# Patient Record
Sex: MALE | Race: WHITE
[De-identification: names, ages, dates, MRNs, and addresses within clinical notes are randomized per-mention and may not be internally consistent; named-entity substitution may affect disease eponyms.]

---

## 2019-02-12 ENCOUNTER — RECORD ABSTRACTING (OUTPATIENT)
Age: 64
End: 2019-02-12

## 2019-02-12 DIAGNOSIS — Z86.79 PERSONAL HISTORY OF OTHER DISEASES OF THE CIRCULATORY SYSTEM: ICD-10-CM

## 2019-02-12 DIAGNOSIS — Z86.39 PERSONAL HISTORY OF OTHER ENDOCRINE, NUTRITIONAL AND METABOLIC DISEASE: ICD-10-CM

## 2019-02-12 DIAGNOSIS — G56.03 CARPAL TUNNEL SYNDROM,BILATERAL UPPER LIMBS: ICD-10-CM

## 2019-02-12 DIAGNOSIS — I37.1 NONRHEUMATIC PULMONARY VALVE INSUFFICIENCY: ICD-10-CM

## 2019-02-12 DIAGNOSIS — Z87.39 PERSONAL HISTORY OF OTHER DISEASES OF THE MUSCULOSKELETAL SYSTEM AND CONNECTIVE TISSUE: ICD-10-CM

## 2019-02-12 DIAGNOSIS — Z87.448 PERSONAL HISTORY OF OTHER DISEASES OF URINARY SYSTEM: ICD-10-CM

## 2019-02-12 DIAGNOSIS — I82.0 BUDD-CHIARI SYNDROME: ICD-10-CM

## 2019-02-12 RX ORDER — MULTIVITAMIN
TABLET ORAL DAILY
Refills: 0 | Status: ACTIVE | COMMUNITY

## 2019-02-12 RX ORDER — FLUTICASONE PROPIONATE 50 UG/1
50 SPRAY, METERED NASAL DAILY
Refills: 0 | Status: ACTIVE | COMMUNITY

## 2019-02-14 ENCOUNTER — RX RENEWAL (OUTPATIENT)
Age: 64
End: 2019-02-14

## 2019-03-13 ENCOUNTER — RX RENEWAL (OUTPATIENT)
Age: 64
End: 2019-03-13

## 2019-03-13 DIAGNOSIS — E11.37X2 TYPE 2 DIABETES MELLITUS WITH DIABETIC MACULAR EDEMA, RESOLVED FOLLOWING TREATMENT, LEFT EYE: ICD-10-CM

## 2019-03-13 DIAGNOSIS — Z79.4 TYPE 2 DIABETES MELLITUS WITH DIABETIC MACULAR EDEMA, RESOLVED FOLLOWING TREATMENT, LEFT EYE: ICD-10-CM

## 2019-06-24 ENCOUNTER — RX RENEWAL (OUTPATIENT)
Age: 64
End: 2019-06-24

## 2019-08-18 ENCOUNTER — RX RENEWAL (OUTPATIENT)
Age: 64
End: 2019-08-18

## 2019-10-14 ENCOUNTER — APPOINTMENT (OUTPATIENT)
Dept: INTERNAL MEDICINE | Facility: CLINIC | Age: 64
End: 2019-10-14
Payer: COMMERCIAL

## 2019-10-14 VITALS
TEMPERATURE: 98.7 F | RESPIRATION RATE: 16 BRPM | OXYGEN SATURATION: 98 % | BODY MASS INDEX: 25.91 KG/M2 | HEART RATE: 72 BPM | SYSTOLIC BLOOD PRESSURE: 126 MMHG | DIASTOLIC BLOOD PRESSURE: 72 MMHG | WEIGHT: 171 LBS | HEIGHT: 68 IN

## 2019-10-14 PROCEDURE — 99214 OFFICE O/P EST MOD 30 MIN: CPT | Mod: 25

## 2019-10-14 PROCEDURE — 36415 COLL VENOUS BLD VENIPUNCTURE: CPT

## 2019-10-14 PROCEDURE — 90662 IIV NO PRSV INCREASED AG IM: CPT

## 2019-10-14 PROCEDURE — G0008: CPT

## 2019-10-14 NOTE — PHYSICAL EXAM
[No Acute Distress] : no acute distress [Well Developed] : well developed [Well Nourished] : well nourished [Normal Sclera/Conjunctiva] : normal sclera/conjunctiva [Well-Appearing] : well-appearing [PERRL] : pupils equal round and reactive to light [EOMI] : extraocular movements intact [Normal Oropharynx] : the oropharynx was normal [No JVD] : no jugular venous distention [Normal Outer Ear/Nose] : the outer ears and nose were normal in appearance [No Lymphadenopathy] : no lymphadenopathy [Thyroid Normal, No Nodules] : the thyroid was normal and there were no nodules present [Supple] : supple [No Respiratory Distress] : no respiratory distress  [No Accessory Muscle Use] : no accessory muscle use [Clear to Auscultation] : lungs were clear to auscultation bilaterally [Regular Rhythm] : with a regular rhythm [Normal Rate] : normal rate  [Normal S1, S2] : normal S1 and S2 [No Murmur] : no murmur heard [No Carotid Bruits] : no carotid bruits [No Abdominal Bruit] : a ~M bruit was not heard ~T in the abdomen [No Varicosities] : no varicosities [No Extremity Clubbing/Cyanosis] : no extremity clubbing/cyanosis [No Edema] : there was no peripheral edema [Pedal Pulses Present] : the pedal pulses are present [No Palpable Aorta] : no palpable aorta [Non Tender] : non-tender [Non-distended] : non-distended [Soft] : abdomen soft [No Masses] : no abdominal mass palpated [No HSM] : no HSM [Normal Anterior Cervical Nodes] : no anterior cervical lymphadenopathy [Normal Posterior Cervical Nodes] : no posterior cervical lymphadenopathy [Normal Bowel Sounds] : normal bowel sounds [No CVA Tenderness] : no CVA  tenderness [No Spinal Tenderness] : no spinal tenderness [No Joint Swelling] : no joint swelling [Grossly Normal Strength/Tone] : grossly normal strength/tone [No Focal Deficits] : no focal deficits [No Rash] : no rash [Coordination Grossly Intact] : coordination grossly intact [Deep Tendon Reflexes (DTR)] : deep tendon reflexes were 2+ and symmetric [Normal Affect] : the affect was normal [Normal Gait] : normal gait [Normal Insight/Judgement] : insight and judgment were intact [de-identified] : overweight

## 2019-10-14 NOTE — HISTORY OF PRESENT ILLNESS
[de-identified] : Patient is a 64 year old male with a past medical history as below who presents for fasting blood work and general follow-up. Patient states he is taking all medications as prescribed and denies any adverse reactions or side effects. He denies lightheadedness or dizziness on Irbesartan. He denies any new arthralgias or myalgias on Rosuvastatin. He denies any recent episodes of hypoglycemia. Patient notes recent MRI showed growth of the neuroendocrine tumor of the pancreas. Patient inquires about receiving the flu vaccine today. He states he has never received the Pneumonia vaccine.  [FreeTextEntry1] : fasting blood work and general follow-up\par

## 2019-10-14 NOTE — ASSESSMENT
[FreeTextEntry1] : Patient is a 64 year old male with a past medical history as above who presents for fasting blood work and general follow-up.\par

## 2019-10-14 NOTE — ADDENDUM
[FreeTextEntry1] : I, Bertin Webb, acted solely as scribe for Dr. Joss Freeman DO on this date 10/14/2019 10:50AM .\par \par All medical record entries made by the Scribe were at my, Dr. Joss Freeman DO direction and personally dictated by me on 10/14/2019 10:50AM. I have reviewed the chart and agree that the record accurately reflects my personal performance of the history, physical exam, assessment and plan. I have also personally directed, reviewed and agreed with the chart.\par

## 2019-10-14 NOTE — PLAN
[FreeTextEntry1] : Cardiology\par benign essential hypertension - continue Irbesartan 75mg p.o.q.d. - continue low sodium diet\par Endocrinology\par hyperlipidemia - continue Rosuvastatin Calcium 10mg p.o.q.d. - continue low cholesterol/low fat diet - check FLP and LFTs\par diabetes - continue Glipizide ER 10mg p.o.q.d. - check hemoglobin A1C\par vitamin D deficiency - continue vitamin D-3 2000 unit tablets p.o.q.d. with food - check vitamin D \par Oncology\par neuroendocrine tumor of pancreas - continue Afinitor 10mg p.o. as directed and Sandostatin 50 MCG/mL injections as directed - follow up with oncologist \par Urology\par BPH - continue Alfuzosin HCl ER 10mg p.o.q.d. - check PSA\par Infectious Disease\par flu vaccine - Fluzone High-Dose 0.5mL x 1 administered intramuscularly \par Pneumovax/Prevnar 13 - discussed, patient to receive the vaccine at next follow-up visit \par

## 2019-10-21 LAB
25(OH)D3 SERPL-MCNC: 66.5 NG/ML
ALBUMIN SERPL ELPH-MCNC: 4.3 G/DL
ALP BLD-CCNC: 101 U/L
ALT SERPL-CCNC: 26 U/L
ANION GAP SERPL CALC-SCNC: 16 MMOL/L
AST SERPL-CCNC: 43 U/L
BASOPHILS # BLD AUTO: 0.03 K/UL
BASOPHILS NFR BLD AUTO: 0.5 %
BILIRUB SERPL-MCNC: 0.5 MG/DL
BUN SERPL-MCNC: 27 MG/DL
CALCIUM SERPL-MCNC: 9.1 MG/DL
CHLORIDE SERPL-SCNC: 102 MMOL/L
CHOLEST SERPL-MCNC: 167 MG/DL
CHOLEST/HDLC SERPL: 2.7 RATIO
CO2 SERPL-SCNC: 21 MMOL/L
CREAT SERPL-MCNC: 1.5 MG/DL
EOSINOPHIL # BLD AUTO: 0.05 K/UL
EOSINOPHIL NFR BLD AUTO: 0.8 %
ESTIMATED AVERAGE GLUCOSE: 148 MG/DL
GLUCOSE SERPL-MCNC: 119 MG/DL
HBA1C MFR BLD HPLC: 6.8 %
HCT VFR BLD CALC: 39.6 %
HDLC SERPL-MCNC: 61 MG/DL
HGB BLD-MCNC: 12 G/DL
IMM GRANULOCYTES NFR BLD AUTO: 0.3 %
LDLC SERPL CALC-MCNC: 91 MG/DL
LYMPHOCYTES # BLD AUTO: 1.17 K/UL
LYMPHOCYTES NFR BLD AUTO: 18.7 %
MAN DIFF?: NORMAL
MCHC RBC-ENTMCNC: 25.2 PG
MCHC RBC-ENTMCNC: 30.3 GM/DL
MCV RBC AUTO: 83.2 FL
MONOCYTES # BLD AUTO: 0.51 K/UL
MONOCYTES NFR BLD AUTO: 8.1 %
NEUTROPHILS # BLD AUTO: 4.49 K/UL
NEUTROPHILS NFR BLD AUTO: 71.6 %
PLATELET # BLD AUTO: 163 K/UL
POTASSIUM SERPL-SCNC: 4.6 MMOL/L
PROT SERPL-MCNC: 7 G/DL
PSA SERPL-MCNC: 1.41 NG/ML
RBC # BLD: 4.76 M/UL
RBC # FLD: 13.5 %
SODIUM SERPL-SCNC: 139 MMOL/L
TRIGL SERPL-MCNC: 75 MG/DL
TSH SERPL-ACNC: 2.42 UIU/ML
WBC # FLD AUTO: 6.27 K/UL

## 2019-12-10 ENCOUNTER — RX RENEWAL (OUTPATIENT)
Age: 64
End: 2019-12-10

## 2020-02-17 ENCOUNTER — RX RENEWAL (OUTPATIENT)
Age: 65
End: 2020-02-17

## 2020-02-18 ENCOUNTER — RX RENEWAL (OUTPATIENT)
Age: 65
End: 2020-02-18

## 2020-03-09 ENCOUNTER — RX RENEWAL (OUTPATIENT)
Age: 65
End: 2020-03-09

## 2020-04-07 ENCOUNTER — RX RENEWAL (OUTPATIENT)
Age: 65
End: 2020-04-07

## 2020-04-16 ENCOUNTER — RX RENEWAL (OUTPATIENT)
Age: 65
End: 2020-04-16

## 2020-05-07 ENCOUNTER — RX RENEWAL (OUTPATIENT)
Age: 65
End: 2020-05-07

## 2020-05-13 ENCOUNTER — APPOINTMENT (OUTPATIENT)
Dept: INTERNAL MEDICINE | Facility: CLINIC | Age: 65
End: 2020-05-13
Payer: COMMERCIAL

## 2020-05-13 VITALS
OXYGEN SATURATION: 98 % | WEIGHT: 177 LBS | HEART RATE: 85 BPM | DIASTOLIC BLOOD PRESSURE: 95 MMHG | RESPIRATION RATE: 16 BRPM | TEMPERATURE: 97.9 F | BODY MASS INDEX: 26.83 KG/M2 | HEIGHT: 68 IN | SYSTOLIC BLOOD PRESSURE: 148 MMHG

## 2020-05-13 PROCEDURE — 99214 OFFICE O/P EST MOD 30 MIN: CPT | Mod: 25

## 2020-05-13 PROCEDURE — 36415 COLL VENOUS BLD VENIPUNCTURE: CPT

## 2020-05-15 LAB
25(OH)D3 SERPL-MCNC: 77.3 NG/ML
ALBUMIN SERPL ELPH-MCNC: 4.2 G/DL
ALP BLD-CCNC: 64 U/L
ALT SERPL-CCNC: 28 U/L
ANION GAP SERPL CALC-SCNC: 12 MMOL/L
AST SERPL-CCNC: 42 U/L
BASOPHILS # BLD AUTO: 0.05 K/UL
BASOPHILS NFR BLD AUTO: 1 %
BILIRUB SERPL-MCNC: 0.5 MG/DL
BUN SERPL-MCNC: 24 MG/DL
CALCIUM SERPL-MCNC: 9.4 MG/DL
CHLORIDE SERPL-SCNC: 103 MMOL/L
CHOLEST SERPL-MCNC: 121 MG/DL
CHOLEST/HDLC SERPL: 2.1 RATIO
CO2 SERPL-SCNC: 26 MMOL/L
CREAT SERPL-MCNC: 1.61 MG/DL
CREAT SPEC-SCNC: 112 MG/DL
EOSINOPHIL # BLD AUTO: 0.53 K/UL
EOSINOPHIL NFR BLD AUTO: 10.2 %
GLUCOSE SERPL-MCNC: 109 MG/DL
HCT VFR BLD CALC: 44 %
HDLC SERPL-MCNC: 56 MG/DL
HGB BLD-MCNC: 14.1 G/DL
IMM GRANULOCYTES NFR BLD AUTO: 0 %
LDLC SERPL CALC-MCNC: 48 MG/DL
LYMPHOCYTES # BLD AUTO: 1.57 K/UL
LYMPHOCYTES NFR BLD AUTO: 30.2 %
MAN DIFF?: NORMAL
MCHC RBC-ENTMCNC: 32 GM/DL
MCHC RBC-ENTMCNC: 34 PG
MCV RBC AUTO: 106 FL
MICROALBUMIN 24H UR DL<=1MG/L-MCNC: 1.8 MG/DL
MICROALBUMIN/CREAT 24H UR-RTO: 16 MG/G
MONOCYTES # BLD AUTO: 0.32 K/UL
MONOCYTES NFR BLD AUTO: 6.2 %
NEUTROPHILS # BLD AUTO: 2.73 K/UL
NEUTROPHILS NFR BLD AUTO: 52.4 %
PLATELET # BLD AUTO: 120 K/UL
POTASSIUM SERPL-SCNC: 4.3 MMOL/L
PROT SERPL-MCNC: 7 G/DL
PSA SERPL-MCNC: 1.2 NG/ML
RBC # BLD: 4.15 M/UL
RBC # FLD: 14.6 %
SARS-COV-2 IGG SERPL IA-ACNC: <0.1 INDEX
SARS-COV-2 IGG SERPL QL IA: NEGATIVE
SODIUM SERPL-SCNC: 141 MMOL/L
TRIGL SERPL-MCNC: 81 MG/DL
TSH SERPL-ACNC: 4.4 UIU/ML
WBC # FLD AUTO: 5.2 K/UL

## 2020-05-17 NOTE — REVIEW OF SYSTEMS
[Heartburn] : heartburn [Skin Rash] : skin rash [Negative] : Psychiatric [de-identified] : left lower extremity

## 2020-05-17 NOTE — HISTORY OF PRESENT ILLNESS
[FreeTextEntry1] : follow up, fasting bloodwork [de-identified] : 65 year old male PMH as below presents for general follow up, fasting blood work and RX refills.  He was started on a new medication to treat his neuroendocrine tumor (sutent).  This was prescribed by his oncologist, Dr. Llamas because previous meds became ineffective with advancement of his tumor with spread to his liver.  He has tolerated sutent, but it has caused him to have GERD which has been successfully treated with omeprazole.  He has gained 6 pounds since his last visit in October 2019.  He is trying to watch the carbohydrates and sugar in his diet because of DM2.  He continue to take glipizide ER10 mg daily and has not had episodes of hypoglycemia.  He denies myalgias/arthralgias on rosuvastatin.  He is taking irbesartan for HTN and denies dizziness/lightheadedness.  His GERD is well controlled on omeprazole.

## 2020-05-17 NOTE — DISCUSSION/SUMMARY
[FreeTextEntry1] : Called patient to review recent bloodwork.  They did not answer the phone and a voice message was left asking them to return my call.\par AK\par

## 2020-05-17 NOTE — PLAN
[FreeTextEntry1] : oncology\par neuroendocrine tumor-follow up with oncologist, continue sutent per Dr. Llamas\par \par Cardiology\par hyperlipidemia-continue rosuvastatin-check FLP and LFTs\par HTN-continue irbesartan, RX refilled, low sodium diet\par \par Endocrinology\par DM2-continue glipizide ER 10 qd, RX refilled, check a1c, urine for microalbumin to creatinine ratio, wt loss, low carb diet\par \par urology\par continue alfuzosin, check PSA\par \par Gastroenterology\par GERD-secondary to sutent-stable on omeprazole, antireflux measures discussed\par \par Dermatology\par left LE rash-?eczyma-trial of OTC cortisone cream, if not improved recommend dermatology consultation

## 2020-05-17 NOTE — ASSESSMENT
[FreeTextEntry1] : 65 year old male presents for fasting blood work, RX refills, BP check and general follow up.

## 2020-05-17 NOTE — PHYSICAL EXAM
[Normal] : normal gait, coordination grossly intact, no focal deficits and deep tendon reflexes were 2+ and symmetric [de-identified] : left lower extremity erythematous plaque (chronic)

## 2020-11-09 ENCOUNTER — RX RENEWAL (OUTPATIENT)
Age: 65
End: 2020-11-09

## 2020-11-10 ENCOUNTER — RX RENEWAL (OUTPATIENT)
Age: 65
End: 2020-11-10

## 2021-02-12 ENCOUNTER — APPOINTMENT (OUTPATIENT)
Dept: INTERNAL MEDICINE | Facility: CLINIC | Age: 66
End: 2021-02-12
Payer: MEDICARE

## 2021-02-12 ENCOUNTER — MED ADMIN CHARGE (OUTPATIENT)
Age: 66
End: 2021-02-12

## 2021-02-12 VITALS
SYSTOLIC BLOOD PRESSURE: 140 MMHG | OXYGEN SATURATION: 98 % | TEMPERATURE: 97.2 F | HEART RATE: 73 BPM | HEIGHT: 67 IN | DIASTOLIC BLOOD PRESSURE: 87 MMHG

## 2021-02-12 DIAGNOSIS — Z23 ENCOUNTER FOR IMMUNIZATION: ICD-10-CM

## 2021-02-12 DIAGNOSIS — E55.9 VITAMIN D DEFICIENCY, UNSPECIFIED: ICD-10-CM

## 2021-02-12 DIAGNOSIS — M81.0 AGE-RELATED OSTEOPOROSIS W/OUT CURRENT PATHOLOGICAL FRACTURE: ICD-10-CM

## 2021-02-12 PROCEDURE — 99214 OFFICE O/P EST MOD 30 MIN: CPT | Mod: 25

## 2021-02-12 PROCEDURE — G0442 ANNUAL ALCOHOL SCREEN 15 MIN: CPT

## 2021-02-12 PROCEDURE — G0009: CPT

## 2021-02-12 PROCEDURE — 90670 PCV13 VACCINE IM: CPT

## 2021-02-12 PROCEDURE — 36415 COLL VENOUS BLD VENIPUNCTURE: CPT

## 2021-02-12 PROCEDURE — 99072 ADDL SUPL MATRL&STAF TM PHE: CPT

## 2021-02-17 ENCOUNTER — NON-APPOINTMENT (OUTPATIENT)
Age: 66
End: 2021-02-17

## 2021-02-17 ENCOUNTER — TRANSCRIPTION ENCOUNTER (OUTPATIENT)
Age: 66
End: 2021-02-17

## 2021-02-17 NOTE — HISTORY OF PRESENT ILLNESS
[FreeTextEntry1] : Fasting labs medication renewal  [de-identified] : Mr. STOCK is a 66 year  male, who present to the office for fasting labs.\par Currently under care of Dr. Padron - has catheter  secondary to dysfunctional bladder \par Takes medication as directed \par Denies fever, chills, nausea or vomiting\par Denies getting PCV 13 shot

## 2021-02-17 NOTE — ASSESSMENT
[FreeTextEntry1] : Mr. STOCK is a 66 year  male, who present to the office for fasting labs and medication renewal

## 2021-02-17 NOTE — COUNSELING
[Potential consequences of obesity discussed] : Potential consequences of obesity discussed [Good understanding] : Patient has a good understanding of disease, goals and obesity follow-up plan [FreeTextEntry2] : ADA diet

## 2021-02-17 NOTE — HEALTH RISK ASSESSMENT
[No falls in past year] : Patient reported no falls in the past year [Patient reported bone density results were abnormal] : Patient reported bone density results were abnormal [Patient reported colonoscopy was abnormal] : Patient reported colonoscopy was abnormal [HIV test declined] : HIV test declined [Hepatitis C test declined] : Hepatitis C test declined [With Family] : lives with family [Fully functional (bathing, dressing, toileting, transferring, walking, feeding)] : Fully functional (bathing, dressing, toileting, transferring, walking, feeding) [Fully functional (using the telephone, shopping, preparing meals, housekeeping, doing laundry, using] : Fully functional and needs no help or supervision to perform IADLs (using the telephone, shopping, preparing meals, housekeeping, doing laundry, using transportation, managing medications and managing finances) [Yes] : Yes [2 - 4 times a month (2 pts)] : 2-4 times a month (2 points) [3 or 4 (1 pt)] : 3 or 4  (1 point) [Never (0 pts)] : Never (0 points) [No] : In the past 12 months have you used drugs other than those required for medical reasons? No [0] : 2) Feeling down, depressed, or hopeless: Not at all (0) [] : No [Audit-CScore] : 3 [[id number]] : 0 [Change in mental status noted] : No change in mental status noted [BoneDensityDate] : 05/18 [BoneDensityComments] : osteoporosis  [ColonoscopyDate] : 12/19 [ColonoscopyComments] : polyps repeat 5 years Dr. Alicea

## 2021-02-17 NOTE — PLAN
[FreeTextEntry1] : DIMPLE was provided education about general treatment options. DIMPLE was advised long term complication of diabetes and its comorbidities.  Patient was advised to routine follow up appointment with podiatry and opthalmology.\par should have repeat hgb a1c every 3 months and microalbumin every 6 months.  DIMPLE STOCK was advised to call me if any concerns about their diabetes. \par Refilled medication.\par Check labs \par Goal LDL less than 70\par \par Cardio - Hypertension -  Patient was educated about hypertension and the importance of controlling the pressure through lifestyle modification which include low sodium  diet and  aerobic  exercise.  Also discussed the use of prescription medication which included their benefits and their side effects. We discussed the use of ASA 81 mg daily.   Having a BMI less than or equal to 25. \par \par Immunization PCV 13 given - education provided \par \par Patient  education  - COVID-19   Counseling and education provided to the patient.  Advised sign and symptoms of the virus.  Advised contact precautions.  Educated patient on proper hand washing and to participate in social distancing. \par Patient is in full awareness of the plan and agrees to it.  All pt question was answered.  \par \par  Follow up with  a COVID vax

## 2021-02-19 ENCOUNTER — TRANSCRIPTION ENCOUNTER (OUTPATIENT)
Age: 66
End: 2021-02-19

## 2021-02-20 ENCOUNTER — EMERGENCY (EMERGENCY)
Facility: HOSPITAL | Age: 66
LOS: 1 days | Discharge: HOME | End: 2021-02-20
Attending: STUDENT IN AN ORGANIZED HEALTH CARE EDUCATION/TRAINING PROGRAM | Admitting: STUDENT IN AN ORGANIZED HEALTH CARE EDUCATION/TRAINING PROGRAM
Payer: MEDICARE

## 2021-02-20 VITALS
RESPIRATION RATE: 19 BRPM | HEART RATE: 79 BPM | TEMPERATURE: 98 F | SYSTOLIC BLOOD PRESSURE: 101 MMHG | DIASTOLIC BLOOD PRESSURE: 65 MMHG | OXYGEN SATURATION: 98 %

## 2021-02-20 VITALS
TEMPERATURE: 98 F | WEIGHT: 184.97 LBS | OXYGEN SATURATION: 98 % | SYSTOLIC BLOOD PRESSURE: 100 MMHG | HEART RATE: 76 BPM | HEIGHT: 68 IN | DIASTOLIC BLOOD PRESSURE: 66 MMHG | RESPIRATION RATE: 18 BRPM

## 2021-02-20 LAB
ALBUMIN SERPL ELPH-MCNC: 3.3 G/DL — SIGNIFICANT CHANGE UP (ref 3.3–5)
ALP SERPL-CCNC: 64 U/L — SIGNIFICANT CHANGE UP (ref 40–120)
ALT FLD-CCNC: 21 U/L — SIGNIFICANT CHANGE UP (ref 12–78)
ANION GAP SERPL CALC-SCNC: 10 MMOL/L — SIGNIFICANT CHANGE UP (ref 5–17)
APTT BLD: 31.4 SEC — SIGNIFICANT CHANGE UP (ref 27.5–35.5)
AST SERPL-CCNC: 23 U/L — SIGNIFICANT CHANGE UP (ref 15–37)
BASOPHILS # BLD AUTO: 0.03 K/UL — SIGNIFICANT CHANGE UP (ref 0–0.2)
BASOPHILS NFR BLD AUTO: 0.2 % — SIGNIFICANT CHANGE UP (ref 0–2)
BILIRUB SERPL-MCNC: 0.4 MG/DL — SIGNIFICANT CHANGE UP (ref 0.2–1.2)
BLD GP AB SCN SERPL QL: SIGNIFICANT CHANGE UP
BUN SERPL-MCNC: 34 MG/DL — HIGH (ref 7–23)
CALCIUM SERPL-MCNC: 9.3 MG/DL — SIGNIFICANT CHANGE UP (ref 8.5–10.1)
CHLORIDE SERPL-SCNC: 101 MMOL/L — SIGNIFICANT CHANGE UP (ref 96–108)
CO2 SERPL-SCNC: 24 MMOL/L — SIGNIFICANT CHANGE UP (ref 22–31)
CREAT SERPL-MCNC: 2 MG/DL — HIGH (ref 0.5–1.3)
EOSINOPHIL # BLD AUTO: 0.03 K/UL — SIGNIFICANT CHANGE UP (ref 0–0.5)
EOSINOPHIL NFR BLD AUTO: 0.2 % — SIGNIFICANT CHANGE UP (ref 0–6)
GLUCOSE SERPL-MCNC: 158 MG/DL — HIGH (ref 70–99)
HCT VFR BLD CALC: 36.2 % — LOW (ref 39–50)
HGB BLD-MCNC: 11.9 G/DL — LOW (ref 13–17)
IMM GRANULOCYTES NFR BLD AUTO: 0.4 % — SIGNIFICANT CHANGE UP (ref 0–1.5)
INR BLD: 1.09 RATIO — SIGNIFICANT CHANGE UP (ref 0.88–1.16)
LIDOCAIN IGE QN: 97 U/L — SIGNIFICANT CHANGE UP (ref 73–393)
LYMPHOCYTES # BLD AUTO: 0.88 K/UL — LOW (ref 1–3.3)
LYMPHOCYTES # BLD AUTO: 7 % — LOW (ref 13–44)
MCHC RBC-ENTMCNC: 31.2 PG — SIGNIFICANT CHANGE UP (ref 27–34)
MCHC RBC-ENTMCNC: 32.9 GM/DL — SIGNIFICANT CHANGE UP (ref 32–36)
MCV RBC AUTO: 95 FL — SIGNIFICANT CHANGE UP (ref 80–100)
MONOCYTES # BLD AUTO: 0.79 K/UL — SIGNIFICANT CHANGE UP (ref 0–0.9)
MONOCYTES NFR BLD AUTO: 6.3 % — SIGNIFICANT CHANGE UP (ref 2–14)
NEUTROPHILS # BLD AUTO: 10.82 K/UL — HIGH (ref 1.8–7.4)
NEUTROPHILS NFR BLD AUTO: 85.9 % — HIGH (ref 43–77)
NRBC # BLD: 0 /100 WBCS — SIGNIFICANT CHANGE UP (ref 0–0)
PLATELET # BLD AUTO: 260 K/UL — SIGNIFICANT CHANGE UP (ref 150–400)
POTASSIUM SERPL-MCNC: 3.9 MMOL/L — SIGNIFICANT CHANGE UP (ref 3.5–5.3)
POTASSIUM SERPL-SCNC: 3.9 MMOL/L — SIGNIFICANT CHANGE UP (ref 3.5–5.3)
PROT SERPL-MCNC: 8 G/DL — SIGNIFICANT CHANGE UP (ref 6–8.3)
PROTHROM AB SERPL-ACNC: 12.7 SEC — SIGNIFICANT CHANGE UP (ref 10.6–13.6)
RBC # BLD: 3.81 M/UL — LOW (ref 4.2–5.8)
RBC # FLD: 12.3 % — SIGNIFICANT CHANGE UP (ref 10.3–14.5)
SODIUM SERPL-SCNC: 135 MMOL/L — SIGNIFICANT CHANGE UP (ref 135–145)
WBC # BLD: 12.6 K/UL — HIGH (ref 3.8–10.5)
WBC # FLD AUTO: 12.6 K/UL — HIGH (ref 3.8–10.5)

## 2021-02-20 PROCEDURE — 96360 HYDRATION IV INFUSION INIT: CPT

## 2021-02-20 PROCEDURE — 74176 CT ABD & PELVIS W/O CONTRAST: CPT

## 2021-02-20 PROCEDURE — 99284 EMERGENCY DEPT VISIT MOD MDM: CPT

## 2021-02-20 PROCEDURE — 83690 ASSAY OF LIPASE: CPT

## 2021-02-20 PROCEDURE — 80053 COMPREHEN METABOLIC PANEL: CPT

## 2021-02-20 PROCEDURE — 74176 CT ABD & PELVIS W/O CONTRAST: CPT | Mod: 26,MA

## 2021-02-20 PROCEDURE — 85610 PROTHROMBIN TIME: CPT

## 2021-02-20 PROCEDURE — 36415 COLL VENOUS BLD VENIPUNCTURE: CPT

## 2021-02-20 PROCEDURE — 85025 COMPLETE CBC W/AUTO DIFF WBC: CPT

## 2021-02-20 PROCEDURE — 86850 RBC ANTIBODY SCREEN: CPT

## 2021-02-20 PROCEDURE — 86901 BLOOD TYPING SEROLOGIC RH(D): CPT

## 2021-02-20 PROCEDURE — 85730 THROMBOPLASTIN TIME PARTIAL: CPT

## 2021-02-20 PROCEDURE — 99284 EMERGENCY DEPT VISIT MOD MDM: CPT | Mod: 25

## 2021-02-20 PROCEDURE — 86900 BLOOD TYPING SEROLOGIC ABO: CPT

## 2021-02-20 RX ORDER — MULTIVIT WITH MIN/MFOLATE/K2 340-15/3 G
1 POWDER (GRAM) ORAL ONCE
Refills: 0 | Status: COMPLETED | OUTPATIENT
Start: 2021-02-20 | End: 2021-02-20

## 2021-02-20 RX ORDER — SODIUM CHLORIDE 9 MG/ML
1000 INJECTION INTRAMUSCULAR; INTRAVENOUS; SUBCUTANEOUS ONCE
Refills: 0 | Status: COMPLETED | OUTPATIENT
Start: 2021-02-20 | End: 2021-02-20

## 2021-02-20 RX ORDER — IOHEXOL 300 MG/ML
30 INJECTION, SOLUTION INTRAVENOUS ONCE
Refills: 0 | Status: COMPLETED | OUTPATIENT
Start: 2021-02-20 | End: 2021-02-20

## 2021-02-20 RX ADMIN — SODIUM CHLORIDE 1000 MILLILITER(S): 9 INJECTION INTRAMUSCULAR; INTRAVENOUS; SUBCUTANEOUS at 12:14

## 2021-02-20 RX ADMIN — SODIUM CHLORIDE 1000 MILLILITER(S): 9 INJECTION INTRAMUSCULAR; INTRAVENOUS; SUBCUTANEOUS at 13:00

## 2021-02-20 RX ADMIN — IOHEXOL 30 MILLILITER(S): 300 INJECTION, SOLUTION INTRAVENOUS at 12:14

## 2021-02-20 RX ADMIN — Medication 1 BOTTLE: at 15:19

## 2021-02-20 NOTE — ED ADULT NURSE NOTE - NSFALLRSKASSESSDT_ED_ALL_ED
Please see fax from [facility] requesting medication alternative script written for the below medication.     Medication: One touch Delica 30G Lancets   QTY: 100  SIG: Test blood sugars 2 times daily as directed.   Diagnosis E11.9    Note: Insurance will only allow once a day testing. Please send new rx for once a day testing.        20-Feb-2021 12:13

## 2021-02-20 NOTE — ED PROVIDER NOTE - OBJECTIVE STATEMENT
66 M history of hypertension, hyperlipidemia, DM, mari-pancreatic neuroendocrine tumor here complaining of constipation. patient reports he normally has 1 bowel movement a day but for the past 6 days has been unable to have a bowel movement. He reports worsening abdominal pain and back pain. he has been passing flatus. He has tried several otc remedies such as dulcolax, Miralax and a home enema. he report his tumor is enlarging and he is being set up for radiation. he was having difficulty urinating and had a Hale catheter placed.    PMD Sweetie  Onc: Rad  Uro: Nereida

## 2021-02-20 NOTE — ED PROVIDER NOTE - NSFOLLOWUPINSTRUCTIONS_ED_ALL_ED_FT
Please follow up with your primary medical doctor / outpatient team.    Constipation    WHAT YOU NEED TO KNOW:    Constipation is when you have hard, dry bowel movements, or you go longer than usual between bowel movements.     DISCHARGE INSTRUCTIONS:    Return to the emergency department if:     You have blood in your bowel movements.      You have a fever and abdominal pain with the constipation.    Contact your healthcare provider if:     Your constipation gets worse.       You start to vomit.      You have questions or concerns about your condition or care.    Medicines:     Medicine such as a laxative may help relax and loosen your intestines to help you have a bowel movement. Your provider may recommend you only use laxatives for a short time. Long-term use may make your bowels dependent on the medicine.      Take your medicine as directed. Contact your healthcare provider if you think your medicine is not helping or if you have side effects. Tell him of her if you are allergic to any medicine. Keep a list of the medicines, vitamins, and herbs you take. Include the amounts, and when and why you take them. Bring the list or the pill bottles to follow-up visits. Carry your medicine list with you in case of an emergency.    Relieve constipation:     A suppository may be used to help soften your bowel movements. This may make them easier to pass. A suppository is guided into your rectum through your anus.Suppository for Constipation           An enema is liquid medicine used to clear bowel movement from your rectum. The medicine is put into your rectum through your anus.Enemas         Prevent constipation:     Drink liquids as directed. You may need to drink extra liquids to help soften and move your bowels. Ask how much liquid to drink each day and which liquids are best for you.       Eat high-fiber foods. This may help decrease constipation by adding bulk to your bowel movements. High-fiber foods include fruit, vegetables, whole-grain breads and cereals, and beans. Your healthcare provider or dietitian can help you create a high-fiber meal plan. Your provider may also recommend a fiber supplement if you cannot get enough fiber from food.        Exercise regularly. Regular physical activity can help stimulate your intestines. Ask which exercises are best for you.    Schedule a time each day to have a bowel movement. This may help train your body to have regular bowel movements. Bend forward while you are on the toilet to help move the bowel movement out. Sit on the toilet for at least 10 minutes, even if you do not have a bowel movement.     Follow up with your healthcare provider as directed: Write down your questions so you remember to ask them during your visits.

## 2021-02-20 NOTE — ED ADULT TRIAGE NOTE - WEIGHT IN KG
Spoke to pharmacy, quantity changed to 32 so that pt has an even amount of antibiotics for future dental appts.   83.9

## 2021-02-20 NOTE — ED PROVIDER NOTE - PATIENT PORTAL LINK FT
You can access the FollowMyHealth Patient Portal offered by [facility] by registering at the following website: [email or web address]. By joining [facility]’s FollowMyHealth portal, you will also be able to view your health information using other applications (apps) compatible with our system.

## 2021-02-20 NOTE — ED PROVIDER NOTE - PHYSICAL EXAMINATION
General:  Comfortable, no acute distress.  Head:  Normocephalic.  Eyes:  Conjunctiva pink, no icterus.  Ear, Nose, Mouth, Throat:  No obvious congestion.  Cardiovascular:  Regular rate, no obvious murmur.  Respiratory:  Clear to auscultation with good air entry bilaterally.  Abdomen:  Soft, non-distended.  No rebound, no guarding. Monge's / McBurney's negative. +bl lower abdominal tenderness to palpation.  Urologic: No CVA tenderness.  Musculoskeletal:  No deformity, edema, or calf tenderness.  Neurologic: Alert and oriented, gross motor and sensory intact.  Skin:  Warm and dry.  Psychiatric: Normal affect.

## 2021-02-20 NOTE — ED PROVIDER NOTE - PROGRESS NOTE DETAILS
results discussed with patient, no bowel obstruction, just constipation. patient given Mag Citrate and had BM.

## 2021-02-20 NOTE — ED PROVIDER NOTE - NS ED ROS FT
Constitutional: No fever.  Neurological: No headache.  Eyes: No vision changes.   Ears, Nose, Mouth, Throat: No congestion.  Cardiovascular: No chest pain.  Respiratory: No difficulty breathing.  Gastrointestinal: No nausea or vomiting. + abdominal pain.  Genitourinary: No dysuria.  Musculoskeletal: No joint pain.  Integumentary (skin and/or breast): No rash.

## 2021-02-20 NOTE — ED PROVIDER NOTE - PMH
BPH (benign prostatic hyperplasia)    DM (diabetes mellitus)    HLD (hyperlipidemia)    Hypertension    Light chain nephropathy    Renal insufficiency

## 2021-02-20 NOTE — ED PROVIDER NOTE - CARE PROVIDER_API CALL
Joss Freeman (DO)  Medicine  [facility]  [address], [address]  [place] [address]  Phone: [phone]  Fax: [phone]  Established Patient  Follow Up Time: 1-3 Days

## 2021-02-20 NOTE — ED ADULT NURSE NOTE - ED CARDIAC CAPILLARY REFILL
02/13/2020  Adalgisa Wright is a 67 y.o., female.    Anesthesia Evaluation    I have reviewed the Patient Summary Reports.    I have reviewed the Nursing Notes.   I have reviewed the Medications.     Review of Systems  Anesthesia Hx:  No problems with previous Anesthesia    Social:  Non-Smoker    Hematology/Oncology:         -- Anemia:   Cardiovascular:   Hypertension Valvular problems/Murmurs hyperlipidemia Aortic Valve Replacement 2003   Pulmonary:  Pulmonary Normal    Renal/:  Renal/ Normal     Hepatic/GI:   GERD Rectal adenocarcinoma    Irritable bowel syndrome with constipation   Musculoskeletal:   Arthritis  DDD (degenerative disc disease), cervical   Neurological:  Neurology Normal    Endocrine:  Endocrine Normal        Physical Exam  General:  Well nourished    Airway/Jaw/Neck:  Airway Findings: Mouth Opening: Normal Tongue: Normal  General Airway Assessment: Adult  Mallampati: II  TM Distance: 4 - 6 cm  Jaw/Neck Findings:  Neck ROM: Normal ROM      Dental:  Dental Findings: In tact, Upper partial dentures   Chest/Lungs:  Chest/Lungs Findings: Clear to auscultation, Normal Respiratory Rate     Heart/Vascular:  Heart Findings: Rate: Normal  Rhythm: Regular Rhythm  Sounds: Normal        Mental Status:  Mental Status Findings:  Cooperative, Alert and Oriented         Anesthesia Plan  Type of Anesthesia, risks & benefits discussed:  Anesthesia Type:  general  Patient's Preference:   Intra-op Monitoring Plan: standard ASA monitors and arterial line  Intra-op Monitoring Plan Comments:   Post Op Pain Control Plan: multimodal analgesia and per primary service following discharge from PACU  Post Op Pain Control Plan Comments:   Induction:   IV  Beta Blocker:  Patient is on a Beta-Blocker and has received one dose within the past 24 hours (No further documentation required).       Informed Consent: Patient  understands risks and agrees with Anesthesia plan.  Questions answered. Anesthesia consent signed with patient.  ASA Score: 2     Day of Surgery Review of History & Physical:  There are no significant changes.          Ready For Surgery From Anesthesia Perspective.         Patient Active Problem List   Diagnosis    Anismus    Benign neoplasm of ascending colon    Postmenopausal osteoporosis    Irritable bowel syndrome with constipation    Hypertension    Aortic valve replaced    Chronic anticoagulation    DDD (degenerative disc disease), cervical    Gastroesophageal reflux disease without esophagitis    Iron deficiency anemia due to chronic blood loss    HLD (hyperlipidemia)    Rectal adenocarcinoma       Chemistry        Component Value Date/Time     01/27/2020 0931    K 4.0 01/27/2020 0931     01/27/2020 0931    CO2 28 01/27/2020 0931    BUN 11 01/27/2020 0931    CREATININE 0.8 01/27/2020 0931    GLU 97 01/27/2020 0931        Component Value Date/Time    CALCIUM 9.8 01/27/2020 0931    ALKPHOS 133 01/27/2020 0931    AST 23 01/27/2020 0931    ALT 22 01/27/2020 0931    BILITOT 0.4 01/27/2020 0931    ESTGFRAFRICA >60 01/27/2020 0931    EGFRNONAA >60 01/27/2020 0931        Lab Results   Component Value Date    WBC 2.57 (L) 01/08/2020    HGB 11.9 (L) 01/08/2020    HCT 38.0 01/08/2020     (H) 01/08/2020     (L) 01/08/2020          2 seconds or less

## 2021-02-20 NOTE — ED ADULT NURSE REASSESSMENT NOTE - NS ED NURSE REASSESS COMMENT FT1
Pt awake and alert from home c/o constipation, pt has recent dx of tumor near pancreas, pt drinking for ct-scan, pt has coulter that is draining and in place r/t kidney function will monitor

## 2021-02-20 NOTE — ED ADULT TRIAGE NOTE - CHIEF COMPLAINT QUOTE
C/O constipation since 7 days.  Has tried dulcolax and enema without success.  No c/o N/V.  Also states lower back pain

## 2021-02-20 NOTE — ED ADULT NURSE NOTE - OBJECTIVE STATEMENT
Pt awake and alert from home hasn't had a BM since Sunday pt doesn't have pain at this time, recently dx with tumor near pancreas, pt also has coulter catheter in place r/t elevated kidney levels and not being able to empty bladder, coulter draining at this time.

## 2021-02-21 ENCOUNTER — NON-APPOINTMENT (OUTPATIENT)
Age: 66
End: 2021-02-21

## 2021-02-22 PROBLEM — E78.5 HYPERLIPIDEMIA, UNSPECIFIED: Chronic | Status: ACTIVE | Noted: 2021-02-20

## 2021-02-22 PROBLEM — E11.9 TYPE 2 DIABETES MELLITUS WITHOUT COMPLICATIONS: Chronic | Status: ACTIVE | Noted: 2021-02-20

## 2021-02-23 ENCOUNTER — NON-APPOINTMENT (OUTPATIENT)
Age: 66
End: 2021-02-23

## 2021-02-23 LAB
25(OH)D3 SERPL-MCNC: 62.5 NG/ML
ALBUMIN SERPL ELPH-MCNC: 3.8 G/DL
ALP BLD-CCNC: 60 U/L
ALT SERPL-CCNC: 20 U/L
ANION GAP SERPL CALC-SCNC: 12 MMOL/L
AST SERPL-CCNC: 25 U/L
BASOPHILS # BLD AUTO: 0.05 K/UL
BASOPHILS NFR BLD AUTO: 0.7 %
BILIRUB SERPL-MCNC: 0.2 MG/DL
BUN SERPL-MCNC: 39 MG/DL
CALCIUM SERPL-MCNC: 9.1 MG/DL
CHLORIDE SERPL-SCNC: 104 MMOL/L
CHOLEST SERPL-MCNC: 135 MG/DL
CO2 SERPL-SCNC: 23 MMOL/L
CREAT SERPL-MCNC: 2.04 MG/DL
EOSINOPHIL # BLD AUTO: 0.18 K/UL
EOSINOPHIL NFR BLD AUTO: 2.4 %
ESTIMATED AVERAGE GLUCOSE: 114 MG/DL
GLUCOSE SERPL-MCNC: 114 MG/DL
HBA1C MFR BLD HPLC: 5.6 %
HCT VFR BLD CALC: 36.6 %
HDLC SERPL-MCNC: 65 MG/DL
HGB BLD-MCNC: 11.2 G/DL
IMM GRANULOCYTES NFR BLD AUTO: 0.3 %
LDLC SERPL CALC-MCNC: 62 MG/DL
LYMPHOCYTES # BLD AUTO: 1.29 K/UL
LYMPHOCYTES NFR BLD AUTO: 17.1 %
MAN DIFF?: NORMAL
MCHC RBC-ENTMCNC: 30.6 GM/DL
MCHC RBC-ENTMCNC: 31.7 PG
MCV RBC AUTO: 103.7 FL
MONOCYTES # BLD AUTO: 0.55 K/UL
MONOCYTES NFR BLD AUTO: 7.3 %
NEUTROPHILS # BLD AUTO: 5.47 K/UL
NEUTROPHILS NFR BLD AUTO: 72.2 %
NONHDLC SERPL-MCNC: 70 MG/DL
PLATELET # BLD AUTO: 226 K/UL
POTASSIUM SERPL-SCNC: 4.8 MMOL/L
PROT SERPL-MCNC: 6.9 G/DL
RBC # BLD: 3.53 M/UL
RBC # FLD: 12.6 %
SODIUM SERPL-SCNC: 140 MMOL/L
TRIGL SERPL-MCNC: 38 MG/DL
WBC # FLD AUTO: 7.56 K/UL

## 2021-03-18 ENCOUNTER — APPOINTMENT (OUTPATIENT)
Dept: INTERNAL MEDICINE | Facility: CLINIC | Age: 66
End: 2021-03-18
Payer: MEDICARE

## 2021-03-18 VITALS
WEIGHT: 178 LBS | BODY MASS INDEX: 27.94 KG/M2 | RESPIRATION RATE: 16 BRPM | OXYGEN SATURATION: 98 % | HEIGHT: 67 IN | HEART RATE: 97 BPM | TEMPERATURE: 97.5 F

## 2021-03-18 VITALS — DIASTOLIC BLOOD PRESSURE: 72 MMHG | SYSTOLIC BLOOD PRESSURE: 110 MMHG

## 2021-03-18 DIAGNOSIS — M54.9 DORSALGIA, UNSPECIFIED: ICD-10-CM

## 2021-03-18 PROCEDURE — 99214 OFFICE O/P EST MOD 30 MIN: CPT

## 2021-03-18 NOTE — HISTORY OF PRESENT ILLNESS
[___ Days ago] : [unfilled] days ago [Constant] : constant [Moderate] : moderate [Heat] : heat [In the Morning] : in the morning [Stable] : stable [FreeTextEntry5] : sitting and Tylenol  [FreeTextEntry4] : Standing  [FreeTextEntry8] : Unaware of any recent trauma to the back\par Denies lower ext weakness\par Denies pain with urination or blood in the urine\par Pain is exacerbated with being in certain positions or prolong standing.  \par Pain worse in the am \par Denies numbness or tingling sensation in the legs \par \par Did get one covid shot so far

## 2021-03-18 NOTE — REVIEW OF SYSTEMS
[Negative] : Heme/Lymph [Back Pain] : back pain [Dysuria] : no dysuria [Hematuria] : no hematuria [Swollen Glands] : no swollen glands [FreeTextEntry8] : catheter / bladder issues  [FreeTextEntry9] : back stiffness

## 2021-03-18 NOTE — PLAN
[FreeTextEntry1] : Ortho- LBP  - Advised to start Mobic and cyclobenzaprine 10 mg.  If not improving to call the office as well as get an x-ray of L-s spine.  \par Advised ROM exercise to do at home .\par \par DIMPLE was provided education about general treatment options. DIMPLE was advised long term complication of diabetes and its comorbidities.  Patient was advised to routine follow up appointment with podiatry and opthalmology.\par should have repeat hgb a1c every 3 months and microalbumin every 6 months.  DIMPLE STOCK was advised to call me if any concerns about their diabetes. \par Goal LDL less than 70\par \par Cardio - Hypertension -  Patient was educated about hypertension and the importance of controlling the pressure through lifestyle modification which include low sodium  diet and  aerobic  exercise.  Also discussed the use of prescription medication which included their benefits and their side effects. We discussed the use of ASA 81 mg daily.   Having a BMI less than or equal to 25. \par \par Patient  education  - COVID-19   Counseling and education provided to the patient.  Advised sign and symptoms of the virus.  Advised contact precautions.  Educated patient on proper hand washing and to participate in social distancing.   Had one dose of the covid vax.  Advised to call the office with the COVID vax date one the second shot is complete\par \par Patient is in full awareness of the plan and agrees to it.  All pt question was answered.  \par \par

## 2021-03-18 NOTE — HEALTH RISK ASSESSMENT
[Yes] : Yes [2 - 4 times a month (2 pts)] : 2-4 times a month (2 points) [3 or 4 (1 pt)] : 3 or 4  (1 point) [Never (0 pts)] : Never (0 points) [No] : In the past 12 months have you used drugs other than those required for medical reasons? No [No falls in past year] : Patient reported no falls in the past year [0] : 2) Feeling down, depressed, or hopeless: Not at all (0) [] : No [Audit-CScore] : 3 [[id number]] : 0

## 2021-04-22 DIAGNOSIS — M43.06 SPONDYLOLYSIS, LUMBAR REGION: ICD-10-CM

## 2021-04-23 ENCOUNTER — NON-APPOINTMENT (OUTPATIENT)
Age: 66
End: 2021-04-23

## 2021-05-10 ENCOUNTER — RX RENEWAL (OUTPATIENT)
Age: 66
End: 2021-05-10

## 2021-08-10 ENCOUNTER — RX RENEWAL (OUTPATIENT)
Age: 66
End: 2021-08-10

## 2021-08-11 ENCOUNTER — RX RENEWAL (OUTPATIENT)
Age: 66
End: 2021-08-11

## 2021-08-17 ENCOUNTER — APPOINTMENT (OUTPATIENT)
Dept: INTERNAL MEDICINE | Facility: CLINIC | Age: 66
End: 2021-08-17
Payer: MEDICARE

## 2021-08-17 VITALS
HEART RATE: 71 BPM | TEMPERATURE: 97.3 F | BODY MASS INDEX: 28.16 KG/M2 | WEIGHT: 179.4 LBS | RESPIRATION RATE: 16 BRPM | DIASTOLIC BLOOD PRESSURE: 88 MMHG | SYSTOLIC BLOOD PRESSURE: 118 MMHG | HEIGHT: 67 IN | OXYGEN SATURATION: 98 %

## 2021-08-17 DIAGNOSIS — Z12.5 ENCOUNTER FOR SCREENING FOR MALIGNANT NEOPLASM OF PROSTATE: ICD-10-CM

## 2021-08-17 PROCEDURE — 99214 OFFICE O/P EST MOD 30 MIN: CPT | Mod: 25

## 2021-08-17 PROCEDURE — 36415 COLL VENOUS BLD VENIPUNCTURE: CPT

## 2021-08-18 NOTE — ASSESSMENT
[FreeTextEntry1] : Mr. STOCK is a 66 year  male, who present to the office for fasting labs ad renewal of medications

## 2021-08-18 NOTE — PLAN
[FreeTextEntry1] : Susan - DIMPLE was provided education about general treatment options. DIMPLE was advised long term complication of diabetes and its comorbidities.  Patient was advised to routine follow up appointment with podiatry and opthalmology.\par should have repeat hgb a1c every 3 months and microalbumin every 6 months.  DIMPLE STOCK was advised to call me if any concerns about their diabetes. \par Goal LDL less than 70\par Check FLP/ LFT and a1c \par Monitor BG \par \par Cardio - Hypertension -  Patient was educated about hypertension and the importance of controlling the pressure through lifestyle modification which include low sodium  diet and  aerobic  exercise.  Also discussed the use of prescription medication which included their benefits and their side effects. We discussed the use of ASA 81 mg daily.   Having a BMI less than or equal to 25. \par Hyperlipidemia -   Advised low fat cholesterol diet.  Advised importance of having low cholesterol / LDL/ triglycerides. Advised life style modifications.  Continue with current medications.  Refilled \par \par Patient  education  - COVID-19   Counseling and education provided to the patient.  Advised sign and symptoms of the virus.  Advised contact precautions.  Educated patient on proper hand washing and to participate in social distancing.   Had one dose of the covid vax.  Advised to call the office with the COVID vax date one the second shot is complete\par \par Patient is in full awareness of the plan and agrees to it.  All pt question was answered.  \par \par

## 2021-08-18 NOTE — HEALTH RISK ASSESSMENT
[Yes] : Yes [2 - 4 times a month (2 pts)] : 2-4 times a month (2 points) [3 or 4 (1 pt)] : 3 or 4  (1 point) [Never (0 pts)] : Never (0 points) [No] : In the past 12 months have you used drugs other than those required for medical reasons? No [No falls in past year] : Patient reported no falls in the past year [0] : 2) Feeling down, depressed, or hopeless: Not at all (0) [] : No [[id number]] : 0 [Audit-CScore] : 3

## 2021-08-18 NOTE — HISTORY OF PRESENT ILLNESS
[FreeTextEntry1] : fasting labs, renewal of medication  [de-identified] : Mr. STOCK is a 66 year  male, who present to the office for fasting labs.\par States he is doing well on his diet.  Denies  having any hypo or hyperglycemic events \par Denies CP, SOB, LEBLANC \par Taking all medication as .\par still seeing  states he self cath 3 x a day

## 2021-08-19 ENCOUNTER — NON-APPOINTMENT (OUTPATIENT)
Age: 66
End: 2021-08-19

## 2021-08-20 ENCOUNTER — NON-APPOINTMENT (OUTPATIENT)
Age: 66
End: 2021-08-20

## 2021-08-24 LAB
25(OH)D3 SERPL-MCNC: 63.3 NG/ML
ALBUMIN SERPL ELPH-MCNC: 4.5 G/DL
ALP BLD-CCNC: 109 U/L
ALT SERPL-CCNC: 27 U/L
ANION GAP SERPL CALC-SCNC: 10 MMOL/L
AST SERPL-CCNC: 27 U/L
BASOPHILS # BLD AUTO: 0.05 K/UL
BASOPHILS NFR BLD AUTO: 0.9 %
BILIRUB SERPL-MCNC: 0.6 MG/DL
BUN SERPL-MCNC: 30 MG/DL
CALCIUM SERPL-MCNC: 9 MG/DL
CHLORIDE SERPL-SCNC: 103 MMOL/L
CHOLEST SERPL-MCNC: 151 MG/DL
CO2 SERPL-SCNC: 25 MMOL/L
CREAT SERPL-MCNC: 1.51 MG/DL
EOSINOPHIL # BLD AUTO: 0.17 K/UL
EOSINOPHIL NFR BLD AUTO: 3.2 %
ESTIMATED AVERAGE GLUCOSE: 123 MG/DL
GLUCOSE SERPL-MCNC: 118 MG/DL
HBA1C MFR BLD HPLC: 5.9 %
HCT VFR BLD CALC: 43.7 %
HDLC SERPL-MCNC: 63 MG/DL
HGB BLD-MCNC: 13.5 G/DL
IMM GRANULOCYTES NFR BLD AUTO: 0 %
LDLC SERPL CALC-MCNC: 72 MG/DL
LYMPHOCYTES # BLD AUTO: 0.89 K/UL
LYMPHOCYTES NFR BLD AUTO: 16.6 %
MAN DIFF?: NORMAL
MCHC RBC-ENTMCNC: 29.2 PG
MCHC RBC-ENTMCNC: 30.9 GM/DL
MCV RBC AUTO: 94.4 FL
MONOCYTES # BLD AUTO: 0.47 K/UL
MONOCYTES NFR BLD AUTO: 8.8 %
NEUTROPHILS # BLD AUTO: 3.78 K/UL
NEUTROPHILS NFR BLD AUTO: 70.5 %
NONHDLC SERPL-MCNC: 89 MG/DL
PLATELET # BLD AUTO: 180 K/UL
POTASSIUM SERPL-SCNC: 4.3 MMOL/L
PROT SERPL-MCNC: 7.2 G/DL
PSA SERPL-MCNC: 1.18 NG/ML
RBC # BLD: 4.63 M/UL
RBC # FLD: 15.3 %
SODIUM SERPL-SCNC: 139 MMOL/L
TRIGL SERPL-MCNC: 82 MG/DL
TSH SERPL-ACNC: 2.1 UIU/ML
WBC # FLD AUTO: 5.36 K/UL

## 2021-11-17 ENCOUNTER — RX RENEWAL (OUTPATIENT)
Age: 66
End: 2021-11-17

## 2021-12-13 ENCOUNTER — RX RENEWAL (OUTPATIENT)
Age: 66
End: 2021-12-13

## 2022-01-01 ENCOUNTER — INPATIENT (INPATIENT)
Facility: HOSPITAL | Age: 67
LOS: 3 days | Discharge: HOME | DRG: 392 | End: 2022-12-14
Attending: STUDENT IN AN ORGANIZED HEALTH CARE EDUCATION/TRAINING PROGRAM | Admitting: FAMILY MEDICINE
Payer: MEDICARE

## 2022-01-01 ENCOUNTER — RX RENEWAL (OUTPATIENT)
Age: 67
End: 2022-01-01

## 2022-01-01 ENCOUNTER — RESULT CHARGE (OUTPATIENT)
Age: 67
End: 2022-01-01

## 2022-01-01 ENCOUNTER — NON-APPOINTMENT (OUTPATIENT)
Age: 67
End: 2022-01-01

## 2022-01-01 ENCOUNTER — TRANSCRIPTION ENCOUNTER (OUTPATIENT)
Age: 67
End: 2022-01-01

## 2022-01-01 ENCOUNTER — APPOINTMENT (OUTPATIENT)
Dept: INTERNAL MEDICINE | Facility: CLINIC | Age: 67
End: 2022-01-01

## 2022-01-01 VITALS
BODY MASS INDEX: 28.25 KG/M2 | DIASTOLIC BLOOD PRESSURE: 80 MMHG | TEMPERATURE: 98.1 F | OXYGEN SATURATION: 98 % | RESPIRATION RATE: 14 BRPM | WEIGHT: 180 LBS | HEIGHT: 67 IN | HEART RATE: 69 BPM | SYSTOLIC BLOOD PRESSURE: 110 MMHG

## 2022-01-01 VITALS
HEART RATE: 125 BPM | OXYGEN SATURATION: 98 % | DIASTOLIC BLOOD PRESSURE: 86 MMHG | SYSTOLIC BLOOD PRESSURE: 120 MMHG | WEIGHT: 175.05 LBS | HEIGHT: 66 IN | TEMPERATURE: 98 F

## 2022-01-01 VITALS
SYSTOLIC BLOOD PRESSURE: 120 MMHG | OXYGEN SATURATION: 96 % | DIASTOLIC BLOOD PRESSURE: 80 MMHG | HEART RATE: 70 BPM | TEMPERATURE: 99 F | RESPIRATION RATE: 18 BRPM

## 2022-01-01 VITALS — DIASTOLIC BLOOD PRESSURE: 80 MMHG | SYSTOLIC BLOOD PRESSURE: 110 MMHG

## 2022-01-01 DIAGNOSIS — T78.3XXA ANGIONEUROTIC EDEMA, INITIAL ENCOUNTER: ICD-10-CM

## 2022-01-01 DIAGNOSIS — L72.3 SEBACEOUS CYST: ICD-10-CM

## 2022-01-01 DIAGNOSIS — R11.2 NAUSEA WITH VOMITING, UNSPECIFIED: ICD-10-CM

## 2022-01-01 DIAGNOSIS — E78.5 HYPERLIPIDEMIA, UNSPECIFIED: ICD-10-CM

## 2022-01-01 DIAGNOSIS — J84.10 PULMONARY FIBROSIS, UNSPECIFIED: ICD-10-CM

## 2022-01-01 DIAGNOSIS — Z29.9 ENCOUNTER FOR PROPHYLACTIC MEASURES, UNSPECIFIED: ICD-10-CM

## 2022-01-01 DIAGNOSIS — E11.9 TYPE 2 DIABETES MELLITUS WITHOUT COMPLICATIONS: ICD-10-CM

## 2022-01-01 DIAGNOSIS — Z00.00 ENCOUNTER FOR GENERAL ADULT MEDICAL EXAMINATION W/OUT ABNORMAL FINDINGS: ICD-10-CM

## 2022-01-01 DIAGNOSIS — C7A.8 OTHER MALIGNANT NEUROENDOCRINE TUMORS: ICD-10-CM

## 2022-01-01 DIAGNOSIS — D72.829 ELEVATED WHITE BLOOD CELL COUNT, UNSPECIFIED: ICD-10-CM

## 2022-01-01 DIAGNOSIS — H61.22 IMPACTED CERUMEN, LEFT EAR: ICD-10-CM

## 2022-01-01 DIAGNOSIS — B35.1 TINEA UNGUIUM: ICD-10-CM

## 2022-01-01 DIAGNOSIS — D47.2 MONOCLONAL GAMMOPATHY: ICD-10-CM

## 2022-01-01 DIAGNOSIS — R82.81 PYURIA: ICD-10-CM

## 2022-01-01 DIAGNOSIS — I10 ESSENTIAL (PRIMARY) HYPERTENSION: ICD-10-CM

## 2022-01-01 DIAGNOSIS — N17.9 ACUTE KIDNEY FAILURE, UNSPECIFIED: ICD-10-CM

## 2022-01-01 DIAGNOSIS — N40.0 BENIGN PROSTATIC HYPERPLASIA WITHOUT LOWER URINARY TRACT SYMPTOMS: ICD-10-CM

## 2022-01-01 DIAGNOSIS — N40.0 BENIGN PROSTATIC HYPERPLASIA WITHOUT LOWER URINARY TRACT SYMPMS: ICD-10-CM

## 2022-01-01 LAB
-  AMIKACIN: SIGNIFICANT CHANGE UP
-  AMOXICILLIN/CLAVULANIC ACID: SIGNIFICANT CHANGE UP
-  AMPICILLIN/SULBACTAM: SIGNIFICANT CHANGE UP
-  AMPICILLIN: SIGNIFICANT CHANGE UP
-  AZTREONAM: SIGNIFICANT CHANGE UP
-  CEFAZOLIN: SIGNIFICANT CHANGE UP
-  CEFEPIME: SIGNIFICANT CHANGE UP
-  CEFOXITIN: SIGNIFICANT CHANGE UP
-  CEFTRIAXONE: SIGNIFICANT CHANGE UP
-  CIPROFLOXACIN: SIGNIFICANT CHANGE UP
-  ERTAPENEM: SIGNIFICANT CHANGE UP
-  GENTAMICIN: SIGNIFICANT CHANGE UP
-  IMIPENEM: SIGNIFICANT CHANGE UP
-  LEVOFLOXACIN: SIGNIFICANT CHANGE UP
-  MEROPENEM: SIGNIFICANT CHANGE UP
-  NITROFURANTOIN: SIGNIFICANT CHANGE UP
-  PIPERACILLIN/TAZOBACTAM: SIGNIFICANT CHANGE UP
-  TOBRAMYCIN: SIGNIFICANT CHANGE UP
-  TRIMETHOPRIM/SULFAMETHOXAZOLE: SIGNIFICANT CHANGE UP
25(OH)D3 SERPL-MCNC: 75.9 NG/ML
A1C WITH ESTIMATED AVERAGE GLUCOSE RESULT: 6.4 % — HIGH (ref 4–5.6)
ALBUMIN SERPL ELPH-MCNC: 2.2 G/DL — LOW (ref 3.3–5)
ALBUMIN SERPL ELPH-MCNC: 2.3 G/DL — LOW (ref 3.3–5)
ALBUMIN SERPL ELPH-MCNC: 2.5 G/DL — LOW (ref 3.3–5)
ALBUMIN SERPL ELPH-MCNC: 2.6 G/DL — LOW (ref 3.3–5)
ALBUMIN SERPL ELPH-MCNC: 3.2 G/DL — LOW (ref 3.3–5)
ALBUMIN SERPL ELPH-MCNC: 4.5 G/DL
ALP BLD-CCNC: 72 U/L
ALP SERPL-CCNC: 48 U/L — SIGNIFICANT CHANGE UP (ref 40–120)
ALP SERPL-CCNC: 51 U/L — SIGNIFICANT CHANGE UP (ref 40–120)
ALP SERPL-CCNC: 51 U/L — SIGNIFICANT CHANGE UP (ref 40–120)
ALP SERPL-CCNC: 60 U/L — SIGNIFICANT CHANGE UP (ref 40–120)
ALP SERPL-CCNC: 66 U/L — SIGNIFICANT CHANGE UP (ref 40–120)
ALT FLD-CCNC: 28 U/L — SIGNIFICANT CHANGE UP (ref 12–78)
ALT FLD-CCNC: 29 U/L — SIGNIFICANT CHANGE UP (ref 12–78)
ALT FLD-CCNC: 30 U/L — SIGNIFICANT CHANGE UP (ref 12–78)
ALT FLD-CCNC: 34 U/L — SIGNIFICANT CHANGE UP (ref 12–78)
ALT FLD-CCNC: 44 U/L — SIGNIFICANT CHANGE UP (ref 12–78)
ALT SERPL-CCNC: 31 U/L
ANION GAP SERPL CALC-SCNC: 12 MMOL/L
ANION GAP SERPL CALC-SCNC: 4 MMOL/L — LOW (ref 5–17)
ANION GAP SERPL CALC-SCNC: 5 MMOL/L — SIGNIFICANT CHANGE UP (ref 5–17)
ANION GAP SERPL CALC-SCNC: 6 MMOL/L — SIGNIFICANT CHANGE UP (ref 5–17)
ANION GAP SERPL CALC-SCNC: 6 MMOL/L — SIGNIFICANT CHANGE UP (ref 5–17)
ANION GAP SERPL CALC-SCNC: 7 MMOL/L — SIGNIFICANT CHANGE UP (ref 5–17)
APPEARANCE UR: ABNORMAL
APTT BLD: 26.8 SEC — LOW (ref 27.5–35.5)
AST SERPL-CCNC: 29 U/L — SIGNIFICANT CHANGE UP (ref 15–37)
AST SERPL-CCNC: 32 U/L — SIGNIFICANT CHANGE UP (ref 15–37)
AST SERPL-CCNC: 35 U/L
AST SERPL-CCNC: 37 U/L — SIGNIFICANT CHANGE UP (ref 15–37)
AST SERPL-CCNC: 40 U/L — HIGH (ref 15–37)
AST SERPL-CCNC: 45 U/L — HIGH (ref 15–37)
BACTERIA UR CULT: ABNORMAL
BASOPHILS # BLD AUTO: 0 K/UL — SIGNIFICANT CHANGE UP (ref 0–0.2)
BASOPHILS # BLD AUTO: 0.05 K/UL
BASOPHILS NFR BLD AUTO: 0 % — SIGNIFICANT CHANGE UP (ref 0–2)
BASOPHILS NFR BLD AUTO: 1 %
BILIRUB SERPL-MCNC: 0.3 MG/DL — SIGNIFICANT CHANGE UP (ref 0.2–1.2)
BILIRUB SERPL-MCNC: 0.4 MG/DL — SIGNIFICANT CHANGE UP (ref 0.2–1.2)
BILIRUB SERPL-MCNC: 0.5 MG/DL
BILIRUB SERPL-MCNC: 0.5 MG/DL — SIGNIFICANT CHANGE UP (ref 0.2–1.2)
BILIRUB UR QL STRIP: NORMAL
BILIRUB UR-MCNC: NEGATIVE — SIGNIFICANT CHANGE UP
BUN SERPL-MCNC: 22 MG/DL — SIGNIFICANT CHANGE UP (ref 7–23)
BUN SERPL-MCNC: 25 MG/DL — HIGH (ref 7–23)
BUN SERPL-MCNC: 26 MG/DL — HIGH (ref 7–23)
BUN SERPL-MCNC: 31 MG/DL
BUN SERPL-MCNC: 31 MG/DL — HIGH (ref 7–23)
BUN SERPL-MCNC: 40 MG/DL — HIGH (ref 7–23)
CALCIUM SERPL-MCNC: 8.2 MG/DL — LOW (ref 8.5–10.1)
CALCIUM SERPL-MCNC: 8.4 MG/DL — LOW (ref 8.5–10.1)
CALCIUM SERPL-MCNC: 8.5 MG/DL — SIGNIFICANT CHANGE UP (ref 8.5–10.1)
CALCIUM SERPL-MCNC: 8.6 MG/DL — SIGNIFICANT CHANGE UP (ref 8.5–10.1)
CALCIUM SERPL-MCNC: 9.2 MG/DL — SIGNIFICANT CHANGE UP (ref 8.5–10.1)
CALCIUM SERPL-MCNC: 9.5 MG/DL
CHLORIDE SERPL-SCNC: 102 MMOL/L — SIGNIFICANT CHANGE UP (ref 96–108)
CHLORIDE SERPL-SCNC: 104 MMOL/L — SIGNIFICANT CHANGE UP (ref 96–108)
CHLORIDE SERPL-SCNC: 106 MMOL/L
CHLORIDE SERPL-SCNC: 107 MMOL/L — SIGNIFICANT CHANGE UP (ref 96–108)
CHLORIDE SERPL-SCNC: 107 MMOL/L — SIGNIFICANT CHANGE UP (ref 96–108)
CHLORIDE SERPL-SCNC: 110 MMOL/L — HIGH (ref 96–108)
CHOLEST SERPL-MCNC: 138 MG/DL
CLARITY UR: CLEAR
CO2 SERPL-SCNC: 24 MMOL/L
CO2 SERPL-SCNC: 26 MMOL/L — SIGNIFICANT CHANGE UP (ref 22–31)
CO2 SERPL-SCNC: 28 MMOL/L — SIGNIFICANT CHANGE UP (ref 22–31)
CO2 SERPL-SCNC: 32 MMOL/L — HIGH (ref 22–31)
CO2 SERPL-SCNC: 33 MMOL/L — HIGH (ref 22–31)
CO2 SERPL-SCNC: 35 MMOL/L — HIGH (ref 22–31)
COLLECTION METHOD: NORMAL
COLOR SPEC: YELLOW — SIGNIFICANT CHANGE UP
CREAT SERPL-MCNC: 1.4 MG/DL — HIGH (ref 0.5–1.3)
CREAT SERPL-MCNC: 1.5 MG/DL — HIGH (ref 0.5–1.3)
CREAT SERPL-MCNC: 1.5 MG/DL — HIGH (ref 0.5–1.3)
CREAT SERPL-MCNC: 1.58 MG/DL
CREAT SERPL-MCNC: 1.8 MG/DL — HIGH (ref 0.5–1.3)
CREAT SERPL-MCNC: 1.9 MG/DL — HIGH (ref 0.5–1.3)
CREAT SPEC-SCNC: 100 MG/DL
CULTURE RESULTS: SIGNIFICANT CHANGE UP
DIFF PNL FLD: ABNORMAL
EGFR: 38 ML/MIN/1.73M2 — LOW
EGFR: 40 ML/MIN/1.73M2 — LOW
EGFR: 47 ML/MIN/1.73M2
EGFR: 50 ML/MIN/1.73M2 — LOW
EGFR: 50 ML/MIN/1.73M2 — LOW
EGFR: 55 ML/MIN/1.73M2 — LOW
EOSINOPHIL # BLD AUTO: 0 K/UL — SIGNIFICANT CHANGE UP (ref 0–0.5)
EOSINOPHIL # BLD AUTO: 0.1 K/UL
EOSINOPHIL NFR BLD AUTO: 0 % — SIGNIFICANT CHANGE UP (ref 0–6)
EOSINOPHIL NFR BLD AUTO: 2 %
ESTIMATED AVERAGE GLUCOSE: 131 MG/DL
ESTIMATED AVERAGE GLUCOSE: 137 MG/DL — HIGH (ref 68–114)
FLUAV AG NPH QL: SIGNIFICANT CHANGE UP
FLUBV AG NPH QL: SIGNIFICANT CHANGE UP
GLUCOSE SERPL-MCNC: 131 MG/DL
GLUCOSE SERPL-MCNC: 149 MG/DL — HIGH (ref 70–99)
GLUCOSE SERPL-MCNC: 157 MG/DL — HIGH (ref 70–99)
GLUCOSE SERPL-MCNC: 162 MG/DL — HIGH (ref 70–99)
GLUCOSE SERPL-MCNC: 197 MG/DL — HIGH (ref 70–99)
GLUCOSE SERPL-MCNC: 94 MG/DL — SIGNIFICANT CHANGE UP (ref 70–99)
GLUCOSE UR QL: NEGATIVE — SIGNIFICANT CHANGE UP
GLUCOSE UR-MCNC: NORMAL
HBA1C MFR BLD HPLC: 6.2 %
HCG UR QL: 0.2 EU/DL
HCT VFR BLD CALC: 33.6 % — LOW (ref 39–50)
HCT VFR BLD CALC: 35.1 % — LOW (ref 39–50)
HCT VFR BLD CALC: 35.2 % — LOW (ref 39–50)
HCT VFR BLD CALC: 40 % — SIGNIFICANT CHANGE UP (ref 39–50)
HCT VFR BLD CALC: 42.2 % — SIGNIFICANT CHANGE UP (ref 39–50)
HCT VFR BLD CALC: 44.8 %
HCV AB S/CO SERPL IA: 0.09 S/CO — SIGNIFICANT CHANGE UP (ref 0–0.99)
HCV AB SERPL-IMP: SIGNIFICANT CHANGE UP
HDLC SERPL-MCNC: 64 MG/DL
HGB BLD-MCNC: 10.6 G/DL — LOW (ref 13–17)
HGB BLD-MCNC: 11.4 G/DL — LOW (ref 13–17)
HGB BLD-MCNC: 11.5 G/DL — LOW (ref 13–17)
HGB BLD-MCNC: 12.6 G/DL — LOW (ref 13–17)
HGB BLD-MCNC: 13.6 G/DL
HGB BLD-MCNC: 14.1 G/DL — SIGNIFICANT CHANGE UP (ref 13–17)
HGB UR QL STRIP.AUTO: NORMAL
IMM GRANULOCYTES NFR BLD AUTO: 0 %
INR BLD: 1.04 RATIO — SIGNIFICANT CHANGE UP (ref 0.88–1.16)
KETONES UR-MCNC: NEGATIVE — SIGNIFICANT CHANGE UP
KETONES UR-MCNC: NORMAL
LACTATE SERPL-SCNC: 1.9 MMOL/L — SIGNIFICANT CHANGE UP (ref 0.7–2)
LDLC SERPL CALC-MCNC: 65 MG/DL
LEUKOCYTE ESTERASE UR QL STRIP: ABNORMAL
LEUKOCYTE ESTERASE UR-ACNC: ABNORMAL
LIDOCAIN IGE QN: 135 U/L — SIGNIFICANT CHANGE UP (ref 73–393)
LYMPHOCYTES # BLD AUTO: 0.22 K/UL — LOW (ref 1–3.3)
LYMPHOCYTES # BLD AUTO: 0.87 K/UL
LYMPHOCYTES # BLD AUTO: 1 % — LOW (ref 13–44)
LYMPHOCYTES NFR BLD AUTO: 17.3 %
MAN DIFF?: NORMAL
MCHC RBC-ENTMCNC: 29.3 PG
MCHC RBC-ENTMCNC: 29.8 PG — SIGNIFICANT CHANGE UP (ref 27–34)
MCHC RBC-ENTMCNC: 29.8 PG — SIGNIFICANT CHANGE UP (ref 27–34)
MCHC RBC-ENTMCNC: 29.9 PG — SIGNIFICANT CHANGE UP (ref 27–34)
MCHC RBC-ENTMCNC: 30.1 PG — SIGNIFICANT CHANGE UP (ref 27–34)
MCHC RBC-ENTMCNC: 30.2 PG — SIGNIFICANT CHANGE UP (ref 27–34)
MCHC RBC-ENTMCNC: 30.4 GM/DL
MCHC RBC-ENTMCNC: 31.5 GM/DL — LOW (ref 32–36)
MCHC RBC-ENTMCNC: 31.5 GM/DL — LOW (ref 32–36)
MCHC RBC-ENTMCNC: 32.4 GM/DL — SIGNIFICANT CHANGE UP (ref 32–36)
MCHC RBC-ENTMCNC: 32.8 GM/DL — SIGNIFICANT CHANGE UP (ref 32–36)
MCHC RBC-ENTMCNC: 33.4 GM/DL — SIGNIFICANT CHANGE UP (ref 32–36)
MCV RBC AUTO: 90.4 FL — SIGNIFICANT CHANGE UP (ref 80–100)
MCV RBC AUTO: 91.9 FL — SIGNIFICANT CHANGE UP (ref 80–100)
MCV RBC AUTO: 91.9 FL — SIGNIFICANT CHANGE UP (ref 80–100)
MCV RBC AUTO: 94.4 FL — SIGNIFICANT CHANGE UP (ref 80–100)
MCV RBC AUTO: 94.8 FL — SIGNIFICANT CHANGE UP (ref 80–100)
MCV RBC AUTO: 96.6 FL
METHOD TYPE: SIGNIFICANT CHANGE UP
MICROALBUMIN 24H UR DL<=1MG/L-MCNC: <1.2 MG/DL
MICROALBUMIN/CREAT 24H UR-RTO: NORMAL MG/G
MONOCYTES # BLD AUTO: 0.39 K/UL
MONOCYTES # BLD AUTO: 1.34 K/UL — HIGH (ref 0–0.9)
MONOCYTES NFR BLD AUTO: 6 % — SIGNIFICANT CHANGE UP (ref 2–14)
MONOCYTES NFR BLD AUTO: 7.8 %
NEUTROPHILS # BLD AUTO: 20.48 K/UL — HIGH (ref 1.8–7.4)
NEUTROPHILS # BLD AUTO: 3.61 K/UL
NEUTROPHILS NFR BLD AUTO: 71.9 %
NEUTROPHILS NFR BLD AUTO: 92 % — HIGH (ref 43–77)
NITRITE UR QL STRIP: POSITIVE
NITRITE UR-MCNC: NEGATIVE — SIGNIFICANT CHANGE UP
NONHDLC SERPL-MCNC: 74 MG/DL
NRBC # BLD: 0 /100 WBCS — SIGNIFICANT CHANGE UP (ref 0–0)
NRBC # BLD: SIGNIFICANT CHANGE UP /100 WBCS (ref 0–0)
ORGANISM # SPEC MICROSCOPIC CNT: SIGNIFICANT CHANGE UP
ORGANISM # SPEC MICROSCOPIC CNT: SIGNIFICANT CHANGE UP
PH UR STRIP: 6
PH UR: 5 — SIGNIFICANT CHANGE UP (ref 5–8)
PLATELET # BLD AUTO: 147 K/UL — LOW (ref 150–400)
PLATELET # BLD AUTO: 149 K/UL — LOW (ref 150–400)
PLATELET # BLD AUTO: 154 K/UL — SIGNIFICANT CHANGE UP (ref 150–400)
PLATELET # BLD AUTO: 171 K/UL — SIGNIFICANT CHANGE UP (ref 150–400)
PLATELET # BLD AUTO: 189 K/UL
PLATELET # BLD AUTO: 233 K/UL — SIGNIFICANT CHANGE UP (ref 150–400)
POTASSIUM SERPL-MCNC: 2.8 MMOL/L — CRITICAL LOW (ref 3.5–5.3)
POTASSIUM SERPL-MCNC: 2.9 MMOL/L — CRITICAL LOW (ref 3.5–5.3)
POTASSIUM SERPL-MCNC: 3 MMOL/L — LOW (ref 3.5–5.3)
POTASSIUM SERPL-MCNC: 3.5 MMOL/L — SIGNIFICANT CHANGE UP (ref 3.5–5.3)
POTASSIUM SERPL-MCNC: 3.7 MMOL/L — SIGNIFICANT CHANGE UP (ref 3.5–5.3)
POTASSIUM SERPL-MCNC: 3.9 MMOL/L — SIGNIFICANT CHANGE UP (ref 3.5–5.3)
POTASSIUM SERPL-MCNC: 4.1 MMOL/L — SIGNIFICANT CHANGE UP (ref 3.5–5.3)
POTASSIUM SERPL-SCNC: 2.8 MMOL/L — CRITICAL LOW (ref 3.5–5.3)
POTASSIUM SERPL-SCNC: 2.9 MMOL/L — CRITICAL LOW (ref 3.5–5.3)
POTASSIUM SERPL-SCNC: 3 MMOL/L — LOW (ref 3.5–5.3)
POTASSIUM SERPL-SCNC: 3.5 MMOL/L — SIGNIFICANT CHANGE UP (ref 3.5–5.3)
POTASSIUM SERPL-SCNC: 3.7 MMOL/L — SIGNIFICANT CHANGE UP (ref 3.5–5.3)
POTASSIUM SERPL-SCNC: 3.9 MMOL/L — SIGNIFICANT CHANGE UP (ref 3.5–5.3)
POTASSIUM SERPL-SCNC: 4.1 MMOL/L — SIGNIFICANT CHANGE UP (ref 3.5–5.3)
POTASSIUM SERPL-SCNC: 4.9 MMOL/L
PROT SERPL-MCNC: 5.5 G/DL — LOW (ref 6–8.3)
PROT SERPL-MCNC: 5.6 G/DL — LOW (ref 6–8.3)
PROT SERPL-MCNC: 5.6 G/DL — LOW (ref 6–8.3)
PROT SERPL-MCNC: 6.4 G/DL — SIGNIFICANT CHANGE UP (ref 6–8.3)
PROT SERPL-MCNC: 7.2 G/DL
PROT SERPL-MCNC: 7.3 G/DL — SIGNIFICANT CHANGE UP (ref 6–8.3)
PROT UR-MCNC: 30 MG/DL
PROTHROM AB SERPL-ACNC: 12.2 SEC — SIGNIFICANT CHANGE UP (ref 10.5–13.4)
PSA SERPL-MCNC: 1.22 NG/ML
RBC # BLD: 3.56 M/UL — LOW (ref 4.2–5.8)
RBC # BLD: 3.82 M/UL — LOW (ref 4.2–5.8)
RBC # BLD: 3.83 M/UL — LOW (ref 4.2–5.8)
RBC # BLD: 4.22 M/UL — SIGNIFICANT CHANGE UP (ref 4.2–5.8)
RBC # BLD: 4.64 M/UL
RBC # BLD: 4.67 M/UL — SIGNIFICANT CHANGE UP (ref 4.2–5.8)
RBC # FLD: 13.9 % — SIGNIFICANT CHANGE UP (ref 10.3–14.5)
RBC # FLD: 14.3 % — SIGNIFICANT CHANGE UP (ref 10.3–14.5)
RBC # FLD: 14.4 % — SIGNIFICANT CHANGE UP (ref 10.3–14.5)
RBC # FLD: 14.5 %
RBC # FLD: 14.5 % — SIGNIFICANT CHANGE UP (ref 10.3–14.5)
RBC # FLD: 14.5 % — SIGNIFICANT CHANGE UP (ref 10.3–14.5)
RSV RNA NPH QL NAA+NON-PROBE: SIGNIFICANT CHANGE UP
SARS-COV-2 RNA SPEC QL NAA+PROBE: SIGNIFICANT CHANGE UP
SODIUM SERPL-SCNC: 139 MMOL/L — SIGNIFICANT CHANGE UP (ref 135–145)
SODIUM SERPL-SCNC: 139 MMOL/L — SIGNIFICANT CHANGE UP (ref 135–145)
SODIUM SERPL-SCNC: 142 MMOL/L
SODIUM SERPL-SCNC: 142 MMOL/L — SIGNIFICANT CHANGE UP (ref 135–145)
SODIUM SERPL-SCNC: 145 MMOL/L — SIGNIFICANT CHANGE UP (ref 135–145)
SODIUM SERPL-SCNC: 147 MMOL/L — HIGH (ref 135–145)
SP GR SPEC: 1.02 — SIGNIFICANT CHANGE UP (ref 1.01–1.02)
SP GR UR STRIP: 1.03
SPECIMEN SOURCE: SIGNIFICANT CHANGE UP
TRIGL SERPL-MCNC: 44 MG/DL
TSH SERPL-ACNC: 2.27 UIU/ML
UROBILINOGEN FLD QL: NEGATIVE — SIGNIFICANT CHANGE UP
WBC # BLD: 13.27 K/UL — HIGH (ref 3.8–10.5)
WBC # BLD: 22.26 K/UL — HIGH (ref 3.8–10.5)
WBC # BLD: 5.15 K/UL — SIGNIFICANT CHANGE UP (ref 3.8–10.5)
WBC # BLD: 6.56 K/UL — SIGNIFICANT CHANGE UP (ref 3.8–10.5)
WBC # BLD: 7.14 K/UL — SIGNIFICANT CHANGE UP (ref 3.8–10.5)
WBC # FLD AUTO: 13.27 K/UL — HIGH (ref 3.8–10.5)
WBC # FLD AUTO: 22.26 K/UL — HIGH (ref 3.8–10.5)
WBC # FLD AUTO: 5.02 K/UL
WBC # FLD AUTO: 5.15 K/UL — SIGNIFICANT CHANGE UP (ref 3.8–10.5)
WBC # FLD AUTO: 6.56 K/UL — SIGNIFICANT CHANGE UP (ref 3.8–10.5)
WBC # FLD AUTO: 7.14 K/UL — SIGNIFICANT CHANGE UP (ref 3.8–10.5)

## 2022-01-01 PROCEDURE — 99232 SBSQ HOSP IP/OBS MODERATE 35: CPT | Mod: GC

## 2022-01-01 PROCEDURE — 85025 COMPLETE CBC W/AUTO DIFF WBC: CPT

## 2022-01-01 PROCEDURE — 71045 X-RAY EXAM CHEST 1 VIEW: CPT

## 2022-01-01 PROCEDURE — 74250 X-RAY XM SM INT 1CNTRST STD: CPT

## 2022-01-01 PROCEDURE — 82962 GLUCOSE BLOOD TEST: CPT

## 2022-01-01 PROCEDURE — 99233 SBSQ HOSP IP/OBS HIGH 50: CPT | Mod: GC

## 2022-01-01 PROCEDURE — 81003 URINALYSIS AUTO W/O SCOPE: CPT | Mod: QW

## 2022-01-01 PROCEDURE — 74176 CT ABD & PELVIS W/O CONTRAST: CPT | Mod: 26

## 2022-01-01 PROCEDURE — 87040 BLOOD CULTURE FOR BACTERIA: CPT

## 2022-01-01 PROCEDURE — 85610 PROTHROMBIN TIME: CPT

## 2022-01-01 PROCEDURE — 80053 COMPREHEN METABOLIC PANEL: CPT

## 2022-01-01 PROCEDURE — 93005 ELECTROCARDIOGRAM TRACING: CPT

## 2022-01-01 PROCEDURE — 87186 SC STD MICRODIL/AGAR DIL: CPT

## 2022-01-01 PROCEDURE — 99222 1ST HOSP IP/OBS MODERATE 55: CPT

## 2022-01-01 PROCEDURE — 84132 ASSAY OF SERUM POTASSIUM: CPT

## 2022-01-01 PROCEDURE — 86803 HEPATITIS C AB TEST: CPT

## 2022-01-01 PROCEDURE — 99233 SBSQ HOSP IP/OBS HIGH 50: CPT

## 2022-01-01 PROCEDURE — 93000 ELECTROCARDIOGRAM COMPLETE: CPT | Mod: 59

## 2022-01-01 PROCEDURE — 71045 X-RAY EXAM CHEST 1 VIEW: CPT | Mod: 26

## 2022-01-01 PROCEDURE — 87077 CULTURE AEROBIC IDENTIFY: CPT

## 2022-01-01 PROCEDURE — 96374 THER/PROPH/DIAG INJ IV PUSH: CPT

## 2022-01-01 PROCEDURE — 85027 COMPLETE CBC AUTOMATED: CPT

## 2022-01-01 PROCEDURE — G1004: CPT

## 2022-01-01 PROCEDURE — 93010 ELECTROCARDIOGRAM REPORT: CPT

## 2022-01-01 PROCEDURE — 83605 ASSAY OF LACTIC ACID: CPT

## 2022-01-01 PROCEDURE — 96376 TX/PRO/DX INJ SAME DRUG ADON: CPT

## 2022-01-01 PROCEDURE — 87637 SARSCOV2&INF A&B&RSV AMP PRB: CPT

## 2022-01-01 PROCEDURE — 83690 ASSAY OF LIPASE: CPT

## 2022-01-01 PROCEDURE — 99285 EMERGENCY DEPT VISIT HI MDM: CPT

## 2022-01-01 PROCEDURE — 99239 HOSP IP/OBS DSCHRG MGMT >30: CPT

## 2022-01-01 PROCEDURE — 99232 SBSQ HOSP IP/OBS MODERATE 35: CPT

## 2022-01-01 PROCEDURE — 85730 THROMBOPLASTIN TIME PARTIAL: CPT

## 2022-01-01 PROCEDURE — 99223 1ST HOSP IP/OBS HIGH 75: CPT | Mod: GC

## 2022-01-01 PROCEDURE — 36415 COLL VENOUS BLD VENIPUNCTURE: CPT

## 2022-01-01 PROCEDURE — 43752 NASAL/OROGASTRIC W/TUBE PLMT: CPT

## 2022-01-01 PROCEDURE — 74176 CT ABD & PELVIS W/O CONTRAST: CPT | Mod: MG

## 2022-01-01 PROCEDURE — 81001 URINALYSIS AUTO W/SCOPE: CPT

## 2022-01-01 PROCEDURE — 74250 X-RAY XM SM INT 1CNTRST STD: CPT | Mod: 26

## 2022-01-01 PROCEDURE — 82270 OCCULT BLOOD FECES: CPT

## 2022-01-01 PROCEDURE — 87086 URINE CULTURE/COLONY COUNT: CPT

## 2022-01-01 PROCEDURE — 99285 EMERGENCY DEPT VISIT HI MDM: CPT | Mod: FS

## 2022-01-01 PROCEDURE — G0439: CPT

## 2022-01-01 PROCEDURE — 0225U NFCT DS DNA&RNA 21 SARSCOV2: CPT

## 2022-01-01 PROCEDURE — 83036 HEMOGLOBIN GLYCOSYLATED A1C: CPT

## 2022-01-01 PROCEDURE — 96375 TX/PRO/DX INJ NEW DRUG ADDON: CPT

## 2022-01-01 RX ORDER — METOCLOPRAMIDE HCL 10 MG
10 TABLET ORAL EVERY 6 HOURS
Refills: 0 | Status: DISCONTINUED | OUTPATIENT
Start: 2022-01-01 | End: 2022-01-01

## 2022-01-01 RX ORDER — IRBESARTAN 75 MG/1
1 TABLET ORAL
Qty: 0 | Refills: 0 | DISCHARGE

## 2022-01-01 RX ORDER — FAMOTIDINE 10 MG/ML
20 INJECTION INTRAVENOUS DAILY
Refills: 0 | Status: DISCONTINUED | OUTPATIENT
Start: 2022-01-01 | End: 2022-01-01

## 2022-01-01 RX ORDER — PANTOPRAZOLE SODIUM 20 MG/1
40 TABLET, DELAYED RELEASE ORAL
Refills: 0 | Status: DISCONTINUED | OUTPATIENT
Start: 2022-01-01 | End: 2022-01-01

## 2022-01-01 RX ORDER — ONDANSETRON 8 MG/1
4 TABLET, FILM COATED ORAL ONCE
Refills: 0 | Status: COMPLETED | OUTPATIENT
Start: 2022-01-01 | End: 2022-01-01

## 2022-01-01 RX ORDER — MULTIVIT-MIN/FOLIC/VIT K/LYCOP 400-300MCG
25 MCG TABLET ORAL DAILY
Refills: 0 | Status: ACTIVE | COMMUNITY
Start: 2022-01-01

## 2022-01-01 RX ORDER — SODIUM CHLORIDE 9 MG/ML
2500 INJECTION INTRAMUSCULAR; INTRAVENOUS; SUBCUTANEOUS ONCE
Refills: 0 | Status: COMPLETED | OUTPATIENT
Start: 2022-01-01 | End: 2022-01-01

## 2022-01-01 RX ORDER — POTASSIUM CHLORIDE 20 MEQ
20 PACKET (EA) ORAL
Refills: 0 | Status: DISCONTINUED | OUTPATIENT
Start: 2022-01-01 | End: 2022-01-01

## 2022-01-01 RX ORDER — HEPARIN SODIUM 5000 [USP'U]/ML
5000 INJECTION INTRAVENOUS; SUBCUTANEOUS EVERY 12 HOURS
Refills: 0 | Status: DISCONTINUED | OUTPATIENT
Start: 2022-01-01 | End: 2022-01-01

## 2022-01-01 RX ORDER — PROCHLORPERAZINE MALEATE 5 MG
0 TABLET ORAL
Qty: 0 | Refills: 0 | DISCHARGE
Start: 2022-01-01

## 2022-01-01 RX ORDER — POTASSIUM CHLORIDE 20 MEQ
40 PACKET (EA) ORAL ONCE
Refills: 0 | Status: COMPLETED | OUTPATIENT
Start: 2022-01-01 | End: 2022-01-01

## 2022-01-01 RX ORDER — POTASSIUM CHLORIDE 20 MEQ
10 PACKET (EA) ORAL
Refills: 0 | Status: COMPLETED | OUTPATIENT
Start: 2022-01-01 | End: 2022-01-01

## 2022-01-01 RX ORDER — MULTIVIT-MIN/FOLIC/VIT K/LYCOP 400-300MCG
50 MCG TABLET ORAL DAILY
Qty: 180 | Refills: 0 | Status: DISCONTINUED | COMMUNITY
Start: 1900-01-01 | End: 2022-01-01

## 2022-01-01 RX ORDER — FAMOTIDINE 10 MG/ML
20 INJECTION INTRAVENOUS ONCE
Refills: 0 | Status: DISCONTINUED | OUTPATIENT
Start: 2022-01-01 | End: 2022-01-01

## 2022-01-01 RX ORDER — ONDANSETRON 8 MG/1
4 TABLET, FILM COATED ORAL EVERY 8 HOURS
Refills: 0 | Status: DISCONTINUED | OUTPATIENT
Start: 2022-01-01 | End: 2022-01-01

## 2022-01-01 RX ORDER — DEXTROSE MONOHYDRATE, SODIUM CHLORIDE, AND POTASSIUM CHLORIDE 50; .745; 4.5 G/1000ML; G/1000ML; G/1000ML
1000 INJECTION, SOLUTION INTRAVENOUS
Refills: 0 | Status: DISCONTINUED | OUTPATIENT
Start: 2022-01-01 | End: 2022-01-01

## 2022-01-01 RX ORDER — INSULIN LISPRO 100/ML
VIAL (ML) SUBCUTANEOUS
Refills: 0 | Status: DISCONTINUED | OUTPATIENT
Start: 2022-01-01 | End: 2022-01-01

## 2022-01-01 RX ORDER — ONDANSETRON 8 MG/1
4 TABLET, FILM COATED ORAL EVERY 6 HOURS
Refills: 0 | Status: DISCONTINUED | OUTPATIENT
Start: 2022-01-01 | End: 2022-01-01

## 2022-01-01 RX ORDER — SODIUM CHLORIDE 9 MG/ML
1000 INJECTION, SOLUTION INTRAVENOUS
Refills: 0 | Status: DISCONTINUED | OUTPATIENT
Start: 2022-01-01 | End: 2022-01-01

## 2022-01-01 RX ORDER — ONDANSETRON 8 MG/1
0 TABLET, FILM COATED ORAL
Qty: 0 | Refills: 0 | DISCHARGE
Start: 2022-01-01

## 2022-01-01 RX ORDER — PROCHLORPERAZINE MALEATE 5 MG
5 TABLET ORAL ONCE
Refills: 0 | Status: DISCONTINUED | OUTPATIENT
Start: 2022-01-01 | End: 2022-01-01

## 2022-01-01 RX ORDER — POTASSIUM CHLORIDE 20 MEQ
10 PACKET (EA) ORAL
Qty: 0 | Refills: 0 | DISCHARGE
Start: 2022-01-01

## 2022-01-01 RX ORDER — CHOLECALCIFEROL (VITAMIN D3) 125 MCG
4000 CAPSULE ORAL DAILY
Refills: 0 | Status: DISCONTINUED | OUTPATIENT
Start: 2022-01-01 | End: 2022-01-01

## 2022-01-01 RX ORDER — LANOLIN ALCOHOL/MO/W.PET/CERES
3 CREAM (GRAM) TOPICAL AT BEDTIME
Refills: 0 | Status: DISCONTINUED | OUTPATIENT
Start: 2022-01-01 | End: 2022-01-01

## 2022-01-01 RX ORDER — INSULIN LISPRO 100/ML
VIAL (ML) SUBCUTANEOUS AT BEDTIME
Refills: 0 | Status: DISCONTINUED | OUTPATIENT
Start: 2022-01-01 | End: 2022-01-01

## 2022-01-01 RX ORDER — DEXTROSE 50 % IN WATER 50 %
25 SYRINGE (ML) INTRAVENOUS ONCE
Refills: 0 | Status: DISCONTINUED | OUTPATIENT
Start: 2022-01-01 | End: 2022-01-01

## 2022-01-01 RX ORDER — PANTOPRAZOLE SODIUM 20 MG/1
40 TABLET, DELAYED RELEASE ORAL
Qty: 0 | Refills: 0 | DISCHARGE
Start: 2022-01-01

## 2022-01-01 RX ORDER — FAMOTIDINE 10 MG/ML
20 INJECTION INTRAVENOUS ONCE
Refills: 0 | Status: COMPLETED | OUTPATIENT
Start: 2022-01-01 | End: 2022-01-01

## 2022-01-01 RX ORDER — PREGABALIN 225 MG/1
1000 CAPSULE ORAL DAILY
Refills: 0 | Status: DISCONTINUED | OUTPATIENT
Start: 2022-01-01 | End: 2022-01-01

## 2022-01-01 RX ORDER — PANTOPRAZOLE SODIUM 20 MG/1
40 TABLET, DELAYED RELEASE ORAL EVERY 12 HOURS
Refills: 0 | Status: DISCONTINUED | OUTPATIENT
Start: 2022-01-01 | End: 2022-01-01

## 2022-01-01 RX ORDER — SODIUM CHLORIDE 9 MG/ML
1000 INJECTION INTRAMUSCULAR; INTRAVENOUS; SUBCUTANEOUS
Refills: 0 | Status: DISCONTINUED | OUTPATIENT
Start: 2022-01-01 | End: 2022-01-01

## 2022-01-01 RX ORDER — FERROUS SULFATE 325(65) MG
325 TABLET ORAL DAILY
Refills: 0 | Status: DISCONTINUED | OUTPATIENT
Start: 2022-01-01 | End: 2022-01-01

## 2022-01-01 RX ORDER — DIPHENHYDRAMINE HCL 50 MG
25 CAPSULE ORAL ONCE
Refills: 0 | Status: COMPLETED | OUTPATIENT
Start: 2022-01-01 | End: 2022-01-01

## 2022-01-01 RX ORDER — IRBESARTAN 75 MG/1
75 TABLET ORAL
Qty: 90 | Refills: 0 | Status: ACTIVE | COMMUNITY
Start: 2019-02-14 | End: 1900-01-01

## 2022-01-01 RX ORDER — DEXTROSE 50 % IN WATER 50 %
15 SYRINGE (ML) INTRAVENOUS ONCE
Refills: 0 | Status: DISCONTINUED | OUTPATIENT
Start: 2022-01-01 | End: 2022-01-01

## 2022-01-01 RX ORDER — CICLOPIROX 80 MG/ML
8 SOLUTION TOPICAL
Qty: 1 | Refills: 3 | Status: ACTIVE | COMMUNITY
Start: 2022-01-01 | End: 1900-01-01

## 2022-01-01 RX ORDER — METOCLOPRAMIDE HCL 10 MG
0 TABLET ORAL
Qty: 0 | Refills: 0 | DISCHARGE
Start: 2022-01-01

## 2022-01-01 RX ORDER — TAMSULOSIN HYDROCHLORIDE 0.4 MG/1
0.8 CAPSULE ORAL AT BEDTIME
Refills: 0 | Status: DISCONTINUED | OUTPATIENT
Start: 2022-01-01 | End: 2022-01-01

## 2022-01-01 RX ORDER — LANOLIN ALCOHOL/MO/W.PET/CERES
1 CREAM (GRAM) TOPICAL
Qty: 0 | Refills: 0 | DISCHARGE
Start: 2022-01-01

## 2022-01-01 RX ORDER — PROCHLORPERAZINE MALEATE 5 MG
5 TABLET ORAL EVERY 6 HOURS
Refills: 0 | Status: DISCONTINUED | OUTPATIENT
Start: 2022-01-01 | End: 2022-01-01

## 2022-01-01 RX ORDER — PROCHLORPERAZINE MALEATE 5 MG
10 TABLET ORAL ONCE
Refills: 0 | Status: DISCONTINUED | OUTPATIENT
Start: 2022-01-01 | End: 2022-01-01

## 2022-01-01 RX ORDER — PROCHLORPERAZINE MALEATE 5 MG
5 TABLET ORAL ONCE
Refills: 0 | Status: COMPLETED | OUTPATIENT
Start: 2022-01-01 | End: 2022-01-01

## 2022-01-01 RX ORDER — ROSUVASTATIN CALCIUM 5 MG/1
1 TABLET ORAL
Qty: 0 | Refills: 0 | DISCHARGE

## 2022-01-01 RX ORDER — GLUCAGON INJECTION, SOLUTION 0.5 MG/.1ML
1 INJECTION, SOLUTION SUBCUTANEOUS ONCE
Refills: 0 | Status: DISCONTINUED | OUTPATIENT
Start: 2022-01-01 | End: 2022-01-01

## 2022-01-01 RX ORDER — DIATRIZOATE MEGLUMINE 180 MG/ML
100 INJECTION, SOLUTION INTRAVESICAL ONCE
Refills: 0 | Status: DISCONTINUED | OUTPATIENT
Start: 2022-01-01 | End: 2022-01-01

## 2022-01-01 RX ORDER — DEXTROSE 50 % IN WATER 50 %
12.5 SYRINGE (ML) INTRAVENOUS ONCE
Refills: 0 | Status: DISCONTINUED | OUTPATIENT
Start: 2022-01-01 | End: 2022-01-01

## 2022-01-01 RX ORDER — ACETAMINOPHEN 500 MG
650 TABLET ORAL EVERY 6 HOURS
Refills: 0 | Status: DISCONTINUED | OUTPATIENT
Start: 2022-01-01 | End: 2022-01-01

## 2022-01-01 RX ORDER — ROSUVASTATIN CALCIUM 10 MG/1
10 TABLET, FILM COATED ORAL
Qty: 90 | Refills: 0 | Status: ACTIVE | COMMUNITY
Start: 2019-06-24 | End: 1900-01-01

## 2022-01-01 RX ADMIN — Medication 125 MILLIGRAM(S): at 12:38

## 2022-01-01 RX ADMIN — PANTOPRAZOLE SODIUM 40 MILLIGRAM(S): 20 TABLET, DELAYED RELEASE ORAL at 05:34

## 2022-01-01 RX ADMIN — Medication 1: at 09:15

## 2022-01-01 RX ADMIN — HEPARIN SODIUM 5000 UNIT(S): 5000 INJECTION INTRAVENOUS; SUBCUTANEOUS at 17:56

## 2022-01-01 RX ADMIN — HEPARIN SODIUM 5000 UNIT(S): 5000 INJECTION INTRAVENOUS; SUBCUTANEOUS at 06:08

## 2022-01-01 RX ADMIN — Medication 100 MILLIEQUIVALENT(S): at 12:54

## 2022-01-01 RX ADMIN — ONDANSETRON 4 MILLIGRAM(S): 8 TABLET, FILM COATED ORAL at 11:57

## 2022-01-01 RX ADMIN — Medication 5 MILLIGRAM(S): at 17:58

## 2022-01-01 RX ADMIN — Medication 1: at 17:56

## 2022-01-01 RX ADMIN — HEPARIN SODIUM 5000 UNIT(S): 5000 INJECTION INTRAVENOUS; SUBCUTANEOUS at 05:34

## 2022-01-01 RX ADMIN — DEXTROSE MONOHYDRATE, SODIUM CHLORIDE, AND POTASSIUM CHLORIDE 80 MILLILITER(S): 50; .745; 4.5 INJECTION, SOLUTION INTRAVENOUS at 23:04

## 2022-01-01 RX ADMIN — SODIUM CHLORIDE 2500 MILLILITER(S): 9 INJECTION INTRAMUSCULAR; INTRAVENOUS; SUBCUTANEOUS at 12:48

## 2022-01-01 RX ADMIN — Medication 100 MILLIEQUIVALENT(S): at 17:42

## 2022-01-01 RX ADMIN — Medication 40 MILLIEQUIVALENT(S): at 12:54

## 2022-01-01 RX ADMIN — SODIUM CHLORIDE 80 MILLILITER(S): 9 INJECTION, SOLUTION INTRAVENOUS at 06:14

## 2022-01-01 RX ADMIN — ONDANSETRON 4 MILLIGRAM(S): 8 TABLET, FILM COATED ORAL at 14:00

## 2022-01-01 RX ADMIN — ONDANSETRON 4 MILLIGRAM(S): 8 TABLET, FILM COATED ORAL at 09:16

## 2022-01-01 RX ADMIN — PANTOPRAZOLE SODIUM 40 MILLIGRAM(S): 20 TABLET, DELAYED RELEASE ORAL at 17:41

## 2022-01-01 RX ADMIN — PANTOPRAZOLE SODIUM 40 MILLIGRAM(S): 20 TABLET, DELAYED RELEASE ORAL at 17:56

## 2022-01-01 RX ADMIN — Medication 5: at 08:34

## 2022-01-01 RX ADMIN — ONDANSETRON 4 MILLIGRAM(S): 8 TABLET, FILM COATED ORAL at 09:09

## 2022-01-01 RX ADMIN — Medication 3: at 11:57

## 2022-01-01 RX ADMIN — HEPARIN SODIUM 5000 UNIT(S): 5000 INJECTION INTRAVENOUS; SUBCUTANEOUS at 18:42

## 2022-01-01 RX ADMIN — Medication 100 MILLIEQUIVALENT(S): at 21:07

## 2022-01-01 RX ADMIN — ONDANSETRON 4 MILLIGRAM(S): 8 TABLET, FILM COATED ORAL at 01:47

## 2022-01-01 RX ADMIN — ONDANSETRON 4 MILLIGRAM(S): 8 TABLET, FILM COATED ORAL at 01:21

## 2022-01-01 RX ADMIN — Medication 100 MILLIEQUIVALENT(S): at 16:24

## 2022-01-01 RX ADMIN — Medication 650 MILLIGRAM(S): at 09:09

## 2022-01-01 RX ADMIN — SODIUM CHLORIDE 80 MILLILITER(S): 9 INJECTION, SOLUTION INTRAVENOUS at 15:10

## 2022-01-01 RX ADMIN — ONDANSETRON 4 MILLIGRAM(S): 8 TABLET, FILM COATED ORAL at 12:38

## 2022-01-01 RX ADMIN — ONDANSETRON 4 MILLIGRAM(S): 8 TABLET, FILM COATED ORAL at 17:42

## 2022-01-01 RX ADMIN — ONDANSETRON 4 MILLIGRAM(S): 8 TABLET, FILM COATED ORAL at 11:47

## 2022-01-01 RX ADMIN — PANTOPRAZOLE SODIUM 40 MILLIGRAM(S): 20 TABLET, DELAYED RELEASE ORAL at 18:12

## 2022-01-01 RX ADMIN — Medication 100 MILLIEQUIVALENT(S): at 14:56

## 2022-01-01 RX ADMIN — ONDANSETRON 4 MILLIGRAM(S): 8 TABLET, FILM COATED ORAL at 18:12

## 2022-01-01 RX ADMIN — ONDANSETRON 4 MILLIGRAM(S): 8 TABLET, FILM COATED ORAL at 06:08

## 2022-01-01 RX ADMIN — ONDANSETRON 4 MILLIGRAM(S): 8 TABLET, FILM COATED ORAL at 14:19

## 2022-01-01 RX ADMIN — Medication 2: at 17:08

## 2022-01-01 RX ADMIN — FAMOTIDINE 20 MILLIGRAM(S): 10 INJECTION INTRAVENOUS at 11:47

## 2022-01-01 RX ADMIN — PANTOPRAZOLE SODIUM 40 MILLIGRAM(S): 20 TABLET, DELAYED RELEASE ORAL at 06:08

## 2022-01-01 RX ADMIN — ONDANSETRON 4 MILLIGRAM(S): 8 TABLET, FILM COATED ORAL at 06:03

## 2022-01-01 RX ADMIN — HEPARIN SODIUM 5000 UNIT(S): 5000 INJECTION INTRAVENOUS; SUBCUTANEOUS at 05:35

## 2022-01-01 RX ADMIN — HEPARIN SODIUM 5000 UNIT(S): 5000 INJECTION INTRAVENOUS; SUBCUTANEOUS at 17:41

## 2022-01-01 RX ADMIN — Medication 100 MILLIEQUIVALENT(S): at 16:53

## 2022-01-01 RX ADMIN — SODIUM CHLORIDE 80 MILLILITER(S): 9 INJECTION, SOLUTION INTRAVENOUS at 19:28

## 2022-01-01 RX ADMIN — Medication 1: at 14:18

## 2022-01-01 RX ADMIN — HEPARIN SODIUM 5000 UNIT(S): 5000 INJECTION INTRAVENOUS; SUBCUTANEOUS at 18:12

## 2022-01-01 RX ADMIN — Medication 25 MILLIGRAM(S): at 12:38

## 2022-01-01 RX ADMIN — PANTOPRAZOLE SODIUM 40 MILLIGRAM(S): 20 TABLET, DELAYED RELEASE ORAL at 05:35

## 2022-01-01 RX ADMIN — TAMSULOSIN HYDROCHLORIDE 0.8 MILLIGRAM(S): 0.4 CAPSULE ORAL at 21:12

## 2022-01-01 RX ADMIN — HEPARIN SODIUM 5000 UNIT(S): 5000 INJECTION INTRAVENOUS; SUBCUTANEOUS at 06:02

## 2022-02-18 ENCOUNTER — APPOINTMENT (OUTPATIENT)
Dept: INTERNAL MEDICINE | Facility: CLINIC | Age: 67
End: 2022-02-18
Payer: MEDICARE

## 2022-02-18 VITALS
SYSTOLIC BLOOD PRESSURE: 140 MMHG | BODY MASS INDEX: 29.35 KG/M2 | RESPIRATION RATE: 16 BRPM | HEIGHT: 67 IN | WEIGHT: 187 LBS | TEMPERATURE: 98.1 F | HEART RATE: 74 BPM | OXYGEN SATURATION: 98 % | DIASTOLIC BLOOD PRESSURE: 90 MMHG

## 2022-02-18 VITALS — DIASTOLIC BLOOD PRESSURE: 92 MMHG | SYSTOLIC BLOOD PRESSURE: 146 MMHG

## 2022-02-18 LAB
BILIRUB UR QL STRIP: NEGATIVE
CLARITY UR: CLEAR
COLLECTION METHOD: NORMAL
GLUCOSE UR-MCNC: NEGATIVE
HCG UR QL: 0.2 EU/DL
HGB UR QL STRIP.AUTO: ABNORMAL
KETONES UR-MCNC: NEGATIVE
LEUKOCYTE ESTERASE UR QL STRIP: NEGATIVE
NITRITE UR QL STRIP: NEGATIVE
PH UR STRIP: 5.5
PROT UR STRIP-MCNC: NEGATIVE
SP GR UR STRIP: 1.02

## 2022-02-18 PROCEDURE — 81003 URINALYSIS AUTO W/O SCOPE: CPT | Mod: 59,QW

## 2022-02-18 PROCEDURE — 36415 COLL VENOUS BLD VENIPUNCTURE: CPT

## 2022-02-18 PROCEDURE — 99214 OFFICE O/P EST MOD 30 MIN: CPT | Mod: 25

## 2022-02-18 NOTE — HISTORY OF PRESENT ILLNESS
[FreeTextEntry1] : Fasting labs and medication renewal  [de-identified] : Mr. STOCK is a 67 year  male, who present to the office for medication renewal and fasting labs.  General feels well.  States blood pressure was elevated today.  Usually has normal BP readings.  Denies chest pain, SOB, dizziness or change in vision.  Recently gaine weight.  Does not have a high sodium diet.  States he took his bp medication today  \par Requesting renewal of Glipizide and crestor.\par

## 2022-02-18 NOTE — REVIEW OF SYSTEMS
[Back Pain] : back pain [Negative] : Musculoskeletal [Dysuria] : no dysuria [Hematuria] : no hematuria [Swollen Glands] : no swollen glands

## 2022-02-18 NOTE — HEALTH RISK ASSESSMENT
[Yes] : Yes [2 - 4 times a month (2 pts)] : 2-4 times a month (2 points) [3 or 4 (1 pt)] : 3 or 4  (1 point) [Never (0 pts)] : Never (0 points) [No] : In the past 12 months have you used drugs other than those required for medical reasons? No [No falls in past year] : Patient reported no falls in the past year [0] : 2) Feeling down, depressed, or hopeless: Not at all (0) [Audit-CScore] : 3 [IBH2Vmklp] : 0

## 2022-02-18 NOTE — PHYSICAL EXAM
[No Acute Distress] : no acute distress [Well Nourished] : well nourished [Well Developed] : well developed [Well-Appearing] : well-appearing [Normal Sclera/Conjunctiva] : normal sclera/conjunctiva [PERRL] : pupils equal round and reactive to light [EOMI] : extraocular movements intact [Normal Outer Ear/Nose] : the outer ears and nose were normal in appearance [Normal Oropharynx] : the oropharynx was normal [No JVD] : no jugular venous distention [No Lymphadenopathy] : no lymphadenopathy [Supple] : supple [Thyroid Normal, No Nodules] : the thyroid was normal and there were no nodules present [No Respiratory Distress] : no respiratory distress  [No Accessory Muscle Use] : no accessory muscle use [Clear to Auscultation] : lungs were clear to auscultation bilaterally [Normal Rate] : normal rate  [Regular Rhythm] : with a regular rhythm [Normal S1, S2] : normal S1 and S2 [No Carotid Bruits] : no carotid bruits [No Abdominal Bruit] : a ~M bruit was not heard ~T in the abdomen [Pedal Pulses Present] : the pedal pulses are present [No Edema] : there was no peripheral edema [No Palpable Aorta] : no palpable aorta [No Extremity Clubbing/Cyanosis] : no extremity clubbing/cyanosis [Soft] : abdomen soft [Non Tender] : non-tender [Non-distended] : non-distended [No Masses] : no abdominal mass palpated [No HSM] : no HSM [Normal Bowel Sounds] : normal bowel sounds [Normal Posterior Cervical Nodes] : no posterior cervical lymphadenopathy [Normal Anterior Cervical Nodes] : no anterior cervical lymphadenopathy [No CVA Tenderness] : no CVA  tenderness [No Spinal Tenderness] : no spinal tenderness [No Joint Swelling] : no joint swelling [Grossly Normal Strength/Tone] : grossly normal strength/tone [No Rash] : no rash [Coordination Grossly Intact] : coordination grossly intact [No Focal Deficits] : no focal deficits [Normal Gait] : normal gait [Deep Tendon Reflexes (DTR)] : deep tendon reflexes were 2+ and symmetric [Normal Affect] : the affect was normal [Normal Insight/Judgement] : insight and judgment were intact [Speech Grossly Normal] : speech grossly normal [Alert and Oriented x3] : oriented to person, place, and time [Normal Mood] : the mood was normal

## 2022-02-18 NOTE — PLAN
[FreeTextEntry1] : Susan - DMIPLE was provided education about general treatment options. DIMPLE was advised long term complication of diabetes and its comorbidities.  Patient was advised to routine follow up appointment with podiatry and opthalmology.\par should have repeat hgb a1c every 3 months and microalbumin every 6 months.  DIMPLE STOCK was advised to call me if any concerns about their diabetes. \par Goal LDL less than 70\par Check FLP/ LFT and a1c \par Monitor BG \par \par Cardio - Hypertension -  Patient was educated about hypertension and the importance of controlling the pressure through lifestyle modification which include low sodium  diet and  aerobic  exercise.  Also discussed the use of prescription medication which included their benefits and their side effects. We discussed the use of ASA 81 mg daily.   Having a BMI less than or equal to 25. \par Return to the office in a few weeks for a blood pressure check \par \par Hyperlipidemia -   Advised low fat cholesterol diet.  Advised importance of having low cholesterol / LDL/ triglycerides. Advised life style modifications.  Continue with current medications.  Refilled Crestor \par \par Patient  education  - COVID-19   Counseling and education provided to the patient.  Advised sign and symptoms of the virus.  Advised contact precautions.  Educated patient on proper hand washing and to participate in social distancing.   Had one dose of the covid vax.  Advised to call the office with the COVID vax date one the second shot is complete\par \par Patient is in full awareness of the plan and agrees to it.  All pt question was answered.  \par \par I spent 25 Minutes with the patient, half of which we discussed finding on physical exam  and coordinated care.  As well as reviewed my plans and follow ups.

## 2022-02-25 LAB
ALBUMIN SERPL ELPH-MCNC: 4.5 G/DL
ALP BLD-CCNC: 87 U/L
ALT SERPL-CCNC: 16 U/L
ANION GAP SERPL CALC-SCNC: 12 MMOL/L
AST SERPL-CCNC: 27 U/L
BASOPHILS # BLD AUTO: 0.04 K/UL
BASOPHILS NFR BLD AUTO: 0.8 %
BILIRUB SERPL-MCNC: 0.5 MG/DL
BUN SERPL-MCNC: 28 MG/DL
CALCIUM SERPL-MCNC: 9.6 MG/DL
CHLORIDE SERPL-SCNC: 101 MMOL/L
CHOLEST SERPL-MCNC: 167 MG/DL
CO2 SERPL-SCNC: 22 MMOL/L
CREAT SERPL-MCNC: 1.34 MG/DL
EOSINOPHIL # BLD AUTO: 0.12 K/UL
EOSINOPHIL NFR BLD AUTO: 2.5 %
ESTIMATED AVERAGE GLUCOSE: 117 MG/DL
GLUCOSE SERPL-MCNC: 69 MG/DL
HBA1C MFR BLD HPLC: 5.7 %
HCT VFR BLD CALC: 43.1 %
HDLC SERPL-MCNC: 68 MG/DL
HGB BLD-MCNC: 13.4 G/DL
IMM GRANULOCYTES NFR BLD AUTO: 0 %
LDLC SERPL CALC-MCNC: 86 MG/DL
LYMPHOCYTES # BLD AUTO: 0.95 K/UL
LYMPHOCYTES NFR BLD AUTO: 19.7 %
MAN DIFF?: NORMAL
MCHC RBC-ENTMCNC: 29.3 PG
MCHC RBC-ENTMCNC: 31.1 GM/DL
MCV RBC AUTO: 94.1 FL
MONOCYTES # BLD AUTO: 0.46 K/UL
MONOCYTES NFR BLD AUTO: 9.5 %
NEUTROPHILS # BLD AUTO: 3.25 K/UL
NEUTROPHILS NFR BLD AUTO: 67.5 %
NONHDLC SERPL-MCNC: 99 MG/DL
PLATELET # BLD AUTO: 182 K/UL
POTASSIUM SERPL-SCNC: 4.2 MMOL/L
PROT SERPL-MCNC: 7.3 G/DL
RBC # BLD: 4.58 M/UL
RBC # FLD: 14.4 %
SODIUM SERPL-SCNC: 135 MMOL/L
TRIGL SERPL-MCNC: 60 MG/DL
TSH SERPL-ACNC: 2.24 UIU/ML
WBC # FLD AUTO: 4.82 K/UL

## 2022-04-01 ENCOUNTER — APPOINTMENT (OUTPATIENT)
Dept: INTERNAL MEDICINE | Facility: CLINIC | Age: 67
End: 2022-04-01
Payer: MEDICARE

## 2022-04-01 VITALS
RESPIRATION RATE: 16 BRPM | WEIGHT: 187.2 LBS | HEART RATE: 61 BPM | DIASTOLIC BLOOD PRESSURE: 86 MMHG | OXYGEN SATURATION: 96 % | TEMPERATURE: 96.6 F | HEIGHT: 67 IN | BODY MASS INDEX: 29.38 KG/M2 | SYSTOLIC BLOOD PRESSURE: 132 MMHG

## 2022-04-01 VITALS — DIASTOLIC BLOOD PRESSURE: 80 MMHG | SYSTOLIC BLOOD PRESSURE: 120 MMHG

## 2022-04-01 PROCEDURE — 69210 REMOVE IMPACTED EAR WAX UNI: CPT

## 2022-04-01 PROCEDURE — 99214 OFFICE O/P EST MOD 30 MIN: CPT | Mod: 25

## 2022-04-01 NOTE — ASSESSMENT
[FreeTextEntry1] : Mr. TSOCK is a 66 year  male, who present to the office for blood pressure check and cerumen impaction

## 2022-04-01 NOTE — PHYSICAL EXAM
[No Acute Distress] : no acute distress [Well Nourished] : well nourished [Well Developed] : well developed [Well-Appearing] : well-appearing [Normal Sclera/Conjunctiva] : normal sclera/conjunctiva [PERRL] : pupils equal round and reactive to light [EOMI] : extraocular movements intact [Normal Outer Ear/Nose] : the outer ears and nose were normal in appearance [Normal Oropharynx] : the oropharynx was normal [No JVD] : no jugular venous distention [No Lymphadenopathy] : no lymphadenopathy [Supple] : supple [Thyroid Normal, No Nodules] : the thyroid was normal and there were no nodules present [No Respiratory Distress] : no respiratory distress  [No Accessory Muscle Use] : no accessory muscle use [Clear to Auscultation] : lungs were clear to auscultation bilaterally [Normal Rate] : normal rate  [Regular Rhythm] : with a regular rhythm [Normal S1, S2] : normal S1 and S2 [No Carotid Bruits] : no carotid bruits [No Abdominal Bruit] : a ~M bruit was not heard ~T in the abdomen [Pedal Pulses Present] : the pedal pulses are present [No Edema] : there was no peripheral edema [No Palpable Aorta] : no palpable aorta [No Extremity Clubbing/Cyanosis] : no extremity clubbing/cyanosis [Soft] : abdomen soft [Non Tender] : non-tender [Non-distended] : non-distended [No Masses] : no abdominal mass palpated [No HSM] : no HSM [Normal Bowel Sounds] : normal bowel sounds [Normal Posterior Cervical Nodes] : no posterior cervical lymphadenopathy [Normal Anterior Cervical Nodes] : no anterior cervical lymphadenopathy [No CVA Tenderness] : no CVA  tenderness [No Spinal Tenderness] : no spinal tenderness [No Joint Swelling] : no joint swelling [Grossly Normal Strength/Tone] : grossly normal strength/tone [No Rash] : no rash [Coordination Grossly Intact] : coordination grossly intact [No Focal Deficits] : no focal deficits [Normal Gait] : normal gait [Deep Tendon Reflexes (DTR)] : deep tendon reflexes were 2+ and symmetric [Speech Grossly Normal] : speech grossly normal [Normal Affect] : the affect was normal [Alert and Oriented x3] : oriented to person, place, and time [Normal Mood] : the mood was normal [Normal Insight/Judgement] : insight and judgment were intact [de-identified] : right TMI - Left ear impacted cerumen

## 2022-04-01 NOTE — REVIEW OF SYSTEMS
[Back Pain] : back pain [Negative] : Heme/Lymph [Dysuria] : no dysuria [Hematuria] : no hematuria [Swollen Glands] : no swollen glands [FreeTextEntry4] : ear clogged

## 2022-04-01 NOTE — PLAN
[FreeTextEntry1] : ENT -  Cerumen impaction - dried - soak ear with hydroperoxide for 5 minutes - than use warm water to irrigate the ear -  Successfully removed the wax.  s/p removal TMI.  Avoid using  q-tip \par Advised to use debrox q 2 weeks to prevent build up  \par \par   Endo - DIMPLE was provided education about general treatment options. DIMPLE was advised long term complication of diabetes and its comorbidities.  Patient was advised to routine follow up appointment with podiatry and opthalmology.\par should have repeat hgb a1c every 3 months and microalbumin every 6 months.  DIMPLE STOCK was advised to call me if any concerns about their diabetes. \par Goal LDL less than 70\par Check FLP/ LFT and a1c  2- months \par Monitor BG \par \par Cardio - Hypertension -  Patient was educated about hypertension and the importance of controlling the pressure through lifestyle modification which include low sodium  diet and  aerobic  exercise.  Also discussed the use of prescription medication which included their benefits and their side effects. We discussed the use of ASA 81 mg daily.   Having a BMI less than or equal to 25.  Continue to monitor BP \par \par Hyperlipidemia -   Advised low fat cholesterol diet.  Advised importance of having low cholesterol / LDL/ triglycerides. Advised life style modifications.  Continue with current medications.  Refilled Crestor \par \par Patient  education  - COVID-19   Counseling and education provided to the patient.  Advised sign and symptoms of the virus.  Advised contact precautions.  Educated patient on proper hand washing and to participate in social distancing.   Had one dose of the covid vax.  Advised to call the office with the COVID vax date one the second shot is complete\par \par Patient is in full awareness of the plan and agrees to it.  All pt question was answered.  \par \par I spent 30 Minutes with the patient, half of which we discussed finding on physical exam  and coordinated care.  As well as reviewed my plans and follow ups.

## 2022-04-01 NOTE — HEALTH RISK ASSESSMENT
[Yes] : Yes [2 - 4 times a month (2 pts)] : 2-4 times a month (2 points) [3 or 4 (1 pt)] : 3 or 4  (1 point) [Never (0 pts)] : Never (0 points) [No] : In the past 12 months have you used drugs other than those required for medical reasons? No [No falls in past year] : Patient reported no falls in the past year [0] : 2) Feeling down, depressed, or hopeless: Not at all (0) [Audit-CScore] : 3 [[id number]] : 0

## 2022-04-01 NOTE — HISTORY OF PRESENT ILLNESS
[FreeTextEntry1] : Blood pressure check and review of labs from 2/22  [de-identified] : Mr. STOCK is a 67 year  male, who present to the office for blood pressure check.\par General feels well -   No fever or chills.\par States was at the urologist office the other day and bp was 140/82 \par Denies headache or chest pain \par Also been having decrease hearing left ear-  feels clogged. denies pain  - unaware of what makes it better or worse.

## 2022-05-17 ENCOUNTER — RX RENEWAL (OUTPATIENT)
Age: 67
End: 2022-05-17

## 2022-08-18 PROBLEM — H61.22 IMPACTED CERUMEN OF LEFT EAR: Status: RESOLVED | Noted: 2022-04-01 | Resolved: 2022-01-01

## 2022-08-18 PROBLEM — E78.5 OTHER AND UNSPECIFIED HYPERLIPIDEMIA: Status: ACTIVE | Noted: 2019-02-12

## 2022-08-18 PROBLEM — N40.0 BPH (BENIGN PROSTATIC HYPERPLASIA): Status: ACTIVE | Noted: 2020-02-10

## 2022-08-18 PROBLEM — J84.10 LUNG FIBROSIS: Status: ACTIVE | Noted: 2022-01-01

## 2022-08-18 PROBLEM — L72.3 SEBACEOUS CYST: Status: ACTIVE | Noted: 2022-01-01

## 2022-08-18 PROBLEM — D47.2 MGUS (MONOCLONAL GAMMOPATHY OF UNKNOWN SIGNIFICANCE): Status: ACTIVE | Noted: 2022-01-01

## 2022-08-18 PROBLEM — B35.1 ONYCHOMYCOSIS: Status: ACTIVE | Noted: 2022-01-01

## 2022-08-18 NOTE — PLAN
[FreeTextEntry1] : Cardiology-screening for coronary artery disease, history of diabetes, reviewed EKG no acute ST-T wave changes noted.  Counseling given to the patient about aerobic exercise.  Check comprehensive metabolic and fasting lipid panel.\par \par Cardio - Hypertension -  Patient was educated about hypertension and the importance of controlling the pressure through lifestyle modification which include low sodium  diet and  aerobic  exercise.  Also discussed the use of prescription medication which included their benefits and their side effects.\par Having a BMI less than or equal to 25.  Advised renal protection with ARB continue with Irbesartan.\par \par Hyperlipidemia -   Advised low fat cholesterol diet.  Advised importance of having low cholesterol / LDL/ triglycerides. Advised life style modifications.  Continue with current medications.  Refilled  medication \par \par Endo - DIMPLE was provided education about general treatment options. DIMPLE was advised long term complication of diabetes and its comorbidities.  Patient was advised to routine follow up appointment with podiatry and opthalmology.\par Should have repeat hgb  A1C  every 3 months and microalbumin every 6 months.  DIMPLE MONTRELL was advised to call me if any concerns about their diabetes. \par Goal LDL less than 70\par Check FLP/ LFT and a1c  \par Will call [facility] for the consult ophthalmology.\par \par GI- repeat colonoscopy in 2024 reviewed colonoscopy report from 2019\par \par Pulmonary-patient worked related hazards i.e. cement dust-vies patient to get a CT scan of the chest\par Reviewed CT scan from 2017 showed questionable scarring and laboratory changes\par \par Urology- BPH- urology- check PSA levels.\par Abnormal urine urinalysis check urine culture\par  request recent urological consult\par Hyperlipidemia -   Advised low fat cholesterol diet.  Advised importance of having low cholesterol / LDL/ triglycerides. Advised life style modifications.  Continue with current medications.  Refilled Crestor \par \par Oncology- neuroendocrine tumor-follow-up with oncologist.  Going for PET scan in September request copy of the report\par Dermatology DIMPLE was encouraged to wear sun protective clothing, sun block, and proper use of SPF board brim hats, to seek shade and to avoid the sun in peak hours.  ABCD of skin moles was advised. \par Sebaceous cyst on back refer to dermatology for excision.  \par Onychomycosis  -start Pen[place] nail polish daily.  Remove nail polish after 7 days and then start recording.  Also can consider laser therapy. \par Patient  education  - COVID-19   Counseling and education provided to the patient.  Advised sign and symptoms of the virus.  Advised contact precautions.  Educated patient on proper hand washing and to participate in social distancing.   Had one dose of the covid vax.  Advised to call the office with the COVID vax date one the second shot is complete\par \par

## 2022-08-18 NOTE — ASSESSMENT
[FreeTextEntry1] : Mr. STOCK is a 66 year  male, who present to the office for physical exam and medication renewal

## 2022-08-18 NOTE — DATA REVIEWED
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

## 2022-08-18 NOTE — PHYSICAL EXAM
[No Acute Distress] : no acute distress [Well Nourished] : well nourished [Well Developed] : well developed [Well-Appearing] : well-appearing [Normal Sclera/Conjunctiva] : normal sclera/conjunctiva [PERRL] : pupils equal round and reactive to light [EOMI] : extraocular movements intact [Normal Outer Ear/Nose] : the outer ears and nose were normal in appearance [Normal Oropharynx] : the oropharynx was normal [No JVD] : no jugular venous distention [No Lymphadenopathy] : no lymphadenopathy [Supple] : supple [Thyroid Normal, No Nodules] : the thyroid was normal and there were no nodules present [No Respiratory Distress] : no respiratory distress  [No Accessory Muscle Use] : no accessory muscle use [Clear to Auscultation] : lungs were clear to auscultation bilaterally [Normal Rate] : normal rate  [Regular Rhythm] : with a regular rhythm [Normal S1, S2] : normal S1 and S2 [No Carotid Bruits] : no carotid bruits [No Abdominal Bruit] : a ~M bruit was not heard ~T in the abdomen [Pedal Pulses Present] : the pedal pulses are present [No Edema] : there was no peripheral edema [No Palpable Aorta] : no palpable aorta [No Extremity Clubbing/Cyanosis] : no extremity clubbing/cyanosis [Soft] : abdomen soft [Non Tender] : non-tender [Non-distended] : non-distended [No Masses] : no abdominal mass palpated [No HSM] : no HSM [Normal Bowel Sounds] : normal bowel sounds [Normal Posterior Cervical Nodes] : no posterior cervical lymphadenopathy [Normal Anterior Cervical Nodes] : no anterior cervical lymphadenopathy [No CVA Tenderness] : no CVA  tenderness [No Spinal Tenderness] : no spinal tenderness [No Joint Swelling] : no joint swelling [Grossly Normal Strength/Tone] : grossly normal strength/tone [No Rash] : no rash [Coordination Grossly Intact] : coordination grossly intact [No Focal Deficits] : no focal deficits [Normal Gait] : normal gait [Deep Tendon Reflexes (DTR)] : deep tendon reflexes were 2+ and symmetric [Speech Grossly Normal] : speech grossly normal [Normal Affect] : the affect was normal [Alert and Oriented x3] : oriented to person, place, and time [Normal Mood] : the mood was normal [Normal Insight/Judgement] : insight and judgment were intact [Normal TMs] : both tympanic membranes were normal [Normal Nasal Mucosa] : the nasal mucosa was normal [Normal Sphincter Tone] : normal sphincter tone [Right Foot Was Examined] : Right foot ~C was examined [Left Foot Was Examined] : left foot ~C was examined [] : both feet [Stool Occult Blood] : stool negative for occult blood [FreeTextEntry1] : Prostate slightly enlarged no nodules appreciated [de-identified] : As per urology [de-identified] : Onychomycosis noted hands and feet, Levasy cyst noted on back about 8-9 mm [TWNoteComboBox3] : +2 [TWNoteComboBox4] : +2

## 2022-08-18 NOTE — COUNSELING
[Potential consequences of obesity discussed] : Potential consequences of obesity discussed [Good understanding] : Patient has a good understanding of disease, goals and obesity follow-up plan [AUDIT-C Screening administered and reviewed] : AUDIT-C Screening administered and reviewed [Encouraged to increase physical activity] : Encouraged to increase physical activity [Decrease Portions] : decrease portions [FreeTextEntry2] : ADA diet

## 2022-08-18 NOTE — HEALTH RISK ASSESSMENT
[Yes] : Yes [2 - 4 times a month (2 pts)] : 2-4 times a month (2 points) [3 or 4 (1 pt)] : 3 or 4  (1 point) [Never (0 pts)] : Never (0 points) [No] : In the past 12 months have you used drugs other than those required for medical reasons? No [No falls in past year] : Patient reported no falls in the past year [0] : 2) Feeling down, depressed, or hopeless: Not at all (0) [Good] : ~his/her~  mood as  good [Never] : Never [No Retinopathy] : No retinopathy [None] : None [With Family] : lives with family [# of Members in Household ___] :  household currently consist of [unfilled] member(s) [Employed] : employed [College] : College [] :  [# Of Children ___] : has [unfilled] children [Feels Safe at Home] : Feels safe at home [Fully functional (bathing, dressing, toileting, transferring, walking, feeding)] : Fully functional (bathing, dressing, toileting, transferring, walking, feeding) [Fully functional (using the telephone, shopping, preparing meals, housekeeping, doing laundry, using] : Fully functional and needs no help or supervision to perform IADLs (using the telephone, shopping, preparing meals, housekeeping, doing laundry, using transportation, managing medications and managing finances) [Smoke Detector] : smoke detector [Carbon Monoxide Detector] : carbon monoxide detector [Seat Belt] :  uses seat belt [FreeTextEntry1] : spot on the back  [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

## 2022-08-18 NOTE — REVIEW OF SYSTEMS
[Negative] : ENT [Dysuria] : no dysuria [Hematuria] : no hematuria [Back Pain] : no back pain [Swollen Glands] : no swollen glands

## 2022-08-18 NOTE — HISTORY OF PRESENT ILLNESS
[FreeTextEntry1] : Yearly physical  [de-identified] : Mr. STOCK is a 67 year  male, who present to the office for physical exam.\par States he recently saw his urologist for evaluation of his bladder wall function.  Still has to self catheterize.  Brought in a urine sample today to have it evaluated..  Denies having any pain with urination, fever chills or night sweats.\par \par Requesting renewal of medications for diabetes and hypertension.  Denies having any side effects to current medication regimen.\par Denies having hypo or hyperglycemic events\par \par Has a growth on his back which seems to be getting slightly larger.  Denies any pain with it.  Dates its been on his back for long period of time\par \par \par

## 2022-08-22 PROBLEM — R82.81 PYURIA: Status: ACTIVE | Noted: 2022-01-01

## 2022-12-10 NOTE — ED PROVIDER NOTE - CLINICAL SUMMARY MEDICAL DECISION MAKING FREE TEXT BOX
DT: I have personally performed a face to face diagnostic evaluation on this patient.  I have reviewed the PA's note and agree with the history, exam, and plan of care, except as noted.  History and Exam by me shows 67 M hx neuroendocrine tumor, htn, hld, dm, on iron supplementation c/o nausea, vomiting after receiving lutathera treatment 3 days ago. Last time he had this done was 1.5 years ago. Was seen @ [facility] yesterday, had labs, given IVF/zofran/decadron with improvement. Symptoms started again last night and noticed swelling to his lips. Denies swelling elsewhere, rash, itching. Pt self-catheterizes x years. Denies f/c, cp, sob, abd pain, urinary changes. .  Patient is NAD.  A n O x 3. Head NC/AT. mouth-  bl upper and lower lip edematous, nt. Lungs cta bl. Heart s1,s2, rrr, no murmurs. Abd-soft, nt, no guarding, no rebound, no distension, no cva tenderness. Ext- FROM actively,  ambulating s any difficulty.  Labs and cxr were unremarkable except wbc 22, bun 40 cr 1.9

## 2022-12-10 NOTE — H&P ADULT - TIME BILLING
direct patient care including but not limited to reviewing chart, medications ,laboratory data, imaging reports, discussion of plan of care with consultants on the case, coordination of care with multidisciplinary team involved in the case and discussion of plan with patient.  Patient and family agreeable to plan of care and verbalized understanding the anticipated hospital course and treatment plan.

## 2022-12-10 NOTE — H&P ADULT - ASSESSMENT
67y Male with PMHx of neuroendocrine tumor, HTN, HLD, T2DM presenting with nausea and vomiting after receiving Lutathera treatment being admitted for angioedema of lips and nausea/vomiting.

## 2022-12-10 NOTE — ED ADULT TRIAGE NOTE - CHIEF COMPLAINT QUOTE
68y/o male c/o n/v, abdominal discomfort/indigestion since wednesday night. pmhx pancreatic tumor, had recent radiation treatment on wednesday, was given fluids and antiemetics. pt presents with angioedema at this time, able to completing full sentences, no respiratory distress, no drooling noted, airway patent.

## 2022-12-10 NOTE — ED PROVIDER NOTE - NS ED ATTENDING STATEMENT MOD
This was a shared visit with the RICHA. I reviewed and verified the documentation and independently performed the documented:

## 2022-12-10 NOTE — H&P ADULT - NSHPREVIEWOFSYSTEMS_GEN_ALL_CORE
CONSTITUTIONAL: Denies weakness, fevers, or chills. Denies headaches. Denies dizziness or lightheadedness.  EYES/ENT: Denies vertigo. Denies visual changes. Denies hearing changes. +Angioedema lips  NECK: Denies pain or stiffness.  RESPIRATORY: Denies cough, wheezing. Denies shortness of breath.  CARDIOVASCULAR: Denies chest pain or palpitations.  GASTROINTESTINAL: Denies abdominal pain. Denies diarrhea or constipation. +Nausea, vomiting  GENITOURINARY: Denies dysuria, frequency, or hematuria.  EXTREMITIES: Denies swelling or edema. Denies any pain.  NEUROLOGICAL: Denies numbness. Denies any focal weakness. Denies any episode of syncope.  SKIN: Denies rashes, bruising. CONSTITUTIONAL: Denies weakness, fevers, or chills. Denies headaches. Denies dizziness or lightheadedness.  EYES/ENT: +Angioedema lips; Denies vertigo. Denies visual changes. Denies hearing changes.   NECK: Denies pain or stiffness.  RESPIRATORY: Denies cough, wheezing. Denies shortness of breath.  CARDIOVASCULAR: Denies chest pain or palpitations.  GASTROINTESTINAL: +Nausea, vomiting Denies abdominal pain. Denies diarrhea or constipation.   GENITOURINARY: Denies dysuria, frequency, or hematuria.  EXTREMITIES: +Lip swelling   NEUROLOGICAL: Denies numbness. Denies any focal weakness. Denies any episode of syncope.  SKIN: Denies rashes, bruising.

## 2022-12-10 NOTE — H&P ADULT - PROBLEM SELECTOR PLAN 2
Pt presents with multiple episodes of nausea and vomiting  - Still currently having active episodes of vomiting in ED  - CTAP ordered  - F/u lactate  - NPO except meds  - Maintenance IVF NS 75cc/hr  - Zofran PRN for nausea  - GI (Dr. Rivers) consulted WBC 22.26 likely reactive - recently given decadron at [place] yesterday  - Afebrile, does not meet SIRS/sepsis criteria on admission  - CXR showed no acute cardiopulmonary pathologies on wet read, pending official  - Fu CT scan; r/o pancreatitis given hx of neuroendocrine cancer, leukocytosis and presenting symptoms   - Fu UA as patient self catherizes and is at increased risk   - Monitor for fevers and signs of infection  - Trend CBC

## 2022-12-10 NOTE — H&P ADULT - NSHPPHYSICALEXAM_GEN_ALL_CORE
T(C): 36.9 (12-10-22 @ 10:57), Max: 36.9 (12-10-22 @ 10:57)  HR: 125 (12-10-22 @ 10:57) (125 - 125)  BP: 120/86 (12-10-22 @ 10:57) (120/86 - 120/86)  RR: --  SpO2: 98% (12-10-22 @ 10:57) (98% - 98%)    PHYSICAL EXAM:  GENERAL: vomitting during exam   HEAD:  Atraumatic, Normocephalic  EYES: EOMI, PERRLA, conjunctiva and sclera clear  ENT: Moist mucous membranes  NECK: Supple, No JVD  CHEST/LUNG: Clear to auscultation bilaterally; No rales, rhonchi, wheezing, or rubs. Unlabored respirations  HEART: Regular rate and rhythm; No murmurs, rubs, or gallops  ABDOMEN: Bowel sounds present; Soft, Nontender, Nondistended. No hepatomegally  EXTREMITIES:  2+ Peripheral Pulses, brisk capillary refill. No clubbing, cyanosis, or edema  NERVOUS SYSTEM:  Alert & Oriented X3, speech clear. No deficits   MSK: FROM all 4 extremities, full and equal strength  SKIN: No rashes or lesions T(C): 36.9 (12-10-22 @ 10:57), Max: 36.9 (12-10-22 @ 10:57)  HR: 125 (12-10-22 @ 10:57) (125 - 125)  BP: 120/86 (12-10-22 @ 10:57) (120/86 - 120/86)  RR: --  SpO2: 98% (12-10-22 @ 10:57) (98% - 98%)    PHYSICAL EXAM:  GENERAL: vomittingx2 during exam   HEAD:  Atraumatic, Normocephalic  EYES: EOMI, PERRLA, conjunctiva and sclera clear  ENT: Moist mucous membranes  NECK: Supple, No JVD  CHEST/LUNG: Clear to auscultation bilaterally; No rales, rhonchi, wheezing, or rubs. Unlabored respirations  HEART: Regular rate and rhythm; No murmurs, rubs, or gallops  ABDOMEN: Bowel sounds present; Soft, Nontender, Nondistended. No hepatomegally  EXTREMITIES:  2+ Peripheral Pulses, brisk capillary refill. No clubbing, cyanosis, or edema  NERVOUS SYSTEM:  Alert & Oriented X3, speech clear. No deficits   MSK: FROM all 4 extremities, full and equal strength  SKIN: No rashes or lesions  mouth: swollen lip

## 2022-12-10 NOTE — ED ADULT NURSE NOTE - OBJECTIVE STATEMENT
the patient presents to the er with complaints of epi gastric pain with associated n/v/d since Wednesday night.  He states that on Wednesday Day, he received a directed radiation treatment for his pancreatic tumor.  He had this procedure performed once before 1.5 years ago and had no reaction at that time.  he went back to [facility] the following day where they administered fluids and antiemetics with relief, but once he got home he started to feel sick again.  he is alert / oriented x4.  spouse at bedside.  edwina barrow.

## 2022-12-10 NOTE — H&P ADULT - PROBLEM SELECTOR PLAN 5
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 Llamas and urologist Dr. Padron  - Receives monthly Sandostatin (Octreotide) injection  - Received Lutathera 3 days prior to admission (new experimental drug), last received 1.5 years ago  - Heme/Onc Dr. Llamas (pt follows outpt) consulted

## 2022-12-10 NOTE — H&P ADULT - PROBLEM SELECTOR PLAN 3
ZO likely 2/2 pre-renal azotemia in the setting of dehydration  - BUN 40, Cr 1.9 on admission  - Baseline Cr: 1.4-1.5 as per outpt chart review  - Given 2.5L NS bolus in ED  - Maintenance IVF as noted above  - Monitor BMP  - Monitor renal indices and urine output  - Avoid nephrotoxic medications ZO likely 2/2 pre-renal azotemia in the setting of dehydration  - BUN 40, Cr 1.9 on admission  - Baseline Cr: 1.4-1.5 as per outpt chart review  - Given 2.5L NS bolus in ED  - Maintenance IVF as noted above  - Monitor BMP  - Monitor renal indices and urine output  - Avoid nephrotoxic medications  - Consider coulter catheter to monitor ins and outs since patient self catherizes at home

## 2022-12-10 NOTE — H&P ADULT - HISTORY OF PRESENT ILLNESS
The patient is a 67y Male with PMHx of neuroendocrine tumor, HTN, HLD, T2DM presenting with nausea and vomiting after receiving Lutathera treatment 3 days ago. Previous time he received this treatment was 1.5 years ago. Of note, pt was recently at [facility] yesterday, where he was given IVF, zofran, decadron with symptomatic improvement and discharged home from ED. Symptoms began again last night with new swelling to his lips.    In the ED,  VS: Temp 98.4F, , /86, SpO2 98% on RA  Labs: WBC 22.26, BUN 40, Cr 1.9, Albumin 3.2  Imaging: CXR showed no acute cardiopulmonary pathologies on wet read, pending official  EKG: NSR 98bpm, nonspecific T wave abnormality, prolonged QT//460  Received: 1x Zofran, 1x Solumedrol 125mg IVP, 1x Pepcid IVP, 1x Benadryl 25mg IVP, 2.5L NS bolus The patient is a 67y Male with PMHx of neuroendocrine tumor diagnosed 6 years ago, HTN, HLD, T2DM presenting with nausea and vomiting the night after receiving Lutathera- an experimental cancer treatment- this Wednesday 12/7. He was nausea and vomiting once Wednesday night. From Thursday, he was vomiting every 15-20 minutes. He cannot tolerate food or water. Previous time he received this treatment was 1.5 years ago. He denies any symptoms similar to today or reaction at that time. Of note, pt was recently at [facility] yesterday, where he was given IVF, zofran, decadron with symptomatic improvement and discharged home from ED. Soon after discharge, the N/V started again and he also noticed swelling of his lips. Wife reports that his lips appeared even more swollen this morning. The patient follows with Dr. Llamas and gets Sandostatin monthly injections for the neuroendocrine tumor. Patient self catherizes around 4x a day. Wife at bedside.     In the ED,  VS: Temp 98.4F, , /86, SpO2 98% on RA  Labs: WBC 22.26, BUN 40, Cr 1.9, Albumin 3.2  Imaging: CXR showed no acute cardiopulmonary pathologies on wet read, pending official  EKG: NSR 98bpm, nonspecific T wave abnormality, prolonged QT//460  Received: 1x Zofran, 1x Solumedrol 125mg IVP, 1x Pepcid IVP, 1x Benadryl 25mg IVP, 2.5L NS bolus

## 2022-12-10 NOTE — H&P ADULT - PROBLEM SELECTOR PLAN 6
Chronic, known history of T2DM  - Hold home meds  - Low dose insulin corrective scale  - Hypoglycemia protocol, fingerstick glucose q6h  - Diet NPO  - F/u AM HbA1c Chronic, known history of T2DM  - Hold home meds: gllipizide   - Low dose insulin corrective scale  - Hypoglycemia protocol, fingerstick glucose q6h  - Diet NPO  - F/u AM HbA1c

## 2022-12-10 NOTE — ED PROVIDER NOTE - OBJECTIVE STATEMENT
67 M hx neuroendocrine tumor, htn, hld, dm, on iron supplementation c/o nausea, vomiting after receiving lutathera treatment 3 days ago. Last time he had this done was 1.5 years ago. Was seen @ [facility] yesterday, had labs, given IVF/zofran/decadron with improvement. Symptoms started again last night and noticed swelling to his lips. Denies swelling elsewhere, rash, itching. Pt self-catheterizes x years. Denies f/c, cp, sob, abd pain, urinary changes.    pmd luara  onc: [facility]

## 2022-12-10 NOTE — H&P ADULT - NSHPSOCIALHISTORY_GEN_ALL_CORE
Living Situation: lives at home with wife  ADLs/Mobility: ambulates and performs ADLs independently  Tobacco Use: denies; remote history of smoking >40 yrs ago  Alcohol Use: socially  Drug Use: denies  Vaccination: covid vaccinated x3 moderna

## 2022-12-10 NOTE — H&P ADULT - PROBLEM SELECTOR PLAN 4
WBC 22.26 likely reactive - recently given decadron at [place] yesterday  - Afebrile, does not meet SIRS/sepsis criteria on admission  - CXR showed no acute cardiopulmonary pathologies on wet read, pending official  - Monitor for fevers and signs of infection  - Trend CBC WBC 22.26 likely reactive - recently given decadron at [place] yesterday  - Afebrile, does not meet SIRS/sepsis criteria on admission  - CXR showed no acute cardiopulmonary pathologies on wet read, pending official  - Fu CT scan; r/o pancreatitis given hx of neuroendocrine cancer, leukocytosis and presenting symptoms   - Monitor for fevers and signs of infection  - Trend CBC Pt presents with swelling of lips s/p Lutathera treatment this Wednesday 12/7    - Pt last received Lutathera 1.5 years ago without issues    - Given IV fluids, Zofran and Decadron in [facility] yesterday   - Given 1x Solumedrol 125mg IVP, 1x Pepcid IVP, 1x Benadryl 25mg IVP, 2.5L NS bolus in ED  - EKG: NSR 98bpm, nonspecific T wave abnormality, prolonged QT//460  - Mild improvement after receiving Benadryl  - Monitor for symptoms and respiratory status

## 2022-12-10 NOTE — H&P ADULT - ATTENDING COMMENTS
Patient was seen and examined by myself. Case was discussed with house staff in details. I have reviewed and agree with the plan as outlined above with edits where appropriate.    HPI:  The patient is a 67y Male with PMHx of neuroendocrine tumor diagnosed 6 years ago, HTN, HLD, T2DM presenting with nausea and vomiting the night after receiving Lutathera- an experimental cancer treatment- this Wednesday 12/7. He was nausea and vomiting once Wednesday night. From Thursday, he was vomiting every 15-20 minutes. He cannot tolerate food or water. Previous time he received this treatment was 1.5 years ago. He denies any symptoms similar to today or reaction at that time. Of note, pt was recently at [facility] yesterday, where he was given IVF, zofran, decadron with symptomatic improvement and discharged home from ED. Soon after discharge, the N/V started again and he also noticed swelling of his lips. Wife reports that his lips appeared even more swollen this morning. The patient follows with Dr. Llamas and gets Sandostatin monthly injections for the neuroendocrine tumor. Patient self catherizes around 4x a day. Wife at bedside.     In the ED,  Received: 1x Zofran, 1x Solumedrol 125mg IVP, 1x Pepcid IVP, 1x Benadryl 25mg IVP, 2.5L NS bolus (10 Dec 2022 15:02)      ROS: as in the HPI; all other ROS negative    SH and family history as above    Vital Signs Last 24 Hrs  T(C): 36.9 (10 Dec 2022 10:57), Max: 36.9 (10 Dec 2022 10:57)  T(F): 98.4 (10 Dec 2022 10:57), Max: 98.4 (10 Dec 2022 10:57)  HR: 125 (10 Dec 2022 10:57) (125 - 125)  BP: 120/86 (10 Dec 2022 10:57) (120/86 - 120/86)  BP(mean): --  RR: --  SpO2: 98% (10 Dec 2022 10:57) (98% - 98%)    Parameters below as of 10 Dec 2022 10:57  Patient On (Oxygen Delivery Method): room air        GEN: NAD  HEENT- normocephalic; mouth moist  CVS- S1S2+,irregular,   LUNGS- clear to auscultation; no wheezing  ABD: Soft , nontender, nondistended, Bowel sounds are present  EXTREMITY: no calf tenderness, no cyanosis, no edema  NEURO: AAOx3; non focal neurologic exam; grossly non focal neuro exam  PSYCH: normal affect and behavior  BACK: no swelling or mass;   VASCULAR: distal peripheral pulses present  SKIN: warm and dry.       Labs and imaging reviewed      Patient presenting with Patient is a 67y old  Male who presents with a chief complaint of Angioedema, N/V (10 Dec 2022 15:02)   admitted for Intractable nausea and Vomiting likely side affect from recent cancer treatment, will put antiemetic ATC. CT pending to r/o intra abd etiology. IVF, NPO for now, may do clear liquid if no obstruction.         Plan of care discussed with patient and family ;  all questions and concerns were addressed.

## 2022-12-10 NOTE — H&P ADULT - NSICDXFAMILYHX_GEN_ALL_CORE_FT
FAMILY HISTORY:  Father  Still living? Unknown  FH: aneurysm, Age at diagnosis: Age Unknown    Mother  Still living? Unknown  FH: breast cancer, Age at diagnosis: Age Unknown

## 2022-12-10 NOTE — H&P ADULT - PROBLEM SELECTOR PLAN 7
Chronic, stable on admission  - Hold home sartan in setting of ZO  - Monitor routine hemodynamics Chronic, stable on admission  - Hold home losartan in setting of ZO  - Monitor routine hemodynamics

## 2022-12-10 NOTE — CONSULT NOTE ADULT - ASSESSMENT
Patient is a 67y Male with PMHx of neuroendocrine tumor diagnosed 6 years ago, HTN, HLD, T2DM p/w nausea and vomiting.  CT findings suggestive of increase in size of known pancreatic tumor now with mass effect on adjacent stomach, noted distention of proximal body, fundus, and distal esophagus with component of gastric outlet obstruction.  Vital signs noted with tachycardia.  Abdominal exam reassuring as distention or tenderness noted to palpation.  Labs revealing 22k leukocytosis, and elevated Cr (1.9).   Patient is a 67y Male with PMHx of neuroendocrine tumor diagnosed 6 years ago, HTN, HLD, T2DM p/w nausea and vomiting.  CT findings suggestive of increase in size of known pancreatic tumor now with mass effect on adjacent stomach, noted distention of proximal body, fundus, and distal esophagus with component of gastric outlet obstruction.  Vital signs noted with tachycardia.  Abdominal exam benign.  Labs revealing 22k leukocytosis, and elevated Cr (1.9).

## 2022-12-10 NOTE — CONSULT NOTE ADULT - PROBLEM SELECTOR RECOMMENDATION 9
- Given patient above findings and patient's clinical presentation, could consider NGT placement for stomach decompression  - NPO/IVF  - Continue care per primary team  - Consider Heme/Onc and GI team evaluation  - Surgery team will follow

## 2022-12-10 NOTE — H&P ADULT - PROBLEM SELECTOR PLAN 1
Pt presents with swelling of lips s/p Lutathera treatment 3 days prior to admission  - Pt last received Lutathera 1.5 years ago without issues  - Given 1x Solumedrol 125mg IVP, 1x Pepcid IVP, 1x Benadryl 25mg IVP, 2.5L NS bolus in ED  - EKG: NSR 98bpm, nonspecific T wave abnormality, prolonged QT//460  - Mild improvement after receiving Benadryl  - Monitor for symptoms and respiratory status Pt presents with swelling of lips s/p Lutathera treatment this Wednesday 12/7    - Pt last received Lutathera 1.5 years ago without issues    - Given IV fluids, Zofran and Decadron in [facility] yesterday   - Given 1x Solumedrol 125mg IVP, 1x Pepcid IVP, 1x Benadryl 25mg IVP, 2.5L NS bolus in ED  - EKG: NSR 98bpm, nonspecific T wave abnormality, prolonged QT//460  - Mild improvement after receiving Benadryl  - Monitor for symptoms and respiratory status Pt presents with multiple episodes of nausea and vomiting  - Still currently having active episodes of vomiting in ED  - CTAP ordered  - F/u lactate  - NPO except meds  - Maintenance IVF NS 75cc/hr  - Zofran PRN for nausea, consider Reglan after CT scan results  - One dose STAT compazine  - 20 mg IVP Pepcid daily  - Fu amylase/lipase levels  - GI (Dr. Rivers) consulted Pt presents with multiple episodes of nausea and vomiting  - Still currently having active episodes of vomiting in ED  - CTAP ordered  - NPO except meds  - Maintenance IVF NS 75cc/hr  - Zofran PRN for nausea, consider Reglan after CT scan results  - One dose STAT compazine  - 20 mg IVP Pepcid daily  - F/u lipase levels

## 2022-12-10 NOTE — CHART NOTE - NSCHARTNOTEFT_GEN_A_CORE
test result reviewed: 1. positive Rhinovirus: supportive care  2. < from: CT Abdomen and Pelvis w/ Oral Cont (12.10.22 @ 16:54) >    Limited noncontrast study.    Increased size of primary pancreatic tumor with increased mass effecton   the adjacent stomach and surrounding soft tissue structures, in   comparison with 2/20/2021 CT.    Distention of the proximal body, fundus, and distal esophagus suggests   component of gastric outlet obstruction. Component of esophagitis can be   considered as well given distal esophageal wall thickening.    New hepatic hypodensities since prior imaging from 2/20/2021, measuring   up to 4 cm hepatic dome, concerning for metastatic disease. Suggest   contrast enhanced MRI correlation versus Dotatate PET-CT for evaluation.    Trace to small bilateral pleural effusions with adjacent compressive   atelectasis.    Severe T12-L1 disc height loss and endplate sclerosis is new since   2/20/2021. Correlate clinically.    Additional findings as above.    < end of copied text >    GI and Sx consult called, will follow up recommendations

## 2022-12-10 NOTE — ED ADULT NURSE NOTE - NSICDXPASTMEDICALHX_GEN_ALL_CORE_FT
PAST MEDICAL HISTORY:  BPH (benign prostatic hyperplasia)     DM (diabetes mellitus)     HLD (hyperlipidemia)     Hypertension     Light chain nephropathy     Renal insufficiency

## 2022-12-11 NOTE — PROGRESS NOTE ADULT - PROBLEM SELECTOR PLAN 1
Pt presents with multiple episodes of nausea and vomiting  - Still currently having active episodes of vomiting in ED  - CTAP ordered  - NPO except meds  - Maintenance IVF NS 75cc/hr  - Zofran PRN for nausea, consider Reglan after CT scan results  - One dose STAT compazine  - 20 mg IVP Pepcid daily  - F/u lipase levels Pt presents with multiple episodes of nausea and vomiting  - Still currently having active episodes of vomiting, abdominal exam soft, nontender, nondistended  - CTAP w/ oral contrast showed increased size of pancreatic tumor with increased mass effect on adjacent stomach and surrounding soft tissues; distention of proximal body, fundus, and distal esophagus suggesting component of gastric outlet obstruction; esophageal wall thickening, possibly esophagitis; new hepatic hypodensities likely mets compared to 2/20/2021, trace to small b/l pleural effusions; severe T12-L1 disc height loss and endplate sclerosis.  - NPO except meds  - Maintenance LR NS 80cc/hr  - Zofran, Reglan, and Prochlorperazine PRN for nausea/vomiting  - Protonix 40mg IVP BID  - F/u lipase levels  - GI Dr. Rivers consulted; consider need for EGD  - Surgery Dr. Hernandez consulted; consider NGT for decompression Pt presents with multiple episodes of nausea and vomiting  - Still currently having active episodes of vomiting, abdominal exam soft, nontender, nondistended  - CTAP w/ oral contrast showed increased size of pancreatic tumor with increased mass effect on adjacent stomach and surrounding soft tissues; distention of proximal body, fundus, and distal esophagus suggesting component of gastric outlet obstruction; esophageal wall thickening, possibly esophagitis; new hepatic hypodensities likely mets compared to 2/20/2021, trace to small b/l pleural effusions; severe T12-L1 disc height loss and endplate sclerosis.  - NPO except meds; dced vitamins  - Maintenance LR NS 80cc/hr  - Zofran, Reglan, and Prochlorperazine PRN for nausea/vomiting  - Protonix 40mg IVP BID  - F/u lipase levels  - GI Dr. Rivers consulted; consider need for EGD  - Surgery Dr. Hernandez consulted; consider NGT for decompression Pt presents with multiple episodes of nausea and vomiting  - Still currently having active episodes of vomiting, abdominal exam soft, nontender, nondistended  - CTAP w/ oral contrast showed increased size of pancreatic tumor with increased mass effect on adjacent stomach and surrounding soft tissues; distention of proximal body, fundus, and distal esophagus suggesting component of gastric outlet obstruction; esophageal wall thickening, possibly esophagitis; new hepatic hypodensities likely mets compared to 2/20/2021, trace to small b/l pleural effusions; severe T12-L1 disc height loss and endplate sclerosis.  - NPO except meds; dced vitamins  - Maintenance LR NS 80cc/hr  - Zofran, Reglan, and Prochlorperazine PRN for nausea/vomiting  - Protonix 40mg IVP BID  - Lipase 135; no signs of acute pancreatitis  - GI Dr. Rivers consulted; consider need for EGD  - Surgery Dr. Hernandez consulted; consider NGT for decompression

## 2022-12-11 NOTE — PROGRESS NOTE ADULT - PROBLEM SELECTOR PLAN 6
Chronic, known history of T2DM  - Hold home meds: gllipizide   - Low dose insulin corrective scale  - Hypoglycemia protocol, fingerstick glucose q6h  - Diet NPO  - F/u AM HbA1c

## 2022-12-11 NOTE — PROGRESS NOTE ADULT - ASSESSMENT
67y Male with PMHx of neuroendocrine tumor, HTN, HLD, T2DM presenting with nausea and vomiting after receiving Lutathera treatment being admitted for angioedema of lips and nausea/vomiting    Plan:  NPO/IVF  Consider small bowel study tomorrow before attempting to feed  care per primary team

## 2022-12-11 NOTE — PROGRESS NOTE ADULT - PROBLEM SELECTOR PLAN 9
Chronic  - On home Alfuzosin  - self catherizes at home Chronic  - On home Alfuzosin  - Self catherizes at home

## 2022-12-11 NOTE — ED ADULT NURSE REASSESSMENT NOTE - NSIMPLEMENTINTERV_GEN_ALL_ED
Implemented All Universal Safety Interventions:  [place] to call system. Call bell, personal items and telephone within reach. Instruct patient to call for assistance. Room bathroom lighting operational. Non-slip footwear when patient is off stretcher. Physically safe environment: no spills, clutter or unnecessary equipment. Stretcher in lowest position, wheels locked, appropriate side rails in place.
Implemented All Universal Safety Interventions:  [place] to call system. Call bell, personal items and telephone within reach. Instruct patient to call for assistance. Room bathroom lighting operational. Non-slip footwear when patient is off stretcher. Physically safe environment: no spills, clutter or unnecessary equipment. Stretcher in lowest position, wheels locked, appropriate side rails in place.

## 2022-12-11 NOTE — ED ADULT NURSE REASSESSMENT NOTE - NS ED NURSE REASSESS COMMENT FT1
1634:  coulter inserted as per residents orders.  tolerated well by the patient.  50ml's return on insertion.  safety maintained.  patient being taken for ct scan at this time.  vitals recorded.  edwina barrow.
1657:  returned from CT scan.  tolerated test well.  informed him that I need to do a fingerstick and will then call the floor to give report.  edwina barrow.
1707:  fingerstick 216.   will cover accordingly. (2 units).  patient aware of bed assignment.  lips are definitely improving, swelling going down, and he states he does feel better, but still vomiting.  safety maintained.  edwina barrow.
1715:  attempted to call unit to give report and was told "ill call you back, I need to speak to admitting on this patient".  no name provided.  edwina barrow.
1750:  called supervisor in regards to loss of bed, and the frustration the patient feels.  edwina barrow.
Per patient and his spouse, Dr. Lee instructed him that since he had a Lutathera treatment (which is radioactive) on Wednesday 12/07/2022, he should not be around pregnant women or children for 7 days.  Note is on bed board, and supervisor Klever is aware.  I will reinforce to the ER Nurse that she should inform the floor nurse of this restriction.
VSS.  A/Ox4.  No change in gen status.  Await in pt bed

## 2022-12-11 NOTE — PROGRESS NOTE ADULT - PROBLEM SELECTOR PLAN 5
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 Llamas and urologist Dr. Padron  - Receives monthly Sandostatin (Octreotide) injection  - Received Lutathera 3 days prior to admission (new experimental drug), last received 1.5 years ago  - Heme/Onc Dr. Llamas (pt follows outpt) consulted Pt with history of neuroendocrine tumor in pancreas diagnosed 6 years ago  - Follows with oncologist Dr. Llamas and urologist Dr. Padron  - Receives monthly Sandostatin (Octreotide) injection  - Received Lutathera 3 days prior to admission (new experimental drug), last received 1.5 years ago  - CTAP scan noted above  - Pt states that he recently got a PET scan done, which showed growth in size of tumor and that was the reason why he received this Lutathera treatment - which helped shrink the tumor when he previously got it  - Heme/Onc Dr. Llamas (pt follows outpt) consulted

## 2022-12-11 NOTE — PROGRESS NOTE ADULT - SUBJECTIVE AND OBJECTIVE BOX
Patient is a 67y old  Male who presents with a chief complaint of Angioedema, N/V (11 Dec 2022 10:18)      INTERVAL HPI/OVERNIGHT EVENTS:  Pt seen and examined by bedside this morning in NAD. Pt still complaining of continued vomiting but it has improved slightly. Stated that frequency of it has decreased was originally vomiting every 15 minutes and then 30 minutes and now less. Just vomited before being seen and had some spit up during conversation.       MEDICATIONS  (STANDING):  cholecalciferol 4000 Unit(s) Oral daily  cyanocobalamin 1000 MICROGram(s) Oral daily  dextrose 5%. 1000 milliLiter(s) (100 mL/Hr) IV Continuous <Continuous>  dextrose 5%. 1000 milliLiter(s) (50 mL/Hr) IV Continuous <Continuous>  dextrose 50% Injectable 25 Gram(s) IV Push once  dextrose 50% Injectable 12.5 Gram(s) IV Push once  dextrose 50% Injectable 25 Gram(s) IV Push once  ferrous    sulfate 325 milliGRAM(s) Oral daily  glucagon  Injectable 1 milliGRAM(s) IntraMuscular once  heparin   Injectable 5000 Unit(s) SubCutaneous every 12 hours  insulin lispro (ADMELOG) corrective regimen sliding scale   SubCutaneous three times a day before meals  insulin lispro (ADMELOG) corrective regimen sliding scale   SubCutaneous at bedtime  lactated ringers. 1000 milliLiter(s) (80 mL/Hr) IV Continuous <Continuous>  multivitamin 1 Tablet(s) Oral daily  ondansetron Injectable 4 milliGRAM(s) IV Push every 6 hours  pantoprazole  Injectable 40 milliGRAM(s) IV Push every 12 hours  tamsulosin 0.8 milliGRAM(s) Oral at bedtime    MEDICATIONS  (PRN):  acetaminophen     Tablet .. 650 milliGRAM(s) Oral every 6 hours PRN Temp greater or equal to 38C (100.4F), Mild Pain (1 - 3)  aluminum hydroxide/magnesium hydroxide/simethicone Suspension 30 milliLiter(s) Oral every 4 hours PRN Dyspepsia  dextrose Oral Gel 15 Gram(s) Oral once PRN Blood Glucose LESS THAN 70 milliGRAM(s)/deciliter  melatonin 3 milliGRAM(s) Oral at bedtime PRN Insomnia  metoclopramide Injectable 10 milliGRAM(s) IV Push every 6 hours PRN nauseas/vomiting  ondansetron Injectable 4 milliGRAM(s) IV Push every 8 hours PRN Nausea and/or Vomiting  prochlorperazine   Injectable 5 milliGRAM(s) IntraMuscular every 6 hours PRN nausea/vomiting      Allergies    No Known Allergies    Intolerances        REVIEW OF SYSTEMS:  CONSTITUTIONAL: No fever or chills  HEENT: No headache, sore throat  RESPIRATORY: No cough, wheezing, or shortness of breath  CARDIOVASCULAR: No chest pain, palpitations  GASTROINTESTINAL: No abd pain, nausea, vomiting, or diarrhea  GENITOURINARY: No dysuria, frequency, or hematuria  NEUROLOGICAL: No focal weakness or dizziness  MUSCULOSKELETAL: No myalgias     Vital Signs Last 24 Hrs  T(C): 37.3 (11 Dec 2022 07:57), Max: 37.8 (10 Dec 2022 20:02)  T(F): 99.1 (11 Dec 2022 07:57), Max: 100 (10 Dec 2022 20:02)  HR: 70 (11 Dec 2022 07:57) (70 - 107)  BP: 123/74 (11 Dec 2022 07:57) (123/74 - 154/87)  BP(mean): --  RR: 18 (11 Dec 2022 07:57) (17 - 19)  SpO2: 95% (11 Dec 2022 07:57) (95% - 98%)    Parameters below as of 11 Dec 2022 07:57  Patient On (Oxygen Delivery Method): room air        PHYSICAL EXAM:  GENERAL: NAD, appears stated age  HEENT: anicteric, eomi  CHEST/LUNG: CTA b/l, no rales, wheezes, or rhonchi  HEART: RRR, S1, S2  ABDOMEN: BS+, soft, nontender, nondistended  EXTREMITIES: no edema, cyanosis, or calf tenderness  NERVOUS SYSTEM: answers questions and follows commands appropriately    LABS:                        11.5   13.27 )-----------( 154      ( 11 Dec 2022 07:19 )             35.1     CBC Full  -  ( 11 Dec 2022 07:19 )  WBC Count : 13.27 K/uL  Hemoglobin : 11.5 g/dL  Hematocrit : 35.1 %  Platelet Count - Automated : 154 K/uL  Mean Cell Volume : 91.9 fl  Mean Cell Hemoglobin : 30.1 pg  Mean Cell Hemoglobin Concentration : 32.8 gm/dL  Auto Neutrophil # : x  Auto Lymphocyte # : x  Auto Monocyte # : x  Auto Eosinophil # : x  Auto Basophil # : x  Auto Neutrophil % : x  Auto Lymphocyte % : x  Auto Monocyte % : x  Auto Eosinophil % : x  Auto Basophil % : x    11 Dec 2022 07:19    142    |  110    |  31     ----------------------------<  162    3.9     |  26     |  1.50     Ca    8.6        11 Dec 2022 07:19    TPro  5.6    /  Alb  2.6    /  TBili  0.3    /  DBili  x      /  AST  29     /  ALT  34     /  AlkPhos  51     11 Dec 2022 07:19    PT/INR - ( 10 Dec 2022 11:50 )   PT: 12.2 sec;   INR: 1.04 ratio         PTT - ( 10 Dec 2022 11:50 )  PTT:26.8 sec  Urinalysis Basic - ( 11 Dec 2022 06:48 )    Color: Yellow / Appearance: Slightly Turbid / S.025 / pH: x  Gluc: x / Ketone: Negative  / Bili: Negative / Urobili: Negative   Blood: x / Protein: 30 mg/dL / Nitrite: Negative   Leuk Esterase: Trace / RBC: 3-5 /HPF / WBC 3-5   Sq Epi: x / Non Sq Epi: Occasional / Bacteria: Moderate      CAPILLARY BLOOD GLUCOSE      POCT Blood Glucose.: 138 mg/dL (11 Dec 2022 07:35)  POCT Blood Glucose.: 136 mg/dL (10 Dec 2022 22:01)  POCT Blood Glucose.: 216 mg/dL (10 Dec 2022 17:02)          RADIOLOGY & ADDITIONAL TESTS:      Consultant(s) Notes Reviewed:  [x] YES  [ ] NO Patient is a 67y old  Male who presents with a chief complaint of Angioedema, N/V (11 Dec 2022 10:18)      INTERVAL HPI/OVERNIGHT EVENTS:  Pt seen and examined by bedside this morning in NAD. Pt still complaining of continued vomiting but it has improved slightly. Stated that frequency of it has decreased - was originally vomiting every 15 minutes and then 30 minutes and now less. Just vomited before being seen and had some spit up during conversation. No abdominal pain but he just feels the reflux and nausea when the contents comes up. States that the vomit is now bile because he hasn't eaten. Drank some ensure this morning and felt okay afterwards. Denies any chest pain, dizziness/lightheadedness, fevers, chills, shortness of breath, diarrhea.    Of note, pt states that he recently got a PET scan just prior to receiving his Lutathera treatment on , which showed increased size of his tumor hence why he underwent this treatment last week.      MEDICATIONS  (STANDING):  cholecalciferol 4000 Unit(s) Oral daily  cyanocobalamin 1000 MICROGram(s) Oral daily  dextrose 5%. 1000 milliLiter(s) (100 mL/Hr) IV Continuous <Continuous>  dextrose 5%. 1000 milliLiter(s) (50 mL/Hr) IV Continuous <Continuous>  dextrose 50% Injectable 25 Gram(s) IV Push once  dextrose 50% Injectable 12.5 Gram(s) IV Push once  dextrose 50% Injectable 25 Gram(s) IV Push once  ferrous    sulfate 325 milliGRAM(s) Oral daily  glucagon  Injectable 1 milliGRAM(s) IntraMuscular once  heparin   Injectable 5000 Unit(s) SubCutaneous every 12 hours  insulin lispro (ADMELOG) corrective regimen sliding scale   SubCutaneous three times a day before meals  insulin lispro (ADMELOG) corrective regimen sliding scale   SubCutaneous at bedtime  lactated ringers. 1000 milliLiter(s) (80 mL/Hr) IV Continuous <Continuous>  multivitamin 1 Tablet(s) Oral daily  ondansetron Injectable 4 milliGRAM(s) IV Push every 6 hours  pantoprazole  Injectable 40 milliGRAM(s) IV Push every 12 hours  tamsulosin 0.8 milliGRAM(s) Oral at bedtime    MEDICATIONS  (PRN):  acetaminophen     Tablet .. 650 milliGRAM(s) Oral every 6 hours PRN Temp greater or equal to 38C (100.4F), Mild Pain (1 - 3)  aluminum hydroxide/magnesium hydroxide/simethicone Suspension 30 milliLiter(s) Oral every 4 hours PRN Dyspepsia  dextrose Oral Gel 15 Gram(s) Oral once PRN Blood Glucose LESS THAN 70 milliGRAM(s)/deciliter  melatonin 3 milliGRAM(s) Oral at bedtime PRN Insomnia  metoclopramide Injectable 10 milliGRAM(s) IV Push every 6 hours PRN nauseas/vomiting  ondansetron Injectable 4 milliGRAM(s) IV Push every 8 hours PRN Nausea and/or Vomiting  prochlorperazine   Injectable 5 milliGRAM(s) IntraMuscular every 6 hours PRN nausea/vomiting      Allergies    No Known Allergies    Intolerances        REVIEW OF SYSTEMS:  CONSTITUTIONAL: No fever or chills  HEENT: No headache, sore throat  RESPIRATORY: No cough, wheezing, or shortness of breath  CARDIOVASCULAR: No chest pain, palpitations  GASTROINTESTINAL: No abd pain or diarrhea +nausea/vomiting  GENITOURINARY: No dysuria, frequency, or hematuria  NEUROLOGICAL: No focal weakness or dizziness  MUSCULOSKELETAL: No myalgias       Vital Signs Last 24 Hrs  T(C): 37.3 (11 Dec 2022 07:57), Max: 37.8 (10 Dec 2022 20:02)  T(F): 99.1 (11 Dec 2022 07:57), Max: 100 (10 Dec 2022 20:02)  HR: 70 (11 Dec 2022 07:57) (70 - 107)  BP: 123/74 (11 Dec 2022 07:57) (123/74 - 154/87)  BP(mean): --  RR: 18 (11 Dec 2022 07:57) (17 - 19)  SpO2: 95% (11 Dec 2022 07:57) (95% - 98%)    Parameters below as of 11 Dec 2022 07:57  Patient On (Oxygen Delivery Method): room air      PHYSICAL EXAM:  GENERAL: NAD, appears stated age  HEENT: anicteric, eomi  CHEST/LUNG: CTA b/l, no rales, wheezes, or rhonchi  HEART: RRR, S1, S2  ABDOMEN: BS+, soft, nontender, nondistended  EXTREMITIES: no edema, cyanosis, or calf tenderness  NERVOUS SYSTEM: answers questions and follows commands appropriately      LABS:                        11.5   13.27 )-----------( 154      ( 11 Dec 2022 07:19 )             35.1     CBC Full  -  ( 11 Dec 2022 07:19 )  WBC Count : 13.27 K/uL  Hemoglobin : 11.5 g/dL  Hematocrit : 35.1 %  Platelet Count - Automated : 154 K/uL  Mean Cell Volume : 91.9 fl  Mean Cell Hemoglobin : 30.1 pg  Mean Cell Hemoglobin Concentration : 32.8 gm/dL  Auto Neutrophil # : x  Auto Lymphocyte # : x  Auto Monocyte # : x  Auto Eosinophil # : x  Auto Basophil # : x  Auto Neutrophil % : x  Auto Lymphocyte % : x  Auto Monocyte % : x  Auto Eosinophil % : x  Auto Basophil % : x    11 Dec 2022 07:19    142    |  110    |  31     ----------------------------<  162    3.9     |  26     |  1.50     Ca    8.6        11 Dec 2022 07:19    TPro  5.6    /  Alb  2.6    /  TBili  0.3    /  DBili  x      /  AST  29     /  ALT  34     /  AlkPhos  51     11 Dec 2022 07:19    PT/INR - ( 10 Dec 2022 11:50 )   PT: 12.2 sec;   INR: 1.04 ratio         PTT - ( 10 Dec 2022 11:50 )  PTT:26.8 sec  Urinalysis Basic - ( 11 Dec 2022 06:48 )    Color: Yellow / Appearance: Slightly Turbid / S.025 / pH: x  Gluc: x / Ketone: Negative  / Bili: Negative / Urobili: Negative   Blood: x / Protein: 30 mg/dL / Nitrite: Negative   Leuk Esterase: Trace / RBC: 3-5 /HPF / WBC 3-5   Sq Epi: x / Non Sq Epi: Occasional / Bacteria: Moderate      CAPILLARY BLOOD GLUCOSE      POCT Blood Glucose.: 138 mg/dL (11 Dec 2022 07:35)  POCT Blood Glucose.: 136 mg/dL (10 Dec 2022 22:01)  POCT Blood Glucose.: 216 mg/dL (10 Dec 2022 17:02)      RADIOLOGY & ADDITIONAL TESTS:    ACC: [id number] EXAM:  CT ABDOMEN AND PELVIS OC                          PROCEDURE DATE:  12/10/2022      INTERPRETATION:  CLINICAL INFORMATION: History of neuroendocrine tumor.   Nausea and vomiting post recent chemotherapy.    COMPARISON: CT abdomen pelvis 2021    CONTRAST/COMPLICATIONS:  IV Contrast: NONE  0 cc administered   0 cc discarded  Oral Contrast: Smoothie Readi-Cat 2  Complications: None reported at time of study completion    PROCEDURE:  CT of the Abdomen and Pelvis was performed.  Sagittal and coronal reformats were performed.    FINDINGS:    LOWER CHEST: Prominent size of the partially imaged heart. Coronary   artery calcifications. Trace to small pleural effusions with adjacent   compressive atelectasis of the lung parenchyma. Esophagus is distended   and mildly thickened.    Solid organ evaluation is limited due to noncontrast technique.    LIVER: Several new hepatic hypodense lesions measuring up to 4 cm at the   hepatic dome, image 15 series 2, concerning for metastatic disease,   though incompletely characterized on this noncontrast study.  BILE DUCTS: No biliary distention  GALLBLADDER: Contracted  SPLEEN: Spleen size within normal limits. Splenic hilum is obscured   secondary to pancreatic mass described below.  PANCREAS: Large mass lesion again replacing the pancreatic body and tail   measuring 13.2 x 8.9 cm, image 38 series 2, previously 10.7 x 7.4 cm on   2021. There is mass effect on the adjacent stomach, proximal small   bowel of the left hemiabdomen, and left kidney. 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  ADRENALS: Unremarkable  KIDNEYS/URETERS: No hydronephrosis. Mass effect in the left kidney   secondary to left upper quadrant mass. 2 mm nonobstructing left renal   calculus.    BLADDER: Mildly distended with in situ Hale catheter. Droplets of air   within the bladder likely due to presence of the Hale catheter.  REPRODUCTIVE ORGANS: Prostate size within normal limits.    BOWEL: Limited evaluation due to lack of contrast. Distal esophagus is   distended and mildly thickened. Gastric fundus and proximal body is   distended followed by compression of the distal body and antrum adjacent   to pancreatic mass. Remainder of the small bowel is nondistended.   Appendix not visualized. Mild stool burden of the colon limits evaluation   of the colonic mucosa.  PERITONEUM: No ascites  VESSELS: No abdominal aortic aneurysm. Atheromatous changes. 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  ABDOMINAL WALL: Soft tissue edema. Numerous nodular densities along the   bilateral gluteal soft tissues are similar to prior imaging from ,   unclear significance.  BONES: Diffuse osseous demineralization and multilevel degenerative   changes of the bones. Bilateral pars interarticularis defects at L5-S1   with grade 1 anterolisthesis of L5 on S1. Severe T12-L1 disc height loss   and endplate sclerosis, new since 2021. Central canal and neural   foramina are not adequately assessed on this study.    IMPRESSION:    Limited noncontrast study.    Increased size of primary pancreatic tumor with increased mass effecton   the adjacent stomach and surrounding soft tissue structures, in   comparison with 2021 CT.    Distention of the proximal body, fundus, and distal esophagus suggests   component of gastric outlet obstruction. Component of esophagitis can be   considered as well given distal esophageal wall thickening.    New hepatic hypodensities since prior imaging from 2021, measuring   up to 4 cm hepatic dome, concerning for metastatic disease. Suggest   contrast enhanced MRI correlation versus Dotatate PET-CT for evaluation.    Trace to small bilateral pleural effusions with adjacent compressive   atelectasis.    Severe T12-L1 disc height loss and endplate sclerosis is new since   2021. Correlate clinically.    Additional findings as above.    Dr. Plasencia discussed these findings with Dr. Mejia Quintanilla on 12/10/2022 at   5:26 PM, with read back.    --- End of Report ---    CASEY PLASENCIA M.D., ATTENDING RADIOLOGIST  This document has been electronically signed. Dec 10 2022  5:33PM      Consultant(s) Notes Reviewed:  [x] YES  [ ] NO

## 2022-12-11 NOTE — PROGRESS NOTE ADULT - PROBLEM SELECTOR PLAN 4
Pt presents with swelling of lips s/p Lutathera treatment this Wednesday 12/7    - Pt last received Lutathera 1.5 years ago without issues    - Given IV fluids, Zofran and Decadron in [facility] yesterday   - Given 1x Solumedrol 125mg IVP, 1x Pepcid IVP, 1x Benadryl 25mg IVP, 2.5L NS bolus in ED  - EKG: NSR 98bpm, nonspecific T wave abnormality, prolonged QT//460  - Mild improvement after receiving Benadryl  - Monitor for symptoms and respiratory status Pt presents with swelling of lips s/p Lutathera treatment 12/7; Improving less swollen  - Pt last received Lutathera 1.5 years ago without issues    - Given IV fluids, Zofran and Decadron in [facility] yesterday   - Given 1x Solumedrol 125mg IVP, 1x Pepcid IVP, 1x Benadryl 25mg IVP, 2.5L NS bolus in ED  - EKG: NSR 98bpm, nonspecific T wave abnormality, prolonged QT//460  - Improvement noted after receiving Benadryl and Solumedrol  - Monitor for symptoms and respiratory status

## 2022-12-11 NOTE — PROGRESS NOTE ADULT - PROBLEM SELECTOR PLAN 2
WBC 22.26 likely reactive - recently given decadron at [facility] yesterday  - Afebrile, does not meet SIRS/sepsis criteria on admission  - CXR showed no acute cardiopulmonary pathologies on wet read, pending official  - Fu CT scan; r/o pancreatitis given hx of neuroendocrine cancer, leukocytosis and presenting symptoms   - Fu UA as patient self catherizes and is at increased risk   - Monitor for fevers and signs of infection  - Trend CBC WBC 22.26 on admisison likely reactive - recently given decadron at [facility] yesterday  - Afebrile, does not meet SIRS/sepsis criteria on admission  - WBC trending downwards 13.27 in AM (12/11)  - CXR showed no acute cardiopulmonary pathologies on wet read, pending official  - CTAP scan noted above  - UA unimpressive, f/u Urine cx as patient self catherizes and is at increased risk   - Monitor for fevers and signs of infection  - Trend CBC

## 2022-12-11 NOTE — CONSULT NOTE ADULT - ASSESSMENT
GOO/ Nausea /Vomiting  NPO   IVF  PPI bid  surgical eval noted  NGT if condition deteriorates  may need egd

## 2022-12-11 NOTE — CONSULT NOTE ADULT - ASSESSMENT
Progressive metastatic neuroendocrine cancer on 2nd course of lutathera (received dose 12/7/2022)  admitted now with nausea and vomiting.  Review of scans suggesting component of gastric outlet obstruction.     Recommendations:  1.  follow CBC  2.  GI and surgical evaluation  3.  continue hydration and Zofran  4.  needs radiation isolation through Wednesday  5.  further heme onc recommendations pending above  discussed with Dr. Hudson and Hernandez as well as patient and wife

## 2022-12-11 NOTE — PATIENT PROFILE ADULT - FALL HARM RISK - UNIVERSAL INTERVENTIONS
Bed in lowest position, wheels locked, appropriate side rails in place/Call bell, personal items and telephone in reach/Instruct patient to call for assistance before getting out of bed or chair/Non-slip footwear when patient is out of bed/[place] to call system/Physically safe environment - no spills, clutter or unnecessary equipment/Purposeful Proactive Rounding/Room/bathroom lighting operational, light cord in reach

## 2022-12-11 NOTE — PROGRESS NOTE ADULT - PROBLEM SELECTOR PLAN 3
ZO likely 2/2 pre-renal azotemia in the setting of dehydration  - BUN 40, Cr 1.9 on admission  - Baseline Cr: 1.4-1.5 as per outpt chart review  - Given 2.5L NS bolus in ED  - Maintenance IVF as noted above  - Monitor BMP  - Monitor renal indices and urine output  - Avoid nephrotoxic medications  - Consider coulter catheter to monitor ins and outs since patient self catherizes at home ZO likely 2/2 pre-renal azotemia in the setting of dehydration, improving  - BUN 40, Cr 1.9 on admission  - Cr trending downwards 1.5 in AM (12/11)  - Baseline Cr: 1.4-1.5 as per outpt chart review  - Given 2.5L NS bolus in ED  - Maintenance IVF as noted above  - Monitor BMP  - Monitor renal indices and urine output  - Avoid nephrotoxic medications  - Hale placed in as patient self catherizes frequently at home

## 2022-12-11 NOTE — PROGRESS NOTE ADULT - SUBJECTIVE AND OBJECTIVE BOX
NAEON    a/w intractable vomiting and nausea and gastric outlet obstruction 2/2 pancreatic mass    SUBJECTIVE:  Patient seen this morning, denies any new complaints, NGT not in place, still with some emesis     MEDICATIONS  (STANDING):  dextrose 5%. 1000 milliLiter(s) (100 mL/Hr) IV Continuous <Continuous>  dextrose 5%. 1000 milliLiter(s) (50 mL/Hr) IV Continuous <Continuous>  dextrose 50% Injectable 25 Gram(s) IV Push once  dextrose 50% Injectable 12.5 Gram(s) IV Push once  dextrose 50% Injectable 25 Gram(s) IV Push once  glucagon  Injectable 1 milliGRAM(s) IntraMuscular once  heparin   Injectable 5000 Unit(s) SubCutaneous every 12 hours  insulin lispro (ADMELOG) corrective regimen sliding scale   SubCutaneous three times a day before meals  insulin lispro (ADMELOG) corrective regimen sliding scale   SubCutaneous at bedtime  lactated ringers. 1000 milliLiter(s) (80 mL/Hr) IV Continuous <Continuous>  ondansetron Injectable 4 milliGRAM(s) IV Push every 6 hours  pantoprazole  Injectable 40 milliGRAM(s) IV Push every 12 hours  tamsulosin 0.8 milliGRAM(s) Oral at bedtime    MEDICATIONS  (PRN):  acetaminophen     Tablet .. 650 milliGRAM(s) Oral every 6 hours PRN Temp greater or equal to 38C (100.4F), Mild Pain (1 - 3)  aluminum hydroxide/magnesium hydroxide/simethicone Suspension 30 milliLiter(s) Oral every 4 hours PRN Dyspepsia  dextrose Oral Gel 15 Gram(s) Oral once PRN Blood Glucose LESS THAN 70 milliGRAM(s)/deciliter  melatonin 3 milliGRAM(s) Oral at bedtime PRN Insomnia  metoclopramide Injectable 10 milliGRAM(s) IV Push every 6 hours PRN nauseas/vomiting  ondansetron Injectable 4 milliGRAM(s) IV Push every 8 hours PRN Nausea and/or Vomiting  prochlorperazine   Injectable 5 milliGRAM(s) IntraMuscular every 6 hours PRN nausea/vomiting      Vital Signs Last 24 Hrs  T(C): 37.3 (11 Dec 2022 07:57), Max: 37.8 (10 Dec 2022 20:02)  T(F): 99.1 (11 Dec 2022 07:57), Max: 100 (10 Dec 2022 20:02)  HR: 70 (11 Dec 2022 07:57) (70 - 107)  BP: 123/74 (11 Dec 2022 07:57) (123/74 - 154/87)  BP(mean): --  RR: 18 (11 Dec 2022 07:57) (17 - 19)  SpO2: 95% (11 Dec 2022 07:57) (95% - 98%)    Parameters below as of 11 Dec 2022 07:57  Patient On (Oxygen Delivery Method): room air        PHYSICAL EXAM:  Seen from [place] [id number] radiation therapy     I&O's Detail    10 Dec 2022 07:01  -  11 Dec 2022 07:00  --------------------------------------------------------  IN:  Total IN: 0 mL    OUT:    Voided (mL): 1000 mL  Total OUT: 1000 mL    Total NET: -1000 mL          LABS:                        11.5   13.27 )-----------( 154      ( 11 Dec 2022 07:19 )             35.1     12-11    142  |  110<H>  |  31<H>  ----------------------------<  162<H>  3.9   |  26  |  1.50<H>    Ca    8.6      11 Dec 2022 07:19    TPro  5.6<L>  /  Alb  2.6<L>  /  TBili  0.3  /  DBili  x   /  AST  29  /  ALT  34  /  AlkPhos  51  12-11    PT/INR - ( 10 Dec 2022 11:50 )   PT: 12.2 sec;   INR: 1.04 ratio         PTT - ( 10 Dec 2022 11:50 )  PTT:26.8 sec  Urinalysis Basic - ( 11 Dec 2022 06:48 )    Color: Yellow / Appearance: Slightly Turbid / S.025 / pH: x  Gluc: x / Ketone: Negative  / Bili: Negative / Urobili: Negative   Blood: x / Protein: 30 mg/dL / Nitrite: Negative   Leuk Esterase: Trace / RBC: 3-5 /HPF / WBC 3-5   Sq Epi: x / Non Sq Epi: Occasional / Bacteria: Moderate

## 2022-12-12 NOTE — PROGRESS NOTE ADULT - SUBJECTIVE AND OBJECTIVE BOX
[facility]  Deion Brady PA-C  [address] [place] [address]  [phone]      INTERVAL HPI/OVERNIGHT EVENTS:  Overnight events noted  +NGT    MEDICATIONS  (STANDING):  dextrose 5%. 1000 milliLiter(s) (100 mL/Hr) IV Continuous <Continuous>  dextrose 5%. 1000 milliLiter(s) (50 mL/Hr) IV Continuous <Continuous>  dextrose 50% Injectable 25 Gram(s) IV Push once  dextrose 50% Injectable 12.5 Gram(s) IV Push once  dextrose 50% Injectable 25 Gram(s) IV Push once  diatrizoate meglumine/diatrizoate sodium. 100 milliLiter(s) Oral once  glucagon  Injectable 1 milliGRAM(s) IntraMuscular once  heparin   Injectable 5000 Unit(s) SubCutaneous every 12 hours  insulin lispro (ADMELOG) corrective regimen sliding scale   SubCutaneous three times a day before meals  insulin lispro (ADMELOG) corrective regimen sliding scale   SubCutaneous at bedtime  lactated ringers. 1000 milliLiter(s) (80 mL/Hr) IV Continuous <Continuous>  ondansetron Injectable 4 milliGRAM(s) IV Push every 6 hours  pantoprazole  Injectable 40 milliGRAM(s) IV Push two times a day  tamsulosin 0.8 milliGRAM(s) Oral at bedtime    MEDICATIONS  (PRN):  acetaminophen     Tablet .. 650 milliGRAM(s) Oral every 6 hours PRN Temp greater or equal to 38C (100.4F), Mild Pain (1 - 3)  aluminum hydroxide/magnesium hydroxide/simethicone Suspension 30 milliLiter(s) Oral every 4 hours PRN Dyspepsia  dextrose Oral Gel 15 Gram(s) Oral once PRN Blood Glucose LESS THAN 70 milliGRAM(s)/deciliter  melatonin 3 milliGRAM(s) Oral at bedtime PRN Insomnia  metoclopramide Injectable 10 milliGRAM(s) IV Push every 6 hours PRN nauseas/vomiting  ondansetron Injectable 4 milliGRAM(s) IV Push every 8 hours PRN Nausea and/or Vomiting  prochlorperazine   Injectable 5 milliGRAM(s) IntraMuscular every 6 hours PRN nausea/vomiting      Allergies    losartan (Angioedema)      PHYSICAL EXAM:   Vital Signs:  Vital Signs Last 24 Hrs  T(C): 37.6 (12 Dec 2022 04:54), Max: 37.6 (12 Dec 2022 04:54)  T(F): 99.6 (12 Dec 2022 04:54), Max: 99.6 (12 Dec 2022 04:54)  HR: 96 (12 Dec 2022 04:54) (69 - 96)  BP: 139/76 (12 Dec 2022 04:54) (132/78 - 139/76)  BP(mean): --  RR: 18 (12 Dec 2022 04:54) (16 - 18)  SpO2: 91% (12 Dec 2022 04:54) (91% - 96%)    Parameters below as of 12 Dec 2022 04:54  Patient On (Oxygen Delivery Method): room air    GENERAL:  Appears stated age  ABDOMEN:  Soft, non-tender, non-distended, +NGT  NEURO:  Alert      LABS:                        11.4   6.56  )-----------( 149      ( 12 Dec 2022 10:13 )             35.2     1212    145  |  107  |  26<H>  ----------------------------<  149<H>  2.8<LL>   |  32<H>  |  1.40<H>    Ca    8.2<L>      12 Dec 2022 10:13    TPro  5.6<L>  /  Alb  2.3<L>  /  TBili  0.4  /  DBili  x   /  AST  32  /  ALT  28  /  AlkPhos  48  12-12    PT/INR - ( 10 Dec 2022 11:50 )   PT: 12.2 sec;   INR: 1.04 ratio         PTT - ( 10 Dec 2022 11:50 )  PTT:26.8 sec  Urinalysis Basic - ( 11 Dec 2022 06:48 )    Color: Yellow / Appearance: Slightly Turbid / S.025 / pH: x  Gluc: x / Ketone: Negative  / Bili: Negative / Urobili: Negative   Blood: x / Protein: 30 mg/dL / Nitrite: Negative   Leuk Esterase: Trace / RBC: 3-5 /HPF / WBC 3-5   Sq Epi: x / Non Sq Epi: Occasional / Bacteria: Moderate

## 2022-12-12 NOTE — DISCHARGE NOTE PROVIDER - ATTENDING DISCHARGE PHYSICAL EXAMINATION:
PHYSICAL EXAM (On Date of Discharge):   Vital Signs Last 24 Hrs  T(C): 37.2 (14 Dec 2022 11:13), Max: 37.7 (13 Dec 2022 21:21)  T(F): 98.9 (14 Dec 2022 11:13), Max: 99.9 (13 Dec 2022 21:21)  HR: 70 (14 Dec 2022 11:13) (70 - 75)  BP: 106/67 (14 Dec 2022 11:13) (106/67 - 120/80)  BP(mean): --  RR: 18 (14 Dec 2022 11:13) (18 - 19)  SpO2: 96% (14 Dec 2022 11:13) (92% - 96%)    Parameters below as of 14 Dec 2022 11:13  Patient On (Oxygen Delivery Method): room air    PHYSICAL EXAM:  GENERAL: NAD, appears stated age  HEENT: anicteric, eomi  CHEST/LUNG: CTA b/l, no rales, wheezes, or rhonchi  HEART: RRR, S1, S2  ABDOMEN: BS+, soft, nontender, nondistended  EXTREMITIES: no edema, cyanosis, or calf tenderness  NERVOUS SYSTEM: answers questions and follows commands appropriately

## 2022-12-12 NOTE — PROGRESS NOTE ADULT - PROBLEM SELECTOR PLAN 4
Pt presents with swelling of lips s/p Lutathera treatment 12/7; Improving less swollen  - Pt last received Lutathera 1.5 years ago without issues    - Given IV fluids, Zofran and Decadron in [facility] yesterday   - Given 1x Solumedrol 125mg IVP, 1x Pepcid IVP, 1x Benadryl 25mg IVP, 2.5L NS bolus in ED  - EKG: NSR 98bpm, nonspecific T wave abnormality, prolonged QT//460  - Improvement noted after receiving Benadryl and Solumedrol  - Monitor for symptoms and respiratory status Pt presents with swelling of lips s/p Lutathera treatment 12/7; resolved  - Pt last received Lutathera 1.5 years ago without issues  - Angioedema possibly from home irbesartan, will HOLD  - Given IV fluids, Zofran and Decadron in [facility] yesterday   - Given 1x Solumedrol 125mg IVP, 1x Pepcid IVP, 1x Benadryl 25mg IVP, 2.5L NS bolus in ED  - EKG: NSR 98bpm, nonspecific T wave abnormality, prolonged QT//460  - Improvement noted after receiving Benadryl and Solumedrol  - Monitor for symptoms and respiratory status

## 2022-12-12 NOTE — DISCHARGE NOTE PROVIDER - NSDCCPCAREPLAN_GEN_ALL_CORE_FT
PRINCIPAL DISCHARGE DIAGNOSIS  Diagnosis: Nausea & vomiting  Assessment and Plan of Treatment: You were admitted with persistent nausea and vomiting. You were managed with supportive anti-nausea medications and needed a nasogastric tube placed to help decompress your stomach. You will need to be followed up with an abdominal series scan to see if your gastric outlet obstruction has resolved after you are transferred.  Please see your primary care physician and oncologist Dr. Llamas for regular follow up.      SECONDARY DISCHARGE DIAGNOSES  Diagnosis: Angioedema of lips  Assessment and Plan of Treatment: You presented with angioedema and swelling of your lips. You were given benadryl and solumedrol (steroids) and your symptoms improved throughout hospital stay. This was a possible allergic reaction from your irbesartan - so please do NOT continue to take your sartan medication until you see your primary care physician to follow up.    Diagnosis: Neuroendocrine cancer  Assessment and Plan of Treatment: You have a known history of a neuroendocrine cancer. A CT scan was done during this hospital stay and results were relayed back to you and to your oncologist, Dr. Llamas.  Please continue to see Dr. Llamas for regular follow up.    Diagnosis: ZO (acute kidney injury)  Assessment and Plan of Treatment: You were found to have an acute kidney injury on admission to the hospital. Your creatinine levels, which is a marker of your kidney functions was elevated. You were given IV fluids for proper hydration and your creatinine levels were monitored daily. Your kidney function continued to improve and normalized during your hospital stay.  Please see your primary care physician for regular follow up and labwork.    Diagnosis: BPH (benign prostatic hyperplasia)  Assessment and Plan of Treatment: You have a known history of enlarged prostate that you normally self-catherize at home. A coulter was placed during this hospital admission so that there is a decreased risk of any possible complications or infection from the need to repeated catherization.  Please continue to take your home medications as previously instructed and see your primary care physician for regular follow up.    Diagnosis: DM (diabetes mellitus)  Assessment and Plan of Treatment: You have a known history of diabetes. Your blood sugar levels were monitored and managed throughout your hospital stay.  Please continue to your take home medications as previously instructed and see your primary care physician for regular follow up.     PRINCIPAL DISCHARGE DIAGNOSIS  Diagnosis: Nausea & vomiting  Assessment and Plan of Treatment: You were admitted with persistent nausea and vomiting. You were managed with supportive anti-nausea medications and needed a nasogastric tube placed to help decompress your stomach. A small bowel x-ray to reassess your condition was done and showed improvement with no obstruction. The nasogastric tube was removed and you were able to tolerate eating regular diet with no more vomiting.  Please see your primary care physician and oncologist Dr. Llamas for regular follow up.      SECONDARY DISCHARGE DIAGNOSES  Diagnosis: Angioedema of lips  Assessment and Plan of Treatment: You presented with angioedema and swelling of your lips. You were given benadryl and solumedrol (steroids) and your symptoms improved throughout hospital stay. This was a possible allergic reaction from your irbesartan - so please do NOT continue to take your sartan medication until you see your primary care physician to follow up.    Diagnosis: Neuroendocrine cancer  Assessment and Plan of Treatment: You have a known history of a neuroendocrine cancer. A CT scan was done during this hospital stay and results were relayed back to you and to your oncologist, Dr. Llamas.  Please continue to see Dr. Llamas for regular follow up.    Diagnosis: ZO (acute kidney injury)  Assessment and Plan of Treatment: You were found to have an acute kidney injury on admission to the hospital. Your creatinine levels, which is a marker of your kidney functions was elevated. You were given IV fluids for proper hydration and your creatinine levels were monitored daily. Your kidney function continued to improve and normalized during your hospital stay.  Please see your primary care physician for regular follow up and labwork.    Diagnosis: BPH (benign prostatic hyperplasia)  Assessment and Plan of Treatment: You have a known history of enlarged prostate that you normally self-catherize at home. A coulter was placed during this hospital admission so that there is a decreased risk of any possible complications or infection from the need to repeated catherization. Coulter was removed prior to discharge.  Please continue to take your home medications as previously instructed and see your primary care physician for regular follow up.    Diagnosis: DM (diabetes mellitus)  Assessment and Plan of Treatment: You have a known history of diabetes. Your blood sugar levels were monitored and managed throughout your hospital stay.  Please continue to your take home medications as previously instructed and see your primary care physician for regular follow up.     PRINCIPAL DISCHARGE DIAGNOSIS  Diagnosis: Nausea & vomiting  Assessment and Plan of Treatment: You were admitted with persistent nausea and vomiting. You were managed with supportive anti-nausea medications and needed a nasogastric tube placed to help decompress your stomach. A small bowel x-ray to reassess your condition was done and showed improvement with no obstruction. The nasogastric tube was removed and you were able to tolerate eating regular diet with no more vomiting.  Please see your primary care physician and oncologist Dr. Llamas for regular follow up.      SECONDARY DISCHARGE DIAGNOSES  Diagnosis: Angioedema of lips  Assessment and Plan of Treatment: You presented with angioedema and swelling of your lips. You were given benadryl and solumedrol (steroids) and your symptoms improved throughout hospital stay. This was a possible reaction from your irbesartan - so please do NOT continue to take your irbesartan medication until you see your primary care physician to follow up.    Diagnosis: Neuroendocrine cancer  Assessment and Plan of Treatment: You have a known history of a neuroendocrine cancer. A CT scan was done during this hospital stay and results were relayed back to you and to your oncologist, Dr. Llamas.  Please continue to see Dr. Llamas for regular follow up.    Diagnosis: ZO (acute kidney injury)  Assessment and Plan of Treatment: You were found to have an acute kidney injury on admission to the hospital. Your creatinine levels, which is a marker of your kidney functions was elevated. You were given IV fluids for proper hydration and your creatinine levels were monitored daily. Your kidney function continued to improve and normalized during your hospital stay.  Please see your primary care physician for regular follow up and labwork.    Diagnosis: BPH (benign prostatic hyperplasia)  Assessment and Plan of Treatment: You have a known history of enlarged prostate that you normally self-catherize at home. A coulter was placed during this hospital admission. Coulter was removed prior to discharge.  Please continue to take your home medications as previously instructed, continue to self straight cath and see your primary care physician for regular follow up.    Diagnosis: DM (diabetes mellitus)  Assessment and Plan of Treatment: You have a known history of diabetes. Your blood sugar levels were monitored and managed throughout your hospital stay.  Please continue to your take home medications as previously instructed and see your primary care physician for regular follow up.

## 2022-12-12 NOTE — CONSULT NOTE ADULT - SUBJECTIVE AND OBJECTIVE BOX
[facility]  INFECTIOUS DISEASES   [address] [place] [address]  Tel: [phone]     Fax: [phone]  ========================================================  MD Cindy Fuentes Kaushal, MD Cho, Michelle, MD Sunjit, Jaspal, MD  ========================================================    MRN-[id number]  DIMPLE STOCK     CC:  Angioedema, N/V     HPI:  68yo man with PMH of neuroendocrine tumor diagnosed 6 years ago, HTN, DM2 was admitted on 12/10 with nausea and vomiting the night after receiving Lutathera, an experimental cancer treatment, on .  Now he has a NGT and feels better with that, less nausea and vomiting. Swelling in lips are resolved.  He also follows with  and since last year, self catheterize himself about 4times daily. No pain in abdomen, flanks, no hematuria or any other symptoms.  UA on admission negative but now UC is growing GNR so ID has been called for evaluation.     PAST MEDICAL & SURGICAL HISTORY:  Hypertension  BPH (benign prostatic hyperplasia)  Renal insufficiency  Light chain nephropathy  DM (diabetes mellitus)  HLD (hyperlipidemia)  No significant past surgical history    Social Hx: no current smoking, ETOH Or drugs     FAMILY HISTORY:  FH: breast cancer (Mother)    FH: aneurysm (Father)    Allergies  losartan (Angioedema)    Antibiotics:  MEDICATIONS  (STANDING):  dextrose 5%. 1000 milliLiter(s) (100 mL/Hr) IV Continuous <Continuous>  dextrose 5%. 1000 milliLiter(s) (50 mL/Hr) IV Continuous <Continuous>  dextrose 50% Injectable 25 Gram(s) IV Push once  dextrose 50% Injectable 12.5 Gram(s) IV Push once  dextrose 50% Injectable 25 Gram(s) IV Push once  diatrizoate meglumine/diatrizoate sodium. 100 milliLiter(s) Oral once  glucagon  Injectable 1 milliGRAM(s) IntraMuscular once  heparin   Injectable 5000 Unit(s) SubCutaneous every 12 hours  insulin lispro (ADMELOG) corrective regimen sliding scale   SubCutaneous three times a day before meals  insulin lispro (ADMELOG) corrective regimen sliding scale   SubCutaneous at bedtime  lactated ringers. 1000 milliLiter(s) (80 mL/Hr) IV Continuous <Continuous>  ondansetron Injectable 4 milliGRAM(s) IV Push every 6 hours  pantoprazole  Injectable 40 milliGRAM(s) IV Push two times a day  potassium chloride  10 mEq/100 mL IVPB 10 milliEquivalent(s) IV Intermittent every 1 hour  tamsulosin 0.8 milliGRAM(s) Oral at bedtime    MEDICATIONS  (PRN):  acetaminophen     Tablet .. 650 milliGRAM(s) Oral every 6 hours PRN Temp greater or equal to 38C (100.4F), Mild Pain (1 - 3)  aluminum hydroxide/magnesium hydroxide/simethicone Suspension 30 milliLiter(s) Oral every 4 hours PRN Dyspepsia  dextrose Oral Gel 15 Gram(s) Oral once PRN Blood Glucose LESS THAN 70 milliGRAM(s)/deciliter  melatonin 3 milliGRAM(s) Oral at bedtime PRN Insomnia  metoclopramide Injectable 10 milliGRAM(s) IV Push every 6 hours PRN nauseas/vomiting  ondansetron Injectable 4 milliGRAM(s) IV Push every 8 hours PRN Nausea and/or Vomiting  prochlorperazine   Injectable 5 milliGRAM(s) IntraMuscular every 6 hours PRN nausea/vomiting    REVIEW OF SYSTEMS:  CONSTITUTIONAL:  No Fever or chills  HEENT:  No diplopia or blurred vision.  No sore throat or runny nose.  CARDIOVASCULAR:  No chest pain or SOB.  RESPIRATORY:  No cough, shortness of breath, PND or orthopnea.  GASTROINTESTINAL:  No nausea, vomiting or diarrhea.  GENITOURINARY:  No dysuria, frequency or urgency. No Blood in urine  MUSCULOSKELETAL:  no joint aches, no muscle pain  SKIN:  No change in skin, hair or nails.  NEUROLOGIC:  No paresthesias or weakness.  PSYCHIATRIC:  No disorder of thought or mood.  ENDOCRINE:  No heat or cold intolerance, polyuria or polydipsia.  HEMATOLOGICAL:  No easy bruising or bleeding.     Physical Exam:  Vital Signs Last 24 Hrs  T(C): 37.6 (12 Dec 2022 04:54), Max: 37.6 (12 Dec 2022 04:54)  T(F): 99.6 (12 Dec 2022 04:54), Max: 99.6 (12 Dec 2022 04:54)  HR: 96 (12 Dec 2022 04:54) (69 - 96)  BP: 139/76 (12 Dec 2022 04:54) (132/78 - 139/76)  RR: 18 (12 Dec 2022 04:54) (16 - 18)  SpO2: 91% (12 Dec 2022 04:54) (91% - 96%)  Parameters below as of 12 Dec 2022 04:54  Patient On (Oxygen Delivery Method): room air  GEN: NAD, NGT in place with dark green fluid in suction cup  HEENT: normocephalic and atraumatic. EOMI. PERRL.    NECK: Supple.  No lymphadenopathy   LUNGS: Clear to auscultation.  HEART: Regular rate and rhythm   ABDOMEN: Soft, nontender, and nondistended.  Positive bowel sounds.    : No CVA tenderness  EXTREMITIES: Without edema.  NEUROLOGIC: grossly intact.  PSYCHIATRIC: Appropriate affect .  SKIN: No rash     Labs:      145  |  107  |  26<H>  ----------------------------<  149<H>  2.8<LL>   |  32<H>  |  1.40<H>    Ca    8.2<L>      12 Dec 2022 10:13    TPro  5.6<L>  /  Alb  2.3<L>  /  TBili  0.4  /  DBili  x   /  AST  32  /  ALT  28  /  AlkPhos  48                          11.4   6.56  )-----------( 149      ( 12 Dec 2022 10:13 )             35.2   Urinalysis Basic - ( 11 Dec 2022 06:48 )    Color: Yellow / Appearance: Slightly Turbid / S.025 / pH: x  Gluc: x / Ketone: Negative  / Bili: Negative / Urobili: Negative   Blood: x / Protein: 30 mg/dL / Nitrite: Negative   Leuk Esterase: Trace / RBC: 3-5 /HPF / WBC 3-5   Sq Epi: x / Non Sq Epi: Occasional / Bacteria: Moderate    LIVER FUNCTIONS - ( 12 Dec 2022 10:13 )  Alb: 2.3 g/dL / Pro: 5.6 g/dL / ALK PHOS: 48 U/L / ALT: 28 U/L / AST: 32 U/L / GGT: x           SARS-CoV-2 Result: NotDetec (12-10-22 @ 11:50)  SARS-CoV-2: NotDetec (12-10-22 @ 11:50)    RECENT CULTURES:   @ 06:56 Clean Catch Clean Catch (Midstream)     >100,000 CFU/ml Gram Negative Rods    12-10 @ 11:50      Detected    12-10 @ 11:45 .Blood Blood-Peripheral     No growth to date.    12-10 @ 11:40 .Blood Blood-Peripheral     No growth to date.    All imaging and other data have been reviewed.  < from: CT Abdomen and Pelvis w/ Oral Cont (12.10.22 @ 16:54) >  IMPRESSION:  Limited noncontrast study.  Increased size of primary pancreatic tumor with increased mass effecton   the adjacent stomach and surrounding soft tissue structures, in   comparison with 2021 CT.  Distention of the proximal body, fundus, and distal esophagus suggests   component of gastric outlet obstruction. Component of esophagitis can be   considered as well given distal esophageal wall thickening.  New hepatic hypodensities since prior imaging from 2021, measuring   up to 4 cm hepatic dome, concerning for metastatic disease. Suggest   contrast enhanced MRI correlation versus Dotatate PET-CT for evaluation.  Trace to small bilateral pleural effusions with adjacent compressive   atelectasis.  Severe T12-L1 disc height loss and endplate sclerosis is new since   2021. Correlate clinically.    Assessment and Plan:   68yo man with PMH of neuroendocrine tumor diagnosed 6 years ago, HTN, DM2 was admitted on 12/10 with nausea and vomiting the night after receiving Lutathera, an experimental cancer treatment, on .  Now he has a NGT and feels better with that, less nausea and vomiting. Swelling in lips are resolved.  He also follows with  and since last year, self catheterize himself about 4times daily. No pain in abdomen, flanks, no hematuria or any other symptoms.  UA on admission negative but now UC is growing GNR so ID has been called for evaluation.   Since asymptomatic no fever, with normal UA, I wouldn't treat him (even though immunocompromised). Will watch closely off antibiotics. Leukocytosis that was reactional has been normalized.   Asymptomatic bacteriuria should be treated in pregnancy,  procedure and renal transplant patients.     Thank you for courtesy of this consult.     Please reconsult as needed.   Discussed with the primary team.     Bertram Gleason MD  Division of Infectious Diseases   Please call ID service at [phone] with any question.      55 minutes spent on total encounter assessing patient, examination, chart review, counseling and coordinating care by the attending physician/nurse/care manager.  
Patient is a 67y old  Male who presents with a chief complaint of Angioedema, N/V (11 Dec 2022 13:40)      HPI:  The patient is a 67y Male with PMHx of neuroendocrine tumor diagnosed 6 years ago, HTN, HLD, T2DM presenting with nausea and vomiting the night after receiving Lutathera- an experimental cancer treatment- this Wednesday 12/7. He was nausea and vomiting once Wednesday night. From Thursday, he was vomiting every 15-20 minutes. He cannot tolerate food or water. Previous time he received this treatment was 1.5 years ago. He denies any symptoms similar to today or reaction at that time. Of note, pt was recently at [place] yesterday, where he was given IVF, zofran, decadron with symptomatic improvement and discharged home from ED. Soon after discharge, the N/V started again and he also noticed swelling of his lips. Wife reports that his lips appeared even more swollen this morning. The patient follows with Dr. Llamas and gets Sandostatin monthly injections for the neuroendocrine tumor. Patient self catherizes around 4x a day. Wife at bedside.     In the ED,  VS: Temp 98.4F, , /86, SpO2 98% on RA  Labs: WBC 22.26, BUN 40, Cr 1.9, Albumin 3.2  Imaging: CXR showed no acute cardiopulmonary pathologies on wet read, pending official  EKG: NSR 98bpm, nonspecific T wave abnormality, prolonged QT//460  Received: 1x Zofran, 1x Solumedrol 125mg IVP, 1x Pepcid IVP, 1x Benadryl 25mg IVP, 2.5L NS bolus (10 Dec 2022 15:02)       ROS:  Negative except for:    PAST MEDICAL & SURGICAL HISTORY:  Hypertension      BPH (benign prostatic hyperplasia)      Renal insufficiency      Light chain nephropathy      DM (diabetes mellitus)      HLD (hyperlipidemia)      No significant past surgical history          SOCIAL HISTORY:    FAMILY HISTORY:  FH: breast cancer (Mother)    FH: aneurysm (Father)        MEDICATIONS  (STANDING):  dextrose 5%. 1000 milliLiter(s) (100 mL/Hr) IV Continuous <Continuous>  dextrose 5%. 1000 milliLiter(s) (50 mL/Hr) IV Continuous <Continuous>  dextrose 50% Injectable 25 Gram(s) IV Push once  dextrose 50% Injectable 12.5 Gram(s) IV Push once  dextrose 50% Injectable 25 Gram(s) IV Push once  glucagon  Injectable 1 milliGRAM(s) IntraMuscular once  heparin   Injectable 5000 Unit(s) SubCutaneous every 12 hours  insulin lispro (ADMELOG) corrective regimen sliding scale   SubCutaneous three times a day before meals  insulin lispro (ADMELOG) corrective regimen sliding scale   SubCutaneous at bedtime  lactated ringers. 1000 milliLiter(s) (80 mL/Hr) IV Continuous <Continuous>  ondansetron Injectable 4 milliGRAM(s) IV Push every 6 hours  pantoprazole  Injectable 40 milliGRAM(s) IV Push every 12 hours  tamsulosin 0.8 milliGRAM(s) Oral at bedtime    MEDICATIONS  (PRN):  acetaminophen     Tablet .. 650 milliGRAM(s) Oral every 6 hours PRN Temp greater or equal to 38C (100.4F), Mild Pain (1 - 3)  aluminum hydroxide/magnesium hydroxide/simethicone Suspension 30 milliLiter(s) Oral every 4 hours PRN Dyspepsia  dextrose Oral Gel 15 Gram(s) Oral once PRN Blood Glucose LESS THAN 70 milliGRAM(s)/deciliter  melatonin 3 milliGRAM(s) Oral at bedtime PRN Insomnia  metoclopramide Injectable 10 milliGRAM(s) IV Push every 6 hours PRN nauseas/vomiting  ondansetron Injectable 4 milliGRAM(s) IV Push every 8 hours PRN Nausea and/or Vomiting  prochlorperazine   Injectable 5 milliGRAM(s) IntraMuscular every 6 hours PRN nausea/vomiting      Allergies    No Known Allergies    Intolerances        Vital Signs Last 24 Hrs  T(C): 37.3 (11 Dec 2022 07:57), Max: 37.8 (10 Dec 2022 20:02)  T(F): 99.1 (11 Dec 2022 07:57), Max: 100 (10 Dec 2022 20:02)  HR: 70 (11 Dec 2022 07:57) (70 - 94)  BP: 123/74 (11 Dec 2022 07:57) (123/74 - 154/87)  BP(mean): --  RR: 18 (11 Dec 2022 07:57) (17 - 18)  SpO2: 95% (11 Dec 2022 07:57) (95% - 98%)    Parameters below as of 11 Dec 2022 07:57  Patient On (Oxygen Delivery Method): room air        PHYSICAL EXAM  General: adult in NAD  HEENT: clear oropharynx, anicteric sclera, pink conjunctivae  Neck: supple  CV: normal S1S2 with no murmur rubs or gallops  Lungs: clear to auscultation, no wheezes, no rhales  Abdomen: soft non-tender non-distended, no hepato/splenomegaly  Ext: no clubbing cyanosis or edema  Skin: no rashes and no petichiae  Neuro: alert and oriented X3 no focal deficits      LABS:    CBC Full  -  ( 11 Dec 2022 07:19 )  WBC Count : 13.27 K/uL  RBC Count : 3.82 M/uL  Hemoglobin : 11.5 g/dL  Hematocrit : 35.1 %  Platelet Count - Automated : 154 K/uL  Mean Cell Volume : 91.9 fl  Mean Cell Hemoglobin : 30.1 pg  Mean Cell Hemoglobin Concentration : 32.8 gm/dL  Auto Neutrophil # : x  Auto Lymphocyte # : x  Auto Monocyte # : x  Auto Eosinophil # : x  Auto Basophil # : x  Auto Neutrophil % : x  Auto Lymphocyte % : x  Auto Monocyte % : x  Auto Eosinophil % : x  Auto Basophil % : x    12-11    142  |  110<H>  |  31<H>  ----------------------------<  162<H>  3.9   |  26  |  1.50<H>    Ca    8.6      11 Dec 2022 07:19    TPro  5.6<L>  /  Alb  2.6<L>  /  TBili  0.3  /  DBili  x   /  AST  29  /  ALT  34  /  AlkPhos  51  12-11    PT/INR - ( 10 Dec 2022 11:50 )   PT: 12.2 sec;   INR: 1.04 ratio         PTT - ( 10 Dec 2022 11:50 )  PTT:26.8 sec          BLOOD SMEAR INTERPRETATION:    RADIOLOGY & ADDITIONAL STUDIES:    < from: CT Abdomen and Pelvis w/ Oral Cont (12.10.22 @ 16:54) >  ACC: [id number] EXAM:  CT ABDOMEN AND PELVIS OC                          PROCEDURE DATE:  12/10/2022          INTERPRETATION:  CLINICAL INFORMATION: History of neuroendocrine tumor.   Nausea and vomiting post recent chemotherapy.    COMPARISON: CT abdomen pelvis 2/20/2021    CONTRAST/COMPLICATIONS:  IV Contrast: NONE  0 cc administered   0 cc discarded  Oral Contrast: Smoothie Readi-Cat 2  Complications: None reported at time of study completion    PROCEDURE:  CT of the Abdomen and Pelvis was performed.  Sagittal and coronal reformats were performed.    FINDINGS:    LOWER CHEST: Prominent size of the partially imaged heart. Coronary   artery calcifications. Trace to small pleural effusions with adjacent   compressive atelectasis of the lung parenchyma. Esophagus is distended   and mildly thickened.    Solid organ evaluation is limited due to noncontrast technique.    LIVER: Several new hepatic hypodense lesions measuring up to 4 cm at the   hepatic dome, image 15 series 2, concerning for metastatic disease,   though incompletely characterized on this noncontrast study.  BILE DUCTS: No biliary distention  GALLBLADDER: Contracted  SPLEEN: Spleen size within normal limits. Splenic hilum is obscured   secondary to pancreatic mass described below.  PANCREAS: Large mass lesion again replacing the pancreatic body and tail   measuring 13.2 x 8.9 cm, image 38 series 2, previously 10.7 x 7.4 cm on   2/20/2021. There is mass effect on the adjacent stomach, proximal small   bowel of the left hemiabdomen, and left kidney. 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  ADRENALS: Unremarkable  KIDNEYS/URETERS: No hydronephrosis. Mass effect in the left kidney   secondary to left upper quadrant mass. 2 mm nonobstructing left renal   calculus.    BLADDER: Mildly distended with in situ Hale catheter. Droplets of air   within the bladder likely due to presence of the Hale catheter.  REPRODUCTIVE ORGANS: Prostate size within normal limits.    BOWEL: Limited evaluation due to lack of contrast. Distal esophagus is   distended and mildly thickened. Gastric fundus and proximal body is   distended followed by compression of the distal body and antrum adjacent   to pancreatic mass. Remainder of the small bowel is nondistended.   Appendix not visualized. Mild stool burden of the colon limits evaluation   of the colonic mucosa.  PERITONEUM: No ascites  VESSELS: No abdominal aortic aneurysm. Atheromatous changes. 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  ABDOMINAL WALL: Soft tissue edema. Numerous nodular densities along the   bilateral gluteal soft tissues are similar to prior imaging from 2021,   unclear significance.  BONES: Diffuse osseous demineralization and multilevel degenerative   changes of the bones. Bilateral pars interarticularis defects at L5-S1   with grade 1 anterolisthesis of L5 on S1. Severe T12-L1 disc height loss   and endplate sclerosis, new since 2/20/2021. Central canal and neural   foramina are not adequately assessed on this study.    IMPRESSION:    Limited noncontrast study.    Increased size of primary pancreatic tumor with increased mass effecton   the adjacent stomach and surrounding soft tissue structures, in   comparison with 2/20/2021 CT.    Distention of the proximal body, fundus, and distal esophagus suggests   component of gastric outlet obstruction. Component of esophagitis can be   considered as well given distal esophageal wall thickening.    New hepatic hypodensities since prior imaging from 2/20/2021, measuring   up to 4 cm hepatic dome, concerning for metastatic disease. Suggest   contrast enhanced MRI correlation versus Dotatate PET-CT for evaluation.    Trace to small bilateral pleural effusions with adjacent compressive   atelectasis.    Severe T12-L1 disc height loss and endplate sclerosis is new since   2/20/2021. Correlate clinically.    Additional findings as above.    Dr. Plasencia discussed these findings with Dr. Mejia Quintanilla on 12/10/2022 at   5:26 PM, with read back.    --- End of Report ---            CASEY PLASENCIA M.D., ATTENDING RADIOLOGIST  This document has been electronically signed. Dec 10 2022  5:33PM    < end of copied text >  
  Chief Complaint:  Patient is a 67y old  Male who presents with a chief complaint of Angioedema, N/V (10 Dec 2022 19:22)    Hypertension    BPH (benign prostatic hyperplasia)    Renal insufficiency    Light chain nephropathy    DM (diabetes mellitus)    HLD (hyperlipidemia)    No significant past surgical history       HPI:  The patient is a 67y Male with PMHx of neuroendocrine tumor diagnosed 6 years ago, HTN, HLD, T2DM presenting with nausea and vomiting the night after receiving Lutathera- an experimental cancer treatment- this . He was nausea and vomiting once Wednesday night. From Thursday, he was vomiting every 15-20 minutes. He cannot tolerate food or water. Previous time he received this treatment was 1.5 years ago. He denies any symptoms similar to today or reaction at that time. Of note, pt was recently at [place] yesterday, where he was given IVF, zofran, decadron with symptomatic improvement and discharged home from ED. Soon after discharge, the N/V started again and he also noticed swelling of his lips. Wife reports that his lips appeared even more swollen this morning. The patient follows with Dr. Llamas and gets Sandostatin monthly injections for the neuroendocrine tumor. Patient self catherizes around 4x a day. Wife at bedside.     In the ED,  VS: Temp 98.4F, , /86, SpO2 98% on RA  Labs: WBC 22.26, BUN 40, Cr 1.9, Albumin 3.2  Imaging: CXR showed no acute cardiopulmonary pathologies on wet read, pending official  EKG: NSR 98bpm, nonspecific T wave abnormality, prolonged QT//460  Received: 1x Zofran, 1x Solumedrol 125mg IVP, 1x Pepcid IVP, 1x Benadryl 25mg IVP, 2.5L NS bolus (10 Dec 2022 15:02)      No Known Allergies      acetaminophen     Tablet .. 650 milliGRAM(s) Oral every 6 hours PRN  aluminum hydroxide/magnesium hydroxide/simethicone Suspension 30 milliLiter(s) Oral every 4 hours PRN  cholecalciferol 4000 Unit(s) Oral daily  cyanocobalamin 1000 MICROGram(s) Oral daily  dextrose 5%. 1000 milliLiter(s) IV Continuous <Continuous>  dextrose 5%. 1000 milliLiter(s) IV Continuous <Continuous>  dextrose 50% Injectable 25 Gram(s) IV Push once  dextrose 50% Injectable 12.5 Gram(s) IV Push once  dextrose 50% Injectable 25 Gram(s) IV Push once  dextrose Oral Gel 15 Gram(s) Oral once PRN  famotidine Injectable 20 milliGRAM(s) IV Push daily  ferrous    sulfate 325 milliGRAM(s) Oral daily  glucagon  Injectable 1 milliGRAM(s) IntraMuscular once  heparin   Injectable 5000 Unit(s) SubCutaneous every 12 hours  insulin lispro (ADMELOG) corrective regimen sliding scale   SubCutaneous three times a day before meals  insulin lispro (ADMELOG) corrective regimen sliding scale   SubCutaneous at bedtime  lactated ringers. 1000 milliLiter(s) IV Continuous <Continuous>  melatonin 3 milliGRAM(s) Oral at bedtime PRN  metoclopramide Injectable 10 milliGRAM(s) IV Push every 6 hours PRN  multivitamin 1 Tablet(s) Oral daily  ondansetron Injectable 4 milliGRAM(s) IV Push every 8 hours PRN  ondansetron Injectable 4 milliGRAM(s) IV Push every 6 hours  prochlorperazine   Injectable 5 milliGRAM(s) IV Push once PRN  prochlorperazine   Injectable 5 milliGRAM(s) IntraMuscular every 6 hours PRN  tamsulosin 0.8 milliGRAM(s) Oral at bedtime        FAMILY HISTORY:  FH: breast cancer (Mother)    FH: aneurysm (Father)          Review of Systems:    General:  No wt loss, fevers, chills, night sweats,fatigue,   Eyes:  Good vision, no reported pain  ENT:  No sore throat, pain, runny nose, dysphagia  CV:  No pain, palpitatioins, hypo/hypertension  Resp:  No dyspnea, cough, tachypnea, wheezing  :  No pain, bleeding, incontinence, nocturia  Muscle:  No pain, weakness  Neuro:  No weakness, tingling, memory problems  Psych:  No fatigue, insomnia, mood problems, depression  Endocrine:  No polyuria, polydypsia, cold/heat intolerance  Heme:  No petechiae, ecchymosis, easy bruisability  Skin:  No rash, tattoos, scars, edema    Relevant Family History:       Relevant Social History:       Physical Exam:    Vital Signs:  Vital Signs Last 24 Hrs  T(C): 37.3 (11 Dec 2022 07:57), Max: 37.8 (10 Dec 2022 20:02)  T(F): 99.1 (11 Dec 2022 07:57), Max: 100 (10 Dec 2022 20:02)  HR: 70 (11 Dec 2022 07:57) (70 - 125)  BP: 123/74 (11 Dec 2022 07:57) (120/86 - 154/87)  BP(mean): --  RR: 18 (11 Dec 2022 07:57) (17 - 19)  SpO2: 95% (11 Dec 2022 07:57) (95% - 98%)    Parameters below as of 11 Dec 2022 07:57  Patient On (Oxygen Delivery Method): room air      Daily Height in cm: 167.64 (10 Dec 2022 10:57)    Daily     General:  Appears stated age, well-groomed, well-nourished, no distress  HEENT:  NC/AT,  conjunctivae clear and pink, no thyromegaly, nodules, adenopathy, no JVD  Chest:  Full & symmetric excursion, no increased effort, breath sounds clear  Cardiovascular:  Regular rhythm, S1, S2, no murmur/rub/S3/S4, no abdominal bruit, no edema  Abdomen:  Soft, non-tender, non-distended, normoactive bowel sounds,  no masses ,no hepatosplenomeagaly, no signs of chronic liver disease  Extremities:  no cyanosis,clubbing or edema  Skin:  No rash/erythema/ecchymoses/petechiae/wounds/abscess/warm/dry  Neuro/Psych:  Alert, oriented, no asterixis, no tremor, no encephalopathy    Laboratory:                            11.5   13.27 )-----------( 154      ( 11 Dec 2022 07:19 )             35.1     12-11    142  |  110<H>  |  31<H>  ----------------------------<  162<H>  3.9   |  26  |  1.50<H>    Ca    8.6      11 Dec 2022 07:19    TPro  5.6<L>  /  Alb  2.6<L>  /  TBili  0.3  /  DBili  x   /  AST  29  /  ALT  34  /  AlkPhos  51  12-11    LIVER FUNCTIONS - ( 11 Dec 2022 07:19 )  Alb: 2.6 g/dL / Pro: 5.6 g/dL / ALK PHOS: 51 U/L / ALT: 34 U/L / AST: 29 U/L / GGT: x           PT/INR - ( 10 Dec 2022 11:50 )   PT: 12.2 sec;   INR: 1.04 ratio         PTT - ( 10 Dec 2022 11:50 )  PTT:26.8 sec  Urinalysis Basic - ( 11 Dec 2022 06:48 )    Color: Yellow / Appearance: Slightly Turbid / S.025 / pH: x  Gluc: x / Ketone: Negative  / Bili: Negative / Urobili: Negative   Blood: x / Protein: 30 mg/dL / Nitrite: Negative   Leuk Esterase: Trace / RBC: 3-5 /HPF / WBC 3-5   Sq Epi: x / Non Sq Epi: Occasional / Bacteria: Moderate        Imaging:          
SURGERY PA CONSULT NOTE:    CHIEF COMPLAINT:  Patient is a 67y old  Male who presents with a chief complaint of Angioedema, N/V (10 Dec 2022 15:02)      HPI FROM ED:  HPI:  The patient is a 67y Male with PMHx of neuroendocrine tumor diagnosed 6 years ago, HTN, HLD, T2DM presenting with nausea and vomiting the night after receiving Lutathera- an experimental cancer treatment- this Wednesday 12/7. He was nausea and vomiting once Wednesday night. From Thursday, he was vomiting every 15-20 minutes. He cannot tolerate food or water. Previous time he received this treatment was 1.5 years ago. He denies any symptoms similar to today or reaction at that time. Of note, pt was recently at [facility] yesterday, where he was given IVF, zofran, decadron with symptomatic improvement and discharged home from ED. Soon after discharge, the N/V started again and he also noticed swelling of his lips. Wife reports that his lips appeared even more swollen this morning. The patient follows with Dr. Llamas and gets Sandostatin monthly injections for the neuroendocrine tumor. Patient self catherizes around 4x a day. Wife at bedside.       Interval HPI:  Patient is a 67 year old male with PMHx as listed above, presenting with 2 days of nausea and multiple episodes of vomiting starting Wednesday night.  Patient states he had underwent serial imaging for his known neuroendocrine tumor (diagnosed 6 years ago at [facility], followed Dr. Llamas) and found to have an enlarged tumor from previous study.  Patient decided to see Dr. Bosworth (Radiation oncologist at [facility]) for a second trial of Lutathera (1st trial last year), on Wednesday 12/7.  Nausea and vomiting began Wednesday night, with minimal improvement in symptoms.  He returned to [facility] for treatment and was subsequently discharged.  Of note, patient was evaluated by Dr. Douglas Pike (surgical oncologist) 6 years ago who recommended surgery, however patient decided to continue medical management of neuroendocrine tumor.  At this time, patient denies passing flatus or BMs as he has not eaten in 2 days.  Denies fevers, chills, abdominal pain, chest pain, SOB, palpitations, calf pain.      In the ED,  VS: Temp 98.4F, , /86, SpO2 98% on RA  Labs: WBC 22.26, BUN 40, Cr 1.9, Albumin 3.2  Imaging: CXR showed no acute cardiopulmonary pathologies on wet read, pending official  EKG: NSR 98bpm, nonspecific T wave abnormality, prolonged QT//460  Received: 1x Zofran, 1x Solumedrol 125mg IVP, 1x Pepcid IVP, 1x Benadryl 25mg IVP, 2.5L NS bolus (10 Dec 2022 15:02)      PAST MEDICAL HISTORY:  PAST MEDICAL & SURGICAL HISTORY:  Hypertension      BPH (benign prostatic hyperplasia)      Renal insufficiency      Light chain nephropathy      DM (diabetes mellitus)      HLD (hyperlipidemia)      No significant past surgical history          PAST SURGICAL HISTORY:    REVIEW OF SYSTEMS:  General/Constitutional: No acute distress, no headache, weakness, fevers, or chills   HEENT: Denies auditory or visual changes/disturbances, no vertigo, no throat pain, no dysphagia    Neck: Denies neck pain/stiffness, denies swelling/lumps/hoarseness   Lymphatic: Denies lumps or swelling in the axillae, groin, or neck bilaterally   Respiratory: Denies cough/hemoptysis, denies wheezing/SOB/dyspnea  Cardiac: Denies chest pain, palpitations  Abdomen: + nausea/vomiting   Extremities: Denies sores, swelling, discoloration bilat UE/LE  Genitourinary: Denies urinary issues or complaints, denies dysuria/hematuria  Neuro: Denies weakness, paraesthesias, paralysis, syncope, loss of vision  Skin: Denies pruritus, pain, rashes  Psych: Denies hallucinations, visual disturbances, or depression    MEDICATIONS:  Home Medications:  acetaminophen 325 mg oral tablet: 2 tab(s) orally every 6 hours, As needed, Mild Pain (10 Dec 2022 15:35)  alfuzosin 10 mg oral tablet, extended release: 1 tab(s) orally once a day (10 Dec 2022 15:35)  cholecalciferol: 4000 mcg by mouth daily (10 Dec 2022 15:35)  cyanocobalamin 1000 mcg/mL injectable solution: inject into the muscle every 30 days  (10 Dec 2022 15:35)  ferrous sulfate 324 mg (65 mg elemental iron) oral tablet:  (10 Dec 2022 15:35)  glipiZIDE 10 mg oral tablet: 1 tab(s) orally once a day (10 Dec 2022 15:35)  irbesartan 75 mg oral tablet: 1 tab(s) orally once a day (10 Dec 2022 15:35)  multivitamin: 1 tab(s) orally once a day (10 Dec 2022 15:35)    MEDICATIONS  (STANDING):  cholecalciferol 4000 Unit(s) Oral daily  cyanocobalamin 1000 MICROGram(s) Oral daily  dextrose 5%. 1000 milliLiter(s) (100 mL/Hr) IV Continuous <Continuous>  dextrose 5%. 1000 milliLiter(s) (50 mL/Hr) IV Continuous <Continuous>  dextrose 50% Injectable 25 Gram(s) IV Push once  dextrose 50% Injectable 12.5 Gram(s) IV Push once  dextrose 50% Injectable 25 Gram(s) IV Push once  ferrous    sulfate 325 milliGRAM(s) Oral daily  glucagon  Injectable 1 milliGRAM(s) IntraMuscular once  heparin   Injectable 5000 Unit(s) SubCutaneous every 12 hours  insulin lispro (ADMELOG) corrective regimen sliding scale   SubCutaneous three times a day before meals  insulin lispro (ADMELOG) corrective regimen sliding scale   SubCutaneous at bedtime  lactated ringers. 1000 milliLiter(s) (80 mL/Hr) IV Continuous <Continuous>  multivitamin 1 Tablet(s) Oral daily  tamsulosin 0.8 milliGRAM(s) Oral at bedtime    MEDICATIONS  (PRN):  acetaminophen     Tablet .. 650 milliGRAM(s) Oral every 6 hours PRN Temp greater or equal to 38C (100.4F), Mild Pain (1 - 3)  aluminum hydroxide/magnesium hydroxide/simethicone Suspension 30 milliLiter(s) Oral every 4 hours PRN Dyspepsia  dextrose Oral Gel 15 Gram(s) Oral once PRN Blood Glucose LESS THAN 70 milliGRAM(s)/deciliter  melatonin 3 milliGRAM(s) Oral at bedtime PRN Insomnia  ondansetron Injectable 4 milliGRAM(s) IV Push every 8 hours PRN Nausea and/or Vomiting  prochlorperazine   Injectable 5 milliGRAM(s) IV Push once PRN vomitting      ALLERGIES:  Allergies    No Known Allergies    Intolerances        SOCIAL HISTORY:  Social History:  Living Situation: lives at home with wife  ADLs/Mobility: ambulates and performs ADLs independently  Tobacco Use: denies; remote history of smoking >40 yrs ago  Alcohol Use: socially  Drug Use: denies  Vaccination: covid vaccinated x3 moderna (10 Dec 2022 15:02)      FAMILY HISTORY:  FAMILY HISTORY:  FH: breast cancer (Mother)    FH: aneurysm (Father)        VITAL SIGNS:  Vital Signs Last 24 Hrs  T(C): 36.9 (10 Dec 2022 18:27), Max: 37 (10 Dec 2022 16:35)  T(F): 98.4 (10 Dec 2022 18:27), Max: 98.6 (10 Dec 2022 16:35)  HR: 94 (10 Dec 2022 18:27) (94 - 125)  BP: 128/84 (10 Dec 2022 18:27) (120/86 - 128/84)  BP(mean): --  RR: 17 (10 Dec 2022 18:27) (17 - 19)  SpO2: 98% (10 Dec 2022 18:27) (98% - 98%)    Parameters below as of 10 Dec 2022 18:27  Patient On (Oxygen Delivery Method): room air        PHYSICAL EXAM:  General: No acute distress, appears comfortable, well nourished, well-groomed, appears stated age  Head, Eyes, Ears, Nose, Throat: Normal cephalic/atraumatic, anicteric, conjunctiva-non injected and moist, vision grossly intact, hearing grossly intact, no nasal discharge, ears and nose symmetrical and atraumatic.  Nasal, oral, and oropharyngeal mucosa pink moist with no evidence of ulceration  Neck: Supple, carotids have good upstroke, trachea in the midline, without JVD or thyromegaly  Lymphatic: No evidence of masses or lymphadenopathy in the head, neck, trunk, axillary, inguinal, or supraclavicular regions  Chest: Lungs are clear to P&A, no wheezing, no rales, no ronchi, with good inspiratory effort  Heart: Heart rhythm regular, no murmurs  Abdomen: Soft, non tender, good bowel sounds present in all four quadrants.  No guarding, rebound, and no peritoneal signs.  No evidence of hepatosplenomegaly.  No evidence of abdominal wall hernias.  Inguinal regions are unremarkable with no evidence of hernias.   Extremity: No swelling, or open sores, no gross deformities,  good range of motion, 2+ peripheral pulses bilat UE/LE, no edema,  negative Kim's sign, no lymphadenopathy  Neuro: Alert and oriented x3, motor and sensory intact  Psychiatric: Awake , alert, oriented x3 with an appropriate affect.   Skin: Good color, turgor, texture with no gross lesions, no eruptions, no rashes, no subcutaneous nodules and normal temperature.     LABS:                        14.1   22.26 )-----------( 233      ( 10 Dec 2022 11:50 )             42.2     12-10    139  |  104  |  40<H>  ----------------------------<  197<H>  4.1   |  28  |  1.90<H>    Ca    9.2      10 Dec 2022 11:50    TPro  7.3  /  Alb  3.2<L>  /  TBili  0.4  /  DBili  x   /  AST  37  /  ALT  44  /  AlkPhos  66  12-10    PT/INR - ( 10 Dec 2022 11:50 )   PT: 12.2 sec;   INR: 1.04 ratio         PTT - ( 10 Dec 2022 11:50 )  PTT:26.8 sec    LIVER FUNCTIONS - ( 10 Dec 2022 11:50 )  Alb: 3.2 g/dL / Pro: 7.3 g/dL / ALK PHOS: 66 U/L / ALT: 44 U/L / AST: 37 U/L / GGT: x               RADIOLOGY & ADDITIONAL STUDIES:      ACC: [id number] EXAM:  CT ABDOMEN AND PELVIS OC                          PROCEDURE DATE:  12/10/2022    INTERPRETATION:  CLINICAL INFORMATION: History of neuroendocrine tumor.   Nausea and vomiting post recent chemotherapy.    COMPARISON: CT abdomen pelvis 2/20/2021    CONTRAST/COMPLICATIONS:  IV Contrast: NONE  0 cc administered   0 cc discarded  Oral Contrast: Smoothie Readi-Cat 2  Complications: None reported at time of study completion    PROCEDURE:  CT of the Abdomen and Pelvis was performed.  Sagittal and coronal reformats were performed.    FINDINGS:    LOWER CHEST: Prominent size of the partially imaged heart. Coronary   artery calcifications. Trace to small pleural effusions with adjacent   compressive atelectasis of the lung parenchyma. Esophagus is distended   and mildly thickened.    Solid organ evaluation is limited due to noncontrast technique.    LIVER: Several new hepatic hypodense lesions measuring up to 4 cm at the   hepatic dome, image 15 series 2, concerning for metastatic disease,   though incompletely characterized on this noncontrast study.  BILE DUCTS: No biliary distention  GALLBLADDER: Contracted  SPLEEN: Spleen size within normal limits. Splenic hilum is obscured   secondary to pancreatic mass described below.  PANCREAS: Large mass lesion again replacing the pancreatic body and tail   measuring 13.2 x 8.9 cm, image 38 series 2, previously 10.7 x 7.4 cm on   2/20/2021. There is mass effect on the adjacent stomach, proximal small   bowel of the left hemiabdomen, and left kidney. 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  ADRENALS: Unremarkable  KIDNEYS/URETERS: No hydronephrosis. Mass effect in the left kidney   secondary to left upper quadrant mass. 2 mm nonobstructing left renal   calculus.    BLADDER: Mildly distended with in situ Hale catheter. Droplets of air   within the bladder likely due to presence of the Hale catheter.  REPRODUCTIVE ORGANS: Prostate size within normal limits.    BOWEL: Limited evaluation due to lack of contrast. Distal esophagus is   distended and mildly thickened. Gastric fundus and proximal body is   distended followed by compression of the distal body and antrum adjacent   to pancreatic mass. Remainder of the small bowel is nondistended.   Appendix not visualized. Mild stool burden of the colon limits evaluation   of the colonic mucosa.  PERITONEUM: No ascites  VESSELS: No abdominal aortic aneurysm. Atheromatous changes. 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  ABDOMINAL WALL: Soft tissue edema. Numerous nodular densities along the   bilateral gluteal soft tissues are similar to prior imaging from 2021,   unclear significance.  BONES: Diffuse osseous demineralization and multilevel degenerative   changes of the bones. Bilateral pars interarticularis defects at L5-S1   with grade 1 anterolisthesis of L5 on S1. Severe T12-L1 disc height loss   and endplate sclerosis, new since 2/20/2021. Central canal and neural   foramina are not adequately assessed on this study.    IMPRESSION:    Limited noncontrast study.    Increased size of primary pancreatic tumor with increased mass effecton   the adjacent stomach and surrounding soft tissue structures, in   comparison with 2/20/2021 CT.    Distention of the proximal body, fundus, and distal esophagus suggests   component of gastric outlet obstruction. Component of esophagitis can be   considered as well given distal esophageal wall thickening.    New hepatic hypodensities since prior imaging from 2/20/2021, measuring   up to 4 cm hepatic dome, concerning for metastatic disease. Suggest   contrast enhanced MRI correlation versus Dotatate PET-CT for evaluation.    Trace to small bilateral pleural effusions with adjacent compressive   atelectasis.    Severe T12-L1 disc height loss and endplate sclerosis is new since   2/20/2021. Correlate clinically.    Additional findings as above.    Dr. Plasencia discussed these findings with Dr. Mejia Quintanilla on 12/10/2022 at   5:26 PM, with read back.    --- End of Report ---            CASEY PLASENCIA M.D., ATTENDING RADIOLOGIST  This document has been electronically signed. Dec 10 2022  5:33PM

## 2022-12-12 NOTE — DISCHARGE NOTE PROVIDER - PROVIDER TOKENS
PROVIDER:[TOKEN:[[id number]],FOLLOWUP:[1 week]],PROVIDER:[TOKEN:[[id number]],FOLLOWUP:[2 weeks]]

## 2022-12-12 NOTE — PROGRESS NOTE ADULT - SUBJECTIVE AND OBJECTIVE BOX
[INTERVAL HX: ]  Patient seen and examined;  Chart reviewed and events noted;     [MEDICATIONS]  MEDICATIONS  (STANDING):  dextrose 5% + sodium chloride 0.45% with potassium chloride 40 mEq/L 1000 milliLiter(s) (80 mL/Hr) IV Continuous <Continuous>  dextrose 5%. 1000 milliLiter(s) (100 mL/Hr) IV Continuous <Continuous>  dextrose 5%. 1000 milliLiter(s) (50 mL/Hr) IV Continuous <Continuous>  dextrose 50% Injectable 25 Gram(s) IV Push once  dextrose 50% Injectable 12.5 Gram(s) IV Push once  dextrose 50% Injectable 25 Gram(s) IV Push once  diatrizoate meglumine/diatrizoate sodium. 100 milliLiter(s) Oral once  glucagon  Injectable 1 milliGRAM(s) IntraMuscular once  heparin   Injectable 5000 Unit(s) SubCutaneous every 12 hours  insulin lispro (ADMELOG) corrective regimen sliding scale   SubCutaneous three times a day before meals  insulin lispro (ADMELOG) corrective regimen sliding scale   SubCutaneous at bedtime  ondansetron Injectable 4 milliGRAM(s) IV Push every 6 hours  pantoprazole  Injectable 40 milliGRAM(s) IV Push two times a day  tamsulosin 0.8 milliGRAM(s) Oral at bedtime    MEDICATIONS  (PRN):  acetaminophen     Tablet .. 650 milliGRAM(s) Oral every 6 hours PRN Temp greater or equal to 38C (100.4F), Mild Pain (1 - 3)  aluminum hydroxide/magnesium hydroxide/simethicone Suspension 30 milliLiter(s) Oral every 4 hours PRN Dyspepsia  dextrose Oral Gel 15 Gram(s) Oral once PRN Blood Glucose LESS THAN 70 milliGRAM(s)/deciliter  melatonin 3 milliGRAM(s) Oral at bedtime PRN Insomnia  metoclopramide Injectable 10 milliGRAM(s) IV Push every 6 hours PRN nauseas/vomiting  ondansetron Injectable 4 milliGRAM(s) IV Push every 8 hours PRN Nausea and/or Vomiting  prochlorperazine   Injectable 5 milliGRAM(s) IntraMuscular every 6 hours PRN nausea/vomiting      [VITALS]  Vital Signs Last 24 Hrs  T(C): 37.6 (12 Dec 2022 04:54), Max: 37.6 (12 Dec 2022 04:54)  T(F): 99.6 (12 Dec 2022 04:54), Max: 99.6 (12 Dec 2022 04:54)  HR: 96 (12 Dec 2022 04:54) (96 - 96)  BP: 139/76 (12 Dec 2022 04:54) (139/76 - 139/76)  BP(mean): --  RR: 18 (12 Dec 2022 04:54) (18 - 18)  SpO2: 91% (12 Dec 2022 04:54) (91% - 91%)    Parameters below as of 12 Dec 2022 04:54  Patient On (Oxygen Delivery Method): room air      [WT/HT]  Daily     Daily   [VENT]      [PHYSICAL EXAM]  xxxxxx    [LABS:]                        11.4   6.56  )-----------( 149      ( 12 Dec 2022 10:13 )             35.2         x   |  x   |  x   ----------------------------<  x   3.0<L>   |  x   |  x     Ca    8.2<L>      12 Dec 2022 10:13    TPro  5.6<L>  /  Alb  2.3<L>  /  TBili  0.4  /  DBili  x   /  AST  32  /  ALT  28  /  AlkPhos  48  12-12            Urinalysis Basic - ( 11 Dec 2022 06:48 )    Color: Yellow / Appearance: Slightly Turbid / S.025 / pH: x  Gluc: x / Ketone: Negative  / Bili: Negative / Urobili: Negative   Blood: x / Protein: 30 mg/dL / Nitrite: Negative   Leuk Esterase: Trace / RBC: 3-5 /HPF / WBC 3-5   Sq Epi: x / Non Sq Epi: Occasional / Bacteria: Moderate        Culture - Urine (collected 11 Dec 2022 06:56)  Source: Clean Catch Clean Catch (Midstream)  Preliminary Report (12 Dec 2022 15:17):    >100,000 CFU/ml Gram Negative Rods    Culture - Blood (collected 10 Dec 2022 11:45)  Source: .Blood Blood-Peripheral  Preliminary Report (11 Dec 2022 15:02):    No growth to date.    Culture - Blood (collected 10 Dec 2022 11:40)  Source: .Blood Blood-Peripheral  Preliminary Report (11 Dec 2022 15:02):    No growth to date.      SARS-CoV-2: NotDetec (10 Dec 2022 11:50)        Culture - Urine (collected 11 Dec 2022 06:56)  Source: Clean Catch Clean Catch (Midstream)  Preliminary Report (12 Dec 2022 15:17):    >100,000 CFU/ml Gram Negative Rods    Culture - Blood (collected 10 Dec 2022 11:45)  Source: .Blood Blood-Peripheral  Preliminary Report (11 Dec 2022 15:02):    No growth to date.    Culture - Blood (collected 10 Dec 2022 11:40)  Source: .Blood Blood-Peripheral  Preliminary Report (11 Dec 2022 15:02):    No growth to date.        [RADIOLOGY STUDIES:]     [INTERVAL HX: ]  Patient seen and examined;  Chart reviewed and events noted;   s/p abd xrays with SB series    +flatus, +BM  NGT in place.     [MEDICATIONS]  MEDICATIONS  (STANDING):  dextrose 5% + sodium chloride 0.45% with potassium chloride 40 mEq/L 1000 milliLiter(s) (80 mL/Hr) IV Continuous <Continuous>  dextrose 5%. 1000 milliLiter(s) (100 mL/Hr) IV Continuous <Continuous>  dextrose 5%. 1000 milliLiter(s) (50 mL/Hr) IV Continuous <Continuous>  dextrose 50% Injectable 25 Gram(s) IV Push once  dextrose 50% Injectable 12.5 Gram(s) IV Push once  dextrose 50% Injectable 25 Gram(s) IV Push once  diatrizoate meglumine/diatrizoate sodium. 100 milliLiter(s) Oral once  glucagon  Injectable 1 milliGRAM(s) IntraMuscular once  heparin   Injectable 5000 Unit(s) SubCutaneous every 12 hours  insulin lispro (ADMELOG) corrective regimen sliding scale   SubCutaneous three times a day before meals  insulin lispro (ADMELOG) corrective regimen sliding scale   SubCutaneous at bedtime  ondansetron Injectable 4 milliGRAM(s) IV Push every 6 hours  pantoprazole  Injectable 40 milliGRAM(s) IV Push two times a day  tamsulosin 0.8 milliGRAM(s) Oral at bedtime    MEDICATIONS  (PRN):  acetaminophen     Tablet .. 650 milliGRAM(s) Oral every 6 hours PRN Temp greater or equal to 38C (100.4F), Mild Pain (1 - 3)  aluminum hydroxide/magnesium hydroxide/simethicone Suspension 30 milliLiter(s) Oral every 4 hours PRN Dyspepsia  dextrose Oral Gel 15 Gram(s) Oral once PRN Blood Glucose LESS THAN 70 milliGRAM(s)/deciliter  melatonin 3 milliGRAM(s) Oral at bedtime PRN Insomnia  metoclopramide Injectable 10 milliGRAM(s) IV Push every 6 hours PRN nauseas/vomiting  ondansetron Injectable 4 milliGRAM(s) IV Push every 8 hours PRN Nausea and/or Vomiting  prochlorperazine   Injectable 5 milliGRAM(s) IntraMuscular every 6 hours PRN nausea/vomiting      [VITALS]  Vital Signs Last 24 Hrs  T(C): 37.6 (12 Dec 2022 04:54), Max: 37.6 (12 Dec 2022 04:54)  T(F): 99.6 (12 Dec 2022 04:54), Max: 99.6 (12 Dec 2022 04:54)  HR: 96 (12 Dec 2022 04:54) (96 - 96)  BP: 139/76 (12 Dec 2022 04:54) (139/76 - 139/76)  BP(mean): --  RR: 18 (12 Dec 2022 04:54) (18 - 18)  SpO2: 91% (12 Dec 2022 04:54) (91% - 91%)    Parameters below as of 12 Dec 2022 04:54  Patient On (Oxygen Delivery Method): room air      [WT/HT]  Daily     Daily   [VENT]      [PHYSICAL EXAM]  WNWD NAD  anicteric  S12S2 RRR  CTAB  soft NT ND no HSM, NABS  no LE edema  AOx4    [LABS:]                        11.4   6.56  )-----------( 149      ( 12 Dec 2022 10:13 )             35.2     1212    x   |  x   |  x   ----------------------------<  x   3.0<L>   |  x   |  x     Ca    8.2<L>      12 Dec 2022 10:13    TPro  5.6<L>  /  Alb  2.3<L>  /  TBili  0.4  /  DBili  x   /  AST  32  /  ALT  28  /  AlkPhos  48  12-12            Urinalysis Basic - ( 11 Dec 2022 06:48 )    Color: Yellow / Appearance: Slightly Turbid / S.025 / pH: x  Gluc: x / Ketone: Negative  / Bili: Negative / Urobili: Negative   Blood: x / Protein: 30 mg/dL / Nitrite: Negative   Leuk Esterase: Trace / RBC: 3-5 /HPF / WBC 3-5   Sq Epi: x / Non Sq Epi: Occasional / Bacteria: Moderate        Culture - Urine (collected 11 Dec 2022 06:56)  Source: Clean Catch Clean Catch (Midstream)  Preliminary Report (12 Dec 2022 15:17):    >100,000 CFU/ml Gram Negative Rods    Culture - Blood (collected 10 Dec 2022 11:45)  Source: .Blood Blood-Peripheral  Preliminary Report (11 Dec 2022 15:02):    No growth to date.    Culture - Blood (collected 10 Dec 2022 11:40)  Source: .Blood Blood-Peripheral  Preliminary Report (11 Dec 2022 15:02):    No growth to date.      SARS-CoV-2: NotDetec (10 Dec 2022 11:50)        Culture - Urine (collected 11 Dec 2022 06:56)  Source: Clean Catch Clean Catch (Midstream)  Preliminary Report (12 Dec 2022 15:17):    >100,000 CFU/ml Gram Negative Rods    Culture - Blood (collected 10 Dec 2022 11:45)  Source: .Blood Blood-Peripheral  Preliminary Report (11 Dec 2022 15:02):    No growth to date.    Culture - Blood (collected 10 Dec 2022 11:40)  Source: .Blood Blood-Peripheral  Preliminary Report (11 Dec 2022 15:02):    No growth to date.        [RADIOLOGY STUDIES:]

## 2022-12-12 NOTE — PROGRESS NOTE ADULT - PROBLEM SELECTOR PLAN 1
VSS, exam reassuring  F/u AM labs  NPO/IVF  NGT in place with 500cc/12hr  Continue care per primary team     Will discuss with Dr. Hernandez VSS, exam reassuring  F/u AM labs  Replete lytes as needed  NPO/IVF  NGT in place with 500cc/12hr  SBS with GG today  Continue care per primary team     Will discuss with Dr. Hernandez

## 2022-12-12 NOTE — PROGRESS NOTE ADULT - ASSESSMENT
Progressive metastatic neuroendocrine cancer on 2nd course of lutathera (received dose 12/7/2022)  admitted now with nausea and vomiting.      Review of scans suggesting component of gastric outlet obstruction.   repeat imaging done today 12/12/22  Small bowel series w/o evidence for gastric outlet obx    RECOMMENDATIONS   follow CBC   GI and surgical followup.    continue hydration and Zofran   Supportive measures.     s/p Lutathera last week Wed. on radiation isolation through Wednesday/Tue    Further heme onc recommendations pending above    D/w Pt and Dr Hudson

## 2022-12-12 NOTE — PROGRESS NOTE ADULT - PROBLEM SELECTOR PLAN 2
WBC 22.26 on admission likely reactive - recently given decadron at [place] yesterday  - Afebrile, does not meet SIRS/sepsis criteria on admission  - WBC trending downwards 13.27 in AM (12/11)  - CXR showed no acute cardiopulmonary pathologies on wet read, pending official  - CTAP scan noted above  - UA unimpressive, f/u Urine cx as patient self catherizes and is at increased risk   - Monitor for fevers and signs of infection  - Trend CBC WBC 22.26 on admission likely reactive - recently given decadron at [place] yesterday  - Afebrile, does not meet SIRS/sepsis criteria on admission  - WBC downtrended and normalized  - CXR showed no acute cardiopulmonary pathologies on wet read, pending official  - CTAP scan noted above  - UA unimpressive, f/u Urine cx as patient self catherizes and is at increased risk   - Monitor for fevers and signs of infection  - Trend CBC WBC 22.26 on admission likely reactive - recently given decadron at [place] yesterday  - Afebrile, does not meet SIRS/sepsis criteria on admission  - WBC downtrended and normalized  - CXR showed no acute cardiopulmonary pathologies  - CTAP scan noted above  - UA unimpressive, f/u Urine cx as patient self catherizes and is at increased risk   - Monitor for fevers and signs of infection  - Trend CBC WBC 22.26 on admission likely reactive - recently given decadron at [place] yesterday  - Afebrile, does not meet SIRS/sepsis criteria on admission  - WBC downtrended and normalized  - CXR showed no acute cardiopulmonary pathologies  - CTAP scan noted above  - UA unimpressive  - UCx grew GNR; patient self catherizes and is at increased risk   - Monitor for fevers and signs of infection  - Trend CBC  - ID Dr. Gleason consulted, recs appreciated

## 2022-12-12 NOTE — PROVIDER CONTACT NOTE (CRITICAL VALUE NOTIFICATION) - ASSESSMENT
[facility] WANDER: [phone]                 INTERAGENCY TRANSFER FORM - NOTES (H&P, Discharge Summary, Consults, Procedures, Therapies)   10/23/2020                   Hospital Admission Date: 10/23/2020  RANJIT COSME   : 1963  Sex: Male        Patient PCP Information     Provider PCP Type    Robel Morris PA-C General      History & Physicals    No notes of this type exist for this encounter.     Discharge Summaries    No notes of this type exist for this encounter.     Consult Notes    No notes of this type exist for this encounter.     Progress Notes - Physician (Notes from 10/20/20 through 10/23/20)    No notes of this type exist for this encounter.     Procedure Notes    No notes of this type exist for this encounter.     Progress Notes - Therapies (Notes from 10/20/20 through 10/23/20)    No notes of this type exist for this encounter.      Pt asymptomatic at this time

## 2022-12-12 NOTE — PROGRESS NOTE ADULT - SUBJECTIVE AND OBJECTIVE BOX
SURGERY PA NOTE ON BEHALF OF DR. NORTON:    S: Patient seen and examined at bedside. No acute overnight events. With NGT in place, succus in cannister with dark reddish/brown hue. Admits passing flatus. Denies new complaints, denies headache, dizziness, sob, chest pain, n/v. Pt currently on last day of isolation for radiation tx.    MEDICATIONS:  acetaminophen     Tablet .. 650 milliGRAM(s) Oral every 6 hours PRN  aluminum hydroxide/magnesium hydroxide/simethicone Suspension 30 milliLiter(s) Oral every 4 hours PRN  dextrose 5%. 1000 milliLiter(s) IV Continuous <Continuous>  dextrose 5%. 1000 milliLiter(s) IV Continuous <Continuous>  dextrose 50% Injectable 25 Gram(s) IV Push once  dextrose 50% Injectable 12.5 Gram(s) IV Push once  dextrose 50% Injectable 25 Gram(s) IV Push once  dextrose Oral Gel 15 Gram(s) Oral once PRN  glucagon  Injectable 1 milliGRAM(s) IntraMuscular once  heparin   Injectable 5000 Unit(s) SubCutaneous every 12 hours  insulin lispro (ADMELOG) corrective regimen sliding scale   SubCutaneous three times a day before meals  insulin lispro (ADMELOG) corrective regimen sliding scale   SubCutaneous at bedtime  lactated ringers. 1000 milliLiter(s) IV Continuous <Continuous>  melatonin 3 milliGRAM(s) Oral at bedtime PRN  metoclopramide Injectable 10 milliGRAM(s) IV Push every 6 hours PRN  ondansetron Injectable 4 milliGRAM(s) IV Push every 8 hours PRN  ondansetron Injectable 4 milliGRAM(s) IV Push every 6 hours  pantoprazole  Injectable 40 milliGRAM(s) IV Push two times a day  prochlorperazine   Injectable 5 milliGRAM(s) IntraMuscular every 6 hours PRN  tamsulosin 0.8 milliGRAM(s) Oral at bedtime    O:  Vital Signs Last 24 Hrs  T(C): 37.6 (12 Dec 2022 04:54), Max: 37.6 (12 Dec 2022 04:54)  T(F): 99.6 (12 Dec 2022 04:54), Max: 99.6 (12 Dec 2022 04:54)  HR: 96 (12 Dec 2022 04:54) (69 - 96)  BP: 139/76 (12 Dec 2022 04:54) (132/78 - 139/76)  RR: 18 (12 Dec 2022 04:54) (16 - 18)  SpO2: 91% (12 Dec 2022 04:54) (91% - 96%)    Parameters below as of 12 Dec 2022 04:54  Patient On (Oxygen Delivery Method): room air    I&O SUMMARY:  12-11-22 @ 07:01  -  12-12-22 @ 07:00  --------------------------------------------------------  IN: 0 mL / OUT: 2400 mL / NET: -2400 mL    PHYSICAL EXAM:  GENERAL: No acute distress, well-developed, NGT in place to LCWS  HEAD:  Atraumatic, Normocephalic  CHEST/LUNG: non labored respirations, no accessory muscle use  HEART: Regular rate and rhythm  ABDOMEN: Soft, non-tender, non-distended  NEUROLOGY: A&O x 3, no focal deficits    LABS:                        11.5   13.27 )-----------( 154      ( 11 Dec 2022 07:19 )             35.1     12-11    142  |  110<H>  |  31<H>  ----------------------------<  162<H>  3.9   |  26  |  1.50<H>    Ca    8.6      11 Dec 2022 07:19    TPro  5.6<L>  /  Alb  2.6<L>  /  TBili  0.3  /  DBili  x   /  AST  29  /  ALT  34  /  AlkPhos  51  12-11    PT/INR - ( 10 Dec 2022 11:50 )   PT: 12.2 sec;   INR: 1.04 ratio         PTT - ( 10 Dec 2022 11:50 )  PTT:26.8 sec

## 2022-12-12 NOTE — PROGRESS NOTE ADULT - ASSESSMENT
GOO/ Nausea /Vomiting    NGT  IVF  PPI bid  surgical eval noted  Follow small bowel series    I reviewed the overnight course of events on the unit, re-confirming the patient history. I discussed the care with the patient and their family  Differential diagnosis and plan of care discussed with patient after the evaluation  35 minutes spent on total encounter of which more than fifty percent of the encounter was spent counseling and/or coordinating care by the attending physician.  Advanced care planning was discussed with patient and family.  Advanced care planning forms were reviewed and discussed.  Risks, benefits and alternatives of gastroenterologic procedures were discussed in detail and all questions were answered.

## 2022-12-12 NOTE — DISCHARGE NOTE PROVIDER - HOSPITAL COURSE
HPI:  The patient is a 67y Male with PMHx of neuroendocrine tumor diagnosed 6 years ago, HTN, HLD, T2DM presenting with nausea and vomiting the night after receiving Lutathera- an experimental cancer treatment- this Wednesday 12/7. He was nausea and vomiting once Wednesday night. From Thursday, he was vomiting every 15-20 minutes. He cannot tolerate food or water. Previous time he received this treatment was 1.5 years ago. He denies any symptoms similar to today or reaction at that time. Of note, pt was recently at [place] yesterday, where he was given IVF, zofran, decadron with symptomatic improvement and discharged home from ED. Soon after discharge, the N/V started again and he also noticed swelling of his lips. Wife reports that his lips appeared even more swollen this morning. The patient follows with Dr. Llamas and gets Sandostatin monthly injections for the neuroendocrine tumor. Patient self catherizes around 4x a day. Wife at bedside.     In the ED,  VS: Temp 98.4F, , /86, SpO2 98% on RA  Labs: WBC 22.26, BUN 40, Cr 1.9, Albumin 3.2  Imaging: CXR showed no acute cardiopulmonary pathologies on wet read, pending official  EKG: NSR 98bpm, nonspecific T wave abnormality, prolonged QT//460  Received: 1x Zofran, 1x Solumedrol 125mg IVP, 1x Pepcid IVP, 1x Benadryl 25mg IVP, 2.5L NS bolus (10 Dec 2022 15:02)    ---  HOSPITAL COURSE:   Patient was admitted for intractable nausea, vomiting, and angioedema of the lips. CT abd/pelvis with oral contrast showed increased size of pancreatic tumor with increased mass effect on adjacent stomach and surrounding soft tissues; distension of proximal body, fundus, and distal esophagus suggesting component of gastric outlet obstruction; esophageal wall thickening possibility esophagitis; new hepatic hypodensities likely mets compared to 2/20/2021. Surgery and GI were consulted. Pt was treated with supportive anti-nausea medications and PPI. Pt's symptoms initially did not improve with just supportive treatments. NGT was placed. Pt felt symptomatically better afterwards. Small bowel series was ordered - however with pt's recent Lutathera treatment and radioactive status, pt will require transfer for proper management and protocols for radiographic images and treatments. In addition, pt had ZO on admission. His kidney functions improved after receiving IVF management. Pt was also found to have leukocytosis initially, which was likely reactive due to recent steroids. Hale was placed in because pt has a history of frequent self-catherization at home. Angioedema also resolved after allergic management with solumedrol and benadryl. His home irbesartan will be held going forward as there is a possibility of new allergic reaction to medication with angioedema of lips presentation. 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 Llamas during hospital stay.     Patient's medical condition improved throughout hospital course and is medically stable for discharge home with close outpatient follow up. Patient seen and examined on day of discharge.    ---  PATIENT CONDITION:  - stable    ---  PHYSICAL EXAM (On Date of Discharge):   VITALS:   T(C): 37.6 (12-12-22 @ 04:54), Max: 37.6 (12-12-22 @ 04:54)  HR: 96 (12-12-22 @ 04:54) (69 - 96)  BP: 139/76 (12-12-22 @ 04:54) (132/78 - 139/76)  RR: 18 (12-12-22 @ 04:54) (16 - 18)  SpO2: 91% (12-12-22 @ 04:54) (91% - 96%)    PHYSICAL EXAM:  GENERAL: NAD, appears stated age  HEENT: anicteric, eomi  CHEST/LUNG: CTA b/l, no rales, wheezes, or rhonchi  HEART: RRR, S1, S2  ABDOMEN: BS+, soft, nontender, nondistended +NGT in place draining gastric contents  EXTREMITIES: no edema, cyanosis, or calf tenderness  NERVOUS SYSTEM: answers questions and follows commands appropriately    ---  CONSULTANTS:   GI - Dr. Rivers  Surgery - Dr. Hernandez  Heme/onc - Dr. Llamas    ---  TIME SPENT:  I, the attending physician, was physically present for the key portions of the evaluation and management (E/M) service provided. The total amount of time spent reviewing the hospital notes, laboratory values, imaging findings, assessing/counseling the patient, discussing with consultant physicians, social work, nursing staff was -- minutes    ---  Primary care provider was made aware of plan for discharge:  [  ] NO     [  ] YES   HPI:  The patient is a 67y Male with PMHx of neuroendocrine tumor diagnosed 6 years ago, HTN, HLD, T2DM presenting with nausea and vomiting the night after receiving Lutathera- an experimental cancer treatment- this Wednesday 12/7. He was nausea and vomiting once Wednesday night. From Thursday, he was vomiting every 15-20 minutes. He cannot tolerate food or water. Previous time he received this treatment was 1.5 years ago. He denies any symptoms similar to today or reaction at that time. Of note, pt was recently at [place] yesterday, where he was given IVF, zofran, decadron with symptomatic improvement and discharged home from ED. Soon after discharge, the N/V started again and he also noticed swelling of his lips. Wife reports that his lips appeared even more swollen this morning. The patient follows with Dr. Llamas and gets Sandostatin monthly injections for the neuroendocrine tumor. Patient self catherizes around 4x a day. Wife at bedside.     In the ED,  VS: Temp 98.4F, , /86, SpO2 98% on RA  Labs: WBC 22.26, BUN 40, Cr 1.9, Albumin 3.2  Imaging: CXR showed no acute cardiopulmonary pathologies on wet read, pending official  EKG: NSR 98bpm, nonspecific T wave abnormality, prolonged QT//460  Received: 1x Zofran, 1x Solumedrol 125mg IVP, 1x Pepcid IVP, 1x Benadryl 25mg IVP, 2.5L NS bolus (10 Dec 2022 15:02)    ---  HOSPITAL COURSE:   Patient was admitted for intractable nausea, vomiting, and angioedema of the lips. CT abd/pelvis with oral contrast showed increased size of pancreatic tumor with increased mass effect on adjacent stomach and surrounding soft tissues; distension of proximal body, fundus, and distal esophagus suggesting component of gastric outlet obstruction; esophageal wall thickening possibility esophagitis; new hepatic hypodensities likely mets compared to 2/20/2021. Surgery and GI were consulted. Pt was treated with supportive anti-nausea medications and PPI. Pt's symptoms initially did not improve with just supportive treatments. NGT was placed. Pt felt symptomatically better afterwards. Small bowel series was ordered to reassess and showed no gastric outlet obstruction. NGT was removed and pt was able to tolerate advancements in diet. In addition, pt had ZO on admission. His kidney functions improved after receiving IVF management. Pt was also found to have leukocytosis initially, which was likely reactive due to recent steroids. Hale was placed in because pt has a history of frequent self-catherization at home. Angioedema also resolved after allergic management with solumedrol and benadryl. His home irbesartan will be held going forward as there is a possibility of new allergic reaction to medication with angioedema of lips presentation. 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 Llamas during hospital stay.     Patient's medical condition improved throughout hospital course and is medically stable for discharge home with close outpatient follow up. Patient seen and examined on day of discharge.    ---  PATIENT CONDITION:  - stable    ---  PHYSICAL EXAM (On Date of Discharge):   VITALS:   T(C): 37.6 (12-12-22 @ 04:54), Max: 37.6 (12-12-22 @ 04:54)  HR: 96 (12-12-22 @ 04:54) (69 - 96)  BP: 139/76 (12-12-22 @ 04:54) (132/78 - 139/76)  RR: 18 (12-12-22 @ 04:54) (16 - 18)  SpO2: 91% (12-12-22 @ 04:54) (91% - 96%)    PHYSICAL EXAM:  GENERAL: NAD, appears stated age  HEENT: anicteric, eomi  CHEST/LUNG: CTA b/l, no rales, wheezes, or rhonchi  HEART: RRR, S1, S2  ABDOMEN: BS+, soft, nontender, nondistended +NGT in place draining gastric contents  EXTREMITIES: no edema, cyanosis, or calf tenderness  NERVOUS SYSTEM: answers questions and follows commands appropriately    ---  CONSULTANTS:   GI - Dr. Rivers  Surgery - Dr. Hernandez  Heme/onc - Dr. Llamas    ---  TIME SPENT:  I, the attending physician, was physically present for the key portions of the evaluation and management (E/M) service provided. The total amount of time spent reviewing the hospital notes, laboratory values, imaging findings, assessing/counseling the patient, discussing with consultant physicians, social work, nursing staff was -- minutes    ---  Primary care provider was made aware of plan for discharge:  [  ] NO     [  ] YES   HPI:  The patient is a 67y Male with PMHx of neuroendocrine tumor diagnosed 6 years ago, HTN, HLD, T2DM presenting with nausea and vomiting the night after receiving Lutathera- an experimental cancer treatment- this Wednesday 12/7. He was nausea and vomiting once Wednesday night. From Thursday, he was vomiting every 15-20 minutes. He cannot tolerate food or water. Previous time he received this treatment was 1.5 years ago. He denies any symptoms similar to today or reaction at that time. Of note, pt was recently at [facility] yesterday, where he was given IVF, zofran, decadron with symptomatic improvement and discharged home from ED. Soon after discharge, the N/V started again and he also noticed swelling of his lips. Wife reports that his lips appeared even more swollen this morning. The patient follows with Dr. Llamas and gets Sandostatin monthly injections for the neuroendocrine tumor. Patient self catherizes around 4x a day. Wife at bedside.     In the ED,  VS: Temp 98.4F, , /86, SpO2 98% on RA  Labs: WBC 22.26, BUN 40, Cr 1.9, Albumin 3.2  Imaging: CXR showed no acute cardiopulmonary pathologies on wet read, pending official  EKG: NSR 98bpm, nonspecific T wave abnormality, prolonged QT//460  Received: 1x Zofran, 1x Solumedrol 125mg IVP, 1x Pepcid IVP, 1x Benadryl 25mg IVP, 2.5L NS bolus (10 Dec 2022 15:02)    ---  HOSPITAL COURSE:   Patient was admitted for intractable nausea, vomiting, and angioedema of the lips. CT abd/pelvis with oral contrast showed increased size of pancreatic tumor with increased mass effect on adjacent stomach and surrounding soft tissues; distension of proximal body, fundus, and distal esophagus suggesting component of gastric outlet obstruction; esophageal wall thickening possibility esophagitis; new hepatic hypodensities likely mets compared to 2/20/2021. Surgery and GI were consulted. Pt was treated with supportive anti-nausea medications and PPI. Pt's symptoms initially did not improve with just supportive treatments. NGT was placed. Pt felt symptomatically better afterwards. Small bowel series was ordered to reassess and showed no gastric outlet obstruction. NGT was removed and pt was able to tolerate advancements in diet. In addition, pt had ZO on admission. His kidney functions improved after receiving IVF management. Pt was also found to have leukocytosis initially, which was likely reactive due to recent steroids. Coulter was placed in because pt has a history of frequent self-catherization at home and will be discharged with the coulter taken out. May continue to straight cath himself at home. Angioedema also resolved after allergic management with solumedrol and benadryl. His home irbesartan will be held going forward as there is a possibility of new allergic reaction to medication with angioedema of lips presentation. 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 Llamas during hospital stay.     Patient's medical condition improved throughout hospital course and is medically stable for discharge home with close outpatient follow up. Patient seen and examined on day of discharge.    ---  PATIENT CONDITION:  - stable    ---  PHYSICAL EXAM (On Date of Discharge):   Vital Signs Last 24 Hrs  T(C): 37.2 (14 Dec 2022 11:13), Max: 37.7 (13 Dec 2022 21:21)  T(F): 98.9 (14 Dec 2022 11:13), Max: 99.9 (13 Dec 2022 21:21)  HR: 70 (14 Dec 2022 11:13) (70 - 75)  BP: 106/67 (14 Dec 2022 11:13) (106/67 - 120/80)  BP(mean): --  RR: 18 (14 Dec 2022 11:13) (18 - 19)  SpO2: 96% (14 Dec 2022 11:13) (92% - 96%)    Parameters below as of 14 Dec 2022 11:13  Patient On (Oxygen Delivery Method): room air    PHYSICAL EXAM:  GENERAL: NAD, appears stated age  HEENT: anicteric, eomi  CHEST/LUNG: CTA b/l, no rales, wheezes, or rhonchi  HEART: RRR, S1, S2  ABDOMEN: BS+, soft, nontender, nondistended  EXTREMITIES: no edema, cyanosis, or calf tenderness  NERVOUS SYSTEM: answers questions and follows commands appropriately    ---  CONSULTANTS:   GI - Dr. Rivers  Surgery - Dr. Hernandez  Heme/onc - Dr. Llamas    ---  TIME SPENT:  I, the attending physician, was physically present for the key portions of the evaluation and management (E/M) service provided. The total amount of time spent reviewing the hospital notes, laboratory values, imaging findings, assessing/counseling the patient, discussing with consultant physicians, social work, nursing staff was -- minutes    ---  Primary care provider was made aware of plan for discharge:  [  ] NO     [  ] YES   HPI:  The patient is a 67y Male with PMHx of neuroendocrine tumor diagnosed 6 years ago, HTN, HLD, T2DM presenting with nausea and vomiting the night after receiving Lutathera- an experimental cancer treatment- this Wednesday 12/7. He was nausea and vomiting once Wednesday night. From Thursday, he was vomiting every 15-20 minutes. He cannot tolerate food or water. Previous time he received this treatment was 1.5 years ago. He denies any symptoms similar to today or reaction at that time. Of note, pt was recently at [place] yesterday, where he was given IVF, zofran, decadron with symptomatic improvement and discharged home from ED. Soon after discharge, the N/V started again and he also noticed swelling of his lips. Wife reports that his lips appeared even more swollen this morning. The patient follows with Dr. Llamas and gets Sandostatin monthly injections for the neuroendocrine tumor. Patient self catherizes around 4x a day. Wife at bedside.     In the ED,  VS: Temp 98.4F, , /86, SpO2 98% on RA  Labs: WBC 22.26, BUN 40, Cr 1.9, Albumin 3.2  Imaging: CXR showed no acute cardiopulmonary pathologies on wet read, pending official  EKG: NSR 98bpm, nonspecific T wave abnormality, prolonged QT//460  Received: 1x Zofran, 1x Solumedrol 125mg IVP, 1x Pepcid IVP, 1x Benadryl 25mg IVP, 2.5L NS bolus (10 Dec 2022 15:02)    ---  HOSPITAL COURSE:   Patient was admitted for intractable nausea, vomiting, and angioedema of the lips. CT abd/pelvis with oral contrast showed increased size of pancreatic tumor with increased mass effect on adjacent stomach and surrounding soft tissues; distension of proximal body, fundus, and distal esophagus suggesting component of gastric outlet obstruction; esophageal wall thickening possibility esophagitis; new hepatic hypodensities likely mets compared to 2/20/2021. Surgery and GI were consulted. Pt was treated with supportive anti-nausea medications and PPI. Pt's symptoms initially did not improve with just supportive treatments. NGT was placed. Pt felt symptomatically better afterwards. Small bowel series was ordered to reassess and showed no gastric outlet obstruction. NGT was removed and pt was able to tolerate advancements in diet. In addition, pt had ZO on admission. His kidney functions improved after receiving IVF management. Pt was also found to have leukocytosis initially, which was likely reactive due to recent steroids. Coulter was placed in because pt has a history of frequent self-catherization at home and will be discharged with the coulter taken out. May continue to straight cath himself at home. Angioedema also resolved after allergic management with solumedrol and benadryl. His home irbesartan will be held going forward as there is a possibility of angioedema. 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 Llamas during hospital stay.     Patient's medical condition improved throughout hospital course and is medically stable for discharge home with close outpatient follow up. Patient seen and examined on day of discharge.    ---  PATIENT CONDITION:  - stable    ---  PHYSICAL EXAM (On Date of Discharge):   Vital Signs Last 24 Hrs  T(C): 37.2 (14 Dec 2022 11:13), Max: 37.7 (13 Dec 2022 21:21)  T(F): 98.9 (14 Dec 2022 11:13), Max: 99.9 (13 Dec 2022 21:21)  HR: 70 (14 Dec 2022 11:13) (70 - 75)  BP: 106/67 (14 Dec 2022 11:13) (106/67 - 120/80)  BP(mean): --  RR: 18 (14 Dec 2022 11:13) (18 - 19)  SpO2: 96% (14 Dec 2022 11:13) (92% - 96%)    Parameters below as of 14 Dec 2022 11:13  Patient On (Oxygen Delivery Method): room air    PHYSICAL EXAM:  GENERAL: NAD, appears stated age  HEENT: anicteric, eomi  CHEST/LUNG: CTA b/l, no rales, wheezes, or rhonchi  HEART: RRR, S1, S2  ABDOMEN: BS+, soft, nontender, nondistended  EXTREMITIES: no edema, cyanosis, or calf tenderness  NERVOUS SYSTEM: answers questions and follows commands appropriately    ---  CONSULTANTS:   GI - Dr. Rivers  Surgery - Dr. Hernandez  Heme/onc - Dr. Llamas

## 2022-12-12 NOTE — PROGRESS NOTE ADULT - PROBLEM SELECTOR PLAN 1
Pt presents with multiple episodes of nausea and vomiting  - Still currently having active episodes of vomiting, abdominal exam soft, nontender, nondistended  - CTAP w/ oral contrast showed increased size of pancreatic tumor with increased mass effect on adjacent stomach and surrounding soft tissues; distention of proximal body, fundus, and distal esophagus suggesting component of gastric outlet obstruction; esophageal wall thickening, possibly esophagitis; new hepatic hypodensities likely mets compared to 2/20/2021, trace to small b/l pleural effusions; severe T12-L1 disc height loss and endplate sclerosis.  - NPO except meds; dced vitamins  - Maintenance LR NS 80cc/hr  - Zofran, Reglan, and Prochlorperazine PRN for nausea/vomiting  - Protonix 40mg IVP BID  - Lipase 135; no signs of acute pancreatitis  - GI Dr. Rivers consulted; consider need for EGD  - Surgery Dr. Hernandez consulted; consider NGT for decompression Pt presents with multiple episodes of nausea and vomiting  - Still currently having active episodes of vomiting, abdominal exam soft, nontender, nondistended  - CTAP w/ oral contrast showed increased size of pancreatic tumor with increased mass effect on adjacent stomach and surrounding soft tissues; distention of proximal body, fundus, and distal esophagus suggesting component of gastric outlet obstruction; esophageal wall thickening, possibly esophagitis; new hepatic hypodensities likely mets compared to 2/20/2021, trace to small b/l pleural effusions; severe T12-L1 disc height loss and endplate sclerosis.  - NPO except meds; dced vitamins  - NGT placed, draining gastric contents; pt feels symptomatically better  - SBS ordered today for evaluation of gastric outlet obstruction  - Maintenance LR 80cc/hr  - Zofran, Reglan, and Prochlorperazine PRN for nausea/vomiting  - Protonix 40mg IVP BID  - Lipase 135; no signs of acute pancreatitis  - GI Dr. Rivers consulted; consider EGD  - Surgery Dr. Hernandez following

## 2022-12-12 NOTE — PROGRESS NOTE ADULT - PROBLEM SELECTOR PLAN 7
Chronic, stable on admission  - Hold home losartan in setting of ZO  - Monitor routine hemodynamics Chronic, stable on admission  - Hold home losartan in setting of ZO and angioedema; would recommend not starting until pt reviews medication with pcp  - Monitor routine hemodynamics

## 2022-12-12 NOTE — PROGRESS NOTE ADULT - PROBLEM SELECTOR PLAN 3
ZO likely 2/2 pre-renal azotemia in the setting of dehydration, improving  - BUN 40, Cr 1.9 on admission  - Cr trending downwards 1.5 in AM (12/11)  - Baseline Cr: 1.4-1.5 as per outpt chart review  - Given 2.5L NS bolus in ED  - Maintenance IVF as noted above  - Monitor BMP  - Monitor renal indices and urine output  - Avoid nephrotoxic medications  - Hale placed in as patient self catherizes frequently at home ZO likely 2/2 pre-renal azotemia in the setting of dehydration, improving  - BUN 40, Cr 1.9 on admission  - Cr trending downwards 1.4 (12/12)  - Baseline Cr: 1.4-1.5 as per outpt chart review  - Given 2.5L NS bolus in ED  - Maintenance IVF as noted above  - Monitor BMP  - Monitor renal indices and urine output  - Avoid nephrotoxic medications  - Hale placed in as patient self catherizes frequently at home    #Hypokalemia  - Repleted with Kriders today  - Trend BMP

## 2022-12-12 NOTE — PROGRESS NOTE ADULT - PROBLEM SELECTOR PLAN 5
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 Llamas and urologist Dr. Padron  - Receives monthly Sandostatin (Octreotide) injection  - Received Lutathera 3 days prior to admission (new experimental drug), last received 1.5 years ago  - CTAP scan noted above  - Pt states that he recently got a PET scan done, which showed growth in size of tumor and that was the reason why he received this Lutathera treatment - which helped shrink the tumor when he previously got it  - Heme/Onc Dr. Llamas (pt follows outpt) consulted Pt with history of neuroendocrine tumor in pancreas diagnosed 6 years ago  - Follows with oncologist Dr. Llamas and urologist Dr. Padron  - Receives monthly Sandostatin (Octreotide) injection  - Received Lutathera 3 days prior to admission (new experimental drug), last received 1.5 years ago  - CTAP scan noted above  - Pt states that he recently got a PET scan done, which showed growth in size of tumor and that was the reason why he received this Lutathera treatment - which helped shrink the tumor when he previously got it  - Radiation isolation precautions  - Heme/Onc Dr. Llamas (pt follows outpt) consulted

## 2022-12-12 NOTE — DISCHARGE NOTE PROVIDER - CARE PROVIDER_API CALL
Ac Llamas)  Hematology; Internal Medicine; Medical Oncology  [address] [facility], [address]  [place] [address]  Phone: [phone]  Fax: [phone]  Follow Up Time: 1 week    Joss Freeman (DO)  Medicine  [facility]  [address] [facility] [address], [address]  [place] [address]  Phone: [phone]  Fax: [phone]  Follow Up Time: 2 weeks

## 2022-12-12 NOTE — PROGRESS NOTE ADULT - ASSESSMENT
67y Male with PMHx of neuroendocrine tumor, HTN, HLD, T2DM presenting with nausea and vomiting after receiving Lutathera treatment being admitted for angioedema of lips and nausea/vomiting   67y Male with PMHx of neuroendocrine tumor, HTN, HLD, T2DM presenting with nausea and vomiting after receiving Lutathera treatment being admitted for angioedema of lips and nausea/vomiting      As in above full PA notation.    Ms. Griggs personally seen and examined during p.m. rounds.  "spitting up" improved.  Denies abdominal pain.  Vitals non-suggestive.  Abdomen soft/ non tender.  Labs reassuring from General Surgery perspective.  UGI negative for GOO.  Clinically, improving overall.  Surgically, stable for present.  To continue current supportive care with removal of NG and consideration of clear liquids in a.m.

## 2022-12-12 NOTE — PROGRESS NOTE ADULT - PROBLEM SELECTOR PLAN 6
Chronic, known history of T2DM  - Hold home meds: gllipizide   - Low dose insulin corrective scale  - Hypoglycemia protocol, fingerstick glucose q6h  - Diet NPO  - F/u AM HbA1c Chronic, known history of T2DM  - Hold home meds: glipizide   - Low dose insulin corrective scale  - Hypoglycemia protocol, fingerstick glucose q6h  - Diet NPO

## 2022-12-13 NOTE — PROGRESS NOTE ADULT - PROBLEM SELECTOR PLAN 5
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 Llamas and urologist Dr. Padron  - Receives monthly Sandostatin (Octreotide) injection  - Received Lutathera 3 days prior to admission (new experimental drug), last received 1.5 years ago  - CTAP scan noted above  - Pt states that he recently got a PET scan done, which showed growth in size of tumor and that was the reason why he received this Lutathera treatment - which helped shrink the tumor when he previously got it  - Radiation isolation precautions  - Heme/Onc Dr. Llamas (pt follows outpt) consulted

## 2022-12-13 NOTE — PROGRESS NOTE ADULT - PROBLEM SELECTOR PLAN 1
CLD this AM  VSS, exam reassuring  F/u AM labs  Replete lytes as needed  NPO/IVF  Continue care per primary team     Will discuss with Dr. Hernandez.

## 2022-12-13 NOTE — PROGRESS NOTE ADULT - SUBJECTIVE AND OBJECTIVE BOX
[facility]  INFECTIOUS DISEASES   [address] [place] [address]  Tel: [phone]     Fax: [phone]  ========================================================  MD Cindy Fuentes Kaushal, MD Cho, Michelle, MD Sunjit, Jaspal, MD  ========================================================    [id number]  DIMPLE STOCK     CC:  Angioedema, N/V     HPI:  66yo man with PMH of neuroendocrine tumor diagnosed 6 years ago, HTN, DM2 was admitted on 12/10 with nausea and vomiting the night after receiving Lutathera, an experimental cancer treatment, on Wednesday 12/7.  Now he has a NGT and feels better with that, less nausea and vomiting. Swelling in lips are resolved.  He also follows with  and since last year, self catheterize himself about 4times daily. No pain in abdomen, flanks, no hematuria or any other symptoms.  UA on admission negative but now UC is growing GNR so ID has been called for evaluation.     PAST MEDICAL & SURGICAL HISTORY:  Hypertension  BPH (benign prostatic hyperplasia)  Renal insufficiency  Light chain nephropathy  DM (diabetes mellitus)  HLD (hyperlipidemia)  No significant past surgical history    Social Hx: no current smoking, ETOH Or drugs     FAMILY HISTORY:  FH: breast cancer (Mother)    FH: aneurysm (Father)    Allergies  losartan (Angioedema)    Antibiotics:  MEDICATIONS  (STANDING):  dextrose 5%. 1000 milliLiter(s) (100 mL/Hr) IV Continuous <Continuous>  dextrose 5%. 1000 milliLiter(s) (50 mL/Hr) IV Continuous <Continuous>  dextrose 50% Injectable 25 Gram(s) IV Push once  dextrose 50% Injectable 12.5 Gram(s) IV Push once  dextrose 50% Injectable 25 Gram(s) IV Push once  diatrizoate meglumine/diatrizoate sodium. 100 milliLiter(s) Oral once  glucagon  Injectable 1 milliGRAM(s) IntraMuscular once  heparin   Injectable 5000 Unit(s) SubCutaneous every 12 hours  insulin lispro (ADMELOG) corrective regimen sliding scale   SubCutaneous three times a day before meals  insulin lispro (ADMELOG) corrective regimen sliding scale   SubCutaneous at bedtime  lactated ringers. 1000 milliLiter(s) (80 mL/Hr) IV Continuous <Continuous>  ondansetron Injectable 4 milliGRAM(s) IV Push every 6 hours  pantoprazole  Injectable 40 milliGRAM(s) IV Push two times a day  potassium chloride  10 mEq/100 mL IVPB 10 milliEquivalent(s) IV Intermittent every 1 hour  tamsulosin 0.8 milliGRAM(s) Oral at bedtime    MEDICATIONS  (PRN):  acetaminophen     Tablet .. 650 milliGRAM(s) Oral every 6 hours PRN Temp greater or equal to 38C (100.4F), Mild Pain (1 - 3)  aluminum hydroxide/magnesium hydroxide/simethicone Suspension 30 milliLiter(s) Oral every 4 hours PRN Dyspepsia  dextrose Oral Gel 15 Gram(s) Oral once PRN Blood Glucose LESS THAN 70 milliGRAM(s)/deciliter  melatonin 3 milliGRAM(s) Oral at bedtime PRN Insomnia  metoclopramide Injectable 10 milliGRAM(s) IV Push every 6 hours PRN nauseas/vomiting  ondansetron Injectable 4 milliGRAM(s) IV Push every 8 hours PRN Nausea and/or Vomiting  prochlorperazine   Injectable 5 milliGRAM(s) IntraMuscular every 6 hours PRN nausea/vomiting    REVIEW OF SYSTEMS:  CONSTITUTIONAL:  No Fever or chills  HEENT:  No diplopia or blurred vision.  No sore throat or runny nose.  CARDIOVASCULAR:  No chest pain or SOB.  RESPIRATORY:  No cough, shortness of breath, PND or orthopnea.  GASTROINTESTINAL:  No nausea, vomiting or diarrhea.  GENITOURINARY:  No dysuria, frequency or urgency. No Blood in urine  MUSCULOSKELETAL:  no joint aches, no muscle pain  SKIN:  No change in skin, hair or nails.  NEUROLOGIC:  No paresthesias or weakness.  PSYCHIATRIC:  No disorder of thought or mood.  ENDOCRINE:  No heat or cold intolerance, polyuria or polydipsia.  HEMATOLOGICAL:  No easy bruising or bleeding.     Physical Exam:  Vital Signs Last 24 Hrs  T(C): 37.6 (12 Dec 2022 04:54), Max: 37.6 (12 Dec 2022 04:54)  T(F): 99.6 (12 Dec 2022 04:54), Max: 99.6 (12 Dec 2022 04:54)  HR: 96 (12 Dec 2022 04:54) (69 - 96)  BP: 139/76 (12 Dec 2022 04:54) (132/78 - 139/76)  RR: 18 (12 Dec 2022 04:54) (16 - 18)  SpO2: 91% (12 Dec 2022 04:54) (91% - 96%)  Parameters below as of 12 Dec 2022 04:54  Patient On (Oxygen Delivery Method): room air  GEN: NAD, NGT in place with dark green fluid in suction cup  HEENT: normocephalic and atraumatic. EOMI. PERRL.    NECK: Supple.  No lymphadenopathy   LUNGS: Clear to auscultation.  HEART: Regular rate and rhythm   ABDOMEN: Soft, nontender, and nondistended.  Positive bowel sounds.    : No CVA tenderness  EXTREMITIES: Without edema.  NEUROLOGIC: grossly intact.  PSYCHIATRIC: Appropriate affect .  SKIN: No rash       Labs:                        12.6   7.14  )-----------( 171      ( 13 Dec 2022 09:37 )             40.0     12-13    147<H>  |  107  |  25<H>  ----------------------------<  94  2.9<LL>   |  35<H>  |  1.80<H>    Ca    8.5      13 Dec 2022 09:37    TPro  6.4  /  Alb  2.5<L>  /  TBili  0.4  /  DBili  x   /  AST  45<H>  /  ALT  30  /  AlkPhos  60  12-13    Culture - Urine (collected 12-11-22 @ 06:56)  Source: Clean Catch Clean Catch (Midstream)    Culture - Blood (collected 12-10-22 @ 11:45)  Source: .Blood Blood-Peripheral    Culture - Blood (collected 12-10-22 @ 11:40)  Source: .Blood Blood-Peripheral    WBC Count: 7.14 K/uL (12-13-22 @ 09:37)  WBC Count: 6.56 K/uL (12-12-22 @ 10:13)  WBC Count: 13.27 K/uL (12-11-22 @ 07:19)  WBC Count: 22.26 K/uL (12-10-22 @ 11:50)    Creatinine, Serum: 1.80 mg/dL (12-13-22 @ 09:37)  Creatinine, Serum: 1.40 mg/dL (12-12-22 @ 10:13)  Creatinine, Serum: 1.50 mg/dL (12-11-22 @ 07:19)  Creatinine, Serum: 1.90 mg/dL (12-10-22 @ 11:50)    SARS-CoV-2 Result: NotDetec (12-10-22 @ 11:50)  SARS-CoV-2: NotDetec (12-10-22 @ 11:50)    All imaging and other data have been reviewed.  < from: CT Abdomen and Pelvis w/ Oral Cont (12.10.22 @ 16:54) >  IMPRESSION:  Limited noncontrast study.  Increased size of primary pancreatic tumor with increased mass effecton   the adjacent stomach and surrounding soft tissue structures, in   comparison with 2/20/2021 CT.  Distention of the proximal body, fundus, and distal esophagus suggests   component of gastric outlet obstruction. Component of esophagitis can be   considered as well given distal esophageal wall thickening.  New hepatic hypodensities since prior imaging from 2/20/2021, measuring   up to 4 cm hepatic dome, concerning for metastatic disease. Suggest   contrast enhanced MRI correlation versus Dotatate PET-CT for evaluation.  Trace to small bilateral pleural effusions with adjacent compressive   atelectasis.  Severe T12-L1 disc height loss and endplate sclerosis is new since   2/20/2021. Correlate clinically.    Assessment and Plan:   66yo man with PMH of neuroendocrine tumor diagnosed 6 years ago, HTN, DM2 was admitted on 12/10 with nausea and vomiting the night after receiving Lutathera, an experimental cancer treatment, on Wednesday 12/7.  Now he has a NGT and feels better with that, less nausea and vomiting. Swelling in lips are resolved.  He also follows with  and since last year, self catheterize himself about 4times daily, since admission had a coulter catheter. No pain in abdomen, flanks, no hematuria or any other symptoms.  No fever or leukocytosis   NG tube has been removed, started on clear liquid diet, tolerating.   UA on admission negative but now UC is growing GNR.  Blood cultures negative   Since asymptomatic no fever, with normal UA, I wouldn't treat him (even though immunocompromised).   Will watch closely off antibiotics. Leukocytosis that was reactional has been normalized.   Asymptomatic bacteriuria should be treated in pregnancy,  procedure and renal transplant patients.     Will sign off please call with any question.     Bertram Gleason MD  Division of Infectious Diseases   Please call ID service at [phone] with any question.      35 minutes spent on total encounter assessing patient, examination, chart review, counseling and coordinating care by the attending physician/nurse/care manager.

## 2022-12-13 NOTE — PROGRESS NOTE ADULT - PROBLEM SELECTOR PLAN 6
Chronic, known history of T2DM  - Hold home meds: glipizide   - Low dose insulin corrective scale  - Hypoglycemia protocol, fingerstick glucose QAC&HS  - On diet, being advanced

## 2022-12-13 NOTE — PROGRESS NOTE ADULT - PROBLEM SELECTOR PLAN 3
ZO likely 2/2 pre-renal azotemia in the setting of dehydration, improving  - BUN 40, Cr 1.9 on admission  - Cr trending 1.4 (12/12) -> 1.8 (12/13)  - Baseline Cr: 1.4-1.5 as per outpt chart review  - Given 2.5L NS bolus in ED  - Maintenance IVF as noted above  - Monitor BMP  - Monitor renal indices and urine output  - Avoid nephrotoxic medications  - Hale placed in as patient self catherizes frequently at home ~4times a day    #Hypokalemia  - Repleted with Kriders yesterday  - K 2.9 in AM, still low  - Will replete with 40mEq powder and 30mEq Kriders  - Repeat K at 17:00 today  - Trend BMP

## 2022-12-13 NOTE — PROGRESS NOTE ADULT - PROBLEM SELECTOR PLAN 1
Pt presents with multiple episodes of nausea and vomiting  - Still currently having active episodes of vomiting, abdominal exam soft, nontender, nondistended  - CTAP w/ oral contrast showed increased size of pancreatic tumor with increased mass effect on adjacent stomach and surrounding soft tissues; distention of proximal body, fundus, and distal esophagus suggesting component of gastric outlet obstruction; esophageal wall thickening, possibly esophagitis; new hepatic hypodensities likely mets compared to 2/20/2021, trace to small b/l pleural effusions; severe T12-L1 disc height loss and endplate sclerosis.  - SBS showed no gastric outlet obstruction, NGT removed 12/12 PM  - Diet advanced to clears in morning and then fulls in afternoon  - Off maintenance fluids  - Zofran, Reglan, and Prochlorperazine PRN for nausea/vomiting  - Protonix 40mg IVP BID  - Lipase 135; no signs of acute pancreatitis  - Speech and swallow consulted  - GI Dr. Barker following  - Surgery Dr. Hernandez following

## 2022-12-13 NOTE — PROGRESS NOTE ADULT - SUBJECTIVE AND OBJECTIVE BOX
[facility]  Deion Brady PA-C  [address] [place] [address]  [phone]      INTERVAL HPI/OVERNIGHT EVENTS:  Pt s/e  NGT was removed overnight  Reports feeling well and tolerating liquids so far  +2 BM yesterday  Otherwise no new GI complaints    MEDICATIONS  (STANDING):  dextrose 5%. 1000 milliLiter(s) (100 mL/Hr) IV Continuous <Continuous>  dextrose 5%. 1000 milliLiter(s) (50 mL/Hr) IV Continuous <Continuous>  dextrose 50% Injectable 25 Gram(s) IV Push once  dextrose 50% Injectable 12.5 Gram(s) IV Push once  dextrose 50% Injectable 25 Gram(s) IV Push once  diatrizoate meglumine/diatrizoate sodium. 100 milliLiter(s) Oral once  glucagon  Injectable 1 milliGRAM(s) IntraMuscular once  heparin   Injectable 5000 Unit(s) SubCutaneous every 12 hours  insulin lispro (ADMELOG) corrective regimen sliding scale   SubCutaneous three times a day before meals  insulin lispro (ADMELOG) corrective regimen sliding scale   SubCutaneous at bedtime  ondansetron Injectable 4 milliGRAM(s) IV Push every 6 hours  pantoprazole  Injectable 40 milliGRAM(s) IV Push two times a day  potassium chloride   Powder 40 milliEquivalent(s) Oral once  potassium chloride  10 mEq/100 mL IVPB 10 milliEquivalent(s) IV Intermittent every 1 hour  tamsulosin 0.8 milliGRAM(s) Oral at bedtime    MEDICATIONS  (PRN):  acetaminophen     Tablet .. 650 milliGRAM(s) Oral every 6 hours PRN Temp greater or equal to 38C (100.4F), Mild Pain (1 - 3)  aluminum hydroxide/magnesium hydroxide/simethicone Suspension 30 milliLiter(s) Oral every 4 hours PRN Dyspepsia  dextrose Oral Gel 15 Gram(s) Oral once PRN Blood Glucose LESS THAN 70 milliGRAM(s)/deciliter  melatonin 3 milliGRAM(s) Oral at bedtime PRN Insomnia  metoclopramide Injectable 10 milliGRAM(s) IV Push every 6 hours PRN nauseas/vomiting  ondansetron Injectable 4 milliGRAM(s) IV Push every 8 hours PRN Nausea and/or Vomiting  prochlorperazine   Injectable 5 milliGRAM(s) IntraMuscular every 6 hours PRN nausea/vomiting      Allergies    losartan (Angioedema)      PHYSICAL EXAM:   Vital Signs:  Vital Signs Last 24 Hrs  T(C): 37.1 (13 Dec 2022 11:23), Max: 37.1 (13 Dec 2022 04:55)  T(F): 98.8 (13 Dec 2022 11:23), Max: 98.8 (13 Dec 2022 04:55)  HR: 69 (13 Dec 2022 11:23) (69 - 69)  BP: 139/70 (13 Dec 2022 11:23) (136/74 - 139/70)  BP(mean): --  RR: 18 (13 Dec 2022 11:23) (18 - 18)  SpO2: 92% (13 Dec 2022 11:23) (92% - 92%)    Parameters below as of 13 Dec 2022 11:23  Patient On (Oxygen Delivery Method): room air    GENERAL:  Appears stated age  ABDOMEN:  Soft, non-tender, non-distended  NEURO:  Alert      LABS:                        12.6   7.14  )-----------( 171      ( 13 Dec 2022 09:37 )             40.0     12-13    147<H>  |  107  |  25<H>  ----------------------------<  94  2.9<LL>   |  35<H>  |  1.80<H>    Ca    8.5      13 Dec 2022 09:37    TPro  6.4  /  Alb  2.5<L>  /  TBili  0.4  /  DBili  x   /  AST  45<H>  /  ALT  30  /  AlkPhos  60  12-13

## 2022-12-13 NOTE — PROGRESS NOTE ADULT - SUBJECTIVE AND OBJECTIVE BOX
Patient is a 67y old Male who presents with a chief complaint of Angioedema, N/V (13 Dec 2022 14:00)      INTERVAL HPI/OVERNIGHT EVENTS:  NGT removed overnight as small bowel series showed no gastric outlet obstruction. Diet was advanced to clear. Pt seen and examined this morning walking around in NAD. Pt stated that he felt much better. Has been having multiple small watery bowel movements because he has not eaten. Pt stated that he is still spitting up some phlegm. No actual episodes of vomiting though. Was able to tolerate clears, however had to force it down as per pt since he hasn't eaten in a couple of days now. Denies any fevers, chills, shortness of breath, chest pain, abd pain.      MEDICATIONS  (STANDING):  dextrose 5%. 1000 milliLiter(s) (100 mL/Hr) IV Continuous <Continuous>  dextrose 5%. 1000 milliLiter(s) (50 mL/Hr) IV Continuous <Continuous>  dextrose 50% Injectable 25 Gram(s) IV Push once  dextrose 50% Injectable 12.5 Gram(s) IV Push once  dextrose 50% Injectable 25 Gram(s) IV Push once  diatrizoate meglumine/diatrizoate sodium. 100 milliLiter(s) Oral once  glucagon  Injectable 1 milliGRAM(s) IntraMuscular once  heparin   Injectable 5000 Unit(s) SubCutaneous every 12 hours  insulin lispro (ADMELOG) corrective regimen sliding scale   SubCutaneous three times a day before meals  insulin lispro (ADMELOG) corrective regimen sliding scale   SubCutaneous at bedtime  ondansetron Injectable 4 milliGRAM(s) IV Push every 6 hours  pantoprazole  Injectable 40 milliGRAM(s) IV Push two times a day  potassium chloride  10 mEq/100 mL IVPB 10 milliEquivalent(s) IV Intermittent every 1 hour  tamsulosin 0.8 milliGRAM(s) Oral at bedtime    MEDICATIONS  (PRN):  acetaminophen     Tablet .. 650 milliGRAM(s) Oral every 6 hours PRN Temp greater or equal to 38C (100.4F), Mild Pain (1 - 3)  aluminum hydroxide/magnesium hydroxide/simethicone Suspension 30 milliLiter(s) Oral every 4 hours PRN Dyspepsia  dextrose Oral Gel 15 Gram(s) Oral once PRN Blood Glucose LESS THAN 70 milliGRAM(s)/deciliter  melatonin 3 milliGRAM(s) Oral at bedtime PRN Insomnia  metoclopramide Injectable 10 milliGRAM(s) IV Push every 6 hours PRN nauseas/vomiting  ondansetron Injectable 4 milliGRAM(s) IV Push every 8 hours PRN Nausea and/or Vomiting  prochlorperazine   Injectable 5 milliGRAM(s) IntraMuscular every 6 hours PRN nausea/vomiting      Allergies    losartan (Angioedema)    Intolerances      REVIEW OF SYSTEMS:  CONSTITUTIONAL: No fever or chills  HEENT: No headache, sore throat  RESPIRATORY: No cough, wheezing, or shortness of breath  CARDIOVASCULAR: No chest pain, palpitations  GASTROINTESTINAL: No abd pain, nausea, vomiting, or diarrhea +spitting up phlegm  GENITOURINARY: No dysuria, hematuria  NEUROLOGICAL: No focal weakness or dizziness  MUSCULOSKELETAL: No myalgias      Vital Signs Last 24 Hrs  T(C): 37.1 (13 Dec 2022 11:23), Max: 37.1 (13 Dec 2022 04:55)  T(F): 98.8 (13 Dec 2022 11:23), Max: 98.8 (13 Dec 2022 04:55)  HR: 69 (13 Dec 2022 11:23) (69 - 69)  BP: 139/70 (13 Dec 2022 11:23) (136/74 - 139/70)  BP(mean): --  RR: 18 (13 Dec 2022 11:23) (18 - 18)  SpO2: 92% (13 Dec 2022 11:23) (92% - 92%)    Parameters below as of 13 Dec 2022 11:23  Patient On (Oxygen Delivery Method): room air      PHYSICAL EXAM:  GENERAL: NAD, appears stated age  HEENT: anicteric, eomi  CHEST/LUNG: CTA b/l, no rales, wheezes, or rhonchi  HEART: RRR, S1, S2  ABDOMEN: BS+, soft, nontender, nondistended  EXTREMITIES: no edema, cyanosis, or calf tenderness  NERVOUS SYSTEM: answers questions and follows commands appropriately      LABS:                        12.6   7.14  )-----------( 171      ( 13 Dec 2022 09:37 )             40.0     CBC Full  -  ( 13 Dec 2022 09:37 )  WBC Count : 7.14 K/uL  Hemoglobin : 12.6 g/dL  Hematocrit : 40.0 %  Platelet Count - Automated : 171 K/uL  Mean Cell Volume : 94.8 fl  Mean Cell Hemoglobin : 29.9 pg  Mean Cell Hemoglobin Concentration : 31.5 gm/dL  Auto Neutrophil # : x  Auto Lymphocyte # : x  Auto Monocyte # : x  Auto Eosinophil # : x  Auto Basophil # : x  Auto Neutrophil % : x  Auto Lymphocyte % : x  Auto Monocyte % : x  Auto Eosinophil % : x  Auto Basophil % : x    13 Dec 2022 09:37    147    |  107    |  25     ----------------------------<  94     2.9     |  35     |  1.80     Ca    8.5        13 Dec 2022 09:37    TPro  6.4    /  Alb  2.5    /  TBili  0.4    /  DBili  x      /  AST  45     /  ALT  30     /  AlkPhos  60     13 Dec 2022 09:37        CAPILLARY BLOOD GLUCOSE      POCT Blood Glucose.: 96 mg/dL (13 Dec 2022 12:04)  POCT Blood Glucose.: 388 mg/dL (13 Dec 2022 08:05)  POCT Blood Glucose.: 159 mg/dL (12 Dec 2022 21:17)  POCT Blood Glucose.: 143 mg/dL (12 Dec 2022 17:38)        Culture - Urine (collected 12-11-22 @ 06:56)  Source: Clean Catch Clean Catch (Midstream)  Preliminary Report (12-13-22 @ 15:05):    >100,000 CFU/ml Klebsiella variicola    Culture - Blood (collected 12-10-22 @ 11:45)  Source: .Blood Blood-Peripheral  Preliminary Report (12-11-22 @ 15:02):    No growth to date.    Culture - Blood (collected 12-10-22 @ 11:40)  Source: .Blood Blood-Peripheral  Preliminary Report (12-11-22 @ 15:02):    No growth to date.      RADIOLOGY & ADDITIONAL TESTS:      Consultant(s) Notes Reviewed:  [x] YES  [ ] NO

## 2022-12-13 NOTE — PROGRESS NOTE ADULT - PROBLEM SELECTOR PLAN 8
Chronic  - Was previously on home Crestor - however has not been taking it anymore

## 2022-12-13 NOTE — PROGRESS NOTE ADULT - PROBLEM SELECTOR PLAN 2
WBC 22.26 on admission likely reactive - recently given decadron at [place] yesterday  - Afebrile, does not meet SIRS/sepsis criteria on admission  - WBC downtrended and normalized  - CXR showed no acute cardiopulmonary pathologies  - CTAP scan noted above  - UA unimpressive  - UCx grew Klebsiella Variicola; patient self catherizes and is at increased risk  - No urinary symptoms, likely contaminant  - Monitor for fevers and signs of infection  - Trend CBC  - ID Dr. Gleason consulted, rec holding off antibiotics for now

## 2022-12-13 NOTE — PROGRESS NOTE ADULT - ASSESSMENT
Progressive metastatic neuroendocrine cancer on 2nd course of lutathera (received dose 12/7/2022)  admitted now with nausea and vomiting.      Review of scans suggesting component of gastric outlet obstruction.   repeat imaging done today 12/12/22  Small bowel series w/o evidence for gastric outlet obx    RECOMMENDATIONS    -clinically appear improving, NG tube out and tolerating fluids po  -continue monitor serial labs  -supportive and symptomatic management from Heme/Onc [facility]

## 2022-12-13 NOTE — SWALLOW BEDSIDE ASSESSMENT ADULT - COMMENTS
Consult received and chart reviewed. As per charting, NGT now removed, clear liquid diet initiated today 12/13. Per discussion with Dr. Hudson's resident, medical team to reconsult this service for clinical swallow assessment pending clearance of diet advancement.

## 2022-12-13 NOTE — PROGRESS NOTE ADULT - ASSESSMENT
GOO/ Nausea /Vomiting    Small bowel series noted; no evidence of GOO  NGT was removed  Advance diet as tolerated  Monitor for GI function  PPI BID  Will follow    I reviewed the overnight course of events on the unit, re-confirming the patient history. I discussed the care with the patient and their family  Differential diagnosis and plan of care discussed with patient after the evaluation  35 minutes spent on total encounter of which more than fifty percent of the encounter was spent counseling and/or coordinating care by the attending physician.  Advanced care planning was discussed with patient and family.  Advanced care planning forms were reviewed and discussed.  Risks, benefits and alternatives of gastroenterologic procedures were discussed in detail and all questions were answered.

## 2022-12-13 NOTE — PROGRESS NOTE ADULT - PROBLEM SELECTOR PLAN 4
Pt presents with swelling of lips s/p Lutathera treatment 12/7; resolved  - Pt last received Lutathera 1.5 years ago without issues  - Angioedema possibly from home irbesartan, will HOLD  - Given IV fluids, Zofran and Decadron in [facility] day prior to admission  - Given 1x Solumedrol 125mg IVP, 1x Pepcid IVP, 1x Benadryl 25mg IVP, 2.5L NS bolus in ED  - EKG: NSR 98bpm, nonspecific T wave abnormality, prolonged QT//460  - Improvement noted after receiving Benadryl and Solumedrol  - Monitor for symptoms and respiratory status

## 2022-12-13 NOTE — PROGRESS NOTE ADULT - PROBLEM SELECTOR PLAN 7
Chronic, stable on admission  - Hold home losartan in setting of ZO and angioedema; would recommend not starting until pt reviews medication with pcp  - Monitor routine hemodynamics

## 2022-12-13 NOTE — PROGRESS NOTE ADULT - TIME BILLING
Reviewed with patient in detail, Hospitalist team, Surgical PA's and today's RN team.
Reviewed with patient in detail, Hospitalist, Surgical PA team and today's RN team.

## 2022-12-13 NOTE — PROGRESS NOTE ADULT - SUBJECTIVE AND OBJECTIVE BOX
All interim records and events noted.    NG tube out  keeping down liquids  no abdomen pain, no nausea or vomit  3 episodes of small smounts watery diarrhea      MEDICATIONS  (STANDING):  dextrose 5%. 1000 milliLiter(s) (100 mL/Hr) IV Continuous <Continuous>  dextrose 5%. 1000 milliLiter(s) (50 mL/Hr) IV Continuous <Continuous>  dextrose 50% Injectable 25 Gram(s) IV Push once  dextrose 50% Injectable 12.5 Gram(s) IV Push once  dextrose 50% Injectable 25 Gram(s) IV Push once  diatrizoate meglumine/diatrizoate sodium. 100 milliLiter(s) Oral once  glucagon  Injectable 1 milliGRAM(s) IntraMuscular once  heparin   Injectable 5000 Unit(s) SubCutaneous every 12 hours  insulin lispro (ADMELOG) corrective regimen sliding scale   SubCutaneous three times a day before meals  insulin lispro (ADMELOG) corrective regimen sliding scale   SubCutaneous at bedtime  ondansetron Injectable 4 milliGRAM(s) IV Push every 6 hours  pantoprazole  Injectable 40 milliGRAM(s) IV Push two times a day  potassium chloride  10 mEq/100 mL IVPB 10 milliEquivalent(s) IV Intermittent every 1 hour  tamsulosin 0.8 milliGRAM(s) Oral at bedtime    MEDICATIONS  (PRN):  acetaminophen     Tablet .. 650 milliGRAM(s) Oral every 6 hours PRN Temp greater or equal to 38C (100.4F), Mild Pain (1 - 3)  aluminum hydroxide/magnesium hydroxide/simethicone Suspension 30 milliLiter(s) Oral every 4 hours PRN Dyspepsia  dextrose Oral Gel 15 Gram(s) Oral once PRN Blood Glucose LESS THAN 70 milliGRAM(s)/deciliter  melatonin 3 milliGRAM(s) Oral at bedtime PRN Insomnia  metoclopramide Injectable 10 milliGRAM(s) IV Push every 6 hours PRN nauseas/vomiting  ondansetron Injectable 4 milliGRAM(s) IV Push every 8 hours PRN Nausea and/or Vomiting  prochlorperazine   Injectable 5 milliGRAM(s) IntraMuscular every 6 hours PRN nausea/vomiting      Vital Signs Last 24 Hrs  T(C): 37.1 (13 Dec 2022 11:23), Max: 37.1 (13 Dec 2022 04:55)  T(F): 98.8 (13 Dec 2022 11:23), Max: 98.8 (13 Dec 2022 04:55)  HR: 69 (13 Dec 2022 11:23) (69 - 69)  BP: 139/70 (13 Dec 2022 11:23) (136/74 - 139/70)  BP(mean): --  RR: 18 (13 Dec 2022 11:23) (18 - 18)  SpO2: 92% (13 Dec 2022 11:23) (92% - 92%)    Parameters below as of 13 Dec 2022 11:23  Patient On (Oxygen Delivery Method): room air        PHYSICAL EXAM  General: well developed  well nourished, in no acute distress  Head: atraumatic, normocephalic  ENT: sclera anicteric, buccal mucosa moist  Neck: supple, trachea midline  CV: S1 S2, regular rate and rhythm  Lungs: clear to auscultation, no wheezes/rhonchi  Abdomen: soft, nontender, bowel sounds present, no palpable masses  Extrem: no clubbing/cyanosis/edema  Skin: no significant increased ecchymosis/petechiae  Neuro: alert and oriented X3,  no focal deficits      LABS:             12.6   7.14  )-----------( 171      ( 12-13 @ 09:37 )             40.0                11.4   6.56  )-----------( 149      ( 12-12 @ 10:13 )             35.2                11.5   13.27 )-----------( 154      ( 12-11 @ 07:19 )             35.1       12-13    147<H>  |  107  |  25<H>  ----------------------------<  94  2.9<LL>   |  35<H>  |  1.80<H>    Ca    8.5      13 Dec 2022 09:37    TPro  6.4  /  Alb  2.5<L>  /  TBili  0.4  /  DBili  x   /  AST  45<H>  /  ALT  30  /  AlkPhos  60  12-13    12-10 @ 11:50  PT12.2 INR1.04  PTT26.8      RADIOLOGY & ADDITIONAL STUDIES:    IMPRESSION/RECOMMENDATIONS:

## 2022-12-13 NOTE — PROGRESS NOTE ADULT - ASSESSMENT
67y Male with PMHx of neuroendocrine tumor, HTN, HLD, T2DM presenting with nausea and vomiting after receiving Lutathera treatment being admitted for angioedema of lips and nausea/vomiting 67y Male with PMHx of neuroendocrine tumor, HTN, HLD, T2DM presenting with nausea and vomiting after receiving Lutathera treatment being admitted for angioedema of lips and nausea/vomiting    As in above full PA notation.  Mr. Griggs personally seen and examined during p.m. rounds.  Reports tolerating full liquids.  Denies abdominal pain.  Vitals non-suggestive.  Abdomen soft and non tender.  Labs reviewed.  No new imaging.  Clinically, improving overall.  Surgically, stable for present.  To continue current supportive care with advancement of diet.  At this time, no clinical/ radiographic evidence of ongoing GOO.  If tolerating low residue diet, no objection to discharge once medically appropriate.    If further concerns, consider EGD.  However, nausea/ vomiting prompting presentation likely multifactorial.  Surgery remains available as needed.  Please recall PRN for questions/ concerns/ changes.  Thank you.

## 2022-12-13 NOTE — PROGRESS NOTE ADULT - SUBJECTIVE AND OBJECTIVE BOX
SUBJECTIVE:  Patient seen and examined at bedside.  No overnight events.  Patient with no new complaints at this time, NPO, admits to flatus and bowel movement.  Patient denies any fevers, chills, chest pain, shortness of breath, nausea, vomiting or diarrhea.    Vital Signs Last 24 Hrs  T(C): 37.1 (13 Dec 2022 04:55), Max: 37.1 (13 Dec 2022 04:55)  T(F): 98.8 (13 Dec 2022 04:55), Max: 98.8 (13 Dec 2022 04:55)  HR: 69 (13 Dec 2022 04:55) (69 - 69)  BP: 136/74 (13 Dec 2022 04:55) (136/74 - 136/74)  BP(mean): --  RR: 18 (13 Dec 2022 04:55) (18 - 18)  SpO2: 92% (13 Dec 2022 04:55) (92% - 92%)    Parameters below as of 13 Dec 2022 04:55  Patient On (Oxygen Delivery Method): room air        PHYSICAL EXAM:  GENERAL: No acute distress, well-developed  HEAD:  Atraumatic, Normocephalic  ABDOMEN: Soft, non-tender, non-distended  NEUROLOGY: responding appropriately, no focal deficits    I&O's Summary    12 Dec 2022 07:01  -  13 Dec 2022 07:00  --------------------------------------------------------  IN: 0 mL / OUT: 1000 mL / NET: -1000 mL      I&O's Detail    12 Dec 2022 07:01  -  13 Dec 2022 07:00  --------------------------------------------------------  IN:  Total IN: 0 mL    OUT:    Voided (mL): 1000 mL  Total OUT: 1000 mL    Total NET: -1000 mL        MEDICATIONS  (STANDING):  dextrose 5% + sodium chloride 0.45% with potassium chloride 40 mEq/L 1000 milliLiter(s) (80 mL/Hr) IV Continuous <Continuous>  dextrose 5%. 1000 milliLiter(s) (50 mL/Hr) IV Continuous <Continuous>  dextrose 5%. 1000 milliLiter(s) (100 mL/Hr) IV Continuous <Continuous>  dextrose 50% Injectable 25 Gram(s) IV Push once  dextrose 50% Injectable 12.5 Gram(s) IV Push once  dextrose 50% Injectable 25 Gram(s) IV Push once  diatrizoate meglumine/diatrizoate sodium. 100 milliLiter(s) Oral once  glucagon  Injectable 1 milliGRAM(s) IntraMuscular once  heparin   Injectable 5000 Unit(s) SubCutaneous every 12 hours  insulin lispro (ADMELOG) corrective regimen sliding scale   SubCutaneous three times a day before meals  insulin lispro (ADMELOG) corrective regimen sliding scale   SubCutaneous at bedtime  ondansetron Injectable 4 milliGRAM(s) IV Push every 6 hours  pantoprazole  Injectable 40 milliGRAM(s) IV Push two times a day  tamsulosin 0.8 milliGRAM(s) Oral at bedtime    MEDICATIONS  (PRN):  acetaminophen     Tablet .. 650 milliGRAM(s) Oral every 6 hours PRN Temp greater or equal to 38C (100.4F), Mild Pain (1 - 3)  aluminum hydroxide/magnesium hydroxide/simethicone Suspension 30 milliLiter(s) Oral every 4 hours PRN Dyspepsia  dextrose Oral Gel 15 Gram(s) Oral once PRN Blood Glucose LESS THAN 70 milliGRAM(s)/deciliter  melatonin 3 milliGRAM(s) Oral at bedtime PRN Insomnia  metoclopramide Injectable 10 milliGRAM(s) IV Push every 6 hours PRN nauseas/vomiting  ondansetron Injectable 4 milliGRAM(s) IV Push every 8 hours PRN Nausea and/or Vomiting  prochlorperazine   Injectable 5 milliGRAM(s) IntraMuscular every 6 hours PRN nausea/vomiting    LABS:                        11.4   6.56  )-----------( 149      ( 12 Dec 2022 10:13 )             35.2     12-12    x   |  x   |  x   ----------------------------<  x   3.0<L>   |  x   |  x     Ca    8.2<L>      12 Dec 2022 10:13    TPro  5.6<L>  /  Alb  2.3<L>  /  TBili  0.4  /  DBili  x   /  AST  32  /  ALT  28  /  AlkPhos  48  12-12        RADIOLOGY & ADDITIONAL STUDIES:

## 2022-12-14 NOTE — PROGRESS NOTE ADULT - SUBJECTIVE AND OBJECTIVE BOX
[facility]  Deion Brady PA-C  [address] [place] [address]  [phone]      INTERVAL HPI/OVERNIGHT EVENTS:  Pt s/e  Reports tolerating reg diet so far  +BM    MEDICATIONS  (STANDING):  dextrose 5%. 1000 milliLiter(s) (100 mL/Hr) IV Continuous <Continuous>  dextrose 5%. 1000 milliLiter(s) (50 mL/Hr) IV Continuous <Continuous>  dextrose 50% Injectable 25 Gram(s) IV Push once  dextrose 50% Injectable 12.5 Gram(s) IV Push once  dextrose 50% Injectable 25 Gram(s) IV Push once  diatrizoate meglumine/diatrizoate sodium. 100 milliLiter(s) Oral once  glucagon  Injectable 1 milliGRAM(s) IntraMuscular once  heparin   Injectable 5000 Unit(s) SubCutaneous every 12 hours  insulin lispro (ADMELOG) corrective regimen sliding scale   SubCutaneous three times a day before meals  insulin lispro (ADMELOG) corrective regimen sliding scale   SubCutaneous at bedtime  ondansetron Injectable 4 milliGRAM(s) IV Push every 6 hours  pantoprazole  Injectable 40 milliGRAM(s) IV Push two times a day  tamsulosin 0.8 milliGRAM(s) Oral at bedtime    MEDICATIONS  (PRN):  acetaminophen     Tablet .. 650 milliGRAM(s) Oral every 6 hours PRN Temp greater or equal to 38C (100.4F), Mild Pain (1 - 3)  aluminum hydroxide/magnesium hydroxide/simethicone Suspension 30 milliLiter(s) Oral every 4 hours PRN Dyspepsia  dextrose Oral Gel 15 Gram(s) Oral once PRN Blood Glucose LESS THAN 70 milliGRAM(s)/deciliter  melatonin 3 milliGRAM(s) Oral at bedtime PRN Insomnia  metoclopramide Injectable 10 milliGRAM(s) IV Push every 6 hours PRN nauseas/vomiting  ondansetron Injectable 4 milliGRAM(s) IV Push every 8 hours PRN Nausea and/or Vomiting  prochlorperazine   Injectable 5 milliGRAM(s) IntraMuscular every 6 hours PRN nausea/vomiting      Allergies    losartan (Angioedema)      PHYSICAL EXAM:   Vital Signs:  Vital Signs Last 24 Hrs  T(C): 37.2 (14 Dec 2022 11:13), Max: 37.7 (13 Dec 2022 21:21)  T(F): 98.9 (14 Dec 2022 11:13), Max: 99.9 (13 Dec 2022 21:21)  HR: 70 (14 Dec 2022 11:13) (70 - 75)  BP: 120/80 (14 Dec 2022 11:13) (120/80 - 120/80)  BP(mean): --  RR: 18 (14 Dec 2022 11:13) (18 - 19)  SpO2: 96% (14 Dec 2022 11:13) (92% - 96%)    Parameters below as of 14 Dec 2022 11:13  Patient On (Oxygen Delivery Method): room air      GENERAL:  Appears stated age  ABDOMEN:  Soft, non-tender, non-distended  NEURO:  Alert      LABS:                        10.6   5.15  )-----------( 147      ( 14 Dec 2022 08:00 )             33.6     12-14    139  |  102  |  22  ----------------------------<  157<H>  3.7   |  33<H>  |  1.50<H>    Ca    8.4<L>      14 Dec 2022 08:00    TPro  5.5<L>  /  Alb  2.2<L>  /  TBili  0.5  /  DBili  x   /  AST  40<H>  /  ALT  29  /  AlkPhos  51  12-14

## 2022-12-14 NOTE — PROGRESS NOTE ADULT - SUBJECTIVE AND OBJECTIVE BOX
[INTERVAL HX: ]  Patient seen and examined;  Chart reviewed and events noted;   Patient seen late morning/early afternoon.    Had bowel movement yesterday.  No bowel movement this AM.  Positive flatus.  NG tube removed.  Feels better.  Tolerated lunch which consisted of Pasta.  Had difficulty with [place] toast this morning. Hoping to be discharged today.    [MEDICATIONS]  MEDICATIONS  (STANDING):  dextrose 5%. 1000 milliLiter(s) (100 mL/Hr) IV Continuous <Continuous>  dextrose 5%. 1000 milliLiter(s) (50 mL/Hr) IV Continuous <Continuous>  dextrose 50% Injectable 25 Gram(s) IV Push once  dextrose 50% Injectable 12.5 Gram(s) IV Push once  dextrose 50% Injectable 25 Gram(s) IV Push once  diatrizoate meglumine/diatrizoate sodium. 100 milliLiter(s) Oral once  glucagon  Injectable 1 milliGRAM(s) IntraMuscular once  heparin   Injectable 5000 Unit(s) SubCutaneous every 12 hours  insulin lispro (ADMELOG) corrective regimen sliding scale   SubCutaneous three times a day before meals  insulin lispro (ADMELOG) corrective regimen sliding scale   SubCutaneous at bedtime  ondansetron Injectable 4 milliGRAM(s) IV Push every 6 hours  pantoprazole  Injectable 40 milliGRAM(s) IV Push two times a day  tamsulosin 0.8 milliGRAM(s) Oral at bedtime    MEDICATIONS  (PRN):  acetaminophen     Tablet .. 650 milliGRAM(s) Oral every 6 hours PRN Temp greater or equal to 38C (100.4F), Mild Pain (1 - 3)  aluminum hydroxide/magnesium hydroxide/simethicone Suspension 30 milliLiter(s) Oral every 4 hours PRN Dyspepsia  dextrose Oral Gel 15 Gram(s) Oral once PRN Blood Glucose LESS THAN 70 milliGRAM(s)/deciliter  melatonin 3 milliGRAM(s) Oral at bedtime PRN Insomnia  metoclopramide Injectable 10 milliGRAM(s) IV Push every 6 hours PRN nauseas/vomiting  ondansetron Injectable 4 milliGRAM(s) IV Push every 8 hours PRN Nausea and/or Vomiting  prochlorperazine   Injectable 5 milliGRAM(s) IntraMuscular every 6 hours PRN nausea/vomiting      [VITALS]  Vital Signs Last 24 Hrs  T(C): 37.2 (14 Dec 2022 11:13), Max: 37.7 (13 Dec 2022 21:21)  T(F): 98.9 (14 Dec 2022 11:13), Max: 99.9 (13 Dec 2022 21:21)  HR: 70 (14 Dec 2022 11:13) (70 - 75)  BP: 106/67 (14 Dec 2022 11:13) (106/67 - 120/80)  BP(mean): --  RR: 18 (14 Dec 2022 11:13) (18 - 19)  SpO2: 96% (14 Dec 2022 11:13) (92% - 96%)    Parameters below as of 14 Dec 2022 11:13  Patient On (Oxygen Delivery Method): room air      [WT/HT]  Daily     Daily   [VENT]      [PHYSICAL EXAM]  WNWD AND  anicteric  S1S2 RRR  CTAB  soft mild distended abd, no guarding, no rebound, NABS  no LE edema    [LABS:]                        10.6   5.15  )-----------( 147      ( 14 Dec 2022 08:00 )             33.6     12-14    139  |  102  |  22  ----------------------------<  157<H>  3.7   |  33<H>  |  1.50<H>    Ca    8.4<L>      14 Dec 2022 08:00    TPro  5.5<L>  /  Alb  2.2<L>  /  TBili  0.5  /  DBili  x   /  AST  40<H>  /  ALT  29  /  AlkPhos  51  12-14                SARS-CoV-2: NotDetec (10 Dec 2022 11:50)          [RADIOLOGY STUDIES:]

## 2022-12-14 NOTE — PROGRESS NOTE ADULT - REASON FOR ADMISSION
Angioedema, N/V

## 2022-12-14 NOTE — DISCHARGE NOTE NURSING/CASE MANAGEMENT/SOCIAL WORK - PATIENT PORTAL LINK FT
You can access the FollowMyHealth Patient Portal offered by [facility] by registering at the following website: [email or web address]. By joining [facility]’s FollowMyHealth portal, you will also be able to view your health information using other applications (apps) compatible with our system.

## 2022-12-14 NOTE — PROGRESS NOTE ADULT - PROBLEM SELECTOR PROBLEM 1
Neuroendocrine cancer
Nausea and vomiting
Neuroendocrine cancer
Nausea and vomiting
Neuroendocrine cancer
Nausea and vomiting

## 2022-12-14 NOTE — DISCHARGE NOTE NURSING/CASE MANAGEMENT/SOCIAL WORK - NSDCPEFALRISK_GEN_ALL_CORE
For information on Fall & Injury Prevention, visit: [email or web address] OR  [email or web address] OR  https://www.cdc.gov/steadi/patient.html

## 2022-12-14 NOTE — PROGRESS NOTE ADULT - ASSESSMENT
GOO/ Nausea /Vomiting    Small bowel series noted; no evidence of GOO  NGT was removed  Advance diet as tolerated  Monitor for GI function  PPI BID  No objection to begin d/c plan   Will follow    I reviewed the overnight course of events on the unit, re-confirming the patient history. I discussed the care with the patient and their family  Differential diagnosis and plan of care discussed with patient after the evaluation  35 minutes spent on total encounter of which more than fifty percent of the encounter was spent counseling and/or coordinating care by the attending physician.  Advanced care planning was discussed with patient and family.  Advanced care planning forms were reviewed and discussed.  Risks, benefits and alternatives of gastroenterologic procedures were discussed in detail and all questions were answered.

## 2022-12-14 NOTE — PROGRESS NOTE ADULT - PROVIDER SPECIALTY LIST ADULT
Infectious Disease
Surgery
Heme/Onc
Surgery
Gastroenterology
Surgery
Heme/Onc
Heme/Onc
Hospitalist

## 2023-01-01 ENCOUNTER — APPOINTMENT (OUTPATIENT)
Dept: INTERNAL MEDICINE | Facility: CLINIC | Age: 68
End: 2023-01-01
Payer: MEDICARE

## 2023-01-01 ENCOUNTER — OUTPATIENT (OUTPATIENT)
Dept: OUTPATIENT SERVICES | Facility: HOSPITAL | Age: 68
LOS: 1 days | Discharge: HOME | End: 2023-01-01

## 2023-01-01 ENCOUNTER — NON-APPOINTMENT (OUTPATIENT)
Age: 68
End: 2023-01-01

## 2023-01-01 ENCOUNTER — TRANSCRIPTION ENCOUNTER (OUTPATIENT)
Age: 68
End: 2023-01-01

## 2023-01-01 ENCOUNTER — APPOINTMENT (OUTPATIENT)
Dept: THORACIC SURGERY | Facility: CLINIC | Age: 68
End: 2023-01-01
Payer: MEDICARE

## 2023-01-01 ENCOUNTER — RESULT REVIEW (OUTPATIENT)
Age: 68
End: 2023-01-01

## 2023-01-01 ENCOUNTER — APPOINTMENT (OUTPATIENT)
Dept: SURGICAL ONCOLOGY | Facility: CLINIC | Age: 68
End: 2023-01-01

## 2023-01-01 ENCOUNTER — INPATIENT (INPATIENT)
Facility: HOSPITAL | Age: 68
LOS: 5 days | Discharge: HOME | DRG: 393 | End: 2023-04-27
Attending: HOSPITALIST | Admitting: HOSPITALIST
Payer: MEDICARE

## 2023-01-01 ENCOUNTER — APPOINTMENT (OUTPATIENT)
Dept: CT IMAGING | Facility: CLINIC | Age: 68
End: 2023-01-01
Payer: MEDICARE

## 2023-01-01 ENCOUNTER — APPOINTMENT (OUTPATIENT)
Dept: SURGICAL ONCOLOGY | Facility: HOSPITAL | Age: 68
End: 2023-01-01

## 2023-01-01 ENCOUNTER — OUTPATIENT (OUTPATIENT)
Dept: OUTPATIENT SERVICES | Facility: HOSPITAL | Age: 68
LOS: 1 days | End: 2023-01-01
Payer: MEDICARE

## 2023-01-01 ENCOUNTER — INPATIENT (INPATIENT)
Facility: HOSPITAL | Age: 68
LOS: 33 days | DRG: 826 | End: 2023-06-02
Attending: INTERNAL MEDICINE | Admitting: INTERNAL MEDICINE
Payer: MEDICARE

## 2023-01-01 ENCOUNTER — APPOINTMENT (OUTPATIENT)
Dept: GASTROENTEROLOGY | Facility: CLINIC | Age: 68
End: 2023-01-01
Payer: MEDICARE

## 2023-01-01 ENCOUNTER — APPOINTMENT (OUTPATIENT)
Dept: RADIOLOGY | Facility: HOSPITAL | Age: 68
End: 2023-01-01
Payer: MEDICARE

## 2023-01-01 ENCOUNTER — LABORATORY RESULT (OUTPATIENT)
Age: 68
End: 2023-01-01

## 2023-01-01 ENCOUNTER — OUTPATIENT (OUTPATIENT)
Dept: OUTPATIENT SERVICES | Facility: HOSPITAL | Age: 68
LOS: 1 days | End: 2023-01-01

## 2023-01-01 ENCOUNTER — RX RENEWAL (OUTPATIENT)
Age: 68
End: 2023-01-01

## 2023-01-01 ENCOUNTER — INPATIENT (INPATIENT)
Facility: HOSPITAL | Age: 68
LOS: 7 days | Discharge: HOME | DRG: 380 | End: 2023-01-14
Attending: INTERNAL MEDICINE | Admitting: EMERGENCY MEDICINE
Payer: MEDICARE

## 2023-01-01 ENCOUNTER — APPOINTMENT (OUTPATIENT)
Dept: GASTROENTEROLOGY | Facility: CLINIC | Age: 68
End: 2023-01-01

## 2023-01-01 ENCOUNTER — APPOINTMENT (OUTPATIENT)
Dept: GASTROENTEROLOGY | Facility: HOSPITAL | Age: 68
End: 2023-01-01

## 2023-01-01 ENCOUNTER — APPOINTMENT (OUTPATIENT)
Dept: SURGICAL ONCOLOGY | Facility: CLINIC | Age: 68
End: 2023-01-01
Payer: MEDICARE

## 2023-01-01 ENCOUNTER — APPOINTMENT (OUTPATIENT)
Dept: SPEECH THERAPY | Facility: HOSPITAL | Age: 68
End: 2023-01-01

## 2023-01-01 ENCOUNTER — APPOINTMENT (OUTPATIENT)
Dept: RADIOLOGY | Facility: CLINIC | Age: 68
End: 2023-01-01
Payer: MEDICARE

## 2023-01-01 VITALS
HEART RATE: 81 BPM | SYSTOLIC BLOOD PRESSURE: 110 MMHG | RESPIRATION RATE: 16 BRPM | TEMPERATURE: 98 F | DIASTOLIC BLOOD PRESSURE: 69 MMHG | OXYGEN SATURATION: 98 %

## 2023-01-01 VITALS
OXYGEN SATURATION: 96 % | HEART RATE: 108 BPM | BODY MASS INDEX: 21.35 KG/M2 | HEIGHT: 67 IN | WEIGHT: 136 LBS | DIASTOLIC BLOOD PRESSURE: 60 MMHG | WEIGHT: 139.99 LBS | SYSTOLIC BLOOD PRESSURE: 88 MMHG | HEIGHT: 67 IN | RESPIRATION RATE: 18 BRPM

## 2023-01-01 VITALS
BODY MASS INDEX: 23.54 KG/M2 | HEIGHT: 67 IN | OXYGEN SATURATION: 99 % | SYSTOLIC BLOOD PRESSURE: 91 MMHG | DIASTOLIC BLOOD PRESSURE: 61 MMHG | WEIGHT: 150 LBS | HEART RATE: 74 BPM

## 2023-01-01 VITALS — WEIGHT: 139.99 LBS | HEIGHT: 67 IN | HEART RATE: 131 BPM | OXYGEN SATURATION: 100 %

## 2023-01-01 VITALS
WEIGHT: 141 LBS | HEIGHT: 67 IN | SYSTOLIC BLOOD PRESSURE: 91 MMHG | BODY MASS INDEX: 22.13 KG/M2 | DIASTOLIC BLOOD PRESSURE: 62 MMHG

## 2023-01-01 VITALS
SYSTOLIC BLOOD PRESSURE: 96 MMHG | HEART RATE: 60 BPM | BODY MASS INDEX: 22.44 KG/M2 | HEIGHT: 67 IN | WEIGHT: 143 LBS | DIASTOLIC BLOOD PRESSURE: 70 MMHG

## 2023-01-01 VITALS
TEMPERATURE: 97.6 F | SYSTOLIC BLOOD PRESSURE: 89 MMHG | DIASTOLIC BLOOD PRESSURE: 56 MMHG | BODY MASS INDEX: 21.9 KG/M2 | RESPIRATION RATE: 16 BRPM | HEART RATE: 110 BPM | OXYGEN SATURATION: 99 % | WEIGHT: 139.5 LBS | HEIGHT: 67 IN

## 2023-01-01 VITALS — HEART RATE: 46 BPM | SYSTOLIC BLOOD PRESSURE: 53 MMHG | RESPIRATION RATE: 17 BRPM | DIASTOLIC BLOOD PRESSURE: 21 MMHG

## 2023-01-01 VITALS — DIASTOLIC BLOOD PRESSURE: 64 MMHG | SYSTOLIC BLOOD PRESSURE: 96 MMHG

## 2023-01-01 VITALS
RESPIRATION RATE: 18 BRPM | OXYGEN SATURATION: 99 % | DIASTOLIC BLOOD PRESSURE: 78 MMHG | HEART RATE: 92 BPM | SYSTOLIC BLOOD PRESSURE: 113 MMHG | TEMPERATURE: 98 F

## 2023-01-01 VITALS
SYSTOLIC BLOOD PRESSURE: 95 MMHG | DIASTOLIC BLOOD PRESSURE: 66 MMHG | OXYGEN SATURATION: 97 % | HEART RATE: 109 BPM | HEIGHT: 67 IN | RESPIRATION RATE: 18 BRPM | BODY MASS INDEX: 21.03 KG/M2 | WEIGHT: 134 LBS

## 2023-01-01 VITALS
HEIGHT: 67 IN | DIASTOLIC BLOOD PRESSURE: 63 MMHG | HEART RATE: 103 BPM | WEIGHT: 141 LBS | BODY MASS INDEX: 22.13 KG/M2 | SYSTOLIC BLOOD PRESSURE: 91 MMHG

## 2023-01-01 VITALS
SYSTOLIC BLOOD PRESSURE: 92 MMHG | HEIGHT: 67 IN | BODY MASS INDEX: 22.6 KG/M2 | DIASTOLIC BLOOD PRESSURE: 60 MMHG | HEART RATE: 89 BPM | TEMPERATURE: 98.6 F | RESPIRATION RATE: 15 BRPM | OXYGEN SATURATION: 98 % | WEIGHT: 144 LBS

## 2023-01-01 VITALS — HEIGHT: 66 IN

## 2023-01-01 DIAGNOSIS — E11.9 TYPE 2 DIABETES MELLITUS W/OUT COMPLICATIONS: ICD-10-CM

## 2023-01-01 DIAGNOSIS — D3A.8 OTHER BENIGN NEUROENDOCRINE TUMORS: ICD-10-CM

## 2023-01-01 DIAGNOSIS — K92.2 GASTROINTESTINAL HEMORRHAGE, UNSPECIFIED: ICD-10-CM

## 2023-01-01 DIAGNOSIS — K44.9 DIAPHRAGMATIC HERNIA WITHOUT OBSTRUCTION OR GANGRENE: ICD-10-CM

## 2023-01-01 DIAGNOSIS — E11.22 TYPE 2 DIABETES MELLITUS WITH DIABETIC CHRONIC KIDNEY DISEASE: ICD-10-CM

## 2023-01-01 DIAGNOSIS — N40.0 BENIGN PROSTATIC HYPERPLASIA WITHOUT LOWER URINARY TRACT SYMPTOMS: ICD-10-CM

## 2023-01-01 DIAGNOSIS — R13.19 OTHER DYSPHAGIA: ICD-10-CM

## 2023-01-01 DIAGNOSIS — Z98.890 OTHER SPECIFIED POSTPROCEDURAL STATES: ICD-10-CM

## 2023-01-01 DIAGNOSIS — N39.0 URINARY TRACT INFECTION, SITE NOT SPECIFIED: ICD-10-CM

## 2023-01-01 DIAGNOSIS — I10 ESSENTIAL (PRIMARY) HYPERTENSION: ICD-10-CM

## 2023-01-01 DIAGNOSIS — K20.90 ESOPHAGITIS, UNSPECIFIED WITHOUT BLEEDING: ICD-10-CM

## 2023-01-01 DIAGNOSIS — R13.10 DYSPHAGIA, UNSPECIFIED: ICD-10-CM

## 2023-01-01 DIAGNOSIS — Z88.8 ALLERGY STATUS TO OTHER DRUGS, MEDICAMENTS AND BIOLOGICAL SUBSTANCES STATUS: ICD-10-CM

## 2023-01-01 DIAGNOSIS — N17.9 ACUTE KIDNEY FAILURE, UNSPECIFIED: ICD-10-CM

## 2023-01-01 DIAGNOSIS — E43 UNSPECIFIED SEVERE PROTEIN-CALORIE MALNUTRITION: ICD-10-CM

## 2023-01-01 DIAGNOSIS — E78.5 HYPERLIPIDEMIA, UNSPECIFIED: ICD-10-CM

## 2023-01-01 DIAGNOSIS — E86.0 DEHYDRATION: ICD-10-CM

## 2023-01-01 DIAGNOSIS — C78.7 SECONDARY MALIGNANT NEOPLASM OF LIVER AND INTRAHEPATIC BILE DUCT: ICD-10-CM

## 2023-01-01 DIAGNOSIS — K31.1 ADULT HYPERTROPHIC PYLORIC STENOSIS: ICD-10-CM

## 2023-01-01 DIAGNOSIS — E87.6 HYPOKALEMIA: ICD-10-CM

## 2023-01-01 DIAGNOSIS — R11.2 NAUSEA WITH VOMITING, UNSPECIFIED: ICD-10-CM

## 2023-01-01 DIAGNOSIS — A41.9 SEPSIS, UNSPECIFIED ORGANISM: ICD-10-CM

## 2023-01-01 DIAGNOSIS — E87.1 HYPO-OSMOLALITY AND HYPONATREMIA: ICD-10-CM

## 2023-01-01 DIAGNOSIS — R07.89 OTHER CHEST PAIN: ICD-10-CM

## 2023-01-01 DIAGNOSIS — C7A.8 OTHER MALIGNANT NEUROENDOCRINE TUMORS: ICD-10-CM

## 2023-01-01 DIAGNOSIS — E85.81 LIGHT CHAIN (AL) AMYLOIDOSIS: ICD-10-CM

## 2023-01-01 DIAGNOSIS — E11.9 TYPE 2 DIABETES MELLITUS WITHOUT COMPLICATIONS: ICD-10-CM

## 2023-01-01 DIAGNOSIS — N18.9 CHRONIC KIDNEY DISEASE, UNSPECIFIED: ICD-10-CM

## 2023-01-01 DIAGNOSIS — E87.0 HYPEROSMOLALITY AND HYPERNATREMIA: ICD-10-CM

## 2023-01-01 DIAGNOSIS — R33.9 RETENTION OF URINE, UNSPECIFIED: ICD-10-CM
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
## 2023-01-01 DIAGNOSIS — K94.13 ENTEROSTOMY MALFUNCTION: ICD-10-CM

## 2023-01-01 DIAGNOSIS — C7A.00 MALIGNANT CARCINOID TUMOR OF UNSPECIFIED SITE: ICD-10-CM

## 2023-01-01 DIAGNOSIS — D64.9 ANEMIA, UNSPECIFIED: ICD-10-CM

## 2023-01-01 DIAGNOSIS — N28.9 DISORDER OF KIDNEY AND URETER, UNSPECIFIED: ICD-10-CM

## 2023-01-01 DIAGNOSIS — Z79.84 LONG TERM (CURRENT) USE OF ORAL HYPOGLYCEMIC DRUGS: ICD-10-CM

## 2023-01-01 DIAGNOSIS — I12.9 HYPERTENSIVE CHRONIC KIDNEY DISEASE WITH STAGE 1 THROUGH STAGE 4 CHRONIC KIDNEY DISEASE, OR UNSPECIFIED CHRONIC KIDNEY DISEASE: ICD-10-CM

## 2023-01-01 DIAGNOSIS — R19.00 INTRA-ABDOMINAL AND PELVIC SWELLING, MASS AND LUMP, UNSPECIFIED SITE: ICD-10-CM

## 2023-01-01 LAB
-  AMIKACIN: SIGNIFICANT CHANGE UP
-  AMOXICILLIN/CLAVULANIC ACID: SIGNIFICANT CHANGE UP
-  AMOXICILLIN/CLAVULANIC ACID: SIGNIFICANT CHANGE UP
-  AMPICILLIN/SULBACTAM: SIGNIFICANT CHANGE UP
-  AMPICILLIN: SIGNIFICANT CHANGE UP
-  AZTREONAM: SIGNIFICANT CHANGE UP
-  CEFAZOLIN: SIGNIFICANT CHANGE UP
-  CEFEPIME: SIGNIFICANT CHANGE UP
-  CEFOXITIN: SIGNIFICANT CHANGE UP
-  CEFTRIAXONE: SIGNIFICANT CHANGE UP
-  CIPROFLOXACIN: SIGNIFICANT CHANGE UP
-  ERTAPENEM: SIGNIFICANT CHANGE UP
-  GENTAMICIN: SIGNIFICANT CHANGE UP
-  IMIPENEM: SIGNIFICANT CHANGE UP
-  LEVOFLOXACIN: SIGNIFICANT CHANGE UP
-  MEROPENEM: SIGNIFICANT CHANGE UP
-  NITROFURANTOIN: SIGNIFICANT CHANGE UP
-  NITROFURANTOIN: SIGNIFICANT CHANGE UP
-  PIPERACILLIN/TAZOBACTAM: SIGNIFICANT CHANGE UP
-  TOBRAMYCIN: SIGNIFICANT CHANGE UP
-  TRIMETHOPRIM/SULFAMETHOXAZOLE: SIGNIFICANT CHANGE UP
A1C WITH ESTIMATED AVERAGE GLUCOSE RESULT: 7.3 % — HIGH (ref 4–5.6)
ACANTHOCYTES BLD QL SMEAR: SLIGHT — SIGNIFICANT CHANGE UP
ACANTHOCYTES BLD QL SMEAR: SLIGHT — SIGNIFICANT CHANGE UP
ADD ON TEST-SPECIMEN IN LAB: SIGNIFICANT CHANGE UP
ALBUMIN SERPL ELPH-MCNC: 1.6 G/DL — LOW (ref 3.3–5)
ALBUMIN SERPL ELPH-MCNC: 1.6 G/DL — LOW (ref 3.3–5)
ALBUMIN SERPL ELPH-MCNC: 1.7 G/DL — LOW (ref 3.3–5)
ALBUMIN SERPL ELPH-MCNC: 1.7 G/DL — LOW (ref 3.3–5)
ALBUMIN SERPL ELPH-MCNC: 1.8 G/DL — LOW (ref 3.3–5)
ALBUMIN SERPL ELPH-MCNC: 1.8 G/DL — LOW (ref 3.3–5)
ALBUMIN SERPL ELPH-MCNC: 2 G/DL — LOW (ref 3.3–5)
ALBUMIN SERPL ELPH-MCNC: 2.1 G/DL — LOW (ref 3.3–5)
ALBUMIN SERPL ELPH-MCNC: 2.2 G/DL — LOW (ref 3.3–5)
ALBUMIN SERPL ELPH-MCNC: 2.2 G/DL — LOW (ref 3.3–5)
ALBUMIN SERPL ELPH-MCNC: 2.3 G/DL — LOW (ref 3.3–5)
ALBUMIN SERPL ELPH-MCNC: 2.4 G/DL — LOW (ref 3.3–5)
ALBUMIN SERPL ELPH-MCNC: 2.6 G/DL — LOW (ref 3.3–5)
ALBUMIN SERPL ELPH-MCNC: 2.7 G/DL — LOW (ref 3.3–5)
ALBUMIN SERPL ELPH-MCNC: 2.7 G/DL — LOW (ref 3.3–5)
ALBUMIN SERPL ELPH-MCNC: 2.8 G/DL — LOW (ref 3.3–5)
ALBUMIN SERPL ELPH-MCNC: 2.9 G/DL — LOW (ref 3.3–5)
ALBUMIN SERPL ELPH-MCNC: 3.1 G/DL — LOW (ref 3.3–5)
ALBUMIN SERPL ELPH-MCNC: 3.3 G/DL — SIGNIFICANT CHANGE UP (ref 3.3–5)
ALBUMIN SERPL ELPH-MCNC: 3.4 G/DL — SIGNIFICANT CHANGE UP (ref 3.3–5)
ALBUMIN SERPL ELPH-MCNC: 3.8 G/DL
ALBUMIN SERPL ELPH-MCNC: 4.1 G/DL
ALP BLD-CCNC: 180 U/L
ALP BLD-CCNC: 95 U/L
ALP SERPL-CCNC: 107 U/L — SIGNIFICANT CHANGE UP (ref 40–120)
ALP SERPL-CCNC: 121 U/L — HIGH (ref 40–120)
ALP SERPL-CCNC: 127 U/L — HIGH (ref 40–120)
ALP SERPL-CCNC: 128 U/L — HIGH (ref 40–120)
ALP SERPL-CCNC: 128 U/L — HIGH (ref 40–120)
ALP SERPL-CCNC: 129 U/L — HIGH (ref 40–120)
ALP SERPL-CCNC: 132 U/L — HIGH (ref 40–120)
ALP SERPL-CCNC: 135 U/L — HIGH (ref 40–120)
ALP SERPL-CCNC: 141 U/L — HIGH (ref 40–120)
ALP SERPL-CCNC: 145 U/L — HIGH (ref 40–120)
ALP SERPL-CCNC: 147 U/L — HIGH (ref 40–120)
ALP SERPL-CCNC: 175 U/L — HIGH (ref 40–120)
ALP SERPL-CCNC: 182 U/L — HIGH (ref 40–120)
ALP SERPL-CCNC: 182 U/L — HIGH (ref 40–120)
ALP SERPL-CCNC: 195 U/L — HIGH (ref 40–120)
ALP SERPL-CCNC: 209 U/L — HIGH (ref 40–120)
ALP SERPL-CCNC: 215 U/L — HIGH (ref 40–120)
ALP SERPL-CCNC: 228 U/L — HIGH (ref 40–120)
ALP SERPL-CCNC: 240 U/L — HIGH (ref 40–120)
ALP SERPL-CCNC: 250 U/L — HIGH (ref 40–120)
ALP SERPL-CCNC: 256 U/L — HIGH (ref 40–120)
ALP SERPL-CCNC: 270 U/L — HIGH (ref 40–120)
ALP SERPL-CCNC: 270 U/L — HIGH (ref 40–120)
ALP SERPL-CCNC: 283 U/L — HIGH (ref 40–120)
ALP SERPL-CCNC: 297 U/L — HIGH (ref 40–120)
ALP SERPL-CCNC: 357 U/L — HIGH (ref 40–120)
ALP SERPL-CCNC: 49 U/L — SIGNIFICANT CHANGE UP (ref 40–120)
ALP SERPL-CCNC: 66 U/L — SIGNIFICANT CHANGE UP (ref 40–120)
ALP SERPL-CCNC: 77 U/L — SIGNIFICANT CHANGE UP (ref 40–120)
ALT FLD-CCNC: 106 U/L — HIGH (ref 12–78)
ALT FLD-CCNC: 11 U/L — LOW (ref 12–78)
ALT FLD-CCNC: 113 U/L — HIGH (ref 12–78)
ALT FLD-CCNC: 113 U/L — HIGH (ref 12–78)
ALT FLD-CCNC: 133 U/L — HIGH (ref 12–78)
ALT FLD-CCNC: 20 U/L — SIGNIFICANT CHANGE UP (ref 12–78)
ALT FLD-CCNC: 22 U/L — SIGNIFICANT CHANGE UP (ref 12–78)
ALT FLD-CCNC: 258 U/L — HIGH (ref 12–78)
ALT FLD-CCNC: 316 U/L — HIGH (ref 12–78)
ALT FLD-CCNC: 328 U/L — HIGH (ref 12–78)
ALT FLD-CCNC: 332 U/L — HIGH (ref 12–78)
ALT FLD-CCNC: 401 U/L — HIGH (ref 12–78)
ALT FLD-CCNC: 534 U/L — HIGH (ref 12–78)
ALT FLD-CCNC: 54 U/L — SIGNIFICANT CHANGE UP (ref 12–78)
ALT FLD-CCNC: 57 U/L — SIGNIFICANT CHANGE UP (ref 12–78)
ALT FLD-CCNC: 59 U/L — SIGNIFICANT CHANGE UP (ref 12–78)
ALT FLD-CCNC: 66 U/L — SIGNIFICANT CHANGE UP (ref 12–78)
ALT FLD-CCNC: 67 U/L — SIGNIFICANT CHANGE UP (ref 12–78)
ALT FLD-CCNC: 694 U/L — HIGH (ref 12–78)
ALT FLD-CCNC: 70 U/L — SIGNIFICANT CHANGE UP (ref 12–78)
ALT FLD-CCNC: 74 U/L — SIGNIFICANT CHANGE UP (ref 12–78)
ALT FLD-CCNC: 75 U/L — SIGNIFICANT CHANGE UP (ref 12–78)
ALT FLD-CCNC: 84 U/L — HIGH (ref 12–78)
ALT FLD-CCNC: 854 U/L — HIGH (ref 12–78)
ALT FLD-CCNC: 87 U/L — HIGH (ref 12–78)
ALT FLD-CCNC: 881 U/L — HIGH (ref 12–78)
ALT FLD-CCNC: 90 U/L — HIGH (ref 12–78)
ALT FLD-CCNC: 91 U/L — HIGH (ref 12–78)
ALT FLD-CCNC: 92 U/L — HIGH (ref 12–78)
ALT SERPL-CCNC: 58 U/L
ALT SERPL-CCNC: 74 U/L
AMMONIA BLD-MCNC: 105 UMOL/L — HIGH (ref 11–32)
AMMONIA BLD-MCNC: 113 UMOL/L — HIGH (ref 11–32)
AMMONIA BLD-MCNC: 31 UMOL/L — SIGNIFICANT CHANGE UP (ref 11–32)
AMMONIA BLD-MCNC: 33 UMOL/L — HIGH (ref 11–32)
AMMONIA BLD-MCNC: 42 UMOL/L — HIGH (ref 11–32)
AMMONIA BLD-MCNC: 47 UMOL/L — HIGH (ref 11–32)
AMMONIA BLD-MCNC: 51 UMOL/L — HIGH (ref 11–32)
AMMONIA BLD-MCNC: 55 UMOL/L — HIGH (ref 11–32)
AMMONIA BLD-MCNC: 56 UMOL/L — HIGH (ref 11–32)
AMMONIA BLD-MCNC: 63 UMOL/L — HIGH (ref 11–32)
AMMONIA BLD-MCNC: 67 UMOL/L — HIGH (ref 11–32)
AMMONIA BLD-MCNC: 83 UMOL/L — HIGH (ref 11–32)
AMYLASE FLD-CCNC: 30 U/L — SIGNIFICANT CHANGE UP
ANION GAP SERPL CALC-SCNC: 10 MMOL/L — SIGNIFICANT CHANGE UP (ref 5–17)
ANION GAP SERPL CALC-SCNC: 11 MMOL/L — SIGNIFICANT CHANGE UP (ref 5–17)
ANION GAP SERPL CALC-SCNC: 12 MMOL/L — SIGNIFICANT CHANGE UP (ref 5–17)
ANION GAP SERPL CALC-SCNC: 13 MMOL/L — SIGNIFICANT CHANGE UP (ref 5–17)
ANION GAP SERPL CALC-SCNC: 14 MMOL/L — SIGNIFICANT CHANGE UP (ref 5–17)
ANION GAP SERPL CALC-SCNC: 15 MMOL/L — SIGNIFICANT CHANGE UP (ref 5–17)
ANION GAP SERPL CALC-SCNC: 16 MMOL/L — SIGNIFICANT CHANGE UP (ref 5–17)
ANION GAP SERPL CALC-SCNC: 17 MMOL/L
ANION GAP SERPL CALC-SCNC: 18 MMOL/L — HIGH (ref 5–17)
ANION GAP SERPL CALC-SCNC: 2 MMOL/L — LOW (ref 5–17)
ANION GAP SERPL CALC-SCNC: 3 MMOL/L — LOW (ref 5–17)
ANION GAP SERPL CALC-SCNC: 4 MMOL/L — LOW (ref 5–17)
ANION GAP SERPL CALC-SCNC: 5 MMOL/L — SIGNIFICANT CHANGE UP (ref 5–17)
ANION GAP SERPL CALC-SCNC: 6 MMOL/L — SIGNIFICANT CHANGE UP (ref 5–17)
ANION GAP SERPL CALC-SCNC: 7 MMOL/L — SIGNIFICANT CHANGE UP (ref 5–17)
ANION GAP SERPL CALC-SCNC: 7 MMOL/L — SIGNIFICANT CHANGE UP (ref 5–17)
ANION GAP SERPL CALC-SCNC: 8 MMOL/L — SIGNIFICANT CHANGE UP (ref 5–17)
ANION GAP SERPL CALC-SCNC: 9 MMOL/L
ANION GAP SERPL CALC-SCNC: 9 MMOL/L — SIGNIFICANT CHANGE UP (ref 5–17)
ANISOCYTOSIS BLD QL: SIGNIFICANT CHANGE UP
ANISOCYTOSIS BLD QL: SIGNIFICANT CHANGE UP
APPEARANCE UR: ABNORMAL
APPEARANCE UR: ABNORMAL
APPEARANCE UR: CLEAR — SIGNIFICANT CHANGE UP
APTT BLD: 27.5 SEC — SIGNIFICANT CHANGE UP (ref 27.5–35.5)
APTT BLD: 29.9 SEC — SIGNIFICANT CHANGE UP (ref 27.5–35.5)
APTT BLD: 30.3 SEC — SIGNIFICANT CHANGE UP (ref 27.5–35.5)
APTT BLD: 33 SEC — SIGNIFICANT CHANGE UP (ref 27.5–35.5)
APTT BLD: 38.5 SEC — HIGH (ref 27.5–35.5)
AST SERPL-CCNC: 103 U/L — HIGH (ref 15–37)
AST SERPL-CCNC: 112 U/L — HIGH (ref 15–37)
AST SERPL-CCNC: 137 U/L — HIGH (ref 15–37)
AST SERPL-CCNC: 140 U/L — HIGH (ref 15–37)
AST SERPL-CCNC: 16 U/L — SIGNIFICANT CHANGE UP (ref 15–37)
AST SERPL-CCNC: 1641 U/L — HIGH (ref 15–37)
AST SERPL-CCNC: 170 U/L — HIGH (ref 15–37)
AST SERPL-CCNC: 175 U/L — HIGH (ref 15–37)
AST SERPL-CCNC: 182 U/L — HIGH (ref 15–37)
AST SERPL-CCNC: 190 U/L — HIGH (ref 15–37)
AST SERPL-CCNC: 192 U/L — HIGH (ref 15–37)
AST SERPL-CCNC: 222 U/L — HIGH (ref 15–37)
AST SERPL-CCNC: 228 U/L — HIGH (ref 15–37)
AST SERPL-CCNC: 240 U/L — HIGH (ref 15–37)
AST SERPL-CCNC: 246 U/L — HIGH (ref 15–37)
AST SERPL-CCNC: 25 U/L — SIGNIFICANT CHANGE UP (ref 15–37)
AST SERPL-CCNC: 2828 U/L — HIGH (ref 15–37)
AST SERPL-CCNC: 3063 U/L — HIGH (ref 15–37)
AST SERPL-CCNC: 319 U/L — HIGH (ref 15–37)
AST SERPL-CCNC: 33 U/L — SIGNIFICANT CHANGE UP (ref 15–37)
AST SERPL-CCNC: 337 U/L — HIGH (ref 15–37)
AST SERPL-CCNC: 421 U/L — HIGH (ref 15–37)
AST SERPL-CCNC: 421 U/L — HIGH (ref 15–37)
AST SERPL-CCNC: 446 U/L — HIGH (ref 15–37)
AST SERPL-CCNC: 454 U/L — HIGH (ref 15–37)
AST SERPL-CCNC: 48 U/L
AST SERPL-CCNC: 510 U/L — HIGH (ref 15–37)
AST SERPL-CCNC: 75 U/L
AST SERPL-CCNC: 763 U/L — HIGH (ref 15–37)
AST SERPL-CCNC: 767 U/L — HIGH (ref 15–37)
AST SERPL-CCNC: 871 U/L — HIGH (ref 15–37)
BACTERIA # UR AUTO: ABNORMAL
BACTERIA # UR AUTO: ABNORMAL
BASE EXCESS BLDA CALC-SCNC: -1.8 MMOL/L — SIGNIFICANT CHANGE UP (ref -2–3)
BASE EXCESS BLDA CALC-SCNC: -3.6 MMOL/L — LOW (ref -2–3)
BASE EXCESS BLDA CALC-SCNC: -3.7 MMOL/L — LOW (ref -2–3)
BASE EXCESS BLDA CALC-SCNC: -6 MMOL/L — LOW (ref -2–3)
BASE EXCESS BLDA CALC-SCNC: -6.1 MMOL/L — LOW (ref -2–3)
BASE EXCESS BLDA CALC-SCNC: -6.4 MMOL/L — LOW (ref -2–3)
BASE EXCESS BLDA CALC-SCNC: -6.9 MMOL/L — LOW (ref -2–3)
BASE EXCESS BLDA CALC-SCNC: -7.8 MMOL/L — LOW (ref -2–3)
BASE EXCESS BLDA CALC-SCNC: -8 MMOL/L — LOW (ref -2–3)
BASE EXCESS BLDA CALC-SCNC: -8.1 MMOL/L — LOW (ref -2–3)
BASE EXCESS BLDA CALC-SCNC: -8.2 MMOL/L — LOW (ref -2–3)
BASE EXCESS BLDA CALC-SCNC: -8.9 MMOL/L — LOW (ref -2–3)
BASE EXCESS BLDA CALC-SCNC: -9.9 MMOL/L — LOW (ref -2–3)
BASE EXCESS BLDV CALC-SCNC: -8.8 MMOL/L — SIGNIFICANT CHANGE UP (ref -2–3)
BASO STIPL BLD QL SMEAR: PRESENT — SIGNIFICANT CHANGE UP
BASOPHILS # BLD AUTO: 0 K/UL — SIGNIFICANT CHANGE UP (ref 0–0.2)
BASOPHILS # BLD AUTO: 0.01 K/UL — SIGNIFICANT CHANGE UP (ref 0–0.2)
BASOPHILS # BLD AUTO: 0.02 K/UL
BASOPHILS # BLD AUTO: 0.02 K/UL — SIGNIFICANT CHANGE UP (ref 0–0.2)
BASOPHILS # BLD AUTO: 0.06 K/UL
BASOPHILS NFR BLD AUTO: 0 % — SIGNIFICANT CHANGE UP (ref 0–2)
BASOPHILS NFR BLD AUTO: 0.1 % — SIGNIFICANT CHANGE UP (ref 0–2)
BASOPHILS NFR BLD AUTO: 0.2 % — SIGNIFICANT CHANGE UP (ref 0–2)
BASOPHILS NFR BLD AUTO: 0.3 %
BASOPHILS NFR BLD AUTO: 0.3 % — SIGNIFICANT CHANGE UP (ref 0–2)
BASOPHILS NFR BLD AUTO: 1 %
BILIRUB DIRECT SERPL-MCNC: 0.2 MG/DL — SIGNIFICANT CHANGE UP (ref 0–0.3)
BILIRUB DIRECT SERPL-MCNC: 0.6 MG/DL — HIGH (ref 0–0.3)
BILIRUB DIRECT SERPL-MCNC: 0.6 MG/DL — HIGH (ref 0–0.3)
BILIRUB DIRECT SERPL-MCNC: 0.7 MG/DL — HIGH (ref 0–0.3)
BILIRUB INDIRECT FLD-MCNC: 0.4 MG/DL — SIGNIFICANT CHANGE UP (ref 0.2–1)
BILIRUB INDIRECT FLD-MCNC: 0.4 MG/DL — SIGNIFICANT CHANGE UP (ref 0.2–1)
BILIRUB INDIRECT FLD-MCNC: 0.5 MG/DL — SIGNIFICANT CHANGE UP (ref 0.2–1)
BILIRUB SERPL-MCNC: 0.3 MG/DL
BILIRUB SERPL-MCNC: 0.3 MG/DL
BILIRUB SERPL-MCNC: 0.3 MG/DL — SIGNIFICANT CHANGE UP (ref 0.2–1.2)
BILIRUB SERPL-MCNC: 0.3 MG/DL — SIGNIFICANT CHANGE UP (ref 0.2–1.2)
BILIRUB SERPL-MCNC: 0.4 MG/DL — SIGNIFICANT CHANGE UP (ref 0.2–1.2)
BILIRUB SERPL-MCNC: 0.5 MG/DL — SIGNIFICANT CHANGE UP (ref 0.2–1.2)
BILIRUB SERPL-MCNC: 0.5 MG/DL — SIGNIFICANT CHANGE UP (ref 0.2–1.2)
BILIRUB SERPL-MCNC: 0.6 MG/DL — SIGNIFICANT CHANGE UP (ref 0.2–1.2)
BILIRUB SERPL-MCNC: 0.9 MG/DL — SIGNIFICANT CHANGE UP (ref 0.2–1.2)
BILIRUB SERPL-MCNC: 1 MG/DL — SIGNIFICANT CHANGE UP (ref 0.2–1.2)
BILIRUB SERPL-MCNC: 1 MG/DL — SIGNIFICANT CHANGE UP (ref 0.2–1.2)
BILIRUB SERPL-MCNC: 1.1 MG/DL — SIGNIFICANT CHANGE UP (ref 0.2–1.2)
BILIRUB SERPL-MCNC: 1.1 MG/DL — SIGNIFICANT CHANGE UP (ref 0.2–1.2)
BILIRUB SERPL-MCNC: 1.2 MG/DL — SIGNIFICANT CHANGE UP (ref 0.2–1.2)
BILIRUB SERPL-MCNC: 1.3 MG/DL — HIGH (ref 0.2–1.2)
BILIRUB SERPL-MCNC: 1.5 MG/DL — HIGH (ref 0.2–1.2)
BILIRUB SERPL-MCNC: 1.5 MG/DL — HIGH (ref 0.2–1.2)
BILIRUB SERPL-MCNC: 1.9 MG/DL — HIGH (ref 0.2–1.2)
BILIRUB SERPL-MCNC: 5.2 MG/DL — HIGH (ref 0.2–1.2)
BILIRUB SERPL-MCNC: 5.3 MG/DL — HIGH (ref 0.2–1.2)
BILIRUB SERPL-MCNC: 5.7 MG/DL — HIGH (ref 0.2–1.2)
BILIRUB SERPL-MCNC: 5.8 MG/DL — HIGH (ref 0.2–1.2)
BILIRUB SERPL-MCNC: 5.8 MG/DL — HIGH (ref 0.2–1.2)
BILIRUB SERPL-MCNC: 6.2 MG/DL — HIGH (ref 0.2–1.2)
BILIRUB SERPL-MCNC: 6.2 MG/DL — HIGH (ref 0.2–1.2)
BILIRUB SERPL-MCNC: 6.4 MG/DL — HIGH (ref 0.2–1.2)
BILIRUB SERPL-MCNC: 6.6 MG/DL — HIGH (ref 0.2–1.2)
BILIRUB SERPL-MCNC: 6.8 MG/DL — HIGH (ref 0.2–1.2)
BILIRUB SERPL-MCNC: 7 MG/DL — HIGH (ref 0.2–1.2)
BILIRUB UR-MCNC: ABNORMAL
BILIRUB UR-MCNC: NEGATIVE — SIGNIFICANT CHANGE UP
BILIRUB UR-MCNC: NEGATIVE — SIGNIFICANT CHANGE UP
BLD GP AB SCN SERPL QL: SIGNIFICANT CHANGE UP
BLOOD GAS COMMENTS ARTERIAL: SIGNIFICANT CHANGE UP
BLOOD GAS COMMENTS, VENOUS: SIGNIFICANT CHANGE UP
BUN SERPL-MCNC: 17 MG/DL — SIGNIFICANT CHANGE UP (ref 7–23)
BUN SERPL-MCNC: 19 MG/DL — SIGNIFICANT CHANGE UP (ref 7–23)
BUN SERPL-MCNC: 23 MG/DL — SIGNIFICANT CHANGE UP (ref 7–23)
BUN SERPL-MCNC: 26 MG/DL — HIGH (ref 7–23)
BUN SERPL-MCNC: 28 MG/DL — HIGH (ref 7–23)
BUN SERPL-MCNC: 28 MG/DL — HIGH (ref 7–23)
BUN SERPL-MCNC: 29 MG/DL — HIGH (ref 7–23)
BUN SERPL-MCNC: 29 MG/DL — HIGH (ref 7–23)
BUN SERPL-MCNC: 30 MG/DL — HIGH (ref 7–23)
BUN SERPL-MCNC: 31 MG/DL — HIGH (ref 7–23)
BUN SERPL-MCNC: 31 MG/DL — HIGH (ref 7–23)
BUN SERPL-MCNC: 32 MG/DL — HIGH (ref 7–23)
BUN SERPL-MCNC: 32 MG/DL — HIGH (ref 7–23)
BUN SERPL-MCNC: 33 MG/DL — HIGH (ref 7–23)
BUN SERPL-MCNC: 36 MG/DL — HIGH (ref 7–23)
BUN SERPL-MCNC: 37 MG/DL — HIGH (ref 7–23)
BUN SERPL-MCNC: 38 MG/DL — HIGH (ref 7–23)
BUN SERPL-MCNC: 39 MG/DL
BUN SERPL-MCNC: 39 MG/DL — HIGH (ref 7–23)
BUN SERPL-MCNC: 41 MG/DL — HIGH (ref 7–23)
BUN SERPL-MCNC: 44 MG/DL — HIGH (ref 7–23)
BUN SERPL-MCNC: 44 MG/DL — HIGH (ref 7–23)
BUN SERPL-MCNC: 46 MG/DL — HIGH (ref 7–23)
BUN SERPL-MCNC: 47 MG/DL — HIGH (ref 7–23)
BUN SERPL-MCNC: 47 MG/DL — HIGH (ref 7–23)
BUN SERPL-MCNC: 48 MG/DL — HIGH (ref 7–23)
BUN SERPL-MCNC: 48 MG/DL — HIGH (ref 7–23)
BUN SERPL-MCNC: 49 MG/DL — HIGH (ref 7–23)
BUN SERPL-MCNC: 50 MG/DL — HIGH (ref 7–23)
BUN SERPL-MCNC: 50 MG/DL — HIGH (ref 7–23)
BUN SERPL-MCNC: 51 MG/DL — HIGH (ref 7–23)
BUN SERPL-MCNC: 51 MG/DL — HIGH (ref 7–23)
BUN SERPL-MCNC: 52 MG/DL — HIGH (ref 7–23)
BUN SERPL-MCNC: 56 MG/DL — HIGH (ref 7–23)
BUN SERPL-MCNC: 56 MG/DL — HIGH (ref 7–23)
BUN SERPL-MCNC: 57 MG/DL — HIGH (ref 7–23)
BUN SERPL-MCNC: 57 MG/DL — HIGH (ref 7–23)
BUN SERPL-MCNC: 58 MG/DL — HIGH (ref 7–23)
BUN SERPL-MCNC: 62 MG/DL — HIGH (ref 7–23)
BUN SERPL-MCNC: 64 MG/DL — HIGH (ref 7–23)
BUN SERPL-MCNC: 66 MG/DL — HIGH (ref 7–23)
BUN SERPL-MCNC: 66 MG/DL — HIGH (ref 7–23)
BUN SERPL-MCNC: 73 MG/DL
BUN SERPL-MCNC: 73 MG/DL — HIGH (ref 7–23)
BUN SERPL-MCNC: 74 MG/DL — HIGH (ref 7–23)
BUN SERPL-MCNC: 76 MG/DL — HIGH (ref 7–23)
BUN SERPL-MCNC: 87 MG/DL — HIGH (ref 7–23)
CALCIUM SERPL-MCNC: 10 MG/DL
CALCIUM SERPL-MCNC: 10 MG/DL — SIGNIFICANT CHANGE UP (ref 8.5–10.1)
CALCIUM SERPL-MCNC: 7.4 MG/DL — LOW (ref 8.5–10.1)
CALCIUM SERPL-MCNC: 7.5 MG/DL — LOW (ref 8.5–10.1)
CALCIUM SERPL-MCNC: 7.5 MG/DL — LOW (ref 8.5–10.1)
CALCIUM SERPL-MCNC: 7.6 MG/DL — LOW (ref 8.5–10.1)
CALCIUM SERPL-MCNC: 7.6 MG/DL — LOW (ref 8.5–10.1)
CALCIUM SERPL-MCNC: 7.7 MG/DL — LOW (ref 8.5–10.1)
CALCIUM SERPL-MCNC: 7.8 MG/DL — LOW (ref 8.5–10.1)
CALCIUM SERPL-MCNC: 7.9 MG/DL — LOW (ref 8.5–10.1)
CALCIUM SERPL-MCNC: 8 MG/DL — LOW (ref 8.5–10.1)
CALCIUM SERPL-MCNC: 8 MG/DL — LOW (ref 8.5–10.1)
CALCIUM SERPL-MCNC: 8.2 MG/DL — LOW (ref 8.5–10.1)
CALCIUM SERPL-MCNC: 8.3 MG/DL — LOW (ref 8.5–10.1)
CALCIUM SERPL-MCNC: 8.4 MG/DL — LOW (ref 8.5–10.1)
CALCIUM SERPL-MCNC: 8.5 MG/DL — SIGNIFICANT CHANGE UP (ref 8.5–10.1)
CALCIUM SERPL-MCNC: 8.6 MG/DL — SIGNIFICANT CHANGE UP (ref 8.5–10.1)
CALCIUM SERPL-MCNC: 8.7 MG/DL — SIGNIFICANT CHANGE UP (ref 8.5–10.1)
CALCIUM SERPL-MCNC: 8.8 MG/DL — SIGNIFICANT CHANGE UP (ref 8.5–10.1)
CALCIUM SERPL-MCNC: 8.9 MG/DL — SIGNIFICANT CHANGE UP (ref 8.5–10.1)
CALCIUM SERPL-MCNC: 9 MG/DL — SIGNIFICANT CHANGE UP (ref 8.5–10.1)
CALCIUM SERPL-MCNC: 9.1 MG/DL
CALCIUM SERPL-MCNC: 9.1 MG/DL — SIGNIFICANT CHANGE UP (ref 8.5–10.1)
CALCIUM SERPL-MCNC: 9.2 MG/DL — SIGNIFICANT CHANGE UP (ref 8.5–10.1)
CALCIUM SERPL-MCNC: 9.4 MG/DL — SIGNIFICANT CHANGE UP (ref 8.5–10.1)
CALCIUM SERPL-MCNC: 9.7 MG/DL — SIGNIFICANT CHANGE UP (ref 8.5–10.1)
CALCIUM SERPL-MCNC: 9.7 MG/DL — SIGNIFICANT CHANGE UP (ref 8.5–10.1)
CGA SERPL-MCNC: 2639 NG/ML
CHLORIDE SERPL-SCNC: 100 MMOL/L — SIGNIFICANT CHANGE UP (ref 96–108)
CHLORIDE SERPL-SCNC: 101 MMOL/L — SIGNIFICANT CHANGE UP (ref 96–108)
CHLORIDE SERPL-SCNC: 102 MMOL/L — SIGNIFICANT CHANGE UP (ref 96–108)
CHLORIDE SERPL-SCNC: 103 MMOL/L — SIGNIFICANT CHANGE UP (ref 96–108)
CHLORIDE SERPL-SCNC: 104 MMOL/L — SIGNIFICANT CHANGE UP (ref 96–108)
CHLORIDE SERPL-SCNC: 105 MMOL/L — SIGNIFICANT CHANGE UP (ref 96–108)
CHLORIDE SERPL-SCNC: 106 MMOL/L — SIGNIFICANT CHANGE UP (ref 96–108)
CHLORIDE SERPL-SCNC: 106 MMOL/L — SIGNIFICANT CHANGE UP (ref 96–108)
CHLORIDE SERPL-SCNC: 107 MMOL/L — SIGNIFICANT CHANGE UP (ref 96–108)
CHLORIDE SERPL-SCNC: 108 MMOL/L — SIGNIFICANT CHANGE UP (ref 96–108)
CHLORIDE SERPL-SCNC: 109 MMOL/L — HIGH (ref 96–108)
CHLORIDE SERPL-SCNC: 110 MMOL/L — HIGH (ref 96–108)
CHLORIDE SERPL-SCNC: 111 MMOL/L — HIGH (ref 96–108)
CHLORIDE SERPL-SCNC: 112 MMOL/L — HIGH (ref 96–108)
CHLORIDE SERPL-SCNC: 113 MMOL/L — HIGH (ref 96–108)
CHLORIDE SERPL-SCNC: 114 MMOL/L — HIGH (ref 96–108)
CHLORIDE SERPL-SCNC: 115 MMOL/L — HIGH (ref 96–108)
CHLORIDE SERPL-SCNC: 115 MMOL/L — HIGH (ref 96–108)
CHLORIDE SERPL-SCNC: 116 MMOL/L — HIGH (ref 96–108)
CHLORIDE SERPL-SCNC: 72 MMOL/L
CHLORIDE SERPL-SCNC: 79 MMOL/L — LOW (ref 96–108)
CHLORIDE SERPL-SCNC: 95 MMOL/L
CHLORIDE SERPL-SCNC: 95 MMOL/L — LOW (ref 96–108)
CHLORIDE SERPL-SCNC: 96 MMOL/L — SIGNIFICANT CHANGE UP (ref 96–108)
CO2 SERPL-SCNC: 16 MMOL/L — LOW (ref 22–31)
CO2 SERPL-SCNC: 17 MMOL/L — LOW (ref 22–31)
CO2 SERPL-SCNC: 18 MMOL/L — LOW (ref 22–31)
CO2 SERPL-SCNC: 19 MMOL/L — LOW (ref 22–31)
CO2 SERPL-SCNC: 20 MMOL/L — LOW (ref 22–31)
CO2 SERPL-SCNC: 20 MMOL/L — LOW (ref 22–31)
CO2 SERPL-SCNC: 21 MMOL/L — LOW (ref 22–31)
CO2 SERPL-SCNC: 22 MMOL/L — SIGNIFICANT CHANGE UP (ref 22–31)
CO2 SERPL-SCNC: 23 MMOL/L — SIGNIFICANT CHANGE UP (ref 22–31)
CO2 SERPL-SCNC: 24 MMOL/L — SIGNIFICANT CHANGE UP (ref 22–31)
CO2 SERPL-SCNC: 25 MMOL/L — SIGNIFICANT CHANGE UP (ref 22–31)
CO2 SERPL-SCNC: 26 MMOL/L — SIGNIFICANT CHANGE UP (ref 22–31)
CO2 SERPL-SCNC: 27 MMOL/L — SIGNIFICANT CHANGE UP (ref 22–31)
CO2 SERPL-SCNC: 28 MMOL/L — SIGNIFICANT CHANGE UP (ref 22–31)
CO2 SERPL-SCNC: 29 MMOL/L — SIGNIFICANT CHANGE UP (ref 22–31)
CO2 SERPL-SCNC: 30 MMOL/L
CO2 SERPL-SCNC: 30 MMOL/L — SIGNIFICANT CHANGE UP (ref 22–31)
CO2 SERPL-SCNC: 30 MMOL/L — SIGNIFICANT CHANGE UP (ref 22–31)
CO2 SERPL-SCNC: 32 MMOL/L — HIGH (ref 22–31)
CO2 SERPL-SCNC: 32 MMOL/L — HIGH (ref 22–31)
CO2 SERPL-SCNC: 33 MMOL/L — HIGH (ref 22–31)
CO2 SERPL-SCNC: 34 MMOL/L — HIGH (ref 22–31)
CO2 SERPL-SCNC: 34 MMOL/L — HIGH (ref 22–31)
CO2 SERPL-SCNC: 36 MMOL/L — HIGH (ref 22–31)
CO2 SERPL-SCNC: 41 MMOL/L — HIGH (ref 22–31)
CO2 SERPL-SCNC: 42 MMOL/L
COLOR SPEC: YELLOW — SIGNIFICANT CHANGE UP
CORTIS AM PEAK SERPL-MCNC: 20.4 UG/DL — HIGH (ref 6–18.4)
CREAT SERPL-MCNC: 1.14 MG/DL — SIGNIFICANT CHANGE UP (ref 0.5–1.3)
CREAT SERPL-MCNC: 1.3 MG/DL — SIGNIFICANT CHANGE UP (ref 0.5–1.3)
CREAT SERPL-MCNC: 1.34 MG/DL — HIGH (ref 0.5–1.3)
CREAT SERPL-MCNC: 1.4 MG/DL — HIGH (ref 0.5–1.3)
CREAT SERPL-MCNC: 1.45 MG/DL — HIGH (ref 0.5–1.3)
CREAT SERPL-MCNC: 1.48 MG/DL
CREAT SERPL-MCNC: 1.56 MG/DL — HIGH (ref 0.5–1.3)
CREAT SERPL-MCNC: 1.72 MG/DL — HIGH (ref 0.5–1.3)
CREAT SERPL-MCNC: 1.73 MG/DL — HIGH (ref 0.5–1.3)
CREAT SERPL-MCNC: 1.77 MG/DL
CREAT SERPL-MCNC: 1.79 MG/DL — HIGH (ref 0.5–1.3)
CREAT SERPL-MCNC: 1.81 MG/DL — HIGH (ref 0.5–1.3)
CREAT SERPL-MCNC: 1.82 MG/DL — HIGH (ref 0.5–1.3)
CREAT SERPL-MCNC: 1.82 MG/DL — HIGH (ref 0.5–1.3)
CREAT SERPL-MCNC: 1.86 MG/DL — HIGH (ref 0.5–1.3)
CREAT SERPL-MCNC: 1.88 MG/DL — HIGH (ref 0.5–1.3)
CREAT SERPL-MCNC: 1.93 MG/DL — HIGH (ref 0.5–1.3)
CREAT SERPL-MCNC: 1.94 MG/DL — HIGH (ref 0.5–1.3)
CREAT SERPL-MCNC: 1.97 MG/DL — HIGH (ref 0.5–1.3)
CREAT SERPL-MCNC: 1.99 MG/DL — HIGH (ref 0.5–1.3)
CREAT SERPL-MCNC: 2 MG/DL — HIGH (ref 0.5–1.3)
CREAT SERPL-MCNC: 2.01 MG/DL — HIGH (ref 0.5–1.3)
CREAT SERPL-MCNC: 2.05 MG/DL — HIGH (ref 0.5–1.3)
CREAT SERPL-MCNC: 2.05 MG/DL — HIGH (ref 0.5–1.3)
CREAT SERPL-MCNC: 2.13 MG/DL — HIGH (ref 0.5–1.3)
CREAT SERPL-MCNC: 2.17 MG/DL — HIGH (ref 0.5–1.3)
CREAT SERPL-MCNC: 2.2 MG/DL — HIGH (ref 0.5–1.3)
CREAT SERPL-MCNC: 2.32 MG/DL — HIGH (ref 0.5–1.3)
CREAT SERPL-MCNC: 2.41 MG/DL — HIGH (ref 0.5–1.3)
CREAT SERPL-MCNC: 2.42 MG/DL — HIGH (ref 0.5–1.3)
CREAT SERPL-MCNC: 2.46 MG/DL — HIGH (ref 0.5–1.3)
CREAT SERPL-MCNC: 2.47 MG/DL — HIGH (ref 0.5–1.3)
CREAT SERPL-MCNC: 2.66 MG/DL — HIGH (ref 0.5–1.3)
CREAT SERPL-MCNC: 2.68 MG/DL — HIGH (ref 0.5–1.3)
CREAT SERPL-MCNC: 2.71 MG/DL — HIGH (ref 0.5–1.3)
CREAT SERPL-MCNC: 2.78 MG/DL — HIGH (ref 0.5–1.3)
CREAT SERPL-MCNC: 2.87 MG/DL — HIGH (ref 0.5–1.3)
CREAT SERPL-MCNC: 2.87 MG/DL — HIGH (ref 0.5–1.3)
CREAT SERPL-MCNC: 2.88 MG/DL — HIGH (ref 0.5–1.3)
CREAT SERPL-MCNC: 2.89 MG/DL — HIGH (ref 0.5–1.3)
CREAT SERPL-MCNC: 2.92 MG/DL — HIGH (ref 0.5–1.3)
CREAT SERPL-MCNC: 3.18 MG/DL — HIGH (ref 0.5–1.3)
CREAT SERPL-MCNC: 3.21 MG/DL — HIGH (ref 0.5–1.3)
CREAT SERPL-MCNC: 3.29 MG/DL — HIGH (ref 0.5–1.3)
CREAT SERPL-MCNC: 3.32 MG/DL — HIGH (ref 0.5–1.3)
CREAT SERPL-MCNC: 3.33 MG/DL — HIGH (ref 0.5–1.3)
CREAT SERPL-MCNC: 3.33 MG/DL — HIGH (ref 0.5–1.3)
CREAT SERPL-MCNC: 3.41 MG/DL — HIGH (ref 0.5–1.3)
CREAT SERPL-MCNC: 3.42 MG/DL — HIGH (ref 0.5–1.3)
CREAT SERPL-MCNC: 3.46 MG/DL — HIGH (ref 0.5–1.3)
CREAT SERPL-MCNC: 3.65 MG/DL — HIGH (ref 0.5–1.3)
CREAT SERPL-MCNC: 3.69 MG/DL — HIGH (ref 0.5–1.3)
CREAT SERPL-MCNC: 3.73 MG/DL — HIGH (ref 0.5–1.3)
CREAT SERPL-MCNC: 3.88 MG/DL — HIGH (ref 0.5–1.3)
CREAT SERPL-MCNC: 3.99 MG/DL — HIGH (ref 0.5–1.3)
CREAT SERPL-MCNC: 4.05 MG/DL — HIGH (ref 0.5–1.3)
CREAT SERPL-MCNC: 4.45 MG/DL — HIGH (ref 0.5–1.3)
CREAT SERPL-MCNC: 4.67 MG/DL — HIGH (ref 0.5–1.3)
CREAT SERPL-MCNC: 5.41 MG/DL — HIGH (ref 0.5–1.3)
CULTURE RESULTS: SIGNIFICANT CHANGE UP
DACRYOCYTES BLD QL SMEAR: SLIGHT — SIGNIFICANT CHANGE UP
DACRYOCYTES BLD QL SMEAR: SLIGHT — SIGNIFICANT CHANGE UP
DIFF PNL FLD: ABNORMAL
E CLOAC COMP DNA BLD POS QL NAA+PROBE: SIGNIFICANT CHANGE UP
EGFR: 11 ML/MIN/1.73M2 — LOW
EGFR: 13 ML/MIN/1.73M2 — LOW
EGFR: 14 ML/MIN/1.73M2 — LOW
EGFR: 15 ML/MIN/1.73M2 — LOW
EGFR: 15 ML/MIN/1.73M2 — LOW
EGFR: 16 ML/MIN/1.73M2 — LOW
EGFR: 17 ML/MIN/1.73M2 — LOW
EGFR: 18 ML/MIN/1.73M2 — LOW
EGFR: 19 ML/MIN/1.73M2 — LOW
EGFR: 20 ML/MIN/1.73M2 — LOW
EGFR: 23 ML/MIN/1.73M2 — LOW
EGFR: 24 ML/MIN/1.73M2 — LOW
EGFR: 25 ML/MIN/1.73M2 — LOW
EGFR: 28 ML/MIN/1.73M2 — LOW
EGFR: 29 ML/MIN/1.73M2 — LOW
EGFR: 30 ML/MIN/1.73M2 — LOW
EGFR: 32 ML/MIN/1.73M2 — LOW
EGFR: 32 ML/MIN/1.73M2 — LOW
EGFR: 33 ML/MIN/1.73M2 — LOW
EGFR: 34 ML/MIN/1.73M2 — LOW
EGFR: 34 ML/MIN/1.73M2 — LOW
EGFR: 35 ML/MIN/1.73M2 — LOW
EGFR: 35 ML/MIN/1.73M2 — LOW
EGFR: 36 ML/MIN/1.73M2 — LOW
EGFR: 36 ML/MIN/1.73M2 — LOW
EGFR: 37 ML/MIN/1.73M2 — LOW
EGFR: 37 ML/MIN/1.73M2 — LOW
EGFR: 38 ML/MIN/1.73M2 — LOW
EGFR: 39 ML/MIN/1.73M2 — LOW
EGFR: 40 ML/MIN/1.73M2 — LOW
EGFR: 41 ML/MIN/1.73M2
EGFR: 41 ML/MIN/1.73M2 — LOW
EGFR: 42 ML/MIN/1.73M2 — LOW
EGFR: 43 ML/MIN/1.73M2 — LOW
EGFR: 48 ML/MIN/1.73M2 — LOW
EGFR: 51 ML/MIN/1.73M2
EGFR: 52 ML/MIN/1.73M2 — LOW
EGFR: 55 ML/MIN/1.73M2 — LOW
EGFR: 58 ML/MIN/1.73M2 — LOW
EGFR: 59 ML/MIN/1.73M2 — LOW
EGFR: 70 ML/MIN/1.73M2 — SIGNIFICANT CHANGE UP
ELLIPTOCYTES BLD QL SMEAR: SLIGHT — SIGNIFICANT CHANGE UP
EOSINOPHIL # BLD AUTO: 0 K/UL
EOSINOPHIL # BLD AUTO: 0 K/UL — SIGNIFICANT CHANGE UP (ref 0–0.5)
EOSINOPHIL # BLD AUTO: 0.01 K/UL — SIGNIFICANT CHANGE UP (ref 0–0.5)
EOSINOPHIL # BLD AUTO: 0.02 K/UL
EOSINOPHIL # BLD AUTO: 0.02 K/UL — SIGNIFICANT CHANGE UP (ref 0–0.5)
EOSINOPHIL # BLD AUTO: 0.05 K/UL — SIGNIFICANT CHANGE UP (ref 0–0.5)
EOSINOPHIL # BLD AUTO: 0.12 K/UL — SIGNIFICANT CHANGE UP (ref 0–0.5)
EOSINOPHIL NFR BLD AUTO: 0 %
EOSINOPHIL NFR BLD AUTO: 0 % — SIGNIFICANT CHANGE UP (ref 0–6)
EOSINOPHIL NFR BLD AUTO: 0.1 % — SIGNIFICANT CHANGE UP (ref 0–6)
EOSINOPHIL NFR BLD AUTO: 0.2 % — SIGNIFICANT CHANGE UP (ref 0–6)
EOSINOPHIL NFR BLD AUTO: 0.3 %
EOSINOPHIL NFR BLD AUTO: 1 % — SIGNIFICANT CHANGE UP (ref 0–6)
EOSINOPHIL NFR BLD AUTO: 2 % — SIGNIFICANT CHANGE UP (ref 0–6)
EPI CELLS # UR: SIGNIFICANT CHANGE UP
EPI CELLS # UR: SIGNIFICANT CHANGE UP
ESTIMATED AVERAGE GLUCOSE: 151 MG/DL
ESTIMATED AVERAGE GLUCOSE: 163 MG/DL — HIGH (ref 68–114)
FERRITIN SERPL-MCNC: 143 NG/ML — SIGNIFICANT CHANGE UP (ref 30–400)
FERRITIN SERPL-MCNC: 38 NG/ML — SIGNIFICANT CHANGE UP (ref 30–400)
FIBRINOGEN PPP-MCNC: 435 MG/DL — SIGNIFICANT CHANGE UP (ref 330–520)
FOLATE SERPL-MCNC: 11.4 NG/ML — SIGNIFICANT CHANGE UP
FOLATE SERPL-MCNC: 14.2 NG/ML — SIGNIFICANT CHANGE UP
FUNGITELL: >500 PG/ML — HIGH
GAS PNL BLDA: SIGNIFICANT CHANGE UP
GAS PNL BLDV: SIGNIFICANT CHANGE UP
GLUCOSE BLDC GLUCOMTR-MCNC: 108 MG/DL — HIGH (ref 70–99)
GLUCOSE BLDC GLUCOMTR-MCNC: 112 MG/DL — HIGH (ref 70–99)
GLUCOSE BLDC GLUCOMTR-MCNC: 112 MG/DL — HIGH (ref 70–99)
GLUCOSE BLDC GLUCOMTR-MCNC: 116 MG/DL — HIGH (ref 70–99)
GLUCOSE BLDC GLUCOMTR-MCNC: 117 MG/DL — HIGH (ref 70–99)
GLUCOSE BLDC GLUCOMTR-MCNC: 118 MG/DL — HIGH (ref 70–99)
GLUCOSE BLDC GLUCOMTR-MCNC: 119 MG/DL — HIGH (ref 70–99)
GLUCOSE BLDC GLUCOMTR-MCNC: 121 MG/DL — HIGH (ref 70–99)
GLUCOSE BLDC GLUCOMTR-MCNC: 121 MG/DL — HIGH (ref 70–99)
GLUCOSE BLDC GLUCOMTR-MCNC: 122 MG/DL — HIGH (ref 70–99)
GLUCOSE BLDC GLUCOMTR-MCNC: 124 MG/DL — HIGH (ref 70–99)
GLUCOSE BLDC GLUCOMTR-MCNC: 126 MG/DL — HIGH (ref 70–99)
GLUCOSE BLDC GLUCOMTR-MCNC: 128 MG/DL — HIGH (ref 70–99)
GLUCOSE BLDC GLUCOMTR-MCNC: 131 MG/DL — HIGH (ref 70–99)
GLUCOSE BLDC GLUCOMTR-MCNC: 131 MG/DL — HIGH (ref 70–99)
GLUCOSE BLDC GLUCOMTR-MCNC: 132 MG/DL — HIGH (ref 70–99)
GLUCOSE BLDC GLUCOMTR-MCNC: 132 MG/DL — HIGH (ref 70–99)
GLUCOSE BLDC GLUCOMTR-MCNC: 133 MG/DL — HIGH (ref 70–99)
GLUCOSE BLDC GLUCOMTR-MCNC: 133 MG/DL — HIGH (ref 70–99)
GLUCOSE BLDC GLUCOMTR-MCNC: 135 MG/DL — HIGH (ref 70–99)
GLUCOSE BLDC GLUCOMTR-MCNC: 136 MG/DL — HIGH (ref 70–99)
GLUCOSE BLDC GLUCOMTR-MCNC: 136 MG/DL — HIGH (ref 70–99)
GLUCOSE BLDC GLUCOMTR-MCNC: 137 MG/DL — HIGH (ref 70–99)
GLUCOSE BLDC GLUCOMTR-MCNC: 139 MG/DL — HIGH (ref 70–99)
GLUCOSE BLDC GLUCOMTR-MCNC: 139 MG/DL — HIGH (ref 70–99)
GLUCOSE BLDC GLUCOMTR-MCNC: 140 MG/DL — HIGH (ref 70–99)
GLUCOSE BLDC GLUCOMTR-MCNC: 140 MG/DL — HIGH (ref 70–99)
GLUCOSE BLDC GLUCOMTR-MCNC: 141 MG/DL — HIGH (ref 70–99)
GLUCOSE BLDC GLUCOMTR-MCNC: 141 MG/DL — HIGH (ref 70–99)
GLUCOSE BLDC GLUCOMTR-MCNC: 142 MG/DL — HIGH (ref 70–99)
GLUCOSE BLDC GLUCOMTR-MCNC: 143 MG/DL — HIGH (ref 70–99)
GLUCOSE BLDC GLUCOMTR-MCNC: 144 MG/DL — HIGH (ref 70–99)
GLUCOSE BLDC GLUCOMTR-MCNC: 147 MG/DL — HIGH (ref 70–99)
GLUCOSE BLDC GLUCOMTR-MCNC: 147 MG/DL — HIGH (ref 70–99)
GLUCOSE BLDC GLUCOMTR-MCNC: 150 MG/DL — HIGH (ref 70–99)
GLUCOSE BLDC GLUCOMTR-MCNC: 150 MG/DL — HIGH (ref 70–99)
GLUCOSE BLDC GLUCOMTR-MCNC: 152 MG/DL — HIGH (ref 70–99)
GLUCOSE BLDC GLUCOMTR-MCNC: 152 MG/DL — HIGH (ref 70–99)
GLUCOSE BLDC GLUCOMTR-MCNC: 153 MG/DL — HIGH (ref 70–99)
GLUCOSE BLDC GLUCOMTR-MCNC: 158 MG/DL — HIGH (ref 70–99)
GLUCOSE BLDC GLUCOMTR-MCNC: 158 MG/DL — HIGH (ref 70–99)
GLUCOSE BLDC GLUCOMTR-MCNC: 159 MG/DL — HIGH (ref 70–99)
GLUCOSE BLDC GLUCOMTR-MCNC: 159 MG/DL — HIGH (ref 70–99)
GLUCOSE BLDC GLUCOMTR-MCNC: 160 MG/DL — HIGH (ref 70–99)
GLUCOSE BLDC GLUCOMTR-MCNC: 161 MG/DL — HIGH (ref 70–99)
GLUCOSE BLDC GLUCOMTR-MCNC: 161 MG/DL — HIGH (ref 70–99)
GLUCOSE BLDC GLUCOMTR-MCNC: 162 MG/DL — HIGH (ref 70–99)
GLUCOSE BLDC GLUCOMTR-MCNC: 163 MG/DL — HIGH (ref 70–99)
GLUCOSE BLDC GLUCOMTR-MCNC: 164 MG/DL — HIGH (ref 70–99)
GLUCOSE BLDC GLUCOMTR-MCNC: 164 MG/DL — HIGH (ref 70–99)
GLUCOSE BLDC GLUCOMTR-MCNC: 165 MG/DL — HIGH (ref 70–99)
GLUCOSE BLDC GLUCOMTR-MCNC: 171 MG/DL — HIGH (ref 70–99)
GLUCOSE BLDC GLUCOMTR-MCNC: 173 MG/DL — HIGH (ref 70–99)
GLUCOSE BLDC GLUCOMTR-MCNC: 176 MG/DL — HIGH (ref 70–99)
GLUCOSE BLDC GLUCOMTR-MCNC: 177 MG/DL — HIGH (ref 70–99)
GLUCOSE BLDC GLUCOMTR-MCNC: 178 MG/DL — HIGH (ref 70–99)
GLUCOSE BLDC GLUCOMTR-MCNC: 178 MG/DL — HIGH (ref 70–99)
GLUCOSE BLDC GLUCOMTR-MCNC: 179 MG/DL — HIGH (ref 70–99)
GLUCOSE BLDC GLUCOMTR-MCNC: 180 MG/DL — HIGH (ref 70–99)
GLUCOSE BLDC GLUCOMTR-MCNC: 181 MG/DL — HIGH (ref 70–99)
GLUCOSE BLDC GLUCOMTR-MCNC: 186 MG/DL — HIGH (ref 70–99)
GLUCOSE BLDC GLUCOMTR-MCNC: 188 MG/DL — HIGH (ref 70–99)
GLUCOSE BLDC GLUCOMTR-MCNC: 189 MG/DL — HIGH (ref 70–99)
GLUCOSE BLDC GLUCOMTR-MCNC: 190 MG/DL — HIGH (ref 70–99)
GLUCOSE BLDC GLUCOMTR-MCNC: 192 MG/DL — HIGH (ref 70–99)
GLUCOSE BLDC GLUCOMTR-MCNC: 194 MG/DL — HIGH (ref 70–99)
GLUCOSE BLDC GLUCOMTR-MCNC: 196 MG/DL — HIGH (ref 70–99)
GLUCOSE BLDC GLUCOMTR-MCNC: 198 MG/DL — HIGH (ref 70–99)
GLUCOSE BLDC GLUCOMTR-MCNC: 202 MG/DL — HIGH (ref 70–99)
GLUCOSE BLDC GLUCOMTR-MCNC: 204 MG/DL — HIGH (ref 70–99)
GLUCOSE BLDC GLUCOMTR-MCNC: 206 MG/DL — HIGH (ref 70–99)
GLUCOSE BLDC GLUCOMTR-MCNC: 207 MG/DL — HIGH (ref 70–99)
GLUCOSE BLDC GLUCOMTR-MCNC: 209 MG/DL — HIGH (ref 70–99)
GLUCOSE BLDC GLUCOMTR-MCNC: 210 MG/DL — HIGH (ref 70–99)
GLUCOSE BLDC GLUCOMTR-MCNC: 214 MG/DL — HIGH (ref 70–99)
GLUCOSE BLDC GLUCOMTR-MCNC: 215 MG/DL — HIGH (ref 70–99)
GLUCOSE BLDC GLUCOMTR-MCNC: 215 MG/DL — HIGH (ref 70–99)
GLUCOSE BLDC GLUCOMTR-MCNC: 217 MG/DL — HIGH (ref 70–99)
GLUCOSE BLDC GLUCOMTR-MCNC: 218 MG/DL — HIGH (ref 70–99)
GLUCOSE BLDC GLUCOMTR-MCNC: 220 MG/DL — HIGH (ref 70–99)
GLUCOSE BLDC GLUCOMTR-MCNC: 221 MG/DL — HIGH (ref 70–99)
GLUCOSE BLDC GLUCOMTR-MCNC: 221 MG/DL — HIGH (ref 70–99)
GLUCOSE BLDC GLUCOMTR-MCNC: 223 MG/DL — HIGH (ref 70–99)
GLUCOSE BLDC GLUCOMTR-MCNC: 225 MG/DL — HIGH (ref 70–99)
GLUCOSE BLDC GLUCOMTR-MCNC: 228 MG/DL — HIGH (ref 70–99)
GLUCOSE BLDC GLUCOMTR-MCNC: 229 MG/DL — HIGH (ref 70–99)
GLUCOSE BLDC GLUCOMTR-MCNC: 231 MG/DL — HIGH (ref 70–99)
GLUCOSE BLDC GLUCOMTR-MCNC: 231 MG/DL — HIGH (ref 70–99)
GLUCOSE BLDC GLUCOMTR-MCNC: 232 MG/DL — HIGH (ref 70–99)
GLUCOSE BLDC GLUCOMTR-MCNC: 232 MG/DL — HIGH (ref 70–99)
GLUCOSE BLDC GLUCOMTR-MCNC: 238 MG/DL — HIGH (ref 70–99)
GLUCOSE BLDC GLUCOMTR-MCNC: 239 MG/DL — HIGH (ref 70–99)
GLUCOSE BLDC GLUCOMTR-MCNC: 240 MG/DL — HIGH (ref 70–99)
GLUCOSE BLDC GLUCOMTR-MCNC: 242 MG/DL — HIGH (ref 70–99)
GLUCOSE BLDC GLUCOMTR-MCNC: 243 MG/DL — HIGH (ref 70–99)
GLUCOSE BLDC GLUCOMTR-MCNC: 244 MG/DL — HIGH (ref 70–99)
GLUCOSE BLDC GLUCOMTR-MCNC: 245 MG/DL — HIGH (ref 70–99)
GLUCOSE BLDC GLUCOMTR-MCNC: 247 MG/DL — HIGH (ref 70–99)
GLUCOSE BLDC GLUCOMTR-MCNC: 250 MG/DL — HIGH (ref 70–99)
GLUCOSE BLDC GLUCOMTR-MCNC: 250 MG/DL — HIGH (ref 70–99)
GLUCOSE BLDC GLUCOMTR-MCNC: 252 MG/DL — HIGH (ref 70–99)
GLUCOSE BLDC GLUCOMTR-MCNC: 252 MG/DL — HIGH (ref 70–99)
GLUCOSE BLDC GLUCOMTR-MCNC: 253 MG/DL — HIGH (ref 70–99)
GLUCOSE BLDC GLUCOMTR-MCNC: 254 MG/DL — HIGH (ref 70–99)
GLUCOSE BLDC GLUCOMTR-MCNC: 257 MG/DL — HIGH (ref 70–99)
GLUCOSE BLDC GLUCOMTR-MCNC: 259 MG/DL — HIGH (ref 70–99)
GLUCOSE BLDC GLUCOMTR-MCNC: 261 MG/DL — HIGH (ref 70–99)
GLUCOSE BLDC GLUCOMTR-MCNC: 262 MG/DL — HIGH (ref 70–99)
GLUCOSE BLDC GLUCOMTR-MCNC: 263 MG/DL — HIGH (ref 70–99)
GLUCOSE BLDC GLUCOMTR-MCNC: 266 MG/DL — HIGH (ref 70–99)
GLUCOSE BLDC GLUCOMTR-MCNC: 267 MG/DL — HIGH (ref 70–99)
GLUCOSE BLDC GLUCOMTR-MCNC: 271 MG/DL — HIGH (ref 70–99)
GLUCOSE BLDC GLUCOMTR-MCNC: 274 MG/DL — HIGH (ref 70–99)
GLUCOSE BLDC GLUCOMTR-MCNC: 277 MG/DL — HIGH (ref 70–99)
GLUCOSE BLDC GLUCOMTR-MCNC: 285 MG/DL — HIGH (ref 70–99)
GLUCOSE BLDC GLUCOMTR-MCNC: 285 MG/DL — HIGH (ref 70–99)
GLUCOSE BLDC GLUCOMTR-MCNC: 286 MG/DL — HIGH (ref 70–99)
GLUCOSE BLDC GLUCOMTR-MCNC: 289 MG/DL — HIGH (ref 70–99)
GLUCOSE BLDC GLUCOMTR-MCNC: 290 MG/DL — HIGH (ref 70–99)
GLUCOSE BLDC GLUCOMTR-MCNC: 291 MG/DL — HIGH (ref 70–99)
GLUCOSE BLDC GLUCOMTR-MCNC: 293 MG/DL — HIGH (ref 70–99)
GLUCOSE BLDC GLUCOMTR-MCNC: 296 MG/DL — HIGH (ref 70–99)
GLUCOSE BLDC GLUCOMTR-MCNC: 296 MG/DL — HIGH (ref 70–99)
GLUCOSE BLDC GLUCOMTR-MCNC: 307 MG/DL — HIGH (ref 70–99)
GLUCOSE BLDC GLUCOMTR-MCNC: 308 MG/DL — HIGH (ref 70–99)
GLUCOSE BLDC GLUCOMTR-MCNC: 323 MG/DL — HIGH (ref 70–99)
GLUCOSE BLDC GLUCOMTR-MCNC: 327 MG/DL — HIGH (ref 70–99)
GLUCOSE BLDC GLUCOMTR-MCNC: 39 MG/DL — CRITICAL LOW (ref 70–99)
GLUCOSE BLDC GLUCOMTR-MCNC: 50 MG/DL — CRITICAL LOW (ref 70–99)
GLUCOSE BLDC GLUCOMTR-MCNC: 52 MG/DL — CRITICAL LOW (ref 70–99)
GLUCOSE BLDC GLUCOMTR-MCNC: 88 MG/DL — SIGNIFICANT CHANGE UP (ref 70–99)
GLUCOSE BLDC GLUCOMTR-MCNC: 98 MG/DL — SIGNIFICANT CHANGE UP (ref 70–99)
GLUCOSE BLDC GLUCOMTR-MCNC: 99 MG/DL — SIGNIFICANT CHANGE UP (ref 70–99)
GLUCOSE SERPL-MCNC: 119 MG/DL — HIGH (ref 70–99)
GLUCOSE SERPL-MCNC: 127 MG/DL — HIGH (ref 70–99)
GLUCOSE SERPL-MCNC: 128 MG/DL — HIGH (ref 70–99)
GLUCOSE SERPL-MCNC: 132 MG/DL — HIGH (ref 70–99)
GLUCOSE SERPL-MCNC: 132 MG/DL — HIGH (ref 70–99)
GLUCOSE SERPL-MCNC: 134 MG/DL — HIGH (ref 70–99)
GLUCOSE SERPL-MCNC: 136 MG/DL — HIGH (ref 70–99)
GLUCOSE SERPL-MCNC: 139 MG/DL — HIGH (ref 70–99)
GLUCOSE SERPL-MCNC: 141 MG/DL — HIGH (ref 70–99)
GLUCOSE SERPL-MCNC: 147 MG/DL — HIGH (ref 70–99)
GLUCOSE SERPL-MCNC: 151 MG/DL — HIGH (ref 70–99)
GLUCOSE SERPL-MCNC: 156 MG/DL — HIGH (ref 70–99)
GLUCOSE SERPL-MCNC: 158 MG/DL — HIGH (ref 70–99)
GLUCOSE SERPL-MCNC: 158 MG/DL — HIGH (ref 70–99)
GLUCOSE SERPL-MCNC: 161 MG/DL
GLUCOSE SERPL-MCNC: 167 MG/DL — HIGH (ref 70–99)
GLUCOSE SERPL-MCNC: 171 MG/DL — HIGH (ref 70–99)
GLUCOSE SERPL-MCNC: 176 MG/DL — HIGH (ref 70–99)
GLUCOSE SERPL-MCNC: 177 MG/DL — HIGH (ref 70–99)
GLUCOSE SERPL-MCNC: 178 MG/DL — HIGH (ref 70–99)
GLUCOSE SERPL-MCNC: 179 MG/DL — HIGH (ref 70–99)
GLUCOSE SERPL-MCNC: 182 MG/DL — HIGH (ref 70–99)
GLUCOSE SERPL-MCNC: 182 MG/DL — HIGH (ref 70–99)
GLUCOSE SERPL-MCNC: 183 MG/DL — HIGH (ref 70–99)
GLUCOSE SERPL-MCNC: 183 MG/DL — HIGH (ref 70–99)
GLUCOSE SERPL-MCNC: 184 MG/DL — HIGH (ref 70–99)
GLUCOSE SERPL-MCNC: 185 MG/DL — HIGH (ref 70–99)
GLUCOSE SERPL-MCNC: 187 MG/DL — HIGH (ref 70–99)
GLUCOSE SERPL-MCNC: 187 MG/DL — HIGH (ref 70–99)
GLUCOSE SERPL-MCNC: 189 MG/DL — HIGH (ref 70–99)
GLUCOSE SERPL-MCNC: 191 MG/DL
GLUCOSE SERPL-MCNC: 193 MG/DL — HIGH (ref 70–99)
GLUCOSE SERPL-MCNC: 201 MG/DL — HIGH (ref 70–99)
GLUCOSE SERPL-MCNC: 202 MG/DL — HIGH (ref 70–99)
GLUCOSE SERPL-MCNC: 203 MG/DL — HIGH (ref 70–99)
GLUCOSE SERPL-MCNC: 204 MG/DL — HIGH (ref 70–99)
GLUCOSE SERPL-MCNC: 207 MG/DL — HIGH (ref 70–99)
GLUCOSE SERPL-MCNC: 207 MG/DL — HIGH (ref 70–99)
GLUCOSE SERPL-MCNC: 210 MG/DL — HIGH (ref 70–99)
GLUCOSE SERPL-MCNC: 210 MG/DL — HIGH (ref 70–99)
GLUCOSE SERPL-MCNC: 218 MG/DL — HIGH (ref 70–99)
GLUCOSE SERPL-MCNC: 222 MG/DL — HIGH (ref 70–99)
GLUCOSE SERPL-MCNC: 223 MG/DL — HIGH (ref 70–99)
GLUCOSE SERPL-MCNC: 234 MG/DL — HIGH (ref 70–99)
GLUCOSE SERPL-MCNC: 236 MG/DL — HIGH (ref 70–99)
GLUCOSE SERPL-MCNC: 238 MG/DL — HIGH (ref 70–99)
GLUCOSE SERPL-MCNC: 241 MG/DL — HIGH (ref 70–99)
GLUCOSE SERPL-MCNC: 250 MG/DL — HIGH (ref 70–99)
GLUCOSE SERPL-MCNC: 255 MG/DL — HIGH (ref 70–99)
GLUCOSE SERPL-MCNC: 257 MG/DL — HIGH (ref 70–99)
GLUCOSE SERPL-MCNC: 264 MG/DL — HIGH (ref 70–99)
GLUCOSE SERPL-MCNC: 275 MG/DL — HIGH (ref 70–99)
GLUCOSE SERPL-MCNC: 282 MG/DL — HIGH (ref 70–99)
GLUCOSE SERPL-MCNC: 310 MG/DL — HIGH (ref 70–99)
GLUCOSE UR QL: NEGATIVE — SIGNIFICANT CHANGE UP
GRAM STN FLD: SIGNIFICANT CHANGE UP
HAPTOGLOB SERPL-MCNC: 241 MG/DL — HIGH (ref 34–200)
HAV IGM SER-ACNC: SIGNIFICANT CHANGE UP
HBA1C MFR BLD HPLC: 6.9 %
HBV CORE IGM SER-ACNC: SIGNIFICANT CHANGE UP
HBV SURFACE AG SER-ACNC: SIGNIFICANT CHANGE UP
HCO3 BLDA-SCNC: 14 MMOL/L — LOW (ref 21–28)
HCO3 BLDA-SCNC: 16 MMOL/L — LOW (ref 21–28)
HCO3 BLDA-SCNC: 16 MMOL/L — LOW (ref 21–28)
HCO3 BLDA-SCNC: 18 MMOL/L — LOW (ref 21–28)
HCO3 BLDA-SCNC: 18 MMOL/L — LOW (ref 21–28)
HCO3 BLDA-SCNC: 19 MMOL/L — LOW (ref 21–28)
HCO3 BLDA-SCNC: 20 MMOL/L — LOW (ref 21–28)
HCO3 BLDA-SCNC: 20 MMOL/L — LOW (ref 21–28)
HCO3 BLDA-SCNC: 21 MMOL/L — SIGNIFICANT CHANGE UP (ref 21–28)
HCO3 BLDA-SCNC: 22 MMOL/L — SIGNIFICANT CHANGE UP (ref 21–28)
HCO3 BLDA-SCNC: 23 MMOL/L — SIGNIFICANT CHANGE UP (ref 21–28)
HCO3 BLDA-SCNC: 23 MMOL/L — SIGNIFICANT CHANGE UP (ref 21–28)
HCO3 BLDA-SCNC: 25 MMOL/L — SIGNIFICANT CHANGE UP (ref 21–28)
HCO3 BLDV-SCNC: 17 MMOL/L — LOW (ref 22–29)
HCT VFR BLD CALC: 19.9 % — CRITICAL LOW (ref 39–50)
HCT VFR BLD CALC: 20.4 % — CRITICAL LOW (ref 39–50)
HCT VFR BLD CALC: 20.7 % — CRITICAL LOW (ref 39–50)
HCT VFR BLD CALC: 21.5 % — LOW (ref 39–50)
HCT VFR BLD CALC: 21.5 % — LOW (ref 39–50)
HCT VFR BLD CALC: 22.1 % — LOW (ref 39–50)
HCT VFR BLD CALC: 22.1 % — LOW (ref 39–50)
HCT VFR BLD CALC: 22.3 % — LOW (ref 39–50)
HCT VFR BLD CALC: 22.4 % — LOW (ref 39–50)
HCT VFR BLD CALC: 22.5 % — LOW (ref 39–50)
HCT VFR BLD CALC: 23 % — LOW (ref 39–50)
HCT VFR BLD CALC: 23.1 % — LOW (ref 39–50)
HCT VFR BLD CALC: 23.2 % — LOW (ref 39–50)
HCT VFR BLD CALC: 23.3 % — LOW (ref 39–50)
HCT VFR BLD CALC: 23.4 % — LOW (ref 39–50)
HCT VFR BLD CALC: 23.6 % — LOW (ref 39–50)
HCT VFR BLD CALC: 23.6 % — LOW (ref 39–50)
HCT VFR BLD CALC: 23.8 % — LOW (ref 39–50)
HCT VFR BLD CALC: 24 % — LOW (ref 39–50)
HCT VFR BLD CALC: 24.2 % — LOW (ref 39–50)
HCT VFR BLD CALC: 24.4 % — LOW (ref 39–50)
HCT VFR BLD CALC: 24.6 % — LOW (ref 39–50)
HCT VFR BLD CALC: 24.7 % — LOW (ref 39–50)
HCT VFR BLD CALC: 25.1 % — LOW (ref 39–50)
HCT VFR BLD CALC: 25.3 % — LOW (ref 39–50)
HCT VFR BLD CALC: 25.4 % — LOW (ref 39–50)
HCT VFR BLD CALC: 25.6 % — LOW (ref 39–50)
HCT VFR BLD CALC: 25.7 % — LOW (ref 39–50)
HCT VFR BLD CALC: 25.7 % — LOW (ref 39–50)
HCT VFR BLD CALC: 25.9 % — LOW (ref 39–50)
HCT VFR BLD CALC: 26 % — LOW (ref 39–50)
HCT VFR BLD CALC: 26.1 % — LOW (ref 39–50)
HCT VFR BLD CALC: 26.1 % — LOW (ref 39–50)
HCT VFR BLD CALC: 26.3 % — LOW (ref 39–50)
HCT VFR BLD CALC: 26.8 % — LOW (ref 39–50)
HCT VFR BLD CALC: 26.9 % — LOW (ref 39–50)
HCT VFR BLD CALC: 27 % — LOW (ref 39–50)
HCT VFR BLD CALC: 27.2 % — LOW (ref 39–50)
HCT VFR BLD CALC: 27.3 % — LOW (ref 39–50)
HCT VFR BLD CALC: 27.5 % — LOW (ref 39–50)
HCT VFR BLD CALC: 27.5 % — LOW (ref 39–50)
HCT VFR BLD CALC: 27.7 % — LOW (ref 39–50)
HCT VFR BLD CALC: 27.8 % — LOW (ref 39–50)
HCT VFR BLD CALC: 28.1 % — LOW (ref 39–50)
HCT VFR BLD CALC: 28.1 % — LOW (ref 39–50)
HCT VFR BLD CALC: 28.4 % — LOW (ref 39–50)
HCT VFR BLD CALC: 28.5 % — LOW (ref 39–50)
HCT VFR BLD CALC: 28.9 % — LOW (ref 39–50)
HCT VFR BLD CALC: 29.3 % — LOW (ref 39–50)
HCT VFR BLD CALC: 29.8 % — LOW (ref 39–50)
HCT VFR BLD CALC: 30.6 % — LOW (ref 39–50)
HCT VFR BLD CALC: 31.1 %
HCT VFR BLD CALC: 31.1 % — LOW (ref 39–50)
HCT VFR BLD CALC: 31.2 % — LOW (ref 39–50)
HCT VFR BLD CALC: 31.2 % — LOW (ref 39–50)
HCT VFR BLD CALC: 31.5 % — LOW (ref 39–50)
HCT VFR BLD CALC: 31.7 % — LOW (ref 39–50)
HCT VFR BLD CALC: 32.8 %
HCT VFR BLD CALC: 33.1 % — LOW (ref 39–50)
HCT VFR BLD CALC: 33.3 % — LOW (ref 39–50)
HCT VFR BLD CALC: 36.5 % — LOW (ref 39–50)
HCT VFR BLD CALC: 42.7 % — SIGNIFICANT CHANGE UP (ref 39–50)
HCV AB S/CO SERPL IA: 0.09 S/CO — SIGNIFICANT CHANGE UP (ref 0–0.99)
HCV AB SERPL-IMP: SIGNIFICANT CHANGE UP
HEPARIN-PF4 AB RESULT: <0.6 U/ML — SIGNIFICANT CHANGE UP (ref 0–0.9)
HGB BLD-MCNC: 10.2 G/DL — LOW (ref 13–17)
HGB BLD-MCNC: 10.3 G/DL
HGB BLD-MCNC: 10.3 G/DL — LOW (ref 13–17)
HGB BLD-MCNC: 10.4 G/DL — LOW (ref 13–17)
HGB BLD-MCNC: 10.8 G/DL — LOW (ref 13–17)
HGB BLD-MCNC: 11.1 G/DL — LOW (ref 13–17)
HGB BLD-MCNC: 11.9 G/DL — LOW (ref 13–17)
HGB BLD-MCNC: 13.8 G/DL — SIGNIFICANT CHANGE UP (ref 13–17)
HGB BLD-MCNC: 6.9 G/DL — CRITICAL LOW (ref 13–17)
HGB BLD-MCNC: 7 G/DL — CRITICAL LOW (ref 13–17)
HGB BLD-MCNC: 7 G/DL — CRITICAL LOW (ref 13–17)
HGB BLD-MCNC: 7.1 G/DL — LOW (ref 13–17)
HGB BLD-MCNC: 7.2 G/DL — LOW (ref 13–17)
HGB BLD-MCNC: 7.3 G/DL — LOW (ref 13–17)
HGB BLD-MCNC: 7.3 G/DL — LOW (ref 13–17)
HGB BLD-MCNC: 7.4 G/DL — LOW (ref 13–17)
HGB BLD-MCNC: 7.4 G/DL — LOW (ref 13–17)
HGB BLD-MCNC: 7.5 G/DL — LOW (ref 13–17)
HGB BLD-MCNC: 7.6 G/DL — LOW (ref 13–17)
HGB BLD-MCNC: 7.7 G/DL — LOW (ref 13–17)
HGB BLD-MCNC: 7.8 G/DL — LOW (ref 13–17)
HGB BLD-MCNC: 7.8 G/DL — LOW (ref 13–17)
HGB BLD-MCNC: 7.9 G/DL — LOW (ref 13–17)
HGB BLD-MCNC: 7.9 G/DL — LOW (ref 13–17)
HGB BLD-MCNC: 8 G/DL — LOW (ref 13–17)
HGB BLD-MCNC: 8.1 G/DL — LOW (ref 13–17)
HGB BLD-MCNC: 8.2 G/DL — LOW (ref 13–17)
HGB BLD-MCNC: 8.3 G/DL — LOW (ref 13–17)
HGB BLD-MCNC: 8.4 G/DL — LOW (ref 13–17)
HGB BLD-MCNC: 8.5 G/DL — LOW (ref 13–17)
HGB BLD-MCNC: 8.6 G/DL — LOW (ref 13–17)
HGB BLD-MCNC: 8.6 G/DL — LOW (ref 13–17)
HGB BLD-MCNC: 8.7 G/DL — LOW (ref 13–17)
HGB BLD-MCNC: 8.8 G/DL — LOW (ref 13–17)
HGB BLD-MCNC: 8.9 G/DL — LOW (ref 13–17)
HGB BLD-MCNC: 9 G/DL — LOW (ref 13–17)
HGB BLD-MCNC: 9.2 G/DL — LOW (ref 13–17)
HGB BLD-MCNC: 9.3 G/DL — LOW (ref 13–17)
HGB BLD-MCNC: 9.4 G/DL — LOW (ref 13–17)
HGB BLD-MCNC: 9.6 G/DL
HGB BLD-MCNC: 9.6 G/DL — LOW (ref 13–17)
HGB BLD-MCNC: 9.6 G/DL — LOW (ref 13–17)
HGB BLD-MCNC: 9.7 G/DL — LOW (ref 13–17)
HGB BLD-MCNC: 9.7 G/DL — LOW (ref 13–17)
HGB BLD-MCNC: 9.8 G/DL — LOW (ref 13–17)
HIV 1 & 2 AB SERPL IA.RAPID: SIGNIFICANT CHANGE UP
HOROWITZ INDEX BLDA+IHG-RTO: 30 — SIGNIFICANT CHANGE UP
HYPOCHROMIA BLD QL: SLIGHT — SIGNIFICANT CHANGE UP
HYPOCHROMIA BLD QL: SLIGHT — SIGNIFICANT CHANGE UP
IMM GRANULOCYTES NFR BLD AUTO: 0.2 %
IMM GRANULOCYTES NFR BLD AUTO: 0.2 % — SIGNIFICANT CHANGE UP (ref 0–0.9)
IMM GRANULOCYTES NFR BLD AUTO: 0.3 %
IMM GRANULOCYTES NFR BLD AUTO: 0.3 % — SIGNIFICANT CHANGE UP (ref 0–0.9)
IMM GRANULOCYTES NFR BLD AUTO: 0.6 % — SIGNIFICANT CHANGE UP (ref 0–0.9)
IMM GRANULOCYTES NFR BLD AUTO: 0.6 % — SIGNIFICANT CHANGE UP (ref 0–0.9)
IMM GRANULOCYTES NFR BLD AUTO: 0.7 % — SIGNIFICANT CHANGE UP (ref 0–0.9)
INR BLD: 1.08 RATIO — SIGNIFICANT CHANGE UP (ref 0.88–1.16)
INR BLD: 1.09 RATIO — SIGNIFICANT CHANGE UP (ref 0.88–1.16)
INR BLD: 1.09 RATIO — SIGNIFICANT CHANGE UP (ref 0.88–1.16)
INR BLD: 1.1 RATIO — SIGNIFICANT CHANGE UP (ref 0.88–1.16)
INR BLD: 1.14 RATIO — SIGNIFICANT CHANGE UP (ref 0.88–1.16)
INR BLD: 1.21 RATIO — HIGH (ref 0.88–1.16)
INR BLD: 1.23 RATIO — HIGH (ref 0.88–1.16)
INR BLD: 1.25 RATIO — HIGH (ref 0.88–1.16)
INR BLD: 1.27 RATIO — HIGH (ref 0.88–1.16)
INR BLD: 1.33 RATIO — HIGH (ref 0.88–1.16)
INR BLD: 1.48 RATIO — HIGH (ref 0.88–1.16)
INR BLD: 1.5 RATIO — HIGH (ref 0.88–1.16)
INR BLD: 2.29 RATIO — HIGH (ref 0.88–1.16)
IRON SATN MFR SERPL: 13 % — LOW (ref 16–55)
IRON SATN MFR SERPL: 24 UG/DL — LOW (ref 45–165)
IRON SATN MFR SERPL: 24 UG/DL — LOW (ref 45–165)
IRON SATN MFR SERPL: 31 UG/DL — LOW (ref 45–165)
IRON SATN MFR SERPL: 9 % — LOW (ref 16–55)
KETONES UR-MCNC: ABNORMAL
KETONES UR-MCNC: NEGATIVE — SIGNIFICANT CHANGE UP
KETONES UR-MCNC: NEGATIVE — SIGNIFICANT CHANGE UP
LACTATE SERPL-SCNC: 1.2 MMOL/L — SIGNIFICANT CHANGE UP (ref 0.7–2)
LACTATE SERPL-SCNC: 1.4 MMOL/L — SIGNIFICANT CHANGE UP (ref 0.7–2)
LACTATE SERPL-SCNC: 1.7 MMOL/L — SIGNIFICANT CHANGE UP (ref 0.7–2)
LACTATE SERPL-SCNC: 1.9 MMOL/L — SIGNIFICANT CHANGE UP (ref 0.7–2)
LACTATE SERPL-SCNC: 2.6 MMOL/L — HIGH (ref 0.7–2)
LACTATE SERPL-SCNC: 3.6 MMOL/L — HIGH (ref 0.7–2)
LACTATE SERPL-SCNC: 4.1 MMOL/L — CRITICAL HIGH (ref 0.7–2)
LACTATE SERPL-SCNC: 4.6 MMOL/L — CRITICAL HIGH (ref 0.7–2)
LACTATE SERPL-SCNC: 5.1 MMOL/L — CRITICAL HIGH (ref 0.7–2)
LACTATE SERPL-SCNC: 7.7 MMOL/L — CRITICAL HIGH (ref 0.7–2)
LDH SERPL L TO P-CCNC: 1168 U/L — HIGH (ref 84–241)
LDH SERPL L TO P-CCNC: 1311 U/L — HIGH (ref 84–241)
LDH SERPL L TO P-CCNC: 351 U/L — HIGH (ref 84–241)
LEUKOCYTE ESTERASE UR-ACNC: ABNORMAL
LEUKOCYTE ESTERASE UR-ACNC: ABNORMAL
LEUKOCYTE ESTERASE UR-ACNC: NEGATIVE — SIGNIFICANT CHANGE UP
LIDOCAIN IGE QN: 157 U/L — SIGNIFICANT CHANGE UP (ref 73–393)
LYMPHOCYTES # BLD AUTO: 0.16 K/UL — LOW (ref 1–3.3)
LYMPHOCYTES # BLD AUTO: 0.19 K/UL — LOW (ref 1–3.3)
LYMPHOCYTES # BLD AUTO: 0.19 K/UL — LOW (ref 1–3.3)
LYMPHOCYTES # BLD AUTO: 0.26 K/UL — LOW (ref 1–3.3)
LYMPHOCYTES # BLD AUTO: 0.36 K/UL — LOW (ref 1–3.3)
LYMPHOCYTES # BLD AUTO: 0.47 K/UL — LOW (ref 1–3.3)
LYMPHOCYTES # BLD AUTO: 0.48 K/UL
LYMPHOCYTES # BLD AUTO: 0.49 K/UL — LOW (ref 1–3.3)
LYMPHOCYTES # BLD AUTO: 0.5 K/UL
LYMPHOCYTES # BLD AUTO: 0.51 K/UL — LOW (ref 1–3.3)
LYMPHOCYTES # BLD AUTO: 0.6 K/UL — LOW (ref 1–3.3)
LYMPHOCYTES # BLD AUTO: 0.61 K/UL — LOW (ref 1–3.3)
LYMPHOCYTES # BLD AUTO: 2 % — LOW (ref 13–44)
LYMPHOCYTES # BLD AUTO: 2 % — LOW (ref 13–44)
LYMPHOCYTES # BLD AUTO: 2.8 % — LOW (ref 13–44)
LYMPHOCYTES # BLD AUTO: 3 % — LOW (ref 13–44)
LYMPHOCYTES # BLD AUTO: 3.7 % — LOW (ref 13–44)
LYMPHOCYTES # BLD AUTO: 6.2 % — LOW (ref 13–44)
LYMPHOCYTES # BLD AUTO: 6.7 % — LOW (ref 13–44)
LYMPHOCYTES # BLD AUTO: 7.1 % — LOW (ref 13–44)
LYMPHOCYTES # BLD AUTO: 8 % — LOW (ref 13–44)
LYMPHOCYTES # BLD AUTO: 8.1 % — LOW (ref 13–44)
LYMPHOCYTES NFR BLD AUTO: 7.9 %
LYMPHOCYTES NFR BLD AUTO: 8.1 %
MACROCYTES BLD QL: SLIGHT — SIGNIFICANT CHANGE UP
MAGNESIUM SERPL-MCNC: 1.5 MG/DL — LOW (ref 1.6–2.6)
MAGNESIUM SERPL-MCNC: 1.5 MG/DL — LOW (ref 1.6–2.6)
MAGNESIUM SERPL-MCNC: 1.7 MG/DL — SIGNIFICANT CHANGE UP (ref 1.6–2.6)
MAGNESIUM SERPL-MCNC: 1.8 MG/DL — SIGNIFICANT CHANGE UP (ref 1.6–2.6)
MAGNESIUM SERPL-MCNC: 1.8 MG/DL — SIGNIFICANT CHANGE UP (ref 1.6–2.6)
MAGNESIUM SERPL-MCNC: 1.9 MG/DL — SIGNIFICANT CHANGE UP (ref 1.6–2.6)
MAGNESIUM SERPL-MCNC: 2 MG/DL — SIGNIFICANT CHANGE UP (ref 1.6–2.6)
MAGNESIUM SERPL-MCNC: 2.1 MG/DL — SIGNIFICANT CHANGE UP (ref 1.6–2.6)
MAGNESIUM SERPL-MCNC: 2.2 MG/DL — SIGNIFICANT CHANGE UP (ref 1.6–2.6)
MAGNESIUM SERPL-MCNC: 2.3 MG/DL — SIGNIFICANT CHANGE UP (ref 1.6–2.6)
MAGNESIUM SERPL-MCNC: 2.4 MG/DL — SIGNIFICANT CHANGE UP (ref 1.6–2.6)
MAGNESIUM SERPL-MCNC: 2.5 MG/DL — SIGNIFICANT CHANGE UP (ref 1.6–2.6)
MAGNESIUM SERPL-MCNC: 2.5 MG/DL — SIGNIFICANT CHANGE UP (ref 1.6–2.6)
MAN DIFF?: NORMAL
MAN DIFF?: NORMAL
MANUAL SMEAR VERIFICATION: SIGNIFICANT CHANGE UP
MCHC RBC-ENTMCNC: 27.8 PG — SIGNIFICANT CHANGE UP (ref 27–34)
MCHC RBC-ENTMCNC: 27.9 PG — SIGNIFICANT CHANGE UP (ref 27–34)
MCHC RBC-ENTMCNC: 28 PG — SIGNIFICANT CHANGE UP (ref 27–34)
MCHC RBC-ENTMCNC: 28.1 PG — SIGNIFICANT CHANGE UP (ref 27–34)
MCHC RBC-ENTMCNC: 28.2 PG — SIGNIFICANT CHANGE UP (ref 27–34)
MCHC RBC-ENTMCNC: 28.3 PG — SIGNIFICANT CHANGE UP (ref 27–34)
MCHC RBC-ENTMCNC: 28.4 PG — SIGNIFICANT CHANGE UP (ref 27–34)
MCHC RBC-ENTMCNC: 28.4 PG — SIGNIFICANT CHANGE UP (ref 27–34)
MCHC RBC-ENTMCNC: 28.5 PG — SIGNIFICANT CHANGE UP (ref 27–34)
MCHC RBC-ENTMCNC: 28.6 PG — SIGNIFICANT CHANGE UP (ref 27–34)
MCHC RBC-ENTMCNC: 28.7 PG — SIGNIFICANT CHANGE UP (ref 27–34)
MCHC RBC-ENTMCNC: 28.8 PG — SIGNIFICANT CHANGE UP (ref 27–34)
MCHC RBC-ENTMCNC: 28.9 PG — SIGNIFICANT CHANGE UP (ref 27–34)
MCHC RBC-ENTMCNC: 28.9 PG — SIGNIFICANT CHANGE UP (ref 27–34)
MCHC RBC-ENTMCNC: 29 PG — SIGNIFICANT CHANGE UP (ref 27–34)
MCHC RBC-ENTMCNC: 29 PG — SIGNIFICANT CHANGE UP (ref 27–34)
MCHC RBC-ENTMCNC: 29.1 PG — SIGNIFICANT CHANGE UP (ref 27–34)
MCHC RBC-ENTMCNC: 29.2 PG — SIGNIFICANT CHANGE UP (ref 27–34)
MCHC RBC-ENTMCNC: 29.2 PG — SIGNIFICANT CHANGE UP (ref 27–34)
MCHC RBC-ENTMCNC: 29.3 PG — SIGNIFICANT CHANGE UP (ref 27–34)
MCHC RBC-ENTMCNC: 29.4 PG — SIGNIFICANT CHANGE UP (ref 27–34)
MCHC RBC-ENTMCNC: 29.5 PG — SIGNIFICANT CHANGE UP (ref 27–34)
MCHC RBC-ENTMCNC: 29.5 PG — SIGNIFICANT CHANGE UP (ref 27–34)
MCHC RBC-ENTMCNC: 29.6 PG — SIGNIFICANT CHANGE UP (ref 27–34)
MCHC RBC-ENTMCNC: 29.7 PG — SIGNIFICANT CHANGE UP (ref 27–34)
MCHC RBC-ENTMCNC: 29.7 PG — SIGNIFICANT CHANGE UP (ref 27–34)
MCHC RBC-ENTMCNC: 29.8 PG — SIGNIFICANT CHANGE UP (ref 27–34)
MCHC RBC-ENTMCNC: 29.8 PG — SIGNIFICANT CHANGE UP (ref 27–34)
MCHC RBC-ENTMCNC: 29.9 PG — SIGNIFICANT CHANGE UP (ref 27–34)
MCHC RBC-ENTMCNC: 30 PG — SIGNIFICANT CHANGE UP (ref 27–34)
MCHC RBC-ENTMCNC: 30.1 PG — SIGNIFICANT CHANGE UP (ref 27–34)
MCHC RBC-ENTMCNC: 30.3 PG
MCHC RBC-ENTMCNC: 30.4 GM/DL — LOW (ref 32–36)
MCHC RBC-ENTMCNC: 30.4 PG
MCHC RBC-ENTMCNC: 30.6 GM/DL — LOW (ref 32–36)
MCHC RBC-ENTMCNC: 30.8 GM/DL — LOW (ref 32–36)
MCHC RBC-ENTMCNC: 30.8 GM/DL — LOW (ref 32–36)
MCHC RBC-ENTMCNC: 30.9 GM/DL
MCHC RBC-ENTMCNC: 30.9 GM/DL — LOW (ref 32–36)
MCHC RBC-ENTMCNC: 31 GM/DL — LOW (ref 32–36)
MCHC RBC-ENTMCNC: 31.1 GM/DL — LOW (ref 32–36)
MCHC RBC-ENTMCNC: 31.4 GM/DL
MCHC RBC-ENTMCNC: 31.4 GM/DL — LOW (ref 32–36)
MCHC RBC-ENTMCNC: 31.5 GM/DL — LOW (ref 32–36)
MCHC RBC-ENTMCNC: 31.5 GM/DL — LOW (ref 32–36)
MCHC RBC-ENTMCNC: 31.6 GM/DL — LOW (ref 32–36)
MCHC RBC-ENTMCNC: 31.7 GM/DL — LOW (ref 32–36)
MCHC RBC-ENTMCNC: 31.9 GM/DL — LOW (ref 32–36)
MCHC RBC-ENTMCNC: 32 GM/DL — SIGNIFICANT CHANGE UP (ref 32–36)
MCHC RBC-ENTMCNC: 32.1 GM/DL — SIGNIFICANT CHANGE UP (ref 32–36)
MCHC RBC-ENTMCNC: 32.1 GM/DL — SIGNIFICANT CHANGE UP (ref 32–36)
MCHC RBC-ENTMCNC: 32.2 GM/DL — SIGNIFICANT CHANGE UP (ref 32–36)
MCHC RBC-ENTMCNC: 32.2 GM/DL — SIGNIFICANT CHANGE UP (ref 32–36)
MCHC RBC-ENTMCNC: 32.3 GM/DL — SIGNIFICANT CHANGE UP (ref 32–36)
MCHC RBC-ENTMCNC: 32.4 GM/DL — SIGNIFICANT CHANGE UP (ref 32–36)
MCHC RBC-ENTMCNC: 32.6 GM/DL — SIGNIFICANT CHANGE UP (ref 32–36)
MCHC RBC-ENTMCNC: 32.7 GM/DL — SIGNIFICANT CHANGE UP (ref 32–36)
MCHC RBC-ENTMCNC: 32.7 GM/DL — SIGNIFICANT CHANGE UP (ref 32–36)
MCHC RBC-ENTMCNC: 32.8 GM/DL — SIGNIFICANT CHANGE UP (ref 32–36)
MCHC RBC-ENTMCNC: 32.9 GM/DL — SIGNIFICANT CHANGE UP (ref 32–36)
MCHC RBC-ENTMCNC: 33 GM/DL — SIGNIFICANT CHANGE UP (ref 32–36)
MCHC RBC-ENTMCNC: 33.1 GM/DL — SIGNIFICANT CHANGE UP (ref 32–36)
MCHC RBC-ENTMCNC: 33.2 GM/DL — SIGNIFICANT CHANGE UP (ref 32–36)
MCHC RBC-ENTMCNC: 33.3 GM/DL — SIGNIFICANT CHANGE UP (ref 32–36)
MCHC RBC-ENTMCNC: 33.3 GM/DL — SIGNIFICANT CHANGE UP (ref 32–36)
MCHC RBC-ENTMCNC: 33.5 GM/DL — SIGNIFICANT CHANGE UP (ref 32–36)
MCHC RBC-ENTMCNC: 33.5 GM/DL — SIGNIFICANT CHANGE UP (ref 32–36)
MCHC RBC-ENTMCNC: 33.6 GM/DL — SIGNIFICANT CHANGE UP (ref 32–36)
MCHC RBC-ENTMCNC: 33.6 GM/DL — SIGNIFICANT CHANGE UP (ref 32–36)
MCHC RBC-ENTMCNC: 33.7 GM/DL — SIGNIFICANT CHANGE UP (ref 32–36)
MCHC RBC-ENTMCNC: 33.7 GM/DL — SIGNIFICANT CHANGE UP (ref 32–36)
MCHC RBC-ENTMCNC: 33.8 GM/DL — SIGNIFICANT CHANGE UP (ref 32–36)
MCHC RBC-ENTMCNC: 34 GM/DL — SIGNIFICANT CHANGE UP (ref 32–36)
MCHC RBC-ENTMCNC: 34.1 GM/DL — SIGNIFICANT CHANGE UP (ref 32–36)
MCHC RBC-ENTMCNC: 34.2 GM/DL — SIGNIFICANT CHANGE UP (ref 32–36)
MCHC RBC-ENTMCNC: 34.3 GM/DL — SIGNIFICANT CHANGE UP (ref 32–36)
MCHC RBC-ENTMCNC: 34.4 GM/DL — SIGNIFICANT CHANGE UP (ref 32–36)
MCHC RBC-ENTMCNC: 34.7 GM/DL — SIGNIFICANT CHANGE UP (ref 32–36)
MCV RBC AUTO: 82.6 FL — SIGNIFICANT CHANGE UP (ref 80–100)
MCV RBC AUTO: 83.4 FL — SIGNIFICANT CHANGE UP (ref 80–100)
MCV RBC AUTO: 83.4 FL — SIGNIFICANT CHANGE UP (ref 80–100)
MCV RBC AUTO: 84 FL — SIGNIFICANT CHANGE UP (ref 80–100)
MCV RBC AUTO: 84.1 FL — SIGNIFICANT CHANGE UP (ref 80–100)
MCV RBC AUTO: 84.2 FL — SIGNIFICANT CHANGE UP (ref 80–100)
MCV RBC AUTO: 84.4 FL — SIGNIFICANT CHANGE UP (ref 80–100)
MCV RBC AUTO: 84.7 FL — SIGNIFICANT CHANGE UP (ref 80–100)
MCV RBC AUTO: 84.8 FL — SIGNIFICANT CHANGE UP (ref 80–100)
MCV RBC AUTO: 85.2 FL — SIGNIFICANT CHANGE UP (ref 80–100)
MCV RBC AUTO: 85.3 FL — SIGNIFICANT CHANGE UP (ref 80–100)
MCV RBC AUTO: 85.4 FL — SIGNIFICANT CHANGE UP (ref 80–100)
MCV RBC AUTO: 85.5 FL — SIGNIFICANT CHANGE UP (ref 80–100)
MCV RBC AUTO: 85.5 FL — SIGNIFICANT CHANGE UP (ref 80–100)
MCV RBC AUTO: 85.6 FL — SIGNIFICANT CHANGE UP (ref 80–100)
MCV RBC AUTO: 85.7 FL — SIGNIFICANT CHANGE UP (ref 80–100)
MCV RBC AUTO: 85.8 FL — SIGNIFICANT CHANGE UP (ref 80–100)
MCV RBC AUTO: 85.9 FL — SIGNIFICANT CHANGE UP (ref 80–100)
MCV RBC AUTO: 86 FL — SIGNIFICANT CHANGE UP (ref 80–100)
MCV RBC AUTO: 86 FL — SIGNIFICANT CHANGE UP (ref 80–100)
MCV RBC AUTO: 86.5 FL — SIGNIFICANT CHANGE UP (ref 80–100)
MCV RBC AUTO: 86.7 FL — SIGNIFICANT CHANGE UP (ref 80–100)
MCV RBC AUTO: 86.7 FL — SIGNIFICANT CHANGE UP (ref 80–100)
MCV RBC AUTO: 86.8 FL — SIGNIFICANT CHANGE UP (ref 80–100)
MCV RBC AUTO: 87.3 FL — SIGNIFICANT CHANGE UP (ref 80–100)
MCV RBC AUTO: 87.4 FL — SIGNIFICANT CHANGE UP (ref 80–100)
MCV RBC AUTO: 87.5 FL — SIGNIFICANT CHANGE UP (ref 80–100)
MCV RBC AUTO: 88.3 FL — SIGNIFICANT CHANGE UP (ref 80–100)
MCV RBC AUTO: 88.5 FL — SIGNIFICANT CHANGE UP (ref 80–100)
MCV RBC AUTO: 88.5 FL — SIGNIFICANT CHANGE UP (ref 80–100)
MCV RBC AUTO: 88.6 FL — SIGNIFICANT CHANGE UP (ref 80–100)
MCV RBC AUTO: 88.8 FL — SIGNIFICANT CHANGE UP (ref 80–100)
MCV RBC AUTO: 88.9 FL — SIGNIFICANT CHANGE UP (ref 80–100)
MCV RBC AUTO: 89 FL — SIGNIFICANT CHANGE UP (ref 80–100)
MCV RBC AUTO: 89.3 FL — SIGNIFICANT CHANGE UP (ref 80–100)
MCV RBC AUTO: 89.5 FL — SIGNIFICANT CHANGE UP (ref 80–100)
MCV RBC AUTO: 89.6 FL — SIGNIFICANT CHANGE UP (ref 80–100)
MCV RBC AUTO: 89.9 FL — SIGNIFICANT CHANGE UP (ref 80–100)
MCV RBC AUTO: 89.9 FL — SIGNIFICANT CHANGE UP (ref 80–100)
MCV RBC AUTO: 90.3 FL — SIGNIFICANT CHANGE UP (ref 80–100)
MCV RBC AUTO: 90.3 FL — SIGNIFICANT CHANGE UP (ref 80–100)
MCV RBC AUTO: 90.5 FL — SIGNIFICANT CHANGE UP (ref 80–100)
MCV RBC AUTO: 90.7 FL — SIGNIFICANT CHANGE UP (ref 80–100)
MCV RBC AUTO: 90.7 FL — SIGNIFICANT CHANGE UP (ref 80–100)
MCV RBC AUTO: 90.8 FL — SIGNIFICANT CHANGE UP (ref 80–100)
MCV RBC AUTO: 91 FL — SIGNIFICANT CHANGE UP (ref 80–100)
MCV RBC AUTO: 91.1 FL — SIGNIFICANT CHANGE UP (ref 80–100)
MCV RBC AUTO: 91.2 FL — SIGNIFICANT CHANGE UP (ref 80–100)
MCV RBC AUTO: 91.3 FL — SIGNIFICANT CHANGE UP (ref 80–100)
MCV RBC AUTO: 91.8 FL — SIGNIFICANT CHANGE UP (ref 80–100)
MCV RBC AUTO: 91.8 FL — SIGNIFICANT CHANGE UP (ref 80–100)
MCV RBC AUTO: 91.9 FL — SIGNIFICANT CHANGE UP (ref 80–100)
MCV RBC AUTO: 92.1 FL — SIGNIFICANT CHANGE UP (ref 80–100)
MCV RBC AUTO: 92.4 FL — SIGNIFICANT CHANGE UP (ref 80–100)
MCV RBC AUTO: 92.5 FL — SIGNIFICANT CHANGE UP (ref 80–100)
MCV RBC AUTO: 92.6 FL — SIGNIFICANT CHANGE UP (ref 80–100)
MCV RBC AUTO: 92.7 FL — SIGNIFICANT CHANGE UP (ref 80–100)
MCV RBC AUTO: 92.8 FL — SIGNIFICANT CHANGE UP (ref 80–100)
MCV RBC AUTO: 92.8 FL — SIGNIFICANT CHANGE UP (ref 80–100)
MCV RBC AUTO: 93.2 FL — SIGNIFICANT CHANGE UP (ref 80–100)
MCV RBC AUTO: 93.4 FL — SIGNIFICANT CHANGE UP (ref 80–100)
MCV RBC AUTO: 93.6 FL — SIGNIFICANT CHANGE UP (ref 80–100)
MCV RBC AUTO: 94.1 FL — SIGNIFICANT CHANGE UP (ref 80–100)
MCV RBC AUTO: 96.5 FL
MCV RBC AUTO: 98.4 FL
METHOD TYPE: SIGNIFICANT CHANGE UP
MONOCYTES # BLD AUTO: 0.05 K/UL — SIGNIFICANT CHANGE UP (ref 0–0.9)
MONOCYTES # BLD AUTO: 0.12 K/UL — SIGNIFICANT CHANGE UP (ref 0–0.9)
MONOCYTES # BLD AUTO: 0.38 K/UL — SIGNIFICANT CHANGE UP (ref 0–0.9)
MONOCYTES # BLD AUTO: 0.38 K/UL — SIGNIFICANT CHANGE UP (ref 0–0.9)
MONOCYTES # BLD AUTO: 0.41 K/UL — SIGNIFICANT CHANGE UP (ref 0–0.9)
MONOCYTES # BLD AUTO: 0.49 K/UL
MONOCYTES # BLD AUTO: 0.51 K/UL
MONOCYTES # BLD AUTO: 0.52 K/UL — SIGNIFICANT CHANGE UP (ref 0–0.9)
MONOCYTES # BLD AUTO: 0.54 K/UL — SIGNIFICANT CHANGE UP (ref 0–0.9)
MONOCYTES # BLD AUTO: 0.57 K/UL — SIGNIFICANT CHANGE UP (ref 0–0.9)
MONOCYTES # BLD AUTO: 0.57 K/UL — SIGNIFICANT CHANGE UP (ref 0–0.9)
MONOCYTES # BLD AUTO: 0.63 K/UL — SIGNIFICANT CHANGE UP (ref 0–0.9)
MONOCYTES NFR BLD AUTO: 1 % — LOW (ref 2–14)
MONOCYTES NFR BLD AUTO: 2 % — SIGNIFICANT CHANGE UP (ref 2–14)
MONOCYTES NFR BLD AUTO: 3.9 % — SIGNIFICANT CHANGE UP (ref 2–14)
MONOCYTES NFR BLD AUTO: 4 % — SIGNIFICANT CHANGE UP (ref 2–14)
MONOCYTES NFR BLD AUTO: 4.5 % — SIGNIFICANT CHANGE UP (ref 2–14)
MONOCYTES NFR BLD AUTO: 5.5 % — SIGNIFICANT CHANGE UP (ref 2–14)
MONOCYTES NFR BLD AUTO: 6 % — SIGNIFICANT CHANGE UP (ref 2–14)
MONOCYTES NFR BLD AUTO: 6.6 % — SIGNIFICANT CHANGE UP (ref 2–14)
MONOCYTES NFR BLD AUTO: 7.5 % — SIGNIFICANT CHANGE UP (ref 2–14)
MONOCYTES NFR BLD AUTO: 8 %
MONOCYTES NFR BLD AUTO: 8.4 %
MONOCYTES NFR BLD AUTO: 9.4 % — SIGNIFICANT CHANGE UP (ref 2–14)
NEUTROPHILS # BLD AUTO: 15.02 K/UL — HIGH (ref 1.8–7.4)
NEUTROPHILS # BLD AUTO: 4.95 K/UL — SIGNIFICANT CHANGE UP (ref 1.8–7.4)
NEUTROPHILS # BLD AUTO: 4.97 K/UL
NEUTROPHILS # BLD AUTO: 5 K/UL — SIGNIFICANT CHANGE UP (ref 1.8–7.4)
NEUTROPHILS # BLD AUTO: 5.11 K/UL
NEUTROPHILS # BLD AUTO: 5.17 K/UL — SIGNIFICANT CHANGE UP (ref 1.8–7.4)
NEUTROPHILS # BLD AUTO: 5.22 K/UL — SIGNIFICANT CHANGE UP (ref 1.8–7.4)
NEUTROPHILS # BLD AUTO: 6.11 K/UL — SIGNIFICANT CHANGE UP (ref 1.8–7.4)
NEUTROPHILS # BLD AUTO: 8.1 K/UL — HIGH (ref 1.8–7.4)
NEUTROPHILS # BLD AUTO: 8.58 K/UL — HIGH (ref 1.8–7.4)
NEUTROPHILS # BLD AUTO: 8.71 K/UL — HIGH (ref 1.8–7.4)
NEUTROPHILS # BLD AUTO: 8.77 K/UL — HIGH (ref 1.8–7.4)
NEUTROPHILS NFR BLD AUTO: 79 % — HIGH (ref 43–77)
NEUTROPHILS NFR BLD AUTO: 80 % — HIGH (ref 43–77)
NEUTROPHILS NFR BLD AUTO: 82 % — HIGH (ref 43–77)
NEUTROPHILS NFR BLD AUTO: 82.2 %
NEUTROPHILS NFR BLD AUTO: 83.3 %
NEUTROPHILS NFR BLD AUTO: 85 % — HIGH (ref 43–77)
NEUTROPHILS NFR BLD AUTO: 86.6 % — HIGH (ref 43–77)
NEUTROPHILS NFR BLD AUTO: 88.3 % — HIGH (ref 43–77)
NEUTROPHILS NFR BLD AUTO: 90.9 % — HIGH (ref 43–77)
NEUTROPHILS NFR BLD AUTO: 91 % — HIGH (ref 43–77)
NEUTROPHILS NFR BLD AUTO: 91.7 % — HIGH (ref 43–77)
NEUTROPHILS NFR BLD AUTO: 93 % — HIGH (ref 43–77)
NEUTS BAND # BLD: 4 % — SIGNIFICANT CHANGE UP (ref 0–8)
NEUTS BAND # BLD: 8 % — SIGNIFICANT CHANGE UP (ref 0–8)
NITRITE UR-MCNC: NEGATIVE — SIGNIFICANT CHANGE UP
NRBC # BLD: 1 /100 WBCS — HIGH (ref 0–0)
NRBC # BLD: 2 /100 WBCS — HIGH (ref 0–0)
NRBC # BLD: 3 /100 WBCS — HIGH (ref 0–0)
NRBC # BLD: 3 /100 — HIGH (ref 0–0)
NRBC # BLD: 4 /100 WBCS — HIGH (ref 0–0)
NRBC # BLD: 5 /100 WBCS — HIGH (ref 0–0)
NRBC # BLD: 6 /100 WBCS — HIGH (ref 0–0)
NRBC # BLD: 8 /100 — HIGH (ref 0–0)
NRBC # BLD: SIGNIFICANT CHANGE UP /100 WBCS (ref 0–0)
OB PNL STL: NEGATIVE — SIGNIFICANT CHANGE UP
ORGANISM # SPEC MICROSCOPIC CNT: SIGNIFICANT CHANGE UP
OVALOCYTES BLD QL SMEAR: SLIGHT — SIGNIFICANT CHANGE UP
PCO2 BLDA: 24 MMHG — LOW (ref 35–48)
PCO2 BLDA: 29 MMHG — LOW (ref 35–48)
PCO2 BLDA: 31 MMHG — LOW (ref 35–48)
PCO2 BLDA: 35 MMHG — SIGNIFICANT CHANGE UP (ref 35–48)
PCO2 BLDA: 37 MMHG — SIGNIFICANT CHANGE UP (ref 35–48)
PCO2 BLDA: 38 MMHG — SIGNIFICANT CHANGE UP (ref 35–48)
PCO2 BLDA: 40 MMHG — SIGNIFICANT CHANGE UP (ref 35–48)
PCO2 BLDA: 43 MMHG — SIGNIFICANT CHANGE UP (ref 35–48)
PCO2 BLDA: 45 MMHG — SIGNIFICANT CHANGE UP (ref 35–48)
PCO2 BLDA: 47 MMHG — SIGNIFICANT CHANGE UP (ref 35–48)
PCO2 BLDA: 48 MMHG — SIGNIFICANT CHANGE UP (ref 35–48)
PCO2 BLDA: 50 MMHG — HIGH (ref 35–48)
PCO2 BLDA: 63 MMHG — HIGH (ref 35–48)
PCO2 BLDV: 34 MMHG — LOW (ref 42–55)
PF4 HEPARIN CMPLX AB SER-ACNC: NEGATIVE — SIGNIFICANT CHANGE UP
PH BLDA: 7.15 — CRITICAL LOW (ref 7.35–7.45)
PH BLDA: 7.23 — LOW (ref 7.35–7.45)
PH BLDA: 7.27 — LOW (ref 7.35–7.45)
PH BLDA: 7.28 — LOW (ref 7.35–7.45)
PH BLDA: 7.29 — LOW (ref 7.35–7.45)
PH BLDA: 7.3 — LOW (ref 7.35–7.45)
PH BLDA: 7.31 — LOW (ref 7.35–7.45)
PH BLDA: 7.32 — LOW (ref 7.35–7.45)
PH BLDA: 7.34 — LOW (ref 7.35–7.45)
PH BLDA: 7.35 — SIGNIFICANT CHANGE UP (ref 7.35–7.45)
PH BLDA: 7.36 — SIGNIFICANT CHANGE UP (ref 7.35–7.45)
PH BLDV: 7.3 — LOW (ref 7.32–7.43)
PH UR: 5 — SIGNIFICANT CHANGE UP (ref 5–8)
PH UR: 7 — SIGNIFICANT CHANGE UP (ref 5–8)
PH UR: 7 — SIGNIFICANT CHANGE UP (ref 5–8)
PHOSPHATE SERPL-MCNC: 1.6 MG/DL — LOW (ref 2.5–4.5)
PHOSPHATE SERPL-MCNC: 1.7 MG/DL — LOW (ref 2.5–4.5)
PHOSPHATE SERPL-MCNC: 1.8 MG/DL — LOW (ref 2.5–4.5)
PHOSPHATE SERPL-MCNC: 1.9 MG/DL — LOW (ref 2.5–4.5)
PHOSPHATE SERPL-MCNC: 1.9 MG/DL — LOW (ref 2.5–4.5)
PHOSPHATE SERPL-MCNC: 2 MG/DL — LOW (ref 2.5–4.5)
PHOSPHATE SERPL-MCNC: 2.2 MG/DL — LOW (ref 2.5–4.5)
PHOSPHATE SERPL-MCNC: 2.4 MG/DL — LOW (ref 2.5–4.5)
PHOSPHATE SERPL-MCNC: 2.5 MG/DL — SIGNIFICANT CHANGE UP (ref 2.5–4.5)
PHOSPHATE SERPL-MCNC: 2.7 MG/DL — SIGNIFICANT CHANGE UP (ref 2.5–4.5)
PHOSPHATE SERPL-MCNC: 2.7 MG/DL — SIGNIFICANT CHANGE UP (ref 2.5–4.5)
PHOSPHATE SERPL-MCNC: 2.9 MG/DL — SIGNIFICANT CHANGE UP (ref 2.5–4.5)
PHOSPHATE SERPL-MCNC: 2.9 MG/DL — SIGNIFICANT CHANGE UP (ref 2.5–4.5)
PHOSPHATE SERPL-MCNC: 3.3 MG/DL — SIGNIFICANT CHANGE UP (ref 2.5–4.5)
PHOSPHATE SERPL-MCNC: 3.3 MG/DL — SIGNIFICANT CHANGE UP (ref 2.5–4.5)
PHOSPHATE SERPL-MCNC: 3.4 MG/DL — SIGNIFICANT CHANGE UP (ref 2.5–4.5)
PHOSPHATE SERPL-MCNC: 3.4 MG/DL — SIGNIFICANT CHANGE UP (ref 2.5–4.5)
PHOSPHATE SERPL-MCNC: 3.5 MG/DL — SIGNIFICANT CHANGE UP (ref 2.5–4.5)
PHOSPHATE SERPL-MCNC: 3.5 MG/DL — SIGNIFICANT CHANGE UP (ref 2.5–4.5)
PHOSPHATE SERPL-MCNC: 3.6 MG/DL — SIGNIFICANT CHANGE UP (ref 2.5–4.5)
PHOSPHATE SERPL-MCNC: 3.6 MG/DL — SIGNIFICANT CHANGE UP (ref 2.5–4.5)
PHOSPHATE SERPL-MCNC: 3.7 MG/DL — SIGNIFICANT CHANGE UP (ref 2.5–4.5)
PHOSPHATE SERPL-MCNC: 3.9 MG/DL — SIGNIFICANT CHANGE UP (ref 2.5–4.5)
PHOSPHATE SERPL-MCNC: 4 MG/DL — SIGNIFICANT CHANGE UP (ref 2.5–4.5)
PHOSPHATE SERPL-MCNC: 4.1 MG/DL — SIGNIFICANT CHANGE UP (ref 2.5–4.5)
PHOSPHATE SERPL-MCNC: 4.4 MG/DL — SIGNIFICANT CHANGE UP (ref 2.5–4.5)
PHOSPHATE SERPL-MCNC: 4.6 MG/DL — HIGH (ref 2.5–4.5)
PHOSPHATE SERPL-MCNC: 4.6 MG/DL — HIGH (ref 2.5–4.5)
PHOSPHATE SERPL-MCNC: 4.8 MG/DL — HIGH (ref 2.5–4.5)
PHOSPHATE SERPL-MCNC: 5 MG/DL — HIGH (ref 2.5–4.5)
PHOSPHATE SERPL-MCNC: 5.1 MG/DL — HIGH (ref 2.5–4.5)
PHOSPHATE SERPL-MCNC: 5.2 MG/DL — HIGH (ref 2.5–4.5)
PHOSPHATE SERPL-MCNC: 5.5 MG/DL — HIGH (ref 2.5–4.5)
PHOSPHATE SERPL-MCNC: 6.1 MG/DL — HIGH (ref 2.5–4.5)
PHOSPHATE SERPL-MCNC: 6.4 MG/DL — HIGH (ref 2.5–4.5)
PHOSPHATE SERPL-MCNC: 6.5 MG/DL — HIGH (ref 2.5–4.5)
PHOSPHATE SERPL-MCNC: 7.6 MG/DL — HIGH (ref 2.5–4.5)
PHOSPHATE SERPL-MCNC: 8.2 MG/DL — HIGH (ref 2.5–4.5)
PLAT MORPH BLD: NORMAL — SIGNIFICANT CHANGE UP
PLATELET # BLD AUTO: 100 K/UL — LOW (ref 150–400)
PLATELET # BLD AUTO: 101 K/UL — LOW (ref 150–400)
PLATELET # BLD AUTO: 103 K/UL — LOW (ref 150–400)
PLATELET # BLD AUTO: 112 K/UL — LOW (ref 150–400)
PLATELET # BLD AUTO: 118 K/UL — LOW (ref 150–400)
PLATELET # BLD AUTO: 123 K/UL — LOW (ref 150–400)
PLATELET # BLD AUTO: 124 K/UL — LOW (ref 150–400)
PLATELET # BLD AUTO: 128 K/UL — LOW (ref 150–400)
PLATELET # BLD AUTO: 131 K/UL — LOW (ref 150–400)
PLATELET # BLD AUTO: 137 K/UL — LOW (ref 150–400)
PLATELET # BLD AUTO: 139 K/UL — LOW (ref 150–400)
PLATELET # BLD AUTO: 140 K/UL — LOW (ref 150–400)
PLATELET # BLD AUTO: 148 K/UL — LOW (ref 150–400)
PLATELET # BLD AUTO: 151 K/UL — SIGNIFICANT CHANGE UP (ref 150–400)
PLATELET # BLD AUTO: 152 K/UL — SIGNIFICANT CHANGE UP (ref 150–400)
PLATELET # BLD AUTO: 159 K/UL — SIGNIFICANT CHANGE UP (ref 150–400)
PLATELET # BLD AUTO: 162 K/UL — SIGNIFICANT CHANGE UP (ref 150–400)
PLATELET # BLD AUTO: 168 K/UL — SIGNIFICANT CHANGE UP (ref 150–400)
PLATELET # BLD AUTO: 177 K/UL — SIGNIFICANT CHANGE UP (ref 150–400)
PLATELET # BLD AUTO: 179 K/UL — SIGNIFICANT CHANGE UP (ref 150–400)
PLATELET # BLD AUTO: 185 K/UL — SIGNIFICANT CHANGE UP (ref 150–400)
PLATELET # BLD AUTO: 186 K/UL — SIGNIFICANT CHANGE UP (ref 150–400)
PLATELET # BLD AUTO: 20 K/UL — CRITICAL LOW (ref 150–400)
PLATELET # BLD AUTO: 20 K/UL — CRITICAL LOW (ref 150–400)
PLATELET # BLD AUTO: 205 K/UL — SIGNIFICANT CHANGE UP (ref 150–400)
PLATELET # BLD AUTO: 22 K/UL — LOW (ref 150–400)
PLATELET # BLD AUTO: 233 K/UL — SIGNIFICANT CHANGE UP (ref 150–400)
PLATELET # BLD AUTO: 247 K/UL
PLATELET # BLD AUTO: 255 K/UL — SIGNIFICANT CHANGE UP (ref 150–400)
PLATELET # BLD AUTO: 270 K/UL — SIGNIFICANT CHANGE UP (ref 150–400)
PLATELET # BLD AUTO: 30 K/UL — LOW (ref 150–400)
PLATELET # BLD AUTO: 30 K/UL — LOW (ref 150–400)
PLATELET # BLD AUTO: 31 K/UL — LOW (ref 150–400)
PLATELET # BLD AUTO: 34 K/UL — LOW (ref 150–400)
PLATELET # BLD AUTO: 34 K/UL — LOW (ref 150–400)
PLATELET # BLD AUTO: 346 K/UL
PLATELET # BLD AUTO: 39 K/UL — LOW (ref 150–400)
PLATELET # BLD AUTO: 41 K/UL — LOW (ref 150–400)
PLATELET # BLD AUTO: 42 K/UL — LOW (ref 150–400)
PLATELET # BLD AUTO: 42 K/UL — LOW (ref 150–400)
PLATELET # BLD AUTO: 43 K/UL — LOW (ref 150–400)
PLATELET # BLD AUTO: 44 K/UL — LOW (ref 150–400)
PLATELET # BLD AUTO: 45 K/UL — LOW (ref 150–400)
PLATELET # BLD AUTO: 47 K/UL — LOW (ref 150–400)
PLATELET # BLD AUTO: 50 K/UL — LOW (ref 150–400)
PLATELET # BLD AUTO: 51 K/UL — LOW (ref 150–400)
PLATELET # BLD AUTO: 51 K/UL — LOW (ref 150–400)
PLATELET # BLD AUTO: 52 K/UL — LOW (ref 150–400)
PLATELET # BLD AUTO: 52 K/UL — LOW (ref 150–400)
PLATELET # BLD AUTO: 53 K/UL — LOW (ref 150–400)
PLATELET # BLD AUTO: 54 K/UL — LOW (ref 150–400)
PLATELET # BLD AUTO: 56 K/UL — LOW (ref 150–400)
PLATELET # BLD AUTO: 59 K/UL — LOW (ref 150–400)
PLATELET # BLD AUTO: 68 K/UL — LOW (ref 150–400)
PLATELET # BLD AUTO: 69 K/UL — LOW (ref 150–400)
PLATELET # BLD AUTO: 69 K/UL — LOW (ref 150–400)
PLATELET # BLD AUTO: 73 K/UL — LOW (ref 150–400)
PLATELET # BLD AUTO: 81 K/UL — LOW (ref 150–400)
PLATELET # BLD AUTO: 83 K/UL — LOW (ref 150–400)
PLATELET # BLD AUTO: 84 K/UL — LOW (ref 150–400)
PLATELET # BLD AUTO: 88 K/UL — LOW (ref 150–400)
PLATELET # BLD AUTO: 9 K/UL — CRITICAL LOW (ref 150–400)
PLATELET # BLD AUTO: 90 K/UL — LOW (ref 150–400)
PLATELET # BLD AUTO: 93 K/UL — LOW (ref 150–400)
PLATELET # BLD AUTO: 96 K/UL — LOW (ref 150–400)
PO2 BLDA: 116 MMHG — HIGH (ref 83–108)
PO2 BLDA: 137 MMHG — HIGH (ref 83–108)
PO2 BLDA: 144 MMHG — HIGH (ref 83–108)
PO2 BLDA: 146 MMHG — HIGH (ref 83–108)
PO2 BLDA: 147 MMHG — HIGH (ref 83–108)
PO2 BLDA: 148 MMHG — HIGH (ref 83–108)
PO2 BLDA: 149 MMHG — HIGH (ref 83–108)
PO2 BLDA: 155 MMHG — HIGH (ref 83–108)
PO2 BLDA: 161 MMHG — HIGH (ref 83–108)
PO2 BLDA: 209 MMHG — HIGH (ref 83–108)
PO2 BLDA: 217 MMHG — HIGH (ref 83–108)
PO2 BLDA: 351 MMHG — HIGH (ref 83–108)
PO2 BLDA: 71 MMHG — LOW (ref 83–108)
PO2 BLDV: 265 MMHG — SIGNIFICANT CHANGE UP (ref 25–45)
POIKILOCYTOSIS BLD QL AUTO: SIGNIFICANT CHANGE UP
POIKILOCYTOSIS BLD QL AUTO: SIGNIFICANT CHANGE UP
POLYCHROMASIA BLD QL SMEAR: SLIGHT — SIGNIFICANT CHANGE UP
POTASSIUM SERPL-MCNC: 2.9 MMOL/L — CRITICAL LOW (ref 3.5–5.3)
POTASSIUM SERPL-MCNC: 3.1 MMOL/L — LOW (ref 3.5–5.3)
POTASSIUM SERPL-MCNC: 3.2 MMOL/L — LOW (ref 3.5–5.3)
POTASSIUM SERPL-MCNC: 3.3 MMOL/L — LOW (ref 3.5–5.3)
POTASSIUM SERPL-MCNC: 3.4 MMOL/L — LOW (ref 3.5–5.3)
POTASSIUM SERPL-MCNC: 3.5 MMOL/L — SIGNIFICANT CHANGE UP (ref 3.5–5.3)
POTASSIUM SERPL-MCNC: 3.6 MMOL/L — SIGNIFICANT CHANGE UP (ref 3.5–5.3)
POTASSIUM SERPL-MCNC: 3.7 MMOL/L — SIGNIFICANT CHANGE UP (ref 3.5–5.3)
POTASSIUM SERPL-MCNC: 3.8 MMOL/L — SIGNIFICANT CHANGE UP (ref 3.5–5.3)
POTASSIUM SERPL-MCNC: 3.8 MMOL/L — SIGNIFICANT CHANGE UP (ref 3.5–5.3)
POTASSIUM SERPL-MCNC: 3.9 MMOL/L — SIGNIFICANT CHANGE UP (ref 3.5–5.3)
POTASSIUM SERPL-MCNC: 4 MMOL/L — SIGNIFICANT CHANGE UP (ref 3.5–5.3)
POTASSIUM SERPL-MCNC: 4 MMOL/L — SIGNIFICANT CHANGE UP (ref 3.5–5.3)
POTASSIUM SERPL-MCNC: 4.1 MMOL/L — SIGNIFICANT CHANGE UP (ref 3.5–5.3)
POTASSIUM SERPL-MCNC: 4.1 MMOL/L — SIGNIFICANT CHANGE UP (ref 3.5–5.3)
POTASSIUM SERPL-MCNC: 4.2 MMOL/L — SIGNIFICANT CHANGE UP (ref 3.5–5.3)
POTASSIUM SERPL-MCNC: 4.3 MMOL/L — SIGNIFICANT CHANGE UP (ref 3.5–5.3)
POTASSIUM SERPL-MCNC: 4.4 MMOL/L — SIGNIFICANT CHANGE UP (ref 3.5–5.3)
POTASSIUM SERPL-MCNC: 4.5 MMOL/L — SIGNIFICANT CHANGE UP (ref 3.5–5.3)
POTASSIUM SERPL-MCNC: 4.5 MMOL/L — SIGNIFICANT CHANGE UP (ref 3.5–5.3)
POTASSIUM SERPL-MCNC: 4.6 MMOL/L — SIGNIFICANT CHANGE UP (ref 3.5–5.3)
POTASSIUM SERPL-MCNC: 4.7 MMOL/L — SIGNIFICANT CHANGE UP (ref 3.5–5.3)
POTASSIUM SERPL-MCNC: 4.7 MMOL/L — SIGNIFICANT CHANGE UP (ref 3.5–5.3)
POTASSIUM SERPL-MCNC: 4.9 MMOL/L — SIGNIFICANT CHANGE UP (ref 3.5–5.3)
POTASSIUM SERPL-MCNC: 4.9 MMOL/L — SIGNIFICANT CHANGE UP (ref 3.5–5.3)
POTASSIUM SERPL-MCNC: 5.1 MMOL/L — SIGNIFICANT CHANGE UP (ref 3.5–5.3)
POTASSIUM SERPL-MCNC: 5.2 MMOL/L — SIGNIFICANT CHANGE UP (ref 3.5–5.3)
POTASSIUM SERPL-MCNC: 5.3 MMOL/L — SIGNIFICANT CHANGE UP (ref 3.5–5.3)
POTASSIUM SERPL-SCNC: 2.9 MMOL/L — CRITICAL LOW (ref 3.5–5.3)
POTASSIUM SERPL-SCNC: 3.1 MMOL/L — LOW (ref 3.5–5.3)
POTASSIUM SERPL-SCNC: 3.2 MMOL/L
POTASSIUM SERPL-SCNC: 3.2 MMOL/L — LOW (ref 3.5–5.3)
POTASSIUM SERPL-SCNC: 3.3 MMOL/L — LOW (ref 3.5–5.3)
POTASSIUM SERPL-SCNC: 3.4 MMOL/L — LOW (ref 3.5–5.3)
POTASSIUM SERPL-SCNC: 3.5 MMOL/L — SIGNIFICANT CHANGE UP (ref 3.5–5.3)
POTASSIUM SERPL-SCNC: 3.6 MMOL/L — SIGNIFICANT CHANGE UP (ref 3.5–5.3)
POTASSIUM SERPL-SCNC: 3.7 MMOL/L — SIGNIFICANT CHANGE UP (ref 3.5–5.3)
POTASSIUM SERPL-SCNC: 3.8 MMOL/L — SIGNIFICANT CHANGE UP (ref 3.5–5.3)
POTASSIUM SERPL-SCNC: 3.8 MMOL/L — SIGNIFICANT CHANGE UP (ref 3.5–5.3)
POTASSIUM SERPL-SCNC: 3.9 MMOL/L — SIGNIFICANT CHANGE UP (ref 3.5–5.3)
POTASSIUM SERPL-SCNC: 4 MMOL/L — SIGNIFICANT CHANGE UP (ref 3.5–5.3)
POTASSIUM SERPL-SCNC: 4 MMOL/L — SIGNIFICANT CHANGE UP (ref 3.5–5.3)
POTASSIUM SERPL-SCNC: 4.1 MMOL/L — SIGNIFICANT CHANGE UP (ref 3.5–5.3)
POTASSIUM SERPL-SCNC: 4.1 MMOL/L — SIGNIFICANT CHANGE UP (ref 3.5–5.3)
POTASSIUM SERPL-SCNC: 4.2 MMOL/L — SIGNIFICANT CHANGE UP (ref 3.5–5.3)
POTASSIUM SERPL-SCNC: 4.3 MMOL/L — SIGNIFICANT CHANGE UP (ref 3.5–5.3)
POTASSIUM SERPL-SCNC: 4.4 MMOL/L — SIGNIFICANT CHANGE UP (ref 3.5–5.3)
POTASSIUM SERPL-SCNC: 4.5 MMOL/L — SIGNIFICANT CHANGE UP (ref 3.5–5.3)
POTASSIUM SERPL-SCNC: 4.5 MMOL/L — SIGNIFICANT CHANGE UP (ref 3.5–5.3)
POTASSIUM SERPL-SCNC: 4.6 MMOL/L
POTASSIUM SERPL-SCNC: 4.6 MMOL/L — SIGNIFICANT CHANGE UP (ref 3.5–5.3)
POTASSIUM SERPL-SCNC: 4.7 MMOL/L — SIGNIFICANT CHANGE UP (ref 3.5–5.3)
POTASSIUM SERPL-SCNC: 4.7 MMOL/L — SIGNIFICANT CHANGE UP (ref 3.5–5.3)
POTASSIUM SERPL-SCNC: 4.9 MMOL/L — SIGNIFICANT CHANGE UP (ref 3.5–5.3)
POTASSIUM SERPL-SCNC: 4.9 MMOL/L — SIGNIFICANT CHANGE UP (ref 3.5–5.3)
POTASSIUM SERPL-SCNC: 5.1 MMOL/L — SIGNIFICANT CHANGE UP (ref 3.5–5.3)
POTASSIUM SERPL-SCNC: 5.2 MMOL/L — SIGNIFICANT CHANGE UP (ref 3.5–5.3)
POTASSIUM SERPL-SCNC: 5.3 MMOL/L — SIGNIFICANT CHANGE UP (ref 3.5–5.3)
PROT SERPL-MCNC: 4.2 GM/DL — LOW (ref 6–8.3)
PROT SERPL-MCNC: 4.3 GM/DL — LOW (ref 6–8.3)
PROT SERPL-MCNC: 4.5 GM/DL — LOW (ref 6–8.3)
PROT SERPL-MCNC: 4.6 GM/DL — LOW (ref 6–8.3)
PROT SERPL-MCNC: 4.7 GM/DL — LOW (ref 6–8.3)
PROT SERPL-MCNC: 4.8 GM/DL — LOW (ref 6–8.3)
PROT SERPL-MCNC: 4.9 GM/DL — LOW (ref 6–8.3)
PROT SERPL-MCNC: 5 GM/DL — LOW (ref 6–8.3)
PROT SERPL-MCNC: 5.2 GM/DL — LOW (ref 6–8.3)
PROT SERPL-MCNC: 5.3 GM/DL — LOW (ref 6–8.3)
PROT SERPL-MCNC: 5.4 GM/DL — LOW (ref 6–8.3)
PROT SERPL-MCNC: 5.4 GM/DL — LOW (ref 6–8.3)
PROT SERPL-MCNC: 5.5 GM/DL — LOW (ref 6–8.3)
PROT SERPL-MCNC: 5.7 GM/DL — LOW (ref 6–8.3)
PROT SERPL-MCNC: 5.9 GM/DL — LOW (ref 6–8.3)
PROT SERPL-MCNC: 6.1 GM/DL — SIGNIFICANT CHANGE UP (ref 6–8.3)
PROT SERPL-MCNC: 6.2 GM/DL — SIGNIFICANT CHANGE UP (ref 6–8.3)
PROT SERPL-MCNC: 6.3 GM/DL — SIGNIFICANT CHANGE UP (ref 6–8.3)
PROT SERPL-MCNC: 6.3 GM/DL — SIGNIFICANT CHANGE UP (ref 6–8.3)
PROT SERPL-MCNC: 6.4 GM/DL — SIGNIFICANT CHANGE UP (ref 6–8.3)
PROT SERPL-MCNC: 6.5 GM/DL — SIGNIFICANT CHANGE UP (ref 6–8.3)
PROT SERPL-MCNC: 6.7 GM/DL — SIGNIFICANT CHANGE UP (ref 6–8.3)
PROT SERPL-MCNC: 6.9 G/DL
PROT SERPL-MCNC: 7.1 G/DL
PROT SERPL-MCNC: 7.6 GM/DL — SIGNIFICANT CHANGE UP (ref 6–8.3)
PROT SERPL-MCNC: 8 GM/DL — SIGNIFICANT CHANGE UP (ref 6–8.3)
PROT SERPL-MCNC: 8.1 GM/DL — SIGNIFICANT CHANGE UP (ref 6–8.3)
PROT UR-MCNC: 100
PROTHROM AB SERPL-ACNC: 12.5 SEC — SIGNIFICANT CHANGE UP (ref 10.5–13.4)
PROTHROM AB SERPL-ACNC: 12.6 SEC — SIGNIFICANT CHANGE UP (ref 10.5–13.4)
PROTHROM AB SERPL-ACNC: 12.6 SEC — SIGNIFICANT CHANGE UP (ref 10.5–13.4)
PROTHROM AB SERPL-ACNC: 12.8 SEC — SIGNIFICANT CHANGE UP (ref 10.5–13.4)
PROTHROM AB SERPL-ACNC: 13.3 SEC — SIGNIFICANT CHANGE UP (ref 10.5–13.4)
PROTHROM AB SERPL-ACNC: 14.1 SEC — HIGH (ref 10.5–13.4)
PROTHROM AB SERPL-ACNC: 14.3 SEC — HIGH (ref 10.5–13.4)
PROTHROM AB SERPL-ACNC: 14.5 SEC — HIGH (ref 10.5–13.4)
PROTHROM AB SERPL-ACNC: 14.8 SEC — HIGH (ref 10.5–13.4)
PROTHROM AB SERPL-ACNC: 15.5 SEC — HIGH (ref 10.5–13.4)
PROTHROM AB SERPL-ACNC: 17.2 SEC — HIGH (ref 10.5–13.4)
PROTHROM AB SERPL-ACNC: 17.5 SEC — HIGH (ref 10.5–13.4)
PROTHROM AB SERPL-ACNC: 26.8 SEC — HIGH (ref 10.5–13.4)
PS EXPRESSION INTERPRETATION: SIGNIFICANT CHANGE UP
PS EXPRESSION PF4 30 MCG/100U HEPARIN: 0 % — SIGNIFICANT CHANGE UP
PS EXPRESSION PF4 30 MCG: 0 % — SIGNIFICANT CHANGE UP
PS EXPRESSION RESULT: NEGATIVE — SIGNIFICANT CHANGE UP
RAPID RVP RESULT: SIGNIFICANT CHANGE UP
RBC # BLD: 2.38 M/UL — LOW (ref 4.2–5.8)
RBC # BLD: 2.41 M/UL — LOW (ref 4.2–5.8)
RBC # BLD: 2.43 M/UL — LOW (ref 4.2–5.8)
RBC # BLD: 2.46 M/UL — LOW (ref 4.2–5.8)
RBC # BLD: 2.46 M/UL — LOW (ref 4.2–5.8)
RBC # BLD: 2.47 M/UL — LOW (ref 4.2–5.8)
RBC # BLD: 2.48 M/UL — LOW (ref 4.2–5.8)
RBC # BLD: 2.52 M/UL — LOW (ref 4.2–5.8)
RBC # BLD: 2.55 M/UL — LOW (ref 4.2–5.8)
RBC # BLD: 2.6 M/UL — LOW (ref 4.2–5.8)
RBC # BLD: 2.61 M/UL — LOW (ref 4.2–5.8)
RBC # BLD: 2.61 M/UL — LOW (ref 4.2–5.8)
RBC # BLD: 2.62 M/UL — LOW (ref 4.2–5.8)
RBC # BLD: 2.62 M/UL — LOW (ref 4.2–5.8)
RBC # BLD: 2.66 M/UL — LOW (ref 4.2–5.8)
RBC # BLD: 2.67 M/UL — LOW (ref 4.2–5.8)
RBC # BLD: 2.69 M/UL — LOW (ref 4.2–5.8)
RBC # BLD: 2.72 M/UL — LOW (ref 4.2–5.8)
RBC # BLD: 2.73 M/UL — LOW (ref 4.2–5.8)
RBC # BLD: 2.74 M/UL — LOW (ref 4.2–5.8)
RBC # BLD: 2.76 M/UL — LOW (ref 4.2–5.8)
RBC # BLD: 2.78 M/UL — LOW (ref 4.2–5.8)
RBC # BLD: 2.79 M/UL — LOW (ref 4.2–5.8)
RBC # BLD: 2.79 M/UL — LOW (ref 4.2–5.8)
RBC # BLD: 2.8 M/UL — LOW (ref 4.2–5.8)
RBC # BLD: 2.8 M/UL — LOW (ref 4.2–5.8)
RBC # BLD: 2.81 M/UL — LOW (ref 4.2–5.8)
RBC # BLD: 2.87 M/UL — LOW (ref 4.2–5.8)
RBC # BLD: 2.88 M/UL — LOW (ref 4.2–5.8)
RBC # BLD: 2.89 M/UL — LOW (ref 4.2–5.8)
RBC # BLD: 2.91 M/UL — LOW (ref 4.2–5.8)
RBC # BLD: 2.91 M/UL — LOW (ref 4.2–5.8)
RBC # BLD: 2.92 M/UL — LOW (ref 4.2–5.8)
RBC # BLD: 2.93 M/UL — LOW (ref 4.2–5.8)
RBC # BLD: 2.94 M/UL — LOW (ref 4.2–5.8)
RBC # BLD: 2.95 M/UL — LOW (ref 4.2–5.8)
RBC # BLD: 2.97 M/UL — LOW (ref 4.2–5.8)
RBC # BLD: 2.98 M/UL — LOW (ref 4.2–5.8)
RBC # BLD: 3.01 M/UL — LOW (ref 4.2–5.8)
RBC # BLD: 3.03 M/UL — LOW (ref 4.2–5.8)
RBC # BLD: 3.04 M/UL — LOW (ref 4.2–5.8)
RBC # BLD: 3.05 M/UL — LOW (ref 4.2–5.8)
RBC # BLD: 3.05 M/UL — LOW (ref 4.2–5.8)
RBC # BLD: 3.06 M/UL — LOW (ref 4.2–5.8)
RBC # BLD: 3.1 M/UL — LOW (ref 4.2–5.8)
RBC # BLD: 3.13 M/UL — LOW (ref 4.2–5.8)
RBC # BLD: 3.13 M/UL — LOW (ref 4.2–5.8)
RBC # BLD: 3.16 M/UL
RBC # BLD: 3.27 M/UL — LOW (ref 4.2–5.8)
RBC # BLD: 3.33 M/UL — LOW (ref 4.2–5.8)
RBC # BLD: 3.37 M/UL — LOW (ref 4.2–5.8)
RBC # BLD: 3.38 M/UL — LOW (ref 4.2–5.8)
RBC # BLD: 3.39 M/UL — LOW (ref 4.2–5.8)
RBC # BLD: 3.4 M/UL
RBC # BLD: 3.41 M/UL — LOW (ref 4.2–5.8)
RBC # BLD: 3.44 M/UL — LOW (ref 4.2–5.8)
RBC # BLD: 3.63 M/UL — LOW (ref 4.2–5.8)
RBC # BLD: 3.72 M/UL — LOW (ref 4.2–5.8)
RBC # BLD: 3.77 M/UL — LOW (ref 4.2–5.8)
RBC # BLD: 3.88 M/UL — LOW (ref 4.2–5.8)
RBC # BLD: 4.04 M/UL — LOW (ref 4.2–5.8)
RBC # BLD: 4.62 M/UL — SIGNIFICANT CHANGE UP (ref 4.2–5.8)
RBC # FLD: 13.8 % — SIGNIFICANT CHANGE UP (ref 10.3–14.5)
RBC # FLD: 13.9 % — SIGNIFICANT CHANGE UP (ref 10.3–14.5)
RBC # FLD: 14.3 % — SIGNIFICANT CHANGE UP (ref 10.3–14.5)
RBC # FLD: 14.5 % — SIGNIFICANT CHANGE UP (ref 10.3–14.5)
RBC # FLD: 14.5 % — SIGNIFICANT CHANGE UP (ref 10.3–14.5)
RBC # FLD: 14.6 % — HIGH (ref 10.3–14.5)
RBC # FLD: 14.9 %
RBC # FLD: 14.9 % — HIGH (ref 10.3–14.5)
RBC # FLD: 15 % — HIGH (ref 10.3–14.5)
RBC # FLD: 15.1 % — HIGH (ref 10.3–14.5)
RBC # FLD: 15.3 % — HIGH (ref 10.3–14.5)
RBC # FLD: 15.4 % — HIGH (ref 10.3–14.5)
RBC # FLD: 15.5 %
RBC # FLD: 16.5 % — HIGH (ref 10.3–14.5)
RBC # FLD: 16.6 % — HIGH (ref 10.3–14.5)
RBC # FLD: 16.7 % — HIGH (ref 10.3–14.5)
RBC # FLD: 16.8 % — HIGH (ref 10.3–14.5)
RBC # FLD: 16.9 % — HIGH (ref 10.3–14.5)
RBC # FLD: 16.9 % — HIGH (ref 10.3–14.5)
RBC # FLD: 17 % — HIGH (ref 10.3–14.5)
RBC # FLD: 17.1 % — HIGH (ref 10.3–14.5)
RBC # FLD: 17.2 % — HIGH (ref 10.3–14.5)
RBC # FLD: 17.2 % — HIGH (ref 10.3–14.5)
RBC # FLD: 17.3 % — HIGH (ref 10.3–14.5)
RBC # FLD: 17.4 % — HIGH (ref 10.3–14.5)
RBC # FLD: 17.7 % — HIGH (ref 10.3–14.5)
RBC # FLD: 18.3 % — HIGH (ref 10.3–14.5)
RBC # FLD: 18.7 % — HIGH (ref 10.3–14.5)
RBC # FLD: 18.9 % — HIGH (ref 10.3–14.5)
RBC # FLD: 19.1 % — HIGH (ref 10.3–14.5)
RBC # FLD: 19.2 % — HIGH (ref 10.3–14.5)
RBC # FLD: 19.3 % — HIGH (ref 10.3–14.5)
RBC # FLD: 19.3 % — HIGH (ref 10.3–14.5)
RBC # FLD: 19.4 % — HIGH (ref 10.3–14.5)
RBC # FLD: 19.5 % — HIGH (ref 10.3–14.5)
RBC # FLD: 19.6 % — HIGH (ref 10.3–14.5)
RBC # FLD: 19.7 % — HIGH (ref 10.3–14.5)
RBC # FLD: 19.8 % — HIGH (ref 10.3–14.5)
RBC # FLD: 19.8 % — HIGH (ref 10.3–14.5)
RBC # FLD: 19.9 % — HIGH (ref 10.3–14.5)
RBC # FLD: 20 % — HIGH (ref 10.3–14.5)
RBC # FLD: 20.6 % — HIGH (ref 10.3–14.5)
RBC # FLD: 21 % — HIGH (ref 10.3–14.5)
RBC # FLD: 21.9 % — HIGH (ref 10.3–14.5)
RBC # FLD: 22.1 % — HIGH (ref 10.3–14.5)
RBC # FLD: 22.5 % — HIGH (ref 10.3–14.5)
RBC # FLD: 24 % — HIGH (ref 10.3–14.5)
RBC # FLD: 24.7 % — HIGH (ref 10.3–14.5)
RBC # FLD: 24.8 % — HIGH (ref 10.3–14.5)
RBC # FLD: 26.4 % — HIGH (ref 10.3–14.5)
RBC # FLD: 26.8 % — HIGH (ref 10.3–14.5)
RBC # FLD: 27.7 % — HIGH (ref 10.3–14.5)
RBC # FLD: 28.3 % — HIGH (ref 10.3–14.5)
RBC BLD AUTO: ABNORMAL
RBC CASTS # UR COMP ASSIST: ABNORMAL /HPF (ref 0–4)
RBC CASTS # UR COMP ASSIST: ABNORMAL /HPF (ref 0–4)
RETICS #: 57 K/UL — SIGNIFICANT CHANGE UP (ref 25–125)
RETICS/RBC NFR: 2 % — SIGNIFICANT CHANGE UP (ref 0.5–2.5)
SAO2 % BLDA: 100 % — HIGH (ref 94–98)
SAO2 % BLDA: 96 % — SIGNIFICANT CHANGE UP (ref 94–98)
SAO2 % BLDA: 99 % — HIGH (ref 94–98)
SAO2 % BLDV: 99 % — SIGNIFICANT CHANGE UP (ref 67–88)
SARS-COV-2 RNA SPEC QL NAA+PROBE: SIGNIFICANT CHANGE UP
SCHISTOCYTES BLD QL AUTO: SLIGHT — SIGNIFICANT CHANGE UP
SCHISTOCYTES BLD QL AUTO: SLIGHT — SIGNIFICANT CHANGE UP
SODIUM SERPL-SCNC: 127 MMOL/L — LOW (ref 135–145)
SODIUM SERPL-SCNC: 131 MMOL/L
SODIUM SERPL-SCNC: 133 MMOL/L — LOW (ref 135–145)
SODIUM SERPL-SCNC: 133 MMOL/L — LOW (ref 135–145)
SODIUM SERPL-SCNC: 134 MMOL/L
SODIUM SERPL-SCNC: 136 MMOL/L — SIGNIFICANT CHANGE UP (ref 135–145)
SODIUM SERPL-SCNC: 137 MMOL/L — SIGNIFICANT CHANGE UP (ref 135–145)
SODIUM SERPL-SCNC: 137 MMOL/L — SIGNIFICANT CHANGE UP (ref 135–145)
SODIUM SERPL-SCNC: 140 MMOL/L — SIGNIFICANT CHANGE UP (ref 135–145)
SODIUM SERPL-SCNC: 141 MMOL/L — SIGNIFICANT CHANGE UP (ref 135–145)
SODIUM SERPL-SCNC: 142 MMOL/L — SIGNIFICANT CHANGE UP (ref 135–145)
SODIUM SERPL-SCNC: 143 MMOL/L — SIGNIFICANT CHANGE UP (ref 135–145)
SODIUM SERPL-SCNC: 144 MMOL/L — SIGNIFICANT CHANGE UP (ref 135–145)
SODIUM SERPL-SCNC: 145 MMOL/L — SIGNIFICANT CHANGE UP (ref 135–145)
SODIUM SERPL-SCNC: 146 MMOL/L — HIGH (ref 135–145)
SODIUM SERPL-SCNC: 147 MMOL/L — HIGH (ref 135–145)
SODIUM SERPL-SCNC: 147 MMOL/L — HIGH (ref 135–145)
SODIUM SERPL-SCNC: 148 MMOL/L — HIGH (ref 135–145)
SODIUM SERPL-SCNC: 150 MMOL/L — HIGH (ref 135–145)
SP GR SPEC: 1.01 — SIGNIFICANT CHANGE UP (ref 1.01–1.02)
SP GR SPEC: 1.01 — SIGNIFICANT CHANGE UP (ref 1.01–1.02)
SP GR SPEC: 1.02 — SIGNIFICANT CHANGE UP (ref 1.01–1.02)
SPECIMEN SOURCE: SIGNIFICANT CHANGE UP
SURGICAL PATHOLOGY STUDY: SIGNIFICANT CHANGE UP
TARGETS BLD QL SMEAR: SIGNIFICANT CHANGE UP
TARGETS BLD QL SMEAR: SLIGHT — SIGNIFICANT CHANGE UP
TIBC SERPL-MCNC: 180 UG/DL — LOW (ref 220–430)
TIBC SERPL-MCNC: 335 UG/DL — SIGNIFICANT CHANGE UP (ref 220–430)
TRIGL SERPL-MCNC: 129 MG/DL — SIGNIFICANT CHANGE UP
TRIGL SERPL-MCNC: 136 MG/DL — SIGNIFICANT CHANGE UP
TRIGL SERPL-MCNC: 378 MG/DL — HIGH
TROPONIN I, HIGH SENSITIVITY RESULT: 10.99 NG/L — SIGNIFICANT CHANGE UP
TROPONIN I, HIGH SENSITIVITY RESULT: 8.17 NG/L — SIGNIFICANT CHANGE UP
TROPONIN I, HIGH SENSITIVITY RESULT: 8.75 NG/L — SIGNIFICANT CHANGE UP
TROPONIN I, HIGH SENSITIVITY RESULT: 9.71 NG/L — SIGNIFICANT CHANGE UP
TSH SERPL-MCNC: 5.69 UU/ML — HIGH (ref 0.34–4.82)
UIBC SERPL-MCNC: 156 UG/DL — SIGNIFICANT CHANGE UP (ref 110–370)
UIBC SERPL-MCNC: 303 UG/DL — SIGNIFICANT CHANGE UP (ref 110–370)
UNFRACTIONATED HEPARIN INTERPRETATION: SIGNIFICANT CHANGE UP
UNFRACTIONATED HEPARIN RESULT: NEGATIVE — SIGNIFICANT CHANGE UP
UNFRACTIONATED HEPARIN-HIGH DOSE: 0 % — SIGNIFICANT CHANGE UP
UNFRACTIONATED HEPARIN-LOW DOSE: 0 % — SIGNIFICANT CHANGE UP
UROBILINOGEN FLD QL: NEGATIVE — SIGNIFICANT CHANGE UP
VIT B12 SERPL-MCNC: 1803 PG/ML — HIGH (ref 232–1245)
VIT D25+D1,25 OH+D1,25 PNL SERPL-MCNC: 27 PG/ML — SIGNIFICANT CHANGE UP (ref 19.9–79.3)
WBC # BLD: 10 K/UL — SIGNIFICANT CHANGE UP (ref 3.8–10.5)
WBC # BLD: 10.1 K/UL — SIGNIFICANT CHANGE UP (ref 3.8–10.5)
WBC # BLD: 10.21 K/UL — SIGNIFICANT CHANGE UP (ref 3.8–10.5)
WBC # BLD: 10.31 K/UL — SIGNIFICANT CHANGE UP (ref 3.8–10.5)
WBC # BLD: 10.61 K/UL — HIGH (ref 3.8–10.5)
WBC # BLD: 10.71 K/UL — HIGH (ref 3.8–10.5)
WBC # BLD: 11.02 K/UL — HIGH (ref 3.8–10.5)
WBC # BLD: 11.04 K/UL — HIGH (ref 3.8–10.5)
WBC # BLD: 11.06 K/UL — HIGH (ref 3.8–10.5)
WBC # BLD: 11.3 K/UL — HIGH (ref 3.8–10.5)
WBC # BLD: 11.31 K/UL — HIGH (ref 3.8–10.5)
WBC # BLD: 11.83 K/UL — HIGH (ref 3.8–10.5)
WBC # BLD: 12.26 K/UL — HIGH (ref 3.8–10.5)
WBC # BLD: 14.57 K/UL — HIGH (ref 3.8–10.5)
WBC # BLD: 16.35 K/UL — HIGH (ref 3.8–10.5)
WBC # BLD: 16.65 K/UL — HIGH (ref 3.8–10.5)
WBC # BLD: 18.49 K/UL — HIGH (ref 3.8–10.5)
WBC # BLD: 18.53 K/UL — HIGH (ref 3.8–10.5)
WBC # BLD: 4 K/UL — SIGNIFICANT CHANGE UP (ref 3.8–10.5)
WBC # BLD: 4.53 K/UL — SIGNIFICANT CHANGE UP (ref 3.8–10.5)
WBC # BLD: 4.67 K/UL — SIGNIFICANT CHANGE UP (ref 3.8–10.5)
WBC # BLD: 5.09 K/UL — SIGNIFICANT CHANGE UP (ref 3.8–10.5)
WBC # BLD: 5.26 K/UL — SIGNIFICANT CHANGE UP (ref 3.8–10.5)
WBC # BLD: 5.36 K/UL — SIGNIFICANT CHANGE UP (ref 3.8–10.5)
WBC # BLD: 5.44 K/UL — SIGNIFICANT CHANGE UP (ref 3.8–10.5)
WBC # BLD: 5.78 K/UL — SIGNIFICANT CHANGE UP (ref 3.8–10.5)
WBC # BLD: 5.8 K/UL — SIGNIFICANT CHANGE UP (ref 3.8–10.5)
WBC # BLD: 5.93 K/UL — SIGNIFICANT CHANGE UP (ref 3.8–10.5)
WBC # BLD: 6.04 K/UL — SIGNIFICANT CHANGE UP (ref 3.8–10.5)
WBC # BLD: 6.14 K/UL — SIGNIFICANT CHANGE UP (ref 3.8–10.5)
WBC # BLD: 6.15 K/UL — SIGNIFICANT CHANGE UP (ref 3.8–10.5)
WBC # BLD: 6.27 K/UL — SIGNIFICANT CHANGE UP (ref 3.8–10.5)
WBC # BLD: 6.43 K/UL — SIGNIFICANT CHANGE UP (ref 3.8–10.5)
WBC # BLD: 6.67 K/UL — SIGNIFICANT CHANGE UP (ref 3.8–10.5)
WBC # BLD: 6.74 K/UL — SIGNIFICANT CHANGE UP (ref 3.8–10.5)
WBC # BLD: 7.02 K/UL — SIGNIFICANT CHANGE UP (ref 3.8–10.5)
WBC # BLD: 7.02 K/UL — SIGNIFICANT CHANGE UP (ref 3.8–10.5)
WBC # BLD: 7.1 K/UL — SIGNIFICANT CHANGE UP (ref 3.8–10.5)
WBC # BLD: 7.14 K/UL — SIGNIFICANT CHANGE UP (ref 3.8–10.5)
WBC # BLD: 7.18 K/UL — SIGNIFICANT CHANGE UP (ref 3.8–10.5)
WBC # BLD: 7.19 K/UL — SIGNIFICANT CHANGE UP (ref 3.8–10.5)
WBC # BLD: 7.42 K/UL — SIGNIFICANT CHANGE UP (ref 3.8–10.5)
WBC # BLD: 7.52 K/UL — SIGNIFICANT CHANGE UP (ref 3.8–10.5)
WBC # BLD: 7.55 K/UL — SIGNIFICANT CHANGE UP (ref 3.8–10.5)
WBC # BLD: 7.56 K/UL — SIGNIFICANT CHANGE UP (ref 3.8–10.5)
WBC # BLD: 7.65 K/UL — SIGNIFICANT CHANGE UP (ref 3.8–10.5)
WBC # BLD: 7.8 K/UL — SIGNIFICANT CHANGE UP (ref 3.8–10.5)
WBC # BLD: 7.8 K/UL — SIGNIFICANT CHANGE UP (ref 3.8–10.5)
WBC # BLD: 7.88 K/UL — SIGNIFICANT CHANGE UP (ref 3.8–10.5)
WBC # BLD: 7.94 K/UL — SIGNIFICANT CHANGE UP (ref 3.8–10.5)
WBC # BLD: 8.16 K/UL — SIGNIFICANT CHANGE UP (ref 3.8–10.5)
WBC # BLD: 8.64 K/UL — SIGNIFICANT CHANGE UP (ref 3.8–10.5)
WBC # BLD: 8.77 K/UL — SIGNIFICANT CHANGE UP (ref 3.8–10.5)
WBC # BLD: 8.8 K/UL — SIGNIFICANT CHANGE UP (ref 3.8–10.5)
WBC # BLD: 8.82 K/UL — SIGNIFICANT CHANGE UP (ref 3.8–10.5)
WBC # BLD: 8.91 K/UL — SIGNIFICANT CHANGE UP (ref 3.8–10.5)
WBC # BLD: 9.07 K/UL — SIGNIFICANT CHANGE UP (ref 3.8–10.5)
WBC # BLD: 9.17 K/UL — SIGNIFICANT CHANGE UP (ref 3.8–10.5)
WBC # BLD: 9.2 K/UL — SIGNIFICANT CHANGE UP (ref 3.8–10.5)
WBC # BLD: 9.28 K/UL — SIGNIFICANT CHANGE UP (ref 3.8–10.5)
WBC # BLD: 9.44 K/UL — SIGNIFICANT CHANGE UP (ref 3.8–10.5)
WBC # BLD: 9.44 K/UL — SIGNIFICANT CHANGE UP (ref 3.8–10.5)
WBC # BLD: 9.47 K/UL — SIGNIFICANT CHANGE UP (ref 3.8–10.5)
WBC # BLD: 9.61 K/UL — SIGNIFICANT CHANGE UP (ref 3.8–10.5)
WBC # BLD: 9.69 K/UL — SIGNIFICANT CHANGE UP (ref 3.8–10.5)
WBC # FLD AUTO: 10 K/UL — SIGNIFICANT CHANGE UP (ref 3.8–10.5)
WBC # FLD AUTO: 10.1 K/UL — SIGNIFICANT CHANGE UP (ref 3.8–10.5)
WBC # FLD AUTO: 10.21 K/UL — SIGNIFICANT CHANGE UP (ref 3.8–10.5)
WBC # FLD AUTO: 10.31 K/UL — SIGNIFICANT CHANGE UP (ref 3.8–10.5)
WBC # FLD AUTO: 10.61 K/UL — HIGH (ref 3.8–10.5)
WBC # FLD AUTO: 10.71 K/UL — HIGH (ref 3.8–10.5)
WBC # FLD AUTO: 11.02 K/UL — HIGH (ref 3.8–10.5)
WBC # FLD AUTO: 11.04 K/UL — HIGH (ref 3.8–10.5)
WBC # FLD AUTO: 11.06 K/UL — HIGH (ref 3.8–10.5)
WBC # FLD AUTO: 11.3 K/UL — HIGH (ref 3.8–10.5)
WBC # FLD AUTO: 11.31 K/UL — HIGH (ref 3.8–10.5)
WBC # FLD AUTO: 11.83 K/UL — HIGH (ref 3.8–10.5)
WBC # FLD AUTO: 12.26 K/UL — HIGH (ref 3.8–10.5)
WBC # FLD AUTO: 14.57 K/UL — HIGH (ref 3.8–10.5)
WBC # FLD AUTO: 16.35 K/UL — HIGH (ref 3.8–10.5)
WBC # FLD AUTO: 16.65 K/UL — HIGH (ref 3.8–10.5)
WBC # FLD AUTO: 18.49 K/UL — HIGH (ref 3.8–10.5)
WBC # FLD AUTO: 18.53 K/UL — HIGH (ref 3.8–10.5)
WBC # FLD AUTO: 4 K/UL — SIGNIFICANT CHANGE UP (ref 3.8–10.5)
WBC # FLD AUTO: 4.53 K/UL — SIGNIFICANT CHANGE UP (ref 3.8–10.5)
WBC # FLD AUTO: 4.67 K/UL — SIGNIFICANT CHANGE UP (ref 3.8–10.5)
WBC # FLD AUTO: 5.09 K/UL — SIGNIFICANT CHANGE UP (ref 3.8–10.5)
WBC # FLD AUTO: 5.26 K/UL — SIGNIFICANT CHANGE UP (ref 3.8–10.5)
WBC # FLD AUTO: 5.36 K/UL — SIGNIFICANT CHANGE UP (ref 3.8–10.5)
WBC # FLD AUTO: 5.44 K/UL — SIGNIFICANT CHANGE UP (ref 3.8–10.5)
WBC # FLD AUTO: 5.78 K/UL — SIGNIFICANT CHANGE UP (ref 3.8–10.5)
WBC # FLD AUTO: 5.8 K/UL — SIGNIFICANT CHANGE UP (ref 3.8–10.5)
WBC # FLD AUTO: 5.93 K/UL — SIGNIFICANT CHANGE UP (ref 3.8–10.5)
WBC # FLD AUTO: 6.04 K/UL — SIGNIFICANT CHANGE UP (ref 3.8–10.5)
WBC # FLD AUTO: 6.05 K/UL
WBC # FLD AUTO: 6.14 K/UL
WBC # FLD AUTO: 6.14 K/UL — SIGNIFICANT CHANGE UP (ref 3.8–10.5)
WBC # FLD AUTO: 6.15 K/UL — SIGNIFICANT CHANGE UP (ref 3.8–10.5)
WBC # FLD AUTO: 6.27 K/UL — SIGNIFICANT CHANGE UP (ref 3.8–10.5)
WBC # FLD AUTO: 6.43 K/UL — SIGNIFICANT CHANGE UP (ref 3.8–10.5)
WBC # FLD AUTO: 6.67 K/UL — SIGNIFICANT CHANGE UP (ref 3.8–10.5)
WBC # FLD AUTO: 6.74 K/UL — SIGNIFICANT CHANGE UP (ref 3.8–10.5)
WBC # FLD AUTO: 7.02 K/UL — SIGNIFICANT CHANGE UP (ref 3.8–10.5)
WBC # FLD AUTO: 7.02 K/UL — SIGNIFICANT CHANGE UP (ref 3.8–10.5)
WBC # FLD AUTO: 7.1 K/UL — SIGNIFICANT CHANGE UP (ref 3.8–10.5)
WBC # FLD AUTO: 7.14 K/UL — SIGNIFICANT CHANGE UP (ref 3.8–10.5)
WBC # FLD AUTO: 7.18 K/UL — SIGNIFICANT CHANGE UP (ref 3.8–10.5)
WBC # FLD AUTO: 7.19 K/UL — SIGNIFICANT CHANGE UP (ref 3.8–10.5)
WBC # FLD AUTO: 7.42 K/UL — SIGNIFICANT CHANGE UP (ref 3.8–10.5)
WBC # FLD AUTO: 7.52 K/UL — SIGNIFICANT CHANGE UP (ref 3.8–10.5)
WBC # FLD AUTO: 7.55 K/UL — SIGNIFICANT CHANGE UP (ref 3.8–10.5)
WBC # FLD AUTO: 7.56 K/UL — SIGNIFICANT CHANGE UP (ref 3.8–10.5)
WBC # FLD AUTO: 7.65 K/UL — SIGNIFICANT CHANGE UP (ref 3.8–10.5)
WBC # FLD AUTO: 7.8 K/UL — SIGNIFICANT CHANGE UP (ref 3.8–10.5)
WBC # FLD AUTO: 7.8 K/UL — SIGNIFICANT CHANGE UP (ref 3.8–10.5)
WBC # FLD AUTO: 7.88 K/UL — SIGNIFICANT CHANGE UP (ref 3.8–10.5)
WBC # FLD AUTO: 7.94 K/UL — SIGNIFICANT CHANGE UP (ref 3.8–10.5)
WBC # FLD AUTO: 8.16 K/UL — SIGNIFICANT CHANGE UP (ref 3.8–10.5)
WBC # FLD AUTO: 8.64 K/UL — SIGNIFICANT CHANGE UP (ref 3.8–10.5)
WBC # FLD AUTO: 8.77 K/UL — SIGNIFICANT CHANGE UP (ref 3.8–10.5)
WBC # FLD AUTO: 8.8 K/UL — SIGNIFICANT CHANGE UP (ref 3.8–10.5)
WBC # FLD AUTO: 8.82 K/UL — SIGNIFICANT CHANGE UP (ref 3.8–10.5)
WBC # FLD AUTO: 8.91 K/UL — SIGNIFICANT CHANGE UP (ref 3.8–10.5)
WBC # FLD AUTO: 9.07 K/UL — SIGNIFICANT CHANGE UP (ref 3.8–10.5)
WBC # FLD AUTO: 9.17 K/UL — SIGNIFICANT CHANGE UP (ref 3.8–10.5)
WBC # FLD AUTO: 9.2 K/UL — SIGNIFICANT CHANGE UP (ref 3.8–10.5)
WBC # FLD AUTO: 9.28 K/UL — SIGNIFICANT CHANGE UP (ref 3.8–10.5)
WBC # FLD AUTO: 9.44 K/UL — SIGNIFICANT CHANGE UP (ref 3.8–10.5)
WBC # FLD AUTO: 9.44 K/UL — SIGNIFICANT CHANGE UP (ref 3.8–10.5)
WBC # FLD AUTO: 9.47 K/UL — SIGNIFICANT CHANGE UP (ref 3.8–10.5)
WBC # FLD AUTO: 9.61 K/UL — SIGNIFICANT CHANGE UP (ref 3.8–10.5)
WBC # FLD AUTO: 9.69 K/UL — SIGNIFICANT CHANGE UP (ref 3.8–10.5)
WBC UR QL: >50 /HPF (ref 0–5)
WBC UR QL: >50 /HPF (ref 0–5)
ZINC SERPL-MCNC: 99 UG/DL — SIGNIFICANT CHANGE UP (ref 44–115)

## 2023-01-01 PROCEDURE — 99233 SBSQ HOSP IP/OBS HIGH 50: CPT

## 2023-01-01 PROCEDURE — 99223 1ST HOSP IP/OBS HIGH 75: CPT

## 2023-01-01 PROCEDURE — 99495 TRANSJ CARE MGMT MOD F2F 14D: CPT | Mod: 25

## 2023-01-01 PROCEDURE — 99024 POSTOP FOLLOW-UP VISIT: CPT

## 2023-01-01 PROCEDURE — 78452 HT MUSCLE IMAGE SPECT MULT: CPT | Mod: 26

## 2023-01-01 PROCEDURE — 99232 SBSQ HOSP IP/OBS MODERATE 35: CPT

## 2023-01-01 PROCEDURE — 85025 COMPLETE CBC W/AUTO DIFF WBC: CPT

## 2023-01-01 PROCEDURE — 80076 HEPATIC FUNCTION PANEL: CPT

## 2023-01-01 PROCEDURE — 74176 CT ABD & PELVIS W/O CONTRAST: CPT | Mod: 26,MA

## 2023-01-01 PROCEDURE — 82728 ASSAY OF FERRITIN: CPT

## 2023-01-01 PROCEDURE — C1769: CPT

## 2023-01-01 PROCEDURE — 87635 SARS-COV-2 COVID-19 AMP PRB: CPT

## 2023-01-01 PROCEDURE — 76770 US EXAM ABDO BACK WALL COMP: CPT | Mod: 26

## 2023-01-01 PROCEDURE — 93005 ELECTROCARDIOGRAM TRACING: CPT

## 2023-01-01 PROCEDURE — 86900 BLOOD TYPING SEROLOGIC ABO: CPT

## 2023-01-01 PROCEDURE — 87150 DNA/RNA AMPLIFIED PROBE: CPT

## 2023-01-01 PROCEDURE — 99291 CRITICAL CARE FIRST HOUR: CPT

## 2023-01-01 PROCEDURE — 82607 VITAMIN B-12: CPT

## 2023-01-01 PROCEDURE — 43241 EGD TUBE/CATH INSERTION: CPT | Mod: 59,GC

## 2023-01-01 PROCEDURE — 93010 ELECTROCARDIOGRAM REPORT: CPT

## 2023-01-01 PROCEDURE — 82150 ASSAY OF AMYLASE: CPT

## 2023-01-01 PROCEDURE — 82962 GLUCOSE BLOOD TEST: CPT

## 2023-01-01 PROCEDURE — 87077 CULTURE AEROBIC IDENTIFY: CPT

## 2023-01-01 PROCEDURE — 74177 CT ABD & PELVIS W/CONTRAST: CPT

## 2023-01-01 PROCEDURE — 36415 COLL VENOUS BLD VENIPUNCTURE: CPT

## 2023-01-01 PROCEDURE — 99221 1ST HOSP IP/OBS SF/LOW 40: CPT

## 2023-01-01 PROCEDURE — 82947 ASSAY GLUCOSE BLOOD QUANT: CPT

## 2023-01-01 PROCEDURE — 99291 CRITICAL CARE FIRST HOUR: CPT | Mod: 25

## 2023-01-01 PROCEDURE — 94002 VENT MGMT INPAT INIT DAY: CPT

## 2023-01-01 PROCEDURE — 83550 IRON BINDING TEST: CPT

## 2023-01-01 PROCEDURE — 84478 ASSAY OF TRIGLYCERIDES: CPT

## 2023-01-01 PROCEDURE — 74176 CT ABD & PELVIS W/O CONTRAST: CPT | Mod: 26

## 2023-01-01 PROCEDURE — L8699: CPT

## 2023-01-01 PROCEDURE — 74177 CT ABD & PELVIS W/CONTRAST: CPT | Mod: 26,MH

## 2023-01-01 PROCEDURE — 82803 BLOOD GASES ANY COMBINATION: CPT

## 2023-01-01 PROCEDURE — 88342 IMHCHEM/IMCYTCHM 1ST ANTB: CPT

## 2023-01-01 PROCEDURE — 84100 ASSAY OF PHOSPHORUS: CPT

## 2023-01-01 PROCEDURE — 99214 OFFICE O/P EST MOD 30 MIN: CPT

## 2023-01-01 PROCEDURE — 94660 CPAP INITIATION&MGMT: CPT

## 2023-01-01 PROCEDURE — 43246 EGD PLACE GASTROSTOMY TUBE: CPT

## 2023-01-01 PROCEDURE — 85045 AUTOMATED RETICULOCYTE COUNT: CPT

## 2023-01-01 PROCEDURE — 36556 INSERT NON-TUNNEL CV CATH: CPT

## 2023-01-01 PROCEDURE — P9100: CPT

## 2023-01-01 PROCEDURE — 71045 X-RAY EXAM CHEST 1 VIEW: CPT | Mod: 26

## 2023-01-01 PROCEDURE — 90935 HEMODIALYSIS ONE EVALUATION: CPT

## 2023-01-01 PROCEDURE — 85730 THROMBOPLASTIN TIME PARTIAL: CPT

## 2023-01-01 PROCEDURE — 70450 CT HEAD/BRAIN W/O DYE: CPT | Mod: 26

## 2023-01-01 PROCEDURE — 99232 SBSQ HOSP IP/OBS MODERATE 35: CPT | Mod: GC

## 2023-01-01 PROCEDURE — 99204 OFFICE O/P NEW MOD 45 MIN: CPT

## 2023-01-01 PROCEDURE — 88309 TISSUE EXAM BY PATHOLOGIST: CPT | Mod: 26

## 2023-01-01 PROCEDURE — 94003 VENT MGMT INPAT SUBQ DAY: CPT

## 2023-01-01 PROCEDURE — 99222 1ST HOSP IP/OBS MODERATE 55: CPT

## 2023-01-01 PROCEDURE — 80048 BASIC METABOLIC PNL TOTAL CA: CPT

## 2023-01-01 PROCEDURE — C1889: CPT

## 2023-01-01 PROCEDURE — 95720 EEG PHY/QHP EA INCR W/VEEG: CPT

## 2023-01-01 PROCEDURE — 82248 BILIRUBIN DIRECT: CPT

## 2023-01-01 PROCEDURE — 85027 COMPLETE CBC AUTOMATED: CPT

## 2023-01-01 PROCEDURE — 70450 CT HEAD/BRAIN W/O DYE: CPT

## 2023-01-01 PROCEDURE — 82565 ASSAY OF CREATININE: CPT

## 2023-01-01 PROCEDURE — C9113: CPT

## 2023-01-01 PROCEDURE — 82272 OCCULT BLD FECES 1-3 TESTS: CPT

## 2023-01-01 PROCEDURE — 99498 ADVNCD CARE PLAN ADDL 30 MIN: CPT

## 2023-01-01 PROCEDURE — 95700 EEG CONT REC W/VID EEG TECH: CPT

## 2023-01-01 PROCEDURE — 99205 OFFICE O/P NEW HI 60 MIN: CPT

## 2023-01-01 PROCEDURE — 88309 TISSUE EXAM BY PATHOLOGIST: CPT

## 2023-01-01 PROCEDURE — 80069 RENAL FUNCTION PANEL: CPT

## 2023-01-01 PROCEDURE — 86901 BLOOD TYPING SEROLOGIC RH(D): CPT

## 2023-01-01 PROCEDURE — 80053 COMPREHEN METABOLIC PANEL: CPT

## 2023-01-01 PROCEDURE — 83036 HEMOGLOBIN GLYCOSYLATED A1C: CPT

## 2023-01-01 PROCEDURE — 80074 ACUTE HEPATITIS PANEL: CPT

## 2023-01-01 PROCEDURE — 99239 HOSP IP/OBS DSCHRG MGMT >30: CPT

## 2023-01-01 PROCEDURE — 71046 X-RAY EXAM CHEST 2 VIEWS: CPT

## 2023-01-01 PROCEDURE — 71045 X-RAY EXAM CHEST 1 VIEW: CPT

## 2023-01-01 PROCEDURE — 83735 ASSAY OF MAGNESIUM: CPT

## 2023-01-01 PROCEDURE — 36600 WITHDRAWAL OF ARTERIAL BLOOD: CPT

## 2023-01-01 PROCEDURE — 87040 BLOOD CULTURE FOR BACTERIA: CPT

## 2023-01-01 PROCEDURE — 81001 URINALYSIS AUTO W/SCOPE: CPT

## 2023-01-01 PROCEDURE — 36430 TRANSFUSION BLD/BLD COMPNT: CPT

## 2023-01-01 PROCEDURE — 83615 LACTATE (LD) (LDH) ENZYME: CPT

## 2023-01-01 PROCEDURE — 99292 CRITICAL CARE ADDL 30 MIN: CPT | Mod: 25

## 2023-01-01 PROCEDURE — 97530 THERAPEUTIC ACTIVITIES: CPT | Mod: GP

## 2023-01-01 PROCEDURE — 82746 ASSAY OF FOLIC ACID SERUM: CPT

## 2023-01-01 PROCEDURE — 83540 ASSAY OF IRON: CPT

## 2023-01-01 PROCEDURE — 99285 EMERGENCY DEPT VISIT HI MDM: CPT

## 2023-01-01 PROCEDURE — 99497 ADVNCD CARE PLAN 30 MIN: CPT

## 2023-01-01 PROCEDURE — 88307 TISSUE EXAM BY PATHOLOGIST: CPT | Mod: 26

## 2023-01-01 PROCEDURE — 85014 HEMATOCRIT: CPT

## 2023-01-01 PROCEDURE — 88342 IMHCHEM/IMCYTCHM 1ST ANTB: CPT | Mod: 26

## 2023-01-01 PROCEDURE — 83010 ASSAY OF HAPTOGLOBIN QUANT: CPT

## 2023-01-01 PROCEDURE — 93306 TTE W/DOPPLER COMPLETE: CPT | Mod: 26

## 2023-01-01 PROCEDURE — 99238 HOSP IP/OBS DSCHRG MGMT 30/<: CPT

## 2023-01-01 PROCEDURE — 83605 ASSAY OF LACTIC ACID: CPT

## 2023-01-01 PROCEDURE — 74177 CT ABD & PELVIS W/CONTRAST: CPT | Mod: 26,MA

## 2023-01-01 PROCEDURE — 76770 US EXAM ABDO BACK WALL COMP: CPT

## 2023-01-01 PROCEDURE — 85384 FIBRINOGEN ACTIVITY: CPT

## 2023-01-01 PROCEDURE — 82140 ASSAY OF AMMONIA: CPT

## 2023-01-01 PROCEDURE — P9047: CPT

## 2023-01-01 PROCEDURE — 94760 N-INVAS EAR/PLS OXIMETRY 1: CPT

## 2023-01-01 PROCEDURE — 99292 CRITICAL CARE ADDL 30 MIN: CPT

## 2023-01-01 PROCEDURE — 82533 TOTAL CORTISOL: CPT

## 2023-01-01 PROCEDURE — 84443 ASSAY THYROID STIM HORMONE: CPT

## 2023-01-01 PROCEDURE — 87086 URINE CULTURE/COLONY COUNT: CPT

## 2023-01-01 PROCEDURE — 85018 HEMOGLOBIN: CPT

## 2023-01-01 PROCEDURE — 48140 PARTIAL REMOVAL OF PANCREAS: CPT | Mod: 82

## 2023-01-01 PROCEDURE — 71045 X-RAY EXAM CHEST 1 VIEW: CPT | Mod: 26,77

## 2023-01-01 PROCEDURE — 99212 OFFICE O/P EST SF 10 MIN: CPT

## 2023-01-01 PROCEDURE — 88341 IMHCHEM/IMCYTCHM EA ADD ANTB: CPT | Mod: 26

## 2023-01-01 PROCEDURE — 74230 X-RAY XM SWLNG FUNCJ C+: CPT | Mod: 26

## 2023-01-01 PROCEDURE — 0275U HEM HEPRN NDUC TRMBCTPNA SRM: CPT

## 2023-01-01 PROCEDURE — A9500: CPT

## 2023-01-01 PROCEDURE — P9016: CPT

## 2023-01-01 PROCEDURE — 84630 ASSAY OF ZINC: CPT

## 2023-01-01 PROCEDURE — 87186 SC STD MICRODIL/AGAR DIL: CPT

## 2023-01-01 PROCEDURE — 86850 RBC ANTIBODY SCREEN: CPT

## 2023-01-01 PROCEDURE — 86923 COMPATIBILITY TEST ELECTRIC: CPT

## 2023-01-01 PROCEDURE — 44141 PARTIAL REMOVAL OF COLON: CPT | Mod: 58

## 2023-01-01 PROCEDURE — 86022 PLATELET ANTIBODIES: CPT

## 2023-01-01 PROCEDURE — 88341 IMHCHEM/IMCYTCHM EA ADD ANTB: CPT

## 2023-01-01 PROCEDURE — 97110 THERAPEUTIC EXERCISES: CPT | Mod: GP

## 2023-01-01 PROCEDURE — P9059: CPT

## 2023-01-01 PROCEDURE — 95714 VEEG EA 12-26 HR UNMNTR: CPT

## 2023-01-01 PROCEDURE — 87449 NOS EACH ORGANISM AG IA: CPT

## 2023-01-01 PROCEDURE — P9037: CPT

## 2023-01-01 PROCEDURE — 85610 PROTHROMBIN TIME: CPT

## 2023-01-01 PROCEDURE — 93306 TTE W/DOPPLER COMPLETE: CPT

## 2023-01-01 PROCEDURE — 71046 X-RAY EXAM CHEST 2 VIEWS: CPT | Mod: 26

## 2023-01-01 PROCEDURE — 88307 TISSUE EXAM BY PATHOLOGIST: CPT

## 2023-01-01 PROCEDURE — 74176 CT ABD & PELVIS W/O CONTRAST: CPT

## 2023-01-01 PROCEDURE — 84484 ASSAY OF TROPONIN QUANT: CPT

## 2023-01-01 PROCEDURE — 97163 PT EVAL HIGH COMPLEX 45 MIN: CPT | Mod: GP

## 2023-01-01 PROCEDURE — 78452 HT MUSCLE IMAGE SPECT MULT: CPT

## 2023-01-01 PROCEDURE — 93017 CV STRESS TEST TRACING ONLY: CPT

## 2023-01-01 PROCEDURE — 99497 ADVNCD CARE PLAN 30 MIN: CPT | Mod: 25

## 2023-01-01 PROCEDURE — 36620 INSERTION CATHETER ARTERY: CPT

## 2023-01-01 PROCEDURE — 82652 VIT D 1 25-DIHYDROXY: CPT

## 2023-01-01 PROCEDURE — 51702 INSERT TEMP BLADDER CATH: CPT

## 2023-01-01 PROCEDURE — 43246 EGD PLACE GASTROSTOMY TUBE: CPT | Mod: GC

## 2023-01-01 PROCEDURE — 86703 HIV-1/HIV-2 1 RESULT ANTBDY: CPT

## 2023-01-01 PROCEDURE — 99285 EMERGENCY DEPT VISIT HI MDM: CPT | Mod: FS,CS

## 2023-01-01 PROCEDURE — 48140 PARTIAL REMOVAL OF PANCREAS: CPT

## 2023-01-01 RX ORDER — DEXTROSE 50 % IN WATER 50 %
25 SYRINGE (ML) INTRAVENOUS ONCE
Refills: 0 | Status: DISCONTINUED | OUTPATIENT
Start: 2023-01-01 | End: 2023-01-01

## 2023-01-01 RX ORDER — SODIUM CHLORIDE 9 MG/ML
1000 INJECTION, SOLUTION INTRAVENOUS
Refills: 0 | Status: DISCONTINUED | OUTPATIENT
Start: 2023-01-01 | End: 2023-01-01

## 2023-01-01 RX ORDER — HEPARIN SODIUM 5000 [USP'U]/ML
5000 INJECTION INTRAVENOUS; SUBCUTANEOUS EVERY 12 HOURS
Refills: 0 | Status: DISCONTINUED | OUTPATIENT
Start: 2023-01-01 | End: 2023-01-01

## 2023-01-01 RX ORDER — DEXTROSE 50 % IN WATER 50 %
15 SYRINGE (ML) INTRAVENOUS ONCE
Refills: 0 | Status: DISCONTINUED | OUTPATIENT
Start: 2023-01-01 | End: 2023-01-01

## 2023-01-01 RX ORDER — CEFEPIME 1 G/1
INJECTION, POWDER, FOR SOLUTION INTRAMUSCULAR; INTRAVENOUS
Refills: 0 | Status: DISCONTINUED | OUTPATIENT
Start: 2023-01-01 | End: 2023-01-01

## 2023-01-01 RX ORDER — MAGNESIUM SULFATE 500 MG/ML
1 VIAL (ML) INJECTION ONCE
Refills: 0 | Status: COMPLETED | OUTPATIENT
Start: 2023-01-01 | End: 2023-01-01

## 2023-01-01 RX ORDER — HEPARIN SODIUM 5000 [USP'U]/ML
5000 INJECTION INTRAVENOUS; SUBCUTANEOUS EVERY 8 HOURS
Refills: 0 | Status: DISCONTINUED | OUTPATIENT
Start: 2023-01-01 | End: 2023-01-01

## 2023-01-01 RX ORDER — CASPOFUNGIN ACETATE 7 MG/ML
70 INJECTION, POWDER, LYOPHILIZED, FOR SOLUTION INTRAVENOUS ONCE
Refills: 0 | Status: COMPLETED | OUTPATIENT
Start: 2023-01-01 | End: 2023-01-01

## 2023-01-01 RX ORDER — PHENYLEPHRINE HYDROCHLORIDE 10 MG/ML
0.5 INJECTION INTRAVENOUS
Qty: 40 | Refills: 0 | Status: DISCONTINUED | OUTPATIENT
Start: 2023-01-01 | End: 2023-01-01

## 2023-01-01 RX ORDER — MEROPENEM 1 G/30ML
500 INJECTION INTRAVENOUS ONCE
Refills: 0 | Status: COMPLETED | OUTPATIENT
Start: 2023-01-01 | End: 2023-01-01

## 2023-01-01 RX ORDER — GLIPIZIDE 5 MG/1
5 TABLET, FILM COATED, EXTENDED RELEASE ORAL
Qty: 60 | Refills: 0 | Status: ACTIVE | COMMUNITY
Start: 2019-03-13 | End: 1900-01-01

## 2023-01-01 RX ORDER — CHLORHEXIDINE GLUCONATE 213 G/1000ML
1 SOLUTION TOPICAL
Refills: 0 | Status: DISCONTINUED | OUTPATIENT
Start: 2023-01-01 | End: 2023-01-01

## 2023-01-01 RX ORDER — ALBUMIN HUMAN 25 %
50 VIAL (ML) INTRAVENOUS
Refills: 0 | Status: COMPLETED | OUTPATIENT
Start: 2023-01-01 | End: 2023-01-01

## 2023-01-01 RX ORDER — SODIUM CHLORIDE 9 MG/ML
500 INJECTION INTRAMUSCULAR; INTRAVENOUS; SUBCUTANEOUS ONCE
Refills: 0 | Status: COMPLETED | OUTPATIENT
Start: 2023-01-01 | End: 2023-01-01

## 2023-01-01 RX ORDER — DEXTROSE 50 % IN WATER 50 %
12.5 SYRINGE (ML) INTRAVENOUS ONCE
Refills: 0 | Status: DISCONTINUED | OUTPATIENT
Start: 2023-01-01 | End: 2023-01-01

## 2023-01-01 RX ORDER — HYDROMORPHONE HYDROCHLORIDE 2 MG/ML
1 INJECTION INTRAMUSCULAR; INTRAVENOUS; SUBCUTANEOUS ONCE
Refills: 0 | Status: DISCONTINUED | OUTPATIENT
Start: 2023-01-01 | End: 2023-01-01

## 2023-01-01 RX ORDER — MORPHINE SULFATE 50 MG/1
2 CAPSULE, EXTENDED RELEASE ORAL
Refills: 0 | Status: DISCONTINUED | OUTPATIENT
Start: 2023-01-01 | End: 2023-01-01

## 2023-01-01 RX ORDER — POTASSIUM PHOSPHATE, MONOBASIC POTASSIUM PHOSPHATE, DIBASIC 236; 224 MG/ML; MG/ML
30 INJECTION, SOLUTION INTRAVENOUS ONCE
Refills: 0 | Status: COMPLETED | OUTPATIENT
Start: 2023-01-01 | End: 2023-01-01

## 2023-01-01 RX ORDER — ONDANSETRON 8 MG/1
4 TABLET, FILM COATED ORAL EVERY 6 HOURS
Refills: 0 | Status: DISCONTINUED | OUTPATIENT
Start: 2023-01-01 | End: 2023-01-01

## 2023-01-01 RX ORDER — MIDODRINE HYDROCHLORIDE 2.5 MG/1
10 TABLET ORAL EVERY 8 HOURS
Refills: 0 | Status: DISCONTINUED | OUTPATIENT
Start: 2023-01-01 | End: 2023-01-01

## 2023-01-01 RX ORDER — CEFEPIME 1 G/1
INJECTION, POWDER, FOR SOLUTION INTRAMUSCULAR; INTRAVENOUS
Refills: 0 | Status: COMPLETED | OUTPATIENT
Start: 2023-01-01 | End: 2023-01-01

## 2023-01-01 RX ORDER — GLUCAGON INJECTION, SOLUTION 0.5 MG/.1ML
1 INJECTION, SOLUTION SUBCUTANEOUS ONCE
Refills: 0 | Status: DISCONTINUED | OUTPATIENT
Start: 2023-01-01 | End: 2023-01-01

## 2023-01-01 RX ORDER — I.V. FAT EMULSION 20 G/100ML
0.69 EMULSION INTRAVENOUS
Qty: 50 | Refills: 0 | Status: DISCONTINUED | OUTPATIENT
Start: 2023-01-01 | End: 2023-01-01

## 2023-01-01 RX ORDER — MEROPENEM 1 G/30ML
500 INJECTION INTRAVENOUS EVERY 12 HOURS
Refills: 0 | Status: COMPLETED | OUTPATIENT
Start: 2023-01-01 | End: 2023-01-01

## 2023-01-01 RX ORDER — SODIUM CHLORIDE 9 MG/ML
1000 INJECTION, SOLUTION INTRAVENOUS ONCE
Refills: 0 | Status: COMPLETED | OUTPATIENT
Start: 2023-01-01 | End: 2023-01-01

## 2023-01-01 RX ORDER — VANCOMYCIN HCL 1 G
1000 VIAL (EA) INTRAVENOUS ONCE
Refills: 0 | Status: COMPLETED | OUTPATIENT
Start: 2023-01-01 | End: 2023-01-01

## 2023-01-01 RX ORDER — POTASSIUM CHLORIDE 20 MEQ
20 PACKET (EA) ORAL ONCE
Refills: 0 | Status: COMPLETED | OUTPATIENT
Start: 2023-01-01 | End: 2023-01-01

## 2023-01-01 RX ORDER — FENTANYL CITRATE 50 UG/ML
50 INJECTION INTRAVENOUS
Refills: 0 | Status: DISCONTINUED | OUTPATIENT
Start: 2023-01-01 | End: 2023-01-01

## 2023-01-01 RX ORDER — SODIUM CHLORIDE 9 MG/ML
1000 INJECTION INTRAMUSCULAR; INTRAVENOUS; SUBCUTANEOUS ONCE
Refills: 0 | Status: COMPLETED | OUTPATIENT
Start: 2023-01-01 | End: 2023-01-01

## 2023-01-01 RX ORDER — FAMOTIDINE 10 MG/ML
20 INJECTION INTRAVENOUS ONCE
Refills: 0 | Status: COMPLETED | OUTPATIENT
Start: 2023-01-01 | End: 2023-01-01

## 2023-01-01 RX ORDER — NYSTATIN CREAM 100000 [USP'U]/G
1 CREAM TOPICAL
Refills: 0 | Status: DISCONTINUED | OUTPATIENT
Start: 2023-01-01 | End: 2023-01-01

## 2023-01-01 RX ORDER — INSULIN GLARGINE 100 [IU]/ML
25 INJECTION, SOLUTION SUBCUTANEOUS AT BEDTIME
Refills: 0 | Status: DISCONTINUED | OUTPATIENT
Start: 2023-01-01 | End: 2023-01-01

## 2023-01-01 RX ORDER — INSULIN LISPRO 100/ML
VIAL (ML) SUBCUTANEOUS EVERY 6 HOURS
Refills: 0 | Status: DISCONTINUED | OUTPATIENT
Start: 2023-01-01 | End: 2023-01-01

## 2023-01-01 RX ORDER — POTASSIUM PHOSPHATE, MONOBASIC POTASSIUM PHOSPHATE, DIBASIC 236; 224 MG/ML; MG/ML
15 INJECTION, SOLUTION INTRAVENOUS ONCE
Refills: 0 | Status: COMPLETED | OUTPATIENT
Start: 2023-01-01 | End: 2023-01-01

## 2023-01-01 RX ORDER — INSULIN GLARGINE 100 [IU]/ML
15 INJECTION, SOLUTION SUBCUTANEOUS ONCE
Refills: 0 | Status: COMPLETED | OUTPATIENT
Start: 2023-01-01 | End: 2023-01-01

## 2023-01-01 RX ORDER — SODIUM BICARBONATE 1 MEQ/ML
50 SYRINGE (ML) INTRAVENOUS ONCE
Refills: 0 | Status: COMPLETED | OUTPATIENT
Start: 2023-01-01 | End: 2023-01-01

## 2023-01-01 RX ORDER — ONDANSETRON 8 MG/1
4 TABLET, FILM COATED ORAL ONCE
Refills: 0 | Status: COMPLETED | OUTPATIENT
Start: 2023-01-01 | End: 2023-01-01

## 2023-01-01 RX ORDER — INSULIN GLARGINE 100 [IU]/ML
35 INJECTION, SOLUTION SUBCUTANEOUS AT BEDTIME
Refills: 0 | Status: DISCONTINUED | OUTPATIENT
Start: 2023-01-01 | End: 2023-01-01

## 2023-01-01 RX ORDER — ELECTROLYTE SOLUTION,INJ
1 VIAL (ML) INTRAVENOUS
Refills: 0 | Status: DISCONTINUED | OUTPATIENT
Start: 2023-01-01 | End: 2023-01-01

## 2023-01-01 RX ORDER — INSULIN LISPRO 100/ML
VIAL (ML) SUBCUTANEOUS AT BEDTIME
Refills: 0 | Status: DISCONTINUED | OUTPATIENT
Start: 2023-01-01 | End: 2023-01-01

## 2023-01-01 RX ORDER — ACETAMINOPHEN 500 MG
1000 TABLET ORAL ONCE
Refills: 0 | Status: COMPLETED | OUTPATIENT
Start: 2023-01-01 | End: 2023-01-01

## 2023-01-01 RX ORDER — ALCOHOL ANTISEPTIC PADS
70 PADS, MEDICATED (EA) TOPICAL
Qty: 1 | Refills: 0 | Status: ACTIVE | COMMUNITY
Start: 2023-01-01 | End: 1900-01-01

## 2023-01-01 RX ORDER — CEFEPIME 1 G/1
1000 INJECTION, POWDER, FOR SOLUTION INTRAMUSCULAR; INTRAVENOUS EVERY 12 HOURS
Refills: 0 | Status: DISCONTINUED | OUTPATIENT
Start: 2023-01-01 | End: 2023-01-01

## 2023-01-01 RX ORDER — IRBESARTAN 75 MG/1
75 TABLET ORAL DAILY
Refills: 0 | Status: DISCONTINUED | OUTPATIENT
Start: 2023-01-01 | End: 2023-01-01

## 2023-01-01 RX ORDER — ONDANSETRON 8 MG/1
4 TABLET, FILM COATED ORAL EVERY 8 HOURS
Refills: 0 | Status: DISCONTINUED | OUTPATIENT
Start: 2023-01-01 | End: 2023-01-01

## 2023-01-01 RX ORDER — FERROUS SULFATE 325(65) MG
1 TABLET ORAL
Qty: 0 | Refills: 0 | DISCHARGE

## 2023-01-01 RX ORDER — ALBUMIN HUMAN 25 %
50 VIAL (ML) INTRAVENOUS
Refills: 0 | Status: DISCONTINUED | OUTPATIENT
Start: 2023-01-01 | End: 2023-01-01

## 2023-01-01 RX ORDER — CEFEPIME 1 G/1
1000 INJECTION, POWDER, FOR SOLUTION INTRAMUSCULAR; INTRAVENOUS EVERY 24 HOURS
Refills: 0 | Status: DISCONTINUED | OUTPATIENT
Start: 2023-01-01 | End: 2023-01-01

## 2023-01-01 RX ORDER — MIDODRINE HYDROCHLORIDE 2.5 MG/1
5 TABLET ORAL EVERY 8 HOURS
Refills: 0 | Status: DISCONTINUED | OUTPATIENT
Start: 2023-01-01 | End: 2023-01-01

## 2023-01-01 RX ORDER — CEFEPIME 1 G/1
1000 INJECTION, POWDER, FOR SOLUTION INTRAMUSCULAR; INTRAVENOUS ONCE
Refills: 0 | Status: DISCONTINUED | OUTPATIENT
Start: 2023-01-01 | End: 2023-01-01

## 2023-01-01 RX ORDER — ALBUMIN HUMAN 25 %
50 VIAL (ML) INTRAVENOUS EVERY 6 HOURS
Refills: 0 | Status: COMPLETED | OUTPATIENT
Start: 2023-01-01 | End: 2023-01-01

## 2023-01-01 RX ORDER — PROCHLORPERAZINE MALEATE 5 MG
2.5 TABLET ORAL EVERY 4 HOURS
Refills: 0 | Status: DISCONTINUED | OUTPATIENT
Start: 2023-01-01 | End: 2023-01-01

## 2023-01-01 RX ORDER — ALPRAZOLAM 0.25 MG
0.25 TABLET ORAL ONCE
Refills: 0 | Status: DISCONTINUED | OUTPATIENT
Start: 2023-01-01 | End: 2023-01-01

## 2023-01-01 RX ORDER — REGADENOSON 0.08 MG/ML
0.4 INJECTION, SOLUTION INTRAVENOUS ONCE
Refills: 0 | Status: COMPLETED | OUTPATIENT
Start: 2023-01-01 | End: 2023-01-01

## 2023-01-01 RX ORDER — CEFEPIME 1 G/1
500 INJECTION, POWDER, FOR SOLUTION INTRAMUSCULAR; INTRAVENOUS ONCE
Refills: 0 | Status: COMPLETED | OUTPATIENT
Start: 2023-01-01 | End: 2023-01-01

## 2023-01-01 RX ORDER — I.V. FAT EMULSION 20 G/100ML
0.7 EMULSION INTRAVENOUS
Qty: 50 | Refills: 0 | Status: DISCONTINUED | OUTPATIENT
Start: 2023-01-01 | End: 2023-01-01

## 2023-01-01 RX ORDER — PIPERACILLIN AND TAZOBACTAM 4; .5 G/20ML; G/20ML
3.38 INJECTION, POWDER, LYOPHILIZED, FOR SOLUTION INTRAVENOUS EVERY 12 HOURS
Refills: 0 | Status: DISCONTINUED | OUTPATIENT
Start: 2023-01-01 | End: 2023-01-01

## 2023-01-01 RX ORDER — BLOOD SUGAR DIAGNOSTIC
STRIP MISCELLANEOUS TWICE DAILY
Qty: 200 | Refills: 0 | Status: COMPLETED | COMMUNITY
Start: 2023-01-01 | End: 2023-01-01

## 2023-01-01 RX ORDER — CHLORHEXIDINE GLUCONATE 213 G/1000ML
15 SOLUTION TOPICAL EVERY 12 HOURS
Refills: 0 | Status: DISCONTINUED | OUTPATIENT
Start: 2023-01-01 | End: 2023-01-01

## 2023-01-01 RX ORDER — PHENYLEPHRINE HYDROCHLORIDE 10 MG/ML
1 INJECTION INTRAVENOUS
Qty: 40 | Refills: 0 | Status: DISCONTINUED | OUTPATIENT
Start: 2023-01-01 | End: 2023-01-01

## 2023-01-01 RX ORDER — LANOLIN ALCOHOL/MO/W.PET/CERES
3 CREAM (GRAM) TOPICAL AT BEDTIME
Refills: 0 | Status: DISCONTINUED | OUTPATIENT
Start: 2023-01-01 | End: 2023-01-01

## 2023-01-01 RX ORDER — PIPERACILLIN AND TAZOBACTAM 4; .5 G/20ML; G/20ML
3.38 INJECTION, POWDER, LYOPHILIZED, FOR SOLUTION INTRAVENOUS ONCE
Refills: 0 | Status: COMPLETED | OUTPATIENT
Start: 2023-01-01 | End: 2023-01-01

## 2023-01-01 RX ORDER — HYDROMORPHONE HYDROCHLORIDE 2 MG/ML
1 INJECTION INTRAMUSCULAR; INTRAVENOUS; SUBCUTANEOUS EVERY 6 HOURS
Refills: 0 | Status: DISCONTINUED | OUTPATIENT
Start: 2023-01-01 | End: 2023-01-01

## 2023-01-01 RX ORDER — NOREPINEPHRINE BITARTRATE/D5W 8 MG/250ML
0.05 PLASTIC BAG, INJECTION (ML) INTRAVENOUS
Qty: 8 | Refills: 0 | Status: DISCONTINUED | OUTPATIENT
Start: 2023-01-01 | End: 2023-01-01

## 2023-01-01 RX ORDER — INSULIN GLARGINE 100 [IU]/ML
20 INJECTION, SOLUTION SUBCUTANEOUS ONCE
Refills: 0 | Status: COMPLETED | OUTPATIENT
Start: 2023-01-01 | End: 2023-01-01

## 2023-01-01 RX ORDER — MEROPENEM 1 G/30ML
500 INJECTION INTRAVENOUS EVERY 24 HOURS
Refills: 0 | Status: COMPLETED | OUTPATIENT
Start: 2023-01-01 | End: 2023-01-01

## 2023-01-01 RX ORDER — CEFTRIAXONE 500 MG/1
1000 INJECTION, POWDER, FOR SOLUTION INTRAMUSCULAR; INTRAVENOUS ONCE
Refills: 0 | Status: DISCONTINUED | OUTPATIENT
Start: 2023-01-01 | End: 2023-01-01

## 2023-01-01 RX ORDER — SODIUM CHLORIDE 9 MG/ML
1000 INJECTION INTRAMUSCULAR; INTRAVENOUS; SUBCUTANEOUS
Refills: 0 | Status: DISCONTINUED | OUTPATIENT
Start: 2023-01-01 | End: 2023-01-01

## 2023-01-01 RX ORDER — IRON/IRON ASP GLY/FA/MV-MIN 38 125-25-1MG
TABLET ORAL
Refills: 0 | Status: ACTIVE | COMMUNITY

## 2023-01-01 RX ORDER — DEXTROSE 50 % IN WATER 50 %
25 SYRINGE (ML) INTRAVENOUS ONCE
Refills: 0 | Status: COMPLETED | OUTPATIENT
Start: 2023-01-01 | End: 2023-01-01

## 2023-01-01 RX ORDER — NITROFURANTOIN (MONOHYDRATE/MACROCRYSTALS) 25; 75 MG/1; MG/1
100 CAPSULE ORAL
Qty: 14 | Refills: 0 | Status: COMPLETED | COMMUNITY
Start: 2022-01-01 | End: 2023-01-01

## 2023-01-01 RX ORDER — FENTANYL CITRATE 50 UG/ML
2 INJECTION INTRAVENOUS
Qty: 2500 | Refills: 0 | Status: DISCONTINUED | OUTPATIENT
Start: 2023-01-01 | End: 2023-01-01

## 2023-01-01 RX ORDER — PROPOFOL 10 MG/ML
10 INJECTION, EMULSION INTRAVENOUS
Qty: 1000 | Refills: 0 | Status: DISCONTINUED | OUTPATIENT
Start: 2023-01-01 | End: 2023-01-01

## 2023-01-01 RX ORDER — VASOPRESSIN 20 [USP'U]/ML
0.05 INJECTION INTRAVENOUS
Qty: 40 | Refills: 0 | Status: DISCONTINUED | OUTPATIENT
Start: 2023-01-01 | End: 2023-01-01

## 2023-01-01 RX ORDER — ALBUMIN HUMAN 25 %
100 VIAL (ML) INTRAVENOUS EVERY 4 HOURS
Refills: 0 | Status: COMPLETED | OUTPATIENT
Start: 2023-01-01 | End: 2023-01-01

## 2023-01-01 RX ORDER — DEXTROSE MONOHYDRATE, SODIUM CHLORIDE, AND POTASSIUM CHLORIDE 50; .745; 4.5 G/1000ML; G/1000ML; G/1000ML
1000 INJECTION, SOLUTION INTRAVENOUS
Refills: 0 | Status: DISCONTINUED | OUTPATIENT
Start: 2023-01-01 | End: 2023-01-01

## 2023-01-01 RX ORDER — SODIUM CHLORIDE 9 MG/ML
1950 INJECTION INTRAMUSCULAR; INTRAVENOUS; SUBCUTANEOUS ONCE
Refills: 0 | Status: COMPLETED | OUTPATIENT
Start: 2023-01-01 | End: 2023-01-01

## 2023-01-01 RX ORDER — METOCLOPRAMIDE HCL 10 MG
10 TABLET ORAL EVERY 6 HOURS
Refills: 0 | Status: DISCONTINUED | OUTPATIENT
Start: 2023-01-01 | End: 2023-01-01

## 2023-01-01 RX ORDER — LANCETS
EACH MISCELLANEOUS
Qty: 100 | Refills: 4 | Status: ACTIVE | COMMUNITY
Start: 2023-01-01 | End: 1900-01-01

## 2023-01-01 RX ORDER — CISATRACURIUM BESYLATE 2 MG/ML
3 INJECTION INTRAVENOUS
Qty: 200 | Refills: 0 | Status: DISCONTINUED | OUTPATIENT
Start: 2023-01-01 | End: 2023-01-01

## 2023-01-01 RX ORDER — FENTANYL CITRATE 50 UG/ML
25 INJECTION INTRAVENOUS
Refills: 0 | Status: DISCONTINUED | OUTPATIENT
Start: 2023-01-01 | End: 2023-01-01

## 2023-01-01 RX ORDER — ONDANSETRON 4 MG/1
4 TABLET, ORALLY DISINTEGRATING ORAL
Qty: 12 | Refills: 0 | Status: ACTIVE | COMMUNITY
Start: 2023-01-01 | End: 1900-01-01

## 2023-01-01 RX ORDER — POTASSIUM CHLORIDE 1.5 G/1
20 POWDER, FOR SOLUTION ORAL
Qty: 15 | Refills: 0 | Status: ACTIVE | COMMUNITY
Start: 2023-01-01 | End: 1900-01-01

## 2023-01-01 RX ORDER — LANCETS 18 GAUGE
EACH MISCELLANEOUS
Qty: 100 | Refills: 0 | Status: ACTIVE | COMMUNITY
Start: 2023-01-01 | End: 1900-01-01

## 2023-01-01 RX ORDER — PANTOPRAZOLE SODIUM 20 MG/1
80 TABLET, DELAYED RELEASE ORAL ONCE
Refills: 0 | Status: COMPLETED | OUTPATIENT
Start: 2023-01-01 | End: 2023-01-01

## 2023-01-01 RX ORDER — VASOPRESSIN 20 [USP'U]/ML
0.01 INJECTION INTRAVENOUS
Qty: 40 | Refills: 0 | Status: DISCONTINUED | OUTPATIENT
Start: 2023-01-01 | End: 2023-01-01

## 2023-01-01 RX ORDER — HUMAN INSULIN 100 [IU]/ML
10 INJECTION, SUSPENSION SUBCUTANEOUS ONCE
Refills: 0 | Status: COMPLETED | OUTPATIENT
Start: 2023-01-01 | End: 2023-01-01

## 2023-01-01 RX ORDER — NALOXONE HYDROCHLORIDE 4 MG/.1ML
0.4 SPRAY NASAL ONCE
Refills: 0 | Status: DISCONTINUED | OUTPATIENT
Start: 2023-01-01 | End: 2023-01-01

## 2023-01-01 RX ORDER — SODIUM CHLORIDE 9 MG/ML
1000 INJECTION, SOLUTION INTRAVENOUS
Refills: 0 | Status: COMPLETED | OUTPATIENT
Start: 2023-01-01 | End: 2023-01-01

## 2023-01-01 RX ORDER — CEFEPIME 1 G/1
500 INJECTION, POWDER, FOR SOLUTION INTRAMUSCULAR; INTRAVENOUS EVERY 12 HOURS
Refills: 0 | Status: COMPLETED | OUTPATIENT
Start: 2023-01-01 | End: 2023-01-01

## 2023-01-01 RX ORDER — SODIUM CHLORIDE 9 MG/ML
2000 INJECTION, SOLUTION INTRAVENOUS ONCE
Refills: 0 | Status: COMPLETED | OUTPATIENT
Start: 2023-01-01 | End: 2023-01-01

## 2023-01-01 RX ORDER — FERROUS SULFATE 325(65) MG
0 TABLET ORAL
Qty: 0 | Refills: 0 | DISCHARGE

## 2023-01-01 RX ORDER — SODIUM CHLORIDE 9 MG/ML
10 INJECTION INTRAMUSCULAR; INTRAVENOUS; SUBCUTANEOUS
Refills: 0 | Status: DISCONTINUED | OUTPATIENT
Start: 2023-01-01 | End: 2023-01-01

## 2023-01-01 RX ORDER — HYDROMORPHONE HYDROCHLORIDE 2 MG/ML
1 INJECTION INTRAMUSCULAR; INTRAVENOUS; SUBCUTANEOUS EVERY 4 HOURS
Refills: 0 | Status: DISCONTINUED | OUTPATIENT
Start: 2023-01-01 | End: 2023-01-01

## 2023-01-01 RX ORDER — ALFUZOSIN HYDROCHLORIDE 10 MG/1
1 TABLET, EXTENDED RELEASE ORAL
Qty: 0 | Refills: 0 | DISCHARGE

## 2023-01-01 RX ORDER — POTASSIUM CHLORIDE 20 MEQ
10 PACKET (EA) ORAL
Refills: 0 | Status: COMPLETED | OUTPATIENT
Start: 2023-01-01 | End: 2023-01-01

## 2023-01-01 RX ORDER — MIDODRINE HYDROCHLORIDE 2.5 MG/1
15 TABLET ORAL EVERY 8 HOURS
Refills: 0 | Status: DISCONTINUED | OUTPATIENT
Start: 2023-01-01 | End: 2023-01-01

## 2023-01-01 RX ORDER — OCTREOTIDE ACETATE 500 UG/ML
INJECTION, SOLUTION INTRAVENOUS; SUBCUTANEOUS
Refills: 0 | Status: ACTIVE | COMMUNITY

## 2023-01-01 RX ORDER — FERROUS SULFATE 325(65) MG
325 TABLET ORAL DAILY
Refills: 0 | Status: DISCONTINUED | OUTPATIENT
Start: 2023-01-01 | End: 2023-01-01

## 2023-01-01 RX ORDER — ONDANSETRON 8 MG/1
4 TABLET, FILM COATED ORAL ONCE
Refills: 0 | Status: DISCONTINUED | OUTPATIENT
Start: 2023-01-01 | End: 2023-01-01

## 2023-01-01 RX ORDER — IRBESARTAN 75 MG/1
1 TABLET ORAL
Qty: 0 | Refills: 0 | DISCHARGE

## 2023-01-01 RX ORDER — BLOOD-GLUCOSE METER
W/DEVICE EACH MISCELLANEOUS
Qty: 1 | Refills: 0 | Status: ACTIVE | COMMUNITY
Start: 2023-01-01 | End: 1900-01-01

## 2023-01-01 RX ORDER — INSULIN GLARGINE 100 [IU]/ML
10 INJECTION, SOLUTION SUBCUTANEOUS ONCE
Refills: 0 | Status: COMPLETED | OUTPATIENT
Start: 2023-01-01 | End: 2023-01-01

## 2023-01-01 RX ORDER — INSULIN LISPRO 100/ML
VIAL (ML) SUBCUTANEOUS
Refills: 0 | Status: DISCONTINUED | OUTPATIENT
Start: 2023-01-01 | End: 2023-01-01

## 2023-01-01 RX ORDER — MEROPENEM 1 G/30ML
INJECTION INTRAVENOUS
Refills: 0 | Status: COMPLETED | OUTPATIENT
Start: 2023-01-01 | End: 2023-01-01

## 2023-01-01 RX ORDER — VASOPRESSIN 20 [USP'U]/ML
0.04 INJECTION INTRAVENOUS
Qty: 40 | Refills: 0 | Status: DISCONTINUED | OUTPATIENT
Start: 2023-01-01 | End: 2023-01-01

## 2023-01-01 RX ORDER — SODIUM BICARBONATE 1 MEQ/ML
0.08 SYRINGE (ML) INTRAVENOUS
Qty: 50 | Refills: 0 | Status: DISCONTINUED | OUTPATIENT
Start: 2023-01-01 | End: 2023-01-01

## 2023-01-01 RX ORDER — MORPHINE SULFATE 50 MG/1
2 CAPSULE, EXTENDED RELEASE ORAL
Qty: 100 | Refills: 0 | Status: DISCONTINUED | OUTPATIENT
Start: 2023-01-01 | End: 2023-01-01

## 2023-01-01 RX ORDER — SODIUM CHLORIDE 9 MG/ML
1000 INJECTION INTRAMUSCULAR; INTRAVENOUS; SUBCUTANEOUS ONCE
Refills: 0 | Status: DISCONTINUED | OUTPATIENT
Start: 2023-01-01 | End: 2023-01-01

## 2023-01-01 RX ORDER — MELOXICAM 7.5 MG/1
7.5 TABLET ORAL
Qty: 10 | Refills: 0 | Status: COMPLETED | COMMUNITY
Start: 2021-03-18 | End: 2023-01-01

## 2023-01-01 RX ORDER — NALOXONE HYDROCHLORIDE 4 MG/.1ML
0.4 SPRAY NASAL ONCE
Refills: 0 | Status: COMPLETED | OUTPATIENT
Start: 2023-01-01 | End: 2023-01-01

## 2023-01-01 RX ORDER — DEXMEDETOMIDINE HYDROCHLORIDE IN 0.9% SODIUM CHLORIDE 4 UG/ML
0.04 INJECTION INTRAVENOUS
Qty: 400 | Refills: 0 | Status: DISCONTINUED | OUTPATIENT
Start: 2023-01-01 | End: 2023-01-01

## 2023-01-01 RX ORDER — POTASSIUM CHLORIDE 20 MEQ
40 PACKET (EA) ORAL ONCE
Refills: 0 | Status: COMPLETED | OUTPATIENT
Start: 2023-01-01 | End: 2023-01-01

## 2023-01-01 RX ORDER — METOCLOPRAMIDE HCL 10 MG
5 TABLET ORAL EVERY 6 HOURS
Refills: 0 | Status: DISCONTINUED | OUTPATIENT
Start: 2023-01-01 | End: 2023-01-01

## 2023-01-01 RX ORDER — ACETAMINOPHEN 500 MG
650 TABLET ORAL EVERY 6 HOURS
Refills: 0 | Status: DISCONTINUED | OUTPATIENT
Start: 2023-01-01 | End: 2023-01-01

## 2023-01-01 RX ORDER — VALSARTAN 80 MG/1
40 TABLET ORAL DAILY
Refills: 0 | Status: DISCONTINUED | OUTPATIENT
Start: 2023-01-01 | End: 2023-01-01

## 2023-01-01 RX ORDER — PHYTONADIONE (VIT K1) 5 MG
5 TABLET ORAL ONCE
Refills: 0 | Status: COMPLETED | OUTPATIENT
Start: 2023-01-01 | End: 2023-01-01

## 2023-01-01 RX ORDER — ALFUZOSIN HYDROCHLORIDE 10 MG/1
10 TABLET, EXTENDED RELEASE ORAL DAILY
Qty: 90 | Refills: 0 | Status: COMPLETED | COMMUNITY
End: 2023-01-01

## 2023-01-01 RX ORDER — DEXTROSE 50 % IN WATER 50 %
12.5 SYRINGE (ML) INTRAVENOUS ONCE
Refills: 0 | Status: COMPLETED | OUTPATIENT
Start: 2023-01-01 | End: 2023-01-01

## 2023-01-01 RX ORDER — PIPERACILLIN AND TAZOBACTAM 4; .5 G/20ML; G/20ML
3.38 INJECTION, POWDER, LYOPHILIZED, FOR SOLUTION INTRAVENOUS ONCE
Refills: 0 | Status: DISCONTINUED | OUTPATIENT
Start: 2023-01-01 | End: 2023-01-01

## 2023-01-01 RX ORDER — SODIUM,POTASSIUM PHOSPHATES 278-250MG
1 POWDER IN PACKET (EA) ORAL ONCE
Refills: 0 | Status: COMPLETED | OUTPATIENT
Start: 2023-01-01 | End: 2023-01-01

## 2023-01-01 RX ORDER — PANTOPRAZOLE SODIUM 20 MG/1
40 TABLET, DELAYED RELEASE ORAL ONCE
Refills: 0 | Status: COMPLETED | OUTPATIENT
Start: 2023-01-01 | End: 2023-01-01

## 2023-01-01 RX ORDER — PANTOPRAZOLE SODIUM 20 MG/1
40 TABLET, DELAYED RELEASE ORAL EVERY 12 HOURS
Refills: 0 | Status: DISCONTINUED | OUTPATIENT
Start: 2023-01-01 | End: 2023-01-01

## 2023-01-01 RX ORDER — ROSUVASTATIN CALCIUM 5 MG/1
1 TABLET ORAL
Qty: 0 | Refills: 0 | DISCHARGE

## 2023-01-01 RX ORDER — PHENYLEPHRINE HYDROCHLORIDE 10 MG/ML
0.05 INJECTION INTRAVENOUS
Qty: 160 | Refills: 0 | Status: DISCONTINUED | OUTPATIENT
Start: 2023-01-01 | End: 2023-01-01

## 2023-01-01 RX ORDER — ALBUMIN HUMAN 25 %
50 VIAL (ML) INTRAVENOUS ONCE
Refills: 0 | Status: COMPLETED | OUTPATIENT
Start: 2023-01-01 | End: 2023-01-01

## 2023-01-01 RX ORDER — ROBINUL 0.2 MG/ML
0.2 INJECTION INTRAMUSCULAR; INTRAVENOUS EVERY 6 HOURS
Refills: 0 | Status: DISCONTINUED | OUTPATIENT
Start: 2023-01-01 | End: 2023-01-01

## 2023-01-01 RX ORDER — OCTREOTIDE ACETATE 50 UG/ML
50 INJECTION, SOLUTION INTRAVENOUS; SUBCUTANEOUS
Refills: 0 | Status: COMPLETED | COMMUNITY
End: 2023-01-01

## 2023-01-01 RX ORDER — INSULIN GLARGINE 100 [IU]/ML
15 INJECTION, SOLUTION SUBCUTANEOUS EVERY MORNING
Refills: 0 | Status: DISCONTINUED | OUTPATIENT
Start: 2023-01-01 | End: 2023-01-01

## 2023-01-01 RX ORDER — PIPERACILLIN AND TAZOBACTAM 4; .5 G/20ML; G/20ML
3.38 INJECTION, POWDER, LYOPHILIZED, FOR SOLUTION INTRAVENOUS EVERY 8 HOURS
Refills: 0 | Status: DISCONTINUED | OUTPATIENT
Start: 2023-01-01 | End: 2023-01-01

## 2023-01-01 RX ORDER — PHENYLEPHRINE HYDROCHLORIDE 10 MG/ML
0.2 INJECTION INTRAVENOUS
Qty: 40 | Refills: 0 | Status: DISCONTINUED | OUTPATIENT
Start: 2023-01-01 | End: 2023-01-01

## 2023-01-01 RX ORDER — OMEPRAZOLE 40 MG/1
40 CAPSULE, DELAYED RELEASE ORAL
Qty: 90 | Refills: 0 | Status: ACTIVE | COMMUNITY
Start: 2023-01-01 | End: 1900-01-01

## 2023-01-01 RX ORDER — ALPRAZOLAM 0.25 MG
1 TABLET ORAL
Qty: 30 | Refills: 0
Start: 2023-01-01

## 2023-01-01 RX ORDER — MIDAZOLAM HYDROCHLORIDE 1 MG/ML
0.02 INJECTION, SOLUTION INTRAMUSCULAR; INTRAVENOUS
Qty: 100 | Refills: 0 | Status: DISCONTINUED | OUTPATIENT
Start: 2023-01-01 | End: 2023-01-01

## 2023-01-01 RX ORDER — PANTOPRAZOLE SODIUM 20 MG/1
8 TABLET, DELAYED RELEASE ORAL
Qty: 80 | Refills: 0 | Status: DISCONTINUED | OUTPATIENT
Start: 2023-01-01 | End: 2023-01-01

## 2023-01-01 RX ORDER — CEFTRIAXONE 500 MG/1
2000 INJECTION, POWDER, FOR SOLUTION INTRAMUSCULAR; INTRAVENOUS ONCE
Refills: 0 | Status: COMPLETED | OUTPATIENT
Start: 2023-01-01 | End: 2023-01-01

## 2023-01-01 RX ORDER — BLOOD SUGAR DIAGNOSTIC
STRIP MISCELLANEOUS
Qty: 100 | Refills: 0 | Status: ACTIVE | COMMUNITY
Start: 2023-01-01 | End: 1900-01-01

## 2023-01-01 RX ORDER — INSULIN GLARGINE 100 [IU]/ML
5 INJECTION, SOLUTION SUBCUTANEOUS EVERY MORNING
Refills: 0 | Status: DISCONTINUED | OUTPATIENT
Start: 2023-01-01 | End: 2023-01-01

## 2023-01-01 RX ORDER — SODIUM CHLORIDE 9 MG/ML
500 INJECTION, SOLUTION INTRAVENOUS ONCE
Refills: 0 | Status: COMPLETED | OUTPATIENT
Start: 2023-01-01 | End: 2023-01-01

## 2023-01-01 RX ORDER — INSULIN GLARGINE 100 [IU]/ML
8 INJECTION, SOLUTION SUBCUTANEOUS ONCE
Refills: 0 | Status: COMPLETED | OUTPATIENT
Start: 2023-01-01 | End: 2023-01-01

## 2023-01-01 RX ORDER — CYCLOBENZAPRINE HYDROCHLORIDE 10 MG/1
10 TABLET, FILM COATED ORAL
Qty: 15 | Refills: 0 | Status: COMPLETED | COMMUNITY
Start: 2021-03-18 | End: 2023-01-01

## 2023-01-01 RX ORDER — ACETAMINOPHEN 500 MG
635 TABLET ORAL EVERY 6 HOURS
Refills: 0 | Status: DISCONTINUED | OUTPATIENT
Start: 2023-01-01 | End: 2023-01-01

## 2023-01-01 RX ORDER — ALBUMIN HUMAN 25 %
50 VIAL (ML) INTRAVENOUS
Refills: 0 | Status: ACTIVE | OUTPATIENT
Start: 2023-01-01 | End: 2024-04-05

## 2023-01-01 RX ORDER — INSULIN GLARGINE 100 [IU]/ML
15 INJECTION, SOLUTION SUBCUTANEOUS AT BEDTIME
Refills: 0 | Status: DISCONTINUED | OUTPATIENT
Start: 2023-01-01 | End: 2023-01-01

## 2023-01-01 RX ORDER — PROPOFOL 10 MG/ML
30 INJECTION, EMULSION INTRAVENOUS
Qty: 1000 | Refills: 0 | Status: DISCONTINUED | OUTPATIENT
Start: 2023-01-01 | End: 2023-01-01

## 2023-01-01 RX ORDER — CHOLECALCIFEROL (VITAMIN D3) 125 MCG
0 CAPSULE ORAL
Qty: 0 | Refills: 0 | DISCHARGE

## 2023-01-01 RX ORDER — CEFTRIAXONE 500 MG/1
2000 INJECTION, POWDER, FOR SOLUTION INTRAMUSCULAR; INTRAVENOUS ONCE
Refills: 0 | Status: DISCONTINUED | OUTPATIENT
Start: 2023-01-01 | End: 2023-01-01

## 2023-01-01 RX ORDER — ALBUMIN HUMAN 25 %
50 VIAL (ML) INTRAVENOUS ONCE
Refills: 0 | Status: DISCONTINUED | OUTPATIENT
Start: 2023-01-01 | End: 2023-01-01

## 2023-01-01 RX ORDER — SODIUM CHLORIDE 9 MG/ML
2000 INJECTION INTRAMUSCULAR; INTRAVENOUS; SUBCUTANEOUS ONCE
Refills: 0 | Status: DISCONTINUED | OUTPATIENT
Start: 2023-01-01 | End: 2023-01-01

## 2023-01-01 RX ORDER — POTASSIUM CHLORIDE 20 MEQ
10 PACKET (EA) ORAL ONCE
Refills: 0 | Status: COMPLETED | OUTPATIENT
Start: 2023-01-01 | End: 2023-01-01

## 2023-01-01 RX ORDER — DEXMEDETOMIDINE HYDROCHLORIDE IN 0.9% SODIUM CHLORIDE 4 UG/ML
0.5 INJECTION INTRAVENOUS
Qty: 400 | Refills: 0 | Status: DISCONTINUED | OUTPATIENT
Start: 2023-01-01 | End: 2023-01-01

## 2023-01-01 RX ORDER — LACTULOSE 10 G/15ML
20 SOLUTION ORAL EVERY 8 HOURS
Refills: 0 | Status: DISCONTINUED | OUTPATIENT
Start: 2023-01-01 | End: 2023-01-01

## 2023-01-01 RX ORDER — PREGABALIN 225 MG/1
0 CAPSULE ORAL
Qty: 0 | Refills: 0 | DISCHARGE

## 2023-01-01 RX ORDER — CASPOFUNGIN ACETATE 7 MG/ML
50 INJECTION, POWDER, LYOPHILIZED, FOR SOLUTION INTRAVENOUS EVERY 24 HOURS
Refills: 0 | Status: DISCONTINUED | OUTPATIENT
Start: 2023-01-01 | End: 2023-01-01

## 2023-01-01 RX ORDER — CEFDINIR 250 MG/5ML
6 POWDER, FOR SUSPENSION ORAL
Qty: 1 | Refills: 0
Start: 2023-01-01 | End: 2023-01-01

## 2023-01-01 RX ORDER — HYDROMORPHONE HYDROCHLORIDE 2 MG/ML
0.1 INJECTION INTRAMUSCULAR; INTRAVENOUS; SUBCUTANEOUS
Refills: 0 | Status: DISCONTINUED | OUTPATIENT
Start: 2023-01-01 | End: 2023-01-01

## 2023-01-01 RX ORDER — MAGNESIUM SULFATE 500 MG/ML
2 VIAL (ML) INJECTION ONCE
Refills: 0 | Status: COMPLETED | OUTPATIENT
Start: 2023-01-01 | End: 2023-01-01

## 2023-01-01 RX ORDER — ROSUVASTATIN CALCIUM 5 MG/1
10 TABLET ORAL AT BEDTIME
Refills: 0 | Status: DISCONTINUED | OUTPATIENT
Start: 2023-01-01 | End: 2023-01-01

## 2023-01-01 RX ORDER — OXYCODONE HYDROCHLORIDE 5 MG/1
10 TABLET ORAL ONCE
Refills: 0 | Status: DISCONTINUED | OUTPATIENT
Start: 2023-01-01 | End: 2023-01-01

## 2023-01-01 RX ADMIN — Medication 5 MILLIGRAM(S): at 12:32

## 2023-01-01 RX ADMIN — PHENYLEPHRINE HYDROCHLORIDE 5.39 MICROGRAM(S)/KG/MIN: 10 INJECTION INTRAVENOUS at 22:50

## 2023-01-01 RX ADMIN — MEROPENEM 100 MILLIGRAM(S): 1 INJECTION INTRAVENOUS at 21:23

## 2023-01-01 RX ADMIN — PANTOPRAZOLE SODIUM 40 MILLIGRAM(S): 20 TABLET, DELAYED RELEASE ORAL at 21:51

## 2023-01-01 RX ADMIN — PANTOPRAZOLE SODIUM 40 MILLIGRAM(S): 20 TABLET, DELAYED RELEASE ORAL at 15:53

## 2023-01-01 RX ADMIN — Medication 6: at 05:28

## 2023-01-01 RX ADMIN — HEPARIN SODIUM 5000 UNIT(S): 5000 INJECTION INTRAVENOUS; SUBCUTANEOUS at 21:15

## 2023-01-01 RX ADMIN — Medication 50 MILLILITER(S): at 12:17

## 2023-01-01 RX ADMIN — NYSTATIN CREAM 1 APPLICATION(S): 100000 CREAM TOPICAL at 22:00

## 2023-01-01 RX ADMIN — MIDODRINE HYDROCHLORIDE 10 MILLIGRAM(S): 2.5 TABLET ORAL at 05:52

## 2023-01-01 RX ADMIN — HEPARIN SODIUM 5000 UNIT(S): 5000 INJECTION INTRAVENOUS; SUBCUTANEOUS at 13:52

## 2023-01-01 RX ADMIN — SODIUM CHLORIDE 75 MILLILITER(S): 9 INJECTION, SOLUTION INTRAVENOUS at 08:30

## 2023-01-01 RX ADMIN — SODIUM CHLORIDE 75 MILLILITER(S): 9 INJECTION INTRAMUSCULAR; INTRAVENOUS; SUBCUTANEOUS at 14:45

## 2023-01-01 RX ADMIN — CASPOFUNGIN ACETATE 260 MILLIGRAM(S): 7 INJECTION, POWDER, LYOPHILIZED, FOR SOLUTION INTRAVENOUS at 15:54

## 2023-01-01 RX ADMIN — Medication 600 MILLILITER(S): at 12:43

## 2023-01-01 RX ADMIN — CISATRACURIUM BESYLATE 11.4 MICROGRAM(S)/KG/MIN: 2 INJECTION INTRAVENOUS at 06:33

## 2023-01-01 RX ADMIN — Medication 50 MILLILITER(S): at 14:23

## 2023-01-01 RX ADMIN — DEXTROSE MONOHYDRATE, SODIUM CHLORIDE, AND POTASSIUM CHLORIDE 75 MILLILITER(S): 50; .745; 4.5 INJECTION, SOLUTION INTRAVENOUS at 15:24

## 2023-01-01 RX ADMIN — Medication 50 MILLILITER(S): at 13:50

## 2023-01-01 RX ADMIN — PANTOPRAZOLE SODIUM 40 MILLIGRAM(S): 20 TABLET, DELAYED RELEASE ORAL at 22:15

## 2023-01-01 RX ADMIN — Medication 5 MILLIGRAM(S): at 23:15

## 2023-01-01 RX ADMIN — Medication 600 MILLILITER(S): at 16:11

## 2023-01-01 RX ADMIN — MIDAZOLAM HYDROCHLORIDE 1.27 MG/KG/HR: 1 INJECTION, SOLUTION INTRAMUSCULAR; INTRAVENOUS at 18:17

## 2023-01-01 RX ADMIN — Medication 4: at 23:44

## 2023-01-01 RX ADMIN — CASPOFUNGIN ACETATE 260 MILLIGRAM(S): 7 INJECTION, POWDER, LYOPHILIZED, FOR SOLUTION INTRAVENOUS at 10:21

## 2023-01-01 RX ADMIN — MEROPENEM 100 MILLIGRAM(S): 1 INJECTION INTRAVENOUS at 06:07

## 2023-01-01 RX ADMIN — Medication 50 MILLILITER(S): at 12:02

## 2023-01-01 RX ADMIN — Medication 3: at 05:33

## 2023-01-01 RX ADMIN — SODIUM CHLORIDE 75 MILLILITER(S): 9 INJECTION INTRAMUSCULAR; INTRAVENOUS; SUBCUTANEOUS at 18:34

## 2023-01-01 RX ADMIN — MORPHINE SULFATE 2 MG/HR: 50 CAPSULE, EXTENDED RELEASE ORAL at 13:37

## 2023-01-01 RX ADMIN — CEFEPIME 100 MILLIGRAM(S): 1 INJECTION, POWDER, FOR SOLUTION INTRAMUSCULAR; INTRAVENOUS at 21:00

## 2023-01-01 RX ADMIN — Medication 50 MILLILITER(S): at 14:29

## 2023-01-01 RX ADMIN — Medication 5 MILLIGRAM(S): at 13:04

## 2023-01-01 RX ADMIN — NYSTATIN CREAM 1 APPLICATION(S): 100000 CREAM TOPICAL at 21:36

## 2023-01-01 RX ADMIN — Medication 4: at 23:39

## 2023-01-01 RX ADMIN — Medication 1: at 17:29

## 2023-01-01 RX ADMIN — LACTULOSE 20 GRAM(S): 10 SOLUTION ORAL at 21:26

## 2023-01-01 RX ADMIN — PHENYLEPHRINE HYDROCHLORIDE 0.67 MICROGRAM(S)/KG/MIN: 10 INJECTION INTRAVENOUS at 01:38

## 2023-01-01 RX ADMIN — FENTANYL CITRATE 12.7 MICROGRAM(S)/KG/HR: 50 INJECTION INTRAVENOUS at 16:28

## 2023-01-01 RX ADMIN — SODIUM CHLORIDE 75 MILLILITER(S): 9 INJECTION INTRAMUSCULAR; INTRAVENOUS; SUBCUTANEOUS at 07:55

## 2023-01-01 RX ADMIN — ONDANSETRON 4 MILLIGRAM(S): 8 TABLET, FILM COATED ORAL at 20:26

## 2023-01-01 RX ADMIN — Medication 5 MILLIGRAM(S): at 06:15

## 2023-01-01 RX ADMIN — PIPERACILLIN AND TAZOBACTAM 25 GRAM(S): 4; .5 INJECTION, POWDER, LYOPHILIZED, FOR SOLUTION INTRAVENOUS at 12:04

## 2023-01-01 RX ADMIN — Medication 5 MILLIGRAM(S): at 14:40

## 2023-01-01 RX ADMIN — LACTULOSE 20 GRAM(S): 10 SOLUTION ORAL at 15:54

## 2023-01-01 RX ADMIN — CEFTRIAXONE 2000 MILLIGRAM(S): 500 INJECTION, POWDER, FOR SOLUTION INTRAMUSCULAR; INTRAVENOUS at 10:40

## 2023-01-01 RX ADMIN — PHENYLEPHRINE HYDROCHLORIDE 5.39 MICROGRAM(S)/KG/MIN: 10 INJECTION INTRAVENOUS at 22:08

## 2023-01-01 RX ADMIN — CHLORHEXIDINE GLUCONATE 15 MILLILITER(S): 213 SOLUTION TOPICAL at 05:38

## 2023-01-01 RX ADMIN — Medication 4: at 05:25

## 2023-01-01 RX ADMIN — MIDODRINE HYDROCHLORIDE 15 MILLIGRAM(S): 2.5 TABLET ORAL at 14:41

## 2023-01-01 RX ADMIN — SODIUM CHLORIDE 100 MILLILITER(S): 9 INJECTION, SOLUTION INTRAVENOUS at 18:36

## 2023-01-01 RX ADMIN — MEROPENEM 100 MILLIGRAM(S): 1 INJECTION INTRAVENOUS at 22:03

## 2023-01-01 RX ADMIN — Medication 5 MILLIGRAM(S): at 15:57

## 2023-01-01 RX ADMIN — CHLORHEXIDINE GLUCONATE 15 MILLILITER(S): 213 SOLUTION TOPICAL at 05:09

## 2023-01-01 RX ADMIN — Medication 4: at 13:08

## 2023-01-01 RX ADMIN — PHENYLEPHRINE HYDROCHLORIDE 0.67 MICROGRAM(S)/KG/MIN: 10 INJECTION INTRAVENOUS at 20:38

## 2023-01-01 RX ADMIN — SODIUM CHLORIDE 75 MILLILITER(S): 9 INJECTION, SOLUTION INTRAVENOUS at 22:19

## 2023-01-01 RX ADMIN — PHENYLEPHRINE HYDROCHLORIDE 5.39 MICROGRAM(S)/KG/MIN: 10 INJECTION INTRAVENOUS at 10:11

## 2023-01-01 RX ADMIN — Medication 5 MILLIGRAM(S): at 17:56

## 2023-01-01 RX ADMIN — CEFEPIME 100 MILLIGRAM(S): 1 INJECTION, POWDER, FOR SOLUTION INTRAMUSCULAR; INTRAVENOUS at 18:19

## 2023-01-01 RX ADMIN — Medication 4: at 17:28

## 2023-01-01 RX ADMIN — Medication 50 MILLILITER(S): at 16:36

## 2023-01-01 RX ADMIN — Medication 6: at 18:13

## 2023-01-01 RX ADMIN — HEPARIN SODIUM 5000 UNIT(S): 5000 INJECTION INTRAVENOUS; SUBCUTANEOUS at 06:07

## 2023-01-01 RX ADMIN — PHENYLEPHRINE HYDROCHLORIDE 0.67 MICROGRAM(S)/KG/MIN: 10 INJECTION INTRAVENOUS at 23:16

## 2023-01-01 RX ADMIN — CHLORHEXIDINE GLUCONATE 1 APPLICATION(S): 213 SOLUTION TOPICAL at 05:15

## 2023-01-01 RX ADMIN — SODIUM CHLORIDE 75 MILLILITER(S): 9 INJECTION INTRAMUSCULAR; INTRAVENOUS; SUBCUTANEOUS at 23:21

## 2023-01-01 RX ADMIN — PANTOPRAZOLE SODIUM 40 MILLIGRAM(S): 20 TABLET, DELAYED RELEASE ORAL at 21:26

## 2023-01-01 RX ADMIN — PHENYLEPHRINE HYDROCHLORIDE 5.39 MICROGRAM(S)/KG/MIN: 10 INJECTION INTRAVENOUS at 11:59

## 2023-01-01 RX ADMIN — Medication 2: at 06:12

## 2023-01-01 RX ADMIN — PANTOPRAZOLE SODIUM 40 MILLIGRAM(S): 20 TABLET, DELAYED RELEASE ORAL at 11:05

## 2023-01-01 RX ADMIN — INSULIN GLARGINE 15 UNIT(S): 100 INJECTION, SOLUTION SUBCUTANEOUS at 18:44

## 2023-01-01 RX ADMIN — CHLORHEXIDINE GLUCONATE 15 MILLILITER(S): 213 SOLUTION TOPICAL at 18:49

## 2023-01-01 RX ADMIN — Medication 1 DROP(S): at 21:37

## 2023-01-01 RX ADMIN — Medication 50 MILLILITER(S): at 14:11

## 2023-01-01 RX ADMIN — VASOPRESSIN 6 UNIT(S)/MIN: 20 INJECTION INTRAVENOUS at 20:19

## 2023-01-01 RX ADMIN — Medication 6.74 MICROGRAM(S)/KG/MIN: at 04:43

## 2023-01-01 RX ADMIN — Medication 400 MILLIGRAM(S): at 20:22

## 2023-01-01 RX ADMIN — PANTOPRAZOLE SODIUM 40 MILLIGRAM(S): 20 TABLET, DELAYED RELEASE ORAL at 21:15

## 2023-01-01 RX ADMIN — Medication 1: at 05:57

## 2023-01-01 RX ADMIN — Medication 20 MILLIEQUIVALENT(S): at 10:07

## 2023-01-01 RX ADMIN — Medication 5.95 MICROGRAM(S)/KG/MIN: at 05:39

## 2023-01-01 RX ADMIN — DEXMEDETOMIDINE HYDROCHLORIDE IN 0.9% SODIUM CHLORIDE 8.99 MICROGRAM(S)/KG/HR: 4 INJECTION INTRAVENOUS at 22:25

## 2023-01-01 RX ADMIN — CHLORHEXIDINE GLUCONATE 1 APPLICATION(S): 213 SOLUTION TOPICAL at 07:25

## 2023-01-01 RX ADMIN — CEFEPIME 100 MILLIGRAM(S): 1 INJECTION, POWDER, FOR SOLUTION INTRAMUSCULAR; INTRAVENOUS at 17:37

## 2023-01-01 RX ADMIN — CHLORHEXIDINE GLUCONATE 15 MILLILITER(S): 213 SOLUTION TOPICAL at 22:48

## 2023-01-01 RX ADMIN — Medication 5 MILLIGRAM(S): at 06:07

## 2023-01-01 RX ADMIN — Medication 5 MILLIGRAM(S): at 17:55

## 2023-01-01 RX ADMIN — LACTULOSE 20 GRAM(S): 10 SOLUTION ORAL at 22:47

## 2023-01-01 RX ADMIN — Medication 2: at 23:52

## 2023-01-01 RX ADMIN — PANTOPRAZOLE SODIUM 40 MILLIGRAM(S): 20 TABLET, DELAYED RELEASE ORAL at 09:56

## 2023-01-01 RX ADMIN — Medication 8: at 11:31

## 2023-01-01 RX ADMIN — Medication 5 MILLIGRAM(S): at 23:16

## 2023-01-01 RX ADMIN — MEROPENEM 100 MILLIGRAM(S): 1 INJECTION INTRAVENOUS at 05:46

## 2023-01-01 RX ADMIN — NYSTATIN CREAM 1 APPLICATION(S): 100000 CREAM TOPICAL at 10:34

## 2023-01-01 RX ADMIN — PHENYLEPHRINE HYDROCHLORIDE 0.67 MICROGRAM(S)/KG/MIN: 10 INJECTION INTRAVENOUS at 10:47

## 2023-01-01 RX ADMIN — CHLORHEXIDINE GLUCONATE 1 APPLICATION(S): 213 SOLUTION TOPICAL at 05:28

## 2023-01-01 RX ADMIN — Medication 2: at 23:20

## 2023-01-01 RX ADMIN — HEPARIN SODIUM 5000 UNIT(S): 5000 INJECTION INTRAVENOUS; SUBCUTANEOUS at 11:11

## 2023-01-01 RX ADMIN — Medication 50 MILLILITER(S): at 11:10

## 2023-01-01 RX ADMIN — Medication 3: at 17:35

## 2023-01-01 RX ADMIN — Medication 5 MILLIGRAM(S): at 17:21

## 2023-01-01 RX ADMIN — NYSTATIN CREAM 1 APPLICATION(S): 100000 CREAM TOPICAL at 15:55

## 2023-01-01 RX ADMIN — INSULIN GLARGINE 10 UNIT(S): 100 INJECTION, SOLUTION SUBCUTANEOUS at 10:29

## 2023-01-01 RX ADMIN — ONDANSETRON 4 MILLIGRAM(S): 8 TABLET, FILM COATED ORAL at 18:16

## 2023-01-01 RX ADMIN — Medication 6: at 11:25

## 2023-01-01 RX ADMIN — VASOPRESSIN 1.5 UNIT(S)/MIN: 20 INJECTION INTRAVENOUS at 14:20

## 2023-01-01 RX ADMIN — Medication 50 MILLILITER(S): at 00:08

## 2023-01-01 RX ADMIN — Medication 6.74 MICROGRAM(S)/KG/MIN: at 01:38

## 2023-01-01 RX ADMIN — PHENYLEPHRINE HYDROCHLORIDE 0.67 MICROGRAM(S)/KG/MIN: 10 INJECTION INTRAVENOUS at 19:41

## 2023-01-01 RX ADMIN — NYSTATIN CREAM 1 APPLICATION(S): 100000 CREAM TOPICAL at 12:58

## 2023-01-01 RX ADMIN — Medication 5 MILLIGRAM(S): at 23:53

## 2023-01-01 RX ADMIN — MIDODRINE HYDROCHLORIDE 10 MILLIGRAM(S): 2.5 TABLET ORAL at 22:19

## 2023-01-01 RX ADMIN — Medication 5.95 MICROGRAM(S)/KG/MIN: at 18:59

## 2023-01-01 RX ADMIN — MEROPENEM 100 MILLIGRAM(S): 1 INJECTION INTRAVENOUS at 17:21

## 2023-01-01 RX ADMIN — Medication 5 MILLIGRAM(S): at 11:11

## 2023-01-01 RX ADMIN — PHENYLEPHRINE HYDROCHLORIDE 5.39 MICROGRAM(S)/KG/MIN: 10 INJECTION INTRAVENOUS at 19:20

## 2023-01-01 RX ADMIN — Medication 2: at 19:30

## 2023-01-01 RX ADMIN — PHENYLEPHRINE HYDROCHLORIDE 0.67 MICROGRAM(S)/KG/MIN: 10 INJECTION INTRAVENOUS at 18:16

## 2023-01-01 RX ADMIN — SODIUM CHLORIDE 75 MILLILITER(S): 9 INJECTION, SOLUTION INTRAVENOUS at 21:13

## 2023-01-01 RX ADMIN — CEFEPIME 1000 MILLIGRAM(S): 1 INJECTION, POWDER, FOR SOLUTION INTRAMUSCULAR; INTRAVENOUS at 14:13

## 2023-01-01 RX ADMIN — Medication 600 MILLILITER(S): at 13:22

## 2023-01-01 RX ADMIN — Medication 5 MILLIGRAM(S): at 05:12

## 2023-01-01 RX ADMIN — Medication 2: at 06:35

## 2023-01-01 RX ADMIN — PHENYLEPHRINE HYDROCHLORIDE 5.39 MICROGRAM(S)/KG/MIN: 10 INJECTION INTRAVENOUS at 22:19

## 2023-01-01 RX ADMIN — DEXMEDETOMIDINE HYDROCHLORIDE IN 0.9% SODIUM CHLORIDE 0.72 MICROGRAM(S)/KG/HR: 4 INJECTION INTRAVENOUS at 04:30

## 2023-01-01 RX ADMIN — CEFEPIME 100 MILLIGRAM(S): 1 INJECTION, POWDER, FOR SOLUTION INTRAMUSCULAR; INTRAVENOUS at 22:39

## 2023-01-01 RX ADMIN — Medication 50 MILLILITER(S): at 05:22

## 2023-01-01 RX ADMIN — PANTOPRAZOLE SODIUM 40 MILLIGRAM(S): 20 TABLET, DELAYED RELEASE ORAL at 21:27

## 2023-01-01 RX ADMIN — CHLORHEXIDINE GLUCONATE 15 MILLILITER(S): 213 SOLUTION TOPICAL at 18:47

## 2023-01-01 RX ADMIN — Medication 1: at 17:37

## 2023-01-01 RX ADMIN — Medication 5 MILLIGRAM(S): at 12:21

## 2023-01-01 RX ADMIN — VASOPRESSIN 6 UNIT(S)/MIN: 20 INJECTION INTRAVENOUS at 09:32

## 2023-01-01 RX ADMIN — CHLORHEXIDINE GLUCONATE 1 APPLICATION(S): 213 SOLUTION TOPICAL at 05:54

## 2023-01-01 RX ADMIN — Medication 5 MILLIGRAM(S): at 05:08

## 2023-01-01 RX ADMIN — CHLORHEXIDINE GLUCONATE 15 MILLILITER(S): 213 SOLUTION TOPICAL at 18:16

## 2023-01-01 RX ADMIN — Medication 5 MILLIGRAM(S): at 05:20

## 2023-01-01 RX ADMIN — Medication 50 MILLILITER(S): at 13:22

## 2023-01-01 RX ADMIN — PHENYLEPHRINE HYDROCHLORIDE 0.67 MICROGRAM(S)/KG/MIN: 10 INJECTION INTRAVENOUS at 03:47

## 2023-01-01 RX ADMIN — CEFEPIME 100 MILLIGRAM(S): 1 INJECTION, POWDER, FOR SOLUTION INTRAMUSCULAR; INTRAVENOUS at 17:49

## 2023-01-01 RX ADMIN — MEROPENEM 100 MILLIGRAM(S): 1 INJECTION INTRAVENOUS at 05:27

## 2023-01-01 RX ADMIN — PHENYLEPHRINE HYDROCHLORIDE 0.67 MICROGRAM(S)/KG/MIN: 10 INJECTION INTRAVENOUS at 21:23

## 2023-01-01 RX ADMIN — HEPARIN SODIUM 5000 UNIT(S): 5000 INJECTION INTRAVENOUS; SUBCUTANEOUS at 21:23

## 2023-01-01 RX ADMIN — Medication 50 MILLILITER(S): at 17:21

## 2023-01-01 RX ADMIN — PHENYLEPHRINE HYDROCHLORIDE 0.67 MICROGRAM(S)/KG/MIN: 10 INJECTION INTRAVENOUS at 22:26

## 2023-01-01 RX ADMIN — Medication 2: at 23:33

## 2023-01-01 RX ADMIN — Medication 5.95 MICROGRAM(S)/KG/MIN: at 10:36

## 2023-01-01 RX ADMIN — MIDODRINE HYDROCHLORIDE 15 MILLIGRAM(S): 2.5 TABLET ORAL at 13:35

## 2023-01-01 RX ADMIN — MIDODRINE HYDROCHLORIDE 15 MILLIGRAM(S): 2.5 TABLET ORAL at 06:21

## 2023-01-01 RX ADMIN — CHLORHEXIDINE GLUCONATE 1 APPLICATION(S): 213 SOLUTION TOPICAL at 00:00

## 2023-01-01 RX ADMIN — HUMAN INSULIN 10 UNIT(S): 100 INJECTION, SUSPENSION SUBCUTANEOUS at 13:09

## 2023-01-01 RX ADMIN — PANTOPRAZOLE SODIUM 40 MILLIGRAM(S): 20 TABLET, DELAYED RELEASE ORAL at 10:25

## 2023-01-01 RX ADMIN — PANTOPRAZOLE SODIUM 40 MILLIGRAM(S): 20 TABLET, DELAYED RELEASE ORAL at 21:24

## 2023-01-01 RX ADMIN — Medication 1: at 12:56

## 2023-01-01 RX ADMIN — Medication 5 MILLIGRAM(S): at 12:22

## 2023-01-01 RX ADMIN — CHLORHEXIDINE GLUCONATE 15 MILLILITER(S): 213 SOLUTION TOPICAL at 06:06

## 2023-01-01 RX ADMIN — PANTOPRAZOLE SODIUM 40 MILLIGRAM(S): 20 TABLET, DELAYED RELEASE ORAL at 22:09

## 2023-01-01 RX ADMIN — HEPARIN SODIUM 5000 UNIT(S): 5000 INJECTION INTRAVENOUS; SUBCUTANEOUS at 21:46

## 2023-01-01 RX ADMIN — Medication 2: at 00:02

## 2023-01-01 RX ADMIN — CEFEPIME 1000 MILLIGRAM(S): 1 INJECTION, POWDER, FOR SOLUTION INTRAMUSCULAR; INTRAVENOUS at 10:29

## 2023-01-01 RX ADMIN — Medication 2: at 00:25

## 2023-01-01 RX ADMIN — VASOPRESSIN 1.5 UNIT(S)/MIN: 20 INJECTION INTRAVENOUS at 04:31

## 2023-01-01 RX ADMIN — MIDODRINE HYDROCHLORIDE 10 MILLIGRAM(S): 2.5 TABLET ORAL at 21:24

## 2023-01-01 RX ADMIN — HEPARIN SODIUM 5000 UNIT(S): 5000 INJECTION INTRAVENOUS; SUBCUTANEOUS at 10:21

## 2023-01-01 RX ADMIN — ONDANSETRON 4 MILLIGRAM(S): 8 TABLET, FILM COATED ORAL at 05:38

## 2023-01-01 RX ADMIN — CHLORHEXIDINE GLUCONATE 15 MILLILITER(S): 213 SOLUTION TOPICAL at 19:07

## 2023-01-01 RX ADMIN — ONDANSETRON 4 MILLIGRAM(S): 8 TABLET, FILM COATED ORAL at 21:06

## 2023-01-01 RX ADMIN — SODIUM CHLORIDE 75 MILLILITER(S): 9 INJECTION INTRAMUSCULAR; INTRAVENOUS; SUBCUTANEOUS at 06:14

## 2023-01-01 RX ADMIN — PHENYLEPHRINE HYDROCHLORIDE 0.67 MICROGRAM(S)/KG/MIN: 10 INJECTION INTRAVENOUS at 23:03

## 2023-01-01 RX ADMIN — Medication 1 APPLICATION(S): at 18:06

## 2023-01-01 RX ADMIN — Medication 1 DROP(S): at 15:54

## 2023-01-01 RX ADMIN — Medication 0.25 MILLIGRAM(S): at 14:14

## 2023-01-01 RX ADMIN — HEPARIN SODIUM 5000 UNIT(S): 5000 INJECTION INTRAVENOUS; SUBCUTANEOUS at 22:18

## 2023-01-01 RX ADMIN — HEPARIN SODIUM 5000 UNIT(S): 5000 INJECTION INTRAVENOUS; SUBCUTANEOUS at 22:08

## 2023-01-01 RX ADMIN — Medication 1: at 06:02

## 2023-01-01 RX ADMIN — Medication 600 MILLILITER(S): at 15:24

## 2023-01-01 RX ADMIN — Medication 600 MILLILITER(S): at 12:41

## 2023-01-01 RX ADMIN — Medication 5 MILLIGRAM(S): at 23:39

## 2023-01-01 RX ADMIN — HYDROMORPHONE HYDROCHLORIDE 1 MILLIGRAM(S): 2 INJECTION INTRAMUSCULAR; INTRAVENOUS; SUBCUTANEOUS at 21:30

## 2023-01-01 RX ADMIN — Medication 50 MILLILITER(S): at 13:19

## 2023-01-01 RX ADMIN — Medication 6: at 12:28

## 2023-01-01 RX ADMIN — SODIUM CHLORIDE 75 MILLILITER(S): 9 INJECTION, SOLUTION INTRAVENOUS at 10:49

## 2023-01-01 RX ADMIN — POTASSIUM PHOSPHATE, MONOBASIC POTASSIUM PHOSPHATE, DIBASIC 62.5 MILLIMOLE(S): 236; 224 INJECTION, SOLUTION INTRAVENOUS at 10:39

## 2023-01-01 RX ADMIN — CEFEPIME 1000 MILLIGRAM(S): 1 INJECTION, POWDER, FOR SOLUTION INTRAMUSCULAR; INTRAVENOUS at 00:21

## 2023-01-01 RX ADMIN — SODIUM CHLORIDE 75 MILLILITER(S): 9 INJECTION, SOLUTION INTRAVENOUS at 11:57

## 2023-01-01 RX ADMIN — NYSTATIN CREAM 1 APPLICATION(S): 100000 CREAM TOPICAL at 09:52

## 2023-01-01 RX ADMIN — Medication 2.5 MILLIGRAM(S): at 05:48

## 2023-01-01 RX ADMIN — Medication 5 MILLIGRAM(S): at 18:35

## 2023-01-01 RX ADMIN — Medication 4: at 19:01

## 2023-01-01 RX ADMIN — Medication 1 PACKET(S): at 09:26

## 2023-01-01 RX ADMIN — Medication 5 MILLIGRAM(S): at 11:29

## 2023-01-01 RX ADMIN — PANTOPRAZOLE SODIUM 40 MILLIGRAM(S): 20 TABLET, DELAYED RELEASE ORAL at 21:44

## 2023-01-01 RX ADMIN — Medication 5 MILLIGRAM(S): at 05:52

## 2023-01-01 RX ADMIN — HEPARIN SODIUM 5000 UNIT(S): 5000 INJECTION INTRAVENOUS; SUBCUTANEOUS at 21:47

## 2023-01-01 RX ADMIN — SODIUM CHLORIDE 1000 MILLILITER(S): 9 INJECTION, SOLUTION INTRAVENOUS at 21:49

## 2023-01-01 RX ADMIN — PHENYLEPHRINE HYDROCHLORIDE 5.39 MICROGRAM(S)/KG/MIN: 10 INJECTION INTRAVENOUS at 04:04

## 2023-01-01 RX ADMIN — PHENYLEPHRINE HYDROCHLORIDE 5.39 MICROGRAM(S)/KG/MIN: 10 INJECTION INTRAVENOUS at 17:34

## 2023-01-01 RX ADMIN — Medication 2: at 06:04

## 2023-01-01 RX ADMIN — VASOPRESSIN 6 UNIT(S)/MIN: 20 INJECTION INTRAVENOUS at 13:15

## 2023-01-01 RX ADMIN — HEPARIN SODIUM 5000 UNIT(S): 5000 INJECTION INTRAVENOUS; SUBCUTANEOUS at 14:12

## 2023-01-01 RX ADMIN — CHLORHEXIDINE GLUCONATE 15 MILLILITER(S): 213 SOLUTION TOPICAL at 18:59

## 2023-01-01 RX ADMIN — CASPOFUNGIN ACETATE 260 MILLIGRAM(S): 7 INJECTION, POWDER, LYOPHILIZED, FOR SOLUTION INTRAVENOUS at 09:50

## 2023-01-01 RX ADMIN — Medication 100 MEQ/KG/HR: at 18:17

## 2023-01-01 RX ADMIN — MIDODRINE HYDROCHLORIDE 5 MILLIGRAM(S): 2.5 TABLET ORAL at 22:11

## 2023-01-01 RX ADMIN — NYSTATIN CREAM 1 APPLICATION(S): 100000 CREAM TOPICAL at 09:22

## 2023-01-01 RX ADMIN — PIPERACILLIN AND TAZOBACTAM 25 GRAM(S): 4; .5 INJECTION, POWDER, LYOPHILIZED, FOR SOLUTION INTRAVENOUS at 22:15

## 2023-01-01 RX ADMIN — Medication 100 MILLIEQUIVALENT(S): at 16:44

## 2023-01-01 RX ADMIN — HEPARIN SODIUM 5000 UNIT(S): 5000 INJECTION INTRAVENOUS; SUBCUTANEOUS at 05:28

## 2023-01-01 RX ADMIN — HEPARIN SODIUM 5000 UNIT(S): 5000 INJECTION INTRAVENOUS; SUBCUTANEOUS at 06:06

## 2023-01-01 RX ADMIN — Medication 5 MILLIGRAM(S): at 06:08

## 2023-01-01 RX ADMIN — Medication 5 MILLIGRAM(S): at 18:18

## 2023-01-01 RX ADMIN — CHLORHEXIDINE GLUCONATE 1 APPLICATION(S): 213 SOLUTION TOPICAL at 05:31

## 2023-01-01 RX ADMIN — Medication 5 MILLIGRAM(S): at 00:24

## 2023-01-01 RX ADMIN — PANTOPRAZOLE SODIUM 40 MILLIGRAM(S): 20 TABLET, DELAYED RELEASE ORAL at 22:40

## 2023-01-01 RX ADMIN — SODIUM CHLORIDE 100 MILLILITER(S): 9 INJECTION, SOLUTION INTRAVENOUS at 12:48

## 2023-01-01 RX ADMIN — PIPERACILLIN AND TAZOBACTAM 25 GRAM(S): 4; .5 INJECTION, POWDER, LYOPHILIZED, FOR SOLUTION INTRAVENOUS at 15:54

## 2023-01-01 RX ADMIN — PANTOPRAZOLE SODIUM 40 MILLIGRAM(S): 20 TABLET, DELAYED RELEASE ORAL at 10:53

## 2023-01-01 RX ADMIN — Medication 4: at 06:27

## 2023-01-01 RX ADMIN — PHENYLEPHRINE HYDROCHLORIDE 5.39 MICROGRAM(S)/KG/MIN: 10 INJECTION INTRAVENOUS at 09:21

## 2023-01-01 RX ADMIN — Medication 5 MILLIGRAM(S): at 05:28

## 2023-01-01 RX ADMIN — FENTANYL CITRATE 12.7 MICROGRAM(S)/KG/HR: 50 INJECTION INTRAVENOUS at 18:58

## 2023-01-01 RX ADMIN — Medication 5 MILLIGRAM(S): at 18:16

## 2023-01-01 RX ADMIN — FAMOTIDINE 20 MILLIGRAM(S): 10 INJECTION INTRAVENOUS at 22:32

## 2023-01-01 RX ADMIN — Medication 6: at 18:42

## 2023-01-01 RX ADMIN — PANTOPRAZOLE SODIUM 40 MILLIGRAM(S): 20 TABLET, DELAYED RELEASE ORAL at 21:38

## 2023-01-01 RX ADMIN — NYSTATIN CREAM 1 APPLICATION(S): 100000 CREAM TOPICAL at 23:40

## 2023-01-01 RX ADMIN — Medication 5 MILLIGRAM(S): at 06:04

## 2023-01-01 RX ADMIN — Medication 1000 MILLIGRAM(S): at 10:00

## 2023-01-01 RX ADMIN — PIPERACILLIN AND TAZOBACTAM 25 GRAM(S): 4; .5 INJECTION, POWDER, LYOPHILIZED, FOR SOLUTION INTRAVENOUS at 05:04

## 2023-01-01 RX ADMIN — PANTOPRAZOLE SODIUM 40 MILLIGRAM(S): 20 TABLET, DELAYED RELEASE ORAL at 09:38

## 2023-01-01 RX ADMIN — Medication 2: at 11:20

## 2023-01-01 RX ADMIN — Medication 2.5 MILLIGRAM(S): at 01:17

## 2023-01-01 RX ADMIN — PHENYLEPHRINE HYDROCHLORIDE 5.39 MICROGRAM(S)/KG/MIN: 10 INJECTION INTRAVENOUS at 17:03

## 2023-01-01 RX ADMIN — Medication 2: at 05:25

## 2023-01-01 RX ADMIN — MORPHINE SULFATE 2 MILLIGRAM(S): 50 CAPSULE, EXTENDED RELEASE ORAL at 13:23

## 2023-01-01 RX ADMIN — PIPERACILLIN AND TAZOBACTAM 25 GRAM(S): 4; .5 INJECTION, POWDER, LYOPHILIZED, FOR SOLUTION INTRAVENOUS at 06:15

## 2023-01-01 RX ADMIN — Medication 5 MILLIGRAM(S): at 18:49

## 2023-01-01 RX ADMIN — Medication 5 MILLIGRAM(S): at 18:37

## 2023-01-01 RX ADMIN — Medication 5 MILLIGRAM(S): at 05:21

## 2023-01-01 RX ADMIN — Medication 2: at 23:47

## 2023-01-01 RX ADMIN — PANTOPRAZOLE SODIUM 40 MILLIGRAM(S): 20 TABLET, DELAYED RELEASE ORAL at 09:20

## 2023-01-01 RX ADMIN — CASPOFUNGIN ACETATE 260 MILLIGRAM(S): 7 INJECTION, POWDER, LYOPHILIZED, FOR SOLUTION INTRAVENOUS at 10:34

## 2023-01-01 RX ADMIN — Medication 600 MILLILITER(S): at 14:19

## 2023-01-01 RX ADMIN — HEPARIN SODIUM 5000 UNIT(S): 5000 INJECTION INTRAVENOUS; SUBCUTANEOUS at 11:12

## 2023-01-01 RX ADMIN — CASPOFUNGIN ACETATE 260 MILLIGRAM(S): 7 INJECTION, POWDER, LYOPHILIZED, FOR SOLUTION INTRAVENOUS at 12:56

## 2023-01-01 RX ADMIN — MIDODRINE HYDROCHLORIDE 10 MILLIGRAM(S): 2.5 TABLET ORAL at 13:04

## 2023-01-01 RX ADMIN — Medication 1000 MILLIGRAM(S): at 21:00

## 2023-01-01 RX ADMIN — VASOPRESSIN 6 UNIT(S)/MIN: 20 INJECTION INTRAVENOUS at 13:56

## 2023-01-01 RX ADMIN — Medication 4: at 23:30

## 2023-01-01 RX ADMIN — MEROPENEM 100 MILLIGRAM(S): 1 INJECTION INTRAVENOUS at 19:07

## 2023-01-01 RX ADMIN — CHLORHEXIDINE GLUCONATE 15 MILLILITER(S): 213 SOLUTION TOPICAL at 21:39

## 2023-01-01 RX ADMIN — Medication 600 MILLILITER(S): at 14:10

## 2023-01-01 RX ADMIN — LACTULOSE 20 GRAM(S): 10 SOLUTION ORAL at 05:24

## 2023-01-01 RX ADMIN — ONDANSETRON 4 MILLIGRAM(S): 8 TABLET, FILM COATED ORAL at 12:26

## 2023-01-01 RX ADMIN — PHENYLEPHRINE HYDROCHLORIDE 0.67 MICROGRAM(S)/KG/MIN: 10 INJECTION INTRAVENOUS at 16:30

## 2023-01-01 RX ADMIN — PHENYLEPHRINE HYDROCHLORIDE 0.67 MICROGRAM(S)/KG/MIN: 10 INJECTION INTRAVENOUS at 05:30

## 2023-01-01 RX ADMIN — SODIUM CHLORIDE 75 MILLILITER(S): 9 INJECTION, SOLUTION INTRAVENOUS at 12:48

## 2023-01-01 RX ADMIN — PANTOPRAZOLE SODIUM 40 MILLIGRAM(S): 20 TABLET, DELAYED RELEASE ORAL at 21:48

## 2023-01-01 RX ADMIN — Medication 600 MILLILITER(S): at 12:02

## 2023-01-01 RX ADMIN — NYSTATIN CREAM 1 APPLICATION(S): 100000 CREAM TOPICAL at 21:25

## 2023-01-01 RX ADMIN — PANTOPRAZOLE SODIUM 40 MILLIGRAM(S): 20 TABLET, DELAYED RELEASE ORAL at 22:28

## 2023-01-01 RX ADMIN — PANTOPRAZOLE SODIUM 40 MILLIGRAM(S): 20 TABLET, DELAYED RELEASE ORAL at 21:10

## 2023-01-01 RX ADMIN — Medication 50 MILLILITER(S): at 13:29

## 2023-01-01 RX ADMIN — Medication 1 APPLICATION(S): at 05:39

## 2023-01-01 RX ADMIN — Medication 50 MILLILITER(S): at 12:21

## 2023-01-01 RX ADMIN — Medication 5 MILLIGRAM(S): at 11:26

## 2023-01-01 RX ADMIN — Medication 1 APPLICATION(S): at 12:46

## 2023-01-01 RX ADMIN — PANTOPRAZOLE SODIUM 40 MILLIGRAM(S): 20 TABLET, DELAYED RELEASE ORAL at 10:01

## 2023-01-01 RX ADMIN — PANTOPRAZOLE SODIUM 40 MILLIGRAM(S): 20 TABLET, DELAYED RELEASE ORAL at 12:05

## 2023-01-01 RX ADMIN — PANTOPRAZOLE SODIUM 40 MILLIGRAM(S): 20 TABLET, DELAYED RELEASE ORAL at 10:10

## 2023-01-01 RX ADMIN — Medication 5 MILLIGRAM(S): at 18:50

## 2023-01-01 RX ADMIN — CHLORHEXIDINE GLUCONATE 1 APPLICATION(S): 213 SOLUTION TOPICAL at 06:08

## 2023-01-01 RX ADMIN — Medication 4: at 23:14

## 2023-01-01 RX ADMIN — Medication 1: at 12:21

## 2023-01-01 RX ADMIN — Medication 5 MILLIGRAM(S): at 18:05

## 2023-01-01 RX ADMIN — ONDANSETRON 4 MILLIGRAM(S): 8 TABLET, FILM COATED ORAL at 00:58

## 2023-01-01 RX ADMIN — MIDODRINE HYDROCHLORIDE 10 MILLIGRAM(S): 2.5 TABLET ORAL at 05:28

## 2023-01-01 RX ADMIN — Medication 5 MILLIGRAM(S): at 05:55

## 2023-01-01 RX ADMIN — SODIUM CHLORIDE 1950 MILLILITER(S): 9 INJECTION INTRAMUSCULAR; INTRAVENOUS; SUBCUTANEOUS at 13:47

## 2023-01-01 RX ADMIN — Medication 50 MILLILITER(S): at 23:45

## 2023-01-01 RX ADMIN — Medication 600 MILLILITER(S): at 13:51

## 2023-01-01 RX ADMIN — CHLORHEXIDINE GLUCONATE 1 APPLICATION(S): 213 SOLUTION TOPICAL at 05:33

## 2023-01-01 RX ADMIN — Medication 5 MILLIGRAM(S): at 18:55

## 2023-01-01 RX ADMIN — Medication 1: at 06:27

## 2023-01-01 RX ADMIN — MEROPENEM 100 MILLIGRAM(S): 1 INJECTION INTRAVENOUS at 18:41

## 2023-01-01 RX ADMIN — CHLORHEXIDINE GLUCONATE 1 APPLICATION(S): 213 SOLUTION TOPICAL at 06:35

## 2023-01-01 RX ADMIN — CHLORHEXIDINE GLUCONATE 15 MILLILITER(S): 213 SOLUTION TOPICAL at 06:08

## 2023-01-01 RX ADMIN — ROSUVASTATIN CALCIUM 10 MILLIGRAM(S): 5 TABLET ORAL at 22:18

## 2023-01-01 RX ADMIN — Medication 50 MILLILITER(S): at 10:48

## 2023-01-01 RX ADMIN — MIDODRINE HYDROCHLORIDE 10 MILLIGRAM(S): 2.5 TABLET ORAL at 05:29

## 2023-01-01 RX ADMIN — Medication 4: at 23:53

## 2023-01-01 RX ADMIN — Medication 50 MILLILITER(S): at 13:18

## 2023-01-01 RX ADMIN — PHENYLEPHRINE HYDROCHLORIDE 5.39 MICROGRAM(S)/KG/MIN: 10 INJECTION INTRAVENOUS at 02:16

## 2023-01-01 RX ADMIN — NYSTATIN CREAM 1 APPLICATION(S): 100000 CREAM TOPICAL at 21:16

## 2023-01-01 RX ADMIN — PHENYLEPHRINE HYDROCHLORIDE 5.39 MICROGRAM(S)/KG/MIN: 10 INJECTION INTRAVENOUS at 09:06

## 2023-01-01 RX ADMIN — HEPARIN SODIUM 5000 UNIT(S): 5000 INJECTION INTRAVENOUS; SUBCUTANEOUS at 21:17

## 2023-01-01 RX ADMIN — HEPARIN SODIUM 5000 UNIT(S): 5000 INJECTION INTRAVENOUS; SUBCUTANEOUS at 21:28

## 2023-01-01 RX ADMIN — SODIUM CHLORIDE 75 MILLILITER(S): 9 INJECTION INTRAMUSCULAR; INTRAVENOUS; SUBCUTANEOUS at 19:38

## 2023-01-01 RX ADMIN — HEPARIN SODIUM 5000 UNIT(S): 5000 INJECTION INTRAVENOUS; SUBCUTANEOUS at 21:24

## 2023-01-01 RX ADMIN — CHLORHEXIDINE GLUCONATE 15 MILLILITER(S): 213 SOLUTION TOPICAL at 10:44

## 2023-01-01 RX ADMIN — PANTOPRAZOLE SODIUM 40 MILLIGRAM(S): 20 TABLET, DELAYED RELEASE ORAL at 22:08

## 2023-01-01 RX ADMIN — CHLORHEXIDINE GLUCONATE 1 APPLICATION(S): 213 SOLUTION TOPICAL at 06:25

## 2023-01-01 RX ADMIN — PANTOPRAZOLE SODIUM 40 MILLIGRAM(S): 20 TABLET, DELAYED RELEASE ORAL at 21:37

## 2023-01-01 RX ADMIN — DEXTROSE MONOHYDRATE, SODIUM CHLORIDE, AND POTASSIUM CHLORIDE 75 MILLILITER(S): 50; .745; 4.5 INJECTION, SOLUTION INTRAVENOUS at 01:12

## 2023-01-01 RX ADMIN — MEROPENEM 100 MILLIGRAM(S): 1 INJECTION INTRAVENOUS at 18:16

## 2023-01-01 RX ADMIN — CHLORHEXIDINE GLUCONATE 1 APPLICATION(S): 213 SOLUTION TOPICAL at 05:27

## 2023-01-01 RX ADMIN — Medication 50 MILLILITER(S): at 11:45

## 2023-01-01 RX ADMIN — CEFEPIME 1000 MILLIGRAM(S): 1 INJECTION, POWDER, FOR SOLUTION INTRAMUSCULAR; INTRAVENOUS at 21:46

## 2023-01-01 RX ADMIN — Medication 2: at 11:49

## 2023-01-01 RX ADMIN — HEPARIN SODIUM 5000 UNIT(S): 5000 INJECTION INTRAVENOUS; SUBCUTANEOUS at 13:34

## 2023-01-01 RX ADMIN — PHENYLEPHRINE HYDROCHLORIDE 5.39 MICROGRAM(S)/KG/MIN: 10 INJECTION INTRAVENOUS at 06:23

## 2023-01-01 RX ADMIN — HEPARIN SODIUM 5000 UNIT(S): 5000 INJECTION INTRAVENOUS; SUBCUTANEOUS at 06:23

## 2023-01-01 RX ADMIN — PANTOPRAZOLE SODIUM 10 MG/HR: 20 TABLET, DELAYED RELEASE ORAL at 09:46

## 2023-01-01 RX ADMIN — Medication 3: at 05:52

## 2023-01-01 RX ADMIN — VASOPRESSIN 6 UNIT(S)/MIN: 20 INJECTION INTRAVENOUS at 18:39

## 2023-01-01 RX ADMIN — SODIUM CHLORIDE 75 MILLILITER(S): 9 INJECTION, SOLUTION INTRAVENOUS at 05:23

## 2023-01-01 RX ADMIN — Medication 2: at 18:25

## 2023-01-01 RX ADMIN — PHENYLEPHRINE HYDROCHLORIDE 0.67 MICROGRAM(S)/KG/MIN: 10 INJECTION INTRAVENOUS at 04:43

## 2023-01-01 RX ADMIN — CHLORHEXIDINE GLUCONATE 15 MILLILITER(S): 213 SOLUTION TOPICAL at 17:56

## 2023-01-01 RX ADMIN — CHLORHEXIDINE GLUCONATE 15 MILLILITER(S): 213 SOLUTION TOPICAL at 05:52

## 2023-01-01 RX ADMIN — HEPARIN SODIUM 5000 UNIT(S): 5000 INJECTION INTRAVENOUS; SUBCUTANEOUS at 22:29

## 2023-01-01 RX ADMIN — Medication 5 MILLIGRAM(S): at 05:45

## 2023-01-01 RX ADMIN — HEPARIN SODIUM 5000 UNIT(S): 5000 INJECTION INTRAVENOUS; SUBCUTANEOUS at 21:26

## 2023-01-01 RX ADMIN — Medication 4: at 18:24

## 2023-01-01 RX ADMIN — VASOPRESSIN 6 UNIT(S)/MIN: 20 INJECTION INTRAVENOUS at 21:52

## 2023-01-01 RX ADMIN — FAMOTIDINE 20 MILLIGRAM(S): 10 INJECTION INTRAVENOUS at 20:50

## 2023-01-01 RX ADMIN — CEFEPIME 1000 MILLIGRAM(S): 1 INJECTION, POWDER, FOR SOLUTION INTRAMUSCULAR; INTRAVENOUS at 10:50

## 2023-01-01 RX ADMIN — SODIUM CHLORIDE 500 MILLILITER(S): 9 INJECTION INTRAMUSCULAR; INTRAVENOUS; SUBCUTANEOUS at 09:20

## 2023-01-01 RX ADMIN — PIPERACILLIN AND TAZOBACTAM 200 GRAM(S): 4; .5 INJECTION, POWDER, LYOPHILIZED, FOR SOLUTION INTRAVENOUS at 13:48

## 2023-01-01 RX ADMIN — CHLORHEXIDINE GLUCONATE 1 APPLICATION(S): 213 SOLUTION TOPICAL at 06:13

## 2023-01-01 RX ADMIN — Medication 5.95 MICROGRAM(S)/KG/MIN: at 16:53

## 2023-01-01 RX ADMIN — Medication 5 MILLIGRAM(S): at 05:27

## 2023-01-01 RX ADMIN — Medication 2: at 17:55

## 2023-01-01 RX ADMIN — NYSTATIN CREAM 1 APPLICATION(S): 100000 CREAM TOPICAL at 10:40

## 2023-01-01 RX ADMIN — SODIUM CHLORIDE 100 MILLILITER(S): 9 INJECTION, SOLUTION INTRAVENOUS at 18:11

## 2023-01-01 RX ADMIN — DEXMEDETOMIDINE HYDROCHLORIDE IN 0.9% SODIUM CHLORIDE 8.99 MICROGRAM(S)/KG/HR: 4 INJECTION INTRAVENOUS at 14:06

## 2023-01-01 RX ADMIN — PANTOPRAZOLE SODIUM 40 MILLIGRAM(S): 20 TABLET, DELAYED RELEASE ORAL at 21:02

## 2023-01-01 RX ADMIN — Medication 6: at 12:17

## 2023-01-01 RX ADMIN — CHLORHEXIDINE GLUCONATE 1 APPLICATION(S): 213 SOLUTION TOPICAL at 05:40

## 2023-01-01 RX ADMIN — Medication 5.95 MICROGRAM(S)/KG/MIN: at 18:51

## 2023-01-01 RX ADMIN — SODIUM CHLORIDE 1000 MILLILITER(S): 9 INJECTION INTRAMUSCULAR; INTRAVENOUS; SUBCUTANEOUS at 08:45

## 2023-01-01 RX ADMIN — MEROPENEM 100 MILLIGRAM(S): 1 INJECTION INTRAVENOUS at 05:26

## 2023-01-01 RX ADMIN — Medication 2: at 17:26

## 2023-01-01 RX ADMIN — CHLORHEXIDINE GLUCONATE 15 MILLILITER(S): 213 SOLUTION TOPICAL at 18:38

## 2023-01-01 RX ADMIN — Medication 1 EACH: at 22:45

## 2023-01-01 RX ADMIN — Medication 4: at 23:16

## 2023-01-01 RX ADMIN — Medication 400 MILLIGRAM(S): at 08:33

## 2023-01-01 RX ADMIN — CHLORHEXIDINE GLUCONATE 15 MILLILITER(S): 213 SOLUTION TOPICAL at 05:28

## 2023-01-01 RX ADMIN — SODIUM CHLORIDE 500 MILLILITER(S): 9 INJECTION INTRAMUSCULAR; INTRAVENOUS; SUBCUTANEOUS at 12:18

## 2023-01-01 RX ADMIN — PANTOPRAZOLE SODIUM 10 MG/HR: 20 TABLET, DELAYED RELEASE ORAL at 18:05

## 2023-01-01 RX ADMIN — PANTOPRAZOLE SODIUM 40 MILLIGRAM(S): 20 TABLET, DELAYED RELEASE ORAL at 11:01

## 2023-01-01 RX ADMIN — MEROPENEM 100 MILLIGRAM(S): 1 INJECTION INTRAVENOUS at 21:22

## 2023-01-01 RX ADMIN — HEPARIN SODIUM 5000 UNIT(S): 5000 INJECTION INTRAVENOUS; SUBCUTANEOUS at 06:03

## 2023-01-01 RX ADMIN — DEXMEDETOMIDINE HYDROCHLORIDE IN 0.9% SODIUM CHLORIDE 8.99 MICROGRAM(S)/KG/HR: 4 INJECTION INTRAVENOUS at 06:43

## 2023-01-01 RX ADMIN — Medication 5.95 MICROGRAM(S)/KG/MIN: at 11:01

## 2023-01-01 RX ADMIN — PANTOPRAZOLE SODIUM 40 MILLIGRAM(S): 20 TABLET, DELAYED RELEASE ORAL at 10:21

## 2023-01-01 RX ADMIN — HYDROMORPHONE HYDROCHLORIDE 1 MILLIGRAM(S): 2 INJECTION INTRAMUSCULAR; INTRAVENOUS; SUBCUTANEOUS at 21:25

## 2023-01-01 RX ADMIN — Medication 2: at 05:36

## 2023-01-01 RX ADMIN — PROPOFOL 3.81 MICROGRAM(S)/KG/MIN: 10 INJECTION, EMULSION INTRAVENOUS at 14:48

## 2023-01-01 RX ADMIN — Medication 100 MILLIEQUIVALENT(S): at 15:16

## 2023-01-01 RX ADMIN — PIPERACILLIN AND TAZOBACTAM 25 GRAM(S): 4; .5 INJECTION, POWDER, LYOPHILIZED, FOR SOLUTION INTRAVENOUS at 10:10

## 2023-01-01 RX ADMIN — INSULIN GLARGINE 8 UNIT(S): 100 INJECTION, SOLUTION SUBCUTANEOUS at 13:05

## 2023-01-01 RX ADMIN — INSULIN GLARGINE 15 UNIT(S): 100 INJECTION, SOLUTION SUBCUTANEOUS at 23:52

## 2023-01-01 RX ADMIN — CISATRACURIUM BESYLATE 11.4 MICROGRAM(S)/KG/MIN: 2 INJECTION INTRAVENOUS at 17:39

## 2023-01-01 RX ADMIN — PANTOPRAZOLE SODIUM 40 MILLIGRAM(S): 20 TABLET, DELAYED RELEASE ORAL at 21:25

## 2023-01-01 RX ADMIN — Medication 2: at 22:31

## 2023-01-01 RX ADMIN — MEROPENEM 100 MILLIGRAM(S): 1 INJECTION INTRAVENOUS at 20:34

## 2023-01-01 RX ADMIN — Medication 5 MILLIGRAM(S): at 00:10

## 2023-01-01 RX ADMIN — Medication 600 MILLILITER(S): at 12:10

## 2023-01-01 RX ADMIN — MIDODRINE HYDROCHLORIDE 10 MILLIGRAM(S): 2.5 TABLET ORAL at 15:06

## 2023-01-01 RX ADMIN — ROSUVASTATIN CALCIUM 10 MILLIGRAM(S): 5 TABLET ORAL at 21:26

## 2023-01-01 RX ADMIN — HYDROMORPHONE HYDROCHLORIDE 1 MILLIGRAM(S): 2 INJECTION INTRAMUSCULAR; INTRAVENOUS; SUBCUTANEOUS at 14:00

## 2023-01-01 RX ADMIN — Medication 1: at 05:27

## 2023-01-01 RX ADMIN — CEFEPIME 1000 MILLIGRAM(S): 1 INJECTION, POWDER, FOR SOLUTION INTRAMUSCULAR; INTRAVENOUS at 11:43

## 2023-01-01 RX ADMIN — PHENYLEPHRINE HYDROCHLORIDE 5.39 MICROGRAM(S)/KG/MIN: 10 INJECTION INTRAVENOUS at 13:15

## 2023-01-01 RX ADMIN — VASOPRESSIN 6 UNIT(S)/MIN: 20 INJECTION INTRAVENOUS at 01:40

## 2023-01-01 RX ADMIN — Medication 1000 MILLIGRAM(S): at 14:45

## 2023-01-01 RX ADMIN — Medication 600 MILLILITER(S): at 11:10

## 2023-01-01 RX ADMIN — HEPARIN SODIUM 5000 UNIT(S): 5000 INJECTION INTRAVENOUS; SUBCUTANEOUS at 15:53

## 2023-01-01 RX ADMIN — Medication 600 MILLILITER(S): at 11:17

## 2023-01-01 RX ADMIN — Medication 50 MILLILITER(S): at 18:17

## 2023-01-01 RX ADMIN — DEXMEDETOMIDINE HYDROCHLORIDE IN 0.9% SODIUM CHLORIDE 8.99 MICROGRAM(S)/KG/HR: 4 INJECTION INTRAVENOUS at 22:49

## 2023-01-01 RX ADMIN — Medication 2: at 11:47

## 2023-01-01 RX ADMIN — PHENYLEPHRINE HYDROCHLORIDE 0.67 MICROGRAM(S)/KG/MIN: 10 INJECTION INTRAVENOUS at 12:12

## 2023-01-01 RX ADMIN — PHENYLEPHRINE HYDROCHLORIDE 5.39 MICROGRAM(S)/KG/MIN: 10 INJECTION INTRAVENOUS at 20:20

## 2023-01-01 RX ADMIN — Medication 5 MILLIGRAM(S): at 12:03

## 2023-01-01 RX ADMIN — Medication 5 MILLIGRAM(S): at 12:56

## 2023-01-01 RX ADMIN — MEROPENEM 100 MILLIGRAM(S): 1 INJECTION INTRAVENOUS at 06:05

## 2023-01-01 RX ADMIN — Medication 1 EACH: at 22:49

## 2023-01-01 RX ADMIN — PIPERACILLIN AND TAZOBACTAM 25 GRAM(S): 4; .5 INJECTION, POWDER, LYOPHILIZED, FOR SOLUTION INTRAVENOUS at 22:47

## 2023-01-01 RX ADMIN — CHLORHEXIDINE GLUCONATE 1 APPLICATION(S): 213 SOLUTION TOPICAL at 05:00

## 2023-01-01 RX ADMIN — Medication 2: at 22:19

## 2023-01-01 RX ADMIN — Medication 50 MILLILITER(S): at 12:11

## 2023-01-01 RX ADMIN — CHLORHEXIDINE GLUCONATE 1 APPLICATION(S): 213 SOLUTION TOPICAL at 05:29

## 2023-01-01 RX ADMIN — Medication 2: at 06:08

## 2023-01-01 RX ADMIN — ROSUVASTATIN CALCIUM 10 MILLIGRAM(S): 5 TABLET ORAL at 21:16

## 2023-01-01 RX ADMIN — Medication 1 DROP(S): at 12:58

## 2023-01-01 RX ADMIN — PHENYLEPHRINE HYDROCHLORIDE 5.39 MICROGRAM(S)/KG/MIN: 10 INJECTION INTRAVENOUS at 05:24

## 2023-01-01 RX ADMIN — DEXTROSE MONOHYDRATE, SODIUM CHLORIDE, AND POTASSIUM CHLORIDE 75 MILLILITER(S): 50; .745; 4.5 INJECTION, SOLUTION INTRAVENOUS at 04:12

## 2023-01-01 RX ADMIN — Medication 6: at 12:53

## 2023-01-01 RX ADMIN — HEPARIN SODIUM 5000 UNIT(S): 5000 INJECTION INTRAVENOUS; SUBCUTANEOUS at 21:45

## 2023-01-01 RX ADMIN — Medication 30 MILLILITER(S): at 17:16

## 2023-01-01 RX ADMIN — PHENYLEPHRINE HYDROCHLORIDE 0.67 MICROGRAM(S)/KG/MIN: 10 INJECTION INTRAVENOUS at 10:41

## 2023-01-01 RX ADMIN — SODIUM CHLORIDE 100 MILLILITER(S): 9 INJECTION, SOLUTION INTRAVENOUS at 06:08

## 2023-01-01 RX ADMIN — DEXTROSE MONOHYDRATE, SODIUM CHLORIDE, AND POTASSIUM CHLORIDE 75 MILLILITER(S): 50; .745; 4.5 INJECTION, SOLUTION INTRAVENOUS at 10:08

## 2023-01-01 RX ADMIN — CHLORHEXIDINE GLUCONATE 1 APPLICATION(S): 213 SOLUTION TOPICAL at 06:10

## 2023-01-01 RX ADMIN — PIPERACILLIN AND TAZOBACTAM 200 GRAM(S): 4; .5 INJECTION, POWDER, LYOPHILIZED, FOR SOLUTION INTRAVENOUS at 08:43

## 2023-01-01 RX ADMIN — SODIUM CHLORIDE 75 MILLILITER(S): 9 INJECTION, SOLUTION INTRAVENOUS at 01:17

## 2023-01-01 RX ADMIN — PANTOPRAZOLE SODIUM 40 MILLIGRAM(S): 20 TABLET, DELAYED RELEASE ORAL at 12:55

## 2023-01-01 RX ADMIN — Medication 25 GRAM(S): at 23:39

## 2023-01-01 RX ADMIN — CASPOFUNGIN ACETATE 260 MILLIGRAM(S): 7 INJECTION, POWDER, LYOPHILIZED, FOR SOLUTION INTRAVENOUS at 09:39

## 2023-01-01 RX ADMIN — SODIUM CHLORIDE 1000 MILLILITER(S): 9 INJECTION INTRAMUSCULAR; INTRAVENOUS; SUBCUTANEOUS at 14:59

## 2023-01-01 RX ADMIN — Medication 3: at 18:04

## 2023-01-01 RX ADMIN — MIDODRINE HYDROCHLORIDE 10 MILLIGRAM(S): 2.5 TABLET ORAL at 13:08

## 2023-01-01 RX ADMIN — CEFEPIME 1000 MILLIGRAM(S): 1 INJECTION, POWDER, FOR SOLUTION INTRAMUSCULAR; INTRAVENOUS at 15:32

## 2023-01-01 RX ADMIN — PHENYLEPHRINE HYDROCHLORIDE 5.39 MICROGRAM(S)/KG/MIN: 10 INJECTION INTRAVENOUS at 08:34

## 2023-01-01 RX ADMIN — Medication 1: at 11:41

## 2023-01-01 RX ADMIN — HEPARIN SODIUM 5000 UNIT(S): 5000 INJECTION INTRAVENOUS; SUBCUTANEOUS at 09:21

## 2023-01-01 RX ADMIN — SODIUM CHLORIDE 250 MILLILITER(S): 9 INJECTION INTRAMUSCULAR; INTRAVENOUS; SUBCUTANEOUS at 18:29

## 2023-01-01 RX ADMIN — PANTOPRAZOLE SODIUM 40 MILLIGRAM(S): 20 TABLET, DELAYED RELEASE ORAL at 10:49

## 2023-01-01 RX ADMIN — INSULIN GLARGINE 15 UNIT(S): 100 INJECTION, SOLUTION SUBCUTANEOUS at 08:25

## 2023-01-01 RX ADMIN — Medication 5 MILLIGRAM(S): at 23:33

## 2023-01-01 RX ADMIN — PHENYLEPHRINE HYDROCHLORIDE 5.39 MICROGRAM(S)/KG/MIN: 10 INJECTION INTRAVENOUS at 16:31

## 2023-01-01 RX ADMIN — Medication 5 MILLIGRAM(S): at 06:05

## 2023-01-01 RX ADMIN — Medication 6.74 MICROGRAM(S)/KG/MIN: at 16:31

## 2023-01-01 RX ADMIN — Medication 4: at 00:23

## 2023-01-01 RX ADMIN — Medication 20 MILLIEQUIVALENT(S): at 07:25

## 2023-01-01 RX ADMIN — NYSTATIN CREAM 1 APPLICATION(S): 100000 CREAM TOPICAL at 21:37

## 2023-01-01 RX ADMIN — PHENYLEPHRINE HYDROCHLORIDE 0.67 MICROGRAM(S)/KG/MIN: 10 INJECTION INTRAVENOUS at 14:30

## 2023-01-01 RX ADMIN — Medication 600 MILLILITER(S): at 12:38

## 2023-01-01 RX ADMIN — SODIUM CHLORIDE 2000 MILLILITER(S): 9 INJECTION, SOLUTION INTRAVENOUS at 12:08

## 2023-01-01 RX ADMIN — PANTOPRAZOLE SODIUM 40 MILLIGRAM(S): 20 TABLET, DELAYED RELEASE ORAL at 22:47

## 2023-01-01 RX ADMIN — PHENYLEPHRINE HYDROCHLORIDE 5.39 MICROGRAM(S)/KG/MIN: 10 INJECTION INTRAVENOUS at 03:13

## 2023-01-01 RX ADMIN — HEPARIN SODIUM 5000 UNIT(S): 5000 INJECTION INTRAVENOUS; SUBCUTANEOUS at 21:35

## 2023-01-01 RX ADMIN — LACTULOSE 20 GRAM(S): 10 SOLUTION ORAL at 05:35

## 2023-01-01 RX ADMIN — PANTOPRAZOLE SODIUM 40 MILLIGRAM(S): 20 TABLET, DELAYED RELEASE ORAL at 11:12

## 2023-01-01 RX ADMIN — Medication 6: at 13:05

## 2023-01-01 RX ADMIN — CHLORHEXIDINE GLUCONATE 1 APPLICATION(S): 213 SOLUTION TOPICAL at 06:21

## 2023-01-01 RX ADMIN — Medication 1: at 12:02

## 2023-01-01 RX ADMIN — Medication 50 MILLILITER(S): at 15:27

## 2023-01-01 RX ADMIN — CEFEPIME 100 MILLIGRAM(S): 1 INJECTION, POWDER, FOR SOLUTION INTRAMUSCULAR; INTRAVENOUS at 06:01

## 2023-01-01 RX ADMIN — INSULIN GLARGINE 15 UNIT(S): 100 INJECTION, SOLUTION SUBCUTANEOUS at 08:56

## 2023-01-01 RX ADMIN — PIPERACILLIN AND TAZOBACTAM 25 GRAM(S): 4; .5 INJECTION, POWDER, LYOPHILIZED, FOR SOLUTION INTRAVENOUS at 05:39

## 2023-01-01 RX ADMIN — MIDODRINE HYDROCHLORIDE 10 MILLIGRAM(S): 2.5 TABLET ORAL at 05:30

## 2023-01-01 RX ADMIN — CHLORHEXIDINE GLUCONATE 1 APPLICATION(S): 213 SOLUTION TOPICAL at 05:58

## 2023-01-01 RX ADMIN — Medication 2: at 17:50

## 2023-01-01 RX ADMIN — HEPARIN SODIUM 5000 UNIT(S): 5000 INJECTION INTRAVENOUS; SUBCUTANEOUS at 14:47

## 2023-01-01 RX ADMIN — HEPARIN SODIUM 5000 UNIT(S): 5000 INJECTION INTRAVENOUS; SUBCUTANEOUS at 14:45

## 2023-01-01 RX ADMIN — PANTOPRAZOLE SODIUM 40 MILLIGRAM(S): 20 TABLET, DELAYED RELEASE ORAL at 21:34

## 2023-01-01 RX ADMIN — Medication 5 MILLIGRAM(S): at 11:40

## 2023-01-01 RX ADMIN — PANTOPRAZOLE SODIUM 40 MILLIGRAM(S): 20 TABLET, DELAYED RELEASE ORAL at 11:34

## 2023-01-01 RX ADMIN — CHLORHEXIDINE GLUCONATE 1 APPLICATION(S): 213 SOLUTION TOPICAL at 07:16

## 2023-01-01 RX ADMIN — SODIUM CHLORIDE 500 MILLILITER(S): 9 INJECTION INTRAMUSCULAR; INTRAVENOUS; SUBCUTANEOUS at 16:56

## 2023-01-01 RX ADMIN — HEPARIN SODIUM 5000 UNIT(S): 5000 INJECTION INTRAVENOUS; SUBCUTANEOUS at 10:53

## 2023-01-01 RX ADMIN — Medication 50 MILLILITER(S): at 17:01

## 2023-01-01 RX ADMIN — MEROPENEM 100 MILLIGRAM(S): 1 INJECTION INTRAVENOUS at 17:29

## 2023-01-01 RX ADMIN — Medication 4: at 05:51

## 2023-01-01 RX ADMIN — HEPARIN SODIUM 5000 UNIT(S): 5000 INJECTION INTRAVENOUS; SUBCUTANEOUS at 05:22

## 2023-01-01 RX ADMIN — MIDODRINE HYDROCHLORIDE 10 MILLIGRAM(S): 2.5 TABLET ORAL at 21:10

## 2023-01-01 RX ADMIN — POTASSIUM PHOSPHATE, MONOBASIC POTASSIUM PHOSPHATE, DIBASIC 83.33 MILLIMOLE(S): 236; 224 INJECTION, SOLUTION INTRAVENOUS at 10:07

## 2023-01-01 RX ADMIN — Medication 2: at 12:34

## 2023-01-01 RX ADMIN — DEXMEDETOMIDINE HYDROCHLORIDE IN 0.9% SODIUM CHLORIDE 8.99 MICROGRAM(S)/KG/HR: 4 INJECTION INTRAVENOUS at 01:38

## 2023-01-01 RX ADMIN — SODIUM CHLORIDE 75 MILLILITER(S): 9 INJECTION, SOLUTION INTRAVENOUS at 22:08

## 2023-01-01 RX ADMIN — Medication 100 MILLIEQUIVALENT(S): at 17:49

## 2023-01-01 RX ADMIN — Medication 1: at 23:50

## 2023-01-01 RX ADMIN — Medication 8: at 05:41

## 2023-01-01 RX ADMIN — CHLORHEXIDINE GLUCONATE 15 MILLILITER(S): 213 SOLUTION TOPICAL at 21:02

## 2023-01-01 RX ADMIN — CHLORHEXIDINE GLUCONATE 15 MILLILITER(S): 213 SOLUTION TOPICAL at 18:06

## 2023-01-01 RX ADMIN — Medication 1 APPLICATION(S): at 12:11

## 2023-01-01 RX ADMIN — PANTOPRAZOLE SODIUM 40 MILLIGRAM(S): 20 TABLET, DELAYED RELEASE ORAL at 22:49

## 2023-01-01 RX ADMIN — Medication 1: at 00:05

## 2023-01-01 RX ADMIN — PHENYLEPHRINE HYDROCHLORIDE 0.67 MICROGRAM(S)/KG/MIN: 10 INJECTION INTRAVENOUS at 22:00

## 2023-01-01 RX ADMIN — Medication 25 GRAM(S): at 19:32

## 2023-01-01 RX ADMIN — Medication 5.95 MICROGRAM(S)/KG/MIN: at 09:47

## 2023-01-01 RX ADMIN — PANTOPRAZOLE SODIUM 40 MILLIGRAM(S): 20 TABLET, DELAYED RELEASE ORAL at 08:55

## 2023-01-01 RX ADMIN — NYSTATIN CREAM 1 APPLICATION(S): 100000 CREAM TOPICAL at 15:05

## 2023-01-01 RX ADMIN — PHENYLEPHRINE HYDROCHLORIDE 5.39 MICROGRAM(S)/KG/MIN: 10 INJECTION INTRAVENOUS at 02:01

## 2023-01-01 RX ADMIN — Medication 600 MILLILITER(S): at 10:47

## 2023-01-01 RX ADMIN — INSULIN GLARGINE 35 UNIT(S): 100 INJECTION, SOLUTION SUBCUTANEOUS at 22:15

## 2023-01-01 RX ADMIN — NYSTATIN CREAM 1 APPLICATION(S): 100000 CREAM TOPICAL at 21:24

## 2023-01-01 RX ADMIN — HEPARIN SODIUM 5000 UNIT(S): 5000 INJECTION INTRAVENOUS; SUBCUTANEOUS at 14:13

## 2023-01-01 RX ADMIN — Medication 600 MILLILITER(S): at 13:05

## 2023-01-01 RX ADMIN — MIDODRINE HYDROCHLORIDE 15 MILLIGRAM(S): 2.5 TABLET ORAL at 22:40

## 2023-01-01 RX ADMIN — SODIUM CHLORIDE 75 MILLILITER(S): 9 INJECTION, SOLUTION INTRAVENOUS at 20:20

## 2023-01-01 RX ADMIN — CEFEPIME 100 MILLIGRAM(S): 1 INJECTION, POWDER, FOR SOLUTION INTRAMUSCULAR; INTRAVENOUS at 05:41

## 2023-01-01 RX ADMIN — Medication 1: at 06:36

## 2023-01-01 RX ADMIN — Medication 5 MILLIGRAM(S): at 12:19

## 2023-01-01 RX ADMIN — Medication 1: at 12:06

## 2023-01-01 RX ADMIN — NYSTATIN CREAM 1 APPLICATION(S): 100000 CREAM TOPICAL at 09:40

## 2023-01-01 RX ADMIN — POTASSIUM PHOSPHATE, MONOBASIC POTASSIUM PHOSPHATE, DIBASIC 83.33 MILLIMOLE(S): 236; 224 INJECTION, SOLUTION INTRAVENOUS at 18:32

## 2023-01-01 RX ADMIN — Medication 50 MILLILITER(S): at 17:27

## 2023-01-01 RX ADMIN — LACTULOSE 20 GRAM(S): 10 SOLUTION ORAL at 15:15

## 2023-01-01 RX ADMIN — CHLORHEXIDINE GLUCONATE 15 MILLILITER(S): 213 SOLUTION TOPICAL at 05:12

## 2023-01-01 RX ADMIN — NYSTATIN CREAM 1 APPLICATION(S): 100000 CREAM TOPICAL at 11:13

## 2023-01-01 RX ADMIN — NALOXONE HYDROCHLORIDE 0.4 MILLIGRAM(S): 4 SPRAY NASAL at 18:50

## 2023-01-01 RX ADMIN — PHENYLEPHRINE HYDROCHLORIDE 0.67 MICROGRAM(S)/KG/MIN: 10 INJECTION INTRAVENOUS at 18:42

## 2023-01-01 RX ADMIN — CHLORHEXIDINE GLUCONATE 15 MILLILITER(S): 213 SOLUTION TOPICAL at 18:41

## 2023-01-01 RX ADMIN — Medication 600 MILLILITER(S): at 11:03

## 2023-01-01 RX ADMIN — INSULIN GLARGINE 5 UNIT(S): 100 INJECTION, SOLUTION SUBCUTANEOUS at 07:49

## 2023-01-01 RX ADMIN — Medication 100 GRAM(S): at 09:26

## 2023-01-01 RX ADMIN — Medication 600 MILLILITER(S): at 14:13

## 2023-01-01 RX ADMIN — HEPARIN SODIUM 5000 UNIT(S): 5000 INJECTION INTRAVENOUS; SUBCUTANEOUS at 14:39

## 2023-01-01 RX ADMIN — CASPOFUNGIN ACETATE 260 MILLIGRAM(S): 7 INJECTION, POWDER, LYOPHILIZED, FOR SOLUTION INTRAVENOUS at 09:51

## 2023-01-01 RX ADMIN — Medication 0.5 MILLIGRAM(S): at 10:21

## 2023-01-01 RX ADMIN — Medication 4: at 18:43

## 2023-01-01 RX ADMIN — MIDODRINE HYDROCHLORIDE 15 MILLIGRAM(S): 2.5 TABLET ORAL at 06:25

## 2023-01-01 RX ADMIN — PANTOPRAZOLE SODIUM 40 MILLIGRAM(S): 20 TABLET, DELAYED RELEASE ORAL at 10:08

## 2023-01-01 RX ADMIN — Medication 600 MILLILITER(S): at 14:47

## 2023-01-01 RX ADMIN — Medication 5 MILLIGRAM(S): at 22:40

## 2023-01-01 RX ADMIN — ONDANSETRON 4 MILLIGRAM(S): 8 TABLET, FILM COATED ORAL at 08:44

## 2023-01-01 RX ADMIN — ONDANSETRON 4 MILLIGRAM(S): 8 TABLET, FILM COATED ORAL at 14:59

## 2023-01-01 RX ADMIN — PHENYLEPHRINE HYDROCHLORIDE 5.39 MICROGRAM(S)/KG/MIN: 10 INJECTION INTRAVENOUS at 20:00

## 2023-01-01 RX ADMIN — CHLORHEXIDINE GLUCONATE 1 APPLICATION(S): 213 SOLUTION TOPICAL at 06:33

## 2023-01-01 RX ADMIN — DEXMEDETOMIDINE HYDROCHLORIDE IN 0.9% SODIUM CHLORIDE 0.64 MICROGRAM(S)/KG/HR: 4 INJECTION INTRAVENOUS at 10:01

## 2023-01-01 RX ADMIN — Medication 5.95 MICROGRAM(S)/KG/MIN: at 03:10

## 2023-01-01 RX ADMIN — CHLORHEXIDINE GLUCONATE 15 MILLILITER(S): 213 SOLUTION TOPICAL at 19:08

## 2023-01-01 RX ADMIN — CHLORHEXIDINE GLUCONATE 1 APPLICATION(S): 213 SOLUTION TOPICAL at 05:22

## 2023-01-01 RX ADMIN — Medication 5 MILLIGRAM(S): at 01:02

## 2023-01-01 RX ADMIN — Medication 5.95 MICROGRAM(S)/KG/MIN: at 14:20

## 2023-01-01 RX ADMIN — Medication 12.5 GRAM(S): at 18:35

## 2023-01-01 RX ADMIN — Medication 5 MILLIGRAM(S): at 23:14

## 2023-01-01 RX ADMIN — CHLORHEXIDINE GLUCONATE 15 MILLILITER(S): 213 SOLUTION TOPICAL at 21:52

## 2023-01-01 RX ADMIN — Medication 5 MILLIGRAM(S): at 23:45

## 2023-01-01 RX ADMIN — PHENYLEPHRINE HYDROCHLORIDE 5.39 MICROGRAM(S)/KG/MIN: 10 INJECTION INTRAVENOUS at 12:18

## 2023-01-01 RX ADMIN — SODIUM CHLORIDE 75 MILLILITER(S): 9 INJECTION, SOLUTION INTRAVENOUS at 21:25

## 2023-01-01 RX ADMIN — PANTOPRAZOLE SODIUM 40 MILLIGRAM(S): 20 TABLET, DELAYED RELEASE ORAL at 22:03

## 2023-01-01 RX ADMIN — ROSUVASTATIN CALCIUM 10 MILLIGRAM(S): 5 TABLET ORAL at 22:14

## 2023-01-01 RX ADMIN — PHENYLEPHRINE HYDROCHLORIDE 0.67 MICROGRAM(S)/KG/MIN: 10 INJECTION INTRAVENOUS at 11:53

## 2023-01-01 RX ADMIN — Medication 100 GRAM(S): at 01:29

## 2023-01-01 RX ADMIN — Medication 100 GRAM(S): at 21:26

## 2023-01-01 RX ADMIN — I.V. FAT EMULSION 20.83 GM/KG/DAY: 20 EMULSION INTRAVENOUS at 22:33

## 2023-01-01 RX ADMIN — Medication 50 MILLILITER(S): at 12:45

## 2023-01-01 RX ADMIN — HEPARIN SODIUM 5000 UNIT(S): 5000 INJECTION INTRAVENOUS; SUBCUTANEOUS at 10:03

## 2023-01-01 RX ADMIN — Medication 4: at 12:57

## 2023-01-01 RX ADMIN — PIPERACILLIN AND TAZOBACTAM 25 GRAM(S): 4; .5 INJECTION, POWDER, LYOPHILIZED, FOR SOLUTION INTRAVENOUS at 15:22

## 2023-01-01 RX ADMIN — MORPHINE SULFATE 2 MILLIGRAM(S): 50 CAPSULE, EXTENDED RELEASE ORAL at 13:48

## 2023-01-01 RX ADMIN — Medication 1 APPLICATION(S): at 23:21

## 2023-01-01 RX ADMIN — Medication 5 MILLIGRAM(S): at 05:26

## 2023-01-01 RX ADMIN — PANTOPRAZOLE SODIUM 40 MILLIGRAM(S): 20 TABLET, DELAYED RELEASE ORAL at 21:23

## 2023-01-01 RX ADMIN — Medication 5 MILLIGRAM(S): at 06:24

## 2023-01-01 RX ADMIN — PANTOPRAZOLE SODIUM 40 MILLIGRAM(S): 20 TABLET, DELAYED RELEASE ORAL at 11:11

## 2023-01-01 RX ADMIN — SODIUM CHLORIDE 500 MILLILITER(S): 9 INJECTION INTRAMUSCULAR; INTRAVENOUS; SUBCUTANEOUS at 17:28

## 2023-01-01 RX ADMIN — PHENYLEPHRINE HYDROCHLORIDE 0.67 MICROGRAM(S)/KG/MIN: 10 INJECTION INTRAVENOUS at 20:18

## 2023-01-01 RX ADMIN — Medication 6: at 11:57

## 2023-01-01 RX ADMIN — PHENYLEPHRINE HYDROCHLORIDE 5.39 MICROGRAM(S)/KG/MIN: 10 INJECTION INTRAVENOUS at 02:08

## 2023-01-01 RX ADMIN — CHLORHEXIDINE GLUCONATE 1 APPLICATION(S): 213 SOLUTION TOPICAL at 05:09

## 2023-01-01 RX ADMIN — PHENYLEPHRINE HYDROCHLORIDE 5.39 MICROGRAM(S)/KG/MIN: 10 INJECTION INTRAVENOUS at 13:07

## 2023-01-01 RX ADMIN — Medication 250 MILLIGRAM(S): at 09:41

## 2023-01-01 RX ADMIN — CHLORHEXIDINE GLUCONATE 1 APPLICATION(S): 213 SOLUTION TOPICAL at 05:26

## 2023-01-01 RX ADMIN — CHLORHEXIDINE GLUCONATE 15 MILLILITER(S): 213 SOLUTION TOPICAL at 17:24

## 2023-01-01 RX ADMIN — Medication 1 EACH: at 22:29

## 2023-01-01 RX ADMIN — Medication 1: at 17:20

## 2023-01-01 RX ADMIN — LACTULOSE 20 GRAM(S): 10 SOLUTION ORAL at 05:04

## 2023-01-01 RX ADMIN — CHLORHEXIDINE GLUCONATE 15 MILLILITER(S): 213 SOLUTION TOPICAL at 18:50

## 2023-01-01 RX ADMIN — HEPARIN SODIUM 5000 UNIT(S): 5000 INJECTION INTRAVENOUS; SUBCUTANEOUS at 13:00

## 2023-01-01 RX ADMIN — SODIUM CHLORIDE 30 MILLILITER(S): 9 INJECTION, SOLUTION INTRAVENOUS at 18:42

## 2023-01-01 RX ADMIN — Medication 5 MILLIGRAM(S): at 13:09

## 2023-01-01 RX ADMIN — Medication 50 MILLILITER(S): at 15:58

## 2023-01-01 RX ADMIN — VASOPRESSIN 6 UNIT(S)/MIN: 20 INJECTION INTRAVENOUS at 22:09

## 2023-01-01 RX ADMIN — PANTOPRAZOLE SODIUM 40 MILLIGRAM(S): 20 TABLET, DELAYED RELEASE ORAL at 12:03

## 2023-01-01 RX ADMIN — Medication 100 MEQ/KG/HR: at 04:30

## 2023-01-01 RX ADMIN — Medication 600 MILLILITER(S): at 12:03

## 2023-01-01 RX ADMIN — CEFEPIME 1000 MILLIGRAM(S): 1 INJECTION, POWDER, FOR SOLUTION INTRAMUSCULAR; INTRAVENOUS at 23:11

## 2023-01-01 RX ADMIN — Medication 5 MILLIGRAM(S): at 17:35

## 2023-01-01 RX ADMIN — MEROPENEM 100 MILLIGRAM(S): 1 INJECTION INTRAVENOUS at 20:31

## 2023-01-01 RX ADMIN — Medication 2: at 17:40

## 2023-01-01 RX ADMIN — ONDANSETRON 4 MILLIGRAM(S): 8 TABLET, FILM COATED ORAL at 12:22

## 2023-01-01 RX ADMIN — HEPARIN SODIUM 5000 UNIT(S): 5000 INJECTION INTRAVENOUS; SUBCUTANEOUS at 23:11

## 2023-01-01 RX ADMIN — PHENYLEPHRINE HYDROCHLORIDE 0.67 MICROGRAM(S)/KG/MIN: 10 INJECTION INTRAVENOUS at 06:32

## 2023-01-01 RX ADMIN — HEPARIN SODIUM 5000 UNIT(S): 5000 INJECTION INTRAVENOUS; SUBCUTANEOUS at 21:50

## 2023-01-01 RX ADMIN — Medication 50 MILLILITER(S): at 04:03

## 2023-01-01 RX ADMIN — Medication 5 MILLIGRAM(S): at 22:29

## 2023-01-01 RX ADMIN — Medication 6.74 MICROGRAM(S)/KG/MIN: at 05:21

## 2023-01-01 RX ADMIN — Medication 600 MILLILITER(S): at 12:04

## 2023-01-01 RX ADMIN — SODIUM CHLORIDE 1000 MILLILITER(S): 9 INJECTION, SOLUTION INTRAVENOUS at 20:21

## 2023-01-01 RX ADMIN — PHENYLEPHRINE HYDROCHLORIDE 5.39 MICROGRAM(S)/KG/MIN: 10 INJECTION INTRAVENOUS at 08:04

## 2023-01-01 RX ADMIN — CHLORHEXIDINE GLUCONATE 1 APPLICATION(S): 213 SOLUTION TOPICAL at 07:36

## 2023-01-01 RX ADMIN — HYDROMORPHONE HYDROCHLORIDE 1 MILLIGRAM(S): 2 INJECTION INTRAMUSCULAR; INTRAVENOUS; SUBCUTANEOUS at 15:04

## 2023-01-01 RX ADMIN — Medication 5 MILLIGRAM(S): at 17:28

## 2023-01-01 RX ADMIN — Medication 3: at 06:12

## 2023-01-01 RX ADMIN — Medication 2: at 23:50

## 2023-01-01 RX ADMIN — MEROPENEM 100 MILLIGRAM(S): 1 INJECTION INTRAVENOUS at 05:20

## 2023-01-01 RX ADMIN — Medication 2: at 06:20

## 2023-01-01 RX ADMIN — Medication 50 GRAM(S): at 21:25

## 2023-01-01 RX ADMIN — CHLORHEXIDINE GLUCONATE 1 APPLICATION(S): 213 SOLUTION TOPICAL at 05:24

## 2023-01-01 RX ADMIN — Medication 1: at 13:19

## 2023-01-01 RX ADMIN — Medication 3: at 12:32

## 2023-01-01 RX ADMIN — Medication 4: at 11:21

## 2023-01-01 RX ADMIN — I.V. FAT EMULSION 20.83 GM/KG/DAY: 20 EMULSION INTRAVENOUS at 22:31

## 2023-01-01 RX ADMIN — Medication 5.95 MICROGRAM(S)/KG/MIN: at 15:15

## 2023-01-01 RX ADMIN — PHENYLEPHRINE HYDROCHLORIDE 0.67 MICROGRAM(S)/KG/MIN: 10 INJECTION INTRAVENOUS at 17:26

## 2023-01-01 RX ADMIN — HEPARIN SODIUM 5000 UNIT(S): 5000 INJECTION INTRAVENOUS; SUBCUTANEOUS at 22:15

## 2023-01-01 RX ADMIN — CHLORHEXIDINE GLUCONATE 15 MILLILITER(S): 213 SOLUTION TOPICAL at 05:26

## 2023-01-01 RX ADMIN — NYSTATIN CREAM 1 APPLICATION(S): 100000 CREAM TOPICAL at 22:30

## 2023-01-01 RX ADMIN — Medication 600 MILLILITER(S): at 13:02

## 2023-01-01 RX ADMIN — CASPOFUNGIN ACETATE 260 MILLIGRAM(S): 7 INJECTION, POWDER, LYOPHILIZED, FOR SOLUTION INTRAVENOUS at 16:36

## 2023-01-01 RX ADMIN — Medication 101 MILLIGRAM(S): at 08:20

## 2023-01-01 RX ADMIN — Medication 1: at 19:39

## 2023-01-01 RX ADMIN — Medication 4: at 18:49

## 2023-01-01 RX ADMIN — Medication 5 MILLIGRAM(S): at 05:38

## 2023-01-01 RX ADMIN — Medication 6: at 23:37

## 2023-01-01 RX ADMIN — Medication 1: at 12:45

## 2023-01-01 RX ADMIN — NYSTATIN CREAM 1 APPLICATION(S): 100000 CREAM TOPICAL at 21:20

## 2023-01-01 RX ADMIN — Medication 4: at 19:13

## 2023-01-01 RX ADMIN — Medication 6.74 MICROGRAM(S)/KG/MIN: at 00:42

## 2023-01-01 RX ADMIN — PHENYLEPHRINE HYDROCHLORIDE 0.67 MICROGRAM(S)/KG/MIN: 10 INJECTION INTRAVENOUS at 18:40

## 2023-01-01 RX ADMIN — PHENYLEPHRINE HYDROCHLORIDE 5.39 MICROGRAM(S)/KG/MIN: 10 INJECTION INTRAVENOUS at 09:54

## 2023-01-01 RX ADMIN — ROSUVASTATIN CALCIUM 10 MILLIGRAM(S): 5 TABLET ORAL at 21:45

## 2023-01-01 RX ADMIN — Medication 5 MILLIGRAM(S): at 23:36

## 2023-01-01 RX ADMIN — ONDANSETRON 4 MILLIGRAM(S): 8 TABLET, FILM COATED ORAL at 20:16

## 2023-01-01 RX ADMIN — DEXMEDETOMIDINE HYDROCHLORIDE IN 0.9% SODIUM CHLORIDE 0.72 MICROGRAM(S)/KG/HR: 4 INJECTION INTRAVENOUS at 23:02

## 2023-01-01 RX ADMIN — MEROPENEM 100 MILLIGRAM(S): 1 INJECTION INTRAVENOUS at 13:05

## 2023-01-01 RX ADMIN — HEPARIN SODIUM 5000 UNIT(S): 5000 INJECTION INTRAVENOUS; SUBCUTANEOUS at 06:28

## 2023-01-01 RX ADMIN — HEPARIN SODIUM 5000 UNIT(S): 5000 INJECTION INTRAVENOUS; SUBCUTANEOUS at 21:36

## 2023-01-01 RX ADMIN — CHLORHEXIDINE GLUCONATE 15 MILLILITER(S): 213 SOLUTION TOPICAL at 10:00

## 2023-01-01 RX ADMIN — Medication 5 MILLIGRAM(S): at 11:53

## 2023-01-01 RX ADMIN — Medication 100 MILLIEQUIVALENT(S): at 20:22

## 2023-01-01 RX ADMIN — INSULIN GLARGINE 25 UNIT(S): 100 INJECTION, SOLUTION SUBCUTANEOUS at 23:15

## 2023-01-01 RX ADMIN — Medication 6.74 MICROGRAM(S)/KG/MIN: at 21:38

## 2023-01-01 RX ADMIN — Medication 5 MILLIGRAM(S): at 00:23

## 2023-01-01 RX ADMIN — Medication 50 MILLIEQUIVALENT(S): at 11:43

## 2023-01-01 RX ADMIN — MEROPENEM 100 MILLIGRAM(S): 1 INJECTION INTRAVENOUS at 18:48

## 2023-01-01 RX ADMIN — CASPOFUNGIN ACETATE 260 MILLIGRAM(S): 7 INJECTION, POWDER, LYOPHILIZED, FOR SOLUTION INTRAVENOUS at 11:10

## 2023-01-01 RX ADMIN — CEFEPIME 1000 MILLIGRAM(S): 1 INJECTION, POWDER, FOR SOLUTION INTRAMUSCULAR; INTRAVENOUS at 10:21

## 2023-01-01 RX ADMIN — PHENYLEPHRINE HYDROCHLORIDE 5.39 MICROGRAM(S)/KG/MIN: 10 INJECTION INTRAVENOUS at 21:13

## 2023-01-01 RX ADMIN — CHLORHEXIDINE GLUCONATE 15 MILLILITER(S): 213 SOLUTION TOPICAL at 05:21

## 2023-01-01 RX ADMIN — SODIUM CHLORIDE 1000 MILLILITER(S): 9 INJECTION, SOLUTION INTRAVENOUS at 01:30

## 2023-01-01 RX ADMIN — Medication 6: at 05:27

## 2023-01-01 RX ADMIN — SODIUM CHLORIDE 75 MILLILITER(S): 9 INJECTION, SOLUTION INTRAVENOUS at 17:34

## 2023-01-01 RX ADMIN — PANTOPRAZOLE SODIUM 40 MILLIGRAM(S): 20 TABLET, DELAYED RELEASE ORAL at 10:03

## 2023-01-01 RX ADMIN — Medication 1 EACH: at 22:02

## 2023-01-01 RX ADMIN — SODIUM CHLORIDE 75 MILLILITER(S): 9 INJECTION, SOLUTION INTRAVENOUS at 20:46

## 2023-01-01 RX ADMIN — Medication 50 MILLILITER(S): at 23:16

## 2023-01-01 RX ADMIN — Medication 5 MILLIGRAM(S): at 11:14

## 2023-01-01 RX ADMIN — PANTOPRAZOLE SODIUM 40 MILLIGRAM(S): 20 TABLET, DELAYED RELEASE ORAL at 21:20

## 2023-01-01 RX ADMIN — Medication 5 MILLIGRAM(S): at 12:55

## 2023-01-01 RX ADMIN — HEPARIN SODIUM 5000 UNIT(S): 5000 INJECTION INTRAVENOUS; SUBCUTANEOUS at 06:14

## 2023-01-01 RX ADMIN — PANTOPRAZOLE SODIUM 80 MILLIGRAM(S): 20 TABLET, DELAYED RELEASE ORAL at 09:07

## 2023-01-01 RX ADMIN — Medication 5 MILLIGRAM(S): at 19:41

## 2023-01-01 RX ADMIN — SODIUM CHLORIDE 500 MILLILITER(S): 9 INJECTION, SOLUTION INTRAVENOUS at 05:38

## 2023-01-01 RX ADMIN — CHLORHEXIDINE GLUCONATE 15 MILLILITER(S): 213 SOLUTION TOPICAL at 06:07

## 2023-01-01 RX ADMIN — PIPERACILLIN AND TAZOBACTAM 25 GRAM(S): 4; .5 INJECTION, POWDER, LYOPHILIZED, FOR SOLUTION INTRAVENOUS at 21:36

## 2023-01-01 RX ADMIN — Medication 1: at 00:23

## 2023-01-01 RX ADMIN — Medication 5 MILLIGRAM(S): at 17:44

## 2023-01-01 RX ADMIN — PHENYLEPHRINE HYDROCHLORIDE 0.67 MICROGRAM(S)/KG/MIN: 10 INJECTION INTRAVENOUS at 22:49

## 2023-01-01 RX ADMIN — Medication 3: at 23:15

## 2023-01-01 RX ADMIN — POTASSIUM PHOSPHATE, MONOBASIC POTASSIUM PHOSPHATE, DIBASIC 62.5 MILLIMOLE(S): 236; 224 INJECTION, SOLUTION INTRAVENOUS at 11:14

## 2023-01-01 RX ADMIN — CHLORHEXIDINE GLUCONATE 1 APPLICATION(S): 213 SOLUTION TOPICAL at 06:15

## 2023-01-01 RX ADMIN — FENTANYL CITRATE 12.7 MICROGRAM(S)/KG/HR: 50 INJECTION INTRAVENOUS at 16:53

## 2023-01-01 RX ADMIN — Medication 5 MILLIGRAM(S): at 19:07

## 2023-01-01 RX ADMIN — Medication 0.5 MILLIGRAM(S): at 21:52

## 2023-01-01 RX ADMIN — Medication 5 MILLIGRAM(S): at 11:36

## 2023-01-01 RX ADMIN — INSULIN GLARGINE 20 UNIT(S): 100 INJECTION, SOLUTION SUBCUTANEOUS at 09:42

## 2023-01-01 RX ADMIN — SODIUM CHLORIDE 30 MILLILITER(S): 9 INJECTION, SOLUTION INTRAVENOUS at 02:54

## 2023-01-01 RX ADMIN — CHLORHEXIDINE GLUCONATE 1 APPLICATION(S): 213 SOLUTION TOPICAL at 06:06

## 2023-01-01 RX ADMIN — PANTOPRAZOLE SODIUM 40 MILLIGRAM(S): 20 TABLET, DELAYED RELEASE ORAL at 12:18

## 2023-01-01 RX ADMIN — PANTOPRAZOLE SODIUM 40 MILLIGRAM(S): 20 TABLET, DELAYED RELEASE ORAL at 10:34

## 2023-01-01 RX ADMIN — Medication 1: at 07:26

## 2023-01-01 RX ADMIN — Medication 5.95 MICROGRAM(S)/KG/MIN: at 21:02

## 2023-01-01 RX ADMIN — Medication 2: at 12:58

## 2023-01-01 RX ADMIN — MEROPENEM 100 MILLIGRAM(S): 1 INJECTION INTRAVENOUS at 21:25

## 2023-01-01 RX ADMIN — REGADENOSON 0.4 MILLIGRAM(S): 0.08 INJECTION, SOLUTION INTRAVENOUS at 10:20

## 2023-01-01 RX ADMIN — CHLORHEXIDINE GLUCONATE 1 APPLICATION(S): 213 SOLUTION TOPICAL at 05:44

## 2023-01-01 RX ADMIN — Medication 1 EACH: at 22:30

## 2023-01-01 RX ADMIN — CASPOFUNGIN ACETATE 260 MILLIGRAM(S): 7 INJECTION, POWDER, LYOPHILIZED, FOR SOLUTION INTRAVENOUS at 10:24

## 2023-01-01 RX ADMIN — PANTOPRAZOLE SODIUM 40 MILLIGRAM(S): 20 TABLET, DELAYED RELEASE ORAL at 21:19

## 2023-01-01 RX ADMIN — HEPARIN SODIUM 5000 UNIT(S): 5000 INJECTION INTRAVENOUS; SUBCUTANEOUS at 09:38

## 2023-01-01 RX ADMIN — MEROPENEM 100 MILLIGRAM(S): 1 INJECTION INTRAVENOUS at 05:12

## 2023-01-01 RX ADMIN — PIPERACILLIN AND TAZOBACTAM 200 GRAM(S): 4; .5 INJECTION, POWDER, LYOPHILIZED, FOR SOLUTION INTRAVENOUS at 09:35

## 2023-01-01 RX ADMIN — CEFEPIME 100 MILLIGRAM(S): 1 INJECTION, POWDER, FOR SOLUTION INTRAMUSCULAR; INTRAVENOUS at 09:03

## 2023-01-01 RX ADMIN — PANTOPRAZOLE SODIUM 40 MILLIGRAM(S): 20 TABLET, DELAYED RELEASE ORAL at 21:00

## 2023-01-01 RX ADMIN — PIPERACILLIN AND TAZOBACTAM 25 GRAM(S): 4; .5 INJECTION, POWDER, LYOPHILIZED, FOR SOLUTION INTRAVENOUS at 19:15

## 2023-01-01 RX ADMIN — PANTOPRAZOLE SODIUM 40 MILLIGRAM(S): 20 TABLET, DELAYED RELEASE ORAL at 21:59

## 2023-01-01 RX ADMIN — CEFEPIME 1000 MILLIGRAM(S): 1 INJECTION, POWDER, FOR SOLUTION INTRAMUSCULAR; INTRAVENOUS at 21:27

## 2023-01-01 RX ADMIN — Medication 2: at 18:57

## 2023-01-01 RX ADMIN — CHLORHEXIDINE GLUCONATE 15 MILLILITER(S): 213 SOLUTION TOPICAL at 21:36

## 2023-01-01 RX ADMIN — VASOPRESSIN 6 UNIT(S)/MIN: 20 INJECTION INTRAVENOUS at 10:19

## 2023-01-01 RX ADMIN — MEROPENEM 100 MILLIGRAM(S): 1 INJECTION INTRAVENOUS at 18:38

## 2023-01-01 RX ADMIN — PANTOPRAZOLE SODIUM 40 MILLIGRAM(S): 20 TABLET, DELAYED RELEASE ORAL at 09:18

## 2023-01-01 RX ADMIN — DEXMEDETOMIDINE HYDROCHLORIDE IN 0.9% SODIUM CHLORIDE 0.72 MICROGRAM(S)/KG/HR: 4 INJECTION INTRAVENOUS at 19:28

## 2023-01-01 RX ADMIN — LACTULOSE 20 GRAM(S): 10 SOLUTION ORAL at 14:44

## 2023-01-01 RX ADMIN — SODIUM CHLORIDE 75 MILLILITER(S): 9 INJECTION, SOLUTION INTRAVENOUS at 21:32

## 2023-01-01 RX ADMIN — Medication 2: at 18:53

## 2023-01-01 RX ADMIN — SODIUM CHLORIDE 500 MILLILITER(S): 9 INJECTION INTRAMUSCULAR; INTRAVENOUS; SUBCUTANEOUS at 14:10

## 2023-01-01 RX ADMIN — Medication 400 MILLIGRAM(S): at 11:34

## 2023-01-01 RX ADMIN — PIPERACILLIN AND TAZOBACTAM 25 GRAM(S): 4; .5 INJECTION, POWDER, LYOPHILIZED, FOR SOLUTION INTRAVENOUS at 14:46

## 2023-01-01 RX ADMIN — HYDROMORPHONE HYDROCHLORIDE 1 MILLIGRAM(S): 2 INJECTION INTRAMUSCULAR; INTRAVENOUS; SUBCUTANEOUS at 15:12

## 2023-01-01 RX ADMIN — SODIUM CHLORIDE 65 MILLILITER(S): 9 INJECTION INTRAMUSCULAR; INTRAVENOUS; SUBCUTANEOUS at 20:34

## 2023-01-01 RX ADMIN — INSULIN GLARGINE 20 UNIT(S): 100 INJECTION, SOLUTION SUBCUTANEOUS at 09:50

## 2023-01-01 RX ADMIN — PANTOPRAZOLE SODIUM 10 MG/HR: 20 TABLET, DELAYED RELEASE ORAL at 03:09

## 2023-01-01 RX ADMIN — Medication 6: at 05:25

## 2023-01-01 RX ADMIN — LACTULOSE 20 GRAM(S): 10 SOLUTION ORAL at 21:50

## 2023-01-01 RX ADMIN — DEXTROSE MONOHYDRATE, SODIUM CHLORIDE, AND POTASSIUM CHLORIDE 75 MILLILITER(S): 50; .745; 4.5 INJECTION, SOLUTION INTRAVENOUS at 17:56

## 2023-01-01 RX ADMIN — HEPARIN SODIUM 5000 UNIT(S): 5000 INJECTION INTRAVENOUS; SUBCUTANEOUS at 14:41

## 2023-01-01 RX ADMIN — Medication 50 MILLILITER(S): at 05:44

## 2023-01-01 RX ADMIN — PANTOPRAZOLE SODIUM 40 MILLIGRAM(S): 20 TABLET, DELAYED RELEASE ORAL at 17:16

## 2023-01-01 RX ADMIN — Medication 600 MILLILITER(S): at 13:04

## 2023-01-01 RX ADMIN — PHENYLEPHRINE HYDROCHLORIDE 0.67 MICROGRAM(S)/KG/MIN: 10 INJECTION INTRAVENOUS at 23:37

## 2023-01-01 RX ADMIN — HEPARIN SODIUM 5000 UNIT(S): 5000 INJECTION INTRAVENOUS; SUBCUTANEOUS at 22:14

## 2023-01-01 RX ADMIN — MEROPENEM 100 MILLIGRAM(S): 1 INJECTION INTRAVENOUS at 21:44

## 2023-01-01 RX ADMIN — CHLORHEXIDINE GLUCONATE 15 MILLILITER(S): 213 SOLUTION TOPICAL at 05:27

## 2023-01-01 RX ADMIN — PIPERACILLIN AND TAZOBACTAM 25 GRAM(S): 4; .5 INJECTION, POWDER, LYOPHILIZED, FOR SOLUTION INTRAVENOUS at 23:20

## 2023-01-01 RX ADMIN — DEXMEDETOMIDINE HYDROCHLORIDE IN 0.9% SODIUM CHLORIDE 8.99 MICROGRAM(S)/KG/HR: 4 INJECTION INTRAVENOUS at 11:34

## 2023-01-01 RX ADMIN — Medication 600 MILLILITER(S): at 11:40

## 2023-01-01 RX ADMIN — HEPARIN SODIUM 5000 UNIT(S): 5000 INJECTION INTRAVENOUS; SUBCUTANEOUS at 05:51

## 2023-01-01 RX ADMIN — HEPARIN SODIUM 5000 UNIT(S): 5000 INJECTION INTRAVENOUS; SUBCUTANEOUS at 13:49

## 2023-01-01 RX ADMIN — CASPOFUNGIN ACETATE 260 MILLIGRAM(S): 7 INJECTION, POWDER, LYOPHILIZED, FOR SOLUTION INTRAVENOUS at 12:12

## 2023-01-01 RX ADMIN — I.V. FAT EMULSION 20.8 GM/KG/DAY: 20 EMULSION INTRAVENOUS at 22:48

## 2023-01-01 RX ADMIN — CHLORHEXIDINE GLUCONATE 15 MILLILITER(S): 213 SOLUTION TOPICAL at 05:35

## 2023-01-01 RX ADMIN — Medication 600 MILLILITER(S): at 11:07

## 2023-01-01 RX ADMIN — PANTOPRAZOLE SODIUM 40 MILLIGRAM(S): 20 TABLET, DELAYED RELEASE ORAL at 23:20

## 2023-01-01 RX ADMIN — Medication 1: at 06:45

## 2023-01-01 RX ADMIN — HEPARIN SODIUM 5000 UNIT(S): 5000 INJECTION INTRAVENOUS; SUBCUTANEOUS at 14:10

## 2023-01-01 RX ADMIN — Medication 50 MILLILITER(S): at 00:03

## 2023-01-01 RX ADMIN — HEPARIN SODIUM 5000 UNIT(S): 5000 INJECTION INTRAVENOUS; SUBCUTANEOUS at 14:11

## 2023-01-01 RX ADMIN — CHLORHEXIDINE GLUCONATE 15 MILLILITER(S): 213 SOLUTION TOPICAL at 09:09

## 2023-01-01 RX ADMIN — DEXMEDETOMIDINE HYDROCHLORIDE IN 0.9% SODIUM CHLORIDE 8.99 MICROGRAM(S)/KG/HR: 4 INJECTION INTRAVENOUS at 00:42

## 2023-01-01 RX ADMIN — Medication 3: at 05:20

## 2023-01-01 RX ADMIN — HEPARIN SODIUM 5000 UNIT(S): 5000 INJECTION INTRAVENOUS; SUBCUTANEOUS at 22:09

## 2023-01-01 RX ADMIN — Medication 40 MILLIEQUIVALENT(S): at 09:24

## 2023-01-01 RX ADMIN — PANTOPRAZOLE SODIUM 40 MILLIGRAM(S): 20 TABLET, DELAYED RELEASE ORAL at 11:13

## 2023-01-01 RX ADMIN — HEPARIN SODIUM 5000 UNIT(S): 5000 INJECTION INTRAVENOUS; SUBCUTANEOUS at 05:43

## 2023-01-01 RX ADMIN — CEFEPIME 100 MILLIGRAM(S): 1 INJECTION, POWDER, FOR SOLUTION INTRAMUSCULAR; INTRAVENOUS at 05:25

## 2023-01-01 RX ADMIN — Medication 2: at 05:27

## 2023-01-01 RX ADMIN — VASOPRESSIN 6 UNIT(S)/MIN: 20 INJECTION INTRAVENOUS at 00:21

## 2023-01-01 RX ADMIN — ROSUVASTATIN CALCIUM 10 MILLIGRAM(S): 5 TABLET ORAL at 21:47

## 2023-01-01 RX ADMIN — HYDROMORPHONE HYDROCHLORIDE 1 MILLIGRAM(S): 2 INJECTION INTRAMUSCULAR; INTRAVENOUS; SUBCUTANEOUS at 13:57

## 2023-01-06 NOTE — ED PROVIDER NOTE - OBJECTIVE STATEMENT
66 y/o male with a PMHx of BPH, DM, HLD, HTN, light chain nephropathy, neuroendocrine tumor diagnosed 6 years ago, renal insufficiency presents to the ED c/o n/v. Pt recently admitted to [facility] for n/v. During admission, pt had CT abd/pelvis with oral contrast showing increased size of pancreatic tumor with increased mass effect on adjacent stomach and surrounding soft tissues; distension of proximal body, fundus, and distal esophagus suggesting component of gastric outlet obstruction; esophageal wall thickening possibility esophagitis; new hepatic hypodensities likely mets compared to 2/20/2021. Surgery and GI were consulted. Pt was treated with supportive anti-nausea medications and PPI. Pt's symptoms initially did not improve with just supportive treatments. NGT was placed. Pt presents today for n/v x days. No other complaints at this time. 66 y/o male with a PMHx of BPH, DM, HLD, HTN, light chain nephropathy, neuroendocrine tumor diagnosed 6 years ago, renal insufficiency presents to the ED c/o n/v. Pt recently admitted to [facility] for n/v. During admission, pt had CT abd/pelvis with oral contrast showing increased size of pancreatic tumor with increased mass effect on adjacent stomach and surrounding soft tissues; distension of proximal body, fundus, and distal esophagus suggesting component of gastric outlet obstruction; esophageal wall thickening possibility esophagitis; new hepatic hypodensities likely mets compared to 2/20/2021. Surgery and GI were consulted. Pt was treated with supportive anti-nausea medications and PPI. Pt's symptoms initially did not improve with just supportive treatments. NGT was placed. Pt presents today for n/v x days. no fevers. No other complaints at this time.

## 2023-01-06 NOTE — ED PROVIDER NOTE - DISPOSITION TYPE
ADMIT Complex Repair And Dermal Autograft Text: The defect edges were debeveled with a #15 scalpel blade.  The primary defect was closed partially with a complex linear closure.  Given the location of the defect, shape of the defect and the proximity to free margins an dermal autograft was deemed most appropriate to repair the remaining defect.  The graft was trimmed to fit the size of the remaining defect.  The graft was then placed in the primary defect, oriented appropriately, and sutured into place.

## 2023-01-06 NOTE — ED STATDOCS - WR ORDER DATE AND TIME 1
"Chief Complaint   Patient presents with     RECHECK     S/P Liver TX post op day 109       Vital signs:  Temp: 97.8  F (36.6  C) Temp src: Oral BP: 121/86 Pulse: 84   Resp: 16 SpO2: 98 %     Height: 160 cm (5' 3\") Weight: 55.3 kg (122 lb)  Estimated body mass index is 21.61 kg/m  as calculated from the following:    Height as of this encounter: 1.6 m (5' 3\").    Weight as of this encounter: 55.3 kg (122 lb).          Florence Burch [facility]  12/17/2018 10:03 AM      "
06-Jan-2023 11:40

## 2023-01-06 NOTE — ED PROVIDER NOTE - CLINICAL SUMMARY MEDICAL DECISION MAKING FREE TEXT BOX
with n/v and inability to tolerate PO in setting of recent gastric outlet obstruction from neuroendocrine tumor. Plan: labs, imaging, GI and admit.

## 2023-01-06 NOTE — H&P ADULT - HISTORY OF PRESENT ILLNESS
68 y/o M PMHx significant for Neuroendocrine tumor diagnosed 6 years ago, Hypertension, Hyperlipidemia, Diabetes Mellitus Type 2, BPH and light chain nephropathy, was reportedly referred to the [facility] by Gastroenterology for further evaluation and management intractable nausea and vomiting (non-bloody), and concerns for ongoing dehydration.  Vital signs in triage => BP 75/52, HR 83/min, RR 17/min, TMax 98.3'F, SpO2 100% on room air. Labs => BUN/Cr 41/2.41, Alb 3.1. CT ABD/Pelvis => Large pancreatic tail mass with minimal mass effect on the stomach. No bowel obstruction. The stomach and bowel are fluid-filled. Small hiatal hernia with questionable mild thickening of the distal esophagus. Multiple hepatic hypodense lesions concerning for metastatic disease. In the ED the patient was given Ondansetron 4mg IVP x 1, LR x 1L, and NS x 1L.

## 2023-01-06 NOTE — ED ADULT NURSE NOTE - OBJECTIVE STATEMENT
pt presents to ED from home for nausea, vomiting. reports he has not tolerated PO intake in a few days. hx pancreatic cancer. pt initially hypotensive. IV access established, labs drawn and sent to lab. 2000 cc bolus LR initiated. pt is A&O x4. GCS 15.

## 2023-01-06 NOTE — ED PROVIDER NOTE - NS ED ROS FT
Constitutional: No fever or chills  Eyes: No visual changes  HEENT: No throat pain  CV: No chest pain  Resp: No SOB no cough  GI: No abd pain, +n/v  : No dysuria  MSK: No musculoskeletal pain  Skin: No rash  Neuro: No headache

## 2023-01-06 NOTE — ED PROVIDER NOTE - NS_ ATTENDINGSCRIBEDETAILS _ED_A_ED_FT
I, Marcial Mead MD,  performed the initial face to face bedside interview with this patient regarding history of present illness, review of symptoms and relevant past medical, social and family history.  I completed an independent physical examination.  I was the initial provider who evaluated this patient.   I personally saw the patient and performed a substantive portion of the visit including all aspects of the medical decision making.  The history, relevant review of systems, past medical and surgical history, medical decision making, and physical examination was documented by the scribe in my presence and I attest to the accuracy of the documentation.

## 2023-01-06 NOTE — H&P ADULT - NSHPSOCIALHISTORY_GEN_ALL_CORE
Lives at home Lives at home with wife  ADLs/Mobility: ambulates and performs ADLs independently  Tobacco Use: denies; remote history of smoking >40 yrs ago  Alcohol Use: socially  Drug Use: denies  Vaccination: COVID-19 vaccinated x 3 w/ Moderna

## 2023-01-06 NOTE — ED STATDOCS - PROGRESS NOTE DETAILS
Naseem Kim for attending Dr. Austin:  68 y/o male w/ a PMHx of DM, light chain neuropathy, HTN, HLD, renal insuffiencey, BPH, and presents to the ED sent in by Dr. Doyle for n/v and dehydration. Pt found to be hypotensive to 75/52. Will send pt to main ED for further evaluation. Naseem Kim for attending Dr. Austin:  68 y/o male w/ a PMHx of DM, light chain neuropathy, HTN, HLD, renal insuffiencey, BPH, and presents to the ED sent in by Dr. Doyle for n/v and dehydration. Pt found to be hypotensive to 75/52. Will send pt to main ED for further evaluation. Further care of the patient deferred to the main ED attending, Informed patient of plan, said goodbye and patient was taken by NA to main ED.

## 2023-01-06 NOTE — H&P ADULT - ASSESSMENT
66 y/o M PMHx significant for Neuroendocrine tumor diagnosed 6 years ago, Hypertension, Hyperlipidemia, Diabetes Mellitus Type 2, BPH and light chain nephropathy, was reportedly referred to the [facility] by Gastroenterology for further evaluation and management intractable nausea and vomiting (non-bloody), and concerns for ongoing dehydration.  Vital signs in triage => BP 75/52, HR 83/min, RR 17/min, TMax 98.3'F, SpO2 100% on room air. Labs => BUN/Cr 41/2.41, Alb 3.1. CT ABD/Pelvis => Large pancreatic tail mass with minimal mass effect on the stomach. No bowel obstruction. The stomach and bowel are fluid-filled. Small hiatal hernia with questionable mild thickening of the distal esophagus. Multiple hepatic hypodense lesions concerning for metastatic disease. In the ED the patient was given Ondansetron 4mg IVP x 1, LR x 1L, and NS x 1L. 66 y/o M PMHx significant for Neuroendocrine tumor diagnosed 6 years ago, Hypertension, Hyperlipidemia, Diabetes Mellitus Type 2, BPH and light chain nephropathy, was reportedly referred to the [facility] by Gastroenterology for further evaluation and management intractable nausea and vomiting (non-bloody), and concerns for ongoing dehydration.  Vital signs in triage => BP 75/52, HR 83/min, RR 17/min, TMax 98.3'F, SpO2 100% on room air. Labs => BUN/Cr 41/2.41, Alb 3.1. CT ABD/Pelvis => Large pancreatic tail mass with minimal mass effect on the stomach. No bowel obstruction. The stomach and bowel are fluid-filled. Small hiatal hernia with questionable mild thickening of the distal esophagus. Multiple hepatic hypodense lesions concerning for metastatic disease. In the ED the patient was given Ondansetron 4mg IVP x 1, LR x 1L, and NS x 1L.     66 y/o M PMHx significant for Neuroendocrine tumor diagnosed 6 years ago, Hypertension, Hyperlipidemia, Diabetes Mellitus Type 2, BPH and light chain nephropathy, was reportedly referred to the [facility] by Gastroenterology for further evaluation and management intractable nausea and vomiting (non-bloody), and concerns for ongoing dehydration.  Vital signs in triage => BP 75/52, HR 83/min, RR 17/min, TMax 98.3'F, SpO2 100% on room air. Labs => BUN/Cr 41/2.41, Alb 3.1. CT ABD/Pelvis => Large pancreatic tail mass with minimal mass effect on the stomach. No bowel obstruction. The stomach and bowel are fluid-filled. Small hiatal hernia with questionable mild thickening of the distal esophagus. Multiple hepatic hypodense lesions concerning for metastatic disease. In the ED the patient was given Ondansetron 4mg IVP x 1, LR x 1L, and NS x 1L.    #Intractable Nausea and Vomiting  ·  Plan: Pt presents with multiple episodes of nausea and vomiting  - Still currently having active episodes of vomiting in ED  - CTAP ordered  - NPO except meds  - Maintenance IVF NS 75cc/hr  - Zofran PRN for nausea, consider Reglan after CT scan results  - One dose STAT compazine  - 20 mg IVP Pepcid daily  - F/u lipase levels  ·  Problem: ZO (acute kidney injury).   ·  Plan: ZO likely 2/2 pre-renal azotemia in the setting of dehydration  - BUN 40, Cr 1.9 on admission  - Baseline Cr: 1.4-1.5 as per outpt chart review  - Given 2.5L NS bolus in ED  - Maintenance IVF as noted above  - Monitor BMP  - Monitor renal indices and urine output  - Avoid nephrotoxic medications  - Consider coulter catheter to monitor ins and outs since patient self catherizes at home.    ·  Problem: Neuroendocrine cancer.   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 Llamas and urologist Dr. Padron  - Receives monthly Sandostatin (Octreotide) injection  - Received Lutathera 3 days prior to admission (new experimental drug), last received 1.5 years ago  - Heme/Onc Dr. Llamas (pt follows outpt) consulted.    ·  Problem: DM (diabetes mellitus).   ·  Plan: Chronic, known history of T2DM  - Hold home meds: gllipizide   - Low dose insulin corrective scale  - Hypoglycemia protocol, fingerstick glucose q6h  - Diet NPO  - F/u AM HbA1c.  ·  Problem: Hypertension.   ·  Plan: Chronic, stable on admission  - Hold home losartan in setting of ZO  - Monitor routine hemodynamics.  ·  Problem: HLD (hyperlipidemia).   ·  Plan: Chronic  - Was previously on home Crestor - however has not been taking it anymore.    ·  Problem: BPH (benign prostatic hyperplasia).   ·  Plan: Chronic  - On home Alfuzosin  - self catherizes at home.    ·  Problem: Need for prophylactic measure.   ·  Plan; DVT ppx w/ subq heparin.   68 y/o M PMHx significant for Neuroendocrine tumor diagnosed 6 years ago, Hypertension, Hyperlipidemia, Diabetes Mellitus Type 2, BPH and light chain nephropathy, was reportedly referred to the [facility] by Gastroenterology for further evaluation and management intractable nausea and vomiting (non-bloody), and concerns for ongoing dehydration.  Vital signs in triage => BP 75/52, HR 83/min, RR 17/min, TMax 98.3'F, SpO2 100% on room air. Labs => BUN/Cr 41/2.41, Alb 3.1. CT ABD/Pelvis => Large pancreatic tail mass with minimal mass effect on the stomach. No bowel obstruction. The stomach and bowel are fluid-filled. Small hiatal hernia with questionable mild thickening of the distal esophagus. Multiple hepatic hypodense lesions concerning for metastatic disease. In the ED the patient was given Ondansetron 4mg IVP x 1, LR x 1L, and NS x 1L.    #Intractable Nausea and Vomiting  ~admit to Medicine  ~CT revealed  Large pancreatic tail mass with minimal mass effect on the stomach  ~f/u w/ GI consultation  ~f/u w/ Surgery consultation  ~cont. anti-emetics prn  ~NPO except meds  ~cont. maintenance IVF NS 75cc/hr    #Acute on Chronic Renal Failure  ~DDx includes pre-renal azotemia  ~cont. IV hydration  ~strict I/Os  ~f/u am labs     #Neuroendocrine Cancer  ~patient has a history of a pancreatic neuroendocrine tumor diagnosed 6 years ago  ~follows with (Oncologist) Dr. Llamas and (Urologist) Dr. Padron  - Receives monthly Sandostatin (Octreotide) injection    #Diabetes Mellitus Type2  ~FS qAC/HS  ~cont. ISS per protocol    #Hypertension  ~will hold Irbesartan in the setting of OZ  ~Monitor routine hemodynamics    #Hyperlipidemia  ~cont. Rosuvastatin 10mg po qhs     #BPH  ~self catherizes at home    #Vte ppx  ~cont. Heparin sq

## 2023-01-06 NOTE — ED PROVIDER NOTE - PROGRESS NOTE DETAILS
Naseem Sales for attending Dr. Mead: Spoke with Maria Isabel José NP for Dr. Doyle. Agrees with admission. Will see pt. CT with likely gastric outlet obstruction again.  BP much improved 110/70.  Not tachycardic no fever no elevation white count or lactate no concern for sepsis at this time.  Patient was likely severely dehydrated.  Patient endorsed to Dr. Demarco accepts. Marcial Mead M.D., Attending Physician

## 2023-01-06 NOTE — ED ADULT NURSE REASSESSMENT NOTE - NS ED NURSE REASSESS COMMENT FT1
pt ambulated to bathroom with safe and steady gait. offered menu to order food. declines at this time. pt and family up to date on plan of care. awaiting inpatient bed.

## 2023-01-06 NOTE — ED STATDOCS - NS_ ATTENDINGSCRIBEDETAILS _ED_A_ED_FT
I Jose Austin MD saw and examined the patient. Scribe documented for me and under my supervision. I have modified the scribe's documentation where necessary to reflect my history, physical exam and other relevant documentations pertinent to the care of the patient.

## 2023-01-06 NOTE — PHARMACOTHERAPY INTERVENTION NOTE - COMMENTS
Medication history complete, reviewed medications with patient and confirmed with doctor first med .

## 2023-01-06 NOTE — H&P ADULT - NSHPPHYSICALEXAM_GEN_ALL_CORE
Vital Signs Last 24 Hrs  T(C): 36.6 (06 Jan 2023 14:58), Max: 36.8 (06 Jan 2023 11:29)  T(F): 97.8 (06 Jan 2023 14:58), Max: 98.3 (06 Jan 2023 11:29)  HR: 72 (06 Jan 2023 14:58) (72 - 83)  BP: 103/70 (06 Jan 2023 14:58) (75/52 - 103/70)  BP(mean): 77 (06 Jan 2023 14:58) (58 - 77)  RR: 15 (06 Jan 2023 14:58) (15 - 17)  SpO2: 99% (06 Jan 2023 14:58) (99% - 100%)    Parameters below as of 06 Jan 2023 14:58  Patient On (Oxygen Delivery Method): room air

## 2023-01-07 NOTE — DIETITIAN INITIAL EVALUATION ADULT - PERTINENT LABORATORY DATA
01-07    141  |  105  |  57<H>  ----------------------------<  236<H>  3.8   |  22  |  2.32<H>    Ca    8.6      07 Jan 2023 06:16    TPro  6.4  /  Alb  2.7<L>  /  TBili  0.4  /  DBili  x   /  AST  25  /  ALT  20  /  AlkPhos  66  01-07  A1C with Estimated Average Glucose Result: 6.4 % (12-11-22 @ 07:19)

## 2023-01-07 NOTE — PROGRESS NOTE ADULT - SUBJECTIVE AND OBJECTIVE BOX
Cheif complaints and Diagnosis: nausea vommitting secondary to pancreatic mass    Subjective: no complaints      REVIEW OF SYSTEMS:    CONSTITUTIONAL: No weakness, fevers or chills  EYES/ENT: No visual changes;  No vertigo or throat pain   NECK: No pain or stiffness  RESPIRATORY: No cough, wheezing, hemoptysis; No shortness of breath  CARDIOVASCULAR: No chest pain or palpitations  GASTROINTESTINAL: No abdominal or epigastric pain. No nausea, vomiting, or hematemesis; No diarrhea or constipation. No melena or hematochezia.  GENITOURINARY: No dysuria, frequency or hematuria  NEUROLOGICAL: No numbness or weakness  SKIN: No itching, burning, rashes, or lesions   All other review of systems is negative unless indicated above      Vital Signs Last 24 Hrs  T(C): 36.9 (2023 09:20), Max: 37.1 (2023 18:21)  T(F): 98.4 (2023 09:20), Max: 98.8 (2023 18:21)  HR: 107 (2023 09:20) (72 - 107)  BP: 147/82 (2023 09:20) (75/52 - 147/82)  BP(mean): 77 (2023 14:58) (58 - 77)  RR: 18 (2023 09:20) (15 - 18)  SpO2: 100% (2023 09:20) (99% - 100%)    Parameters below as of 2023 09:20  Patient On (Oxygen Delivery Method): room air        HEENT:   pupils equal and reactive, EOMI, no oropharyngeal lesions, erythema, exudates, oral thrush    NECK:   supple, no carotid bruits, no palpable lymph nodes, no thyromegaly    CV:  +S1, +S2, regular, no murmurs or rubs    RESP:   lungs clear to auscultation bilaterally, no wheezing, rales, rhonchi, good air entry bilaterally    BREAST:  not examined    GI:  abdomen soft, non-tender, non-distended, normal BS, no bruits, no abdominal masses, no palpable masses    RECTAL:  not examined    :  not examined    MSK:   normal muscle tone, no atrophy, no rigidity, no contractions    EXT:   no clubbing, no cyanosis, no edema, no calf pain, swelling or erythema    VASCULAR:  pulses equal and symmetric in the upper and lower extremities    NEURO:  AAOX3, no focal neurological deficits, follows all commands, able to move extremities spontaneously    SKIN:  no ulcers, lesions or rashes    MEDICATIONS  (STANDING):  ferrous    sulfate 325 milliGRAM(s) Oral daily  heparin   Injectable 5000 Unit(s) SubCutaneous every 8 hours  multivitamin 1 Tablet(s) Oral daily  rosuvastatin 10 milliGRAM(s) Oral at bedtime  valsartan 40 milliGRAM(s) Oral daily    MEDICATIONS  (PRN):  acetaminophen     Tablet .. 650 milliGRAM(s) Oral every 6 hours PRN Temp greater or equal to 38C (100.4F), Mild Pain (1 - 3)  aluminum hydroxide/magnesium hydroxide/simethicone Suspension 30 milliLiter(s) Oral every 4 hours PRN Dyspepsia  melatonin 3 milliGRAM(s) Oral at bedtime PRN Insomnia  ondansetron Injectable 4 milliGRAM(s) IV Push every 8 hours PRN Nausea and/or Vomiting  prochlorperazine   Injectable 2.5 milliGRAM(s) IV Push every 4 hours PRN Nausea      Urinalysis Basic - ( 2023 17:21 )    Color: Yellow / Appearance: Slightly Turbid / S.025 / pH: x  Gluc: x / Ketone: Small  / Bili: Small / Urobili: Negative   Blood: x / Protein: 100 / Nitrite: Negative   Leuk Esterase: Small / RBC: 11-25 /HPF / WBC >50 /HPF   Sq Epi: x / Non Sq Epi: Few / Bacteria: Many    2023 06:16    141    |  105    |  57     ----------------------------<  236    3.8     |  22     |  2.32     Ca    8.6        2023 06:16    TPro  6.4    /  Alb  2.7    /  TBili  0.4    /  DBili  x      /  AST  25     /  ALT  20     /  AlkPhos  66     2023 06:16  LIVER FUNCTIONS - ( 2023 06:16 )  Alb: 2.7 g/dL / Pro: 6.4 gm/dL / ALK PHOS: 66 U/L / ALT: 20 U/L / AST: 25 U/L / GGT: x         PT/INR - ( 2023 12:06 )   PT: 12.6 sec;   INR: 1.09 ratio         PTT - ( 2023 12:06 )  PTT:29.9 secCBC Full  -  ( 2023 06:16 )  WBC Count : 16.35 K/uL  Hemoglobin : 11.9 g/dL  Hematocrit : 36.5 %  Platelet Count - Automated : 255 K/uL  Mean Cell Volume : 90.3 fl  Mean Cell Hemoglobin : 29.5 pg  Mean Cell Hemoglobin Concentration : 32.6 gm/dL  Auto Neutrophil # : 15.02 K/uL  Auto Lymphocyte # : 0.60 K/uL  Auto Monocyte # : 0.63 K/uL  Auto Eosinophil # : 0.00 K/uL  Auto Basophil # : 0.01 K/uL  Auto Neutrophil % : 91.7 %  Auto Lymphocyte % : 3.7 %  Auto Monocyte % : 3.9 %  Auto Eosinophil % : 0.0 %  Auto Basophil % : 0.1 %        Blood, Urine: Small ( @ 17:21)      PT/INR - ( 2023 12:06 )   PT: 12.6 sec;   INR: 1.09 ratio         PTT - ( 2023 12:06 )  PTT:29.9 sec              Assessment and Plan:      	  68 y/o M PMHx significant for Neuroendocrine tumor diagnosed 6 years ago, Hypertension, Hyperlipidemia, Diabetes Mellitus Type 2, BPH and light chain nephropathy, was reportedly referred to the [facility] by Gastroenterology for further evaluation and management intractable nausea and vomiting (non-bloody), and concerns for ongoing dehydration.  Vital signs in triage => BP 75/52, HR 83/min, RR 17/min, TMax 98.3'F, SpO2 100% on room air. Labs => BUN/Cr 41/2.41, Alb 3.1. CT ABD/Pelvis => Large pancreatic tail mass with minimal mass effect on the stomach. No bowel obstruction. The stomach and bowel are fluid-filled. Small hiatal hernia with questionable mild thickening of the distal esophagus. Multiple hepatic hypodense lesions concerning for metastatic disease. In the ED the patient was given Ondansetron 4mg IVP x 1, LR x 1L, and NS x 1L.    #Intractable Nausea and Vomiting  ~admit to Medicine  ~CT revealed  Large pancreatic tail mass with minimal mass effect on the stomach  ~f/u w/ GI consultation  ~f/u w/ Surgery consultation  ~cont. anti-emetics prn  ~NPO except meds  ~cont. maintenance IVF NS 75cc/hr    #Acute on Chronic Renal Failure  -BL is 1.5, currently 2.32  ~DDx includes pre-renal azotemia  ~cont. IV hydration  ~strict I/Os  ~f/u am labs     #Neuroendocrine Cancer  ~patient has a history of a pancreatic neuroendocrine tumor diagnosed 6 years ago  ~follows with (Oncologist) Dr. Llamas and (Urologist) Dr. Padron  - Receives monthly Sandostatin (Octreotide) injection    #Diabetes Mellitus Type2  ~FS qAC/HS  ~cont. ISS per protocol    #Hypertension  ~will hold Irbesartan in the setting of ZO  ~Monitor routine hemodynamics    #Hyperlipidemia  ~cont. Rosuvastatin 10mg po qhs     #BPH  ~self catherizes at home    #Vte ppx  ~cont. Heparin sq          Cheif complaints and Diagnosis: nausea vommitting secondary to pancreatic mass    Subjective: no complaints      REVIEW OF SYSTEMS:    CONSTITUTIONAL: No weakness, fevers or chills  EYES/ENT: No visual changes;  No vertigo or throat pain   NECK: No pain or stiffness  RESPIRATORY: No cough, wheezing, hemoptysis; No shortness of breath  CARDIOVASCULAR: No chest pain or palpitations  GASTROINTESTINAL: No abdominal or epigastric pain. No nausea, vomiting, or hematemesis; No diarrhea or constipation. No melena or hematochezia.  GENITOURINARY: No dysuria, frequency or hematuria  NEUROLOGICAL: No numbness or weakness  SKIN: No itching, burning, rashes, or lesions   All other review of systems is negative unless indicated above      Vital Signs Last 24 Hrs  T(C): 36.9 (2023 09:20), Max: 37.1 (2023 18:21)  T(F): 98.4 (2023 09:20), Max: 98.8 (2023 18:21)  HR: 107 (2023 09:20) (72 - 107)  BP: 147/82 (2023 09:20) (75/52 - 147/82)  BP(mean): 77 (2023 14:58) (58 - 77)  RR: 18 (2023 09:20) (15 - 18)  SpO2: 100% (2023 09:20) (99% - 100%)    Parameters below as of 2023 09:20  Patient On (Oxygen Delivery Method): room air        HEENT:   pupils equal and reactive, EOMI, no oropharyngeal lesions, erythema, exudates, oral thrush    NECK:   supple, no carotid bruits, no palpable lymph nodes, no thyromegaly    CV:  +S1, +S2, regular, no murmurs or rubs    RESP:   lungs clear to auscultation bilaterally, no wheezing, rales, rhonchi, good air entry bilaterally    BREAST:  not examined    GI:  abdomen soft, non-tender, non-distended, normal BS, no bruits, no abdominal masses, no palpable masses    RECTAL:  not examined    :  not examined    MSK:   normal muscle tone, no atrophy, no rigidity, no contractions    EXT:   no clubbing, no cyanosis, no edema, no calf pain, swelling or erythema    VASCULAR:  pulses equal and symmetric in the upper and lower extremities    NEURO:  AAOX3, no focal neurological deficits, follows all commands, able to move extremities spontaneously    SKIN:  no ulcers, lesions or rashes    MEDICATIONS  (STANDING):  ferrous    sulfate 325 milliGRAM(s) Oral daily  heparin   Injectable 5000 Unit(s) SubCutaneous every 8 hours  multivitamin 1 Tablet(s) Oral daily  rosuvastatin 10 milliGRAM(s) Oral at bedtime  valsartan 40 milliGRAM(s) Oral daily    MEDICATIONS  (PRN):  acetaminophen     Tablet .. 650 milliGRAM(s) Oral every 6 hours PRN Temp greater or equal to 38C (100.4F), Mild Pain (1 - 3)  aluminum hydroxide/magnesium hydroxide/simethicone Suspension 30 milliLiter(s) Oral every 4 hours PRN Dyspepsia  melatonin 3 milliGRAM(s) Oral at bedtime PRN Insomnia  ondansetron Injectable 4 milliGRAM(s) IV Push every 8 hours PRN Nausea and/or Vomiting  prochlorperazine   Injectable 2.5 milliGRAM(s) IV Push every 4 hours PRN Nausea      Urinalysis Basic - ( 2023 17:21 )    Color: Yellow / Appearance: Slightly Turbid / S.025 / pH: x  Gluc: x / Ketone: Small  / Bili: Small / Urobili: Negative   Blood: x / Protein: 100 / Nitrite: Negative   Leuk Esterase: Small / RBC: 11-25 /HPF / WBC >50 /HPF   Sq Epi: x / Non Sq Epi: Few / Bacteria: Many    2023 06:16    141    |  105    |  57     ----------------------------<  236    3.8     |  22     |  2.32     Ca    8.6        2023 06:16    TPro  6.4    /  Alb  2.7    /  TBili  0.4    /  DBili  x      /  AST  25     /  ALT  20     /  AlkPhos  66     2023 06:16  LIVER FUNCTIONS - ( 2023 06:16 )  Alb: 2.7 g/dL / Pro: 6.4 gm/dL / ALK PHOS: 66 U/L / ALT: 20 U/L / AST: 25 U/L / GGT: x         PT/INR - ( 2023 12:06 )   PT: 12.6 sec;   INR: 1.09 ratio         PTT - ( 2023 12:06 )  PTT:29.9 secCBC Full  -  ( 2023 06:16 )  WBC Count : 16.35 K/uL  Hemoglobin : 11.9 g/dL  Hematocrit : 36.5 %  Platelet Count - Automated : 255 K/uL  Mean Cell Volume : 90.3 fl  Mean Cell Hemoglobin : 29.5 pg  Mean Cell Hemoglobin Concentration : 32.6 gm/dL  Auto Neutrophil # : 15.02 K/uL  Auto Lymphocyte # : 0.60 K/uL  Auto Monocyte # : 0.63 K/uL  Auto Eosinophil # : 0.00 K/uL  Auto Basophil # : 0.01 K/uL  Auto Neutrophil % : 91.7 %  Auto Lymphocyte % : 3.7 %  Auto Monocyte % : 3.9 %  Auto Eosinophil % : 0.0 %  Auto Basophil % : 0.1 %        Blood, Urine: Small ( @ 17:21)      PT/INR - ( 2023 12:06 )   PT: 12.6 sec;   INR: 1.09 ratio         PTT - ( 2023 12:06 )  PTT:29.9 sec              Assessment and Plan:      	  66 y/o M PMHx significant for Neuroendocrine tumor diagnosed 6 years ago, Hypertension, Hyperlipidemia, Diabetes Mellitus Type 2, BPH and light chain nephropathy, was reportedly referred to [facility] by Gastroenterology for further evaluation and management intractable nausea and vomiting (non-bloody), and concerns for ongoing dehydration.  Vital signs in triage => BP 75/52, HR 83/min, RR 17/min, TMax 98.3'F, SpO2 100% on room air. Labs => BUN/Cr 41/2.41, Alb 3.1. CT ABD/Pelvis => Large pancreatic tail mass with minimal mass effect on the stomach. No bowel obstruction. The stomach and bowel are fluid-filled. Small hiatal hernia with questionable mild thickening of the distal esophagus. Multiple hepatic hypodense lesions concerning for metastatic disease. In the ED the patient was given Ondansetron 4mg IVP x 1, LR x 1L, and NS x 1L.    #Intractable Nausea and Vomiting  ~admit to Medicine  ~CT revealed  Large pancreatic tail mass with minimal mass effect on the stomach  ~f/u w/ GI consultation  ~f/u w/ Surgery consultation  ~cont. anti-emetics prn  ~NPO except meds  ~cont. maintenance IVF NS 75cc/hr    #Acute on Chronic Renal Failure  -BL is 1.5, currently 2.32  ~DDx includes pre-renal azotemia  ~cont. IV hydration  ~strict I/Os  ~f/u am labs   -hold ACE/ARB    #Neuroendocrine Cancer  ~patient has a history of a pancreatic neuroendocrine tumor diagnosed 6 years ago  ~follows with (Oncologist) Dr. Llamas and (Urologist) Dr. Padron  - Receives monthly Sandostatin (Octreotide) injection    #Diabetes Mellitus Type2  ~FS qAC/HS  ~cont. ISS per protocol    #Hypertension  ~will hold Irbesartan in the setting of ZO  ~Monitor routine hemodynamics    #Hyperlipidemia  ~cont. Rosuvastatin 10mg po qhs     #BPH  ~self catherizes at home    #Vte ppx  ~cont. Heparin sq

## 2023-01-07 NOTE — DIETITIAN INITIAL EVALUATION ADULT - CONTINUE CURRENT NUTRITION CARE PLAN
Suggest advance diet to Full Liquids to Low Fiber, when medically feasible and as tolerated. If unable to advance po within 2 more days, confirm GOC, consider nutrition support, and re-consult RD.

## 2023-01-07 NOTE — DIETITIAN INITIAL EVALUATION ADULT - OTHER INFO
66 y/o M PMHx significant for Neuroendocrine tumor diagnosed 6 years ago, Hypertension, Hyperlipidemia, Diabetes Mellitus Type 2, BPH and light chain nephropathy, was reportedly referred to the [facility] by Gastroenterology for further evaluation and management intractable nausea and vomiting (non-bloody), and concerns for ongoing dehydration.  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 Consumed 50% clears breakfast, did not want clears lunch yet d/t persistent n/v today, tray untouched at time of RD visit. Most likely consuming <50% ENN x 5 days or more. RD obtained bedscale wt today 1/7/23: 139#.  UBW: 150# x 1 week ago. NFPE reveals severe/moderate muscle/fat wasting. Rec Suggest advance diet to Clear Liquids to Full Liquids to Low Fiber, when medically feasible and as tolerated.  If unable to advance po within 2 more days, confirm GOC, consider nutrition support and re-consult RD. Will add suppls, pt willing. See below recommendations.

## 2023-01-07 NOTE — CONSULT NOTE ADULT - SUBJECTIVE AND OBJECTIVE BOX
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     Patient states that a few weeks ago began to develop constant nausea and vomiting. Prior to this did not have issues with eating or nausea. Over the last few weeks has not been able to tolerate anything orally and has had intractable nausea and vomiting. He was admitted to [facility] two weeks ago where he had NG tube and was managed conservatively. He was discharged and tolerating liquids and I was called by Dr. iSddiqi last week, have him planned for an endoscopic evaluation and likely stenting on Tuesday. He said after his discharge from [place] was doing ok but for the last few days again has the same issue, can't keep anything down and constantly nauseated with vomiting. Denies any fevers or chills. No abdominal pain, just soreness after vomiting. No sick contacts or travel history. No eating raw or undercooked meat. No diarrhea.         PAST MEDICAL & SURGICAL HISTORY:  Hypertension      BPH (benign prostatic hyperplasia)      Renal insufficiency      Light chain nephropathy      DM (diabetes mellitus)      HLD (hyperlipidemia)      No significant past surgical history          MEDICATIONS  (STANDING):  ferrous    sulfate 325 milliGRAM(s) Oral daily  heparin   Injectable 5000 Unit(s) SubCutaneous every 8 hours  multivitamin 1 Tablet(s) Oral daily  rosuvastatin 10 milliGRAM(s) Oral at bedtime  sodium chloride 0.9%. 1000 milliLiter(s) (75 mL/Hr) IV Continuous <Continuous>    MEDICATIONS  (PRN):  acetaminophen     Tablet .. 650 milliGRAM(s) Oral every 6 hours PRN Temp greater or equal to 38C (100.4F), Mild Pain (1 - 3)  aluminum hydroxide/magnesium hydroxide/simethicone Suspension 30 milliLiter(s) Oral every 4 hours PRN Dyspepsia  melatonin 3 milliGRAM(s) Oral at bedtime PRN Insomnia  ondansetron Injectable 4 milliGRAM(s) IV Push every 8 hours PRN Nausea and/or Vomiting  prochlorperazine   Injectable 2.5 milliGRAM(s) IV Push every 4 hours PRN Nausea      Allergies    losartan (Angioedema)    Intolerances        SOCIAL HISTORY:  no smoking, drinking or drugs    FAMILY HISTORY:  FH: breast cancer (Mother)    FH: aneurysm (Father)    no colon or stomach cancer, no pancreatic cancer      REVIEW OF SYSTEMS:    CONSTITUTIONAL: + weakness, no fevers or chills  EYES/ENT: No visual changes;  No vertigo or throat pain   NECK: No pain or stiffness  RESPIRATORY: No cough, wheezing, hemoptysis; No shortness of breath  CARDIOVASCULAR: No chest pain or palpitations  GASTROINTESTINAL: see HPI  GENITOURINARY: No dysuria, frequency or hematuria  NEUROLOGICAL: No numbness or weakness  SKIN: No itching, burning, rashes, or lesions   PSYCH: Normal mood and affect  All other review of systems is negative unless indicated above.    Vital Signs Last 24 Hrs  T(C): 36.9 (07 Jan 2023 09:20), Max: 37.1 (06 Jan 2023 18:21)  T(F): 98.4 (07 Jan 2023 09:20), Max: 98.8 (06 Jan 2023 18:21)  HR: 107 (07 Jan 2023 09:20) (72 - 107)  BP: 147/82 (07 Jan 2023 09:20) (75/52 - 147/82)  BP(mean): 77 (06 Jan 2023 14:58) (58 - 77)  RR: 18 (07 Jan 2023 09:20) (15 - 18)  SpO2: 100% (07 Jan 2023 09:20) (99% - 100%)    Parameters below as of 07 Jan 2023 09:20  Patient On (Oxygen Delivery Method): room air        PHYSICAL EXAM:    Constitutional: constant gagging and vomiting during intervnew, vomiting dark material, well-developed,   HEENT: unmasked, good phonation, not icteric  Neck: supple, no lymphadenopathy  Respiratory: clear to ascultation bilaterally, no wheezing  Cardiovascular: S1 and S2, regular rate and rhythm, no murmurs rubs or gallops  Gastrointestinal: soft, non-tender, non-distended, +bowel sounds, no rebound or guarding  Extremities: No peripheral edema, no cyanosis or clubbing  Vascular: 2+ peripheral pulses, no venous stasis  Neurological: A/O x 3, no focal deficits, no asterixis  Psychiatric: Normal mood, normal affect  Skin: No rashes, not jaundiced    LABS:                        11.9   16.35 )-----------( 255      ( 07 Jan 2023 06:16 )             36.5     01-07    141  |  105  |  57<H>  ----------------------------<  236<H>  3.8   |  22  |  2.32<H>    Ca    8.6      07 Jan 2023 06:16    TPro  6.4  /  Alb  2.7<L>  /  TBili  0.4  /  DBili  x   /  AST  25  /  ALT  20  /  AlkPhos  66  01-07    PT/INR - ( 06 Jan 2023 12:06 )   PT: 12.6 sec;   INR: 1.09 ratio         PTT - ( 06 Jan 2023 12:06 )  PTT:29.9 sec  LIVER FUNCTIONS - ( 07 Jan 2023 06:16 )  Alb: 2.7 g/dL / Pro: 6.4 gm/dL / ALK PHOS: 66 U/L / ALT: 20 U/L / AST: 25 U/L / GGT: x             RADIOLOGY & ADDITIONAL STUDIES: imaging 12-10 reviewed, inmaging 1-7 reviewed, although no clear GOO there is compression of the stomach by the mass on recent imaging

## 2023-01-07 NOTE — CONSULT NOTE ADULT - ASSESSMENT
68 yo M with known pancreatic tail neuroendocrine tumor, presents with gastric outlet obstruction-like symptoms and dehydration.    Plan:  -IVF  -strict Is/Os  -recommend Hale placement as pt normally self catheterizes  -NGT placement for emesis  -antiemetics prn  -f/u GI consult  -medical management as per primary team  -surgical oncology will follow    Plan to be discussed with Dr. Black

## 2023-01-07 NOTE — CONSULT NOTE ADULT - SUBJECTIVE AND OBJECTIVE BOX
Pt is a 68 yo M who presents to  ED for dehydration, sent in by GI. Per pt, was admitted to [facility] for similar symptoms about 2 weeks ago. At that time, it was noted on imaging that the pancreatic neuroendocrine tumor had increased in size and an NGT was placed for decompression. Pt states he was discharged home about 1.5 weeks ago and subsequently had nausea and vomiting and inability to tolerate PO. Before discharge, he was given referral to Dr. Doyle for possible stenting and had an appointment on Tuesday. States last time he passed flatus was about 4-5 days ago and last BM was the day prior to arrival.     PAST MEDICAL & SURGICAL HISTORY:  Hypertension      BPH (benign prostatic hyperplasia)      Renal insufficiency      Light chain nephropathy      DM (diabetes mellitus)      HLD (hyperlipidemia)      No significant past surgical history    Vital Signs Last 24 Hrs  T(C): 36.7 (07 Jan 2023 00:11), Max: 37.1 (06 Jan 2023 18:21)  T(F): 98 (07 Jan 2023 00:11), Max: 98.8 (06 Jan 2023 18:21)  HR: 81 (07 Jan 2023 00:11) (72 - 91)  BP: 136/71 (07 Jan 2023 00:11) (75/52 - 136/71)  BP(mean): 77 (06 Jan 2023 14:58) (58 - 77)  RR: 18 (06 Jan 2023 18:31) (15 - 18)  SpO2: 100% (07 Jan 2023 00:11) (99% - 100%)    Parameters below as of 06 Jan 2023 18:31  Patient On (Oxygen Delivery Method): room air    T(C): 36.7 (01-07-23 @ 00:11), Max: 37.1 (01-06-23 @ 18:21)  HR: 81 (01-07-23 @ 00:11) (72 - 91)  BP: 136/71 (01-07-23 @ 00:11) (75/52 - 136/71)  RR: 18 (01-06-23 @ 18:31) (15 - 18)  SpO2: 100% (01-07-23 @ 00:11) (99% - 100%)    PHYSICAL EXAM:  General: AAOx3, in mild distress from constantly spitting up  HEENT: NC/AT, EOMI  Cardio: S1S2, RRR  Pulm: non labored on RA  Abdomen: soft, NT/ND    Labs:                        13.8   9.17  )-----------( 270      ( 06 Jan 2023 12:06 )             42.7     01-06    136  |  105  |  41<H>  ----------------------------<  127<H>  4.3   |  20<L>  |  2.41<H>    Ca    9.4      06 Jan 2023 12:06    TPro  7.6  /  Alb  3.1<L>  /  TBili  0.5  /  DBili  x   /  AST  33  /  ALT  22  /  AlkPhos  77  01-06    Imaging:  < from: CT Abdomen and Pelvis No Cont (01.06.23 @ 14:17) >    ACC: [id number] EXAM:  CT ABDOMEN AND PELVIS                          PROCEDURE DATE:  01/06/2023      < end of copied text >  < from: CT Abdomen and Pelvis No Cont (01.06.23 @ 14:17) >  IMPRESSION:  Large pancreatic tail mass with minimal mass effect on the stomach. No   bowel obstruction.    The stomach and bowel are fluid-filled.    Small hiatal hernia with questionable mild thickening of the distal   esophagus.    Multiple hepatic hypodense lesions concerning for metastatic disease.   Consider PET/CT for further evaluation.    < end of copied text >

## 2023-01-07 NOTE — DIETITIAN NUTRITION RISK NOTIFICATION - ADDITIONAL COMMENTS/DIETITIAN RECOMMENDATIONS
1. Suggest advance diet to Full Liquids to Low Fiber, when medically feasible and as tolerated. If unable to advance po within 2 more days, confirm GOC, consider nutrition support, and re-consult RD.  2. Add Progelatein BID to help pt meet ENN and optimize po/PRO intake   3. Consider adding thiamine 100 mg daily 2/2 poor PO intake/ malnutrition and MVI w/ minerals daily to ensure 100% RDA met   4. Monitor bowel movements, consider adding Banatrol TID in applesauce to bowel regimen once po is advanced to fulls to help improve GI fxn .   5. RDN will continue to monitor PO intake, labs, hydration, and wt prn.

## 2023-01-07 NOTE — PROVIDER CONTACT NOTE (OTHER) - SITUATION
pt having multiple episodes of vomingting and c/o nausea.
Come see pt per MD patsis for NG tube recommendation/ possible stent placement tm

## 2023-01-07 NOTE — CONSULT NOTE ADULT - ASSESSMENT
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     On prior imaging from 12-10, there was significant compression of the antrum of the stomach by the mass. Currently the mass abuts it but not as bad, although stomach is decompressed in this image. Still needs endoscopic evaluation. Plan would be for stenting vs G-J tube as can just have an atonic/dilated stomach due to mass/nerve infiltration vs actual gastric outlet obstruction. Would need to endoscopically evaluate.     Patient needs NG tube for symptomatic comfort and to help for safer anesthesia. Told patient would try to add him on for Monday but likely will remain as scheduled for Tuesday.

## 2023-01-07 NOTE — DIETITIAN INITIAL EVALUATION ADULT - PERTINENT MEDS FT
MEDICATIONS  (STANDING):  ferrous    sulfate 325 milliGRAM(s) Oral daily  heparin   Injectable 5000 Unit(s) SubCutaneous every 8 hours  multivitamin 1 Tablet(s) Oral daily  ondansetron Injectable 4 milliGRAM(s) IV Push every 6 hours  rosuvastatin 10 milliGRAM(s) Oral at bedtime  sodium chloride 0.9%. 1000 milliLiter(s) (75 mL/Hr) IV Continuous <Continuous>    MEDICATIONS  (PRN):  acetaminophen     Tablet .. 650 milliGRAM(s) Oral every 6 hours PRN Temp greater or equal to 38C (100.4F), Mild Pain (1 - 3)  aluminum hydroxide/magnesium hydroxide/simethicone Suspension 30 milliLiter(s) Oral every 4 hours PRN Dyspepsia  melatonin 3 milliGRAM(s) Oral at bedtime PRN Insomnia  prochlorperazine   Injectable 2.5 milliGRAM(s) IV Push every 4 hours PRN Nausea

## 2023-01-07 NOTE — PROVIDER CONTACT NOTE (OTHER) - ACTION/TREATMENT ORDERED:
MD aware. Will see patient per Patsis request
Md aware. C/s surgery, Give iv fluids and PRN antiemetics

## 2023-01-07 NOTE — DIETITIAN INITIAL EVALUATION ADULT - DIET TYPE
Suggest advance diet to Full Liquids to Low Fiber, when medically feasible and as tolerated. If unable to advance po within 2 more days, confirm GOC, consider nutrition support, and re-consult RD/regular/fiber/residue restricted/supplement (specify)

## 2023-01-07 NOTE — DIETITIAN INITIAL EVALUATION ADULT - ADD RECOMMEND
1. Suggest advance diet to Full Liquids to Low Fiber, when medically feasible and as tolerated. If unable to advance po within 2 more days, confirm GOC, consider nutrition support, and re-consult RD.  2. Add Progelatein BID to help pt meet ENN and optimize po/PRO intake   3. Consider adding thiamine 100 mg daily 2/2 poor PO intake/ malnutrition and MVI w/ minerals daily to ensure 100% RDA met   4. Monitor bowel movements, consider adding Banatrol TID in applesauce to bowel regimen once po is advanced to fulls .   5. RDN will continue to monitor PO intake, labs, hydration, and wt prn.

## 2023-01-08 NOTE — PROGRESS NOTE ADULT - ASSESSMENT
68 yo M with known pancreatic tail neuroendocrine tumor, presents with gastric outlet obstruction-like symptoms and dehydration.    Plan:  -IVF  -strict Is/Os  -recommend Hale placement as pt normally self catheterizes  -NGT to low intermittent suction   -antiemetics prn  -GI eval appreciated; NGT and planned for intervention Tuesday  -medical management as per primary team  -surgical oncology will follow    Plan to be discussed with Dr. Black

## 2023-01-08 NOTE — PROGRESS NOTE ADULT - SUBJECTIVE AND OBJECTIVE BOX
Cheif complaints and Diagnosis: pancreatic mass/ intractable nausea and vommitting    Subjective: NG tube inserted , no complaints      REVIEW OF SYSTEMS:    CONSTITUTIONAL: No weakness, fevers or chills  EYES/ENT: No visual changes;  No vertigo or throat pain   NECK: No pain or stiffness  RESPIRATORY: No cough, wheezing, hemoptysis; No shortness of breath  CARDIOVASCULAR: No chest pain or palpitations  GASTROINTESTINAL: No abdominal or epigastric pain. No nausea, vomiting, or hematemesis; No diarrhea or constipation. No melena or hematochezia.  GENITOURINARY: No dysuria, frequency or hematuria  NEUROLOGICAL: No numbness or weakness  SKIN: No itching, burning, rashes, or lesions   All other review of systems is negative unless indicated above      Vital Signs Last 24 Hrs  T(C): 36.6 (2023 07:31), Max: 37.1 (2023 15:47)  T(F): 97.9 (2023 07:31), Max: 98.7 (2023 15:47)  HR: 91 (2023 07:31) (80 - 107)  BP: 127/86 (2023 07:31) (127/86 - 147/82)  BP(mean): --  RR: 19 (2023 07:31) (18 - 19)  SpO2: 99% (2023 07:31) (98% - 100%)    Parameters below as of 2023 07:31  Patient On (Oxygen Delivery Method): room air        HEENT:   pupils equal and reactive, EOMI, no oropharyngeal lesions, erythema, exudates, oral thrush    NECK:   supple, no carotid bruits, no palpable lymph nodes, no thyromegaly    CV:  +S1, +S2, regular, no murmurs or rubs    RESP:   lungs clear to auscultation bilaterally, no wheezing, rales, rhonchi, good air entry bilaterally    BREAST:  not examined    GI:  abdomen soft, non-tender, non-distended, normal BS, no bruits, no abdominal masses, no palpable masses    RECTAL:  not examined    :  not examined    MSK:   normal muscle tone, no atrophy, no rigidity, no contractions    EXT:   no clubbing, no cyanosis, no edema, no calf pain, swelling or erythema    VASCULAR:  pulses equal and symmetric in the upper and lower extremities    NEURO:  AAOX3, no focal neurological deficits, follows all commands, able to move extremities spontaneously    SKIN:  no ulcers, lesions or rashes    MEDICATIONS  (STANDING):  dextrose 5%. 1000 milliLiter(s) (50 mL/Hr) IV Continuous <Continuous>  dextrose 5%. 1000 milliLiter(s) (100 mL/Hr) IV Continuous <Continuous>  dextrose 50% Injectable 25 Gram(s) IV Push once  dextrose 50% Injectable 12.5 Gram(s) IV Push once  dextrose 50% Injectable 25 Gram(s) IV Push once  ferrous    sulfate 325 milliGRAM(s) Oral daily  glucagon  Injectable 1 milliGRAM(s) IntraMuscular once  heparin   Injectable 5000 Unit(s) SubCutaneous every 8 hours  multivitamin 1 Tablet(s) Oral daily  ondansetron Injectable 4 milliGRAM(s) IV Push every 6 hours  rosuvastatin 10 milliGRAM(s) Oral at bedtime  sodium chloride 0.9%. 1000 milliLiter(s) (75 mL/Hr) IV Continuous <Continuous>    MEDICATIONS  (PRN):  acetaminophen     Tablet .. 650 milliGRAM(s) Oral every 6 hours PRN Temp greater or equal to 38C (100.4F), Mild Pain (1 - 3)  aluminum hydroxide/magnesium hydroxide/simethicone Suspension 30 milliLiter(s) Oral every 4 hours PRN Dyspepsia  dextrose Oral Gel 15 Gram(s) Oral once PRN Blood Glucose LESS THAN 70 milliGRAM(s)/deciliter  melatonin 3 milliGRAM(s) Oral at bedtime PRN Insomnia  prochlorperazine   Injectable 2.5 milliGRAM(s) IV Push every 4 hours PRN Nausea      Urinalysis Basic - ( 2023 17:21 )    Color: Yellow / Appearance: Slightly Turbid / S.025 / pH: x  Gluc: x / Ketone: Small  / Bili: Small / Urobili: Negative   Blood: x / Protein: 100 / Nitrite: Negative   Leuk Esterase: Small / RBC: 11-25 /HPF / WBC >50 /HPF   Sq Epi: x / Non Sq Epi: Few / Bacteria: Many    2023 06:16    141    |  105    |  57     ----------------------------<  236    3.8     |  22     |  2.32     Ca    8.6        2023 06:16    TPro  6.4    /  Alb  2.7    /  TBili  0.4    /  DBili  x      /  AST  25     /  ALT  20     /  AlkPhos  66     2023 06:16  LIVER FUNCTIONS - ( 2023 06:16 )  Alb: 2.7 g/dL / Pro: 6.4 gm/dL / ALK PHOS: 66 U/L / ALT: 20 U/L / AST: 25 U/L / GGT: x         PT/INR - ( 2023 12:06 )   PT: 12.6 sec;   INR: 1.09 ratio         PTT - ( 2023 12:06 )  PTT:29.9 secCBC Full  -  ( 2023 08:26 )  WBC Count : 18.53 K/uL  Hemoglobin : 10.2 g/dL  Hematocrit : 31.1 %  Platelet Count - Automated : 179 K/uL  Mean Cell Volume : 91.2 fl  Mean Cell Hemoglobin : 29.9 pg  Mean Cell Hemoglobin Concentration : 32.8 gm/dL  Auto Neutrophil # : x  Auto Lymphocyte # : x  Auto Monocyte # : x  Auto Eosinophil # : x  Auto Basophil # : x  Auto Neutrophil % : x  Auto Lymphocyte % : x  Auto Monocyte % : x  Auto Eosinophil % : x  Auto Basophil % : x            PT/INR - ( 2023 12:06 )   PT: 12.6 sec;   INR: 1.09 ratio         PTT - ( 2023 12:06 )  PTT:29.9 sec          Assessment and Plan:      	  68 y/o M PMHx significant for Neuroendocrine tumor diagnosed 6 years ago, Hypertension, Hyperlipidemia, Diabetes Mellitus Type 2, BPH and light chain nephropathy, was reportedly referred to the [facility] by Gastroenterology for further evaluation and management intractable nausea and vomiting (non-bloody), and concerns for ongoing dehydration.  Vital signs in triage => BP 75/52, HR 83/min, RR 17/min, TMax 98.3'F, SpO2 100% on room air. Labs => BUN/Cr 41/2.41, Alb 3.1. CT ABD/Pelvis => Large pancreatic tail mass with minimal mass effect on the stomach. No bowel obstruction. The stomach and bowel are fluid-filled. Small hiatal hernia with questionable mild thickening of the distal esophagus. Multiple hepatic hypodense lesions concerning for metastatic disease. In the ED the patient was given Ondansetron 4mg IVP x 1, LR x 1L, and NS x 1L.    #Intractable Nausea and Vomiting secondary to known large pancreatic mass   -currently on radiation therapy, had one session before comming here, after which he subsequently got symptoms  -NG tube inserted to offload stomach and symptoms.   ~admit to Medicine  ~cont. maintenance IVF NS 75cc/hr, very poor oral intake sec nausea  -possible stenting by Dr. Doyle      #Acute on Chronic Renal Failure  -BL is 1.5, currently 2.32  ~DDx includes pre-renal azotemia  ~cont. IV hydration  ~strict I/Os  ~f/u am labs   -hold ACE/ARB    #Neuroendocrine Cancer  ~patient has a history of a pancreatic neuroendocrine tumor diagnosed 6 years ago  ~follows with (Oncologist) Dr. Llamas and (Urologist) Dr. Padron  - Receives monthly Sandostatin (Octreotide) injection    #Diabetes Mellitus Type2  ~FS qAC/HS  ~cont. ISS per protocol    #Hypertension  ~will hold Irbesartan in the setting of ZO  ~Monitor routine hemodynamics    #Hyperlipidemia  ~cont. Rosuvastatin 10mg po qhs     #BPH  ~self catherizes at home  coulter inserted here because of NG Tube.     #Vte ppx  ~cont. Heparin sq          Cheif complaints and Diagnosis: pancreatic mass/ intractable nausea and vommitting    Subjective: NG tube inserted , no complaints      REVIEW OF SYSTEMS:    CONSTITUTIONAL: No weakness, fevers or chills  EYES/ENT: No visual changes;  No vertigo or throat pain   NECK: No pain or stiffness  RESPIRATORY: No cough, wheezing, hemoptysis; No shortness of breath  CARDIOVASCULAR: No chest pain or palpitations  GASTROINTESTINAL: No abdominal or epigastric pain. No nausea, vomiting, or hematemesis; No diarrhea or constipation. No melena or hematochezia.  GENITOURINARY: No dysuria, frequency or hematuria  NEUROLOGICAL: No numbness or weakness  SKIN: No itching, burning, rashes, or lesions   All other review of systems is negative unless indicated above      Vital Signs Last 24 Hrs  T(C): 36.6 (2023 07:31), Max: 37.1 (2023 15:47)  T(F): 97.9 (2023 07:31), Max: 98.7 (2023 15:47)  HR: 91 (2023 07:31) (80 - 107)  BP: 127/86 (2023 07:31) (127/86 - 147/82)  BP(mean): --  RR: 19 (2023 07:31) (18 - 19)  SpO2: 99% (2023 07:31) (98% - 100%)    Parameters below as of 2023 07:31  Patient On (Oxygen Delivery Method): room air        HEENT:   pupils equal and reactive, EOMI, no oropharyngeal lesions, erythema, exudates, oral thrush    NECK:   supple, no carotid bruits, no palpable lymph nodes, no thyromegaly    CV:  +S1, +S2, regular, no murmurs or rubs    RESP:   lungs clear to auscultation bilaterally, no wheezing, rales, rhonchi, good air entry bilaterally    BREAST:  not examined    GI:  abdomen soft, non-tender, non-distended, normal BS, no bruits, no abdominal masses, no palpable masses    RECTAL:  not examined    :  not examined    MSK:   normal muscle tone, no atrophy, no rigidity, no contractions    EXT:   no clubbing, no cyanosis, no edema, no calf pain, swelling or erythema    VASCULAR:  pulses equal and symmetric in the upper and lower extremities    NEURO:  AAOX3, no focal neurological deficits, follows all commands, able to move extremities spontaneously    SKIN:  no ulcers, lesions or rashes    MEDICATIONS  (STANDING):  dextrose 5%. 1000 milliLiter(s) (50 mL/Hr) IV Continuous <Continuous>  dextrose 5%. 1000 milliLiter(s) (100 mL/Hr) IV Continuous <Continuous>  dextrose 50% Injectable 25 Gram(s) IV Push once  dextrose 50% Injectable 12.5 Gram(s) IV Push once  dextrose 50% Injectable 25 Gram(s) IV Push once  ferrous    sulfate 325 milliGRAM(s) Oral daily  glucagon  Injectable 1 milliGRAM(s) IntraMuscular once  heparin   Injectable 5000 Unit(s) SubCutaneous every 8 hours  multivitamin 1 Tablet(s) Oral daily  ondansetron Injectable 4 milliGRAM(s) IV Push every 6 hours  rosuvastatin 10 milliGRAM(s) Oral at bedtime  sodium chloride 0.9%. 1000 milliLiter(s) (75 mL/Hr) IV Continuous <Continuous>    MEDICATIONS  (PRN):  acetaminophen     Tablet .. 650 milliGRAM(s) Oral every 6 hours PRN Temp greater or equal to 38C (100.4F), Mild Pain (1 - 3)  aluminum hydroxide/magnesium hydroxide/simethicone Suspension 30 milliLiter(s) Oral every 4 hours PRN Dyspepsia  dextrose Oral Gel 15 Gram(s) Oral once PRN Blood Glucose LESS THAN 70 milliGRAM(s)/deciliter  melatonin 3 milliGRAM(s) Oral at bedtime PRN Insomnia  prochlorperazine   Injectable 2.5 milliGRAM(s) IV Push every 4 hours PRN Nausea      Urinalysis Basic - ( 2023 17:21 )    Color: Yellow / Appearance: Slightly Turbid / S.025 / pH: x  Gluc: x / Ketone: Small  / Bili: Small / Urobili: Negative   Blood: x / Protein: 100 / Nitrite: Negative   Leuk Esterase: Small / RBC: 11-25 /HPF / WBC >50 /HPF   Sq Epi: x / Non Sq Epi: Few / Bacteria: Many    2023 06:16    141    |  105    |  57     ----------------------------<  236    3.8     |  22     |  2.32     Ca    8.6        2023 06:16    TPro  6.4    /  Alb  2.7    /  TBili  0.4    /  DBili  x      /  AST  25     /  ALT  20     /  AlkPhos  66     2023 06:16  LIVER FUNCTIONS - ( 2023 06:16 )  Alb: 2.7 g/dL / Pro: 6.4 gm/dL / ALK PHOS: 66 U/L / ALT: 20 U/L / AST: 25 U/L / GGT: x         PT/INR - ( 2023 12:06 )   PT: 12.6 sec;   INR: 1.09 ratio         PTT - ( 2023 12:06 )  PTT:29.9 secCBC Full  -  ( 2023 08:26 )  WBC Count : 18.53 K/uL  Hemoglobin : 10.2 g/dL  Hematocrit : 31.1 %  Platelet Count - Automated : 179 K/uL  Mean Cell Volume : 91.2 fl  Mean Cell Hemoglobin : 29.9 pg  Mean Cell Hemoglobin Concentration : 32.8 gm/dL  Auto Neutrophil # : x  Auto Lymphocyte # : x  Auto Monocyte # : x  Auto Eosinophil # : x  Auto Basophil # : x  Auto Neutrophil % : x  Auto Lymphocyte % : x  Auto Monocyte % : x  Auto Eosinophil % : x  Auto Basophil % : x            PT/INR - ( 2023 12:06 )   PT: 12.6 sec;   INR: 1.09 ratio         PTT - ( 2023 12:06 )  PTT:29.9 sec          Assessment and Plan:      	  66 y/o M PMHx significant for Neuroendocrine tumor diagnosed 6 years ago, Hypertension, Hyperlipidemia, Diabetes Mellitus Type 2, BPH and light chain nephropathy, was reportedly referred to the [facility] by Gastroenterology for further evaluation and management intractable nausea and vomiting (non-bloody), and concerns for ongoing dehydration.  Vital signs in triage => BP 75/52, HR 83/min, RR 17/min, TMax 98.3'F, SpO2 100% on room air. Labs => BUN/Cr 41/2.41, Alb 3.1. CT ABD/Pelvis => Large pancreatic tail mass with minimal mass effect on the stomach. No bowel obstruction. The stomach and bowel are fluid-filled. Small hiatal hernia with questionable mild thickening of the distal esophagus. Multiple hepatic hypodense lesions concerning for metastatic disease. In the ED the patient was given Ondansetron 4mg IVP x 1, LR x 1L, and NS x 1L.    #Intractable Nausea and Vomiting secondary to known large pancreatic mass   -currently on radiation therapy, had one session before comming here, after which he subsequently got symptoms  -NG tube inserted to offload stomach and symptoms.   ~admit to Medicine  ~cont. maintenance IVF NS 75cc/hr, very poor oral intake sec nausea  -possible stenting by Dr. Doyle      #Acute on Chronic Renal Failure  -BL is 1.5, currently 2.32  ~DDx includes pre-renal azotemia  ~cont. IV hydration  ~strict I/Os  ~f/u am labs   -hold ACE/ARB    #Neuroendocrine Cancer  ~patient has a history of a pancreatic neuroendocrine tumor diagnosed 6 years ago  ~follows with (Oncologist) Dr. Llamas and (Urologist) Dr. Padron  - Receives monthly Sandostatin (Octreotide) injection    #Diabetes Mellitus Type2  ~FS qAC/HS  ~cont. ISS per protocol    #Hypertension  ~will hold Irbesartan in the setting of ZO  ~Monitor routine hemodynamics    #Hyperlipidemia  ~cont. Rosuvastatin 10mg po qhs     #BPH  ~self catherizes at home      #Vte ppx  ~cont. Heparin sq

## 2023-01-08 NOTE — PROGRESS NOTE ADULT - SUBJECTIVE AND OBJECTIVE BOX
Patient seen and examined at bedside. Patient has no complaints and reports pain is much improved since placement of NGT tube. Patient remains NPO. Nurse denies any acute events. Vitals reviewed and WNL.      Vitals:  T(C): 36.6 (01-08 @ 07:31), Max: 37.1 (01-07 @ 15:47)  HR: 91 (01-08 @ 07:31) (80 - 94)  BP: 127/86 (01-08 @ 07:31) (127/86 - 138/86)  RR: 19 (01-08 @ 07:31) (18 - 19)  SpO2: 99% (01-08 @ 07:31) (98% - 99%)    01-07 @ 07:01  -  01-08 @ 07:00  --------------------------------------------------------  IN:    sodium chloride 0.9%: 900 mL  Total IN: 900 mL    OUT:    Intermittent Catheterization - Urethral (mL): 450 mL    Nasogastric/Oral tube (mL): 200 mL  Total OUT: 650 mL    Total NET: 250 mL          Physical Exam:  General: AAOx3, Well developed, NAD  Chest: Normal respiratory effort  Heart: RRR  Abdomen: Soft, NTND, no masses  Neuro/Psych: No localized deficits. Normal speech, normal tone  Skin: Normal, no rashes, no lesions noted.   Extremities: Warm, well perfused, no edema, Pulses intact    01-08 @ 08:26                    10.2  CBC: 18.53>)-------(<179                     31.1                 144 | 111 | 47    CMP:  ----------------------< 218               3.6 | 27 | 1.72                      Ca:8.7  Phos:-  Mg:-               -|      |-        LFTs:  ------|-|-----             -|      |-  01-07 @ 06:16                    11.9  CBC: 16.35>)-------(<255                     36.5                 141 | 105 | 57    CMP:  ----------------------< 236               3.8 | 22 | 2.32                      Ca:8.6  Phos:-  Mg:-               0.4|      |25        LFTs:  ------|66|-----             -|      |-      Culture - Urine (collected 01-06-23 @ 17:21)  Source: Clean Catch None  Preliminary Report (01-08-23 @ 07:43):    >100,000 CFU/ml Gram Negative Rods    Culture - Blood (collected 01-06-23 @ 12:34)  Source: .Blood None  Preliminary Report (01-07-23 @ 19:01):    No growth to date.    Culture - Blood (collected 01-06-23 @ 12:06)  Source: .Blood None  Preliminary Report (01-07-23 @ 16:00):    No growth to date.      Current Inpatient Medications:  acetaminophen     Tablet .. 650 milliGRAM(s) Oral every 6 hours PRN  aluminum hydroxide/magnesium hydroxide/simethicone Suspension 30 milliLiter(s) Oral every 4 hours PRN  dextrose 5%. 1000 milliLiter(s) (50 mL/Hr) IV Continuous <Continuous>  dextrose 5%. 1000 milliLiter(s) (100 mL/Hr) IV Continuous <Continuous>  dextrose 50% Injectable 25 Gram(s) IV Push once  dextrose 50% Injectable 12.5 Gram(s) IV Push once  dextrose 50% Injectable 25 Gram(s) IV Push once  dextrose Oral Gel 15 Gram(s) Oral once PRN  ferrous    sulfate 325 milliGRAM(s) Oral daily  glucagon  Injectable 1 milliGRAM(s) IntraMuscular once  heparin   Injectable 5000 Unit(s) SubCutaneous every 8 hours  melatonin 3 milliGRAM(s) Oral at bedtime PRN  multivitamin 1 Tablet(s) Oral daily  ondansetron Injectable 4 milliGRAM(s) IV Push every 6 hours  prochlorperazine   Injectable 2.5 milliGRAM(s) IV Push every 4 hours PRN  rosuvastatin 10 milliGRAM(s) Oral at bedtime  sodium chloride 0.9%. 1000 milliLiter(s) (75 mL/Hr) IV Continuous <Continuous>

## 2023-01-09 NOTE — PROGRESS NOTE ADULT - ASSESSMENT
Assessment and Plan:      	  68 y/o M PMHx significant for Neuroendocrine tumor diagnosed 6 years ago, Hypertension, Hyperlipidemia, Diabetes Mellitus Type 2, BPH and light chain nephropathy, was reportedly referred to the [facility] by Gastroenterology for further evaluation and management intractable nausea and vomiting (non-bloody), and concerns for ongoing dehydration.  Vital signs in triage => BP 75/52, HR 83/min, RR 17/min, TMax 98.3'F, SpO2 100% on room air. Labs => BUN/Cr 41/2.41, Alb 3.1. CT ABD/Pelvis => Large pancreatic tail mass with minimal mass effect on the stomach. No bowel obstruction. The stomach and bowel are fluid-filled. Small hiatal hernia with questionable mild thickening of the distal esophagus. Multiple hepatic hypodense lesions concerning for metastatic disease. In the ED the patient was given Ondansetron 4mg IVP x 1, LR x 1L, and NS x 1L.    #Intractable Nausea and Vomiting secondary to known large pancreatic mass causing GOO  - NG tube inserted to offload stomach and symptoms.   - Panned for stent vs G tube on 1/10  -cont. maintenance IVF NS 75cc/hr, very poor oral intake sec nausea  - zofran prn      #Acute on Chronic Renal Failure -baseline 1.5  - currently 1.4  - pre renal  - volume depletion  - mild hypernatremia      #Neuroendocrine Cancer  ~patient has a history of a pancreatic neuroendocrine tumor diagnosed 6 years ago  ~follows with (Oncologist) Dr. Llamas and (Urologist) Dr. Padron  - Receives monthly Sandostatin (Octreotide) injection    #Diabetes Mellitus Type2  ~FS qAC/HS  ~cont. ISS per protocol    #Hypertension  ~will hold Irbesartan in the setting of ZO - resume upon dc  ~Monitor routine hemodynamics    #Hyperlipidemia  ~cont. Rosuvastatin 10mg po qhs     #BPH  ~self catherizes at home      #Vte ppx  ~cont. Heparin sq

## 2023-01-09 NOTE — PROGRESS NOTE ADULT - SUBJECTIVE AND OBJECTIVE BOX
Patient seen and examined at bedside. Patient has no complaints and reports pain is much improved since placement of NGT tube. Patient remains NPO. Nurse denies any acute events. Vitals reviewed and WNL.      Vitals:  Vital Signs Last 24 Hrs  T(C): 36.4 (09 Jan 2023 08:02), Max: 36.8 (08 Jan 2023 15:24)  T(F): 97.5 (09 Jan 2023 08:02), Max: 98.2 (08 Jan 2023 15:24)  HR: 69 (09 Jan 2023 08:02) (69 - 79)  BP: 124/76 (09 Jan 2023 08:02) (121/86 - 124/76)  BP(mean): --  RR: 18 (08 Jan 2023 15:24) (18 - 18)  SpO2: 96% (09 Jan 2023 08:02) (96% - 100%)    Parameters below as of 09 Jan 2023 08:02  Patient On (Oxygen Delivery Method): room air        --------------------------------------------------------  IN:    sodium chloride 0.9%: 900 mL  Total IN: 900 mL    OUT:    Intermittent Catheterization - Urethral (mL): 450 mL    Nasogastric/Oral tube (mL): 200 mL  Total OUT: 650 mL    Total NET: 250 mL          Physical Exam:  General: AAOx3, Well developed, NAD  Chest: Normal respiratory effort  Heart: RRR  Abdomen: Soft, NTND, no masses  Neuro/Psych: No localized deficits. Normal speech, normal tone  Skin: Normal, no rashes, no lesions noted.   Extremities: Warm, well perfused, no edema, Pulses intact                            9.2    11.30 )-----------( 131      ( 09 Jan 2023 06:38 )             28.4     01-09    147<H>  |  115<H>  |  33<H>  ----------------------------<  156<H>  3.6   |  28  |  1.40<H>    Ca    8.0<L>      09 Jan 2023 06:38  Phos  2.5     01-09  Mg     1.8     01-09    TPro  5.0<L>  /  Alb  2.1<L>  /  TBili  0.3  /  DBili  x   /  AST  16  /  ALT  11<L>  /  AlkPhos  49  01-09        Ca:8.7  Phos:-  Mg:-               -|      |-        LFTs:  ------|-|-----             -|      |-  01-07 @ 06:16                    11.9  CBC: 16.35>)-------(<255                     36.5                 141 | 105 | 57    CMP:  ----------------------< 236               3.8 | 22 | 2.32                      Ca:8.6  Phos:-  Mg:-               0.4|      |25        LFTs:  ------|66|-----             -|      |-      Culture - Urine (collected 01-06-23 @ 17:21)  Source: Clean Catch None  Preliminary Report (01-08-23 @ 07:43):    >100,000 CFU/ml Gram Negative Rods    Culture - Blood (collected 01-06-23 @ 12:34)  Source: .Blood None  Preliminary Report (01-07-23 @ 19:01):    No growth to date.    Culture - Blood (collected 01-06-23 @ 12:06)  Source: .Blood None  Preliminary Report (01-07-23 @ 16:00):    No growth to date.      MEDICATIONS  (STANDING):  dextrose 5%. 1000 milliLiter(s) (50 mL/Hr) IV Continuous <Continuous>  dextrose 5%. 1000 milliLiter(s) (100 mL/Hr) IV Continuous <Continuous>  dextrose 50% Injectable 25 Gram(s) IV Push once  dextrose 50% Injectable 12.5 Gram(s) IV Push once  dextrose 50% Injectable 25 Gram(s) IV Push once  ferrous    sulfate 325 milliGRAM(s) Oral daily  glucagon  Injectable 1 milliGRAM(s) IntraMuscular once  heparin   Injectable 5000 Unit(s) SubCutaneous every 8 hours  multivitamin 1 Tablet(s) Oral daily  ondansetron Injectable 4 milliGRAM(s) IV Push every 6 hours  rosuvastatin 10 milliGRAM(s) Oral at bedtime  sodium chloride 0.9%. 1000 milliLiter(s) (75 mL/Hr) IV Continuous <Continuous>    MEDICATIONS  (PRN):  acetaminophen     Tablet .. 650 milliGRAM(s) Oral every 6 hours PRN Temp greater or equal to 38C (100.4F), Mild Pain (1 - 3)  aluminum hydroxide/magnesium hydroxide/simethicone Suspension 30 milliLiter(s) Oral every 4 hours PRN Dyspepsia  dextrose Oral Gel 15 Gram(s) Oral once PRN Blood Glucose LESS THAN 70 milliGRAM(s)/deciliter  melatonin 3 milliGRAM(s) Oral at bedtime PRN Insomnia  prochlorperazine   Injectable 2.5 milliGRAM(s) IV Push every 4 hours PRN Nausea

## 2023-01-09 NOTE — PROGRESS NOTE ADULT - ASSESSMENT
68 yo M with known pancreatic tail neuroendocrine tumor, presents with gastric outlet obstruction-like symptoms and dehydration.    Plan:  -IVF  -strict Is/Os  -recommend Hale placement as pt normally self catheterizes  -NGT to low intermittent suction   -antiemetics prn  -GI eval appreciated; NGT and planned for intervention Tuesday   - PLEASE PREOP For TUESDAY, NPO tonight.   -medical management as per primary team  -surgical oncology will follow    Plan to be discussed with Dr. Black

## 2023-01-09 NOTE — CONSULT NOTE ADULT - ASSESSMENT
68 y/o M PMHx significant for Neuroendocrine tumor diagnosed 6 years ago, Hypertension, Hyperlipidemia, Diabetes Mellitus Type 2, BPH and light chain nephropathy, was reportedly referred to the [facility] by Gastroenterology for further evaluation and management intractable nausea and vomiting (non-bloody), and concerns for ongoing dehydration. Heme/Onc consulted for continuity of care.    # Neuroendocrine Tumor    - Initially diagnosed approx 6 years ago; currently follows with Primary Onc Dr Ac Llamas  - Started on Sandostatin, completed Lutathera about 1 year ago ---> Recent scans noted to have progression of disease, started back on Lutathera 12/07/2022  - Recently admitted to [facility] for similar GI symptoms following noted Lutathera treatment  - Heme/Onc attending Dr Farrell will speak with Primary Onc Dr Llamas      # Nausea Vomiting s/p Lutathera Treatment    - Most recent treatment 12/07/2022 (2nd cycle)  - Recently admitted to [facility] for similar GI symptoms following noted Lutathera treatment, d/c'ed 12/14/2022  - Found to have gastric outlet obstruction this admission; SurgOnc and GI following  - As per GI;  Plan for stenting vs G-J tube as can just have an atonic/dilated stomach due to mass/nerve infiltration vs actual gastric outlet obstruction; Endoscopically evaluate; NGT for symptomatic comfort and safer anesthesia. Try for Monday but likely Tuesday  - Continue supportive measures 66 y/o M PMHx significant for Neuroendocrine tumor diagnosed 6 years ago, Hypertension, Hyperlipidemia, Diabetes Mellitus Type 2, BPH and light chain nephropathy, was reportedly referred to the [facility] by Gastroenterology for further evaluation and management intractable nausea and vomiting (non-bloody), and concerns for ongoing dehydration. Heme/Onc consulted for continuity of care.    # Neuroendocrine Tumor    - Initially diagnosed approx 6 years ago; currently follows with Primary Onc Dr Ac Llamas  - Started on Sandostatin, completed Lutathera about 1 year ago ---> Recent scans noted to have progression of disease, started back on Lutathera 12/07/2022  - Recently admitted to [facility] for similar GI symptoms following noted Lutathera treatment    Diagnosed with gastric outlet obstruction - referred to surgical oncology. Saw Dr Siddiqi in outpatient 1/4/23.      # Nausea Vomiting due to gastric outlet obstruction ;   s/p Lutathera Treatment    - Most recent Luthathera treatment 12/07/2022 . Current sx not due to Luthatera.  - Recently admitted to [facility] for similar GI symptoms.  - Found to have gastric outlet obstruction this admission; SurgOnc and GI following  - As per GI;  Plan for stenting vs G-J tube as can just have an atonic/dilated stomach due to mass/nerve infiltration vs actual gastric outlet obstruction; Endoscopically evaluate; NGT for symptomatic comfort and safer anesthesia. Try for Monday but likely Tuesday  - Continue supportive measures.

## 2023-01-09 NOTE — CONSULT NOTE ADULT - SUBJECTIVE AND OBJECTIVE BOX
HPI:    66 y/o M PMHx significant for Neuroendocrine tumor diagnosed 6 years ago, Hypertension, Hyperlipidemia, Diabetes Mellitus Type 2, BPH and light chain nephropathy, was reportedly referred to the [facility] by Gastroenterology for further evaluation and management intractable nausea and vomiting (non-bloody), and concerns for ongoing dehydration.    Vital signs in triage => BP 75/52, HR 83/min, RR 17/min, TMax 98.3'F, SpO2 100% on room air. Labs => BUN/Cr 41/2.41, Alb 3.1. CT ABD/Pelvis => Large pancreatic tail mass with minimal mass effect on the stomach. No bowel obstruction. The stomach and bowel are fluid-filled. Small hiatal hernia with questionable mild thickening of the distal esophagus. Multiple hepatic hypodense lesions concerning for metastatic disease. In the ED the patient was given Ondansetron 4mg IVP x 1, LR x 1L, and NS x 1L.    (06 Jan 2023 17:50)      01/09/2023: Patient seen at bedside with attending Dr Farrell, no acute distress      PAST MEDICAL & SURGICAL HISTORY:    Hypertension    BPH (benign prostatic hyperplasia)    Renal insufficiency    Light chain nephropathy    DM (diabetes mellitus)    HLD (hyperlipidemia)    No significant past surgical history          MEDICATIONS  (STANDING):    dextrose 5%. 1000 milliLiter(s) (50 mL/Hr) IV Continuous <Continuous>  dextrose 5%. 1000 milliLiter(s) (100 mL/Hr) IV Continuous <Continuous>  dextrose 50% Injectable 25 Gram(s) IV Push once  dextrose 50% Injectable 12.5 Gram(s) IV Push once  dextrose 50% Injectable 25 Gram(s) IV Push once  ferrous    sulfate 325 milliGRAM(s) Oral daily  glucagon  Injectable 1 milliGRAM(s) IntraMuscular once  heparin   Injectable 5000 Unit(s) SubCutaneous every 8 hours  multivitamin 1 Tablet(s) Oral daily  rosuvastatin 10 milliGRAM(s) Oral at bedtime  sodium chloride 0.9%. 1000 milliLiter(s) (75 mL/Hr) IV Continuous <Continuous>      MEDICATIONS  (PRN):    acetaminophen     Tablet .. 650 milliGRAM(s) Oral every 6 hours PRN Temp greater or equal to 38C (100.4F), Mild Pain (1 - 3)  aluminum hydroxide/magnesium hydroxide/simethicone Suspension 30 milliLiter(s) Oral every 4 hours PRN Dyspepsia  dextrose Oral Gel 15 Gram(s) Oral once PRN Blood Glucose LESS THAN 70 milliGRAM(s)/deciliter  melatonin 3 milliGRAM(s) Oral at bedtime PRN Insomnia  ondansetron Injectable 4 milliGRAM(s) IV Push every 6 hours PRN Nausea and/or Vomiting      ALLERGIES:    losartan (Angioedema)        FAMILY HISTORY:    breast cancer (Mother)    aneurysm (Father)        REVIEW OF SYSTEMS:    Constitutional, Eyes, ENT, Cardiovascular, Respiratory, Gastrointestinal, Genitourinary, Musculoskeletal, Integumentary, Neurological, Psychiatric, Endocrine, Heme/Lymph and Allergic/Immunologic review of systems are otherwise negative except as noted in HPI.       VITALS:    Vital Signs Last 24 Hrs  T(C): 36.4 (09 Jan 2023 08:02), Max: 36.8 (08 Jan 2023 15:24)  T(F): 97.5 (09 Jan 2023 08:02), Max: 98.2 (08 Jan 2023 15:24)  HR: 69 (09 Jan 2023 08:02) (69 - 79)  BP: 124/76 (09 Jan 2023 08:02) (121/86 - 124/76)  BP(mean): --  RR: 18 (08 Jan 2023 15:24) (18 - 18)  SpO2: 96% (09 Jan 2023 08:02) (96% - 100%)    Parameters below as of 09 Jan 2023 08:02  Patient On (Oxygen Delivery Method): room air        PHYSICAL EXAM:    Constitutional: no acute distress  Eyes: PERRL, EOMI, no conjunctival infection, anicteric.   ENT: pharynx is unremarkable, moist mucus membrane, no oral lesions. NGT in place draining dark bilious fluid  Neck: supple without JVD, no thyromegaly or masses appreciated.   Pulmonary: clear to auscultation bilaterally, no dullness, no wheezing.   Cardiac: RRR, normal S1S2, no murmurs, rubs, gallops.   Vascular: no JVD, no calf tenderness, venous stasis changes, varices.   Abdomen: normoactive bowel sounds, soft and nontender, no hepatosplenomegaly or masses appreciated.   Lymphatic: no peripheral adenopathy appreciated.   Musculoskeletal: full range of motion and no deformities appreciated.   Skin: normal appearance, no rash, nodules, vesicles, ulcers, erythema.   Neurology: CNII - XII intact with no obvious focal deficits  Psychiatric: affect/mood appropriate, A&Ox3 (person, place, time)      LABS:    CBC Full  -  ( 09 Jan 2023 06:38 )  WBC Count : 11.30 K/uL  RBC Count : 3.06 M/uL  Hemoglobin : 9.2 g/dL  Hematocrit : 28.4 %  Platelet Count - Automated : 131 K/uL  Mean Cell Volume : 92.8 fl  Mean Cell Hemoglobin : 30.1 pg  Mean Cell Hemoglobin Concentration : 32.4 gm/dL  Auto Neutrophil # : x  Auto Lymphocyte # : x  Auto Monocyte # : x  Auto Eosinophil # : x  Auto Basophil # : x  Auto Neutrophil % : x  Auto Lymphocyte % : x  Auto Monocyte % : x  Auto Eosinophil % : x  Auto Basophil % : x    01-09    147<H>  |  115<H>  |  33<H>  ----------------------------<  156<H>  3.6   |  28  |  1.40<H>    Ca    8.0<L>      09 Jan 2023 06:38  Phos  2.5     01-09  Mg     1.8     01-09    TPro  5.0<L>  /  Alb  2.1<L>  /  TBili  0.3  /  DBili  x   /  AST  16  /  ALT  11<L>  /  AlkPhos  49  01-09          RADIOLOGY & ADDITIONAL STUDIES:      Xray Chest 1 View-PORTABLE IMMEDIATE (01.06.23 @ 13:07)    INTERPRETATION:  HISTORY: Sepsis    TECHNIQUE: A single AP view of the chest was obtained.    COMPARISON: 12/11/2022    FINDINGS:  The cardiac silhouette is normal in size. There are no focal   consolidations or pleural effusions. The hilar and mediastinal structures   appear unremarkable. The osseous structures are intact.    IMPRESSION: Clear lungs.          CT Abdomen and Pelvis No Cont (01.06.23 @ 14:17)      INTERPRETATION:  CLINICAL INFORMATION: Vomiting, rule out SBO or gastric   outlet obstruction    COMPARISON: 12/10/2022    CONTRAST/COMPLICATIONS:  IV Contrast: NONE  Oral Contrast: NONE  Complications: None reported at time of study completion    PROCEDURE:  CT of the Abdomen and Pelvis was performed.  Sagittal and coronal reformats were performed.    FINDINGS:  LOWER CHEST: Within normal limits.    LIVER: Multiple indeterminant hepatic hypodensities measuring up to 4.7 x   3.9 cm at the hepatic dome (2:14; previously 4.0 x 3.4 cm).  BILE DUCTS: Normal caliber.  GALLBLADDER: Within normal limits.  SPLEEN: Within normal limits.  PANCREAS: Large heterogeneous mass in the pancreatic tail/distal body   measuring 12.8 x 7.5 cm with mild mass effect on the stomach.  ADRENALS: Within normal limits.  KIDNEYS/URETERS: There is a 3 mm nonobstructing stone in the lower pole   of the left kidney.    BLADDER: Minimally distended.  REPRODUCTIVE ORGANS: Prostate within normal limits.    BOWEL: Small hiatal hernia with questionable mild thickening of the   distal esophagus. Stomach and bowel are fluid distended. No bowel   obstruction. Appendixis not visualized. No evidence of inflammation in   the pericecal region.  PERITONEUM: No ascites.  VESSELS: Atherosclerotic changes.  RETROPERITONEUM/LYMPH NODES: No lymphadenopathy.  ABDOMINAL WALL: Within normal limits.  BONES: Degenerative changes. Severe T12-L1 disc height loss and endplate   sclerosis, similar in appearance to prior. Bilateral L5 spondylolysis.    IMPRESSION:  Large pancreatic tail mass with minimal mass effect on the stomach. No   bowel obstruction.    The stomach and bowel are fluid-filled.    Small hiatal hernia with questionable mild thickening of the distal   esophagus.    Multiple hepatic hypodense lesions concerning for metastatic disease.   Consider PET/CT for further evaluation.    Additional findings as above.       HPI:    66 y/o M PMHx significant for Neuroendocrine tumor diagnosed 6 years ago, Hypertension, Hyperlipidemia, Diabetes Mellitus Type 2, BPH and light chain nephropathy, was reportedly referred to the [facility] by Gastroenterology for further evaluation and management intractable nausea and vomiting (non-bloody), and concerns for ongoing dehydration.    Vital signs in triage => BP 75/52, HR 83/min, RR 17/min, TMax 98.3'F, SpO2 100% on room air. Labs => BUN/Cr 41/2.41, Alb 3.1. CT ABD/Pelvis => Large pancreatic tail mass with minimal mass effect on the stomach. No bowel obstruction. The stomach and bowel are fluid-filled. Small hiatal hernia with questionable mild thickening of the distal esophagus. Multiple hepatic hypodense lesions concerning for metastatic disease. In the ED the patient was given Ondansetron 4mg IVP x 1, LR x 1L, and NS x 1L.    Found to have gastric outlet obstruction- evaluated by GI and surgical oncology.(06 Jan 2023 17:50)      01/09/2023: Patient seen at bedside with attending Dr Farrell, no acute distress. No vomiting. NGT in place No abd pain      PAST MEDICAL & SURGICAL HISTORY:    Hypertension    BPH (benign prostatic hyperplasia)    Renal insufficiency    Light chain nephropathy    DM (diabetes mellitus)    HLD (hyperlipidemia)    No significant past surgical history          MEDICATIONS  (STANDING):    dextrose 5%. 1000 milliLiter(s) (50 mL/Hr) IV Continuous <Continuous>  dextrose 5%. 1000 milliLiter(s) (100 mL/Hr) IV Continuous <Continuous>  dextrose 50% Injectable 25 Gram(s) IV Push once  dextrose 50% Injectable 12.5 Gram(s) IV Push once  dextrose 50% Injectable 25 Gram(s) IV Push once  ferrous    sulfate 325 milliGRAM(s) Oral daily  glucagon  Injectable 1 milliGRAM(s) IntraMuscular once  heparin   Injectable 5000 Unit(s) SubCutaneous every 8 hours  multivitamin 1 Tablet(s) Oral daily  rosuvastatin 10 milliGRAM(s) Oral at bedtime  sodium chloride 0.9%. 1000 milliLiter(s) (75 mL/Hr) IV Continuous <Continuous>      MEDICATIONS  (PRN):    acetaminophen     Tablet .. 650 milliGRAM(s) Oral every 6 hours PRN Temp greater or equal to 38C (100.4F), Mild Pain (1 - 3)  aluminum hydroxide/magnesium hydroxide/simethicone Suspension 30 milliLiter(s) Oral every 4 hours PRN Dyspepsia  dextrose Oral Gel 15 Gram(s) Oral once PRN Blood Glucose LESS THAN 70 milliGRAM(s)/deciliter  melatonin 3 milliGRAM(s) Oral at bedtime PRN Insomnia  ondansetron Injectable 4 milliGRAM(s) IV Push every 6 hours PRN Nausea and/or Vomiting      ALLERGIES:    losartan (Angioedema)        FAMILY HISTORY:    breast cancer (Mother)    aneurysm (Father)        REVIEW OF SYSTEMS:    Constitutional - weight loss ~ 20 lbs last few months , Eyes, ENT, Cardiovascular, Respiratory, Gastrointestinal, Genitourinary, Musculoskeletal, Integumentary, Neurological, Psychiatric, Endocrine, Heme/Lymph and Allergic/Immunologic review of systems are otherwise negative except as noted in HPI.       VITALS:    Vital Signs Last 24 Hrs  T(C): 36.4 (09 Jan 2023 08:02), Max: 36.8 (08 Jan 2023 15:24)  T(F): 97.5 (09 Jan 2023 08:02), Max: 98.2 (08 Jan 2023 15:24)  HR: 69 (09 Jan 2023 08:02) (69 - 79)  BP: 124/76 (09 Jan 2023 08:02) (121/86 - 124/76)  BP(mean): --  RR: 18 (08 Jan 2023 15:24) (18 - 18)  SpO2: 96% (09 Jan 2023 08:02) (96% - 100%)    Parameters below as of 09 Jan 2023 08:02  Patient On (Oxygen Delivery Method): room air        PHYSICAL EXAM:    Constitutional: no acute distress  Eyes: PERRL, EOMI, no conjunctival infection, anicteric.   ENT: pharynx is unremarkable, moist mucus membrane, no oral lesions. NGT in place draining dark bilious fluid  Neck: supple without JVD, no thyromegaly or masses appreciated.   Pulmonary: clear to auscultation bilaterally, no dullness, no wheezing.   Cardiac: RRR, normal S1S2, no murmurs, rubs, gallops.   Vascular: no JVD, no calf tenderness, venous stasis changes, varices.   Abdomen: normoactive bowel sounds, soft and nontender, no hepatosplenomegaly or masses appreciated.   Lymphatic: no peripheral adenopathy appreciated.   Musculoskeletal: full range of motion and no deformities appreciated.   Skin: normal appearance, no rash, nodules, vesicles, ulcers, erythema.   Neurology: CNII - XII intact with no obvious focal deficits  Psychiatric: affect/mood appropriate, A&Ox3 (person, place, time)      LABS:    CBC Full  -  ( 09 Jan 2023 06:38 )  WBC Count : 11.30 K/uL  RBC Count : 3.06 M/uL  Hemoglobin : 9.2 g/dL  Hematocrit : 28.4 %  Platelet Count - Automated : 131 K/uL  Mean Cell Volume : 92.8 fl  Mean Cell Hemoglobin : 30.1 pg  Mean Cell Hemoglobin Concentration : 32.4 gm/dL  Auto Neutrophil # : x  Auto Lymphocyte # : x  Auto Monocyte # : x  Auto Eosinophil # : x  Auto Basophil # : x  Auto Neutrophil % : x  Auto Lymphocyte % : x  Auto Monocyte % : x  Auto Eosinophil % : x  Auto Basophil % : x    01-09    147<H>  |  115<H>  |  33<H>  ----------------------------<  156<H>  3.6   |  28  |  1.40<H>    Ca    8.0<L>      09 Jan 2023 06:38  Phos  2.5     01-09  Mg     1.8     01-09    TPro  5.0<L>  /  Alb  2.1<L>  /  TBili  0.3  /  DBili  x   /  AST  16  /  ALT  11<L>  /  AlkPhos  49  01-09          RADIOLOGY & ADDITIONAL STUDIES:      Xray Chest 1 View-PORTABLE IMMEDIATE (01.06.23 @ 13:07)    INTERPRETATION:  HISTORY: Sepsis    TECHNIQUE: A single AP view of the chest was obtained.    COMPARISON: 12/11/2022    FINDINGS:  The cardiac silhouette is normal in size. There are no focal   consolidations or pleural effusions. The hilar and mediastinal structures   appear unremarkable. The osseous structures are intact.    IMPRESSION: Clear lungs.          CT Abdomen and Pelvis No Cont (01.06.23 @ 14:17)      INTERPRETATION:  CLINICAL INFORMATION: Vomiting, rule out SBO or gastric   outlet obstruction    COMPARISON: 12/10/2022    CONTRAST/COMPLICATIONS:  IV Contrast: NONE  Oral Contrast: NONE  Complications: None reported at time of study completion    PROCEDURE:  CT of the Abdomen and Pelvis was performed.  Sagittal and coronal reformats were performed.    FINDINGS:  LOWER CHEST: Within normal limits.    LIVER: Multiple indeterminant hepatic hypodensities measuring up to 4.7 x   3.9 cm at the hepatic dome (2:14; previously 4.0 x 3.4 cm).  BILE DUCTS: Normal caliber.  GALLBLADDER: Within normal limits.  SPLEEN: Within normal limits.  PANCREAS: Large heterogeneous mass in the pancreatic tail/distal body   measuring 12.8 x 7.5 cm with mild mass effect on the stomach.  ADRENALS: Within normal limits.  KIDNEYS/URETERS: There is a 3 mm nonobstructing stone in the lower pole   of the left kidney.    BLADDER: Minimally distended.  REPRODUCTIVE ORGANS: Prostate within normal limits.    BOWEL: Small hiatal hernia with questionable mild thickening of the   distal esophagus. Stomach and bowel are fluid distended. No bowel   obstruction. Appendixis not visualized. No evidence of inflammation in   the pericecal region.  PERITONEUM: No ascites.  VESSELS: Atherosclerotic changes.  RETROPERITONEUM/LYMPH NODES: No lymphadenopathy.  ABDOMINAL WALL: Within normal limits.  BONES: Degenerative changes. Severe T12-L1 disc height loss and endplate   sclerosis, similar in appearance to prior. Bilateral L5 spondylolysis.    IMPRESSION:  Large pancreatic tail mass with minimal mass effect on the stomach. No   bowel obstruction.    The stomach and bowel are fluid-filled.    Small hiatal hernia with questionable mild thickening of the distal   esophagus.    Multiple hepatic hypodense lesions concerning for metastatic disease.   Consider PET/CT for further evaluation.    Additional findings as above.

## 2023-01-09 NOTE — PROGRESS NOTE ADULT - SUBJECTIVE AND OBJECTIVE BOX
Cheif complaints and Diagnosis: pancreatic mass/ intractable nausea and vommitting    Subjective: NGT to intermittent suction. Bilious output. No pain. No vomiting. planned for G tube vs stenting on 1/10      REVIEW OF SYSTEMS:    CONSTITUTIONAL: No weakness, fevers or chills  EYES/ENT: No visual changes;  No vertigo or throat pain   NECK: No pain or stiffness  RESPIRATORY: No cough, wheezing, hemoptysis; No shortness of breath  CARDIOVASCULAR: No chest pain or palpitations  GASTROINTESTINAL: No abdominal or epigastric pain. No nausea, vomiting, or hematemesis; No diarrhea or constipation. No melena or hematochezia.  GENITOURINARY: No dysuria, frequency or hematuria  NEUROLOGICAL: No numbness or weakness  SKIN: No itching, burning, rashes, or lesions   All other review of systems is negative unless indicated above      ICU Vital Signs Last 24 Hrs  T(C): 36.7 (2023 14:55), Max: 36.7 (2023 14:55)  T(F): 98.1 (2023 14:55), Max: 98.1 (2023 14:55)  HR: 70 (2023 14:55) (69 - 70)  BP: 125/76 (2023 14:55) (124/76 - 125/76)  BP(mean): --  ABP: --  ABP(mean): --  RR: 18 (2023 14:55) (18 - 18)  SpO2: 98% (2023 14:55) (96% - 98%)    O2 Parameters below as of 2023 14:55  Patient On (Oxygen Delivery Method): room air            HEENT:   pupils equal and reactive, EOMI, no oropharyngeal lesions, erythema, exudates, oral thrush    NECK:   supple, no carotid bruits, no palpable lymph nodes, no thyromegaly    CV:  +S1, +S2, regular, no murmurs or rubs    RESP:   lungs clear to auscultation bilaterally, no wheezing, rales, rhonchi, good air entry bilaterally    BREAST:  not examined    GI:  abdomen soft, non-tender, non-distended, normal BS, no bruits, no abdominal masses, no palpable masses    RECTAL:  not examined    :  not examined    MSK:   normal muscle tone, no atrophy, no rigidity, no contractions    EXT:   no clubbing, no cyanosis, no edema, no calf pain, swelling or erythema    VASCULAR:  pulses equal and symmetric in the upper and lower extremities    NEURO:  AAOX3, no focal neurological deficits, follows all commands, able to move extremities spontaneously    SKIN:  no ulcers, lesions or rashes    MEDICATIONS  (STANDING):  dextrose 5%. 1000 milliLiter(s) (50 mL/Hr) IV Continuous <Continuous>  dextrose 5%. 1000 milliLiter(s) (100 mL/Hr) IV Continuous <Continuous>  dextrose 50% Injectable 25 Gram(s) IV Push once  dextrose 50% Injectable 12.5 Gram(s) IV Push once  dextrose 50% Injectable 25 Gram(s) IV Push once  ferrous    sulfate 325 milliGRAM(s) Oral daily  glucagon  Injectable 1 milliGRAM(s) IntraMuscular once  heparin   Injectable 5000 Unit(s) SubCutaneous every 8 hours  multivitamin 1 Tablet(s) Oral daily  ondansetron Injectable 4 milliGRAM(s) IV Push every 6 hours  rosuvastatin 10 milliGRAM(s) Oral at bedtime  sodium chloride 0.9%. 1000 milliLiter(s) (75 mL/Hr) IV Continuous <Continuous>    MEDICATIONS  (PRN):  acetaminophen     Tablet .. 650 milliGRAM(s) Oral every 6 hours PRN Temp greater or equal to 38C (100.4F), Mild Pain (1 - 3)  aluminum hydroxide/magnesium hydroxide/simethicone Suspension 30 milliLiter(s) Oral every 4 hours PRN Dyspepsia  dextrose Oral Gel 15 Gram(s) Oral once PRN Blood Glucose LESS THAN 70 milliGRAM(s)/deciliter  melatonin 3 milliGRAM(s) Oral at bedtime PRN Insomnia  prochlorperazine   Injectable 2.5 milliGRAM(s) IV Push every 4 hours PRN Nausea      Urinalysis Basic - ( 2023 17:21 )    Color: Yellow / Appearance: Slightly Turbid / S.025 / pH: x  Gluc: x / Ketone: Small  / Bili: Small / Urobili: Negative   Blood: x / Protein: 100 / Nitrite: Negative   Leuk Esterase: Small / RBC: 11-25 /HPF / WBC >50 /HPF   Sq Epi: x / Non Sq Epi: Few / Bacteria: Many    2023 06:16    141    |  105    |  57     ----------------------------<  236    3.8     |  22     |  2.32     Ca    8.6        2023 06:16    TPro  6.4    /  Alb  2.7    /  TBili  0.4    /  DBili  x      /  AST  25     /  ALT  20     /  AlkPhos  66     2023 06:16  LIVER FUNCTIONS - ( 2023 06:16 )  Alb: 2.7 g/dL / Pro: 6.4 gm/dL / ALK PHOS: 66 U/L / ALT: 20 U/L / AST: 25 U/L / GGT: x         PT/INR - ( 2023 12:06 )   PT: 12.6 sec;   INR: 1.09 ratio         PTT - ( 2023 12:06 )  PTT:29.9 secCBC Full  -  ( 2023 08:26 )  WBC Count : 18.53 K/uL  Hemoglobin : 10.2 g/dL  Hematocrit : 31.1 %  Platelet Count - Automated : 179 K/uL  Mean Cell Volume : 91.2 fl  Mean Cell Hemoglobin : 29.9 pg  Mean Cell Hemoglobin Concentration : 32.8 gm/dL  Auto Neutrophil # : x  Auto Lymphocyte # : x  Auto Monocyte # : x  Auto Eosinophil # : x  Auto Basophil # : x  Auto Neutrophil % : x  Auto Lymphocyte % : x  Auto Monocyte % : x  Auto Eosinophil % : x  Auto Basophil % : x            PT/INR - ( 2023 12:06 )   PT: 12.6 sec;   INR: 1.09 ratio         PTT - ( 2023 12:06 )  PTT:29.9 sec

## 2023-01-10 NOTE — PROGRESS NOTE ADULT - ASSESSMENT
Assessment and Plan:      	  66 y/o M PMHx significant for Neuroendocrine tumor diagnosed 6 years ago, Hypertension, Hyperlipidemia, Diabetes Mellitus Type 2, BPH and light chain nephropathy, was reportedly referred to the [facility] by Gastroenterology for further evaluation and management intractable nausea and vomiting (non-bloody), and concerns for ongoing dehydration.  Vital signs in triage => BP 75/52, HR 83/min, RR 17/min, TMax 98.3'F, SpO2 100% on room air. Labs => BUN/Cr 41/2.41, Alb 3.1. CT ABD/Pelvis => Large pancreatic tail mass with minimal mass effect on the stomach. No bowel obstruction. The stomach and bowel are fluid-filled. Small hiatal hernia with questionable mild thickening of the distal esophagus. Multiple hepatic hypodense lesions concerning for metastatic disease. In the ED the patient was given Ondansetron 4mg IVP x 1, LR x 1L, and NS x 1L.    #Intractable Nausea and Vomiting secondary to known large pancreatic mass causing GOO  #Hypernatremia 2/2 to GI sensible losses and npo status/Hypokalemia  - NG tube inserted to offload stomach and symptoms.   - Planned for stent vs G tube on 1/10  - rotate to D51/2 NS with K additive 20meq  - zofran prn      #Acute on Chronic Renal Failure -baseline 1.5  - currently 1.4  - pre renal  - volume depletion  - mild hypernatremia      #Neuroendocrine Cancer  ~patient has a history of a pancreatic neuroendocrine tumor diagnosed 6 years ago  ~follows with (Oncologist) Dr. Llamas and (Urologist) Dr. Padron  - Receives monthly Sandostatin (Octreotide) injection    #Diabetes Mellitus Type2  ~FS qAC/HS  ~cont. ISS per protocol    #Hypertension  ~will hold Irbesartan in the setting of ZO - resume upon dc  ~Monitor routine hemodynamics. BP contrrolled    #Hyperlipidemia  ~cont. Rosuvastatin 10mg po qhs     #BPH  ~self catherizes at home      #Vte ppx  ~cont. Heparin sq

## 2023-01-10 NOTE — BRIEF OPERATIVE NOTE - COMMENTS
G tube (suction port) for venting relief (unclip the clamp to vent).   J tube for feeds per nurition recs. No meds through J tube port (feed port), only feeds.   Rx port never to be used.   Clears for pleasure.   PPI IV BID.   Carafate 1 g orally 4 times daily.

## 2023-01-10 NOTE — PROGRESS NOTE ADULT - ASSESSMENT
68 yo M with known pancreatic tail neuroendocrine tumor, presents with gastric outlet obstruction-like symptoms and dehydration.    Plan:  -IVF  -strict Is/Os  -recommend Hale placement as pt normally self catheterizes  -NGT to low intermittent suction   -antiemetics prn  -GI eval appreciated; NGT and planned for intervention Tuesday   - PLEASE PREOP For TUESDAY, NPO today.   -medical management as per primary team  -surgical oncology will follow    Plan to be discussed with Dr. Black

## 2023-01-10 NOTE — PROGRESS NOTE ADULT - SUBJECTIVE AND OBJECTIVE BOX
Patient seen and examined at bedside. Patient has no complaints and reports pain is much improved since placement of NGT tube. Patient remains NPO. Nurse denies any acute events. Vitals reviewed and WNL.      Vitals:  Vital Signs Last 24 Hrs  T(C): 36.8 (10 Cedrick 2023 08:47), Max: 36.8 (10 Cedrick 2023 08:47)  T(F): 98.2 (10 Cedrick 2023 08:47), Max: 98.2 (10 Cedrick 2023 08:47)  HR: 64 (10 Cedrick 2023 08:47) (64 - 77)  BP: 131/78 (10 Cedrick 2023 08:47) (124/76 - 131/78)  BP(mean): --  RR: 17 (10 Cedrick 2023 08:47) (17 - 18)  SpO2: 100% (10 Cedrick 2023 08:47) (98% - 100%)    Parameters below as of 10 Cedrick 2023 08:47  Patient On (Oxygen Delivery Method): room air              --------------------------------------------------------  IN:    sodium chloride 0.9%: 900 mL  Total IN: 900 mL    OUT:    Intermittent Catheterization - Urethral (mL): 450 mL    Nasogastric/Oral tube (mL): 200 mL  Total OUT: 650 mL    Total NET: 250 mL          Physical Exam:  General: AAOx3, Well developed, NAD  Chest: Normal respiratory effort  Heart: RRR  Abdomen: Soft, NTND, no masses  Neuro/Psych: No localized deficits. Normal speech, normal tone  Skin: Normal, no rashes, no lesions noted.   Extremities: Warm, well perfused, no edema, Pulses intact    Labs                        9.4    6.15  )-----------( 124      ( 10 Cedrick 2023 06:18 )             29.3     01-10    150<H>  |  113<H>  |  29<H>  ----------------------------<  183<H>  3.1<L>   |  34<H>  |  1.45<H>    Ca    8.5      10 Cedrick 2023 06:18  Phos  2.5     01-09  Mg     1.8     01-09    TPro  5.0<L>  /  Alb  2.1<L>  /  TBili  0.3  /  DBili  x   /  AST  16  /  ALT  11<L>  /  AlkPhos  49  01-09                       Ca:8.7  Phos:-  Mg:-               -|      |-        LFTs:  ------|-|-----             -|      |-  01-07 @ 06:16                    11.9  CBC: 16.35>)-------(<255                     36.5                 141 | 105 | 57    CMP:  ----------------------< 236               3.8 | 22 | 2.32                      Ca:8.6  Phos:-  Mg:-               0.4|      |25        LFTs:  ------|66|-----             -|      |-      Culture - Urine (collected 01-06-23 @ 17:21)  Source: Clean Catch None  Preliminary Report (01-08-23 @ 07:43):    >100,000 CFU/ml Gram Negative Rods    Culture - Blood (collected 01-06-23 @ 12:34)  Source: .Blood None  Preliminary Report (01-07-23 @ 19:01):    No growth to date.    Culture - Blood (collected 01-06-23 @ 12:06)  Source: .Blood None  Preliminary Report (01-07-23 @ 16:00):    No growth to date.    MEDICATIONS  (STANDING):  dextrose 5% + sodium chloride 0.45% with potassium chloride 20 mEq/L 1000 milliLiter(s) (75 mL/Hr) IV Continuous <Continuous>  dextrose 5%. 1000 milliLiter(s) (100 mL/Hr) IV Continuous <Continuous>  dextrose 5%. 1000 milliLiter(s) (50 mL/Hr) IV Continuous <Continuous>  dextrose 50% Injectable 25 Gram(s) IV Push once  dextrose 50% Injectable 12.5 Gram(s) IV Push once  dextrose 50% Injectable 25 Gram(s) IV Push once  ferrous    sulfate 325 milliGRAM(s) Oral daily  glucagon  Injectable 1 milliGRAM(s) IntraMuscular once  heparin   Injectable 5000 Unit(s) SubCutaneous every 8 hours  multivitamin 1 Tablet(s) Oral daily  rosuvastatin 10 milliGRAM(s) Oral at bedtime    MEDICATIONS  (PRN):  acetaminophen     Tablet .. 650 milliGRAM(s) Oral every 6 hours PRN Temp greater or equal to 38C (100.4F), Mild Pain (1 - 3)  aluminum hydroxide/magnesium hydroxide/simethicone Suspension 30 milliLiter(s) Oral every 4 hours PRN Dyspepsia  dextrose Oral Gel 15 Gram(s) Oral once PRN Blood Glucose LESS THAN 70 milliGRAM(s)/deciliter  melatonin 3 milliGRAM(s) Oral at bedtime PRN Insomnia  ondansetron Injectable 4 milliGRAM(s) IV Push every 6 hours PRN Nausea and/or Vomiting

## 2023-01-10 NOTE — PROGRESS NOTE ADULT - SUBJECTIVE AND OBJECTIVE BOX
Cheif complaints and Diagnosis: pancreatic mass/ intractable nausea and vommitting    Subjective: NGT to intermittent suction. Bilious output: 1150. No pain. No vomiting. planned for G tube vs stenting on 1/10      REVIEW OF SYSTEMS:    CONSTITUTIONAL: No weakness, fevers or chills  EYES/ENT: No visual changes;  No vertigo or throat pain   NECK: No pain or stiffness  RESPIRATORY: No cough, wheezing, hemoptysis; No shortness of breath  CARDIOVASCULAR: No chest pain or palpitations  GASTROINTESTINAL: No abdominal or epigastric pain. No nausea, vomiting, or hematemesis; No diarrhea or constipation. No melena or hematochezia.  GENITOURINARY: No dysuria, frequency or hematuria  NEUROLOGICAL: No numbness or weakness  SKIN: No itching, burning, rashes, or lesions   All other review of systems is negative unless indicated above      ICU Vital Signs Last 24 Hrs  T(C): 36.7 (2023 14:55), Max: 36.7 (2023 14:55)  T(F): 98.1 (2023 14:55), Max: 98.1 (2023 14:55)  HR: 70 (2023 14:55) (69 - 70)  BP: 125/76 (2023 14:55) (124/76 - 125/76)  BP(mean): --  ABP: --  ABP(mean): --  RR: 18 (2023 14:55) (18 - 18)  SpO2: 98% (2023 14:55) (96% - 98%)    O2 Parameters below as of 2023 14:55  Patient On (Oxygen Delivery Method): room air        EKG: SR with PVC. No dynamic changes from ekg on 23    HEENT:   pupils equal and reactive, EOMI, no oropharyngeal lesions, erythema, exudates, oral thrush    NECK:   supple, no carotid bruits, no palpable lymph nodes, no thyromegaly    CV:  +S1, +S2, regular, no murmurs or rubs    RESP:   lungs clear to auscultation bilaterally, no wheezing, rales, rhonchi, good air entry bilaterally    BREAST:  not examined    GI:  abdomen soft, non-tender, non-distended, normal BS, no bruits, no abdominal masses, no palpable masses    RECTAL:  not examined    :  not examined    MSK:   normal muscle tone, no atrophy, no rigidity, no contractions    EXT:   no clubbing, no cyanosis, no edema, no calf pain, swelling or erythema    VASCULAR:  pulses equal and symmetric in the upper and lower extremities    NEURO:  AAOX3, no focal neurological deficits, follows all commands, able to move extremities spontaneously    SKIN:  no ulcers, lesions or rashes    MEDICATIONS  (STANDING):  dextrose 5%. 1000 milliLiter(s) (50 mL/Hr) IV Continuous <Continuous>  dextrose 5%. 1000 milliLiter(s) (100 mL/Hr) IV Continuous <Continuous>  dextrose 50% Injectable 25 Gram(s) IV Push once  dextrose 50% Injectable 12.5 Gram(s) IV Push once  dextrose 50% Injectable 25 Gram(s) IV Push once  ferrous    sulfate 325 milliGRAM(s) Oral daily  glucagon  Injectable 1 milliGRAM(s) IntraMuscular once  heparin   Injectable 5000 Unit(s) SubCutaneous every 8 hours  multivitamin 1 Tablet(s) Oral daily  ondansetron Injectable 4 milliGRAM(s) IV Push every 6 hours  rosuvastatin 10 milliGRAM(s) Oral at bedtime  sodium chloride 0.9%. 1000 milliLiter(s) (75 mL/Hr) IV Continuous <Continuous>    MEDICATIONS  (PRN):  acetaminophen     Tablet .. 650 milliGRAM(s) Oral every 6 hours PRN Temp greater or equal to 38C (100.4F), Mild Pain (1 - 3)  aluminum hydroxide/magnesium hydroxide/simethicone Suspension 30 milliLiter(s) Oral every 4 hours PRN Dyspepsia  dextrose Oral Gel 15 Gram(s) Oral once PRN Blood Glucose LESS THAN 70 milliGRAM(s)/deciliter  melatonin 3 milliGRAM(s) Oral at bedtime PRN Insomnia  prochlorperazine   Injectable 2.5 milliGRAM(s) IV Push every 4 hours PRN Nausea      Urinalysis Basic - ( 2023 17:21 )    Color: Yellow / Appearance: Slightly Turbid / S.025 / pH: x  Gluc: x / Ketone: Small  / Bili: Small / Urobili: Negative   Blood: x / Protein: 100 / Nitrite: Negative   Leuk Esterase: Small / RBC: 11-25 /HPF / WBC >50 /HPF   Sq Epi: x / Non Sq Epi: Few / Bacteria: Many    2023 06:16    141    |  105    |  57     ----------------------------<  236    3.8     |  22     |  2.32     Ca    8.6        2023 06:16    TPro  6.4    /  Alb  2.7    /  TBili  0.4    /  DBili  x      /  AST  25     /  ALT  20     /  AlkPhos  66     2023 06:16  LIVER FUNCTIONS - ( 2023 06:16 )  Alb: 2.7 g/dL / Pro: 6.4 gm/dL / ALK PHOS: 66 U/L / ALT: 20 U/L / AST: 25 U/L / GGT: x         PT/INR - ( 2023 12:06 )   PT: 12.6 sec;   INR: 1.09 ratio         PTT - ( 2023 12:06 )  PTT:29.9 secCBC Full  -  ( 2023 08:26 )  WBC Count : 18.53 K/uL  Hemoglobin : 10.2 g/dL  Hematocrit : 31.1 %  Platelet Count - Automated : 179 K/uL  Mean Cell Volume : 91.2 fl  Mean Cell Hemoglobin : 29.9 pg  Mean Cell Hemoglobin Concentration : 32.8 gm/dL  Auto Neutrophil # : x  Auto Lymphocyte # : x  Auto Monocyte # : x  Auto Eosinophil # : x  Auto Basophil # : x  Auto Neutrophil % : x  Auto Lymphocyte % : x  Auto Monocyte % : x  Auto Eosinophil % : x  Auto Basophil % : x            PT/INR - ( 2023 12:06 )   PT: 12.6 sec;   INR: 1.09 ratio         PTT - ( 2023 12:06 )  PTT:29.9 sec

## 2023-01-10 NOTE — BRIEF OPERATIVE NOTE - DISPOSITION
Patient off unit to surgery. Left via transport.  Pre-procedure wipes completed.  Consent obtained by surgical team.  Patient's belongings remain in [id number] until NICU bed is assigned.  Patient's son and daughter-in-law leaving unit with patient.   pacu then floor

## 2023-01-11 NOTE — PROGRESS NOTE ADULT - ATTENDING COMMENTS
Discussed care plan with resident. I agree with the plan.

## 2023-01-11 NOTE — PROGRESS NOTE ADULT - SUBJECTIVE AND OBJECTIVE BOX
Cheif complaints and Diagnosis: pancreatic mass/ intractable nausea and vommitting    Subjective: successful G tube placement.      REVIEW OF SYSTEMS:    CONSTITUTIONAL: No weakness, fevers or chills  EYES/ENT: No visual changes;  No vertigo or throat pain   NECK: No pain or stiffness  RESPIRATORY: No cough, wheezing, hemoptysis; No shortness of breath  CARDIOVASCULAR: No chest pain or palpitations  GASTROINTESTINAL: No abdominal or epigastric pain. No nausea, vomiting, or hematemesis; No diarrhea or constipation. No melena or hematochezia.  GENITOURINARY: No dysuria, frequency or hematuria  NEUROLOGICAL: No numbness or weakness  SKIN: No itching, burning, rashes, or lesions   All other review of systems is negative unless indicated above      ICU Vital Signs Last 24 Hrs  T(C): 36.8 (2023 07:50), Max: 36.8 (2023 07:50)  T(F): 98.2 (2023 07:50), Max: 98.2 (2023 07:50)  HR: 58 (2023 07:50) (58 - 71)  BP: 132/81 (2023 07:50) (118/77 - 141/84)  BP(mean): --  ABP: --  ABP(mean): --  RR: 17 (2023 07:50) (13 - 22)  SpO2: 100% (2023 07:50) (96% - 100%)    O2 Parameters below as of 2023 07:50  Patient On (Oxygen Delivery Method): room air            EKG: SR with PVC. No dynamic changes from ekg on 23    HEENT:   pupils equal and reactive, EOMI, no oropharyngeal lesions, erythema, exudates, oral thrush    NECK:   supple, no carotid bruits, no palpable lymph nodes, no thyromegaly    CV:  +S1, +S2, regular, no murmurs or rubs    RESP:   lungs clear to auscultation bilaterally, no wheezing, rales, rhonchi, good air entry bilaterally    BREAST:  not examined    GI:  abdomen soft, non-tender, non-distended, normal BS, no bruits, no abdominal masses, no palpable masses. tube in place, no surrounding redness or discharge    RECTAL:  not examined    :  not examined    MSK:   normal muscle tone, no atrophy, no rigidity, no contractions    EXT:   no clubbing, no cyanosis, no edema, no calf pain, swelling or erythema    VASCULAR:  pulses equal and symmetric in the upper and lower extremities    NEURO:  AAOX3, no focal neurological deficits, follows all commands, able to move extremities spontaneously    SKIN:  no ulcers, lesions or rashes    MEDICATIONS  (STANDING):  dextrose 5%. 1000 milliLiter(s) (50 mL/Hr) IV Continuous <Continuous>  dextrose 5%. 1000 milliLiter(s) (100 mL/Hr) IV Continuous <Continuous>  dextrose 50% Injectable 25 Gram(s) IV Push once  dextrose 50% Injectable 12.5 Gram(s) IV Push once  dextrose 50% Injectable 25 Gram(s) IV Push once  ferrous    sulfate 325 milliGRAM(s) Oral daily  glucagon  Injectable 1 milliGRAM(s) IntraMuscular once  heparin   Injectable 5000 Unit(s) SubCutaneous every 8 hours  multivitamin 1 Tablet(s) Oral daily  ondansetron Injectable 4 milliGRAM(s) IV Push every 6 hours  rosuvastatin 10 milliGRAM(s) Oral at bedtime  sodium chloride 0.9%. 1000 milliLiter(s) (75 mL/Hr) IV Continuous <Continuous>    MEDICATIONS  (PRN):  acetaminophen     Tablet .. 650 milliGRAM(s) Oral every 6 hours PRN Temp greater or equal to 38C (100.4F), Mild Pain (1 - 3)  aluminum hydroxide/magnesium hydroxide/simethicone Suspension 30 milliLiter(s) Oral every 4 hours PRN Dyspepsia  dextrose Oral Gel 15 Gram(s) Oral once PRN Blood Glucose LESS THAN 70 milliGRAM(s)/deciliter  melatonin 3 milliGRAM(s) Oral at bedtime PRN Insomnia  prochlorperazine   Injectable 2.5 milliGRAM(s) IV Push every 4 hours PRN Nausea      Urinalysis Basic - ( 2023 17:21 )    Color: Yellow / Appearance: Slightly Turbid / S.025 / pH: x  Gluc: x / Ketone: Small  / Bili: Small / Urobili: Negative   Blood: x / Protein: 100 / Nitrite: Negative   Leuk Esterase: Small / RBC: 11-25 /HPF / WBC >50 /HPF   Sq Epi: x / Non Sq Epi: Few / Bacteria: Many    2023 06:16    141    |  105    |  57     ----------------------------<  236    3.8     |  22     |  2.32     Ca    8.6        2023 06:16    TPro  6.4    /  Alb  2.7    /  TBili  0.4    /  DBili  x      /  AST  25     /  ALT  20     /  AlkPhos  66     2023 06:16  LIVER FUNCTIONS - ( 2023 06:16 )  Alb: 2.7 g/dL / Pro: 6.4 gm/dL / ALK PHOS: 66 U/L / ALT: 20 U/L / AST: 25 U/L / GGT: x         PT/INR - ( 2023 12:06 )   PT: 12.6 sec;   INR: 1.09 ratio         PTT - ( 2023 12:06 )  PTT:29.9 secCBC Full  -  ( 2023 08:26 )  WBC Count : 18.53 K/uL  Hemoglobin : 10.2 g/dL  Hematocrit : 31.1 %  Platelet Count - Automated : 179 K/uL  Mean Cell Volume : 91.2 fl  Mean Cell Hemoglobin : 29.9 pg  Mean Cell Hemoglobin Concentration : 32.8 gm/dL  Auto Neutrophil # : x  Auto Lymphocyte # : x  Auto Monocyte # : x  Auto Eosinophil # : x  Auto Basophil # : x  Auto Neutrophil % : x  Auto Lymphocyte % : x  Auto Monocyte % : x  Auto Eosinophil % : x  Auto Basophil % : x            PT/INR - ( 2023 12:06 )   PT: 12.6 sec;   INR: 1.09 ratio         PTT - ( 2023 12:06 )  PTT:29.9 sec

## 2023-01-11 NOTE — PROGRESS NOTE ADULT - ASSESSMENT
68 yo M with known pancreatic tail neuroendocrine tumor, presents with gastric outlet obstruction-like symptoms and dehydration.    Plan:  -IVF  -strict Is/Os  -recommend Hale placement as pt normally self catheterizes  -NGT to low intermittent suction   -antiemetics prn  -GI eval appreciated; NGT and sx intervention 1/10/22 No obstruction. Severe esophagitis, LA Grade D.  GJ tube placement  - No stenosis on EGD. S/p GJ  -medical management as per primary team  -surgical oncology will follow    Plan to be discussed with Dr. Black 68 yo M with known pancreatic tail neuroendocrine tumor, presents with gastric outlet obstruction-like symptoms and dehydration.    Plan:  -IVF  -strict Is/Os  -antiemetics prn  -GI eval appreciated; NGT and sx intervention 1/10/22 No obstruction. Severe esophagitis, LA Grade D.  GJ tube placement  -No stenosis on EGD. S/p GJ  -TF as tolerated  -medical management as per primary team  -please have pt follow up with Dr. Siddiqi once discharged    Plan discussed with Dr. Siddiqi

## 2023-01-11 NOTE — PROGRESS NOTE ADULT - SUBJECTIVE AND OBJECTIVE BOX
Patient seen and examined at bedside. Patient has no complaints and reports pain is much improved since placement of NGT tube. Patient remains NPO. Nurse denies any acute events. Vitals reviewed and WNL.      Vitals:  Vital Signs Last 24 Hrs  T(C): 36.8 (11 Jan 2023 07:50), Max: 36.8 (10 Cedrick 2023 08:47)  T(F): 98.2 (11 Jan 2023 07:50), Max: 98.2 (10 Cedrick 2023 08:47)  HR: 58 (11 Jan 2023 07:50) (58 - 71)  BP: 132/81 (11 Jan 2023 07:50) (118/77 - 141/84)  BP(mean): --  RR: 17 (11 Jan 2023 07:50) (13 - 22)  SpO2: 100% (11 Jan 2023 07:50) (96% - 100%)    Parameters below as of 11 Jan 2023 07:50  Patient On (Oxygen Delivery Method): room air         --------------------------------------------------------  IN:    sodium chloride 0.9%: 900 mL  Total IN: 900 mL    OUT:    Intermittent Catheterization - Urethral (mL): 450 mL    Nasogastric/Oral tube (mL): 200 mL  Total OUT: 650 mL    Total NET: 250 mL          Physical Exam:  General: AAOx3, Well developed, NAD  Chest: Normal respiratory effort  Heart: RRR  Abdomen: Soft, NTND, no masses  Neuro/Psych: No localized deficits. Normal speech, normal tone  Skin: Normal, no rashes, no lesions noted.   Extremities: Warm, well perfused, no edema, Pulses intact    Labs                           9.4    6.15  )-----------( 124      ( 10 Cedrick 2023 06:18 )             29.3     01-10    150<H>  |  113<H>  |  29<H>  ----------------------------<  183<H>  3.1<L>   |  34<H>  |  1.45<H>    Ca    8.5      10 Cedrick 2023 06:18  Phos  2.5     01-09  Mg     1.8     01-09    TPro  5.0<L>  /  Alb  2.1<L>  /  TBili  0.3  /  DBili  x   /  AST  16  /  ALT  11<L>  /  AlkPhos  49  01-09                       Ca:8.7  Phos:-  Mg:-               -|      |-        LFTs:  ------|-|-----             -|      |-  01-07 @ 06:16                    11.9  CBC: 16.35>)-------(<255                     36.5                 141 | 105 | 57    CMP:  ----------------------< 236               3.8 | 22 | 2.32                      Ca:8.6  Phos:-  Mg:-               0.4|      |25        LFTs:  ------|66|-----             -|      |-      Culture - Urine (collected 01-06-23 @ 17:21)  Source: Clean Catch None  Preliminary Report (01-08-23 @ 07:43):    >100,000 CFU/ml Gram Negative Rods    Culture - Blood (collected 01-06-23 @ 12:34)  Source: .Blood None  Preliminary Report (01-07-23 @ 19:01):    No growth to date.    Culture - Blood (collected 01-06-23 @ 12:06)  Source: .Blood None  Preliminary Report (01-07-23 @ 16:00):    No growth to date.    MEDICATIONS  (STANDING):  dextrose 5% + sodium chloride 0.45% with potassium chloride 20 mEq/L 1000 milliLiter(s) (75 mL/Hr) IV Continuous <Continuous>  dextrose 5%. 1000 milliLiter(s) (100 mL/Hr) IV Continuous <Continuous>  dextrose 5%. 1000 milliLiter(s) (50 mL/Hr) IV Continuous <Continuous>  dextrose 50% Injectable 25 Gram(s) IV Push once  dextrose 50% Injectable 12.5 Gram(s) IV Push once  dextrose 50% Injectable 25 Gram(s) IV Push once  ferrous    sulfate 325 milliGRAM(s) Oral daily  glucagon  Injectable 1 milliGRAM(s) IntraMuscular once  heparin   Injectable 5000 Unit(s) SubCutaneous every 8 hours  multivitamin 1 Tablet(s) Oral daily  rosuvastatin 10 milliGRAM(s) Oral at bedtime    MEDICATIONS  (PRN):  acetaminophen     Tablet .. 650 milliGRAM(s) Oral every 6 hours PRN Temp greater or equal to 38C (100.4F), Mild Pain (1 - 3)  aluminum hydroxide/magnesium hydroxide/simethicone Suspension 30 milliLiter(s) Oral every 4 hours PRN Dyspepsia  dextrose Oral Gel 15 Gram(s) Oral once PRN Blood Glucose LESS THAN 70 milliGRAM(s)/deciliter  melatonin 3 milliGRAM(s) Oral at bedtime PRN Insomnia             Patient seen and examined at bedside. Patient has no complaints. Patient remains NPO. Nurse denies any acute events. Vitals reviewed and WNL.      Vitals:  Vital Signs Last 24 Hrs  T(C): 36.8 (11 Jan 2023 07:50), Max: 36.8 (10 Cedrick 2023 08:47)  T(F): 98.2 (11 Jan 2023 07:50), Max: 98.2 (10 Cedrick 2023 08:47)  HR: 58 (11 Jan 2023 07:50) (58 - 71)  BP: 132/81 (11 Jan 2023 07:50) (118/77 - 141/84)  BP(mean): --  RR: 17 (11 Jan 2023 07:50) (13 - 22)  SpO2: 100% (11 Jan 2023 07:50) (96% - 100%)    Parameters below as of 11 Jan 2023 07:50  Patient On (Oxygen Delivery Method): room air         --------------------------------------------------------  IN:    sodium chloride 0.9%: 900 mL  Total IN: 900 mL    OUT:    Intermittent Catheterization - Urethral (mL): 450 mL    Nasogastric/Oral tube (mL): 200 mL  Total OUT: 650 mL    Total NET: 250 mL          Physical Exam:  General: AAOx3, Well developed, NAD  Chest: Normal respiratory effort  Heart: RRR  Abdomen: Soft, NTND, no masses, GJ tube c/d/i  Neuro/Psych: No localized deficits. Normal speech, normal tone  Skin: Normal, no rashes, no lesions noted  Extremities: Warm, well perfused, no edema, Pulses intact    Labs                           9.4    6.15  )-----------( 124      ( 10 Cedrick 2023 06:18 )             29.3     01-10    150<H>  |  113<H>  |  29<H>  ----------------------------<  183<H>  3.1<L>   |  34<H>  |  1.45<H>    Ca    8.5      10 Cedrick 2023 06:18  Phos  2.5     01-09  Mg     1.8     01-09    TPro  5.0<L>  /  Alb  2.1<L>  /  TBili  0.3  /  DBili  x   /  AST  16  /  ALT  11<L>  /  AlkPhos  49  01-09                       Ca:8.7  Phos:-  Mg:-               -|      |-        LFTs:  ------|-|-----             -|      |-  01-07 @ 06:16                    11.9  CBC: 16.35>)-------(<255                     36.5                 141 | 105 | 57    CMP:  ----------------------< 236               3.8 | 22 | 2.32                      Ca:8.6  Phos:-  Mg:-               0.4|      |25        LFTs:  ------|66|-----             -|      |-      Culture - Urine (collected 01-06-23 @ 17:21)  Source: Clean Catch None  Preliminary Report (01-08-23 @ 07:43):    >100,000 CFU/ml Gram Negative Rods    Culture - Blood (collected 01-06-23 @ 12:34)  Source: .Blood None  Preliminary Report (01-07-23 @ 19:01):    No growth to date.    Culture - Blood (collected 01-06-23 @ 12:06)  Source: .Blood None  Preliminary Report (01-07-23 @ 16:00):    No growth to date.    MEDICATIONS  (STANDING):  dextrose 5% + sodium chloride 0.45% with potassium chloride 20 mEq/L 1000 milliLiter(s) (75 mL/Hr) IV Continuous <Continuous>  dextrose 5%. 1000 milliLiter(s) (100 mL/Hr) IV Continuous <Continuous>  dextrose 5%. 1000 milliLiter(s) (50 mL/Hr) IV Continuous <Continuous>  dextrose 50% Injectable 25 Gram(s) IV Push once  dextrose 50% Injectable 12.5 Gram(s) IV Push once  dextrose 50% Injectable 25 Gram(s) IV Push once  ferrous    sulfate 325 milliGRAM(s) Oral daily  glucagon  Injectable 1 milliGRAM(s) IntraMuscular once  heparin   Injectable 5000 Unit(s) SubCutaneous every 8 hours  multivitamin 1 Tablet(s) Oral daily  rosuvastatin 10 milliGRAM(s) Oral at bedtime    MEDICATIONS  (PRN):  acetaminophen     Tablet .. 650 milliGRAM(s) Oral every 6 hours PRN Temp greater or equal to 38C (100.4F), Mild Pain (1 - 3)  aluminum hydroxide/magnesium hydroxide/simethicone Suspension 30 milliLiter(s) Oral every 4 hours PRN Dyspepsia  dextrose Oral Gel 15 Gram(s) Oral once PRN Blood Glucose LESS THAN 70 milliGRAM(s)/deciliter  melatonin 3 milliGRAM(s) Oral at bedtime PRN Insomnia

## 2023-01-11 NOTE — PROGRESS NOTE ADULT - ASSESSMENT
Assessment and Plan:      	  66 y/o M PMHx significant for Neuroendocrine tumor diagnosed 6 years ago, Hypertension, Hyperlipidemia, Diabetes Mellitus Type 2, BPH and light chain nephropathy, was reportedly referred to the [facility] by Gastroenterology for further evaluation and management intractable nausea and vomiting (non-bloody), and concerns for ongoing dehydration.  Vital signs in triage => BP 75/52, HR 83/min, RR 17/min, TMax 98.3'F, SpO2 100% on room air. Labs => BUN/Cr 41/2.41, Alb 3.1. CT ABD/Pelvis => Large pancreatic tail mass with minimal mass effect on the stomach. No bowel obstruction. The stomach and bowel are fluid-filled. Small hiatal hernia with questionable mild thickening of the distal esophagus. Multiple hepatic hypodense lesions concerning for metastatic disease. In the ED the patient was given Ondansetron 4mg IVP x 1, LR x 1L, and NS x 1L.    #Intractable Nausea and Vomiting secondary to known large pancreatic mass and gastric atonia  #Hypernatremia 2/2 to GI sensible losses and npo status/Hypokalemia  - G tube in place - begin feeds today and f/u with nutriton regarding rate for feeds after approx 48 hours  - D/W Dr Doyle-> can have clear pleasure feeds  - zofran prn      #Acute on Chronic Renal Failure -baseline 1.5  - currently 1.4  - pre renal  - volume depletion  - mild hypernatremia      #Neuroendocrine Cancer  ~patient has a history of a pancreatic neuroendocrine tumor diagnosed 6 years ago  ~follows with (Oncologist) Dr. Llamas and (Urologist) Dr. Padron  - Receives monthly Sandostatin (Octreotide) injection    #Diabetes Mellitus Type2  ~FS qAC/HS  ~cont. ISS per protocol    #Hypertension  ~will hold Irbesartan in the setting of ZO - resume upon dc  ~Monitor routine hemodynamics. BP contrrolled    #Hyperlipidemia  ~cont. Rosuvastatin 10mg po qhs     #BPH  ~self catherizes at home      #Vte ppx  ~cont. Heparin sq       Dispo: Feeds begin today. Will monitor residuals. Correct all electrolytes. Ucx growing klebsiella - pt not symptomatic and will assume is a colonizer

## 2023-01-11 NOTE — PROGRESS NOTE ADULT - SUBJECTIVE AND OBJECTIVE BOX
HPI:    66 y/o M PMHx significant for Neuroendocrine tumor diagnosed 6 years ago, Hypertension, Hyperlipidemia, Diabetes Mellitus Type 2, BPH and light chain nephropathy, was reportedly referred to the [facility] by Gastroenterology for further evaluation and management intractable nausea and vomiting (non-bloody), and concerns for ongoing dehydration.    Vital signs in triage => BP 75/52, HR 83/min, RR 17/min, TMax 98.3'F, SpO2 100% on room air. Labs => BUN/Cr 41/2.41, Alb 3.1. CT ABD/Pelvis => Large pancreatic tail mass with minimal mass effect on the stomach. No bowel obstruction. The stomach and bowel are fluid-filled. Small hiatal hernia with questionable mild thickening of the distal esophagus. Multiple hepatic hypodense lesions concerning for metastatic disease. In the ED the patient was given Ondansetron 4mg IVP x 1, LR x 1L, and NS x 1L.    Found to have gastric outlet obstruction- evaluated by GI and surgical oncology.(06 Jan 2023 17:50)      01/09/2023: Patient seen at bedside with attending Dr Farrell, no acute distress. No vomiting. NGT in place No abd pain    01/11/2023: Patient seen at bedside, s/p G & J tube placement, no complications, feeling well, NGT absent      PAST MEDICAL & SURGICAL HISTORY:    Hypertension    BPH (benign prostatic hyperplasia)    Renal insufficiency    Light chain nephropathy    DM (diabetes mellitus)    HLD (hyperlipidemia)    No significant past surgical history          MEDICATIONS  (STANDING):    dextrose 5% + sodium chloride 0.45% with potassium chloride 20 mEq/L 1000 milliLiter(s) (75 mL/Hr) IV Continuous <Continuous>  dextrose 5%. 1000 milliLiter(s) (100 mL/Hr) IV Continuous <Continuous>  dextrose 5%. 1000 milliLiter(s) (50 mL/Hr) IV Continuous <Continuous>  dextrose 50% Injectable 25 Gram(s) IV Push once  dextrose 50% Injectable 12.5 Gram(s) IV Push once  dextrose 50% Injectable 25 Gram(s) IV Push once  ferrous    sulfate 325 milliGRAM(s) Oral daily  glucagon  Injectable 1 milliGRAM(s) IntraMuscular once  heparin   Injectable 5000 Unit(s) SubCutaneous every 8 hours  multivitamin 1 Tablet(s) Oral daily  rosuvastatin 10 milliGRAM(s) Oral at bedtime      MEDICATIONS  (PRN):    acetaminophen     Tablet .. 650 milliGRAM(s) Oral every 6 hours PRN Temp greater or equal to 38C (100.4F), Mild Pain (1 - 3)  aluminum hydroxide/magnesium hydroxide/simethicone Suspension 30 milliLiter(s) Oral every 4 hours PRN Dyspepsia  dextrose Oral Gel 15 Gram(s) Oral once PRN Blood Glucose LESS THAN 70 milliGRAM(s)/deciliter  melatonin 3 milliGRAM(s) Oral at bedtime PRN Insomnia        ALLERGIES:    losartan (Angioedema)        FAMILY HISTORY:    breast cancer (Mother)    aneurysm (Father)        REVIEW OF SYSTEMS:    Constitutional - weight loss ~ 20 lbs last few months , Eyes, ENT, Cardiovascular, Respiratory, Gastrointestinal, Genitourinary, Musculoskeletal, Integumentary, Neurological, Psychiatric, Endocrine, Heme/Lymph and Allergic/Immunologic review of systems are otherwise negative except as noted in HPI.       VITALS:    ICU Vital Signs Last 24 Hrs  T(C): 36.8 (11 Jan 2023 07:50), Max: 36.8 (11 Jan 2023 07:50)  T(F): 98.2 (11 Jan 2023 07:50), Max: 98.2 (11 Jan 2023 07:50)  HR: 58 (11 Jan 2023 07:50) (58 - 71)  BP: 132/81 (11 Jan 2023 07:50) (118/77 - 141/84)  BP(mean): --  ABP: --  ABP(mean): --  RR: 17 (11 Jan 2023 07:50) (13 - 22)  SpO2: 100% (11 Jan 2023 07:50) (96% - 100%)    O2 Parameters below as of 11 Jan 2023 07:50  Patient On (Oxygen Delivery Method): room air        PHYSICAL EXAM:    Constitutional: no acute distress  Eyes: PERRL, EOMI, no conjunctival infection, anicteric.   ENT: pharynx is unremarkable, moist mucus membrane, no oral lesions.   Neck: supple without JVD, no thyromegaly or masses appreciated.   Pulmonary: clear to auscultation bilaterally, no dullness, no wheezing.   Cardiac: RRR, normal S1S2, no murmurs, rubs, gallops.   Vascular: no JVD, no calf tenderness, venous stasis changes, varices.   Abdomen: normoactive bowel sounds, soft and nontender, no hepatosplenomegaly or masses appreciated. G J in place clean dry & intact  Lymphatic: no peripheral adenopathy appreciated.   Musculoskeletal: full range of motion and no deformities appreciated.   Skin: normal appearance, no rash, nodules, vesicles, ulcers, erythema.   Neurology: CNII - XII intact with no obvious focal deficits  Psychiatric: affect/mood appropriate, A&Ox3 (person, place, time)      LABS:    CBC Full  -  ( 10 Cedrick 2023 06:18 )  WBC Count : 6.15 K/uL  RBC Count : 3.13 M/uL  Hemoglobin : 9.4 g/dL  Hematocrit : 29.3 %  Platelet Count - Automated : 124 K/uL  Mean Cell Volume : 93.6 fl  Mean Cell Hemoglobin : 30.0 pg  Mean Cell Hemoglobin Concentration : 32.1 gm/dL  Auto Neutrophil # : x  Auto Lymphocyte # : x  Auto Monocyte # : x  Auto Eosinophil # : x  Auto Basophil # : x  Auto Neutrophil % : x  Auto Lymphocyte % : x  Auto Monocyte % : x  Auto Eosinophil % : x  Auto Basophil % : x                          9.4    6.15  )-----------( 124      ( 10 Cedrick 2023 06:18 )             29.3     01-11    146<H>  |  110<H>  |  28<H>  ----------------------------<  193<H>  3.7   |  32<H>  |  1.56<H>    Ca    8.2<L>      11 Jan 2023 06:41          RADIOLOGY & ADDITIONAL STUDIES:      Xray Chest 1 View-PORTABLE IMMEDIATE (01.06.23 @ 13:07)    INTERPRETATION:  HISTORY: Sepsis    TECHNIQUE: A single AP view of the chest was obtained.    COMPARISON: 12/11/2022    FINDINGS:  The cardiac silhouette is normal in size. There are no focal   consolidations or pleural effusions. The hilar and mediastinal structures   appear unremarkable. The osseous structures are intact.    IMPRESSION: Clear lungs.          CT Abdomen and Pelvis No Cont (01.06.23 @ 14:17)      INTERPRETATION:  CLINICAL INFORMATION: Vomiting, rule out SBO or gastric   outlet obstruction    COMPARISON: 12/10/2022    CONTRAST/COMPLICATIONS:  IV Contrast: NONE  Oral Contrast: NONE  Complications: None reported at time of study completion    PROCEDURE:  CT of the Abdomen and Pelvis was performed.  Sagittal and coronal reformats were performed.    FINDINGS:  LOWER CHEST: Within normal limits.    LIVER: Multiple indeterminant hepatic hypodensities measuring up to 4.7 x   3.9 cm at the hepatic dome (2:14; previously 4.0 x 3.4 cm).  BILE DUCTS: Normal caliber.  GALLBLADDER: Within normal limits.  SPLEEN: Within normal limits.  PANCREAS: Large heterogeneous mass in the pancreatic tail/distal body   measuring 12.8 x 7.5 cm with mild mass effect on the stomach.  ADRENALS: Within normal limits.  KIDNEYS/URETERS: There is a 3 mm nonobstructing stone in the lower pole   of the left kidney.    BLADDER: Minimally distended.  REPRODUCTIVE ORGANS: Prostate within normal limits.    BOWEL: Small hiatal hernia with questionable mild thickening of the   distal esophagus. Stomach and bowel are fluid distended. No bowel   obstruction. Appendixis not visualized. No evidence of inflammation in   the pericecal region.  PERITONEUM: No ascites.  VESSELS: Atherosclerotic changes.  RETROPERITONEUM/LYMPH NODES: No lymphadenopathy.  ABDOMINAL WALL: Within normal limits.  BONES: Degenerative changes. Severe T12-L1 disc height loss and endplate   sclerosis, similar in appearance to prior. Bilateral L5 spondylolysis.    IMPRESSION:  Large pancreatic tail mass with minimal mass effect on the stomach. No   bowel obstruction.    The stomach and bowel are fluid-filled.    Small hiatal hernia with questionable mild thickening of the distal   esophagus.    Multiple hepatic hypodense lesions concerning for metastatic disease.   Consider PET/CT for further evaluation.    Additional findings as above.

## 2023-01-11 NOTE — PROGRESS NOTE ADULT - ASSESSMENT
66 y/o M PMHx significant for Neuroendocrine tumor diagnosed 6 years ago, Hypertension, Hyperlipidemia, Diabetes Mellitus Type 2, BPH and light chain nephropathy, was reportedly referred to the [facility] by Gastroenterology for further evaluation and management intractable nausea and vomiting (non-bloody), and concerns for ongoing dehydration. Heme/Onc consulted for continuity of care.    # Neuroendocrine Tumor    - Initially diagnosed approx 6 years ago; currently follows with Primary Onc Dr Ac Llamas  - Started on Sandostatin, completed Lutathera about 1 year ago ---> Recent scans noted to have progression of disease, started back on Lutathera 12/07/2022  - Recently admitted to [facility] for similar GI symptoms following noted Lutathera treatment    Diagnosed with gastric outlet obstruction - referred to surgical oncology. Saw Dr Sididqi in outpatient 1/4/23.  - Follow up with Primary Onc Dr Llamas once d/c'ed      # Nausea Vomiting due to gastric outlet obstruction ;   s/p Lutathera Treatment    - Most recent Luthathera treatment 12/07/2022 . Current sx not due to Luthatera.  - Recently admitted to [facility] for similar GI symptoms.  - Found to have gastric outlet obstruction this admission; SurgOnc and GI following  - As per GI;  Plan for stenting vs G-J tube as can just have an atonic/dilated stomach due to mass/nerve infiltration vs actual gastric outlet obstruction; Endoscopically evaluate; NGT for symptomatic comfort and safer anesthesia ---> s/p placement 01/10/2023  - Continue supportive measures  - Tolerating PO intake at this time     68 y/o M PMHx significant for Neuroendocrine tumor diagnosed 6 years ago, Hypertension, Hyperlipidemia, Diabetes Mellitus Type 2, BPH and light chain nephropathy, was reportedly referred to the [facility] by Gastroenterology for further evaluation and management intractable nausea and vomiting (non-bloody), and concerns for ongoing dehydration. Heme/Onc consulted for continuity of care.    # Neuroendocrine Tumor    - Initially diagnosed approx 6 years ago; currently follows with Primary Onc Dr Ac Llamas  - Started on Sandostatin, completed Lutathera about 1 year ago ---> Recent scans noted to have progression of disease, started back on Lutathera 12/07/2022  - Recently admitted to [facility] for similar GI symptoms following noted Lutathera treatment    Diagnosed with gastric outlet obstruction - referred to surgical oncology. Saw Dr Siddiqi in outpatient 1/4/23.  - Follow up with Primary Onc Dr Llamas once d/c'ed      # Nausea Vomiting due to gastric outlet obstruction ;      - Found to have gastric outlet obstruction this admission; SurgOnc and GI following  - As per GI  S/p EGD 1/10 - severe esophagitis, no obstruction seen- no stent. S/p GJ tube placement .   To  advance diet per surgery and GI.

## 2023-01-12 NOTE — PROGRESS NOTE ADULT - ASSESSMENT
Assessment and Plan:      	  66 y/o M PMHx significant for Neuroendocrine tumor diagnosed 6 years ago, Hypertension, Hyperlipidemia, Diabetes Mellitus Type 2, BPH and light chain nephropathy, was reportedly referred to the [facility] by Gastroenterology for further evaluation and management intractable nausea and vomiting (non-bloody), and concerns for ongoing dehydration.  Vital signs in triage => BP 75/52, HR 83/min, RR 17/min, TMax 98.3'F, SpO2 100% on room air. Labs => BUN/Cr 41/2.41, Alb 3.1. CT ABD/Pelvis => Large pancreatic tail mass with minimal mass effect on the stomach. No bowel obstruction. The stomach and bowel are fluid-filled. Small hiatal hernia with questionable mild thickening of the distal esophagus. Multiple hepatic hypodense lesions concerning for metastatic disease. In the ED the patient was given Ondansetron 4mg IVP x 1, LR x 1L, and NS x 1L.    #Intractable Nausea and Vomiting secondary to known large pancreatic mass and gastric atonia  #Hypernatremia 2/2 to GI sensible losses and npo status/Hypokalemia  - G tube in place - Feeds tolerated over 1st 24 hours. after 48 hours can begin dc planning for home feed instructions  - D/W Dr Doyle-> can have clear pleasure feeds  - zofran prn      #Acute on Chronic Renal Failure -baseline 1.5  #Severe protein calorie malnutrition  - currently 1.1  - pre renal  - volume depletion  - stop ivf      #Neuroendocrine Cancer  ~patient has a history of a pancreatic neuroendocrine tumor diagnosed 6 years ago  ~follows with (Oncologist) Dr. Llamas and (Urologist) Dr. Padron  - Receives monthly Sandostatin (Octreotide) injection    #Diabetes Mellitus Type2  ~FS qAC/HS  ~cont. ISS per protocol    #Hypertension  ~will hold Irbesartan in the setting of ZO - resume upon dc  ~Monitor routine hemodynamics. BP controlled    #Hyperlipidemia  ~cont. Rosuvastatin 10mg po qhs     #BPH  ~self catherizes at home      #Vte ppx  ~cont. Heparin sq       Dispo:  monitor residuals. Correct all electrolytes. Ucx growing klebsiella - pt not symptomatic and will assume is a colonizer

## 2023-01-12 NOTE — PROGRESS NOTE ADULT - SUBJECTIVE AND OBJECTIVE BOX
Cheif complaints and Diagnosis: pancreatic mass/ intractable nausea and vommitting    Subjective: successful G tube placement. D#2 Tube feeds. Tolerated feeds overnight       REVIEW OF SYSTEMS:    CONSTITUTIONAL: No weakness, fevers or chills  EYES/ENT: No visual changes;  No vertigo or throat pain   NECK: No pain or stiffness  RESPIRATORY: No cough, wheezing, hemoptysis; No shortness of breath  CARDIOVASCULAR: No chest pain or palpitations  GASTROINTESTINAL: No abdominal or epigastric pain. No nausea, vomiting, or hematemesis; No diarrhea or constipation. No melena or hematochezia.  GENITOURINARY: No dysuria, frequency or hematuria  NEUROLOGICAL: No numbness or weakness  SKIN: No itching, burning, rashes, or lesions   All other review of systems is negative unless indicated above      ICU Vital Signs Last 24 Hrs  T(C): 36.8 (2023 07:50), Max: 36.8 (2023 07:50)  T(F): 98.2 (2023 07:50), Max: 98.2 (2023 07:50)  HR: 58 (2023 07:50) (58 - 71)  BP: 132/81 (2023 07:50) (118/77 - 141/84)  BP(mean): --  ABP: --  ABP(mean): --  RR: 17 (2023 07:50) (13 - 22)  SpO2: 100% (2023 07:50) (96% - 100%)    O2 Parameters below as of 2023 07:50  Patient On (Oxygen Delivery Method): room air            EKG: SR with PVC. No dynamic changes from ekg on 23    HEENT:   pupils equal and reactive, EOMI, no oropharyngeal lesions, erythema, exudates, oral thrush    NECK:   supple, no carotid bruits, no palpable lymph nodes, no thyromegaly    CV:  +S1, +S2, regular, no murmurs or rubs    RESP:   lungs clear to auscultation bilaterally, no wheezing, rales, rhonchi, good air entry bilaterally    BREAST:  not examined    GI:  abdomen soft, non-tender, non-distended, normal BS, no bruits, no abdominal masses, no palpable masses. tube in place, no surrounding redness or discharge    RECTAL:  not examined    :  not examined    MSK:   normal muscle tone, no atrophy, no rigidity, no contractions    EXT:   no clubbing, no cyanosis, no edema, no calf pain, swelling or erythema    VASCULAR:  pulses equal and symmetric in the upper and lower extremities    NEURO:  AAOX3, no focal neurological deficits, follows all commands, able to move extremities spontaneously    SKIN:  no ulcers, lesions or rashes    MEDICATIONS  (STANDING):  dextrose 5%. 1000 milliLiter(s) (50 mL/Hr) IV Continuous <Continuous>  dextrose 5%. 1000 milliLiter(s) (100 mL/Hr) IV Continuous <Continuous>  dextrose 50% Injectable 25 Gram(s) IV Push once  dextrose 50% Injectable 12.5 Gram(s) IV Push once  dextrose 50% Injectable 25 Gram(s) IV Push once  ferrous    sulfate 325 milliGRAM(s) Oral daily  glucagon  Injectable 1 milliGRAM(s) IntraMuscular once  heparin   Injectable 5000 Unit(s) SubCutaneous every 8 hours  multivitamin 1 Tablet(s) Oral daily  ondansetron Injectable 4 milliGRAM(s) IV Push every 6 hours  rosuvastatin 10 milliGRAM(s) Oral at bedtime  sodium chloride 0.9%. 1000 milliLiter(s) (75 mL/Hr) IV Continuous <Continuous>    MEDICATIONS  (PRN):  acetaminophen     Tablet .. 650 milliGRAM(s) Oral every 6 hours PRN Temp greater or equal to 38C (100.4F), Mild Pain (1 - 3)  aluminum hydroxide/magnesium hydroxide/simethicone Suspension 30 milliLiter(s) Oral every 4 hours PRN Dyspepsia  dextrose Oral Gel 15 Gram(s) Oral once PRN Blood Glucose LESS THAN 70 milliGRAM(s)/deciliter  melatonin 3 milliGRAM(s) Oral at bedtime PRN Insomnia  prochlorperazine   Injectable 2.5 milliGRAM(s) IV Push every 4 hours PRN Nausea      Urinalysis Basic - ( 2023 17:21 )    Color: Yellow / Appearance: Slightly Turbid / S.025 / pH: x  Gluc: x / Ketone: Small  / Bili: Small / Urobili: Negative   Blood: x / Protein: 100 / Nitrite: Negative   Leuk Esterase: Small / RBC: 11-25 /HPF / WBC >50 /HPF   Sq Epi: x / Non Sq Epi: Few / Bacteria: Many    2023 06:16    141    |  105    |  57     ----------------------------<  236    3.8     |  22     |  2.32     Ca    8.6        2023 06:16    TPro  6.4    /  Alb  2.7    /  TBili  0.4    /  DBili  x      /  AST  25     /  ALT  20     /  AlkPhos  66     2023 06:16  LIVER FUNCTIONS - ( 2023 06:16 )  Alb: 2.7 g/dL / Pro: 6.4 gm/dL / ALK PHOS: 66 U/L / ALT: 20 U/L / AST: 25 U/L / GGT: x         PT/INR - ( 2023 12:06 )   PT: 12.6 sec;   INR: 1.09 ratio         PTT - ( 2023 12:06 )  PTT:29.9 secCBC Full  -  ( 2023 08:26 )  WBC Count : 18.53 K/uL  Hemoglobin : 10.2 g/dL  Hematocrit : 31.1 %  Platelet Count - Automated : 179 K/uL  Mean Cell Volume : 91.2 fl  Mean Cell Hemoglobin : 29.9 pg  Mean Cell Hemoglobin Concentration : 32.8 gm/dL  Auto Neutrophil # : x  Auto Lymphocyte # : x  Auto Monocyte # : x  Auto Eosinophil # : x  Auto Basophil # : x  Auto Neutrophil % : x  Auto Lymphocyte % : x  Auto Monocyte % : x  Auto Eosinophil % : x  Auto Basophil % : x            PT/INR - ( 2023 12:06 )   PT: 12.6 sec;   INR: 1.09 ratio         PTT - ( 2023 12:06 )  PTT:29.9 sec

## 2023-01-13 NOTE — DISCHARGE NOTE NURSING/CASE MANAGEMENT/SOCIAL WORK - PATIENT PORTAL LINK FT
You can access the FollowMyHealth Patient Portal offered by [facility] by registering at the following website: [email or web address]. By joining [employer]’s FollowMyHealth portal, you will also be able to view your health information using other applications (apps) compatible with our system.
You can access the FollowMyHealth Patient Portal offered by Health system by registering at the following website: [email or web address]. By joining [employer]’s FollowMyHealth portal, you will also be able to view your health information using other applications (apps) compatible with our system.

## 2023-01-13 NOTE — DISCHARGE NOTE NURSING/CASE MANAGEMENT/SOCIAL WORK - NSSCNAMETXT_GEN_ALL_CORE
[facility] infusion services( [facility]) [phone]; [facility] [phone]

## 2023-01-13 NOTE — DISCHARGE NOTE NURSING/CASE MANAGEMENT/SOCIAL WORK - NSDCPEFALRISK_GEN_ALL_CORE
For information on Fall & Injury Prevention, visit: [email or web address] OR  [email or web address] OR  https://www.cdc.gov/steadi/patient.html

## 2023-01-13 NOTE — PROGRESS NOTE ADULT - ASSESSMENT
68 y/o M PMHx significant for Neuroendocrine tumor diagnosed 6 years ago, Hypertension, Hyperlipidemia, Diabetes Mellitus Type 2, BPH and light chain nephropathy, was reportedly referred to the [facility] by Gastroenterology for further evaluation and management intractable nausea and vomiting (non-bloody), and concerns for ongoing dehydration.  Vital signs in triage => BP 75/52, HR 83/min, RR 17/min, TMax 98.3'F, SpO2 100% on room air. Labs => BUN/Cr 41/2.41, Alb 3.1. CT ABD/Pelvis => Large pancreatic tail mass with minimal mass effect on the stomach. No bowel obstruction. The stomach and bowel are fluid-filled. Small hiatal hernia with questionable mild thickening of the distal esophagus. Multiple hepatic hypodense lesions concerning for metastatic disease. In the ED the patient was given Ondansetron 4mg IVP x 1, LR x 1L, and NS x 1L.    #Intractable Nausea and Vomiting secondary to known large pancreatic mass and gastric atonia  #Hypernatremia 2/2 to GI sensible losses and npo status/Hypokalemia  - G tube in place - Feeds tolerated over 1st 24 hours. after 48 hours can begin dc planning for home feed instructions  - D/W Dr Doyle-> can have clear pleasure feeds - can have trial of FL pleasure feeds in small amounts today - would not advance further without seeing GI as OP  - zofran prn      #Acute on Chronic Renal Failure -baseline 1.5  #Severe protein calorie malnutrition  - pre renal  - volume depletion  - stop ivf      #Neuroendocrine Cancer  ~patient has a history of a pancreatic neuroendocrine tumor diagnosed 6 years ago  ~follows with (Oncologist) Dr. Llamas and (Urologist) Dr. Padron  - Receives monthly Sandostatin (Octreotide) injection    #Diabetes Mellitus Type2  ~FS qAC/HS  ~cont. ISS per protocol    #Hypertension  ~will hold Irbesartan in the setting of ZO - resume upon dc  ~Monitor routine hemodynamics. BP controlled    #Hyperlipidemia  ~cont. Rosuvastatin 10mg po qhs     #BPH  ~self catherizes at home  Ucx growing klebsiella - pt not symptomatic and will assume is a colonizer    #Vte ppx  ~cont. 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

## 2023-01-13 NOTE — PROGRESS NOTE ADULT - SUBJECTIVE AND OBJECTIVE BOX
CC:  pancreatic mass/ intractable nausea and vommitting    1/13 - no cp palps sob abdo pain; tolerating TFs - disappointed that he cannot get bolus, is ambulating     PHYSICAL EXAM:  Vital Signs Last 24 Hrs  T(C): 36.8 (13 Jan 2023 16:58), Max: 36.8 (13 Jan 2023 16:58)  T(F): 98.2 (13 Jan 2023 16:58), Max: 98.2 (13 Jan 2023 16:58)  HR: 80 (13 Jan 2023 16:58) (72 - 80)  BP: 104/66 (13 Jan 2023 16:58) (104/66 - 114/71)  RR: 18 (13 Jan 2023 16:58) (17 - 18)  SpO2: 95% (13 Jan 2023 16:58) (95% - 98%)  Parameters below as of 13 Jan 2023 16:58  Patient On (Oxygen Delivery Method): room air  GENERAL: NAD, able to lie flat in bed  HEAD:  Atraumatic, Normocephalic  EYES: EOMI, PERRLA, normal sclera  ENT: Moist mucous membranes  NECK: Supple, No JVD, no nuchal rigidity  CHEST/LUNG: Clear to auscultation bilaterally; No rales, rhonchi, wheezing, or rubs. Unlabored respirations  HEART: Regular rate and rhythm; No murmurs, rubs, or gallops  ABDOMEN: Bowel sounds present; Soft, Nontender, Nondistended. No hepatomegaly, sp GJ tube   EXTREMITIES:  no pitting bilaterally  NERVOUS SYSTEM:  Alert & Oriented X3, speech clear. No focal motor or sensory deficits  MSK: FROM all 4 extremities, full and equal strength  SKIN: No rashes or lesions      LABS: All Labs Reviewed:  133<L>  |  96  |  23  ----------------------------<  134<H>  3.5   |  29  |  1.34<H>    Ca    8.6      13 Jan 2023 07:40  Phos  1.8     01-12  Mg     2.2     01-13    RADIOLOGY/EKG:  < from: CT Abdomen and Pelvis No Cont (01.06.23 @ 14:17) >  Large pancreatic tail mass with minimal mass effect on the stomach. No   bowel obstruction.  The stomach and bowel are fluid-filled.  Small hiatal hernia with questionable mild thickening of the distal   esophagus.  Multiple hepatic hypodense lesions concerning for metastatic disease.   Consider PET/CT for further evaluation.        MEDS:   acetaminophen     Tablet .. 650 milliGRAM(s) Oral every 6 hours PRN  aluminum hydroxide/magnesium hydroxide/simethicone Suspension 30 milliLiter(s) Oral every 4 hours PRN  dextrose 5%. 1000 milliLiter(s) IV Continuous <Continuous>  dextrose 5%. 1000 milliLiter(s) IV Continuous <Continuous>  dextrose 50% Injectable 25 Gram(s) IV Push once  dextrose 50% Injectable 25 Gram(s) IV Push once  dextrose 50% Injectable 12.5 Gram(s) IV Push once  dextrose Oral Gel 15 Gram(s) Oral once PRN  ferrous    sulfate 325 milliGRAM(s) Oral daily  glucagon  Injectable 1 milliGRAM(s) IntraMuscular once  heparin   Injectable 5000 Unit(s) SubCutaneous every 8 hours  melatonin 3 milliGRAM(s) Oral at bedtime PRN  multivitamin 1 Tablet(s) Oral daily  rosuvastatin 10 milliGRAM(s) Oral at bedtime

## 2023-01-13 NOTE — CHART NOTE - NSCHARTNOTEFT_GEN_A_CORE
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 Reportedly referred to the [facility] by Gastroenterology for further evaluation and management intractable nausea and vomiting (non-bloody), and concerns for ongoing dehydration. PMH significant for Neuroendocrine tumor diagnosed 6 years ago, Hypertension, Hyperlipidemia, Diabetes Mellitus Type 2, BPH and light chain nephropathy.     *1/11: Pt seen by RD on 1/7 - dx w/ severe PCM w/ recommendations to initiate nutrition support if PO intake does not improve within 48 hrs. As per GI note on 1/7, "Plan would be for stenting vs G-J tube as can just have an atonic/dilated stomach due to mass/nerve infiltration vs actual gastric outlet obstruction. Would need to endoscopically evaluate. Patient needs NG tube for symptomatic comfort and to help for safer anesthesia." NG tube placement improved pain, underwent G-J tube placement on 1/10 as per surgical resident note. Consult placed for RD on 1/11 for TPN/PPN, however TPN/PPN inappropriate 2/2 functional GI tract and s/p G-J placement. See below for TF recommendations.     *current status: Yesterday initiated continuous G-J tube TF. As per hospitalist note, "tolerated feeds overnight. G tube in place - Feeds tolerated over 1st 24 hours. after 48 hours can begin d/c planning for home feed instructions. D/W Dr Doyle-> can have clear pleasure feeds." As per discussion w/ MD, pt requesting bolus feeds upon discharge. Unable to change continuous feeds to bolus feeds 2/2 CANNOT PROVIDE BOLUS FEEDS IN J-PORT OF G-J TUBE. G-tube currently being used for suction. Will change G-J TF to nocturnal feeds to provide SOLE nutritional needs and allow pleasure feeds through the day. See updated TF recommendations below.      *labs reviewed:  01-12    137  |  101  |  19  ----------------------------<  182<H>  3.7   |  32<H>  |  1.14    Ca    7.8<L>      12 Jan 2023 06:07  Phos  1.8     01-12  Mg     1.7     01-12      *I&O's  *none documented. Important to maintain strict I/Os.     *Last BM documented on 1/6 - not on bowel regimen.     *TF RATE NOT DOCUMENTED IN FLOWSHEET.     *ESTIMATED NUTR NEEDS: 63Kg based on     7306-5680 Kcal (30-35 Kcal/Kg)      100-113 g protein (1.6-1.8 g/Kg)      6141-1631 mL   (25-30 mL/Kg)    RECOMMENDATIONS:  1) Change TF to Glucerna 1.5; nocturnal feeds @ 95 cc/hr x 14 hours. Will provide ~ 1995 kcal, 110 g protein, and 1010 mL free water. Nocturnal feeds to provide SOLE nutritional needs; allow pleasure feeds during the day.  2) monitor hydration status; adjust free water flushes prn to maintain hydration as per MD, consider free water flushes of 30 cc/hr (which provides an additional 720 mL of water per day)  3) monitor TF tolerance; keep back of bed > 45 degrees  4) monitor BM: consider implementing bowel regimen 2/2 pt w/o BM x > 3 days  5) MVI w/ minerals daily to ensure 100% RDA met   6) Consider adding thiamine 100 mg daily 2/2 malnutrition   7) daily wt checks to track/trend changes; strict I/Os     *will continue to monitor and adjust treatment plan prn*  Nina Christensen, RDN [phone]
Clinical Nutrition BRIEF Note    *Current status: RD spoke w/ MD in regards to nocturnal feeds (see previous RD TF brief note - pt s/p G-J tube on 1/11.) As per MD, the pt and pt's wife wanted to speak w/ RD why bolus feeds are not feasible. RD and CNM spoke w/ pt and pt's wife (present at bed side) why bolus feeds are not feasible and explained upon d/c that pt will undergo pleasure feeds PO during the day and will be bridged w/ nocturnal feeds to meet 100% of his SOLE nutritional needs. Both receptive to information. MD to determine when pt can be d/c'ed and when PO diet can be advanced from CLD. See recommendations below.     Diet, Clear Liquid:   Tube Feeding Modality: Gastro-Jejunostomy  Glucerna 1.5 Chidi (GLUCERNA1.5)  Total Volume for 24 Hours (mL): 2280  Continuous  Starting Tube Feed Rate {mL per Hour}: 65  Increase Tube Feed Rate by (mL): 10     Every 6 hours  Until Goal Tube Feed Rate (mL per Hour): 95  Tube Feed Duration (in Hours): 24  Tube Feed Start Time: 18:00  Free Water Flush   Total Volume per Flush (mL): 30   Frequency: Every Hour (01-12-23 @ 18:46)    *ESTIMATED NUTR NEEDS: 63Kg based on     7774-1078 Kcal (30-35 Kcal/Kg)      100-113 g protein (1.6-1.8 g/Kg)      5355-8921 mL   (25-30 mL/Kg)    RECOMMENDATIONS:  1) C/w Glucerna 1.5; nocturnal feeds @ 95 cc/hr x 14 hours. Will provide ~ 1995 kcal, 110 g protein, and 1010 mL free water. Nocturnal feeds to provide SOLE nutritional needs; allow pleasure feeds during the day. MD to determine when PO diet can advance.   2) monitor hydration status; adjust free water flushes prn to maintain hydration as per MD, consider free water flushes of 30 cc/hr (which provides an additional 720 mL of water per day)  3) monitor TF tolerance; keep back of bed > 45 degrees  4) monitor BM: consider implementing bowel regimen 2/2 pt w/o BM x > 3 days  5) MVI w/ minerals daily to ensure 100% RDA met   6) Consider adding thiamine 100 mg daily 2/2 malnutrition   7) daily wt checks to track/trend changes; strict I/Os     *will continue to monitor and adjust nutrition plan prn*  Nina Christensen, RDN [phone]
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 Reportedly referred to the [facility] by Gastroenterology for further evaluation and management intractable nausea and vomiting (non-bloody), and concerns for ongoing dehydration. PMH significant for Neuroendocrine tumor diagnosed 6 years ago, Hypertension, Hyperlipidemia, Diabetes Mellitus Type 2, BPH and light chain nephropathy.     *current status: Pt seen by RD on 1/7 - dx w/ severe PCM w/ recommendations to initiate nutrition support if PO intake does not improve within 48 hrs. As per GI note on 1/7, "Plan would be for stenting vs G-J tube as can just have an atonic/dilated stomach due to mass/nerve infiltration vs actual gastric outlet obstruction. Would need to endoscopically evaluate. Patient needs NG tube for symptomatic comfort and to help for safer anesthesia." NG tube placement improved pain, underwent G-J tube placement on 1/10 as per surgical resident note. Consult placed for RD on 1/11 for TPN/PPN, however TPN/PPN inappropriate 2/2 functional GI tract and s/p G-J placement. See below for TF recommendations.     *labs reviewed:  01-11    146<H>  |  110<H>  |  28<H>  ----------------------------<  193<H>  3.7   |  32<H>  |  1.56<H>    Ca    8.2<L>      11 Jan 2023 06:41        *I&O's    01-10-23 @ 07:01  -  01-11-23 @ 07:00  --------------------------------------------------------  IN:    Other (mL): 400 mL  Total IN: 400 mL    OUT:    Intermittent Catheterization - Urethral (mL): 250 mL    Voided (mL): 800 mL  Total OUT: 1050 mL    Total NET: -650 mL    *Last BM documented on 1/6 - not on bowel regimen.     *malnutrition: Pt meets criteria for severe protein calorie malnutrition in context of acute illness r/t inability to consume sufficient ENN 2/2 n/v from gastric outlet obstruction AEB severe/moderate muscle/fat wasting , >7% wt loss in 1 week, <50% ENN x 5 days or more.    *ESTIMATED NUTR NEEDS: 63Kg based on     9046-9133 Kcal (30-35 Kcal/Kg)      100-113 g protein (1.6-1.8 g/Kg)      5568-7138 mL   (25-30 mL/Kg)    RECOMMENDATIONS:  1) Initiate Glucerna 1.5 @ 25 cc/hr, increase by 10 cc/hr q6 hours until goal rate of 65 cc/hr is met. Will provide ~ 1950 kcal, 107 g protein, and 987 mL free water.   2) monitor hydration status; adjust free water flushes prn, consider free water flushes of 30 cc/hr (which provides an additional 720 mL of water per day; total volume 1700 mL)  3) monitor TF tolerance; keep back of bed > 45 degrees  4) monitor BM: consider implementing bowel regimen 2/2 pt w/o BM x > 3 days  5) MVI w/ minerals daily to ensure 100% RDA met   6) Consider adding thiamine 100 mg daily 2/2 malnutrition   7) daily wt checks to track/trend changes; strict I/Os     *will continue to monitor and adjust treatment plan prn*  Nina Christensen, RDN [phone]

## 2023-01-14 NOTE — DISCHARGE NOTE PROVIDER - DETAILS OF MALNUTRITION DIAGNOSIS/DIAGNOSES
This patient has been assessed with a concern for Malnutrition and was treated during this hospitalization for the following Nutrition diagnosis/diagnoses:     -  01/07/2023: Severe protein-calorie malnutrition

## 2023-01-14 NOTE — DISCHARGE NOTE PROVIDER - CARE PROVIDER_API CALL
Joss Freeman (DO)  Medicine  [facility]  [address], [address]  [place] [address]  Phone: [phone]  Fax: [phone]  Established Patient  Follow Up Time:     Leonardo Doyle)  Gastroenterology; Internal Medicine  [address] [place] [address]  Phone: [phone]  Fax: [phone]  Established Patient  Follow Up Time:

## 2023-01-14 NOTE — PROGRESS NOTE ADULT - SUBJECTIVE AND OBJECTIVE BOX
Patient is a 67y old  Male who presents with a chief complaint of Nausea, and vomiting (13 Jan 2023 19:49)      History, interim events and clinically pertinent issues reviewed; patient interviewed and examined.  he feels well and is anxious to go home  No NV  tolerating GT feeds      ALLERGIES:  losartan (Angioedema)    MEDICATIONS  (STANDING):  dextrose 5%. 1000 milliLiter(s) (100 mL/Hr) IV Continuous <Continuous>  dextrose 5%. 1000 milliLiter(s) (50 mL/Hr) IV Continuous <Continuous>  dextrose 50% Injectable 25 Gram(s) IV Push once  dextrose 50% Injectable 12.5 Gram(s) IV Push once  dextrose 50% Injectable 25 Gram(s) IV Push once  ferrous    sulfate 325 milliGRAM(s) Oral daily  glucagon  Injectable 1 milliGRAM(s) IntraMuscular once  heparin   Injectable 5000 Unit(s) SubCutaneous every 8 hours  multivitamin 1 Tablet(s) Oral daily  rosuvastatin 10 milliGRAM(s) Oral at bedtime    MEDICATIONS  (PRN):  acetaminophen     Tablet .. 650 milliGRAM(s) Oral every 6 hours PRN Temp greater or equal to 38C (100.4F), Mild Pain (1 - 3)  aluminum hydroxide/magnesium hydroxide/simethicone Suspension 30 milliLiter(s) Oral every 4 hours PRN Dyspepsia  dextrose Oral Gel 15 Gram(s) Oral once PRN Blood Glucose LESS THAN 70 milliGRAM(s)/deciliter  melatonin 3 milliGRAM(s) Oral at bedtime PRN Insomnia    Vital Signs Last 24 Hrs  T(F): 97.7 (14 Jan 2023 07:53), Max: 98.2 (13 Jan 2023 16:58)  HR: 81 (14 Jan 2023 07:53) (75 - 81)  BP: 110/69 (14 Jan 2023 07:53) (100/68 - 110/69)  RR: 16 (14 Jan 2023 07:53) (16 - 18)  SpO2: 98% (14 Jan 2023 07:53) (95% - 98%)  I&O's Summary              PHYSICAL EXAM:  General: NAD, A/O x 3  ENT: MMM  Neck: Supple, No JVD  Lungs: Clear to auscultation bilaterally  Cardio: RRR, S1/S2, No murmurs  Abdomen: Soft, Nontender, Nondistended; Bowel sounds present GT C/D/I  Extremities: No calf tenderness, No pitting edema      LABS:      01-13    133  |  96  |  23  ----------------------------<  134  3.5   |  29  |  1.34    Ca    8.6      13 Jan 2023 07:40  Phos  1.8     01-12  Mg     2.2     01-13 01-12 Chol -- LDL -- HDL -- Trig 129 mg/dL                COVID-19 PCR: Luis (01-10-23 @ 05:30)

## 2023-01-14 NOTE — DISCHARGE NOTE PROVIDER - HOSPITAL COURSE
66 y/o M PMHx significant for Neuroendocrine tumor diagnosed 6 years ago, Hypertension, Hyperlipidemia, Diabetes Mellitus Type 2, BPH and light chain nephropathy, was reportedly referred to the [facility] by Gastroenterology for further evaluation and management intractable nausea and vomiting (non-bloody), and concerns for ongoing dehydration.  Vital signs in triage => BP 75/52, HR 83/min, RR 17/min, TMax 98.3'F, SpO2 100% on room air. Labs => BUN/Cr 41/2.41, Alb 3.1. CT ABD/Pelvis => Large pancreatic tail mass with minimal mass effect on the stomach. No bowel obstruction. The stomach and bowel are fluid-filled. Small hiatal hernia with questionable mild thickening of the distal esophagus. Multiple hepatic hypodense lesions concerning for metastatic disease. In the ED the patient was given Ondansetron 4mg IVP x 1, LR x 1L, and NS x 1L    Patient was admitted for IV Fluids with resolution of ZO and hypernatremia; he underwent successful endoscopic placement of percutaneous gastrostomy tube by Dr Galvez.  He tolerated G-tube feds and was cleared for pleasure feeds    All Feeding tube supplies and equipment were in place w/ case management

## 2023-01-14 NOTE — PROGRESS NOTE ADULT - ASSESSMENT
68 y/o M PMHx significant for Neuroendocrine tumor diagnosed 6 years ago, Hypertension, Hyperlipidemia, Diabetes Mellitus Type 2, BPH and light chain nephropathy, was reportedly referred to the [facility] by Gastroenterology for further evaluation and management intractable nausea and vomiting (non-bloody), and concerns for ongoing dehydration.  Vital signs in triage => BP 75/52, HR 83/min, RR 17/min, TMax 98.3'F, SpO2 100% on room air. Labs => BUN/Cr 41/2.41, Alb 3.1. CT ABD/Pelvis => Large pancreatic tail mass with minimal mass effect on the stomach. No bowel obstruction. The stomach and bowel are fluid-filled. Small hiatal hernia with questionable mild thickening of the distal esophagus. Multiple hepatic hypodense lesions concerning for metastatic disease. In the ED the patient was given Ondansetron 4mg IVP x 1, LR x 1L, and NS x 1L.    #Intractable Nausea and Vomiting secondary to known large pancreatic mass and gastric atonia  #Hypernatremia 2/2 to GI sensible losses and npo status/Hypokalemia  - G tube in place - Feeds tolerated over 1st 24 hours. after 48 hours can begin dc planning for home feed instructions  - F/U w Dr Doyle after discharge as well as PCP and hematology  - zofran prn      #Acute on Chronic Renal Failure -baseline 1.5  #Severe protein calorie malnutrition  - pre renal  - volume depletion  - stop ivf      #Neuroendocrine Cancer  ~patient has a history of a pancreatic neuroendocrine tumor diagnosed 6 years ago  ~follows with (Oncologist) Dr. Llamas and (Urologist) Dr. Padron  - Receives monthly Sandostatin (Octreotide) injection    #Diabetes Mellitus Type2  ~FS qAC/HS  ~cont. ISS per protocol    #Hypertension  ~will hold Irbesartan in the setting of ZO - resume upon dc  ~Monitor routine hemodynamics. BP controlled    #Hyperlipidemia  ~cont. Rosuvastatin 10mg po qhs     #BPH  ~self catherizes at home  Ucx growing klebsiella - pt not symptomatic and will assume is a colonizer    #Vte ppx  ~cont. Heparin sq       Dispo:  DC home

## 2023-01-14 NOTE — DISCHARGE NOTE PROVIDER - NSDCCPCAREPLAN_GEN_ALL_CORE_FT
PRINCIPAL DISCHARGE DIAGNOSIS  Diagnosis: Gastric outlet obstruction  Assessment and Plan of Treatment:       SECONDARY DISCHARGE DIAGNOSES  Diagnosis: Other malignant neuroendocrine tumor  Assessment and Plan of Treatment:     Diagnosis: Nausea & vomiting  Assessment and Plan of Treatment:     Diagnosis: ZO (acute kidney injury)  Assessment and Plan of Treatment:     Diagnosis: HLD (hyperlipidemia)  Assessment and Plan of Treatment:     Diagnosis: Diabetes mellitus, type 2  Assessment and Plan of Treatment:     Diagnosis: Essential hypertension  Assessment and Plan of Treatment:

## 2023-01-14 NOTE — DISCHARGE NOTE PROVIDER - CARE PROVIDERS DIRECT ADDRESSES
[email or web address]

## 2023-01-14 NOTE — DISCHARGE NOTE PROVIDER - NSDCMRMEDTOKEN_GEN_ALL_CORE_FT
acetaminophen 325 mg oral tablet: 2 tab(s) orally every 6 hours, As needed, Mild Pain  ferrous sulfate 325 mg (65 mg elemental iron) oral tablet: 1 tab(s) orally once a day  glipiZIDE 10 mg oral tablet: 1 tab(s) orally once a day  multivitamin: 1 tab(s) orally once a day  rosuvastatin 10 mg oral tablet: 1 tab(s) orally once a day

## 2023-01-14 NOTE — PROGRESS NOTE ADULT - NUTRITIONAL ASSESSMENT
This patient has been assessed with a concern for Malnutrition and has been determined to have a diagnosis/diagnoses of Severe protein-calorie malnutrition.    This patient is being managed with:   Diet NPO after Midnight-     NPO Start Date: 09-Jan-2023   NPO Start Time: 23:59  Entered: Jan 9 2023  9:19AM    Diet NPO-  With Ice Chips/Sips of Water  Entered: Jan 8 2023 12:14PM    
This patient has been assessed with a concern for Malnutrition and has been determined to have a diagnosis/diagnoses of Severe protein-calorie malnutrition.    This patient is being managed with:   Diet Clear Liquid-  Entered: Jan 7 2023  9:24AM    
This patient has been assessed with a concern for Malnutrition and has been determined to have a diagnosis/diagnoses of Severe protein-calorie malnutrition.    This patient is being managed with:   Diet Clear Liquid-  Entered: Jan 7 2023  9:24AM    
This patient has been assessed with a concern for Malnutrition and has been determined to have a diagnosis/diagnoses of Severe protein-calorie malnutrition.    This patient is being managed with:   Diet NPO after Midnight-     NPO Start Date: 09-Jan-2023   NPO Start Time: 23:59  Entered: Jan 9 2023  9:19AM    Diet NPO-  With Ice Chips/Sips of Water  Entered: Jan 8 2023 12:14PM    
This patient has been assessed with a concern for Malnutrition and has been determined to have a diagnosis/diagnoses of Severe protein-calorie malnutrition.    This patient is being managed with:   Diet Clear Liquid-  Tube Feeding Modality: Gastro-Jejunostomy  Glucerna 1.5 Chidi (GLUCERNA1.5)  Total Volume for 24 Hours (mL): 2280  Continuous  Starting Tube Feed Rate {mL per Hour}: 65  Increase Tube Feed Rate by (mL): 10     Every 6 hours  Until Goal Tube Feed Rate (mL per Hour): 95  Tube Feed Duration (in Hours): 24  Tube Feed Start Time: 18:00  Free Water Flush   Total Volume per Flush (mL): 30   Frequency: Every Hour  Entered: Jan 12 2023  6:46PM    
This patient has been assessed with a concern for Malnutrition and has been determined to have a diagnosis/diagnoses of Severe protein-calorie malnutrition.    This patient is being managed with:   Diet NPO with Tube Feed-  Tube Feeding Modality: Gastro-Jejunostomy  Glucerna 1.5 Chidi (GLUCERNA1.5)  Total Volume for 24 Hours (mL): 1560  Continuous  Starting Tube Feed Rate {mL per Hour}: 25  Increase Tube Feed Rate by (mL): 10     Every 6 hours  Until Goal Tube Feed Rate (mL per Hour): 65  Tube Feed Duration (in Hours): 24  Tube Feed Start Time: 00:00  Free Water Flush  Free Water Flush Instructions:  consider free water flushes of 30 cc/hr (provides an additional 720 mL); adjust prn to maintain hydration as per MD  Entered: Jan 11 2023 11:30AM    Diet NPO after Midnight-     NPO Start Date: 09-Jan-2023   NPO Start Time: 23:59  Entered: Jan 9 2023  9:19AM    
This patient has been assessed with a concern for Malnutrition and has been determined to have a diagnosis/diagnoses of Severe protein-calorie malnutrition.    This patient is being managed with:   Diet Clear Liquid-  Tube Feeding Modality: Gastro-Jejunostomy  Glucerna 1.5 Chidi (GLUCERNA1.5)  Total Volume for 24 Hours (mL): 2280  Continuous  Starting Tube Feed Rate {mL per Hour}: 65  Increase Tube Feed Rate by (mL): 10     Every 6 hours  Until Goal Tube Feed Rate (mL per Hour): 95  Tube Feed Duration (in Hours): 24  Tube Feed Start Time: 18:00  Free Water Flush   Total Volume per Flush (mL): 30   Frequency: Every Hour  Entered: Jan 12 2023  6:46PM    
This patient has been assessed with a concern for Malnutrition and has been determined to have a diagnosis/diagnoses of Severe protein-calorie malnutrition.    This patient is being managed with:   Diet NPO after Midnight-     NPO Start Date: 09-Jan-2023   NPO Start Time: 23:59  Entered: Jan 9 2023  9:19AM    Diet NPO-  With Ice Chips/Sips of Water  Entered: Jan 8 2023 12:14PM    
This patient has been assessed with a concern for Malnutrition and has been determined to have a diagnosis/diagnoses of Severe protein-calorie malnutrition.    This patient is being managed with:   Diet Clear Liquid-  Entered: Cedrick 10 2023  2:22PM    Diet NPO after Midnight-     NPO Start Date: 09-Jan-2023   NPO Start Time: 23:59  Entered: Jan 9 2023  9:19AM    
This patient has been assessed with a concern for Malnutrition and has been determined to have a diagnosis/diagnoses of Severe protein-calorie malnutrition.    This patient is being managed with:   Diet NPO with Tube Feed-  Tube Feeding Modality: Gastro-Jejunostomy  Glucerna 1.5 Chidi (GLUCERNA1.5)  Total Volume for 24 Hours (mL): 1560  Continuous  Starting Tube Feed Rate {mL per Hour}: 25  Increase Tube Feed Rate by (mL): 10     Every 6 hours  Until Goal Tube Feed Rate (mL per Hour): 65  Tube Feed Duration (in Hours): 24  Tube Feed Start Time: 00:00  Free Water Flush  Free Water Flush Instructions:  consider free water flushes of 30 cc/hr (provides an additional 720 mL); adjust prn to maintain hydration as per MD  Entered: Jan 11 2023 11:30AM    Diet NPO after Midnight-     NPO Start Date: 09-Jan-2023   NPO Start Time: 23:59  Entered: Jan 9 2023  9:19AM    
This patient has been assessed with a concern for Malnutrition and has been determined to have a diagnosis/diagnoses of Severe protein-calorie malnutrition.    This patient is being managed with:   Diet NPO-  With Ice Chips/Sips of Water  Entered: Jan 8 2023 12:14PM

## 2023-01-14 NOTE — PROGRESS NOTE ADULT - PROVIDER SPECIALTY LIST ADULT
Hospitalist
Surgery
Hospitalist
Heme/Onc
Heme/Onc
Hospitalist
Surgery
Hospitalist
Surgery
Surgery

## 2023-01-14 NOTE — PROGRESS NOTE ADULT - ASSESSMENT
66 y/o M PMHx significant for Neuroendocrine tumor diagnosed 6 years ago, Hypertension, Hyperlipidemia, Diabetes Mellitus Type 2, BPH and light chain nephropathy, was reportedly referred to the [facility] by Gastroenterology for further evaluation and management intractable nausea and vomiting (non-bloody), and concerns for ongoing dehydration. Heme/Onc consulted for continuity of care.    # Neuroendocrine Tumor    - Initially diagnosed approx 6 years ago; currently follows with Primary Onc Dr Ac Llamas  - Started on Sandostatin, completed Lutathera about 1 year ago ---> Recent scans noted to have progression of disease, started back on Lutathera 12/07/2022  - Recently admitted to [facility] for similar GI symptoms following noted Lutathera treatment    Diagnosed with gastric outlet obstruction - referred to surgical oncology. Saw Dr Siddiqi in outpatient 1/4/23.  - Follow up with Primary Onc Dr Llamas upon d/c       # Nausea Vomiting due to gastric outlet obstruction ;      - Found to have gastric outlet obstruction this admission; SurgOnc and GI following  - As per GI  - S/p EGD 1/10 - severe esophagitis, no obstruction seen- no stent.   -S/p GJ tube placement- 1/10/23  -On G-J feeds .   -To  advance diet per surgery and GI.

## 2023-01-14 NOTE — PROGRESS NOTE ADULT - SUBJECTIVE AND OBJECTIVE BOX
HPI:    68 y/o M PMHx significant for Neuroendocrine tumor diagnosed 6 years ago, Hypertension, Hyperlipidemia, Diabetes Mellitus Type 2, BPH and light chain nephropathy, was reportedly referred to the [facility] by Gastroenterology for further evaluation and management intractable nausea and vomiting (non-bloody), and concerns for ongoing dehydration.    Vital signs in triage => BP 75/52, HR 83/min, RR 17/min, TMax 98.3'F, SpO2 100% on room air. Labs => BUN/Cr 41/2.41, Alb 3.1. CT ABD/Pelvis => Large pancreatic tail mass with minimal mass effect on the stomach. No bowel obstruction. The stomach and bowel are fluid-filled. Small hiatal hernia with questionable mild thickening of the distal esophagus. Multiple hepatic hypodense lesions concerning for metastatic disease. In the ED the patient was given Ondansetron 4mg IVP x 1, LR x 1L, and NS x 1L.    Found to have gastric outlet obstruction- evaluated by GI and surgical oncology.(06 Jan 2023 17:50)      01/09/2023: Patient seen at bedside with attending Dr Farrell, no acute distress. No vomiting. NGT in place No abd pain    01/11/2023: Patient seen at bedside, s/p G & J tube placement, no complications, feeling well, NGT absent    01/14/2023- seen at bedside, in no acute distress. On G-J tube feeding , as well as tolerating PO clear liquids. Stable and awaiting possible d/c today.       PAST MEDICAL & SURGICAL HISTORY:    Hypertension  BPH (benign prostatic hyperplasia)  Renal insufficiency  Light chain nephropathy  DM (diabetes mellitus)  HLD (hyperlipidemia)  No significant past surgical history    MEDICATIONS  (STANDING):    dextrose 5% + sodium chloride 0.45% with potassium chloride 20 mEq/L 1000 milliLiter(s) (75 mL/Hr) IV Continuous <Continuous>  dextrose 5%. 1000 milliLiter(s) (100 mL/Hr) IV Continuous <Continuous>  dextrose 5%. 1000 milliLiter(s) (50 mL/Hr) IV Continuous <Continuous>  dextrose 50% Injectable 25 Gram(s) IV Push once  dextrose 50% Injectable 12.5 Gram(s) IV Push once  dextrose 50% Injectable 25 Gram(s) IV Push once  ferrous    sulfate 325 milliGRAM(s) Oral daily  glucagon  Injectable 1 milliGRAM(s) IntraMuscular once  heparin   Injectable 5000 Unit(s) SubCutaneous every 8 hours  multivitamin 1 Tablet(s) Oral daily  rosuvastatin 10 milliGRAM(s) Oral at bedtime      MEDICATIONS  (PRN):    acetaminophen     Tablet .. 650 milliGRAM(s) Oral every 6 hours PRN Temp greater or equal to 38C (100.4F), Mild Pain (1 - 3)  aluminum hydroxide/magnesium hydroxide/simethicone Suspension 30 milliLiter(s) Oral every 4 hours PRN Dyspepsia  dextrose Oral Gel 15 Gram(s) Oral once PRN Blood Glucose LESS THAN 70 milliGRAM(s)/deciliter  melatonin 3 milliGRAM(s) Oral at bedtime PRN Insomnia    ALLERGIES:  losartan (Angioedema)    FAMILY HISTORY:  breast cancer (Mother)  aneurysm (Father)    REVIEW OF SYSTEMS:  Constitutional - weight loss ~ 20 lbs last few months , Eyes, ENT, Cardiovascular, Respiratory, Gastrointestinal, Genitourinary, Musculoskeletal, Integumentary, Neurological, Psychiatric, Endocrine, Heme/Lymph and Allergic/Immunologic review of systems are otherwise negative except as noted in HPI.       VITALS:  Vital Signs Last 24 Hrs  T(C): 36.5 (14 Jan 2023 07:53), Max: 36.8 (13 Jan 2023 16:58)  T(F): 97.7 (14 Jan 2023 07:53), Max: 98.2 (13 Jan 2023 16:58)  HR: 81 (14 Jan 2023 07:53) (75 - 81)  BP: 110/69 (14 Jan 2023 07:53) (100/68 - 110/69)  BP(mean): --  RR: 16 (14 Jan 2023 07:53) (16 - 18)  SpO2: 98% (14 Jan 2023 07:53) (95% - 98%)    Parameters below as of 14 Jan 2023 07:53  Patient On (Oxygen Delivery Method): room air      PHYSICAL EXAM:  GENERAL: no acute distress  HEENT: EOMI, neck supple  RESPIRATORY: LCTAB/L, no rhonchi, rales, or wheezing  CARDIOVASCULAR: RRR, no murmurs, gallops, rubs  ABDOMINAL: soft, non-tender, non-distended, positive bowel sounds   EXTREMITIES: no clubbing, cyanosis, or edema  NEUROLOGICAL: alert and oriented x 3, non-focal  SKIN: no rashes or lesions   MUSCULOSKELETAL: no gross joint deformity      LABS:    CBC Full  -  ( 10 Cedrick 2023 06:18 )  WBC Count : 6.15 K/uL  RBC Count : 3.13 M/uL  Hemoglobin : 9.4 g/dL  Hematocrit : 29.3 %  Platelet Count - Automated : 124 K/uL  Mean Cell Volume : 93.6 fl  Mean Cell Hemoglobin : 30.0 pg  Mean Cell Hemoglobin Concentration : 32.1 gm/dL  Auto Neutrophil # : x  Auto Lymphocyte # : x  Auto Monocyte # : x  Auto Eosinophil # : x  Auto Basophil # : x  Auto Neutrophil % : x  Auto Lymphocyte % : x  Auto Monocyte % : x  Auto Eosinophil % : x  Auto Basophil % : x             01-13    133<L>  |  96  |  23  ----------------------------<  134<H>  3.5   |  29  |  1.34<H>    Ca    8.6      13 Jan 2023 07:40  Mg     2.2     01-13      RADIOLOGY & ADDITIONAL STUDIES:      Xray Chest 1 View-PORTABLE IMMEDIATE (01.06.23 @ 13:07)    INTERPRETATION:  HISTORY: Sepsis    TECHNIQUE: A single AP view of the chest was obtained.    COMPARISON: 12/11/2022    FINDINGS:  The cardiac silhouette is normal in size. There are no focal   consolidations or pleural effusions. The hilar and mediastinal structures   appear unremarkable. The osseous structures are intact.    IMPRESSION: Clear lungs.          CT Abdomen and Pelvis No Cont (01.06.23 @ 14:17)      INTERPRETATION:  CLINICAL INFORMATION: Vomiting, rule out SBO or gastric   outlet obstruction    COMPARISON: 12/10/2022    CONTRAST/COMPLICATIONS:  IV Contrast: NONE  Oral Contrast: NONE  Complications: None reported at time of study completion    PROCEDURE:  CT of the Abdomen and Pelvis was performed.  Sagittal and coronal reformats were performed.    FINDINGS:  LOWER CHEST: Within normal limits.    LIVER: Multiple indeterminant hepatic hypodensity measuring up to 4.7 x   3.9 cm at the hepatic dome (2:14; previously 4.0 x 3.4 cm).  BILE DUCTS: Normal caliber.  GALLBLADDER: Within normal limits.  SPLEEN: Within normal limits.  PANCREAS: Large heterogeneous mass in the pancreatic tail/distal body   measuring 12.8 x 7.5 cm with mild mass effect on the stomach.  ADRENALS: Within normal limits.  KIDNEYS/URETERS: There is a 3 mm non obstructing stone in the lower pole   of the left kidney.    BLADDER: Minimally distended.  REPRODUCTIVE ORGANS: Prostate within normal limits.    BOWEL: Small hiatal hernia with questionable mild thickening of the   distal esophagus. Stomach and bowel are fluid distended. No bowel   obstruction. Appendix not visualized. No evidence of inflammation in   the pericecal region.  PERITONEUM: No ascites.  VESSELS: Atherosclerotic changes.  RETROPERITONEUM/LYMPH NODES: No lymphadenopathy.  ABDOMINAL WALL: Within normal limits.  BONES: Degenerative changes. Severe T12-L1 disc height loss and endplate   sclerosis, similar in appearance to prior. Bilateral L5 spondylolysis.    IMPRESSION:  Large pancreatic tail mass with minimal mass effect on the stomach. No   bowel obstruction.    The stomach and bowel are fluid-filled.    Small hiatal hernia with questionable mild thickening of the distal   esophagus.    Multiple hepatic hypodense lesions concerning for metastatic disease.   Consider PET/CT for further evaluation.    Additional findings as above.

## 2023-01-14 NOTE — PROGRESS NOTE ADULT - REASON FOR ADMISSION
Nausea, and vomiting

## 2023-01-14 NOTE — DISCHARGE NOTE PROVIDER - PROVIDER TOKENS
PROVIDER:[TOKEN:[[id number]],ESTABLISHEDPATIENT:[T]],PROVIDER:[TOKEN:[[id number]],ESTABLISHEDPATIENT:[T]]

## 2023-01-24 PROBLEM — E11.9 TYPE 2 DIABETES MELLITUS: Status: ACTIVE | Noted: 2019-03-13

## 2023-01-24 PROBLEM — I10 BENIGN ESSENTIAL HYPERTENSION: Status: ACTIVE | Noted: 2019-02-12

## 2023-01-24 NOTE — HEALTH RISK ASSESSMENT
[Never] : Never [Yes] : Yes [2 - 4 times a month (2 pts)] : 2-4 times a month (2 points) [3 or 4 (1 pt)] : 3 or 4  (1 point) [Never (0 pts)] : Never (0 points) [No] : In the past 12 months have you used drugs other than those required for medical reasons? No [No falls in past year] : Patient reported no falls in the past year [0] : 2) Feeling down, depressed, or hopeless: Not at all (0) [None] : None [With Family] : lives with family [# of Members in Household ___] :  household currently consist of [unfilled] member(s) [Employed] : employed [College] : College [] :  [# Of Children ___] : has [unfilled] children [Feels Safe at Home] : Feels safe at home [Fully functional (bathing, dressing, toileting, transferring, walking, feeding)] : Fully functional (bathing, dressing, toileting, transferring, walking, feeding) [Fully functional (using the telephone, shopping, preparing meals, housekeeping, doing laundry, using] : Fully functional and needs no help or supervision to perform IADLs (using the telephone, shopping, preparing meals, housekeeping, doing laundry, using transportation, managing medications and managing finances) [Smoke Detector] : smoke detector [Carbon Monoxide Detector] : carbon monoxide detector [Seat Belt] :  uses seat belt [de-identified] : urologist   Dr. Turcios,  Oncologist  Dr. Ac Llamas  Surg  Dr. 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

## 2023-01-24 NOTE — ASSESSMENT
[FreeTextEntry1] : Mr. STOCK is a 67 year  male, with a past medical history as noted below,who present to the office  today for  hospital follow up

## 2023-01-24 NOTE — PLAN
[FreeTextEntry1] : Neuroendocrine mass -  Follow-up with oncologist Dr. Ac Llamas .    Continue with current medication regimen\par and discussion about Lutahera treatment \par \par Gastro Consult  Follow up with Dr Doyle   for evaluation of tube feeding  and gastric obstruction.\par \par Endo -- DM -  Trial of  glipizide 5 mg  2 tabs daily to see if he can swallow the 5 mg tab pill \par Check labs today   cbc, a1c and lft\par \par I spent 38  Minutes with the patient, half of which we discussed finding on physical exam  and coordinated care.  As well as reviewed my plans and follow ups. Dragon speech recognition software was used to create portions of this document.  An attempt at proofreading has been made to minimize errors please call if any questions arise. \par

## 2023-01-24 NOTE — REVIEW OF SYSTEMS
[Fatigue] : fatigue [Recent Change In Weight] : ~T recent weight change [Diarrhea] : diarrhea [Negative] : Heme/Lymph [Fever] : no fever [Chills] : no chills [Pain] : no pain [Vision Problems] : no vision problems [Earache] : no earache [Nosebleeds] : no nosebleeds [Sore Throat] : no sore throat [Chest Pain] : no chest pain [Palpitations] : no palpitations [Claudication] : no  leg claudication [Shortness Of Breath] : no shortness of breath [Lower Ext Edema] : no lower extremity edema [Wheezing] : no wheezing [Cough] : no cough [Dyspnea on Exertion] : not dyspnea on exertion [Abdominal Pain] : no abdominal pain [Nausea] : no nausea [Constipation] : no constipation [Vomiting] : no vomiting [Heartburn] : no heartburn [Melena] : no melena [Dysuria] : no dysuria [Hematuria] : no hematuria [Joint Pain] : no joint pain [Skin Rash] : no skin rash [Insomnia] : no insomnia [Easy Bleeding] : no easy bleeding [Easy Bruising] : no easy bruising [Swollen Glands] : no swollen glands [FreeTextEntry2] : weight loss  [de-identified] : dry skin

## 2023-01-24 NOTE — HISTORY OF PRESENT ILLNESS
[Post-hospitalization from ___ Hospital] : Post-hospitalization from [unfilled] Hospital [Admitted on: ___] : The patient was admitted on [unfilled] [Discharged on ___] : discharged on [unfilled] [Discharge Med List] : discharge medication list [Other: ____] : [unfilled] [Med Reconciliation] : medication reconciliation has been completed [Patient Contacted By: ____] : and contacted by [unfilled] [FreeTextEntry2] :  67-year-old male with significant for neuroendocrine tumor, hypertension, hyperlipidemia, diabetes, BPH and light chain neuropathy.  He was admitted to the hospital on  January 6, 2023 for nausea, vomiting and ZO.   Patient underwent IV fluid with resolution of the ZO and hypernatremia.  Also had a gastrotomy tube placed  and tolerate NG food feedings.\par Since being home vomiting has stopped.  Able to tolerate soups. Get slight nausea with some Cereals.    States he stopped taking his Glucotrol XL secondary to the pill being too much to swallow.

## 2023-01-24 NOTE — COUNSELING
[Fall prevention counseling provided] : Fall prevention counseling provided [FreeTextEntry2] : on tube feeding

## 2023-01-24 NOTE — PHYSICAL EXAM
[No Acute Distress] : no acute distress [Well Developed] : well developed [Well-Appearing] : well-appearing [Normal Sclera/Conjunctiva] : normal sclera/conjunctiva [PERRL] : pupils equal round and reactive to light [EOMI] : extraocular movements intact [Normal Outer Ear/Nose] : the outer ears and nose were normal in appearance [Normal Oropharynx] : the oropharynx was normal [Normal TMs] : both tympanic membranes were normal [Normal Nasal Mucosa] : the nasal mucosa was normal [No JVD] : no jugular venous distention [No Lymphadenopathy] : no lymphadenopathy [Supple] : supple [Thyroid Normal, No Nodules] : the thyroid was normal and there were no nodules present [No Respiratory Distress] : no respiratory distress  [No Accessory Muscle Use] : no accessory muscle use [Clear to Auscultation] : lungs were clear to auscultation bilaterally [Normal Rate] : normal rate  [Regular Rhythm] : with a regular rhythm [Normal S1, S2] : normal S1 and S2 [No Carotid Bruits] : no carotid bruits [No Abdominal Bruit] : a ~M bruit was not heard ~T in the abdomen [Pedal Pulses Present] : the pedal pulses are present [No Edema] : there was no peripheral edema [No Palpable Aorta] : no palpable aorta [No Extremity Clubbing/Cyanosis] : no extremity clubbing/cyanosis [Soft] : abdomen soft [Non Tender] : non-tender [Non-distended] : non-distended [No Masses] : no abdominal mass palpated [No HSM] : no HSM [Normal Bowel Sounds] : normal bowel sounds [Normal Sphincter Tone] : normal sphincter tone [Normal Posterior Cervical Nodes] : no posterior cervical lymphadenopathy [Normal Anterior Cervical Nodes] : no anterior cervical lymphadenopathy [No CVA Tenderness] : no CVA  tenderness [No Spinal Tenderness] : no spinal tenderness [No Joint Swelling] : no joint swelling [Grossly Normal Strength/Tone] : grossly normal strength/tone [No Rash] : no rash [Coordination Grossly Intact] : coordination grossly intact [No Focal Deficits] : no focal deficits [Normal Gait] : normal gait [Deep Tendon Reflexes (DTR)] : deep tendon reflexes were 2+ and symmetric [Speech Grossly Normal] : speech grossly normal [Normal Affect] : the affect was normal [Alert and Oriented x3] : oriented to person, place, and time [Normal Mood] : the mood was normal [Normal Insight/Judgement] : insight and judgment were intact [Right Foot Was Examined] : Right foot ~C was examined [Left Foot Was Examined] : left foot ~C was examined [] : both feet [Stool Occult Blood] : stool negative for occult blood [de-identified] : g tube in place  [de-identified] : As per urology [de-identified] : Onychomycosis noted hands and feet, [place] cyst noted on back about 8-9 mm [TWNoteComboBox3] : +2 [TWNoteComboBox4] : +2 [42828 - Moderate Complexity requires multiple possible diagnoses and/or the management options, moderate complexity of the medical data (tests, etc.) to be reviewed, and moderate risk of significant complications, morbidity, and/or mortality as well as co] : Moderate Complexity

## 2023-02-02 NOTE — HISTORY OF PRESENT ILLNESS
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 \par \par Patient states that when he wakes up he feels a lot of acid in his chest, feels nauseated. Bad taste in mouth. Also with phlegm. No cough, just hacking up phlegm. No fevers or chills. No vomiting. No abdominal pain. Had xray over the weekend without evidence of pneumonia. No overt signs of Gi bleeding like hematemesis, melena, hematochezia. Is having clears/liquids but not eating food. He isn't using the gastric suction port for decompression of his GJ tube. He is getting nighttime feeds via the j port. \par \par

## 2023-02-02 NOTE — PHYSICAL EXAM
[Alert] : alert [Normal Voice/Communication] : normal voice/communication [Healthy Appearing] : healthy appearing [No Acute Distress] : no acute distress [Hearing Threshold Finger Rub Not [place]] : hearing was normal [Sclera] : the sclera and conjunctiva were normal [Normal Appearance] : the appearance of the neck was normal [No Neck Mass] : no neck mass was observed [Abdomen Tenderness] : non-tender [Abdomen Soft] : soft [Abnormal Walk] : normal gait [No Clubbing, Cyanosis] : no clubbing or cyanosis of the fingernails [Normal Color / Pigmentation] : normal skin color and pigmentation [Skin Lesions] : no skin lesions [] : no rash [No Focal Deficits] : no focal deficits [Motor Exam] : the motor exam was normal [Oriented To Time, Place, And Person] : oriented to person, place, and time [de-identified] : soft, non distended, GJ tube c/d/i, freely rotates, tube clean, when g port opened water poured out

## 2023-02-02 NOTE — ASSESSMENT
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 \par \par Feel like pglegm is a result of acid reflux. His chest xray was negative. \par I thought him again how to use the G port portion of the GJ tube to decompress his abdomen. \par He will continue PPI, twice daily. \par If still very symptomatic and still not passing from stomach (as evidenced by water pouring out of the tube since he had water a few hours before appointment) will need surgical GJ by Dr. Siddiqi to bypass. \par He will see me in one month and we will re-evaluate.

## 2023-02-09 NOTE — PHYSICAL EXAM
[Restricted in physically strenuous activity but ambulatory and able to carry out work of a light or sedentary nature] : Status 1- Restricted in physically strenuous activity but ambulatory and able to carry out work of a light or sedentary nature, e.g., light house work, office work [General Appearance - Alert] : alert [General Appearance - In No Acute Distress] : in no acute distress [General Appearance - Well Nourished] : well nourished [Sclera] : the sclera and conjunctiva were normal [Extraocular Movements] : extraocular movements were intact [Outer Ear] : the ears and nose were normal in appearance [Hearing Threshold Finger Rub Not [place]] : hearing was normal [Neck Appearance] : the appearance of the neck was normal [] : no respiratory distress [Respiration, Rhythm And Depth] : normal respiratory rhythm and effort [Exaggerated Use Of Accessory Muscles For Inspiration] : no accessory muscle use [Auscultation Breath Sounds / Voice Sounds] : lungs were clear to auscultation bilaterally [Heart Rate And Rhythm] : heart rate was normal and rhythm regular [Examination Of The Chest] : the chest was normal in appearance [Chest Visual Inspection Thoracic Asymmetry] : no chest asymmetry [Diminished Respiratory Excursion] : normal chest expansion [2+] : left 2+ [Breast Appearance] : normal in appearance [Breast Palpation Mass] : no palpable masses [Bowel Sounds] : normal bowel sounds [Abdomen Soft] : soft [Abdomen Tenderness] : non-tender [Cervical Lymph Nodes Enlarged Posterior Bilaterally] : posterior cervical [Cervical Lymph Nodes Enlarged Anterior Bilaterally] : anterior cervical [Supraclavicular Lymph Nodes Enlarged Bilaterally] : supraclavicular [No CVA Tenderness] : no ~M costovertebral angle tenderness [No Spinal Tenderness] : no spinal tenderness [Involuntary Movements] : no involuntary movements were seen [Skin Color & Pigmentation] : normal skin color and pigmentation [No Focal Deficits] : no focal deficits [Oriented To Time, Place, And Person] : oriented to person, place, and time [FreeTextEntry1] : W

## 2023-02-09 NOTE — HISTORY OF PRESENT ILLNESS
[FreeTextEntry1] : Mr. 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 Now, s/p GJ placement. On nocturnal feeds. Experiencing excessive phlegm, and dysphagia. Self referred for further evaluation. \par \par CT Abdomen and Pelvis on 1/6/23:\par - Multiple indeterminant hepatic hypodensities measuring up to 4.7 x 3.9 cm at the hepatic dome (2:14; previously 4.0 x 3.4 cm).\par - Large heterogeneous mass in the pancreatic tail/distal body measuring 12.8 x 7.5 cm with mild mass effect on the stomach.\par - There is a 3 mm nonobstructing stone in the lower pole of the left kidney.\par - Small hiatal hernia with questionable mild thickening of the distal esophagus. Stomach and bowel are fluid distended. No bowel obstruction. Appendix is not visualized. No evidence of inflammation in the pericecal region.\par - Severe T12-L1 disc height loss and endplate sclerosis, similar in appearance to prior. Bilateral L5 spondylolysis.\par \par Patient presents today for consultation.

## 2023-02-09 NOTE — ASSESSMENT
[FreeTextEntry1] : Mr. 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 Now, s/p GJ placement. On nocturnal feeds. Experiencing excessive phlegm, and dysphagia. Self referred for further evaluation. \par \par I have independently reviewed the medical records and imaging at the time of this office consultation, and discussed the following interpretations with the patient:\par - Discussed Barium Esophagram for further evaluation of dysphagia. Patient is agreeable. Return to clinic following to discuss results and further plan of care. \par \par Recommendations reviewed with patient during this office visit, and all questions answered; Patient instructed on the importance of follow up and verbalizes understanding.\par \par I, YISEL OneillIM, personally performed the evaluation and management (E/M) services for this new patient. That E/M includes conducting the initial examination, assessing all conditions, and establishing the plan of care. Today, My ACP, Polly Marr, was here to observe my evaluation and management services for this patient to be followed going forward.\par \par \par

## 2023-02-16 NOTE — ASSESSMENT
[FreeTextEntry1] : MODIFIED BARIUM SWALLOW STUDY \par \par Date of Report: 2/16/23 \par Date of Evaluation: 2/16/23 \par Patient Name: Kev Griggs \par YOB: 1955 \par Primary Diagnosis: OroPharyngeal Dysphagia \par Treatment Diagnosis: OroPharyngeal Dysphagia \par Referring Physician: Dr. Vladimir Diamond  \par \par Impressions/Results: No Aspiration viewed before, during or after the swallow for puree, regular solids and/or thin liquids. \par  \par Reason For Referral: This 68 year old male was seen for a Modified Barium Swallow to: rule out aspiration and assess for safest diet consistency, to determine patient's ability to safely tolerate an oral diet. The physician ordered this procedure because they want the patient to meet their nutrition/hydration needs by mouth without compromising respiratory status. \par \par Patient arrived unaccompanied to today’s evaluation. Patient reporting regurgitation/emesis after eating/drinking after receiving Radiation Therapy in Summer 2022. Patient is now s/p GJ tube for 1 month. Patient states he is “trying to eat by mouth” but is having “phlegm” and coughing up while eating/drinking. Patient states he eats/drinks water, broth, Jell-o and pudding but he feels “it’s getting blocked” in his throat.  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 The patient was alert, cooperative. Secretion management was adequate. There was no coughing, throat clearing or wet/gurgly vocal quality prior to test administration. \par \par Consistencies Administered: \par Solids: Puree, Regular solids \par Liquids:  Thin liquids \par \par SUMMARY & IMPRESSION \par \par The patient demonstrated the following: \par 1. Functional oral stage for puree, regular solids and thin liquids marked by adequate oral acceptance, collection and tongue motion for anterior to posterior transport. Adequate oral clearance post primary swallow. \par 2. Functional pharyngeal dysphagia for puree, regular solids and thin liquids marked by a delayed pharyngeal swallow trigger (bolus head at the vallecula for puree and thin liquids) adequate reduced base of tongue retraction, adequate hyolaryngeal elevation, adequate epiglottic deflection, reduced pharyngeal contractility and adequate closure of the laryngeal vestibule. Adequate pharyngeal clearance post primary swallow. There was one episode of retrograde/backflow into the hypopharynx for puree trials. No Aspiration viewed before, during or after the swallow for puree, regular solids and/or thin liquids. \par \par Of Note: Patient exhibited episodes of regurgitation/emesis following PO trials of Puree, Thin liquids and Regular solids. \par \par An Esophageal Screen was completed. The patient was turned to AP view and given a Barium Tablet with a cup of thin liquids. The tablet was noted to course through the esophagus with hold up in the midsternal region/chest. This is not a full/complete evaluation of the esophagus. \par \par Aspiration - Penetration Scale: \par PUREE: 1 \par REGULAR SOLIDS: 1 \par THIN LIQUIDS: 1 \par \par Aspiration - Penetration Scale (Filippobek et al Dysphagia 11:93-98 (April 1996), Aspiration-Penetration Scale) \par \par 1. Material does not enter the airway \par 2. Material enters the airway, remains above the vocal folds, and is ejected from the airway \par 3. Material enters the airway, remains above the vocal folds, and is not ejected \par 4. Material enters the airway, contacts the vocal folds, and is ejected from the airway \par 5. Material enters the airway, contacts the vocal folds, and is not ejected from the airway \par 6. Material enters the airway, passes below the vocal folds and is ejected into the larynx or out of the airway \par 7. Material enters the airway, passes below the vocal folds, and is not ejected from the trachea despite effort \par 8. Material enters the airway, passes below the vocal folds, and no effort is made to eject \par \par Recommendations: \par 1) Continue GJ tube feedings  \par 2) From an oropharyngeal standpoint, regular solids with thin liquids pending clearance from GI/CTSX given concern for esophageal dysphagia component \par 3) Feeding/swallowing guidelines: Upright positioning, alternate solids with liquids  \par 4) Follow up with referring physician \par \par The above results and recommendations have been discussed with the patient. All questions were answered with patient demonstrating good understanding. \par  \par Should you have any additional concerns, please contact the Center at [phone]. \par \par Mayte Zafar MS, CCC-SLP \par Speech-Language Pathologist \par [facility], [place]

## 2023-02-21 NOTE — ASSESSMENT
[FreeTextEntry1] : Mr. 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 Now, s/p GJ placement. On nocturnal feeds. Experiencing excessive phlegm, and dysphagia. Self referred for further evaluation. \par \par Here today to discuss Modified Barium results and further plan of care. \par \par Recommendations reviewed with patient during this office visit, and all questions answered; Patient instructed on the importance of follow up and verbalizes understanding.\par -  Images reviewed; No need for further Thoracic surgery follow up. Continue following with GI. Return to clinic as needed. \par \par Recommendations reviewed with patient during this office visit, and all questions answered; Patient instructed on the importance of follow up and verbalizes understanding.\par \par I, Dr. BAUTISTA [facility], personally performed the evaluation and management (E/M) services for this established patient. That E/M includes conducting the examination, assessing all new/exacerbated conditions, and establishing a new plan of care. Today, My ACP, Polly Marr, was here to observe my evaluation and management services for this patient to be followed going forward.\par \par

## 2023-02-21 NOTE — HISTORY OF PRESENT ILLNESS
[FreeTextEntry1] : Mr. 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 Now, s/p GJ placement. On nocturnal feeds. Experiencing excessive phlegm, and dysphagia. Self referred for further evaluation. \par \par CT Abdomen and Pelvis on 1/6/23:\par - Multiple indeterminant hepatic hypodensities measuring up to 4.7 x 3.9 cm at the hepatic dome (2:14; previously 4.0 x 3.4 cm).\par - Large heterogeneous mass in the pancreatic tail/distal body measuring 12.8 x 7.5 cm with mild mass effect on the stomach.\par - There is a 3 mm nonobstructing stone in the lower pole of the left kidney.\par - Small hiatal hernia with questionable mild thickening of the distal esophagus. Stomach and bowel are fluid distended. No bowel obstruction. Appendix is not visualized. No evidence of inflammation in the pericecal region.\par - Severe T12-L1 disc height loss and endplate sclerosis, similar in appearance to prior. Bilateral L5 spondylolysis.\par \par Modified Barium Swallow on 2/16/23: \par - No evidence of aspiration before the swallow of puree, regular solids and thin liquids.\par \par Speech and Swallow recs:\par 1) Continue GJ tube feedings \par 2) From an oropharyngeal standpoint, regular solids with thin liquids pending clearance from GI/CTSX given concern for esophageal dysphagia component \par \par Here today to discuss modified barium results as well as further plan of care. Upon initial consultation, discussed obtaining modified barium to further evaluate dysphagia symptoms.

## 2023-02-21 NOTE — PHYSICAL EXAM
[Bowel Sounds] : normal bowel sounds [Abdomen Soft] : soft [Abdomen Tenderness] : non-tender [Skin Color & Pigmentation] : normal skin color and pigmentation [Oriented To Time, Place, And Person] : oriented to person, place, and time [] : no respiratory distress [Respiration, Rhythm And Depth] : normal respiratory rhythm and effort [Exaggerated Use Of Accessory Muscles For Inspiration] : no accessory muscle use [Auscultation Breath Sounds / Voice Sounds] : lungs were clear to auscultation bilaterally [Examination Of The Chest] : the chest was normal in appearance [Chest Visual Inspection Thoracic Asymmetry] : no chest asymmetry [Diminished Respiratory Excursion] : normal chest expansion [2+] : left 2+ [FreeTextEntry1] : Feeding tube in place

## 2023-03-09 PROBLEM — R13.10 DYSPHAGIA: Status: ACTIVE | Noted: 2023-01-01

## 2023-03-09 NOTE — PHYSICAL EXAM
[Normal Neck Lymph Nodes] : normal neck lymph nodes  [Normal Supraclavicular Lymph Nodes] : normal supraclavicular lymph nodes [Normal] : normal appearance, no rash, nodules, vesicles, ulcers, erythema [de-identified] : GJ tube site clean and dry no leakage noted

## 2023-03-09 NOTE — HISTORY OF PRESENT ILLNESS
[de-identified] : 67 year old male with history of HTN, HLD, T2DM neuroendocrine tumor diagnosed 6 years ago. Initially found to have a large 20 cm abdominal mass on workup of abdominal pain and nausea 10/2015. Biopsy of the mass revealed findings consistent with a neuroendocrine tumor, likely of GI/pancreatic origin. He was started on Sandostatin. He had RT (Lutathera) about 1.5 year ago. He presented to ER at   for further evaluation and management intractable nausea and vomiting, and concerns for ongoing dehydration. Patient was admitted for IV Fluids with resolution of ZO and hypernatremia; he underwent successful endoscopic placement of percutaneous gastrostomy tube by Dr Doyle.  He tolerated G-tube feds and was cleared for pleasure feeds\par Since discharge from the hospital he reports having dysphagia and phlegm.  He was not using the G port of the GJ to decompress. He was taught how to do that when he followed with Dr. Davalos. He reports he continued to have those symptoms.He also follow up with CTSX. He had Barium swallow study done and was evaluated by speech language pathologist  \par He is tolerating tube feeds no nausea vomiting or leakage from the tube reported. He is also getting IV hydration. He is following up with Dr. Llamas. He reports feeling fatigue \par

## 2023-03-09 NOTE — ASSESSMENT
[FreeTextEntry1] :  Pancreatic neuroendocrine carcinoma\par \par Pancreatic neuroendocrine carcinoma 67 year old male with history of HTN, HLD, T2DM neuroendocrine tumor diagnosed 6 years ago. Initially found to have a large 20 cm abdominal mass on workup of abdominal pain and nausea 10/2015. Biopsy of the mass revealed findings consistent with a neuroendocrine tumor, Patient now on Luthera for progression of disease.  We discussed his case at tumor board and concluded that it would be best to support him through a complete treatment of Luthera since he has only received one of four cycles. A palliative operation at this time is possible but the potential for complications is high given the need for a high gastric transection and Marcela-en-Y reconstruction. He is doing quite well maintaining his nutrition with an Albumin of > 4.0. \par I have communicated with Dr Llamas and we will manage the dysphagia and reflux accordingly. I will see him back if his symptoms do not improve or get worse.\par \par

## 2023-03-29 NOTE — H&P ADULT - NSICDXPASTSURGICALHX_GEN_ALL_CORE_FT
Subjective   Patient ID: Kevin Bowles is a 60 y.o. male who presents for Follow-up.  IN for follow up for gout        Review of Systems    Objective   Physical Exam  Constitutional:       Appearance: Normal appearance.   HENT:      Head: Normocephalic and atraumatic.      Nose: Nose normal.   Cardiovascular:      Rate and Rhythm: Normal rate and regular rhythm.   Abdominal:      General: Abdomen is flat.      Palpations: Abdomen is soft.   Musculoskeletal:         General: Normal range of motion.      Cervical back: Normal range of motion.   Skin:     General: Skin is warm and dry.   Neurological:      Mental Status: He is alert.       /58 (BP Location: Right arm)   Pulse 58   Ht 1.829 m (6')   Wt 93.4 kg (206 lb)   SpO2 100%   BMI 27.94 kg/m²         Assessment/Plan   Problem List Items Addressed This Visit          Other    Gout - Primary   GOUT ETOH not a factor  Will follow        PAST SURGICAL HISTORY:  No significant past surgical history

## 2023-04-03 NOTE — HISTORY OF PRESENT ILLNESS
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 \par \par Patient states that he currently is not needing to decompress (nothing coming out when he does it) the G tube on a daily basis, but still gets nauseated when takes PO so all of his nutrition is via the J tube. His only issue currently is that he sometimes gets mild oozing of blood from the gastrostomy site. There is no blood within the G tube, just on the outside of the tube. It is not daily. Mild amount of red blood/oozing. No clots. Site is non tender and he has no abdominal pain. Denies melena. J tube rate is on maximal rate (90). \par \par

## 2023-04-03 NOTE — ASSESSMENT
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 \par Explained that the mild oozing he is seeing is likely just some irritation from the plastic tube on the gastrostomy site, nothing to worry about. Discussed signs and symptoms of concerning bleeding, especially if through the tube.

## 2023-04-03 NOTE — PHYSICAL EXAM
[Normal Voice/Communication] : normal voice/communication [Alert] : alert [Healthy Appearing] : healthy appearing [No Acute Distress] : no acute distress [Sclera] : the sclera and conjunctiva were normal [Hearing Threshold Finger Rub Not Estill] : hearing was normal [Normal Lips/Gums] : the lips and gums were normal [Oropharynx] : the oropharynx was normal [Normal Appearance] : the appearance of the neck was normal [No Neck Mass] : no neck mass was observed [Abdomen Tenderness] : non-tender [Abdomen Soft] : soft [Oriented To Time, Place, And Person] : oriented to person, place, and time [de-identified] : GJ tube rotates freely, bumper tighness appropriate, no blood, non tender, no skin masceration

## 2023-04-12 PROBLEM — D3A.8 NEUROENDOCRINE TUMOR OF PANCREAS: Status: ACTIVE | Noted: 2019-02-12

## 2023-04-12 PROBLEM — K31.1 GASTRIC OUTLET OBSTRUCTION: Status: ACTIVE | Noted: 2023-01-01

## 2023-04-14 NOTE — HISTORY OF PRESENT ILLNESS
[de-identified] : 67 year old male with history of HTN, HLD, T2DM neuroendocrine tumor diagnosed 6 years ago. Initially found to have a large 20 cm abdominal mass on workup of abdominal pain and nausea 10/2015. Biopsy of the mass revealed findings consistent with a neuroendocrine tumor, likely of GI/pancreatic origin. He was started on Sandostatin. He had RT (Lutathera) about 1.5 year ago. He presented to ER at  for further evaluation and management intractable nausea and vomiting, and concerns for ongoing dehydration. Patient was admitted for IV Fluids with resolution of ZO and hypernatremia; he underwent successful endoscopic placement of percutaneous gastrostomy tube by Dr Doyle. He tolerated G-tube feds and was cleared for pleasure feeds\par Since discharge from the hospital he reports having dysphagia and phlegm. He was not using the G port of the GJ to decompress. He was taught how to do that when he followed with Dr. Davalos. He reports he continued to have those symptoms.He also follow up with [facility]. He had Barium swallow study done and was evaluated by speech language pathologist \par He is tolerating tube feeds no nausea vomiting or leakage from the tube reported. He is also getting IV hydration. He is following up with Dr. Llamas. He reports feeling fatigue \par \par INTERVAL HISTORY 4/12/23\par HE presents to the office for follow up visit. He is using Jtube for feeds.  He reports he continues to have dysphagia not able to swallow anything. He has persistent nausea. Decompress with G port of GJ tube reports not much comes out. He has to constantly clear his throat and brings up mucus, has hiccups. Denies vomiting. He is able to tolerate the feeds. Last Lutathera treatment was  6-7 weeks ago total of 2 cycles. He reports having constant fatigue and does not have much energy to do anything throughout the day. \par

## 2023-04-14 NOTE — ASSESSMENT
[FreeTextEntry1] :  Pancreatic neuroendocrine carcinoma\par \par Pancreatic neuroendocrine carcinoma 67 year old male with history of HTN, HLD, T2DM neuroendocrine tumor diagnosed 6 years ago. Initially found to have a large 20 cm abdominal mass on workup of abdominal pain and nausea 10/2015. Biopsy of the mass revealed findings consistent with a neuroendocrine tumor, Patient now on Lutahera for progression of disease.  We discussed his case at tumor board and concluded that it would be best to support him through a complete treatment of Lutahera since he has only received one of four cycles. He has completed two treatments and his symptoms appear to be getting worse. He is totally dependent on tube feedings and is unable to tolerate po liquids. \par \par  A palliative operation at this time is possible but the potential for complications is high given the need for a high gastric transection and Marcela-en-Y reconstruction. He is doing quite well maintaining his nutrition with an Albumin of > 4.0. I have communicated with Dr Llamas that we will re image his abdomen with a plan to either resect the perigastric disease or bypass the stomach. He has a reasonable life expectancy but his quality of life is miserable.\par \par

## 2023-04-21 NOTE — ED ADULT NURSE NOTE - OBJECTIVE STATEMENT
pt presents to ED c/o J-Tube malfunction. states he is unable to get anything through j-tube. pt has history of stomach tumor. pt not in any distress. no symptoms.

## 2023-04-21 NOTE — H&P ADULT - NSHPPHYSICALEXAM_GEN_ALL_CORE
PHYSICAL EXAM:    T(C): 36.4 (04-21-23 @ 16:35), Max: 37.2 (04-21-23 @ 12:57)  HR: 88 (04-21-23 @ 21:00) (88 - 112)  BP: 113/84 (04-21-23 @ 21:00) (86/58 - 113/84)  RR: 18 (04-21-23 @ 21:00) (18 - 20)  SpO2: 99% (04-21-23 @ 21:00) (96% - 100%)    General: AAOx3; NAD; Frail  Head: AT/NC  ENT: Dry Mucous Membranes; No Injury  Eyes: EOMI; PERRL  Neck: Non-tender; No JVD  CVS: RRR, S1&S2, No murmur, No edema  Respiratory: Lungs CTA B/L; Normal Respiratory Effort  Abdomen/GI: Soft, non-tender, non-distended, no guarding, no rebound, normal bowel sounds; GJ tube with insertion site clean  : No bladder distention, No Hale  Extremities: No cyanosis, No clubbing, No edema  MSK: No CVA tenderness, Normal ROM, No injury; Diffuse muscle atrophy  Neuro: AAOx3, CNII-XII grossly intact, non-focal  Psych: Appropriate, Cooperative,  Skin: Clean, Dry and Intact

## 2023-04-21 NOTE — CONSULT NOTE ADULT - SUBJECTIVE AND OBJECTIVE BOX
Chief Complaint:  Patient is a 68y old  Male who presents with a chief complaint of clogged Jejunostomy    HPI:  69yo M with hx of Neuroendocrine tumor presented to ED with clogged Jejunostomy. Pt seen in ED, stated he could not flush anything through. He has had the J tube for several months. Otherwise denied fever, CP, SOB, NVD.     Allergies  losartan (Angioedema)        MEDICATIONS  (STANDING):    MEDICATIONS  (PRN):      PAST MEDICAL & SURGICAL HISTORY:  Hypertension      BPH (benign prostatic hyperplasia)      Renal insufficiency      Light chain nephropathy      DM (diabetes mellitus)      HLD (hyperlipidemia)      No significant past surgical history          FAMILY HISTORY:  FH: breast cancer (Mother)    FH: aneurysm (Father)        Review of Systems:  As per HPI    Vital Signs Last 24 Hrs  T(C): 37.2 (21 Apr 2023 12:57), Max: 37.2 (21 Apr 2023 12:57)  T(F): 99 (21 Apr 2023 12:57), Max: 99 (21 Apr 2023 12:57)  HR: 112 (21 Apr 2023 13:38) (109 - 112)  BP: 94/61 (21 Apr 2023 13:38) (86/58 - 94/61)  BP(mean): 70 (21 Apr 2023 13:38) (67 - 78)  RR: 18 (21 Apr 2023 13:38) (18 - 20)  SpO2: 100% (21 Apr 2023 13:38) (96% - 100%)    Parameters below as of 21 Apr 2023 13:38  Patient On (Oxygen Delivery Method): room air        GENERAL APPEARANCE: Well developed, in no acute distress.    LUNGS: Auscultation of the lungs revealed normal breath sounds     CARDIOVASCULAR: There was a regular rate and rhythm    ABDOMEN: Soft and nontender with normal bowel sounds.     NEUROLOGIC: Alert and oriented x 3. Normal affect.     CBC                        11.1   7.19  )-----------( 233      ( 21 Apr 2023 13:11 )             33.3       Chemistry          PT/INR - ( 21 Apr 2023 13:11 )   PT: 12.5 sec;   INR: 1.08 ratio         PTT - ( 21 Apr 2023 13:11 )  PTT:27.5 sec

## 2023-04-21 NOTE — ED STATDOCS - OBJECTIVE STATEMENT
67 y/o male w/ a PMHx of Neuroendocrine tumor (dx 6 years ago), HTN, HLD, DM2, BPH, and light chain nephropathy presents to the ED for J-tube malfunction. Pt states he is unable to get anything through the J-tube, endorses pushing w/o improvement, denies hx of any issues. Denies any new abd pain. No other complaints at this time. GI: Dr. Doyle. PCP: Dr. Siddiqi.

## 2023-04-21 NOTE — PHARMACOTHERAPY INTERVENTION NOTE - COMMENTS
Medication history complete, reviewed medications with patient and confirmed with doctor first. Patient stopped taking medications 4 months ago.

## 2023-04-21 NOTE — ED STATDOCS - CARE PLAN
1 Principal Discharge DX:	Sepsis  Secondary Diagnosis:	Acute UTI  Secondary Diagnosis:	Malfunction of jejunostomy tube  Secondary Diagnosis:	Hypokalemia  Secondary Diagnosis:	Hyponatremia  Secondary Diagnosis:	ZO (acute kidney injury)

## 2023-04-21 NOTE — H&P ADULT - HISTORY OF PRESENT ILLNESS
66 y/o M PMHx significant for Neuroendocrine tumor diagnosed 6 years ago with mets to liver with associated chronic dysphagia with GJ tube, Hypertension, Hyperlipidemia, Diabetes Mellitus Type 2, BPH and light chain nephropathy presents with clogged J tube. Tube feeds stopped. Unable to take PO including pleasure feeds, liquids and medications. Reported to ER. IR consulted and susccessful clearance of clogged tube and patent tube after. SBP low. Labs drawn. Revealing ZO, hyponatremia, hypokalemia. Admitted for further evaluation. Ptient reports weakness and lethargy but denies fever ,chills, sycnope, dizziness, chest pain, SOB. Does report chronic intermittent nausea.

## 2023-04-21 NOTE — H&P ADULT - NSHPLABSRESULTS_GEN_ALL_CORE
11.1   7.19  )-----------( 233      ( 21 Apr 2023 13:11 )             33.3     04-21    127<L>  |  79<L>  |  73<H>  ----------------------------<  187<H>  2.9<LL>   |  41<H>  |  2.20<H>    Ca    9.7      21 Apr 2023 13:11  Mg     2.3     04-21    TPro  8.0  /  Alb  2.9<L>  /  TBili  0.4  /  DBili  x   /  AST  112<H>  /  ALT  90<H>  /  AlkPhos  250<H>  04-21    SARS-CoV-2: NotDetec (21 Apr 2023 13:11)  COVID-19 PCR: NotDetec (10 Cedrick 2023 05:30)  SARS-CoV-2: NotDetec (06 Jan 2023 12:06)  SARS-CoV-2: NotDetec (10 Dec 2022 11:50)  Lactate, Blood: 1.7 mmol/L (04.21.23 @ 13:11) Urinalysis (04.21.23 @ 13:11)   Glucose Qualitative, Urine: Negative  Blood, Urine: Moderate  pH Urine: 7.0  Color: Yellow  Urine Appearance: Slightly Turbid  Bilirubin: Negative  Ketone - Urine: Negative  Specific Gravity: 1.010  Protein, Urine: 100  Urobilinogen: Negative  Nitrite: Negative  Leukocyte Esterase Concentration: Moderate    RADIOLOGY:    < from: Xray Chest 1 View-PORTABLE IMMEDIATE (04.21.23 @ 13:54) >      INTERPRETATION:  AP chest on April 21, 2023 at 1:44 PM. Patient has   sepsis.    Shallow inspiration crowds the chest.    Heartmagnified by technique.    Lungs remain clear.    Chest is similar to January 27 this year.    IMPRESSION: No acute finding or change.    < end of copied text >      EKG (4/21):    < from: 12 Lead ECG (04.21.23 @ 13:39) >    Diagnosis Line Sinus rhythm with occasional Premature ventricular complexes and Fusion complexes  Incomplete right bundle branch block  Septal infarct , age undetermined  Abnormal ECG    < end of copied text >    I personally reviewed labs, imaging, ekg, orders and vitals.  Discussed with patient, wife, RN, Hem/onc

## 2023-04-21 NOTE — ED STATDOCS - ATTENDING APP SHARED VISIT CONTRIBUTION OF CARE
I, Helen Samuels DO,  performed the initial face to face bedside interview with this patient regarding history of present illness, review of symptoms and relevant past medical, social and family history.  I completed an independent physical examination.  I was the initial provider who evaluated this patient.   I personally saw the patient and performed a substantive portion of the visit including all aspects of the medical decision making.  I have signed out the follow up of any pending tests (i.e. labs, radiological studies) to the ACP.  I have communicated the patient’s plan of care and disposition with the ACP.  The history, relevant review of systems, past medical and surgical history, medical decision making, and physical examination was documented by the scribe in my presence and I attest to the accuracy of the documentation.

## 2023-04-21 NOTE — ED STATDOCS - BIRTH SEX
-- DO NOT REPLY / DO NOT REPLY ALL --  -- Message is from the [facility]--    Patient is requesting a medication refill - medication is on active medication list    Patient is currently OUT of the requested medication.    Was Medication Pended?  Yes.    Rx Name and Dose:  triamterene-hydroCHLOROthiazide (MAXZIDE-25) 37.5-25 MG per tablet    Duration: 30 days with refills    Pharmacy  [id number] [place] [address]    Patient confirmed the above pharmacy as correct?  Yes    Caller Information       Type Contact Phone    01/28/2021 10:33 AM CST Phone (Incoming) Giovany Storey (Self) [phone] (H)          Turnaround time given to caller:   \"This message will be sent to [state Provider's name]. The clinical team will fulfill your request as soon as they review your message.\"   Male

## 2023-04-21 NOTE — ED STATDOCS - PROGRESS NOTE DETAILS
Naseem Kim for attending Dr. Samuels:  Spoke to Maria Isabel (Dr. Doyle' NP) who recommends speaking to IR. IR contacted, will consult. Naseem Kim for attending Dr. Samuels:  Seen an evaluated by IR, tube now working, pt persistently hypotensive, rectal temp 99, will initiate sepsis workup at this time, pt to be admitted. signed Doris Sylvester PA-C   Pt to be DNM as per Dr Benson to monitor pts vitals.

## 2023-04-21 NOTE — CONSULT NOTE ADULT - PROBLEM SELECTOR RECOMMENDATION 9
- Using a combination of flushing with sterile water and access of the Jejunostomy with a stiff glidewire, the obstruction was broken up and the Jejunostomy was made patent again. Recommended to pt that he flush with 30cc water before and after every use for feeding, in addition to his existing flushing regimen to prevent future clogs

## 2023-04-21 NOTE — ED STATDOCS - DISPOSITION TYPE
Asthma    Eczema    Hypertrophy of tonsils with hypertrophy of adenoids    Obstructive sleep apnea
ADMIT

## 2023-04-21 NOTE — PATIENT PROFILE ADULT - NSPROSPHOSPCHAPLAINYN_GEN_A_NUR
Patient presents today for BP check.  BP was 128/82.  Compliant with newly added Losartan 50mg and HCTZ 25mg.  Lap Band procedure scheduled for today was canceled until further notice.  Please see note for todays visit.  Thank you.  no

## 2023-04-21 NOTE — ED STATDOCS - CLINICAL SUMMARY MEDICAL DECISION MAKING FREE TEXT BOX
69 y/o w/ malfunction J tube. Will contact Dr. Doyle' services for further evaluation. 67 y/o w/ malfunction J tube. Will contact Dr. Doyle' services for further evaluation.    signed Doris Sylvester PA-C Pt seen initially in intake by Dr Samuels.   68M here for eval of J tube malfunction, tube was placed for neuroendocrine tumor mechanically obstructing stomach. IR saw pt in ED, instrumented tube which resolved blockage. Pt was noted to have low BP (normally pt is low 100s to high 90s) and rectal temp was 99. Workup revealed ZO creat 2.2 from roughly 1.4, Na 127, K 2.9, +uti, lactate negative thought pt appeared to be in sepsis. Pt alert, appears chronically ill and very thin but not toxic. vitals improving after fluids and meds. Admit for IV abx, fluids, re-eval of electrolyte abnormalities and infection. Pt agrees with admission and plan of care. Case discussed with and admission accepted by hospitalist Dr. Benson.

## 2023-04-21 NOTE — H&P ADULT - ASSESSMENT
MEDICATIONS  (STANDING):  sodium chloride 0.9% with potassium chloride 20 mEq/L 1000 milliLiter(s) (75 mL/Hr) IV Continuous <Continuous>    MEDICATIONS  (PRN):  acetaminophen   Oral Liquid .. 635 milliGRAM(s) Oral every 6 hours PRN Temp greater or equal to 38C (100.4F), Mild Pain (1 - 3)  ondansetron Injectable 4 milliGRAM(s) IV Push every 8 hours PRN Nausea and/or Vomiting        A/P    GJ Tube malfunction s/p Correction by IR (4/21)  Associated ZO/Hyponatremia/Hypokalemia due to above  -IVF  -Monitor Cr and electrolytes. Correct electrolytes accordingly  -I/Os  -Resume home tube feeds  -Bladder scans  -Bladder scan q6 hours to monitor for retention.     Metastatic Neuroendocrine Tumar to Liver  Chronic Dysphagia  Associated LFTs (patient reports recent outpatient labs with elevated LFT)  -Outpatient follow up.     DMII  -Last A1c 6.4.  Recheck. I  -not on any Rx at home. Weight loss from dysphagia/cancer  -Recheck A1c. If Significant or persistent significant hyperglycemia on am labs, will order BGM/ISS>     DVT Prophylaxis: heparin subq

## 2023-04-21 NOTE — ED STATDOCS - CCCP TRG CHIEF CMPLNT
see chief complaint quote Scribe Attestation (For Scribes USE Only)... Attending Attestation (For Attendings USE Only).../Scribe Attestation (For Scribes USE Only)...

## 2023-04-21 NOTE — H&P ADULT - NSVTERISKREFERASSESS_GEN_ALL_CORE
[address] [place] [address]     Admitting diagnosis:  Acute gastritis without hemorrhage, unspecified gastritis type [K29.00]    Ht: 167.6 cm (5' 6\")  Wt: 79.7 kg (175 lb 11 oz).  This is 135% of IBW  Body mass index is 28.2 kg
Refer to the Assessment tab to view/cancel completed assessment.

## 2023-04-22 NOTE — DIETITIAN INITIAL EVALUATION ADULT - NS FNS DIET ORDER
Diet, NPO with Tube Feed:   Tube Feeding Modality: Jejunostomy  Glucerna 1.5 Chidi (GLUCERNA1.5)  Total Volume for 24 Hours (mL): 1560  Continuous  Starting Tube Feed Rate {mL per Hour}: 65  Until Goal Tube Feed Rate (mL per Hour): 65  Tube Feed Duration (in Hours): 24  Tube Feed Start Time: 20:50  Free Water Flush  Pump   Rate (mL per Hour): 62   Frequency: Every Hour    Duration (Hours): 25 (04-21-23 @ 20:49)

## 2023-04-22 NOTE — DIETITIAN INITIAL EVALUATION ADULT - ADD RECOMMEND
1) Change TF to recommendation above  2) Obtain vitamin D 25OH level to assess nutriture  3) monitor hydration status; adjust free water flushes prn, consider free water flushes of 60mL/hr x 14 hours (which provides ~ 840mL of water per day)  4) monitor TF tolerance; keep back of bed > 35 degrees  5) monitor BM: if > 3 days without BM, add bowel regimen prn  6) daily wt checks to track/trend changes  7) Consider adding thiamine 100 mg daily 2/2 poor PO intake/ malnutrition  RD will continue to monitor PO intake, labs, hydration, and wt prn.  1) Change TF to recommendation above  2) Obtain vitamin D 25OH level to assess nutriture  3) monitor hydration status; adjust free water flushes prn, consider free water flushes of 65mL/hr x 12 hours (which provides ~ 780mL of water per day)  4) monitor TF tolerance; keep back of bed > 35 degrees  5) monitor BM: if > 3 days without BM, add bowel regimen prn  6) daily wt checks to track/trend changes  7) Consider adding thiamine 100 mg daily 2/2 poor PO intake/ malnutrition  RD will continue to monitor PO intake, labs, hydration, and wt prn.

## 2023-04-22 NOTE — DIETITIAN NUTRITION RISK NOTIFICATION - TREATMENT: THE FOLLOWING DIET HAS BEEN RECOMMENDED
Diet, NPO with Tube Feed:   Tube Feeding Modality: Jejunostomy  Glucerna 1.5 Chidi (GLUCERNA1.5)  Total Volume for 24 Hours (mL): 1560  Continuous  Starting Tube Feed Rate {mL per Hour}: 65  Until Goal Tube Feed Rate (mL per Hour): 65  Tube Feed Duration (in Hours): 24  Tube Feed Start Time: 20:50  Free Water Flush  Pump   Rate (mL per Hour): 62   Frequency: Every Hour    Duration (Hours): 25 (04-21-23 @ 20:49) [Active]

## 2023-04-22 NOTE — DIETITIAN INITIAL EVALUATION ADULT - OTHER INFO
66 y/o M with a PMHx of Neuroendocrine tumor diagnosed 6 years ago with mets to liver with associated chronic dysphagia with GJ tube, Hypertension, Hyperlipidemia, Diabetes Mellitus Type 2, BPH and light chain nephropathy presents with clogged J tube. Tube feeds stopped. Unable to take PO including pleasure feeds, liquids and medications. Reported to ER. IR consulted and successful clearance of clogged tube and patent tube after. SBP low. Labs drawn. Revealing ZO, hyponatremia, hypokalemia. Admitted for further evaluation. Ptient reports weakness and lethargy but denies fever ,chills, sycnope, dizziness, chest pain, SOB. Does report chronic intermittent nausea. Admitted for GJ Tube malfunction s/p Correction by IR.    Glucerna 1.5 held at time of visit as pt just came back from bathroom - states that he was previously tolerating TF with no issues. Reports his ubw is 140# since his hospitalization in January. RD obtained bedscale wt on 4/22 - 131#. Weight hx reviewed: 139# (taken by RD on 4/22; met criteria for severe malnutrition); weight loss of 8# (5.8%) x 3 mons - not clinsig. Pt thin, frail, and meliza appearing. NFPE reveals moderate to severe muscle/ fat wasting, pt continues to meet criteria for PCM at this time. Recommend to change TF to home infusion. Please see additional recommendations below.  68 y/o M with a PMHx of Neuroendocrine tumor diagnosed 6 years ago with mets to liver with associated chronic dysphagia with GJ tube, Hypertension, Hyperlipidemia, Diabetes Mellitus Type 2, BPH and light chain nephropathy presents with clogged J tube. Tube feeds stopped. Unable to take PO including pleasure feeds, liquids and medications. Reported to ER. IR consulted and successful clearance of clogged tube and patent tube after. SBP low. Labs drawn. Revealing ZO, hyponatremia, hypokalemia. Admitted for further evaluation. Ptient reports weakness and lethargy but denies fever ,chills, sycnope, dizziness, chest pain, SOB. Does report chronic intermittent nausea. Admitted for GJ Tube malfunction s/p Correction by IR.    Glucerna 1.5 held at time of visit as pt just came back from bathroom - states that he was previously tolerating TF with no issues. Reports his ubw is 140# since his hospitalization in January. RD obtained bedscale wt on 4/22 - 131#. Weight hx reviewed: 139# (taken by RD on 4/22; met criteria for severe malnutrition); weight loss of 8# (5.8%) x 3 mons - not clinsig. Pt thin, frail, and meliza appearing. NFPE reveals moderate to severe muscle/ fat wasting, pt continues to meet criteria for PCM at this time. Recommend to change TF to home infusion. Current home TF regimen if underfeeding pt at this current time, however, per discussion with pt and wife - would like to c/w home TF regimen and adjust tomorrow if receptive. Please see additional recommendations below.

## 2023-04-22 NOTE — PROGRESS NOTE ADULT - SUBJECTIVE AND OBJECTIVE BOX
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 Tube feeds stopped. Unable to take PO including pleasure feeds, liquids and medications. Reported to ER. IR consulted and susccessful clearance of clogged tube and patent tube after. SBP low. Labs drawn. Revealing ZO, hyponatremia, hypokalemia. Admitted for further evaluation. Ptient reports weakness and lethargy but denies fever ,chills, sycnope, dizziness, chest pain, SOB. Does report chronic intermittent nausea.     INTERVAL HPI/OVERNIGHT EVENTS:    Vital Signs Last 24 Hrs  T(C): 36.8 (2023 07:15), Max: 37.2 (2023 12:57)  T(F): 98.2 (2023 07:15), Max: 99 (2023 12:57)  HR: 99 (2023 10:43) (88 - 112)  BP: 105/67 (2023 07:15) (86/58 - 118/81)  BP(mean): 91 (2023 21:00) (67 - 91)  RR: 18 (2023 07:15) (18 - 20)  SpO2: 97% (2023 07:15) (95% - 100%)    Parameters below as of 2023 07:15  Patient On (Oxygen Delivery Method): room air      I&O's Detail    2023 07:01  -  2023 12:20  --------------------------------------------------------  IN:    Free Water: 186 mL    Glucerna 1.5: 195 mL    sodium chloride 0.9% w/ Additives: 225 mL  Total IN: 606 mL    OUT:    Intermittent Catheterization - Urethral (mL): 550 mL  Total OUT: 550 mL    Total NET: 56 mL        REVIEW OF SYSTEMS:    CONSTITUTIONAL: No weakness, fevers or chills  EYES/ENT: No visual changes;  No vertigo or throat pain   NECK: No pain or stiffness  RESPIRATORY: No cough, wheezing, hemoptysis; No shortness of breath  CARDIOVASCULAR: No chest pain or palpitations  GASTROINTESTINAL: No abdominal or epigastric pain. No nausea, vomiting, or hematemesis; No diarrhea or constipation. No melena or hematochezia.  GENITOURINARY: No dysuria, frequency or hematuria  NEUROLOGICAL: No numbness or weakness  SKIN: No itching, burning, rashes, or lesions   All other review of systems is negative unless indicated above.  PHYSICAL EXAM:    General: Well developed; well nourished; in no acute distress  Eyes: PERRLA, EOMI; conjunctiva and sclera clear  Head: Normocephalic; atraumatic  ENMT: No nasal discharge; airway clear  Neck: Supple; non tender; no masses  Respiratory: No wheezes, rales or rhonchi  Cardiovascular: Regular rate and rhythm. S1 and S2 Normal; No murmurs, gallops or rubs  Gastrointestinal: Soft non-tender non-distended; Normal bowel sounds  Genitourinary: No  suprapubic  tenderness  Extremities: Normal range of motion, No clubbing, cyanosis or edema  Vascular: Peripheral pulses palpable 2+ bilaterally  Neurological: Alert and oriented x4  Skin: Warm and dry. No acute rash  Lymph Nodes: No acute cervical adenopathy  Musculoskeletal: Normal muscle tone, without deformities  Psychiatric: Cooperative and appropriate                            11.1   7.19  )-----------( 233      ( 2023 13:11 )             33.3     2023 13:11    127    |  79     |  73     ----------------------------<  187    2.9     |  41     |  2.20     Ca    9.7        2023 13:11  Mg     2.3       2023 13:11    TPro  8.0    /  Alb  2.9    /  TBili  0.4    /  DBili  x      /  AST  112    /  ALT  90     /  AlkPhos  250    2023 13:11    PT/INR - ( 2023 13:11 )   PT: 12.5 sec;   INR: 1.08 ratio         PTT - ( 2023 13:11 )  PTT:27.5 sec  CAPILLARY BLOOD GLUCOSE      POCT Blood Glucose.: 206 mg/dL (2023 11:56)  POCT Blood Glucose.: 196 mg/dL (2023 07:48)    LIVER FUNCTIONS - ( 2023 13:11 )  Alb: 2.9 g/dL / Pro: 8.0 gm/dL / ALK PHOS: 250 U/L / ALT: 90 U/L / AST: 112 U/L / GGT: x           Urinalysis Basic - ( 2023 13:11 )    Color: Yellow / Appearance: Slightly Turbid / S.010 / pH: x  Gluc: x / Ketone: Negative  / Bili: Negative / Urobili: Negative   Blood: x / Protein: 100 / Nitrite: Negative   Leuk Esterase: Moderate / RBC: 11-25 /HPF / WBC >50 /HPF   Sq Epi: x / Non Sq Epi: x / Bacteria: TNTC        MEDICATIONS  (STANDING):  heparin   Injectable 5000 Unit(s) SubCutaneous every 8 hours  sodium chloride 0.9% with potassium chloride 20 mEq/L 1000 milliLiter(s) (75 mL/Hr) IV Continuous <Continuous>    MEDICATIONS  (PRN):  acetaminophen   Oral Liquid .. 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 Tube feeds  were stopped. Unable to take PO including pleasure feeds, liquids and medications. Ptient reports weakness and lethargy but denies fever ,chills, sycnope, dizziness, chest pain, SOB. Does report chronic intermittent nausea.   In ED, IR consulted, had  successful clearance of clogged tube and patent tube after.   After procedure  SBP  was low. Labs drawn. Revealing ZO, hyponatremia, hypokalemia. Abnormal UA.     INTERVAL HPI/ OVERNIGHT EVENTS: chart reviewed, Pt was seen and examined, reports some intermittent  nausea,  feels weak. FAmily t bedside, Pt had recent CT and was seen by Sx onc, planned for possible procedure? requesting inPt evaluation     Vital Signs Last 24 Hrs  T(C): 36.8 (2023 07:15), Max: 37.2 (2023 12:57)  T(F): 98.2 (2023 07:15), Max: 99 (2023 12:57)  HR: 99 (2023 10:43) (88 - 112)  BP: 105/67 (2023 07:15) (86/58 - 118/81)  BP(mean): 91 (2023 21:00) (67 - 91)  RR: 18 (2023 07:15) (18 - 20)  SpO2: 97% (2023 07:15) (95% - 100%)    Parameters below as of 2023 07:15  Patient On (Oxygen Delivery Method): room air        REVIEW OF SYSTEMS:  All other review of systems is negative unless indicated above.      PHYSICAL EXAM:  General: Well developed; frail elderly male,  in no acute distress  Eyes:  EOMI; conjunctiva and sclera clear  Head: Normocephalic; atraumatic  ENMT: No nasal discharge; airway clear  Respiratory: No wheezes, rales or rhonchi  Cardiovascular: Regular rate and rhythm. S1 and S2 Normal;   Gastrointestinal: Soft non-tender non-distended; Normal bowel sounds, J tube in place   Genitourinary: No  suprapubic  tenderness  Extremities: No edema  Neurological: Alert and oriented x3, non focal   Skin: Warm and dry. No acute rash  Musculoskeletal: Normal muscle tone, without deformities  Psychiatric: Cooperative and appropriate    LABS                         11.1   7.19  )-----------( 233      ( 2023 13:11 )             33.3     2023 13:11    127    |  79     |  73     ----------------------------<  187    2.9     |  41     |  2.20     Ca    9.7        2023 13:11  Mg     2.3       2023 13:11    TPro  8.0    /  Alb  2.9    /  TBili  0.4    /  DBili  x      /  AST  112    /  ALT  90     /  AlkPhos  250    2023 13:11    PT/INR - ( 2023 13:11 )   PT: 12.5 sec;   INR: 1.08 ratio    PTT - ( 2023 13:11 )  PTT:27.5 sec      CAPILLARY BLOOD GLUCOSE  POCT Blood Glucose.: 206 mg/dL (2023 11:56)  POCT Blood Glucose.: 196 mg/dL (2023 07:48)    LIVER FUNCTIONS - ( 2023 13:11 )  Alb: 2.9 g/dL / Pro: 8.0 gm/dL / ALK PHOS: 250 U/L / ALT: 90 U/L / AST: 112 U/L / GGT: x           Urinalysis Basic - ( 2023 13:11 )    Color: Yellow / Appearance: Slightly Turbid / S.010 / pH: x  Gluc: x / Ketone: Negative  / Bili: Negative / Urobili: Negative   Blood: x / Protein: 100 / Nitrite: Negative   Leuk Esterase: Moderate / RBC: 11-25 /HPF / WBC >50 /HPF   Sq Epi: x / Non Sq Epi: x / Bacteria: TNTC        MEDICATIONS  (STANDING):  cefepime  Injectable. 1000 milliGRAM(s) IV Push every 24 hours  dextrose 5%. 1000 milliLiter(s) (50 mL/Hr) IV Continuous <Continuous>  dextrose 5%. 1000 milliLiter(s) (100 mL/Hr) IV Continuous <Continuous>  dextrose 50% Injectable 25 Gram(s) IV Push once  dextrose 50% Injectable 12.5 Gram(s) IV Push once  dextrose 50% Injectable 25 Gram(s) IV Push once  glucagon  Injectable 1 milliGRAM(s) IntraMuscular once  heparin   Injectable 5000 Unit(s) SubCutaneous every 8 hours  insulin lispro (ADMELOG) corrective regimen sliding scale   SubCutaneous three times a day before meals  insulin lispro (ADMELOG) corrective regimen sliding scale   SubCutaneous at bedtime  sodium chloride 0.9% with potassium chloride 20 mEq/L 1000 milliLiter(s) (75 mL/Hr) IV Continuous <Continuous>    MEDICATIONS  (PRN):  acetaminophen   Oral Liquid .. 635 milliGRAM(s) Oral every 6 hours PRN Temp greater or equal to 38C (100.4F), Mild Pain (1 - 3)  dextrose Oral Gel 15 Gram(s) Oral once PRN Blood Glucose LESS THAN 70 milliGRAM(s)/deciliter  ondansetron Injectable 4 milliGRAM(s) IV Push every 8 hours PRN Nausea and/or Vomiting        RADIOLOGY & ADDITIONAL TESTS:  < from: Xray Chest 1 View-PORTABLE IMMEDIATE (23 @ 13:54) >    ACC: [id number] EXAM:  XR CHEST PORTABLE IMMED 1V   ORDERED BY: JAQUELINE DAMON     PROCEDURE DATE:  2023          INTERPRETATION:  AP chest on 2023 at 1:44 PM. Patient has   sepsis.    Shallow inspiration crowds the chest.    Heartmagnified by technique.    Lungs remain clear.    Chest is similar to  this year.    IMPRESSION: No acute finding or change.

## 2023-04-22 NOTE — DIETITIAN INITIAL EVALUATION ADULT - ORAL INTAKE PTA/DIET HISTORY
Reports only receiving EN at home, unable to tolerate pleasure feeds. Receives Glucerna 1.5 at 95 mL/hr x 14 hours at home (provides 1995 kcals, 110 g pro, and 1010 kcals). Free water flushes of 60 mL/hr Reports only receiving EN at home, unable to tolerate pleasure feeds. Receives Glucerna 1.5 - 3 cans from 6pm -230 am (continuos through open system) and 1 can at 10 am (provides a total of 1424 kcals, 78g pro, and 720cc free water). Free water flushes of 65 mL/hr Reports only receiving EN at home, unable to tolerate pleasure feeds. Receives Glucerna 1.5 - 3 cans from 6pm -230 am (continuous through open system) and 1 can at 10 am (provides a total of 1424 kcals, 78g pro, and 720cc free water). Free water flushes of 65 mL/hr

## 2023-04-22 NOTE — CONSULT NOTE ADULT - SUBJECTIVE AND OBJECTIVE BOX
Patient is a 68y old  Male who presents with a chief complaint of J-tube malfunction; weakness; dehydration; hyponatremia; ZO (2023 12:20)    HPI:  66 y/o M PMHx significant for Neuroendocrine tumor diagnosed 6 years ago with mets to liver with associated chronic dysphagia with GJ tube, Hypertension, Hyperlipidemia, Diabetes Mellitus Type 2, BPH and light chain nephropathy presents with clogged J tube. Tube feeds stopped. Unable to take PO including pleasure feeds, liquids and medications. Reported to ER. IR consulted and susccessful clearance of clogged tube and patent tube after. SBP low. Labs drawn. Revealing ZO, hyponatremia, hypokalemia. Admitted for further evaluation. Ptient reports weakness and lethargy but denies fever ,chills, sycnope, dizziness, chest pain, SOB. Does report chronic intermittent nausea. Pt self catheterizes for many years d/t urinary retention, UA positive, concern for UTI, given IV cefepime.     PMH: as above  PSH: as above  Meds: per reconciliation sheet, noted below  MEDICATIONS  (STANDING):  cefepime  Injectable. 1000 milliGRAM(s) IV Push every 24 hours  heparin   Injectable 5000 Unit(s) SubCutaneous every 8 hours  sodium chloride 0.9% with potassium chloride 20 mEq/L 1000 milliLiter(s) (75 mL/Hr) IV Continuous <Continuous>      Allergies    losartan (Angioedema)    Intolerances      Social: no smoking, no alcohol, no illegal drugs; no recent travel, no exposure to TB  FAMILY HISTORY:  FH: breast cancer (Mother)    FH: aneurysm (Father)       no history of premature cardiovascular disease in first degree relatives    ROS: the patient denies fever, no chills, no HA, no dizziness, no sore throat, no blurry vision, no CP, no palpitations, no SOB, no cough, no abdominal pain, no diarrhea, no N/V, no dysuria, no leg pain, no claudication, no rash, no joint aches, no rectal pain or bleeding, no night sweats    All other systems reviewed and are negative    Vital Signs Last 24 Hrs  T(C): 36.8 (2023 07:15), Max: 36.8 (2023 07:15)  T(F): 98.2 (2023 07:15), Max: 98.2 (2023 07:15)  HR: 99 (2023 10:43) (88 - 112)  BP: 105/67 (2023 07:15) (94/61 - 118/81)  BP(mean): 91 (2023 21:00) (70 - 91)  RR: 18 (2023 07:15) (18 - 18)  SpO2: 97% (2023 07:15) (95% - 100%)    Parameters below as of 2023 07:15  Patient On (Oxygen Delivery Method): room air      Daily     Daily     PE:  Constitutional: NAD  HEENT: NC/AT, EOMI, PERRLA, conjunctivae clear; ears and nose atraumatic; pharynx benign  Neck: supple; thyroid not palpable  Back: no tenderness  Respiratory: respiratory effort normal; clear to auscultation  Cardiovascular: S1S2 regular, no murmurs  Abdomen: soft, not tender, not distended, positive BS; liver and spleen WNL  Genitourinary: no suprapubic tenderness  Lymphatic: no LN palpable  Musculoskeletal: no muscle tenderness, no joint swelling or tenderness  Extremities: no pedal edema  Neurological/ Psychiatric: AxOx3, Judgement and insight normal;  moving all extremities  Skin: no rashes; no palpable lesions    Labs: all available labs reviewed                        11.1   7.19  )-----------( 233      ( 2023 13:11 )             33.3     04-    127<L>  |  79<L>  |  73<H>  ----------------------------<  187<H>  2.9<LL>   |  41<H>  |  2.20<H>    Ca    9.7      2023 13:11  Mg     2.3     -    TPro  8.0  /  Alb  2.9<L>  /  TBili  0.4  /  DBili  x   /  AST  112<H>  /  ALT  90<H>  /  AlkPhos  250<H>  -     LIVER FUNCTIONS - ( 2023 13:11 )  Alb: 2.9 g/dL / Pro: 8.0 gm/dL / ALK PHOS: 250 U/L / ALT: 90 U/L / AST: 112 U/L / GGT: x           Urinalysis Basic - ( 2023 13:11 )    Color: Yellow / Appearance: Slightly Turbid / S.010 / pH: x  Gluc: x / Ketone: Negative  / Bili: Negative / Urobili: Negative   Blood: x / Protein: 100 / Nitrite: Negative   Leuk Esterase: Moderate / RBC: 11-25 /HPF / WBC >50 /HPF   Sq Epi: x / Non Sq Epi: x / Bacteria: TNTC          Radiology: all available radiological tests reviewed  < from: Xray Chest 1 View-PORTABLE IMMEDIATE (23 @ 13:54) >    ACC: [id number] EXAM:  XR CHEST PORTABLE IMMED 1V   ORDERED BY: JAQUELINE DAMON     PROCEDURE DATE:  2023          INTERPRETATION:  AP chest on 2023 at 1:44 PM. Patient has   sepsis.    Shallow inspiration crowds the chest.    Heartmagnified by technique.    Lungs remain clear.    Chest is similar to  this year.    IMPRESSION: No acute finding or change.    --- End of Report ---    < end of copied text >    Advanced directives addressed: full resuscitation

## 2023-04-22 NOTE — PROGRESS NOTE ADULT - ASSESSMENT
GJ Tube malfunction s/p Correction by IR (4/21)  Associated ZO/Hyponatremia/Hypokalemia due to above  -IVF  -Monitor Cr and electrolytes. Correct electrolytes accordingly  -I/Os  -Resume home tube feeds  -Bladder scans  -Bladder scan q6 hours to monitor for retention.     Metastatic Neuroendocrine Tumar to Liver  Chronic Dysphagia  Associated LFTs (patient reports recent outpatient labs with elevated LFT)  -Outpatient follow up.     DMII  -Last A1c 6.4.  Recheck. I  -not on any Rx at home. Weight loss from dysphagia/cancer  -Recheck A1c. If Significant or persistent significant hyperglycemia on am labs, will order BGM/ISS>     DVT Prophylaxis: heparin subq 68 y/o M PMHx significant for Neuroendocrine tumor diagnosed 6 years ago with mets to liver with associated chronic gastric outlet  obstruction, s/p ith GJ tube placement for nutrition, Hypertension, Hyperlipidemia, Diabetes Mellitus Type 2, BPH and light chain nephropathy admitted for:       1. GJ Tube malfunction s/p Correction by IR (4/21)  Neuroendocrine Tumor. Chronic Gastric outlet obstruction   Recent CT abd with advancing tumor, Liver Mets   Symptomatic with nausea and  weight loss  Zofran PRN   Started on Tube Feedings   Sx onc and med onc eval       2. ZO.  Dehydration Hyponatremia/Hypokalemia  -IVF  - replace   -Monitor Cr and electrolytes. Correct electrolytes accordingly  -I/Os  -Resume home tube feeds  -Bladder scans  -Bladder scan q6 hours to monitor for retention.     Metastatic Neuroendocrine Tumar to Liver  Chronic Dysphagia  Associated LFTs (patient reports recent outpatient labs with elevated LFT)  -Outpatient follow up.     DMII  -Last A1c 6.4.  Recheck. I  -not on any Rx at home. Weight loss from dysphagia/cancer  -Recheck A1c. If Significant or persistent significant hyperglycemia on am labs, will order BGM/ISS>     DVT Prophylaxis: heparin subq 66 y/o M PMHx significant for Neuroendocrine tumor diagnosed 6 years ago with mets to liver with associated chronic gastric outlet  obstruction, s/p ith GJ tube placement for nutrition, Hypertension, Hyperlipidemia, Diabetes Mellitus Type 2, BPH and light chain nephropathy admitted for:       1. GJ Tube malfunction s/p Correction by IR (4/21)  Neuroendocrine Tumor. Chronic Gastric outlet obstruction   Recent CT abd with advancing tumor, Liver Mets   Symptomatic with nausea and  weight loss  Zofran PRN   Started on Tube Feedings   Sx onc and med onc eval       2. ZO.  Dehydration Hyponatremia/Hypokalemia.  Chronic urinary retention  IVF  Replace lytes IV   C/w Tube Feeds   -Bladder scan q6 hours, Pt self catheterizes himself   labs in am     3.  Abnormal UA, suspected UTI   F/u UCx  start Cefepime  ID eval     4. DMII  Monitor BS, cover with ISS  Check HB A1c       5. DVT Prophylaxis: heparin subq

## 2023-04-22 NOTE — DIETITIAN INITIAL EVALUATION ADULT - PERTINENT LABORATORY DATA
04-21    127<L>  |  79<L>  |  73<H>  ----------------------------<  187<H>  2.9<LL>   |  41<H>  |  2.20<H>    Ca    9.7      21 Apr 2023 13:11  Mg     2.3     04-21    TPro  8.0  /  Alb  2.9<L>  /  TBili  0.4  /  DBili  x   /  AST  112<H>  /  ALT  90<H>  /  AlkPhos  250<H>  04-21  POCT Blood Glucose.: 196 mg/dL (04-22-23 @ 07:48)  A1C with Estimated Average Glucose Result: 6.4 % (12-11-22 @ 07:19)

## 2023-04-22 NOTE — DIETITIAN NUTRITION RISK NOTIFICATION - ADDITIONAL COMMENTS/DIETITIAN RECOMMENDATIONS
1) Change TF to recommendation above  2) Obtain vitamin D 25OH level to assess nutriture  3) monitor hydration status; adjust free water flushes prn, consider free water flushes of 60mL/hr x 14 hours (which provides ~ 840mL of water per day)  4) monitor TF tolerance; keep back of bed > 35 degrees  5) monitor BM: if > 3 days without BM, add bowel regimen prn  6) daily wt checks to track/trend changes  7) Consider adding thiamine 100 mg daily 2/2 poor PO intake/ malnutrition  RD will continue to monitor PO intake, labs, hydration, and wt prn.

## 2023-04-22 NOTE — DIETITIAN INITIAL EVALUATION ADULT - PERTINENT MEDS FT
MEDICATIONS  (STANDING):  heparin   Injectable 5000 Unit(s) SubCutaneous every 8 hours  sodium chloride 0.9% with potassium chloride 20 mEq/L 1000 milliLiter(s) (75 mL/Hr) IV Continuous <Continuous>    MEDICATIONS  (PRN):  acetaminophen   Oral Liquid .. 635 milliGRAM(s) Oral every 6 hours PRN Temp greater or equal to 38C (100.4F), Mild Pain (1 - 3)  ondansetron Injectable 4 milliGRAM(s) IV Push every 8 hours PRN Nausea and/or Vomiting

## 2023-04-22 NOTE — DIETITIAN INITIAL EVALUATION ADULT - ETIOLOGY
r/t decreased ability to meet increased nutrient needs 2/2 neuroendocrine tumor diagnosed 6 years ago with mets to liver with associated chronic dysphagia

## 2023-04-22 NOTE — CONSULT NOTE ADULT - ASSESSMENT
68 y/o M PMHx significant for Neuroendocrine tumor diagnosed 6 years ago with mets to liver with associated chronic dysphagia with GJ tube, Hypertension, Hyperlipidemia, Diabetes Mellitus Type 2, BPH and light chain nephropathy presents with clogged J tube. Tube feeds stopped. Unable to take PO including pleasure feeds, liquids and medications. Reported to ER. IR consulted and susccessful clearance of clogged tube and patent tube after. SBP low. Labs drawn. Revealing ZO, hyponatremia, hypokalemia. Admitted for further evaluation. Patient reports weakness and lethargy but denies fever ,chills, sycnope, dizziness, chest pain, SOB. Does report chronic intermittent nausea. Pt self catheterizes for many years d/t urinary retention, UA positive, concern for UTI, given IV cefepime.     1. Pyuria. Probable complicated UTI. BPH. Urinary retention. ZO  - self catheterizes   - f/u urine cx, blood cx  - agree with IV cefepime adjust dose to 1gm daily  - continue with abx coverage  - monitor for retention  - fu cbc  - tolerating abx well so far; no side effects noted  - reason for abx use and side effects reviewed with patient    2. other issues - care per medicine

## 2023-04-22 NOTE — DIETITIAN INITIAL EVALUATION ADULT - NSFNSGIIOFT_GEN_A_CORE
04-22-23 @ 07:01  -  04-22-23 @ 11:27  --------------------------------------------------------  OUT:  Total OUT: 0 mL    Total NET: 195 mL

## 2023-04-23 NOTE — CONSULT NOTE ADULT - ASSESSMENT
69 y/o M with a PMHx of neuroendocrine tumor being treated the Lutathera, known to our team from previous admission, presented to the ED for J tube obstruction with associated weakness, fatigue/lethargy.      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    - Primary Onc Dr Ac Llamas  - Initially diagnosed approx 6 years ago  - Started on Sandostatin, completed Lutathera about 1 year ago ---> scans end of 2022 noted POD; started back on Lutathera 12/07/2022  - Recently admitted to  01/2023 after being seen outpatient by SurgOnc Dr Siddiqi 01/04/2023 with concern for gastric outlet obstruction  - Received EGD 01/10/23 with severe esophagitis, no obvious obstruction seen with no stenting done; received GJ tube placement  - Case discussed among multiple specialists at tumor boards and it was concluded that it would be best to support the patient through a complete Lutathera treatment  - Patient continuing to follow up with Primary Onc Dr Llamas ---> completed 2 cycles of Lutathera with most recent dosing approx 2 months ago  - Patient recent seen outpatient by SurgOnc Dr Siddiqi 04/12/23 with ongoing GOC/plan being discussed including a palliative procedure, but the potential for complications is high given the need for a high gastric transection and Marcela-en-Y reconstruction. Currently dependent on using J tube for feeds & G port to decompress; unable to tolerate PO intake   - SurgOnc and Primary Onc have been communicating with plan to re-image his abdomen and either resect the perigastric disease or bypass the stomach   - Most recent CT AP imaging 04/17/23 revealed diffuse bilobar liver metastasis which have progressed since 01/06/2023; large pancreatic mass, not significantly changed  - At this time, continue supportive measures; [facility]      # J Tube Malfunction with Nausea , Weakness , Weight Loss    - GJ tube placement 01/10/23 by GI Dr Doyle  - J tube for feedings, recently unable to pass anything through  - IR consulted in the ED with successful irrigation of existing J tube; advised at least 30cc flush before and after feedings  - Dietary evaluation completed   - Continue supportive measures including feedings at this time      # ZO with Hyponatremia & Suspected UTI    - Likely 2/2 dehydration from decreased feeding intake   - IVF  - Replace sodium   - Bladder scan q6h ---> patient self catheterizes  - Cefepime started; ID following  - Continue supportive measures      # Normocytic Anemia    - Hgb down to 8.8 g/dL 04/22  - Likely 2/2 dehydration with dilutional effect 2/2 IVF  - Additional contributing factor including decreased feedings with weight loss   - Will await labs this AM  - No obvious signs of bleeding  - Can check hemolysis labs for completeness but not likely the cause (ordered)  - Can also check FOBT to r/o potential GIB (ordered)  - Continue to trend serial CBCs  - Transfuse blood as necessary; keep Hgb >7.5 g/dL 67 y/o M with a PMHx of neuroendocrine tumor being treated the Lutathera, known to our team from previous admission, presented to the ED for J tube obstruction with associated weakness, fatigue/lethargy.      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    - Primary Onc Dr Ac Llamas  - Initially diagnosed approx 6 years ago  - Started on Sandostatin, completed Lutathera about 1 year ago ---> scans end of 2022 noted POD; started back on Lutathera 12/07/2022  - Recently admitted to  01/2023 after being seen outpatient by SurgOnc Dr Siddiqi 01/04/2023 with concern for gastric outlet obstruction  - Received EGD 01/10/23 with severe esophagitis, no obvious obstruction seen with no stenting done; received GJ tube placement  - Case discussed among multiple specialists at tumor boards and it was concluded that it would be best to support the patient through a complete Lutathera treatment  - Patient continuing to follow up with Primary Onc Dr Llamas ---> completed 2 cycles of Lutathera with most recent dosing approx 2 months ago  - Patient recent seen outpatient by SurgOnc Dr Siddiqi 04/12/23 with ongoing GOC/plan being discussed including a palliative procedure, but the potential for complications is high given the need for a high gastric transection and Marcela-en-Y reconstruction. Currently dependent on using J tube for feeds & G port to decompress; unable to tolerate PO intake   - SurgOnc and Primary Onc have been communicating with plan to re-image his abdomen and either resect the perigastric disease or bypass the stomach   - Most recent CT AP imaging 04/17/23 revealed diffuse bilobar liver metastasis which have progressed since 01/06/2023; large pancreatic mass, not significantly changed  - At this time, continue supportive measures; [facility]      # J Tube Malfunction with Nausea , Weakness , Weight Loss    - GJ tube placement 01/10/23 by GI Dr Doyle  - J tube for feedings, recently unable to pass anything through  - IR consulted in the ED with successful irrigation of existing J tube; advised at least 30cc flush before and after feedings  - Dietary evaluation completed   - Continue supportive measures including feedings at this time      # ZO with Hyponatremia & Suspected UTI    - Likely 2/2 dehydration from decreased feeding intake   - IVF  - Replace sodium   - Bladder scan q6h ---> patient self catheterizes  - Cefepime started; ID following  - Continue supportive measures      # Normocytic Anemia    - Hgb down to 8.5 g/dL, stable  - Likely 2/2 dehydration with dilutional effect 2/2 IVF  - Additional contributing factor including decreased feedings with weight loss   - No obvious signs of bleeding  - Can check hemolysis labs for completeness but not likely the cause (ordered)  - Can also check FOBT to r/o potential GIB (ordered)  - Continue to trend serial CBCs  - Transfuse blood as necessary; keep Hgb >7.5 g/dL

## 2023-04-23 NOTE — CONSULT NOTE ADULT - SUBJECTIVE AND OBJECTIVE BOX
HPI:  67 y/o M PMHx significant for neuroendocrine tumor diagnosed 6 years ago with mets to liver with associated chronic dysphagia with GJ tube (follows with Primary Onc Dr Llamas), HTN, HLD, T2DM, BPH and light chain nephropathy presented to the ED with clogged J tube. He admitted that the tube feeds stopped, unable to take PO including pleasure feeds, liquids and medications. In the ED, IR consulted with successful clearance of clogged tube with patent tube after. Patient reported ongoing weakness/lethargy but denied fever, chills, syncope, dizziness, CP, palpitations, SOB/LEBLANC, wheezing, melena, BRBPR, dysuria or any other acute c/o at the time.        PAST MEDICAL & SURGICAL HISTORY:  Hypertension      BPH (benign prostatic hyperplasia)      Renal insufficiency      Light chain nephropathy      DM (diabetes mellitus)      HLD (hyperlipidemia)      No significant past surgical history          MEDICATIONS  (STANDING):  cefepime  Injectable. 1000 milliGRAM(s) IV Push every 24 hours  dextrose 5%. 1000 milliLiter(s) (50 mL/Hr) IV Continuous <Continuous>  dextrose 5%. 1000 milliLiter(s) (100 mL/Hr) IV Continuous <Continuous>  dextrose 50% Injectable 25 Gram(s) IV Push once  dextrose 50% Injectable 25 Gram(s) IV Push once  dextrose 50% Injectable 12.5 Gram(s) IV Push once  glucagon  Injectable 1 milliGRAM(s) IntraMuscular once  heparin   Injectable 5000 Unit(s) SubCutaneous every 8 hours  insulin lispro (ADMELOG) corrective regimen sliding scale   SubCutaneous at bedtime  insulin lispro (ADMELOG) corrective regimen sliding scale   SubCutaneous three times a day before meals  sodium chloride 0.9% with potassium chloride 20 mEq/L 1000 milliLiter(s) (75 mL/Hr) IV Continuous <Continuous>    MEDICATIONS  (PRN):  acetaminophen   Oral Liquid .. 635 milliGRAM(s) Oral every 6 hours PRN Temp greater or equal to 38C (100.4F), Mild Pain (1 - 3)  dextrose Oral Gel 15 Gram(s) Oral once PRN Blood Glucose LESS THAN 70 milliGRAM(s)/deciliter  ondansetron Injectable 4 milliGRAM(s) IV Push every 8 hours PRN Nausea and/or Vomiting      Allergies    losartan (Angioedema)    Intolerances        SOCIAL HISTORY:    FAMILY HISTORY:  FH: breast cancer (Mother)    FH: aneurysm (Father)            Physical Exam:  Constitutional: ill appearing, no acute distress  Eyes: PERRL, EOMI  ENT: pharynx is unremarkable  Neck: supple without JVD  Pulmonary: clear to auscultation bilaterally, no dullness, no wheezing  Cardiac: RRR, normal S1S2  Vascular: no calf tenderness, venous stasis changes, varices.   Abdomen: normoactive bowel sounds, soft and nontender, no hepatosplenomegaly or masses appreciated; GJ in place  Lymphatic: no peripheral adenopathy appreciated  Musculoskeletal: full range of motion and no deformities appreciated  Skin: normal appearance, no rash/erythema   Neurology: grossly intact  Psychiatric: affect appropriate    Vital Signs Last 24 Hrs  T(C): 37 (23 Apr 2023 07:36), Max: 37 (22 Apr 2023 21:52)  T(F): 98.6 (23 Apr 2023 07:36), Max: 98.6 (22 Apr 2023 21:52)  HR: 95 (23 Apr 2023 07:36) (95 - 106)  BP: 113/81 (23 Apr 2023 07:36) (111/80 - 113/81)  BP(mean): --  RR: 16 (23 Apr 2023 07:36) (16 - 18)  SpO2: 95% (23 Apr 2023 07:36) (95% - 96%)    Parameters below as of 23 Apr 2023 07:36  Patient On (Oxygen Delivery Method): room air        I&O's Summary    22 Apr 2023 07:01  -  23 Apr 2023 07:00  --------------------------------------------------------  IN: 1826 mL / OUT: 905 mL / NET: 921 mL            LABS:                        8.5    6.27  )-----------( 162      ( 23 Apr 2023 07:01 )             27.0     04-23    133<L>  |  100  |  52<H>  ----------------------------<  179<H>  3.4<L>   |  28  |  1.81<H>    Ca    8.5      23 Apr 2023 07:01  Phos  1.9     04-23  Mg     1.9     04-23          CAPILLARY BLOOD GLUCOSE      POCT Blood Glucose.: 152 mg/dL (23 Apr 2023 11:19)  POCT Blood Glucose.: 190 mg/dL (23 Apr 2023 06:11)  POCT Blood Glucose.: 217 mg/dL (22 Apr 2023 23:21)  POCT Blood Glucose.: 179 mg/dL (22 Apr 2023 17:34)        Cultures:

## 2023-04-23 NOTE — PROGRESS NOTE ADULT - ASSESSMENT
66 y/o M PMHx significant for Neuroendocrine tumor diagnosed 6 years ago with mets to liver with associated chronic gastric outlet  obstruction, s/p ith GJ tube placement for nutrition, Hypertension, Hyperlipidemia, Diabetes Mellitus Type 2, BPH and light chain nephropathy admitted for:       1. GJ Tube malfunction s/p Correction by IR (4/21)  Neuroendocrine Tumor. Chronic Gastric outlet obstruction   Recent CT abd with advancing tumor, Liver Mets   Symptomatic with nausea and  weight loss  Zofran PRN   Started on Tube Feedings   Sx onc and med onc eval       2. ZO.  Dehydration Hyponatremia/Hypokalemia.  Chronic urinary retention  IVF  Replace lytes IV   C/w Tube Feeds   -Bladder scan q6 hours, Pt self catheterizes himself   labs in am     3.  Abnormal UA, suspected UTI   F/u UCx  start Cefepime  ID eval     4. DMII  Monitor BS, cover with ISS  Check HB A1c       5. DVT Prophylaxis: heparin subq 68 y/o M PMHx significant for Neuroendocrine tumor diagnosed 6 years ago with mets to liver with associated chronic gastric outlet  obstruction, s/p ith GJ tube placement for nutrition, Hypertension, Hyperlipidemia, Diabetes Mellitus Type 2, BPH and light chain nephropathy admitted for:     GJ Tube malfunction s/p Correction by IR (4/21)  Severe protein-calorie malnutrition  Neuroendocrine Tumor. Chronic Gastric outlet obstruction   Recent CT abd with advancing tumor, Liver Mets   Symptomatic with nausea and  weight loss  Zofran PRN   Started on Tube Feedings   Sx onc consult appreciated   Discussed with surgery resident today, to discuss with Dr. Siddiqi on further management   Will likely need a high gastric transection and Marcela-en-Y reconstruction  Oncology consult appreciated     ZO.  Dehydration Hyponatremia/Hypokalemia.  Chronic urinary retention  S/p IVF  Replace lytes PRN   C/w Tube Feeds   Bladder scan q6 hours, Pt self catheterizes himself   labs in am     UTI  UCx GNR - prelim   continue Cefepime  ID eval appreciated     DMII  Monitor BS, cover with ISS  F/u HB A1c     DVT Prophylaxis: heparin subq

## 2023-04-23 NOTE — CONSULT NOTE ADULT - SUBJECTIVE AND OBJECTIVE BOX
HPI:    69 y/o M PMHx significant for neuroendocrine tumor diagnosed 6 years ago with mets to liver with associated chronic dysphagia with GJ tube (follows with Primary Onc Dr Llamas), HTN, HLD, T2DM, BPH and light chain nephropathy presented to the ED with clogged J tube. He admitted that the tube feeds stopped, unable to take PO including pleasure feeds, liquids and medications. In the ED, IR consulted with successful clearance of clogged tube with patent tube after. Patient reported ongoing weakness/lethargy but denied fever, chills, syncope, dizziness, CP, palpitations, SOB/LEBLANC, wheezing, melena, BRBPR, dysuria or any other acute c/o at the time. (2023 21:49)      2022:      PAST MEDICAL & SURGICAL HISTORY:    Neuroendocrine tumor    Hypertension    BPH (benign prostatic hyperplasia)    Renal insufficiency    Light chain nephropathy    DM (diabetes mellitus)    HLD (hyperlipidemia)    GJ tube placement          MEDICATIONS  (STANDING):    cefepime  Injectable. 1000 milliGRAM(s) IV Push every 24 hours  dextrose 5%. 1000 milliLiter(s) (50 mL/Hr) IV Continuous <Continuous>  dextrose 5%. 1000 milliLiter(s) (100 mL/Hr) IV Continuous <Continuous>  dextrose 50% Injectable 25 Gram(s) IV Push once  dextrose 50% Injectable 25 Gram(s) IV Push once  dextrose 50% Injectable 12.5 Gram(s) IV Push once  glucagon  Injectable 1 milliGRAM(s) IntraMuscular once  heparin   Injectable 5000 Unit(s) SubCutaneous every 8 hours  insulin lispro (ADMELOG) corrective regimen sliding scale   SubCutaneous at bedtime  insulin lispro (ADMELOG) corrective regimen sliding scale   SubCutaneous three times a day before meals  sodium chloride 0.9% with potassium chloride 20 mEq/L 1000 milliLiter(s) (75 mL/Hr) IV Continuous <Continuous>      MEDICATIONS  (PRN):    acetaminophen   Oral Liquid .. 635 milliGRAM(s) Oral every 6 hours PRN Temp greater or equal to 38C (100.4F), Mild Pain (1 - 3)  dextrose Oral Gel 15 Gram(s) Oral once PRN Blood Glucose LESS THAN 70 milliGRAM(s)/deciliter  ondansetron Injectable 4 milliGRAM(s) IV Push every 8 hours PRN Nausea and/or Vomiting      ALLERGIES:    losartan (Angioedema)        FAMILY HISTORY:    breast cancer (Mother)  aneurysm (Father)        REVIEW OF SYSTEMS:    Constitutional, Eyes, ENT, Cardiovascular, Respiratory, Gastrointestinal, Genitourinary, Musculoskeletal, Integumentary, Neurological, Psychiatric, Endocrine, Heme/Lymph and Allergic/Immunologic review of systems are otherwise negative except as noted in HPI.       VITALS:    T(C): 37 (2023 21:52), Max: 37 (2023 21:52)  T(F): 98.6 (2023 21:52), Max: 98.6 (2023 21:52)  HR: 106 (2023 21:52) (99 - 106)  BP: 111/80 (2023 21:52) (111/80 - 113/81)  BP(mean): --  RR: 17 (2023 21:52) (17 - 18)  SpO2: 96% (2023 21:52) (95% - 96%)    Parameters below as of 2023 21:52  Patient On (Oxygen Delivery Method): room air        PHYSICAL:      LABS:    CBC Full  -  ( 2023 13:13 )  WBC Count : 6.74 K/uL  RBC Count : 3.06 M/uL  Hemoglobin : 8.8 g/dL  Hematocrit : 27.5 %  Platelet Count - Automated : 186 K/uL  Mean Cell Volume : 89.9 fl  Mean Cell Hemoglobin : 28.8 pg  Mean Cell Hemoglobin Concentration : 32.0 gm/dL  Auto Neutrophil # : x  Auto Lymphocyte # : x  Auto Monocyte # : x  Auto Eosinophil # : x  Auto Basophil # : x  Auto Neutrophil % : x  Auto Lymphocyte % : x  Auto Monocyte % : x  Auto Eosinophil % : x  Auto Basophil % : x        137  |  95<L>  |  62<H>  ----------------------------<  207<H>  3.2<L>   |  36<H>  |  1.97<H>    Ca    8.6      2023 13:13  Phos  2.4       Mg     1.9         TPro  8.0  /  Alb  2.9<L>  /  TBili  0.4  /  DBili  x   /  AST  112<H>  /  ALT  90<H>  /  AlkPhos  250<H>  04-21    PT/INR - ( 2023 13:11 )   PT: 12.5 sec;   INR: 1.08 ratio         PTT - ( 2023 13:11 )  PTT:27.5 sec  Urinalysis Basic - ( 2023 13:11 )    Color: Yellow / Appearance: Slightly Turbid / S.010 / pH: x  Gluc: x / Ketone: Negative  / Bili: Negative / Urobili: Negative   Blood: x / Protein: 100 / Nitrite: Negative   Leuk Esterase: Moderate / RBC: 11-25 /HPF / WBC >50 /HPF   Sq Epi: x / Non Sq Epi: x / Bacteria: TNTC        RADIOLOGY & ADDITIONAL STUDIES:      Xray Chest 1 View-PORTABLE IMMEDIATE (23 @ 13:54    INTERPRETATION:  AP chest on 2023 at 1:44 PM. Patient has   sepsis.    Shallow inspiration crowds the chest.    Heartmagnified by technique.    Lungs remain clear.    Chest is similar to  this year.    IMPRESSION: No acute finding or change.        CT AP 2023:    INTERPRETATION:  CLINICAL INFORMATION: Neuroendocrine mass of the pancreas. Liver metastasis. Follow-up scan prior to surgery.    COMPARISON: Multiple prior studies, most recently CT abdomen/pelvis 2023    CONTRAST/COMPLICATIONS:  IV Contrast: Omnipaque 350  90 cc administered   10 cc discarded  Oral Contrast: Water  Complications: None reported at time of study completion    PROCEDURE:  CT of the Abdomen and Pelvis was performed.  Arterial and Portal Venous phases were acquired.  Sagittal and coronal reformats were performed.    FINDINGS:  LOWER CHEST: Linear atelectasis or scarring right middle lobe. Coronary artery calcifications.    LIVER: Diffuse bilobar liver metastasis which has progressed since 2023; some of the lesions appear confluent. Comparison with the prior noncontrast study is limited, however lesions appear to be increased in size and number with a representative lesion in the right hepatic lobe measuring 8.3 x 7.9 cm, previously 3.9 x 4.7 cm (series 3 image 22).  BILE DUCTS: Normal caliber.  GALLBLADDER: Within normal limits.  SPLEEN: Within normal limits.  PANCREAS: Pancreatic tail mass measuring 10.9 x 6.9 cm, not significantly changed since 2023.  ADRENALS: Within normal limits.  KIDNEYS/URETERS: No hydronephrosis. 2 mm nonobstructing calculus in the lower pole of the left kidney.    BLADDER: Underdistended, limiting evaluation with wall thickening which can be secondary to underdistention.  REPRODUCTIVE ORGANS: Prostate not enlarged.    BOWEL: No bowel obstruction. Gastrostomy tube in the stomach with the tip in the jejunum.  PERITONEUM: No ascites.  VESSELS: Atherosclerotic changes. Abdominal aorta normal in caliber. Narrowed splenic vein in the region of the portosplenic confluence which is thrombosed in the region the pancreatic tail. Perisplenic, perigastric and anterior abdominal varices. There also appear to be varices either within the gastric wall or lumen of the proximal stomach.  RETROPERITONEUM/LYMPH NODES: No lymphadenopathy.  ABDOMINAL WALL: Gastrostomy tube.  BONES: Degenerative changes in the spine including severe endplate degenerative changes at T12-L1. Bilateral L5 spondylolysis with mild anterolisthesis of L5 on S1.    IMPRESSION:    Diffuse bilobar liver metastasis which have progressed since 2023.    Large pancreatic mass, not significantly changed HPI:    67 y/o M PMHx significant for neuroendocrine tumor diagnosed 6 years ago with mets to liver with associated chronic dysphagia with GJ tube (follows with Primary Onc Dr Llamas), HTN, HLD, T2DM, BPH and light chain nephropathy presented to the ED with clogged J tube. He admitted that the tube feeds stopped, unable to take PO including pleasure feeds, liquids and medications. In the ED, IR consulted with successful clearance of clogged tube with patent tube after. Patient reported ongoing weakness/lethargy but denied fever, chills, syncope, dizziness, CP, palpitations, SOB/LEBLANC, wheezing, melena, BRBPR, dysuria or any other acute c/o at the time. (21 Apr 2023 21:49)      04/23/2022:      PAST MEDICAL & SURGICAL HISTORY:    Neuroendocrine tumor    Hypertension    BPH (benign prostatic hyperplasia)    Renal insufficiency    Light chain nephropathy    DM (diabetes mellitus)    HLD (hyperlipidemia)    GJ tube placement          MEDICATIONS  (STANDING):    cefepime  Injectable. 1000 milliGRAM(s) IV Push every 24 hours  dextrose 5%. 1000 milliLiter(s) (50 mL/Hr) IV Continuous <Continuous>  dextrose 5%. 1000 milliLiter(s) (100 mL/Hr) IV Continuous <Continuous>  dextrose 50% Injectable 25 Gram(s) IV Push once  dextrose 50% Injectable 25 Gram(s) IV Push once  dextrose 50% Injectable 12.5 Gram(s) IV Push once  glucagon  Injectable 1 milliGRAM(s) IntraMuscular once  heparin   Injectable 5000 Unit(s) SubCutaneous every 8 hours  insulin lispro (ADMELOG) corrective regimen sliding scale   SubCutaneous at bedtime  insulin lispro (ADMELOG) corrective regimen sliding scale   SubCutaneous three times a day before meals  sodium chloride 0.9% with potassium chloride 20 mEq/L 1000 milliLiter(s) (75 mL/Hr) IV Continuous <Continuous>      MEDICATIONS  (PRN):    acetaminophen   Oral Liquid .. 635 milliGRAM(s) Oral every 6 hours PRN Temp greater or equal to 38C (100.4F), Mild Pain (1 - 3)  dextrose Oral Gel 15 Gram(s) Oral once PRN Blood Glucose LESS THAN 70 milliGRAM(s)/deciliter  ondansetron Injectable 4 milliGRAM(s) IV Push every 8 hours PRN Nausea and/or Vomiting      ALLERGIES:    losartan (Angioedema)        FAMILY HISTORY:    breast cancer (Mother)  aneurysm (Father)        REVIEW OF SYSTEMS:    Constitutional, Eyes, ENT, Cardiovascular, Respiratory, Gastrointestinal, Genitourinary, Musculoskeletal, Integumentary, Neurological, Psychiatric, Endocrine, Heme/Lymph and Allergic/Immunologic review of systems are otherwise negative except as noted in HPI.       VITALS:    T(C): 37 (22 Apr 2023 21:52), Max: 37 (22 Apr 2023 21:52)  T(F): 98.6 (22 Apr 2023 21:52), Max: 98.6 (22 Apr 2023 21:52)  HR: 106 (22 Apr 2023 21:52) (99 - 106)  BP: 111/80 (22 Apr 2023 21:52) (111/80 - 113/81)  BP(mean): --  RR: 17 (22 Apr 2023 21:52) (17 - 18)  SpO2: 96% (22 Apr 2023 21:52) (95% - 96%)    Parameters below as of 22 Apr 2023 21:52  Patient On (Oxygen Delivery Method): room air        PHYSICAL:      LABS:                          8.5    6.27  )-----------( 162      ( 23 Apr 2023 07:01 )             27.0     04-23    133<L>  |  100  |  52<H>  ----------------------------<  179<H>  3.4<L>   |  28  |  1.81<H>    Ca    8.5      23 Apr 2023 07:01  Phos  2.4     04-22  Mg     1.9     04-22    TPro  8.0  /  Alb  2.9<L>  /  TBili  0.4  /  DBili  x   /  AST  112<H>  /  ALT  90<H>  /  AlkPhos  250<H>  04-21        RADIOLOGY & ADDITIONAL STUDIES:      Xray Chest 1 View-PORTABLE IMMEDIATE (04.21.23 @ 13:54    INTERPRETATION:  AP chest on April 21, 2023 at 1:44 PM. Patient has   sepsis.    Shallow inspiration crowds the chest.    Heartmagnified by technique.    Lungs remain clear.    Chest is similar to January 27 this year.    IMPRESSION: No acute finding or change.        CT AP 04/17/2023:    INTERPRETATION:  CLINICAL INFORMATION: Neuroendocrine mass of the pancreas. Liver metastasis. Follow-up scan prior to surgery.    COMPARISON: Multiple prior studies, most recently CT abdomen/pelvis 1/6/2023    CONTRAST/COMPLICATIONS:  IV Contrast: Omnipaque 350  90 cc administered   10 cc discarded  Oral Contrast: Water  Complications: None reported at time of study completion    PROCEDURE:  CT of the Abdomen and Pelvis was performed.  Arterial and Portal Venous phases were acquired.  Sagittal and coronal reformats were performed.    FINDINGS:  LOWER CHEST: Linear atelectasis or scarring right middle lobe. Coronary artery calcifications.    LIVER: Diffuse bilobar liver metastasis which has progressed since 1/6/2023; some of the lesions appear confluent. Comparison with the prior noncontrast study is limited, however lesions appear to be increased in size and number with a representative lesion in the right hepatic lobe measuring 8.3 x 7.9 cm, previously 3.9 x 4.7 cm (series 3 image 22).  BILE DUCTS: Normal caliber.  GALLBLADDER: Within normal limits.  SPLEEN: Within normal limits.  PANCREAS: Pancreatic tail mass measuring 10.9 x 6.9 cm, not significantly changed since 1/6/2023.  ADRENALS: Within normal limits.  KIDNEYS/URETERS: No hydronephrosis. 2 mm nonobstructing calculus in the lower pole of the left kidney.    BLADDER: Underdistended, limiting evaluation with wall thickening which can be secondary to underdistention.  REPRODUCTIVE ORGANS: Prostate not enlarged.    BOWEL: No bowel obstruction. Gastrostomy tube in the stomach with the tip in the jejunum.  PERITONEUM: No ascites.  VESSELS: Atherosclerotic changes. Abdominal aorta normal in caliber. Narrowed splenic vein in the region of the portosplenic confluence which is thrombosed in the region the pancreatic tail. Perisplenic, perigastric and anterior abdominal varices. There also appear to be varices either within the gastric wall or lumen of the proximal stomach.  RETROPERITONEUM/LYMPH NODES: No lymphadenopathy.  ABDOMINAL WALL: Gastrostomy tube.  BONES: Degenerative changes in the spine including severe endplate degenerative changes at T12-L1. Bilateral L5 spondylolysis with mild anterolisthesis of L5 on S1.    IMPRESSION:    Diffuse bilobar liver metastasis which have progressed since 1/6/2023.    Large pancreatic mass, not significantly changed HPI:    67 y/o M PMHx significant for neuroendocrine tumor diagnosed 6 years ago with mets to liver with associated chronic dysphagia with GJ tube (follows with Primary Onc Dr Llamas), HTN, HLD, T2DM, BPH and light chain nephropathy presented to the ED with clogged J tube. He admitted that the tube feeds stopped, unable to take PO including pleasure feeds, liquids and medications. In the ED, IR consulted with successful clearance of clogged tube with patent tube after. Patient reported ongoing weakness/lethargy but denied fever, chills, syncope, dizziness, CP, palpitations, SOB/LEBLANC, wheezing, melena, BRBPR, dysuria or any other acute c/o at the time. (21 Apr 2023 21:49)      04/23/2022: Seen at bedside, no acute distress, feeling better than yesterday      PAST MEDICAL & SURGICAL HISTORY:    Neuroendocrine tumor    Hypertension    BPH (benign prostatic hyperplasia)    Renal insufficiency    Light chain nephropathy    DM (diabetes mellitus)    HLD (hyperlipidemia)    GJ tube placement          MEDICATIONS  (STANDING):    cefepime  Injectable. 1000 milliGRAM(s) IV Push every 24 hours  dextrose 5%. 1000 milliLiter(s) (50 mL/Hr) IV Continuous <Continuous>  dextrose 5%. 1000 milliLiter(s) (100 mL/Hr) IV Continuous <Continuous>  dextrose 50% Injectable 25 Gram(s) IV Push once  dextrose 50% Injectable 25 Gram(s) IV Push once  dextrose 50% Injectable 12.5 Gram(s) IV Push once  glucagon  Injectable 1 milliGRAM(s) IntraMuscular once  heparin   Injectable 5000 Unit(s) SubCutaneous every 8 hours  insulin lispro (ADMELOG) corrective regimen sliding scale   SubCutaneous at bedtime  insulin lispro (ADMELOG) corrective regimen sliding scale   SubCutaneous three times a day before meals  sodium chloride 0.9% with potassium chloride 20 mEq/L 1000 milliLiter(s) (75 mL/Hr) IV Continuous <Continuous>      MEDICATIONS  (PRN):    acetaminophen   Oral Liquid .. 635 milliGRAM(s) Oral every 6 hours PRN Temp greater or equal to 38C (100.4F), Mild Pain (1 - 3)  dextrose Oral Gel 15 Gram(s) Oral once PRN Blood Glucose LESS THAN 70 milliGRAM(s)/deciliter  ondansetron Injectable 4 milliGRAM(s) IV Push every 8 hours PRN Nausea and/or Vomiting      ALLERGIES:    losartan (Angioedema)        FAMILY HISTORY:    breast cancer (Mother)  aneurysm (Father)        REVIEW OF SYSTEMS:    Constitutional, Eyes, ENT, Cardiovascular, Respiratory, Gastrointestinal, Genitourinary, Musculoskeletal, Integumentary, Neurological, Psychiatric, Endocrine, Heme/Lymph and Allergic/Immunologic review of systems are otherwise negative except as noted in HPI.       VITALS:    T(C): 37 (22 Apr 2023 21:52), Max: 37 (22 Apr 2023 21:52)  T(F): 98.6 (22 Apr 2023 21:52), Max: 98.6 (22 Apr 2023 21:52)  HR: 106 (22 Apr 2023 21:52) (99 - 106)  BP: 111/80 (22 Apr 2023 21:52) (111/80 - 113/81)  BP(mean): --  RR: 17 (22 Apr 2023 21:52) (17 - 18)  SpO2: 96% (22 Apr 2023 21:52) (95% - 96%)    Parameters below as of 22 Apr 2023 21:52  Patient On (Oxygen Delivery Method): room air        PHYSICAL:    Constitutional: ill appearing, no acute distress  Eyes: PERRL, EOMI  ENT: pharynx is unremarkable  Neck: supple without JVD  Pulmonary: clear to auscultation bilaterally, no dullness, no wheezing  Cardiac: RRR, normal S1S2  Vascular: no calf tenderness, venous stasis changes, varices.   Abdomen: normoactive bowel sounds, soft and nontender, no hepatosplenomegaly or masses appreciated; GJ in place  Lymphatic: no peripheral adenopathy appreciated  Musculoskeletal: full range of motion and no deformities appreciated  Skin: normal appearance, no rash/erythema   Neurology: grossly intact  Psychiatric: affect appropriate      LABS:                          8.5    6.27  )-----------( 162      ( 23 Apr 2023 07:01 )             27.0     04-23    133<L>  |  100  |  52<H>  ----------------------------<  179<H>  3.4<L>   |  28  |  1.81<H>    Ca    8.5      23 Apr 2023 07:01  Phos  2.4     04-22  Mg     1.9     04-22    TPro  8.0  /  Alb  2.9<L>  /  TBili  0.4  /  DBili  x   /  AST  112<H>  /  ALT  90<H>  /  AlkPhos  250<H>  04-21        RADIOLOGY & ADDITIONAL STUDIES:      Xray Chest 1 View-PORTABLE IMMEDIATE (04.21.23 @ 13:54    INTERPRETATION:  AP chest on April 21, 2023 at 1:44 PM. Patient has   sepsis.    Shallow inspiration crowds the chest.    Heartmagnified by technique.    Lungs remain clear.    Chest is similar to January 27 this year.    IMPRESSION: No acute finding or change.        CT AP 04/17/2023:    INTERPRETATION:  CLINICAL INFORMATION: Neuroendocrine mass of the pancreas. Liver metastasis. Follow-up scan prior to surgery.    COMPARISON: Multiple prior studies, most recently CT abdomen/pelvis 1/6/2023    CONTRAST/COMPLICATIONS:  IV Contrast: Omnipaque 350  90 cc administered   10 cc discarded  Oral Contrast: Water  Complications: None reported at time of study completion    PROCEDURE:  CT of the Abdomen and Pelvis was performed.  Arterial and Portal Venous phases were acquired.  Sagittal and coronal reformats were performed.    FINDINGS:  LOWER CHEST: Linear atelectasis or scarring right middle lobe. Coronary artery calcifications.    LIVER: Diffuse bilobar liver metastasis which has progressed since 1/6/2023; some of the lesions appear confluent. Comparison with the prior noncontrast study is limited, however lesions appear to be increased in size and number with a representative lesion in the right hepatic lobe measuring 8.3 x 7.9 cm, previously 3.9 x 4.7 cm (series 3 image 22).  BILE DUCTS: Normal caliber.  GALLBLADDER: Within normal limits.  SPLEEN: Within normal limits.  PANCREAS: Pancreatic tail mass measuring 10.9 x 6.9 cm, not significantly changed since 1/6/2023.  ADRENALS: Within normal limits.  KIDNEYS/URETERS: No hydronephrosis. 2 mm nonobstructing calculus in the lower pole of the left kidney.    BLADDER: Underdistended, limiting evaluation with wall thickening which can be secondary to underdistention.  REPRODUCTIVE ORGANS: Prostate not enlarged.    BOWEL: No bowel obstruction. Gastrostomy tube in the stomach with the tip in the jejunum.  PERITONEUM: No ascites.  VESSELS: Atherosclerotic changes. Abdominal aorta normal in caliber. Narrowed splenic vein in the region of the portosplenic confluence which is thrombosed in the region the pancreatic tail. Perisplenic, perigastric and anterior abdominal varices. There also appear to be varices either within the gastric wall or lumen of the proximal stomach.  RETROPERITONEUM/LYMPH NODES: No lymphadenopathy.  ABDOMINAL WALL: Gastrostomy tube.  BONES: Degenerative changes in the spine including severe endplate degenerative changes at T12-L1. Bilateral L5 spondylolysis with mild anterolisthesis of L5 on S1.    IMPRESSION:    Diffuse bilobar liver metastasis which have progressed since 1/6/2023.    Large pancreatic mass, not significantly changed

## 2023-04-23 NOTE — CONSULT NOTE ADULT - ASSESSMENT
69 y/o M with a PMHx of pancreatic tail neuroendocrine tumor being treated the [facility], known to our team from previous admission, presented to the ED for J tube obstruction with associated weakness, fatigue/lethargy. IR unclogged J tube and has been functional on goal feeds since.     Plan:   -Will discuss patient with Dr. Siddiqi regarding need of imaging and intervention while in house  -Current ongoing GOC/plan being discussed including a palliative procedure  -Potential complications is high given the need for a high gastric transection and Marcela-en-Y reconstruction.   -Currently dependent on using J tube for feeds & G port to decompress; unable to tolerate PO intake   -Continue supportive measures at this time  -Updated reccs to follow once discussed with attending.    Plan will be discussed with Dr. Siddiqi

## 2023-04-23 NOTE — PROGRESS NOTE ADULT - ASSESSMENT
66 y/o M PMHx significant for Neuroendocrine tumor diagnosed 6 years ago with mets to liver with associated chronic dysphagia with GJ tube, Hypertension, Hyperlipidemia, Diabetes Mellitus Type 2, BPH and light chain nephropathy presents with clogged J tube. Tube feeds stopped. Unable to take PO including pleasure feeds, liquids and medications. Reported to ER. IR consulted and susccessful clearance of clogged tube and patent tube after. SBP low. Labs drawn. Revealing ZO, hyponatremia, hypokalemia. Admitted for further evaluation. Patient reports weakness and lethargy but denies fever ,chills, sycnope, dizziness, chest pain, SOB. Does report chronic intermittent nausea. Pt self catheterizes for many years d/t urinary retention, UA positive, concern for UTI, given IV cefepime.     1. Pyuria. Complicated UTI. BPH. Urinary retention. ZO  - self catheterizes   - f/u urine cx- growing > 100,000 GNRs blood cx no growth   - on IV cefepime 1gm q12h #2  - continue with abx coverage  - monitor for retention  - fu cbc  - tolerating abx well so far; no side effects noted  - reason for abx use and side effects reviewed with patient    2. other issues - care per medicine

## 2023-04-23 NOTE — CHART NOTE - NSCHARTNOTEFT_GEN_A_CORE
GJ tube functioning.   GJ tube c/d/i. Bumper at 2.5 cm. No further issues.   If clogs again may need to replace.

## 2023-04-23 NOTE — PROGRESS NOTE ADULT - SUBJECTIVE AND OBJECTIVE BOX
Date of service: 23 @ 15:13    pt seen and examined  feels better   no dysuria  afebrile    ROS: no fever or chills; denies dizziness, no HA, no SOB or cough, no abdominal pain, no diarrhea or constipation;  no legs pain, no rashes    MEDICATIONS  (STANDING):  cefepime  Injectable. 1000 milliGRAM(s) IV Push every 24 hours  dextrose 5%. 1000 milliLiter(s) (100 mL/Hr) IV Continuous <Continuous>  dextrose 5%. 1000 milliLiter(s) (50 mL/Hr) IV Continuous <Continuous>  dextrose 50% Injectable 25 Gram(s) IV Push once  dextrose 50% Injectable 25 Gram(s) IV Push once  dextrose 50% Injectable 12.5 Gram(s) IV Push once  glucagon  Injectable 1 milliGRAM(s) IntraMuscular once  heparin   Injectable 5000 Unit(s) SubCutaneous every 8 hours  insulin lispro (ADMELOG) corrective regimen sliding scale   SubCutaneous three times a day before meals  insulin lispro (ADMELOG) corrective regimen sliding scale   SubCutaneous at bedtime  sodium chloride 0.9% with potassium chloride 20 mEq/L 1000 milliLiter(s) (75 mL/Hr) IV Continuous <Continuous>      Vital Signs Last 24 Hrs  T(C): 37 (2023 07:36), Max: 37 (2023 21:52)  T(F): 98.6 (2023 07:36), Max: 98.6 (2023 21:52)  HR: 95 (2023 07:36) (95 - 106)  BP: 113/81 (2023 07:36) (111/80 - 113/81)  BP(mean): --  RR: 16 (2023 07:36) (16 - 18)  SpO2: 95% (2023 07:36) (95% - 96%)    Parameters below as of 2023 07:36  Patient On (Oxygen Delivery Method): room air      PE:  Constitutional: NAD  HEENT: NC/AT, EOMI, PERRLA, conjunctivae clear; ears and nose atraumatic; pharynx benign  Neck: supple; thyroid not palpable  Back: no tenderness  Respiratory: respiratory effort normal; clear to auscultation  Cardiovascular: S1S2 regular, no murmurs  Abdomen: soft, not tender, not distended, positive BS; liver and spleen WNL  Genitourinary: no suprapubic tenderness  Lymphatic: no LN palpable  Musculoskeletal: no muscle tenderness, no joint swelling or tenderness  Extremities: no pedal edema  Neurological/ Psychiatric: AxOx3, Judgement and insight normal;  moving all extremities  Skin: no rashes; no palpable lesions    Labs: all available labs reviewed                        8.5    6.27  )-----------( 162      ( 2023 07:01 )             27.0     04-23    133<L>  |  100  |  52<H>  ----------------------------<  179<H>  3.4<L>   |  28  |  1.81<H>    Ca    8.5      2023 07:01  Phos  1.9       Mg     1.9                  LIVER FUNCTIONS - ( 2023 13:11 )  Alb: 2.9 g/dL / Pro: 8.0 gm/dL / ALK PHOS: 250 U/L / ALT: 90 U/L / AST: 112 U/L / GGT: x           Urinalysis Basic - ( 2023 13:11 )    Color: Yellow / Appearance: Slightly Turbid / S.010 / pH: x  Gluc: x / Ketone: Negative  / Bili: Negative / Urobili: Negative   Blood: x / Protein: 100 / Nitrite: Negative   Leuk Esterase: Moderate / RBC: 11-25 /HPF / WBC >50 /HPF   Sq Epi: x / Non Sq Epi: x / Bacteria: TNTC    Culture - Urine (23 @ 13:11)   Specimen Source: Clean Catch None  Culture Results:   >100,000 CFU/ml Gram Negative Rods  Culture - Blood (23 @ 13:11)   Specimen Source: .Blood None  Culture Results:   No growth to date.      Radiology: all available radiological tests reviewed  < from: Xray Chest 1 View-PORTABLE IMMEDIATE (23 @ 13:54) >    ACC: [id number] EXAM:  XR CHEST PORTABLE IMMED 1V   ORDERED BY: JAQUELINE DAMON     PROCEDURE DATE:  2023          INTERPRETATION:  AP chest on 2023 at 1:44 PM. Patient has   sepsis.    Shallow inspiration crowds the chest.    Heartmagnified by technique.    Lungs remain clear.    Chest is similar to  this year.    IMPRESSION: No acute finding or change.    --- End of Report ---    < end of copied text >    Advanced directives addressed: full resuscitation

## 2023-04-23 NOTE — PROGRESS NOTE ADULT - SUBJECTIVE AND OBJECTIVE BOX
CC: Sepsis    HPI:  66 y/o M PMHx significant for Neuroendocrine tumor diagnosed 6 years ago with mets to liver with associated chronic gastric outlet  obstruction, s/p GJ tube placement for nutrition, Hypertension, Hyperlipidemia, Diabetes Mellitus Type 2, BPH and light chain nephropathy presented with GJ tube malFx. Tube feeds  were stopped. Unable to take PO including pleasure feeds, liquids and medications. Ptient reports weakness and lethargy but denies fever ,chills, sycnope, dizziness, chest pain, SOB. Does report chronic intermittent nausea.   In ED, IR consulted, had  successful clearance of clogged tube and patent tube after.   After procedure  SBP  was low. Labs drawn. Revealing ZO, hyponatremia, hypokalemia. Abnormal UA.     INTERVAL HPI/ OVERNIGHT EVENTS: chart reviewed, Pt was seen and examined, reports some intermittent  nausea,  feels weak. FAmily t bedside, Pt had recent CT and was seen by Sx onc, planned for possible procedure? requesting inPt evaluation     Vital Signs Last 24 Hrs  T(C): 36.8 (2023 07:15), Max: 37.2 (2023 12:57)  T(F): 98.2 (2023 07:15), Max: 99 (2023 12:57)  HR: 99 (2023 10:43) (88 - 112)  BP: 105/67 (2023 07:15) (86/58 - 118/81)  BP(mean): 91 (2023 21:00) (67 - 91)  RR: 18 (2023 07:15) (18 - 20)  SpO2: 97% (2023 07:15) (95% - 100%)    Parameters below as of 2023 07:15  Patient On (Oxygen Delivery Method): room air        REVIEW OF SYSTEMS:  All other review of systems is negative unless indicated above.      PHYSICAL EXAM:  General: Well developed; frail elderly male,  in no acute distress  Eyes:  EOMI; conjunctiva and sclera clear  Head: Normocephalic; atraumatic  ENMT: No nasal discharge; airway clear  Respiratory: No wheezes, rales or rhonchi  Cardiovascular: Regular rate and rhythm. S1 and S2 Normal;   Gastrointestinal: Soft non-tender non-distended; Normal bowel sounds, J tube in place   Genitourinary: No  suprapubic  tenderness  Extremities: No edema  Neurological: Alert and oriented x3, non focal   Skin: Warm and dry. No acute rash  Musculoskeletal: Normal muscle tone, without deformities  Psychiatric: Cooperative and appropriate    LABS                         11.1   7.19  )-----------( 233      ( 2023 13:11 )             33.3     2023 13:11    127    |  79     |  73     ----------------------------<  187    2.9     |  41     |  2.20     Ca    9.7        2023 13:11  Mg     2.3       2023 13:11    TPro  8.0    /  Alb  2.9    /  TBili  0.4    /  DBili  x      /  AST  112    /  ALT  90     /  AlkPhos  250    2023 13:11    PT/INR - ( 2023 13:11 )   PT: 12.5 sec;   INR: 1.08 ratio    PTT - ( 2023 13:11 )  PTT:27.5 sec      CAPILLARY BLOOD GLUCOSE  POCT Blood Glucose.: 206 mg/dL (2023 11:56)  POCT Blood Glucose.: 196 mg/dL (2023 07:48)    LIVER FUNCTIONS - ( 2023 13:11 )  Alb: 2.9 g/dL / Pro: 8.0 gm/dL / ALK PHOS: 250 U/L / ALT: 90 U/L / AST: 112 U/L / GGT: x           Urinalysis Basic - ( 2023 13:11 )    Color: Yellow / Appearance: Slightly Turbid / S.010 / pH: x  Gluc: x / Ketone: Negative  / Bili: Negative / Urobili: Negative   Blood: x / Protein: 100 / Nitrite: Negative   Leuk Esterase: Moderate / RBC: 11-25 /HPF / WBC >50 /HPF   Sq Epi: x / Non Sq Epi: x / Bacteria: TNTC        MEDICATIONS  (STANDING):  cefepime  Injectable. 1000 milliGRAM(s) IV Push every 24 hours  dextrose 5%. 1000 milliLiter(s) (50 mL/Hr) IV Continuous <Continuous>  dextrose 5%. 1000 milliLiter(s) (100 mL/Hr) IV Continuous <Continuous>  dextrose 50% Injectable 25 Gram(s) IV Push once  dextrose 50% Injectable 12.5 Gram(s) IV Push once  dextrose 50% Injectable 25 Gram(s) IV Push once  glucagon  Injectable 1 milliGRAM(s) IntraMuscular once  heparin   Injectable 5000 Unit(s) SubCutaneous every 8 hours  insulin lispro (ADMELOG) corrective regimen sliding scale   SubCutaneous three times a day before meals  insulin lispro (ADMELOG) corrective regimen sliding scale   SubCutaneous at bedtime  sodium chloride 0.9% with potassium chloride 20 mEq/L 1000 milliLiter(s) (75 mL/Hr) IV Continuous <Continuous>    MEDICATIONS  (PRN):  acetaminophen   Oral Liquid .. 635 milliGRAM(s) Oral every 6 hours PRN Temp greater or equal to 38C (100.4F), Mild Pain (1 - 3)  dextrose Oral Gel 15 Gram(s) Oral once PRN Blood Glucose LESS THAN 70 milliGRAM(s)/deciliter  ondansetron Injectable 4 milliGRAM(s) IV Push every 8 hours PRN Nausea and/or Vomiting        RADIOLOGY & ADDITIONAL TESTS:  < from: Xray Chest 1 View-PORTABLE IMMEDIATE (23 @ 13:54) >    ACC: [id number] EXAM:  XR CHEST PORTABLE IMMED 1V   ORDERED BY: JAQUELINE DAMON     PROCEDURE DATE:  2023          INTERPRETATION:  AP chest on 2023 at 1:44 PM. Patient has   sepsis.    Shallow inspiration crowds the chest.    Heartmagnified by technique.    Lungs remain clear.    Chest is similar to  this year.    IMPRESSION: No acute finding or change.     HPI:  66 y/o M PMHx significant for Neuroendocrine tumor diagnosed 6 years ago with mets to liver with associated chronic dysphagia with GJ tube, Hypertension, Hyperlipidemia, Diabetes Mellitus Type 2, BPH and light chain nephropathy presents with clogged J tube. Tube feeds stopped. Unable to take PO including pleasure feeds, liquids and medications. Reported to ER. IR consulted and susccessful clearance of clogged tube and patent tube after. SBP low. Labs drawn. Revealing ZO, hyponatremia, hypokalemia. Admitted for further evaluation. Ptient reports weakness and lethargy but denies fever ,chills, sycnope, dizziness, chest pain, SOB. Does report chronic intermittent nausea.      (21 Apr 2023 21:49)      MEDICATIONS  (STANDING):  cefepime  Injectable. 1000 milliGRAM(s) IV Push every 24 hours  dextrose 5%. 1000 milliLiter(s) (50 mL/Hr) IV Continuous <Continuous>  dextrose 5%. 1000 milliLiter(s) (100 mL/Hr) IV Continuous <Continuous>  dextrose 50% Injectable 25 Gram(s) IV Push once  dextrose 50% Injectable 25 Gram(s) IV Push once  dextrose 50% Injectable 12.5 Gram(s) IV Push once  glucagon  Injectable 1 milliGRAM(s) IntraMuscular once  heparin   Injectable 5000 Unit(s) SubCutaneous every 8 hours  insulin lispro (ADMELOG) corrective regimen sliding scale   SubCutaneous three times a day before meals  insulin lispro (ADMELOG) corrective regimen sliding scale   SubCutaneous at bedtime  sodium chloride 0.9% with potassium chloride 20 mEq/L 1000 milliLiter(s) (75 mL/Hr) IV Continuous <Continuous>    MEDICATIONS  (PRN):  acetaminophen   Oral Liquid .. 635 milliGRAM(s) Oral every 6 hours PRN Temp greater or equal to 38C (100.4F), Mild Pain (1 - 3)  dextrose Oral Gel 15 Gram(s) Oral once PRN Blood Glucose LESS THAN 70 milliGRAM(s)/deciliter  ondansetron Injectable 4 milliGRAM(s) IV Push every 8 hours PRN Nausea and/or Vomiting      Vital Signs Last 24 Hrs  T(C): 37 (23 Apr 2023 07:36), Max: 37 (22 Apr 2023 21:52)  T(F): 98.6 (23 Apr 2023 07:36), Max: 98.6 (22 Apr 2023 21:52)  HR: 95 (23 Apr 2023 07:36) (95 - 106)  BP: 113/81 (23 Apr 2023 07:36) (111/80 - 113/81)  BP(mean): --  RR: 16 (23 Apr 2023 07:36) (16 - 18)  SpO2: 95% (23 Apr 2023 07:36) (95% - 96%)    Parameters below as of 23 Apr 2023 07:36  Patient On (Oxygen Delivery Method): room air        GEN: NAD, comfortable, resting in bed  HEENT: NC/AT, EOMI, PERRLA, MMM, clear conjunctiva and sclera, normal hearing, no nasal discharge, throat clear, no thrush, normal dentition.   NECK: supple, no JVD, no LAD, no thyromegaly  BACK:  ROM intact, no spinal/paraspinal tenderness  CV: S1S2, RRR, no mumur  RESP: good air movement, CTABL, no rales, rhonchi or wheezing, respirations unlabored  ABD: +BS, soft, ND, NT, no guarding, no rigidity, no HSM  EXT: +2 radial and pedal pulses, no edema, no calve tenderness  SKIN: No visible Rashes or Ulcers  MSK: ROM intact in all extremities  NEURO:  sensory and CN 2-12 Grossly intact, no motor deficits, no, saddle anesthesia, AOx3  PSYCH: normal behavior         LABS:                          8.5    6.27  )-----------( 162      ( 23 Apr 2023 07:01 )             27.0     23 Apr 2023 07:01    133    |  100    |  52     ----------------------------<  179    3.4     |  28     |  1.81     Ca    8.5        23 Apr 2023 07:01  Phos  1.9       23 Apr 2023 07:01  Mg     1.9       23 Apr 2023 07:01          CAPILLARY BLOOD GLUCOSE      POCT Blood Glucose.: 152 mg/dL (23 Apr 2023 11:19)  POCT Blood Glucose.: 190 mg/dL (23 Apr 2023 06:11)  POCT Blood Glucose.: 217 mg/dL (22 Apr 2023 23:21)  POCT Blood Glucose.: 179 mg/dL (22 Apr 2023 17:34)            RADIOLOGY:         HPI: 66 y/o M PMHx significant for Neuroendocrine tumor diagnosed 6 years ago with mets to liver with associated chronic dysphagia with GJ tube, Hypertension, Hyperlipidemia, Diabetes Mellitus Type 2, BPH and light chain nephropathy presents with clogged J tube. Tube feeds stopped. Unable to take PO including pleasure feeds, liquids and medications. Reported to ER. IR consulted and successful clearance of clogged tube and patent tube after. SBP low. Labs drawn. Revealing ZO, hyponatremia, hypokalemia. Admitted for further evaluation. Patient reports weakness and lethargy but denies fever ,chills, syncope, dizziness, chest pain, SOB. Does report chronic intermittent nausea.       Subjective: Patient seen and examined at bedside today, feels well, wife at bedside, no complaints to offer.     REVIEW OF SYSTEMS:    CONSTITUTIONAL: No weakness, fevers or chills  EYES/ENT: No visual changes;  No vertigo or throat pain   NECK: No pain or stiffness  RESPIRATORY: No cough, wheezing, hemoptysis; No shortness of breath  CARDIOVASCULAR: No chest pain or palpitations  GASTROINTESTINAL: No abdominal or epigastric pain. No nausea, vomiting, or hematemesis; No diarrhea or constipation. No melena or hematochezia.  GENITOURINARY: No dysuria, frequency or hematuria  NEUROLOGICAL: No numbness or weakness  SKIN: No itching, rashes      MEDICATIONS  (STANDING):  cefepime  Injectable. 1000 milliGRAM(s) IV Push every 24 hours  dextrose 5%. 1000 milliLiter(s) (50 mL/Hr) IV Continuous <Continuous>  dextrose 5%. 1000 milliLiter(s) (100 mL/Hr) IV Continuous <Continuous>  dextrose 50% Injectable 25 Gram(s) IV Push once  dextrose 50% Injectable 25 Gram(s) IV Push once  dextrose 50% Injectable 12.5 Gram(s) IV Push once  glucagon  Injectable 1 milliGRAM(s) IntraMuscular once  heparin   Injectable 5000 Unit(s) SubCutaneous every 8 hours  insulin lispro (ADMELOG) corrective regimen sliding scale   SubCutaneous three times a day before meals  insulin lispro (ADMELOG) corrective regimen sliding scale   SubCutaneous at bedtime  sodium chloride 0.9% with potassium chloride 20 mEq/L 1000 milliLiter(s) (75 mL/Hr) IV Continuous <Continuous>    MEDICATIONS  (PRN):  acetaminophen   Oral Liquid .. 635 milliGRAM(s) Oral every 6 hours PRN Temp greater or equal to 38C (100.4F), Mild Pain (1 - 3)  dextrose Oral Gel 15 Gram(s) Oral once PRN Blood Glucose LESS THAN 70 milliGRAM(s)/deciliter  ondansetron Injectable 4 milliGRAM(s) IV Push every 8 hours PRN Nausea and/or Vomiting      Vital Signs Last 24 Hrs  T(C): 37 (23 Apr 2023 07:36), Max: 37 (22 Apr 2023 21:52)  T(F): 98.6 (23 Apr 2023 07:36), Max: 98.6 (22 Apr 2023 21:52)  HR: 95 (23 Apr 2023 07:36) (95 - 106)  BP: 113/81 (23 Apr 2023 07:36) (111/80 - 113/81)  RR: 16 (23 Apr 2023 07:36) (16 - 18)  SpO2: 95% (23 Apr 2023 07:36) (95% - 96%)    Parameters below as of 23 Apr 2023 07:36  Patient On (Oxygen Delivery Method): room air      PHYSICAL EXAM:  GENERAL: NAD, lying in bed comfortably  HEAD:  Atraumatic, Normocephalic  EYES: conjunctiva and sclera clear  ENT: Moist mucous membranes  NECK: Supple, No JVD  CHEST/LUNG: Clear to auscultation bilaterally; No rales, rhonchi, wheezing. Unlabored respirations  HEART: Regular rate and rhythm; No murmurs  ABDOMEN: Bowel sounds present; Soft, Nontender, Nondistended.   EXTREMITIES:  2+ Peripheral Pulses, brisk capillary refill. No clubbing, cyanosis, or edema  NERVOUS SYSTEM:  Alert & Oriented X3, speech clear. No deficits   MSK: FROM all 4 extremities, full and equal strength        LABS:                          8.5    6.27  )-----------( 162      ( 23 Apr 2023 07:01 )             27.0     23 Apr 2023 07:01    133    |  100    |  52     ----------------------------<  179    3.4     |  28     |  1.81     Ca    8.5        23 Apr 2023 07:01  Phos  1.9       23 Apr 2023 07:01  Mg     1.9       23 Apr 2023 07:01          CAPILLARY BLOOD GLUCOSE      POCT Blood Glucose.: 152 mg/dL (23 Apr 2023 11:19)  POCT Blood Glucose.: 190 mg/dL (23 Apr 2023 06:11)  POCT Blood Glucose.: 217 mg/dL (22 Apr 2023 23:21)  POCT Blood Glucose.: 179 mg/dL (22 Apr 2023 17:34)            RADIOLOGY:

## 2023-04-24 NOTE — DISCHARGE NOTE PROVIDER - CARE PROVIDER_API CALL
Javi Siddiqi)  Surgery  [address] [facility] [address], [address] [place] [address]  Phone: [phone]  Fax: [phone]  Follow Up Time:     Ac Llamas)  Hematology; Internal Medicine; Medical Oncology  [address] [facility] [address], [address]  [place] [address]  Phone: [phone]  Fax: [phone]  Follow Up Time:

## 2023-04-24 NOTE — PHYSICAL THERAPY INITIAL EVALUATION ADULT - PERTINENT HX OF CURRENT PROBLEM, REHAB EVAL
67 yo M admitted with clogged GJ tube. In the ED, IR consulted with successful clearance of clogged tube with patent tube after. Recent CT abd with advancing tumor, Liver Mets Symptomatic with nausea and  weight loss. Will likely need a high gastric transection and Marcela-en-Y reconstruction.

## 2023-04-24 NOTE — DISCHARGE NOTE NURSING/CASE MANAGEMENT/SOCIAL WORK - PATIENT PORTAL LINK FT
You can access the FollowMyHealth Patient Portal offered by [facility] by registering at the following website: [email or web address]. By joining [email or web address]’s FollowMyHealth portal, you will also be able to view your health information using other applications (apps) compatible with our system.

## 2023-04-24 NOTE — PROGRESS NOTE ADULT - ASSESSMENT
68 y/o M PMHx significant for Neuroendocrine tumor diagnosed 6 years ago with mets to liver with associated chronic gastric outlet  obstruction, s/p ith GJ tube placement for nutrition, Hypertension, Hyperlipidemia, Diabetes Mellitus Type 2, BPH and light chain nephropathy admitted for:     GJ Tube malfunction s/p Correction by IR (4/21)  Severe protein-calorie malnutrition  Neuroendocrine Tumor. Chronic Gastric outlet obstruction   Recent CT abd with advancing tumor, Liver Mets   Symptomatic with nausea and  weight loss  Zofran PRN   Started on Tube Feedings   Sx onc consult appreciated   Surgical consult appreciated plan for outpt follow up   Will likely need a high gastric transection and Marcela-en-Y reconstruction  Oncology consult appreciated     Atypical chest pain   EKG: Sinus tachycardia, incomplete RBBB, septal infarct (both previously noted), ?poor R wave progression   HsTrop neg x 1, f/u repeat at 8:00pm  Remote tele   Given pantoprazole IV, Maalox via J tube, zofran   Repeat labs noted   Supplement Mg, K  IVF NS bolus then cont with NS 65cc/hr x 12 h  TTE for AM  Cardiology consult: known to Dr. Desouza, will consult team for AM    ZO.  Dehydration Hyponatremia/Hypokalemia.  Chronic urinary retention  S/p IVF  Replace lytes PRN   C/w Tube Feeds   Bladder scan q6 hours, Pt self catheterizes himself   labs in am     UTI  UCx GNR - prelim   continue Cefepime  ID eval appreciated     DMII  Monitor BS, cover with ISS  F/u HB A1c     DVT Prophylaxis: heparin subq 66 y/o M PMHx significant for Neuroendocrine tumor diagnosed 6 years ago with mets to liver with associated chronic gastric outlet  obstruction, s/p ith GJ tube placement for nutrition, Hypertension, Hyperlipidemia, Diabetes Mellitus Type 2, BPH and light chain nephropathy admitted for:     GJ Tube malfunction s/p Correction by IR (4/21)  Severe protein-calorie malnutrition  Neuroendocrine Tumor. Chronic Gastric outlet obstruction   Recent CT abd with advancing tumor, Liver Mets   Symptomatic with nausea and  weight loss  Zofran PRN   Started on Tube Feedings   Sx onc consult appreciated   Surgical consult appreciated plan for outpt follow up   Will likely need a high gastric transection and Marcela-en-Y reconstruction  Oncology consult appreciated     Atypical chest pain   EKG: Sinus tachycardia, incomplete RBBB, septal infarct (both previously noted), ?poor R wave progression   HsTrop neg x 1, f/u repeat at 8:00pm  Remote tele   Given pantoprazole IV, Maalox via J tube, zofran   Repeat labs noted   Supplement Mg, K  IVF NS bolus then cont with NS 65cc/hr x 12 h --> monitor volume status closely   TTE for AM  Cardiology consult: known to Dr. Desouza, will consult team for AM  --signed out to overnight covering PA, to f/u Trop    ZO.  Dehydration Hyponatremia/Hypokalemia.  Chronic urinary retention  S/p IVF  Replace lytes PRN   C/w Tube Feeds   Bladder scan q6 hours, Pt self catheterizes himself   labs in am     UTI  UCx GNR - klebsiella variicola   continue Cefepime  ID eval appreciated     DMII  Monitor BS, cover with ISS  F/u HB A1c     DVT Prophylaxis: heparin subq

## 2023-04-24 NOTE — PHYSICAL THERAPY INITIAL EVALUATION ADULT - PATIENT PROFILE REVIEW, REHAB EVAL
PMHx significant for neuroendocrine tumor diagnosed 6 years ago with mets to liver with associated chronic dysphagia with GJ tube (follows with Primary Onc Dr Llamas), HTN, HLD, T2DM, BPH and light chain nephropathy/yes

## 2023-04-24 NOTE — DISCHARGE NOTE PROVIDER - NSDCMRMEDTOKEN_GEN_ALL_CORE_FT
cefdinir 250 mg/5 mL oral liquid: 6 milliliter(s) orally 2 times a day   cefdinir 250 mg/5 mL oral liquid: 6 milliliter(s) orally 2 times a day  Xanax 0.25 mg oral tablet: 1 tab(s) orally 2 times a day as needed for  anxiety MDD: 2 tabs

## 2023-04-24 NOTE — PHYSICAL THERAPY INITIAL EVALUATION ADULT - MD/RN NOTIFIED
CONSULTATION      February  3, 2017     Patient Name:  BONNIE KHALIL  YOB: 1953                          MRN:  [id number]  DX:  [ICD10] C79.31 Secondary malignant neoplasm of brain    REF. PHYSICIANS:  DOROTHY/ALEJANDRO    DIAGNOSIS: LtPosteriorFrontalLo Secondary malignant neoplasm of brain Adenocarcinoma, metastatic, NOS, Stage  IV      - Palliative RT T1-T4 30 in 10 @ HT on 8/2/2016  - Completed LtFrontalParietal Postop Radiosurgery 18 Gy x 1 on 8/17/2016  - MRI Brain 1/2017: Progresive brain disease      HISTORY OF PRESENT ILLNESS:  Patient with h/o stage iv lung cancer s/p palliative RT and chemotherapy and radiosurgery – now with progressive lesions in the brain on MRI – here to discuss further palliative RT.      PAST MEDICAL:  please see initial consultation note.    SURGICAL HISTORY:  unchanged since initial consultation note.    MEDICATIONS:    omeprazole 1 Oral  Symbicort 0 Inhalation  simvastatin 1 tab Oral  dexamethasone 1 tab Oral  levetiracetam 1 tab Oral  tamsulosin 2 Capsule Oral  ibuprofen 1 tab Oral  Norco 1 tab Oral  ibuprofen 200 mg Oral  hydrocortisone 0 tab Oral  Senna Concentrate 2 tab Oral  ipratropium-albuterol 0 Inhalation  Paxil 1 tab Oral  dexamethasone 4 mg Oral     ALLERGIES:     No Known Drug Allergies    SOCIAL HISTORY:  unchanged since prior consultation note.    FAMILY HISTORY:  unchanged since prior consultation note.    OBSTETRIC/GYNECOLOGIC HISTORY:      REVIEW OF SYSTEMS:    Constitutional symptoms: Denies weight loss, loss of appetite, fever  Eyes: No visual changes, no discharge  Ears, Nose, Mouth, Throat: No otalgia, epistaxis, sore throat, or neck mass  Cardiovascular: no chest pain, syncope  Respiratory: No cough, dyspnea, hemoptysis, or pleuritic pain  Gastrointestinal: No dysphagia, nausea, vomiting, hematemesis, odynophagia  Genitourinary: No dysuria, hematuria  Musculoskeletal: No muscular pain or limitation in range of motion  Integumentary  (skin and/or breast): No rash, jaundice or pruritus  Neurological: No headache, seizures, focal weakness, bowel/bladder incontinence  Psychiatric: No suicidal ideations, depression, confusion, or hallucinations  Hematologic/Lymphatic: No lumps or swelling noted.    PHYSICAL EXAMINATION:      Constitutional:  Vital signs:   Date 2/3/2017   Time 9:17 AM   Vital Signs, Weight and PS       Height (cm) (cm) 180.34     Weight (kg) (kg) 173     Percent Weight Change 116     BMI(A) 53.2     T (F) (F) 36.9     R 20     P 104     B/P 126/71     BSA(D) (m*2) 2.77   Vitals - U.S. Units       Height (in) (inches) 71     Weight (lb) (lb) 381.4   Pain       Pain Location none     Pain Intensity-Current 0     Patient Pain Goal 0   Well nourished, well developed, in no apparent distress  Eyes: Sclera anicteric, EOMI  Ears, Nose, and Throat: Hearing intact on gross exam, oral cavity and lips within normal limits, oropharynx within normal limits without gross abnormalities.  Neck: Symmetric, trachea in the midline, No palpable neck mass or thyroid nodules  Cardiovascular: regular, rhythm and rate. No JVD appreciated  Respiratory: Lungs clear to auscultation without wheeze or crackles. Breathing comfortably without using accessory muscles  Chest(Breasts):   Affected breast: well healed surgical changes. No mass, discharge or skin changes  Contralateral breast: within normal limits without palpable mass, discharge or skin changes  Gastrointestinal(Abdomen): No hepatosplenomegaly, mass, tenderness or guarding  Rectum/: No rectal mass palpable and rectal tone intact, prostate slightly enlarged without palpable mass or tenderness  Lymphatic: No palpable adenopathy of head and neck region, bilateral axilla and inguinal region  Musculoskeletal: Full range of motion. No edema or tenderness. No cyanosis. Motor strength intact and symmetric, Gait intact  Neurologic: Facial features symmetric and no cranial nerve deficit. Sensory  intact  Psychiatric: Intact judgment, alert and oriented x 3, pleasant demeanor, no agitation    IMPRESSION:  here for stereotactic radiosurgery to the cavity and tumor bed..    Progressive metastatic lung cancer with 3 additional brain mets  RECOMMENDATION:    - Discussed whole brain vs SRS – they wish to proceed w SRS  - Discussed w Dr. Ordonez’s NP Cleo – they will proceed with dose of Opdivo – we will do SRS a week following the most recent dose  - Not symptomatic, pt given a prescription for decadron if symptoms arise w the caveat to let Dr. Ordonez know since it nullifies opdivo benfit, if they ened to start.        Authenticated by Seymour Villa M.D. on 4/21/2017 at 3:57 PM  SEYMOUR VILLA M.D.     yes

## 2023-04-24 NOTE — PHYSICAL THERAPY INITIAL EVALUATION ADULT - ADDITIONAL COMMENTS
(-) , Community Ambulator with spouse. Patient reported stairs inside the home and 3 steps to enter the home with rails. Female

## 2023-04-24 NOTE — PROVIDER CONTACT NOTE (CHANGE IN STATUS NOTIFICATION) - SITUATION
Patient with episode of dark emesis. Patient with drop in H&H since being admitted. Fecal Occult stool sent overnight and is negative. Patient due to receive heparin subcutaneously.

## 2023-04-24 NOTE — PROGRESS NOTE ADULT - ASSESSMENT
67 y/o M with a PMHx of pancreatic tail neuroendocrine tumor being treated the [place], known to our team from previous admission, presented to the ED for J tube obstruction with associated weakness, fatigue/lethargy. IR unclogged J tube and has been functional on goal feeds since.     Plan:   -Case discussed with Dr. Black and Dr. Siddiqi regarding need surgical intervention, at the moment no surgical internvention offered.  - PT can be discharge from surgery standpoint with tube feeds at goal  - Follow up in clinic next week [facility] Siddiqi  - C/W follow up with  hem/onc  -Continue supportive measures at this time    Plan will be discussed with Dr. Black

## 2023-04-24 NOTE — CHART NOTE - NSCHARTNOTEFT_GEN_A_CORE
Received a call from the pt's nurse stating that he had 1 episode of dark emesis. Stool occult done earlier today was negative. Will hold heparin dose due to be given now and f/u AM H+H. Pt remains hemodynamically stable at this time.

## 2023-04-24 NOTE — DISCHARGE NOTE PROVIDER - HOSPITAL COURSE
68 y/o M PMHx significant for Neuroendocrine tumor diagnosed 6 years ago with mets to liver with associated chronic dysphagia with GJ tube, Hypertension, Hyperlipidemia, Diabetes Mellitus Type 2, BPH and light chain nephropathy presents with clogged J tube. Tube feeds stopped. Unable to take PO including pleasure feeds, liquids and medications. Reported to ER. IR consulted and successful clearance of clogged tube and patent tube after. SBP low. Labs drawn. Revealing ZO, hyponatremia, hypokalemia. Admitted for further evaluation. Patient reports weakness and lethargy but denies fever ,chills, syncope, dizziness, chest pain, SOB. Does report chronic intermittent nausea.     68 y/o M PMHx significant for Neuroendocrine tumor diagnosed 6 years ago with mets to liver with associated chronic gastric outlet  obstruction, s/p ith GJ tube placement for nutrition, Hypertension, Hyperlipidemia, Diabetes Mellitus Type 2, BPH and light chain nephropathy admitted for:     GJ Tube malfunction s/p Correction by IR (4/21)  Severe protein-calorie malnutrition  Neuroendocrine Tumor. Chronic Gastric outlet obstruction   Recent CT abd with advancing tumor, Liver Mets   Symptomatic with nausea and  weight loss  Zofran PRN   Started on Tube Feedings   Sx onc consult appreciated   Discussed with surgery resident today, to discuss with Dr. Siddiqi on further management   Will likely need a high gastric transection and Marcela-en-Y reconstruction  Oncology consult appreciated     ZO.  Dehydration Hyponatremia/Hypokalemia.  Chronic urinary retention  S/p IVF  Replace lytes PRN   C/w Tube Feeds   Bladder scan q6 hours, Pt self catheterizes himself   labs in am     UTI  UCx GNR - prelim   continue Cefepime  ID eval appreciated     DMII  Monitor BS, cover with ISS  F/u HB A1c     DVT Prophylaxis: heparin subq   66 y/o M PMHx significant for Neuroendocrine tumor diagnosed 6 years ago with mets to liver with associated chronic dysphagia with GJ tube, Hypertension, Hyperlipidemia, Diabetes Mellitus Type 2, BPH and light chain nephropathy presents with clogged J tube. Tube feeds stopped. Unable to take PO including pleasure feeds, liquids and medications. Reported to ER. IR consulted and successful clearance of clogged tube and patent tube after. SBP low. Labs drawn. Revealing ZO, hyponatremia, hypokalemia. Admitted for further evaluation. Patient reports weakness and lethargy but denies fever ,chills, syncope, dizziness, chest pain, SOB. Does report chronic intermittent nausea.     66 y/o M PMHx significant for Neuroendocrine tumor diagnosed 6 years ago with mets to liver with associated chronic gastric outlet  obstruction, s/p ith GJ tube placement for nutrition, Hypertension, Hyperlipidemia, Diabetes Mellitus Type 2, BPH and light chain nephropathy admitted for:     GJ Tube malfunction s/p Correction by IR (4/21)  Severe protein-calorie malnutrition  Neuroendocrine Tumor. Chronic Gastric outlet obstruction   Recent CT abd with advancing tumor, Liver Mets   Symptomatic with nausea and  weight loss  Zofran PRN   Started on Tube Feedings   Sx onc consult appreciated   Discussed with surgery resident today, to discuss with Dr. Siddiqi on further management   Will likely need a high gastric transection and Marcela-en-Y reconstruction and case d/w dr gilbert  stable for dc home and will see dr siddiqi as outpatient.    Oncology consult appreciated     ZO.  Dehydration Hyponatremia/Hypokalemia.  Chronic urinary retention  S/p IVF  Replace lytes PRN   C/w Tube Feeds   Bladder scan q6 hours, Pt self catheterizes himself   labs in am     UTI  UCx GNR - prelim   continue Cefepime  ID eval appreciated     DMII  Monitor BS, cover with ISS  F/u HB A1c     DVT Prophylaxis: heparin subq

## 2023-04-24 NOTE — DISCHARGE NOTE PROVIDER - NSDCFUSCHEDAPPT_GEN_ALL_CORE_FT
Javi Siddiqi  [facility]  SURGONC [address]  Scheduled Appointment: 05/03/2023

## 2023-04-24 NOTE — PROGRESS NOTE ADULT - SUBJECTIVE AND OBJECTIVE BOX
Date of service: 23 @ 14:42    pt seen and examined  feels better   no dysuria  no overnight events   afebrile     ROS: no fever or chills; denies dizziness, no HA, no SOB or cough, no abdominal pain, no diarrhea or constipation;  no legs pain, no rashes    MEDICATIONS  (STANDING):  cefepime  Injectable. 1000 milliGRAM(s) IV Push every 12 hours  dextrose 5%. 1000 milliLiter(s) (100 mL/Hr) IV Continuous <Continuous>  dextrose 5%. 1000 milliLiter(s) (50 mL/Hr) IV Continuous <Continuous>  dextrose 50% Injectable 25 Gram(s) IV Push once  dextrose 50% Injectable 12.5 Gram(s) IV Push once  dextrose 50% Injectable 25 Gram(s) IV Push once  glucagon  Injectable 1 milliGRAM(s) IntraMuscular once  heparin   Injectable 5000 Unit(s) SubCutaneous every 8 hours  insulin lispro (ADMELOG) corrective regimen sliding scale   SubCutaneous every 6 hours      Vital Signs Last 24 Hrs  T(C): 36.9 (2023 07:33), Max: 36.9 (2023 07:33)  T(F): 98.4 (2023 07:33), Max: 98.4 (2023 07:33)  HR: 103 (2023 07:33) (90 - 103)  BP: 104/70 (2023 07:33) (104/70 - 114/80)  BP(mean): --  RR: 18 (2023 07:33) (18 - 18)  SpO2: 96% (2023 07:33) (94% - 97%)    Parameters below as of 2023 07:33  Patient On (Oxygen Delivery Method): room air        PE:  Constitutional: NAD  HEENT: NC/AT, EOMI, PERRLA, conjunctivae clear; ears and nose atraumatic; pharynx benign  Neck: supple; thyroid not palpable  Back: no tenderness  Respiratory: respiratory effort normal; clear to auscultation  Cardiovascular: S1S2 regular, no murmurs  Abdomen: soft, not tender, not distended, positive BS; liver and spleen WNL  Genitourinary: no suprapubic tenderness  Lymphatic: no LN palpable  Musculoskeletal: no muscle tenderness, no joint swelling or tenderness  Extremities: no pedal edema  Neurological/ Psychiatric: AxOx3, Judgement and insight normal;  moving all extremities  Skin: no rashes; no palpable lesions    Labs: all available labs reviewed                                   8.9    5.78  )-----------( 159      ( 2023 06:35 )             27.2     04-24    140  |  103  |  47<H>  ----------------------------<  183<H>  4.0   |  34<H>  |  1.82<H>    Ca    8.8      2023 06:35  Phos  1.9     -  Mg     2.0     -24      Culture - Urine (23 @ 13:11)   Specimen Source: Clean Catch None  Culture Results:   >100,000 CFU/ml Klebsiella variicola  Culture - Blood (23 @ 13:11)   Specimen Source: .Blood None  Culture Results:   No growth to date.            LIVER FUNCTIONS - ( 2023 13:11 )  Alb: 2.9 g/dL / Pro: 8.0 gm/dL / ALK PHOS: 250 U/L / ALT: 90 U/L / AST: 112 U/L / GGT: x           Urinalysis Basic - ( 2023 13:11 )    Color: Yellow / Appearance: Slightly Turbid / S.010 / pH: x  Gluc: x / Ketone: Negative  / Bili: Negative / Urobili: Negative   Blood: x / Protein: 100 / Nitrite: Negative   Leuk Esterase: Moderate / RBC: 11-25 /HPF / WBC >50 /HPF   Sq Epi: x / Non Sq Epi: x / Bacteria: TNTC    Culture - Urine (23 @ 13:11)   Specimen Source: Clean Catch None  Culture Results:   >100,000 CFU/ml Gram Negative Rods  Culture - Blood (23 @ 13:11)   Specimen Source: .Blood None  Culture Results:   No growth to date.      Radiology: all available radiological tests reviewed  < from: Xray Chest 1 View-PORTABLE IMMEDIATE (23 @ 13:54) >    ACC: [id number] EXAM:  XR CHEST PORTABLE IMMED 1V   ORDERED BY: JAQUELINE DAMON     PROCEDURE DATE:  2023          INTERPRETATION:  AP chest on 2023 at 1:44 PM. Patient has   sepsis.    Shallow inspiration crowds the chest.    Heartmagnified by technique.    Lungs remain clear.    Chest is similar to  this year.    IMPRESSION: No acute finding or change.    --- End of Report ---    < end of copied text >    Advanced directives addressed: full resuscitation

## 2023-04-24 NOTE — PROGRESS NOTE ADULT - SUBJECTIVE AND OBJECTIVE BOX
HPI: 66 y/o M PMHx significant for Neuroendocrine tumor diagnosed 6 years ago with mets to liver with associated chronic dysphagia with GJ tube, Hypertension, Hyperlipidemia, Diabetes Mellitus Type 2, BPH and light chain nephropathy presents with clogged J tube. Tube feeds stopped. Unable to take PO including pleasure feeds, liquids and medications. Reported to ER. IR consulted and successful clearance of clogged tube and patent tube after. SBP low. Labs drawn. Revealing ZO, hyponatremia, hypokalemia. Admitted for further evaluation. Patient reports weakness and lethargy but denies fever ,chills, syncope, dizziness, chest pain, SOB. Does report chronic intermittent nausea.       Subjective: Patient seen and examined at bedside this AM, had no complaints, ambulated today, was planned for discharge today, however, started to c/o mid chest pain, burning in nature, with accompanying nausea, +chills on exam, sinus tachycardia on EKG, afebrile, labs with elevated BUN/Cr, HsTrop negative x 1. EKG: sinus tachy, incomplete RBBB, ?poor R wave progression when compared to prior.     REVIEW OF SYSTEMS:    CONSTITUTIONAL: No weakness, fevers or chills  EYES/ENT: No visual changes;  No vertigo or throat pain   NECK: No pain or stiffness  RESPIRATORY: No cough, wheezing, hemoptysis; No shortness of breath  CARDIOVASCULAR: + chest pain, no palpitations  GASTROINTESTINAL: No abdominal or epigastric pain. No nausea, vomiting, or hematemesis; No diarrhea or constipation. No melena or hematochezia.  GENITOURINARY: No dysuria, frequency or hematuria  NEUROLOGICAL: No numbness or weakness  SKIN: No itching, rashes      MEDICATIONS  (STANDING):  cefepime  Injectable. 1000 milliGRAM(s) IV Push every 12 hours  dextrose 5%. 1000 milliLiter(s) (50 mL/Hr) IV Continuous <Continuous>  dextrose 5%. 1000 milliLiter(s) (100 mL/Hr) IV Continuous <Continuous>  dextrose 50% Injectable 25 Gram(s) IV Push once  dextrose 50% Injectable 12.5 Gram(s) IV Push once  dextrose 50% Injectable 25 Gram(s) IV Push once  glucagon  Injectable 1 milliGRAM(s) IntraMuscular once  heparin   Injectable 5000 Unit(s) SubCutaneous every 8 hours  insulin lispro (ADMELOG) corrective regimen sliding scale   SubCutaneous every 6 hours  magnesium sulfate  IVPB 1 Gram(s) IV Intermittent once  ondansetron Injectable 4 milliGRAM(s) IV Push every 6 hours  potassium chloride  10 mEq/100 mL IVPB 10 milliEquivalent(s) IV Intermittent once  sodium chloride 0.9%. 1000 milliLiter(s) (65 mL/Hr) IV Continuous <Continuous>    MEDICATIONS  (PRN):  acetaminophen   Oral Liquid .. 635 milliGRAM(s) Oral every 6 hours PRN Temp greater or equal to 38C (100.4F), Mild Pain (1 - 3)  aluminum hydroxide/magnesium hydroxide/simethicone Suspension 30 milliLiter(s) Oral every 6 hours PRN Dyspepsia  dextrose Oral Gel 15 Gram(s) Oral once PRN Blood Glucose LESS THAN 70 milliGRAM(s)/deciliter    Vital Signs Last 24 Hrs  T(C): 36.4 (24 Apr 2023 18:10), Max: 37 (24 Apr 2023 15:57)  T(F): 97.5 (24 Apr 2023 18:10), Max: 98.6 (24 Apr 2023 15:57)  HR: 117 (24 Apr 2023 18:10) (90 - 121)  BP: 133/95 (24 Apr 2023 18:10) (104/70 - 133/95)  RR: 18 (24 Apr 2023 18:10) (18 - 18)  SpO2: 100% (24 Apr 2023 18:10) (94% - 100%)    Parameters below as of 24 Apr 2023 18:10  Patient On (Oxygen Delivery Method): room air    PHYSICAL EXAM:  GENERAL: NAD, lying in bed comfortably  HEAD:  Atraumatic, Normocephalic  EYES: conjunctiva and sclera clear  ENT: Moist mucous membranes  NECK: Supple, No JVD  CHEST/LUNG: Clear to auscultation bilaterally; No rales, rhonchi, wheezing. Unlabored respirations  HEART: Regular rate and rhythm; No murmurs  ABDOMEN: Bowel sounds present; Soft, Nontender, Nondistended.   EXTREMITIES:  2+ Peripheral Pulses, brisk capillary refill. No clubbing, cyanosis, or edema  NERVOUS SYSTEM:  Alert & Oriented X3, speech clear. No deficits   MSK: FROM all 4 extremities, full and equal strength        LABS:                          9.6    7.94  )-----------( 205      ( 24 Apr 2023 17:16 )             29.8   04-24    141  |  104  |  49<H>  ----------------------------<  177<H>  3.5   |  30  |  2.05<H>    Ca    9.0      24 Apr 2023 17:16  Phos  3.4     04-24  Mg     1.7     04-24                RADIOLOGY:

## 2023-04-24 NOTE — PROGRESS NOTE ADULT - ASSESSMENT
68 y/o M PMHx significant for Neuroendocrine tumor diagnosed 6 years ago with mets to liver with associated chronic dysphagia with GJ tube, Hypertension, Hyperlipidemia, Diabetes Mellitus Type 2, BPH and light chain nephropathy presents with clogged J tube. Tube feeds stopped. Unable to take PO including pleasure feeds, liquids and medications. Reported to ER. IR consulted and susccessful clearance of clogged tube and patent tube after. SBP low. Labs drawn. Revealing ZO, hyponatremia, hypokalemia. Admitted for further evaluation. Patient reports weakness and lethargy but denies fever ,chills, sycnope, dizziness, chest pain, SOB. Does report chronic intermittent nausea. Pt self catheterizes for many years d/t urinary retention, UA positive, concern for UTI, given IV cefepime.     1. Pyuria. Complicated UTI. BPH. Urinary retention. ZO  - self catheterizes   - f/u urine cx- growing > 100,000 Kleb variicola; blood cx no growth   - on IV cefepime 1gm q12h #3  - continue with abx coverage  - monitor for retention  - dc po ceftin 500mg bid 4 more days 7 day course  - fu cbc  - tolerating abx well so far; no side effects noted  - reason for abx use and side effects reviewed with patient    2. other issues - care per medicine

## 2023-04-24 NOTE — DISCHARGE NOTE PROVIDER - DETAILS OF MALNUTRITION DIAGNOSIS/DIAGNOSES
This patient has been assessed with a concern for Malnutrition and was treated during this hospitalization for the following Nutrition diagnosis/diagnoses:     -  04/22/2023: Severe protein-calorie malnutrition

## 2023-04-24 NOTE — DISCHARGE NOTE PROVIDER - NSDCCPCAREPLAN_GEN_ALL_CORE_FT
PRINCIPAL DISCHARGE DIAGNOSIS  Diagnosis: Malfunction of jejunostomy tube  Assessment and Plan of Treatment: Admitted for tube malfunction, with dehydration and electrolyte abnormalities, see by IR with adjustments, restarted tube feeds without difficulty.      SECONDARY DISCHARGE DIAGNOSES  Diagnosis: Acute UTI  Assessment and Plan of Treatment: Treated with cefepime, continue with cefdinir via feeding tube twice daily for 7 days.    Diagnosis: ZO (acute kidney injury)  Assessment and Plan of Treatment: Resolved    Diagnosis: Neuroendocrine carcinoma metastatic to liver  Assessment and Plan of Treatment: Follow up up with surgical oncology Dr. Siddiqi for continued management and your oncologist.

## 2023-04-24 NOTE — DISCHARGE NOTE NURSING/CASE MANAGEMENT/SOCIAL WORK - NSDCPEFALRISK_GEN_ALL_CORE
For information on Fall & Injury Prevention, visit: [email or web address] OR  [email or web address] OR  https://www.cdc.gov/steadi/patient.html

## 2023-04-24 NOTE — DISCHARGE NOTE PROVIDER - NSDCCAREPROVSEEN_GEN_ALL_CORE_FT
Vivek, Leonardo Benson, Sandro Cunha, May Gonzalez, Carter Ambriz, Alvaro Siddiqi, Javi Ortiz, Katy CORDERO

## 2023-04-24 NOTE — PHYSICAL THERAPY INITIAL EVALUATION ADULT - MODALITIES TREATMENT COMMENTS
Patient left OOB in chair with CBIR. Ambulating the halls independently per RN. Patient independent in functional mobility, no skilled physical therapy need in this setting.  May ambulate per nursing.  Will d/c from PT. RN, CM informed.

## 2023-04-24 NOTE — DISCHARGE NOTE PROVIDER - ATTENDING DISCHARGE PHYSICAL EXAMINATION:
Patient seen and examined at bedside, has no complaints.    Vital Signs Last 24 Hrs  T(C): 37 (24 Apr 2023 15:57), Max: 37 (24 Apr 2023 15:57)  T(F): 98.6 (24 Apr 2023 15:57), Max: 98.6 (24 Apr 2023 15:57)  HR: 108 (24 Apr 2023 15:57) (90 - 108)  BP: 120/89 (24 Apr 2023 15:57) (104/70 - 120/89)  RR: 18 (24 Apr 2023 15:57) (18 - 18)  SpO2: 100% (24 Apr 2023 15:57) (94% - 100%)    Parameters below as of 24 Apr 2023 15:57  Patient On (Oxygen Delivery Method): room air    PHYSICAL EXAM:  GENERAL: NAD, lying in bed comfortably  HEAD:  Atraumatic, Normocephalic  EYES: conjunctiva and sclera clear  ENT: Moist mucous membranes  NECK: Supple, No JVD  CHEST/LUNG: Clear to auscultation bilaterally; No rales, rhonchi, wheezing. Unlabored respirations  HEART: Regular rate and rhythm; No murmurs  ABDOMEN: Bowel sounds present; Soft, Nontender, Nondistended.   EXTREMITIES:  2+ Peripheral Pulses, brisk capillary refill. No clubbing, cyanosis, or edema  NERVOUS SYSTEM:  Alert & Oriented X3, speech clear. No deficits   MSK: FROM all 4 extremities, full and equal strength

## 2023-04-24 NOTE — PROVIDER CONTACT NOTE (CHANGE IN STATUS NOTIFICATION) - SITUATION
Patient complaining of chest pain and shaking. Patient complaining of chest pain, nausea, vomiting, and shaking.

## 2023-04-24 NOTE — PROGRESS NOTE ADULT - SUBJECTIVE AND OBJECTIVE BOX
Pt seen at bedside, no new complains  Tolerating tube feeds  No nausea or vomiting, no fever, chills    Vitals:  T(C): 36.9 ( @ 07:33), Max: 36.9 ( @ 07:33)  HR: 103 ( @ 07:33) (90 - 103)  BP: 104/70 ( @ 07:33) (104/70 - 114/80)  RR: 18 ( @ 07:33) (18 - 18)  SpO2: 96% ( @ 07:33) (94% - 97%)     @ 07:01  -   @ 07:00  --------------------------------------------------------  IN:  Total IN: 0 mL    OUT:    Intermittent Catheterization - Urethral (mL): 500 mL  Total OUT: 500 mL    Total NET: -500 mL       @ 07:01  -   @ 14:18  --------------------------------------------------------  IN:  Total IN: 0 mL    OUT:    Intermittent Catheterization - Urethral (mL): 500 mL  Total OUT: 500 mL    Total NET: -500 mL          Physical Exam:  General: AAOx3, Well developed, NAD  Chest: Normal respiratory effort  Heart: RRR  Abdomen: Soft, NT, mildly distended  Neuro/Psych: No localized deficits. Normal spech, normal tone  Skin: Normal, no rashes, no lesions noted.   Extremities: Warm, well perfused, no edema, Pulses intact     @ 06:35                    8.9  CBC: 5.78>)-------(<159                     27.2                 140 | 103 | 47    CMP:  ----------------------< 183               4.0 | 34 | 1.82                      Ca:8.8  Phos:-  M.0               -|      |-        LFTs:  ------|-|-----             -|      |-   @ 07:01                    8.5  CBC: 6.27>)-------(<162                     27.0                 133 | 100 | 52    CMP:  ----------------------< 179               3.4 | 28 | 1.81                      Ca:8.5  Phos:1.9  M.9               -|      |-        LFTs:  ------|-|-----             -|      |-      Culture - Blood (collected 23 @ 13:11)  Source: .Blood None  Preliminary Report (23 @ 19:01):    No growth to date.    Culture - Urine (collected 23 @ 13:11)  Source: Clean Catch None  Preliminary Report (23 @ 19:35):    >100,000 CFU/ml Klebsiella variicola    Culture - Blood (collected 23 @ 13:11)  Source: .Blood None  Preliminary Report (23 @ 19:01):    No growth to date.      Current Inpatient Medications:  acetaminophen   Oral Liquid .. 635 milliGRAM(s) Oral every 6 hours PRN  cefepime  Injectable. 1000 milliGRAM(s) IV Push every 12 hours  dextrose 5%. 1000 milliLiter(s) (100 mL/Hr) IV Continuous <Continuous>  dextrose 5%. 1000 milliLiter(s) (50 mL/Hr) IV Continuous <Continuous>  dextrose 50% Injectable 25 Gram(s) IV Push once  dextrose 50% Injectable 25 Gram(s) IV Push once  dextrose 50% Injectable 12.5 Gram(s) IV Push once  dextrose Oral Gel 15 Gram(s) Oral once PRN  glucagon  Injectable 1 milliGRAM(s) IntraMuscular once  heparin   Injectable 5000 Unit(s) SubCutaneous every 8 hours  insulin lispro (ADMELOG) corrective regimen sliding scale   SubCutaneous every 6 hours  ondansetron Injectable 4 milliGRAM(s) IV Push every 8 hours PRN

## 2023-04-25 NOTE — CONSULT NOTE ADULT - ASSESSMENT
Chest pain - atypical in nature.  Cardiac enzymes and EKG negative.  Check 2 D echo this am.  He is asymptomatic at this time.   Will order stress test.   No cardiac workup traced as outpt.     HTN- BP stable.    Renal insufficiency- improving.    Other medical issues- Management per primary team.   Thank you for allowing me to participate in the care of this patient. Please feel free to contact me with any questions.

## 2023-04-25 NOTE — PROGRESS NOTE ADULT - ASSESSMENT
68 y/o M PMHx significant for Neuroendocrine tumor diagnosed 6 years ago with mets to liver with associated chronic gastric outlet  obstruction, s/p ith GJ tube placement for nutrition, Hypertension, Hyperlipidemia, Diabetes Mellitus Type 2, BPH and light chain nephropathy admitted for:     GJ Tube malfunction s/p Correction by IR (4/21)  Severe protein-calorie malnutrition  Neuroendocrine Tumor. Chronic Gastric outlet obstruction   Recent CT abd with advancing tumor, Liver Mets   Symptomatic with nausea and  weight loss  Zofran PRN   Started on Tube Feedings   Sx onc consult appreciated   Surgical consult appreciated plan for outpt follow up   Will likely need a high gastric transection and Marcela-en-Y reconstruction  Oncology consult appreciated   Restart tube feeds tonight     Atypical chest pain   EKG: Sinus tachycardia, incomplete RBBB, septal infarct (both previously noted), ?poor R wave progression   HsTrop neg x 2  Remote tele -- will d/c  Given pantoprazole IV, Maalox via J tube, zofran    Cardiology consult appreciated  -S/p NM stress today   -TTE LVEF: 55-60%, mild diastolic dysfunction     ZO.  Dehydration Hyponatremia/Hypokalemia.  Chronic urinary retention  S/p IVF  Replace lytes PRN   C/w Tube Feeds   Bladder scan q6 hours, Pt self catheterizes himself     UTI  UCx GNR - klebsiella variicola   continue Cefepime  ID eval appreciated     DMII  Monitor BS, cover with ISS  F/u HB A1c     DVT Prophylaxis: heparin subq

## 2023-04-25 NOTE — CHART NOTE - NSCHARTNOTEFT_GEN_A_CORE
ACP OVERNIGHT CHART NOTE ACP OVERNIGHT CHART NOTE    Patient is a 66 y/o M PMHx significant for Neuroendocrine tumor diagnosed 6 years ago with mets to liver with associated chronic dysphagia with GJ tube, Hypertension, Hyperlipidemia, Diabetes Mellitus Type 2, BPH and light chain nephropathy presents with clogged J tube s/p correction by IR. Tube feeds were previously resumed, but patient was experiencing atypical chest pain earlier this afternoon with reflux. Patient ordered Maalox, IV Protonix. Troponin this afternoon was negative, Sinus tachy on EKG with NH wave progression. Patient received a bolus of IVF followed by maintenance fluids for sustained tachycardia. Patient was placed on remote Tele.     Received call from RN that on monitor patient sustained 5 beats of VTach. Patient's other vitals are at baseline. Patient still mildly tachycardic in the 110s. Patient still complaining of acid reflux feeling. Per RN, patient did have an episode of vomiting this evening.   Patient seen at bedside. States he's not nausea just feels regurgitation from the reflux and then feels like he has to spit up. Zofran orders given. Maalox administered. Patient was currently getting K+ runners along with magnesium IV. Did repeat labs. K+ now 4.1 Mag 2.2 Phos 3.7   Checked on patient again, now is sleeping. On tele monitor no more PVCs appreciated. HR is 102-107.   Continue to maintain k+> 4 Mag >2 . supplement as needed.   Patient is displaying no signs of infection. Afebrile, no white count, Lactic Acid is negative.   Continue with IVF maintenance.   Patient has cardiology consult in place for AM, patient of Dr. Desouza. Also to go for Echo in AM.     Vital Signs Last 24 Hrs  T(C): 36.7 (24 Apr 2023 21:44), Max: 37 (24 Apr 2023 15:57)  T(F): 98.1 (24 Apr 2023 21:44), Max: 98.6 (24 Apr 2023 15:57)  HR: 117 (24 Apr 2023 18:10) (103 - 121)  BP: 128/94 (24 Apr 2023 21:44) (104/70 - 133/95)  BP(mean): --  RR: 18 (24 Apr 2023 21:44) (18 - 18)  SpO2: 98% (24 Apr 2023 21:44) (96% - 100%)    Parameters below as of 24 Apr 2023 21:44  Patient On (Oxygen Delivery Method): room air

## 2023-04-25 NOTE — CONSULT NOTE ADULT - SUBJECTIVE AND OBJECTIVE BOX
Patient is a 68y old  Male who presents with a chief complaint of J-tube malfunction; weakness; dehydration; hyponatremia; ZO (      HPI:  66 y/o M PMHx significant for Neuroendocrine tumor diagnosed 6 years ago with mets to liver with associated chronic dysphagia with GJ tube, Hypertension, Hyperlipidemia, Diabetes Mellitus Type 2, BPH and light chain nephropathy presents with clogged J tube. Tube feeds stopped. Unable to take PO including pleasure feeds, liquids and medications. Reported to ER. IR consulted and susccessful clearance of clogged tube and patent tube after. SBP low.ZO, hyponatremia, hypokalemia.     Cardiology team consulted for chest pain that happened overnight.  associated with diaphoresis.  Pt denies any symptoms now.  Per RN he was given GI meds and later after 2-3 hr completely resolved.  Pt denies any prior episodes      PAST MEDICAL & SURGICAL HISTORY:  Hypertension      BPH (benign prostatic hyperplasia)      Renal insufficiency      Light chain nephropathy      DM (diabetes mellitus)      HLD (hyperlipidemia)      No significant past surgical history          MEDICATIONS  (STANDING):  cefepime  Injectable. 1000 milliGRAM(s) IV Push every 12 hours  dextrose 5%. 1000 milliLiter(s) (100 mL/Hr) IV Continuous <Continuous>  dextrose 5%. 1000 milliLiter(s) (50 mL/Hr) IV Continuous <Continuous>  dextrose 50% Injectable 25 Gram(s) IV Push once  dextrose 50% Injectable 25 Gram(s) IV Push once  dextrose 50% Injectable 12.5 Gram(s) IV Push once  glucagon  Injectable 1 milliGRAM(s) IntraMuscular once  heparin   Injectable 5000 Unit(s) SubCutaneous every 8 hours  insulin lispro (ADMELOG) corrective regimen sliding scale   SubCutaneous every 6 hours  ondansetron Injectable 4 milliGRAM(s) IV Push every 6 hours  sodium chloride 0.9%. 1000 milliLiter(s) (65 mL/Hr) IV Continuous <Continuous>    MEDICATIONS  (PRN):  acetaminophen   Oral Liquid .. 635 milliGRAM(s) Oral every 6 hours PRN Temp greater or equal to 38C (100.4F), Mild Pain (1 - 3)  aluminum hydroxide/magnesium hydroxide/simethicone Suspension 30 milliLiter(s) Oral every 6 hours PRN Dyspepsia  dextrose Oral Gel 15 Gram(s) Oral once PRN Blood Glucose LESS THAN 70 milliGRAM(s)/deciliter      FAMILY HISTORY:  FH: breast cancer (Mother)    FH: aneurysm (Father)        SOCIAL HISTORY: no recent smoking     REVIEW OF SYSTEMS:  CONSTITUTIONAL:    c/o fatigue, malaise, lethargy.  No fever or chills.  RESPIRATORY:  No cough.  No wheeze.  No hemoptysis.    CARDIOVASCULAR:  No chest pains.  No palpitations. No shortness of breath, No orthopnea or PND.  GASTROINTESTINAL:  No abdominal pain.  No nausea or vomiting.    GENITOURINARY:    No hematuria.    MUSCULOSKELETAL:  No musculoskeletal pain.  No joint swelling.  No arthritis.  NEUROLOGICAL:  No tingling or numbness or weakness.  PSYCHIATRIC:  No confusion  SKIN:  No rashes.            Vital Signs Last 24 Hrs  T(C): 36.7 (24 Apr 2023 21:44), Max: 37 (24 Apr 2023 15:57)  T(F): 98.1 (24 Apr 2023 21:44), Max: 98.6 (24 Apr 2023 15:57)  HR: 117 (24 Apr 2023 18:10) (103 - 121)  BP: 128/94 (24 Apr 2023 21:44) (104/70 - 133/95)  BP(mean): --  RR: 18 (24 Apr 2023 21:44) (18 - 18)  SpO2: 98% (24 Apr 2023 21:44) (96% - 100%)    Parameters below as of 24 Apr 2023 21:44  Patient On (Oxygen Delivery Method): room air        PHYSICAL EXAM-    Constitutional:  no acute distress , frail elderly     Head: Head is normocephalic and atraumatic.      Neck:  No JVD.     Cardiovascular: Regular rate and rhythm without S3, S4. No murmurs or rubs are appreciated.      Respiratory: Breathsounds are normal. No rales. No wheezing.    Abdomen: Soft, nontender, nondistended with positive bowel sounds.      Extremity: No tenderness. No  pitting edema     Neurologic: The patient is alert and oriented.      Skin: No rash, no obvious lesions noted.      Psychiatric: The patient appears to be emotionally stable.      INTERPRETATION OF TELEMETRY: SR. Sinus tachycardia and one episode of mid RP tachycardia likely atrial tachycardia 4 beats    ECG: Sinus tachycardia, incomplete RBBB    I&O's Detail    24 Apr 2023 07:01  -  25 Apr 2023 07:00  --------------------------------------------------------  IN:  Total IN: 0 mL    OUT:    Intermittent Catheterization - Urethral (mL): 500 mL  Total OUT: 500 mL    Total NET: -500 mL          LABS:                        8.3    6.04  )-----------( 152      ( 25 Apr 2023 06:34 )             26.9     04-24    140  |  107  |  51<H>  ----------------------------<  185<H>  4.1   |  27  |  2.00<H>    Ca    8.7      24 Apr 2023 23:21  Phos  3.7     04-24  Mg     2.2     04-24              I&O's Summary    24 Apr 2023 07:01  -  25 Apr 2023 07:00  --------------------------------------------------------  IN: 0 mL / OUT: 500 mL / NET: -500 mL      BNP  RADIOLOGY & ADDITIONAL STUDIES:  c< from: Xray Chest 1 View-PORTABLE IMMEDIATE (04.21.23 @ 13:54) >    ACC: [id number] EXAM:  XR CHEST PORTABLE IMMED 1V   ORDERED BY: JAQUELINE DAMON     PROCEDURE DATE:  04/21/2023          INTERPRETATION:  AP chest on April 21, 2023 at 1:44 PM. Patient has   sepsis.    Shallow inspiration crowds the chest.    Heartmagnified by technique.    Lungs remain clear.    Chest is similar to January 27 this year.    IMPRESSION: No acute finding or change.    --- End of Report ---            PENELOPE AVILA MD; Attending Radiologist  This document has been electronically signed. Apr 21 2023  2:24PM    < end of copied text >

## 2023-04-25 NOTE — CHART NOTE - NSCHARTNOTEFT_GEN_A_CORE
chart accessed as I got orders for feeds to authorize for this patient.  they will reach out to GI - pt not seen by me this admn.

## 2023-04-25 NOTE — PROGRESS NOTE ADULT - SUBJECTIVE AND OBJECTIVE BOX
HPI: 66 y/o M PMHx significant for Neuroendocrine tumor diagnosed 6 years ago with mets to liver with associated chronic dysphagia with GJ tube, Hypertension, Hyperlipidemia, Diabetes Mellitus Type 2, BPH and light chain nephropathy presents with clogged J tube. Tube feeds stopped. Unable to take PO including pleasure feeds, liquids and medications. Reported to ER. IR consulted and successful clearance of clogged tube and patent tube after. SBP low. Labs drawn. Revealing ZO, hyponatremia, hypokalemia. Admitted for further evaluation. Patient reports weakness and lethargy but denies fever ,chills, syncope, dizziness, chest pain, SOB. Does report chronic intermittent nausea.       Subjective: Patient seen and examined at bedside this AM, s/p NM stress test this AM, feels better today.    REVIEW OF SYSTEMS:    CONSTITUTIONAL: No weakness, fevers or chills  EYES/ENT: No visual changes;  No vertigo or throat pain   NECK: No pain or stiffness  RESPIRATORY: No cough, wheezing, hemoptysis; No shortness of breath  CARDIOVASCULAR: no chest pain, no palpitations  GASTROINTESTINAL: No abdominal or epigastric pain. No nausea, vomiting, or hematemesis; No diarrhea or constipation. No melena or hematochezia.  GENITOURINARY: No dysuria, frequency or hematuria  NEUROLOGICAL: No numbness or weakness  SKIN: No itching, rashes      MEDICATIONS  (STANDING):  cefepime  Injectable. 1000 milliGRAM(s) IV Push every 12 hours  dextrose 5%. 1000 milliLiter(s) (50 mL/Hr) IV Continuous <Continuous>  dextrose 5%. 1000 milliLiter(s) (100 mL/Hr) IV Continuous <Continuous>  dextrose 50% Injectable 25 Gram(s) IV Push once  dextrose 50% Injectable 12.5 Gram(s) IV Push once  dextrose 50% Injectable 25 Gram(s) IV Push once  glucagon  Injectable 1 milliGRAM(s) IntraMuscular once  heparin   Injectable 5000 Unit(s) SubCutaneous every 8 hours  insulin lispro (ADMELOG) corrective regimen sliding scale   SubCutaneous every 6 hours  magnesium sulfate  IVPB 1 Gram(s) IV Intermittent once  ondansetron Injectable 4 milliGRAM(s) IV Push every 6 hours  potassium chloride  10 mEq/100 mL IVPB 10 milliEquivalent(s) IV Intermittent once  sodium chloride 0.9%. 1000 milliLiter(s) (65 mL/Hr) IV Continuous <Continuous>    MEDICATIONS  (PRN):  acetaminophen   Oral Liquid .. 635 milliGRAM(s) Oral every 6 hours PRN Temp greater or equal to 38C (100.4F), Mild Pain (1 - 3)  aluminum hydroxide/magnesium hydroxide/simethicone Suspension 30 milliLiter(s) Oral every 6 hours PRN Dyspepsia  dextrose Oral Gel 15 Gram(s) Oral once PRN Blood Glucose LESS THAN 70 milliGRAM(s)/deciliter    Vital Signs Last 24 Hrs  T(C): 36.6 (25 Apr 2023 15:08), Max: 36.7 (24 Apr 2023 21:44)  T(F): 97.9 (25 Apr 2023 15:08), Max: 98.1 (24 Apr 2023 21:44)  HR: 99 (25 Apr 2023 15:08) (99 - 99)  BP: 118/83 (25 Apr 2023 15:08) (118/83 - 128/94)  RR: 18 (25 Apr 2023 15:08) (18 - 18)  SpO2: 100% (25 Apr 2023 15:08) (96% - 100%)    Parameters below as of 25 Apr 2023 15:08  Patient On (Oxygen Delivery Method): room air        PHYSICAL EXAM:  GENERAL: NAD, lying in bed comfortably  HEAD:  Atraumatic, Normocephalic  EYES: conjunctiva and sclera clear  ENT: Moist mucous membranes  NECK: Supple, No JVD  CHEST/LUNG: Clear to auscultation bilaterally; No rales, rhonchi, wheezing. Unlabored respirations  HEART: Regular rate and rhythm; No murmurs  ABDOMEN: Bowel sounds present; Soft, Nontender, Nondistended.   EXTREMITIES:  2+ Peripheral Pulses, brisk capillary refill. No clubbing, cyanosis, or edema  NERVOUS SYSTEM:  Alert & Oriented X3, speech clear. No deficits   MSK: FROM all 4 extremities, full and equal strength        LABS:                                     8.3    6.04  )-----------( 152      ( 25 Apr 2023 06:34 )             26.9   04-25    141  |  111<H>  |  52<H>  ----------------------------<  128<H>  4.2   |  25  |  1.94<H>    Ca    8.8      25 Apr 2023 06:34  Phos  3.6     04-25  Mg     2.3     04-25                  RADIOLOGY:    < from: TTE Echo Complete w/o Contrast w/ Doppler (04.25.23 @ 14:13) >   Impression     Summary     The left ventricle is normal in size, wall thickness, wall motion and   contractility. Estimated left ventricular ejection fraction is 55 - 60 %.   Mild diastolic dysfunction (stage I).   Normal appearing right ventricle structure and function.   Mild to moderate mitral regurgitation.   Mild tricuspid valve regurgitation.

## 2023-04-25 NOTE — CONSULT NOTE ADULT - REASON FOR ADMISSION
J-tube malfunction; weakness; dehydration; hyponatremia; ZO
J-tube Malfunction  Weakness  Dehydration  Hyponatremia  ZO

## 2023-04-26 NOTE — PROGRESS NOTE ADULT - NUTRITIONAL ASSESSMENT
This patient has been assessed with a concern for Malnutrition and has been determined to have a diagnosis/diagnoses of Severe protein-calorie malnutrition.    This patient is being managed with:   Diet NPO with Tube Feed-  Tube Feeding Modality: Jejunostomy  Glucerna 1.5 Chidi (GLUCERNA1.5)  Total Volume for 24 Hours (mL): 960  Intermittent  Until Goal Tube Feed Rate (mL per Hour): 80  Tube Feeding Hours ON: 12  Tube Feeding OFF (Hours): 12  Tube Feed Start Time: 18:00  Free Water Flush  Free Water Flush Instructions:  Free water flushes of 65cc/hr x 12 hours (provides ~780cc); monitor and adjust free water flushes per MD  Entered: Apr 22 2023  1:39PM  
This patient has been assessed with a concern for Malnutrition and has been determined to have a diagnosis/diagnoses of Severe protein-calorie malnutrition.    This patient is being managed with:   Diet NPO with Tube Feed-  Tube Feeding Modality: Jejunostomy  Glucerna 1.5 Chidi (GLUCERNA1.5)  Total Volume for 24 Hours (mL): 1330  Intermittent  Until Goal Tube Feed Rate (mL per Hour): 95  Tube Feeding Hours ON: 14  Tube Feeding OFF (Hours): 10  Tube Feed Start Time: 18:00  Free Water Flush Instructions:  Free water flushes of 60cc/hr x 14 hours (provides 840cc); monitor and adjust PRN per MD  Entered: Apr 22 2023 11:49AM    Diet NPO with Tube Feed-  Tube Feeding Modality: Jejunostomy  Glucerna 1.5 Chidi (GLUCERNA1.5)  Total Volume for 24 Hours (mL): 1560  Continuous  Starting Tube Feed Rate {mL per Hour}: 65  Until Goal Tube Feed Rate (mL per Hour): 65  Tube Feed Duration (in Hours): 24  Tube Feed Start Time: 20:50  Free Water Flush  Pump   Rate (mL per Hour): 62   Frequency: Every Hour    Duration (Hours): 25  Entered: Apr 21 2023  8:49PM    The following pending diet order is being considered for treatment of Severe protein-calorie malnutrition:null
This patient has been assessed with a concern for Malnutrition and has been determined to have a diagnosis/diagnoses of Severe protein-calorie malnutrition.    This patient is being managed with:   Diet NPO with Tube Feed-  Tube Feeding Modality: Jejunostomy  Glucerna 1.5 Chidi (GLUCERNA1.5)  Total Volume for 24 Hours (mL): 960  Intermittent  Until Goal Tube Feed Rate (mL per Hour): 80  Tube Feeding Hours ON: 12  Tube Feeding OFF (Hours): 12  Tube Feed Start Time: 18:00  Free Water Flush  Free Water Flush Instructions:  Free water flushes of 65cc/hr x 12 hours (provides ~780cc); monitor and adjust free water flushes per MD  Entered: Apr 22 2023  1:39PM

## 2023-04-26 NOTE — PROGRESS NOTE ADULT - SUBJECTIVE AND OBJECTIVE BOX
HPI:    69 y/o M PMHx significant for neuroendocrine tumor diagnosed 6 years ago with mets to liver with associated chronic dysphagia with GJ tube (follows with Primary Onc Dr Llamas), HTN, HLD, T2DM, BPH and light chain nephropathy presented to the ED with clogged J tube. He admitted that the tube feeds stopped, unable to take PO including pleasure feeds, liquids and medications. In the ED, IR consulted with successful clearance of clogged tube with patent tube after. Patient reported ongoing weakness/lethargy but denied fever, chills, syncope, dizziness, CP, palpitations, SOB/LEBLANC, wheezing, melena, BRBPR, dysuria or any other acute c/o at the time. (21 Apr 2023 21:49)      04/23/2022: Seen at bedside, no acute distress, feeling better than yesterday    04/26/2023:      PAST MEDICAL & SURGICAL HISTORY:    Neuroendocrine tumor    Hypertension    BPH (benign prostatic hyperplasia)    Renal insufficiency    Light chain nephropathy    DM (diabetes mellitus)    HLD (hyperlipidemia)    GJ tube placement        MEDICATIONS  (STANDING):    cefepime  Injectable. 1000 milliGRAM(s) IV Push every 12 hours  dextrose 5%. 1000 milliLiter(s) (50 mL/Hr) IV Continuous <Continuous>  dextrose 5%. 1000 milliLiter(s) (100 mL/Hr) IV Continuous <Continuous>  dextrose 50% Injectable 25 Gram(s) IV Push once  dextrose 50% Injectable 25 Gram(s) IV Push once  dextrose 50% Injectable 12.5 Gram(s) IV Push once  glucagon  Injectable 1 milliGRAM(s) IntraMuscular once  heparin   Injectable 5000 Unit(s) SubCutaneous every 8 hours  insulin lispro (ADMELOG) corrective regimen sliding scale   SubCutaneous every 6 hours  ondansetron Injectable 4 milliGRAM(s) IV Push every 6 hours  sodium chloride 0.9%. 1000 milliLiter(s) (65 mL/Hr) IV Continuous <Continuous>      MEDICATIONS  (PRN):    acetaminophen   Oral Liquid .. 635 milliGRAM(s) Oral every 6 hours PRN Temp greater or equal to 38C (100.4F), Mild Pain (1 - 3)  aluminum hydroxide/magnesium hydroxide/simethicone Suspension 30 milliLiter(s) Oral every 6 hours PRN Dyspepsia  dextrose Oral Gel 15 Gram(s) Oral once PRN Blood Glucose LESS THAN 70 milliGRAM(s)/deciliter        ALLERGIES:    losartan (Angioedema)        FAMILY HISTORY:    breast cancer (Mother)  aneurysm (Father)        REVIEW OF SYSTEMS:    Constitutional, Eyes, ENT, Cardiovascular, Respiratory, Gastrointestinal, Genitourinary, Musculoskeletal, Integumentary, Neurological, Psychiatric, Endocrine, Heme/Lymph and Allergic/Immunologic review of systems are otherwise negative except as noted in HPI.       VITALS:    ICU Vital Signs Last 24 Hrs  T(C): 36.5 (26 Apr 2023 07:27), Max: 36.8 (25 Apr 2023 22:05)  T(F): 97.7 (26 Apr 2023 07:27), Max: 98.2 (25 Apr 2023 22:05)  HR: 110 (26 Apr 2023 07:27) (99 - 110)  BP: 100/66 (26 Apr 2023 07:27) (100/66 - 118/83)  BP(mean): --  ABP: --  ABP(mean): --  RR: 16 (26 Apr 2023 07:27) (16 - 18)  SpO2: 97% (26 Apr 2023 07:27) (97% - 100%)    O2 Parameters below as of 26 Apr 2023 07:27  Patient On (Oxygen Delivery Method): room air        PHYSICAL:    Constitutional: ill appearing, no acute distress  Eyes: PERRL, EOMI  ENT: pharynx is unremarkable  Neck: supple without JVD  Pulmonary: clear to auscultation bilaterally, no dullness, no wheezing  Cardiac: RRR, normal S1S2  Vascular: no calf tenderness, venous stasis changes, varices.   Abdomen: normoactive bowel sounds, soft and nontender, no hepatosplenomegaly or masses appreciated; GJ in place  Lymphatic: no peripheral adenopathy appreciated  Musculoskeletal: full range of motion and no deformities appreciated  Skin: normal appearance, no rash/erythema   Neurology: grossly intact  Psychiatric: affect appropriate      LABS:                                           8.0    7.10  )-----------( 137      ( 26 Apr 2023 06:33 )             25.4     04-26    141  |  113<H>  |  52<H>  ----------------------------<  184<H>  4.2   |  24  |  1.88<H>    Ca    8.7      26 Apr 2023 06:33  Phos  2.0     04-26  Mg     2.0     04-26            RADIOLOGY & ADDITIONAL STUDIES:      Xray Chest 1 View-PORTABLE IMMEDIATE (04.21.23 @ 13:54    INTERPRETATION:  AP chest on April 21, 2023 at 1:44 PM. Patient has   sepsis.    Shallow inspiration crowds the chest.    Heartmagnified by technique.    Lungs remain clear.    Chest is similar to January 27 this year.    IMPRESSION: No acute finding or change.        CT AP 04/17/2023:    INTERPRETATION:  CLINICAL INFORMATION: Neuroendocrine mass of the pancreas. Liver metastasis. Follow-up scan prior to surgery.    COMPARISON: Multiple prior studies, most recently CT abdomen/pelvis 1/6/2023    CONTRAST/COMPLICATIONS:  IV Contrast: Omnipaque 350  90 cc administered   10 cc discarded  Oral Contrast: Water  Complications: None reported at time of study completion    PROCEDURE:  CT of the Abdomen and Pelvis was performed.  Arterial and Portal Venous phases were acquired.  Sagittal and coronal reformats were performed.    FINDINGS:  LOWER CHEST: Linear atelectasis or scarring right middle lobe. Coronary artery calcifications.    LIVER: Diffuse bilobar liver metastasis which has progressed since 1/6/2023; some of the lesions appear confluent. Comparison with the prior noncontrast study is limited, however lesions appear to be increased in size and number with a representative lesion in the right hepatic lobe measuring 8.3 x 7.9 cm, previously 3.9 x 4.7 cm (series 3 image 22).  BILE DUCTS: Normal caliber.  GALLBLADDER: Within normal limits.  SPLEEN: Within normal limits.  PANCREAS: Pancreatic tail mass measuring 10.9 x 6.9 cm, not significantly changed since 1/6/2023.  ADRENALS: Within normal limits.  KIDNEYS/URETERS: No hydronephrosis. 2 mm nonobstructing calculus in the lower pole of the left kidney.    BLADDER: Underdistended, limiting evaluation with wall thickening which can be secondary to underdistention.  REPRODUCTIVE ORGANS: Prostate not enlarged.    BOWEL: No bowel obstruction. Gastrostomy tube in the stomach with the tip in the jejunum.  PERITONEUM: No ascites.  VESSELS: Atherosclerotic changes. Abdominal aorta normal in caliber. Narrowed splenic vein in the region of the portosplenic confluence which is thrombosed in the region the pancreatic tail. Perisplenic, perigastric and anterior abdominal varices. There also appear to be varices either within the gastric wall or lumen of the proximal stomach.  RETROPERITONEUM/LYMPH NODES: No lymphadenopathy.  ABDOMINAL WALL: Gastrostomy tube.  BONES: Degenerative changes in the spine including severe endplate degenerative changes at T12-L1. Bilateral L5 spondylolysis with mild anterolisthesis of L5 on S1.    IMPRESSION:    Diffuse bilobar liver metastasis which have progressed since 1/6/2023.    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 He admitted that the tube feeds stopped, unable to take PO including pleasure feeds, liquids and medications. In the ED, IR consulted with successful clearance of clogged tube with patent tube after. Patient reported ongoing weakness/lethargy but denied fever, chills, syncope, dizziness, CP, palpitations, SOB/LEBLANC, wheezing, melena, BRBPR, dysuria or any other acute c/o at the time. (21 Apr 2023 21:49)      04/23/2022: Seen at bedside, no acute distress, feeling better than yesterday    04/26/2023: Seen at bedside accompanied by wife, no acute distress      PAST MEDICAL & SURGICAL HISTORY:    Neuroendocrine tumor    Hypertension    BPH (benign prostatic hyperplasia)    Renal insufficiency    Light chain nephropathy    DM (diabetes mellitus)    HLD (hyperlipidemia)    GJ tube placement        MEDICATIONS  (STANDING):    cefepime  Injectable. 1000 milliGRAM(s) IV Push every 12 hours  dextrose 5%. 1000 milliLiter(s) (50 mL/Hr) IV Continuous <Continuous>  dextrose 5%. 1000 milliLiter(s) (100 mL/Hr) IV Continuous <Continuous>  dextrose 50% Injectable 25 Gram(s) IV Push once  dextrose 50% Injectable 25 Gram(s) IV Push once  dextrose 50% Injectable 12.5 Gram(s) IV Push once  glucagon  Injectable 1 milliGRAM(s) IntraMuscular once  heparin   Injectable 5000 Unit(s) SubCutaneous every 8 hours  insulin lispro (ADMELOG) corrective regimen sliding scale   SubCutaneous every 6 hours  ondansetron Injectable 4 milliGRAM(s) IV Push every 6 hours  sodium chloride 0.9%. 1000 milliLiter(s) (65 mL/Hr) IV Continuous <Continuous>      MEDICATIONS  (PRN):    acetaminophen   Oral Liquid .. 635 milliGRAM(s) Oral every 6 hours PRN Temp greater or equal to 38C (100.4F), Mild Pain (1 - 3)  aluminum hydroxide/magnesium hydroxide/simethicone Suspension 30 milliLiter(s) Oral every 6 hours PRN Dyspepsia  dextrose Oral Gel 15 Gram(s) Oral once PRN Blood Glucose LESS THAN 70 milliGRAM(s)/deciliter        ALLERGIES:    losartan (Angioedema)        FAMILY HISTORY:    breast cancer (Mother)  aneurysm (Father)        REVIEW OF SYSTEMS:    Constitutional, Eyes, ENT, Cardiovascular, Respiratory, Gastrointestinal, Genitourinary, Musculoskeletal, Integumentary, Neurological, Psychiatric, Endocrine, Heme/Lymph and Allergic/Immunologic review of systems are otherwise negative except as noted in HPI.       VITALS:    ICU Vital Signs Last 24 Hrs  T(C): 36.5 (26 Apr 2023 07:27), Max: 36.8 (25 Apr 2023 22:05)  T(F): 97.7 (26 Apr 2023 07:27), Max: 98.2 (25 Apr 2023 22:05)  HR: 110 (26 Apr 2023 07:27) (99 - 110)  BP: 100/66 (26 Apr 2023 07:27) (100/66 - 118/83)  BP(mean): --  ABP: --  ABP(mean): --  RR: 16 (26 Apr 2023 07:27) (16 - 18)  SpO2: 97% (26 Apr 2023 07:27) (97% - 100%)    O2 Parameters below as of 26 Apr 2023 07:27  Patient On (Oxygen Delivery Method): room air        PHYSICAL:    Constitutional: no acute distress  Eyes: PERRL, EOMI  ENT: pharynx is unremarkable  Neck: supple without JVD  Pulmonary: clear to auscultation bilaterally, no dullness, no wheezing  Cardiac: tachycardic rate, regular rhythm, normal S1S2  Vascular: no calf tenderness, venous stasis changes, varices.   Abdomen: normoactive bowel sounds, soft and nontender, no hepatosplenomegaly or masses appreciated; GJ in place  Lymphatic: no peripheral adenopathy appreciated  Musculoskeletal: full range of motion and no deformities appreciated  Skin: normal appearance, no rash/erythema   Neurology: grossly intact  Psychiatric: affect appropriate      LABS:                                           8.0    7.10  )-----------( 137      ( 26 Apr 2023 06:33 )             25.4     04-26    141  |  113<H>  |  52<H>  ----------------------------<  184<H>  4.2   |  24  |  1.88<H>    Ca    8.7      26 Apr 2023 06:33  Phos  2.0     04-26  Mg     2.0     04-26            RADIOLOGY & ADDITIONAL STUDIES:      Xray Chest 1 View-PORTABLE IMMEDIATE (04.21.23 @ 13:54    INTERPRETATION:  AP chest on April 21, 2023 at 1:44 PM. Patient has   sepsis.    Shallow inspiration crowds the chest.    Heartmagnified by technique.    Lungs remain clear.    Chest is similar to January 27 this year.    IMPRESSION: No acute finding or change.        CT AP 04/17/2023:    INTERPRETATION:  CLINICAL INFORMATION: Neuroendocrine mass of the pancreas. Liver metastasis. Follow-up scan prior to surgery.    COMPARISON: Multiple prior studies, most recently CT abdomen/pelvis 1/6/2023    CONTRAST/COMPLICATIONS:  IV Contrast: Omnipaque 350  90 cc administered   10 cc discarded  Oral Contrast: Water  Complications: None reported at time of study completion    PROCEDURE:  CT of the Abdomen and Pelvis was performed.  Arterial and Portal Venous phases were acquired.  Sagittal and coronal reformats were performed.    FINDINGS:  LOWER CHEST: Linear atelectasis or scarring right middle lobe. Coronary artery calcifications.    LIVER: Diffuse bilobar liver metastasis which has progressed since 1/6/2023; some of the lesions appear confluent. Comparison with the prior noncontrast study is limited, however lesions appear to be increased in size and number with a representative lesion in the right hepatic lobe measuring 8.3 x 7.9 cm, previously 3.9 x 4.7 cm (series 3 image 22).  BILE DUCTS: Normal caliber.  GALLBLADDER: Within normal limits.  SPLEEN: Within normal limits.  PANCREAS: Pancreatic tail mass measuring 10.9 x 6.9 cm, not significantly changed since 1/6/2023.  ADRENALS: Within normal limits.  KIDNEYS/URETERS: No hydronephrosis. 2 mm nonobstructing calculus in the lower pole of the left kidney.    BLADDER: Underdistended, limiting evaluation with wall thickening which can be secondary to underdistention.  REPRODUCTIVE ORGANS: Prostate not enlarged.    BOWEL: No bowel obstruction. Gastrostomy tube in the stomach with the tip in the jejunum.  PERITONEUM: No ascites.  VESSELS: Atherosclerotic changes. Abdominal aorta normal in caliber. Narrowed splenic vein in the region of the portosplenic confluence which is thrombosed in the region the pancreatic tail. Perisplenic, perigastric and anterior abdominal varices. There also appear to be varices either within the gastric wall or lumen of the proximal stomach.  RETROPERITONEUM/LYMPH NODES: No lymphadenopathy.  ABDOMINAL WALL: Gastrostomy tube.  BONES: Degenerative changes in the spine including severe endplate degenerative changes at T12-L1. Bilateral L5 spondylolysis with mild anterolisthesis of L5 on S1.    IMPRESSION:    Diffuse bilobar liver metastasis which have progressed since 1/6/2023.    Large pancreatic mass, not significantly changed

## 2023-04-26 NOTE — PROGRESS NOTE ADULT - ASSESSMENT
68 y/o M PMHx significant for Neuroendocrine tumor diagnosed 6 years ago with mets to liver with associated chronic gastric outlet  obstruction, s/p ith GJ tube placement for nutrition, Hypertension, Hyperlipidemia, Diabetes Mellitus Type 2, BPH and light chain nephropathy admitted for:     GJ Tube malfunction s/p Correction by IR (4/21)  Severe protein-calorie malnutrition  Neuroendocrine Tumor. Chronic Gastric outlet obstruction   Recent CT abd with advancing tumor, Liver Mets   Symptomatic with nausea and  weight loss  Zofran PRN   Started on Tube Feedings   Sx onc consult appreciated   Surgical consult appreciated plan for outpt follow up   Will likely need a high gastric transection and Marcela-en-Y reconstruction  Oncology consult appreciated   Restarted tube feeds 4/25    Atypical chest pain   EKG: Sinus tachycardia, incomplete RBBB, septal infarct (both previously noted), ?poor R wave progression   HsTrop neg x 2  Remote tele -- will d/c  Given pantoprazole IV, Maalox via J tube, zofran    Cardiology consult appreciated  -S/p NM stress   -TTE LVEF: 55-60%, mild diastolic dysfunction     ZO.  Dehydration Hyponatremia/Hypokalemia.  Chronic urinary retention  S/p IVF  Replace lytes PRN   C/w Tube Feeds   Bladder scan q6 hours, Pt self catheterizes himself     UTI  UCx GNR - klebsiella variicola   continue Cefepime  ID eval appreciated     DMII  Monitor BS, cover with ISS  HB A1c 7.3%    Anemia - acute on chronic   FOBT negative  Likely in setting of malnutrition, underlying malignancy   H/H continues to drop  Will transfuse 1u pRBC today      DVT Prophylaxis: heparin subq      DISPO: d/c tomorrow once h/h stable    Will need to call region care again for tube feed orders - I did call and update TF amount, they need call again and a script for TF

## 2023-04-26 NOTE — PROGRESS NOTE ADULT - SUBJECTIVE AND OBJECTIVE BOX
HPI: 66 y/o M PMHx significant for Neuroendocrine tumor diagnosed 6 years ago with mets to liver with associated chronic dysphagia with GJ tube, Hypertension, Hyperlipidemia, Diabetes Mellitus Type 2, BPH and light chain nephropathy presents with clogged J tube. Tube feeds stopped. Unable to take PO including pleasure feeds, liquids and medications. Reported to ER. IR consulted and successful clearance of clogged tube and patent tube after. SBP low. Labs drawn. Revealing ZO, hyponatremia, hypokalemia. Admitted for further evaluation. Patient reports weakness and lethargy but denies fever ,chills, syncope, dizziness, chest pain, SOB. Does report chronic intermittent nausea.       Subjective: Patient seen and examined at bedside this AM, feels well, no further chest pain, continue with sinus tachycardia, noted borderline low blood pressure.     REVIEW OF SYSTEMS:    CONSTITUTIONAL: No weakness, fevers or chills  EYES/ENT: No visual changes;  No vertigo or throat pain   NECK: No pain or stiffness  RESPIRATORY: No cough, wheezing, hemoptysis; No shortness of breath  CARDIOVASCULAR: no chest pain, no palpitations  GASTROINTESTINAL: No abdominal or epigastric pain. No nausea, vomiting, or hematemesis; No diarrhea or constipation. No melena or hematochezia.  GENITOURINARY: No dysuria, frequency or hematuria  NEUROLOGICAL: No numbness or weakness  SKIN: No itching, rashes      MEDICATIONS  (STANDING):  cefepime  Injectable. 1000 milliGRAM(s) IV Push every 12 hours  dextrose 5%. 1000 milliLiter(s) (50 mL/Hr) IV Continuous <Continuous>  dextrose 5%. 1000 milliLiter(s) (100 mL/Hr) IV Continuous <Continuous>  dextrose 50% Injectable 25 Gram(s) IV Push once  dextrose 50% Injectable 12.5 Gram(s) IV Push once  dextrose 50% Injectable 25 Gram(s) IV Push once  glucagon  Injectable 1 milliGRAM(s) IntraMuscular once  heparin   Injectable 5000 Unit(s) SubCutaneous every 8 hours  insulin lispro (ADMELOG) corrective regimen sliding scale   SubCutaneous every 6 hours  magnesium sulfate  IVPB 1 Gram(s) IV Intermittent once  ondansetron Injectable 4 milliGRAM(s) IV Push every 6 hours  potassium chloride  10 mEq/100 mL IVPB 10 milliEquivalent(s) IV Intermittent once  sodium chloride 0.9%. 1000 milliLiter(s) (65 mL/Hr) IV Continuous <Continuous>    MEDICATIONS  (PRN):  acetaminophen   Oral Liquid .. 635 milliGRAM(s) Oral every 6 hours PRN Temp greater or equal to 38C (100.4F), Mild Pain (1 - 3)  aluminum hydroxide/magnesium hydroxide/simethicone Suspension 30 milliLiter(s) Oral every 6 hours PRN Dyspepsia  dextrose Oral Gel 15 Gram(s) Oral once PRN Blood Glucose LESS THAN 70 milliGRAM(s)/deciliter    Vital Signs Last 24 Hrs  T(C): 36.8 (26 Apr 2023 18:24), Max: 36.8 (25 Apr 2023 22:05)  T(F): 98.2 (26 Apr 2023 18:24), Max: 98.2 (25 Apr 2023 22:05)  HR: 96 (26 Apr 2023 18:24) (90 - 110)  BP: 112/82 (26 Apr 2023 18:24) (100/66 - 116/85)  RR: 18 (26 Apr 2023 18:24) (16 - 18)  SpO2: 100% (26 Apr 2023 18:24) (97% - 100%)    Parameters below as of 26 Apr 2023 18:24  Patient On (Oxygen Delivery Method): room air      PHYSICAL EXAM:  GENERAL: NAD, lying in bed comfortably  HEAD:  Atraumatic, Normocephalic  EYES: conjunctiva and sclera clear  ENT: Moist mucous membranes  NECK: Supple, No JVD  CHEST/LUNG: Clear to auscultation bilaterally; No rales, rhonchi, wheezing. Unlabored respirations  HEART: Regular rate and rhythm; No murmurs  ABDOMEN: Bowel sounds present; Soft, Nontender, Nondistended.   EXTREMITIES:  2+ Peripheral Pulses, brisk capillary refill. No clubbing, cyanosis, or edema  NERVOUS SYSTEM:  Alert & Oriented X3, speech clear. No deficits   MSK: FROM all 4 extremities, full and equal strength  SKIN: erythema to RLE - chronic         LABS:                                             7.8    6.14  )-----------( 140      ( 26 Apr 2023 12:42 )             25.3   04-26    141  |  113<H>  |  52<H>  ----------------------------<  184<H>  4.2   |  24  |  1.88<H>    Ca    8.7      26 Apr 2023 06:33  Phos  2.0     04-26  Mg     2.0     04-26                      RADIOLOGY:    < from: TTE Echo Complete w/o Contrast w/ Doppler (04.25.23 @ 14:13) >   Impression     Summary     The left ventricle is normal in size, wall thickness, wall motion and   contractility. Estimated left ventricular ejection fraction is 55 - 60 %.   Mild diastolic dysfunction (stage I).   Normal appearing right ventricle structure and function.   Mild to moderate mitral regurgitation.   Mild tricuspid valve regurgitation.

## 2023-04-26 NOTE — PROGRESS NOTE ADULT - ASSESSMENT
67 y/o M with a PMHx of neuroendocrine tumor being treated the Lutathera, known to our team from previous admission, presented to the ED for J tube obstruction with associated weakness, fatigue/lethargy.      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    - Primary Onc Dr Ac Llamas  - Initially diagnosed approx 6 years ago  - Started on Sandostatin, completed Lutathera about 1 year ago ---> scans end of 2022 noted POD; started back on Lutathera 12/07/2022  - Recently admitted to  01/2023 after being seen outpatient by SurgOnc Dr Siddiqi 01/04/2023 with concern for gastric outlet obstruction  - Received EGD 01/10/23 with severe esophagitis, no obvious obstruction seen with no stenting done; received GJ tube placement  - Case discussed among multiple specialists at tumor boards and it was concluded that it would be best to support the patient through a complete Lutathera treatment  - Patient continuing to follow up with Primary Onc Dr Llamas ---> completed 2 cycles of Lutathera with most recent dosing approx 2 months ago  - Patient recent seen outpatient by SurgOnc Dr Siddiqi 04/12/23 with ongoing GOC/plan being discussed including a palliative procedure, but the potential for complications is high given the need for a high gastric transection and Marcela-en-Y reconstruction. Currently dependent on using J tube for feeds & G port to decompress; unable to tolerate PO intake   - SurgOnc and Primary Onc have been communicating with plan to re-image his abdomen and either resect the perigastric disease or bypass the stomach   - Most recent CT AP imaging 04/17/23 revealed diffuse bilobar liver metastasis which have progressed since 01/06/2023; large pancreatic mass, not significantly changed  - At this time, continue supportive measures; [facility]  - Spoke with Primary Onc Dr Llamas this morning, confirmed that patient received x6 cycles/doses of Lutathera, planning to change treatment regimen but agrees that patient should receive palliative surgical procedure, likely distal gastrectomy as per SurgOnc Dr Black      # J Tube Malfunction with Nausea , Weakness , Weight Loss    - GJ tube placement 01/10/23 by GI Dr Doyle  - J tube for feedings, recently unable to pass anything through  - IR consulted in the ED with successful irrigation of existing J tube; advised at least 30cc flush before and after feedings  - Dietary evaluation completed   - Continue supportive measures including feedings at this time      # ZO with Hyponatremia & Suspected UTI    - Likely 2/2 dehydration from decreased feeding intake   - IVF  - Replace sodium   - Bladder scan q6h ---> patient self catheterizes  - Cefepime started; ID following  - Continue supportive measures      # Normocytic Anemia    - Hgb 8.0 g/dL, stable  - Likely 2/2 dehydration with dilutional effect 2/2 IVF  - Additional contributing factor including decreased feedings with weight loss   - No obvious signs of bleeding  - Can check hemolysis labs for completeness but not likely the cause (ordered)  - Can also check FOBT to r/o potential GIB (ordered)  - Continue to trend serial CBCs  - Transfuse blood as necessary; keep Hgb >7.5 g/dL 69 y/o M with a PMHx of neuroendocrine tumor being treated the Lutathera, known to our team from previous admission, presented to the ED for J tube obstruction with associated weakness, fatigue/lethargy.      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    - Primary Onc Dr Ac Llamas  - Initially diagnosed approx 6 years ago  - Started on Sandostatin, completed Lutathera about 1 year ago ---> scans end of 2022 noted POD; started back on Lutathera 12/07/2022  - Recently admitted to  01/2023 after being seen outpatient by SurgOnc Dr Siddiqi 01/04/2023 with concern for gastric outlet obstruction  - Received EGD 01/10/23 with severe esophagitis, no obvious obstruction seen with no stenting done; received GJ tube placement  - Case discussed among multiple specialists at tumor boards and it was concluded that it would be best to support the patient through a complete Lutathera treatment  - Patient continuing to follow up with Primary Onc Dr Llamas ---> completed 2 cycles of Lutathera with most recent dosing approx 2 months ago  - Patient recent seen outpatient by SurgOnc Dr Siddiqi 04/12/23 with ongoing GOC/plan being discussed including a palliative procedure, but the potential for complications is high given the need for a high gastric transection and Marcela-en-Y reconstruction. Currently dependent on using J tube for feeds & G port to decompress; unable to tolerate PO intake   - SurgOnc and Primary Onc have been communicating with plan to re-image his abdomen and either resect the perigastric disease or bypass the stomach   - Most recent CT AP imaging 04/17/23 revealed diffuse bilobar liver metastasis which have progressed since 01/06/2023; large pancreatic mass, not significantly changed  - At this time, continue supportive measures; [facility]  - Spoke with Primary Onc Dr Llamas this morning, confirmed that patient received x6 cycles/doses of Lutathera, planning to change treatment regimen but agrees that patient should receive palliative surgical procedure, likely distal gastrectomy as per SurgOnc Dr Black      # J Tube Malfunction with Nausea , Weakness , Weight Loss    - GJ tube placement 01/10/23 by GI Dr Doyle  - J tube for feedings, recently unable to pass anything through  - IR consulted in the ED with successful irrigation of existing J tube; advised at least 30cc flush before and after feedings  - Dietary evaluation completed   - Continue supportive measures including feedings at this time      # ZO with Hyponatremia & Suspected UTI    - Likely 2/2 dehydration from decreased feeding intake   - IVF  - Replace sodium   - Bladder scan q6h ---> patient self catheterizes  - Cefepime started; ID following  - Continue supportive measures      # Normocytic Anemia with Tachycardia and Hypotension    - Repeat Hgb today 7.9 g/dL  - Likely 2/2 dehydration with dilutional effect 2/2 IVF  - Additional contributing factor including decreased feedings with weight loss   - No obvious signs of bleeding  - Can check hemolysis labs for completeness but not likely the cause (ordered)  - Can also check FOBT to r/o potential GIB (ordered)  - Continue to trend serial CBCs  - Transfuse blood as necessary; keep Hgb >7.5 g/dL and/or symptomatic   - Spoke with Hospitalist Dr Gonzalez; agree with transfusion at this point as patient is symptomatic with tachycardia & hypotension  67 y/o M with a PMHx of neuroendocrine tumor treated with Lutathera, presented for J tube obstruction with associated weakness, fatigue/lethargy.      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    - Primary Onc Dr Ac Llamas  - Initially diagnosed approx 6 years ago  - Started on Sandostatin, completed Lutathera about 1 year ago ---> scans end of 2022 noted POD; started back on Lutathera 12/07/2022  - Recently admitted to  01/2023 with concern for gastric outlet obstruction  - EGD 01/10/23 with severe esophagitis, no obvious obstruction seen with no stenting done  - s/p GJ tube placement  - completed Lutathera treatment ~ 2 months ago with Primary Onc Dr Llamas   - seen by Dr Siddiqi 04/12/23 with ongoing GOC/planning for a palliative procedure for a high gastric transection and Marcela-en-Y reconstruction. Currently dependent on using J tube for feeds & G port to decompress; unable to tolerate PO intake   - CT AP 04/17/23 revealed diffuse bilobar liver metastasis which have progressed since 01/06/2023; large pancreatic mass, not significantly changed  - Discussed with Primary Onc Dr Llamas who confirmed that patient received 6 cdoses of Lutathera, with plan to change treatment regimen to chemotherapy following palliative surgical procedure, likely distal gastrectomy per SurgOnc Dr Bonds.      # J Tube Malfunction with Nausea , Weakness , Weight Loss    - GJ tube placement 01/10/23 by GI Dr Doyle  - J tube for feedings, recently unable to pass anything through  - IR consulted with successful irrigation of existing J tube; advised at least 30cc flush before and after feedings  - Dietary evaluation completed   - Continue supportive measures including feedings at this time      # ZO with Hyponatremia & Suspected UTI    - Likely 2/2 dehydration from decreased feeding intake   - IVF  - Replace sodium   - Bladder scan q6h ---> patient self catheterizes  - Cefepime started; ID following  - Continue supportive measures      # Normocytic Anemia with Tachycardia and Hypotension    - Repeat Hgb today 7.9 g/dL  - Likely 2/2 dehydration with dilutional effect 2/2 IVF  - Additional contributing factor including decreased feedings with weight loss   - No obvious signs of bleeding  - Can check hemolysis labs for completeness but not likely the cause (ordered)  - Can also check FOBT to r/o potential GIB (ordered)  - Continue to trend serial CBCs  - Transfuse blood as necessary; keep Hgb >7.5 g/dL and/or symptomatic   - Spoke with Hospitalist Dr Gonzalez; agree with transfusion at this point as patient is symptomatic with tachycardia & hypotension

## 2023-04-27 NOTE — PROVIDER CONTACT NOTE (OTHER) - ASSESSMENT
pt aox4. pt with no residual from feed . pt put upright position. pt given famotidine 20mg as per dr Turk with no relief.

## 2023-04-27 NOTE — PROGRESS NOTE ADULT - ASSESSMENT
67 y/o M with a PMHx of neuroendocrine tumor treated with Lutathera, presented for J tube obstruction with associated weakness, fatigue/lethargy.      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    - Primary Onc Dr Ac Llamas  - Initially diagnosed approx 6 years ago  - Started on Sandostatin, completed Lutathera about 1 year ago ---> scans end of 2022 noted POD; started back on Lutathera 12/07/2022  - Recently admitted to  01/2023 with concern for gastric outlet obstruction  - EGD 01/10/23 with severe esophagitis, no obvious obstruction seen with no stenting done  - s/p GJ tube placement  - completed Lutathera treatment ~ 2 months ago with Primary Onc Dr Llamas   - seen by Dr Siddiqi 04/12/23 with ongoing GOC/planning for a palliative procedure for a high gastric transection and Marcela-en-Y reconstruction. Currently dependent on using J tube for feeds & G port to decompress; unable to tolerate PO intake   - CT AP 04/17/23 revealed diffuse bilobar liver metastasis which have progressed since 01/06/2023; large pancreatic mass, not significantly changed  - Discussed with Primary Onc Dr Llamas who confirmed that patient received 6 doses of Lutathera, with plan to change treatment regimen to chemotherapy following palliative surgical procedure, likely distal gastrectomy per SurgOnc Dr Bonds.      # J Tube Malfunction with Nausea , Weakness , Weight Loss    - GJ tube placement 01/10/23 by GI Dr Doyle  - J tube for feedings, recently unable to pass anything through  - IR consulted with successful irrigation of existing J tube; advised at least 30cc flush before and after feedings  - Dietary evaluation completed   - Continue supportive measures including feedings at this time      # ZO with Hyponatremia & Suspected UTI    - Likely 2/2 dehydration from decreased feeding intake   - IVF  - Replace sodium   - Bladder scan q6h ---> patient self catheterizes  - Cefepime started; ID following  - Continue supportive measures      # Normocytic Anemia with Tachycardia and Hypotension    - Hgb 8.7 g/dL today; s/p 1U PRBC 04/26  - Likely 2/2 dehydration with dilutional effect 2/2 IVF  - Additional contributing factor including decreased feedings with weight loss   - No obvious signs of bleeding  - Can check hemolysis labs for completeness but not likely the cause (ordered)  - Can also check FOBT to r/o potential GIB (ordered)  - Continue to trend serial CBCs  - Transfuse blood as necessary; keep Hgb >7.5 g/dL and/or symptomatic   - Spoke with Hospitalist Dr Gonzalez; agree with transfusion at this point as patient is symptomatic with tachycardia & hypotension  67 y/o M with a PMHx of neuroendocrine tumor treated with Lutathera, presented for J tube obstruction with associated weakness, fatigue/lethargy.      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    - Primary Onc Dr Ac Llamas  - Initially diagnosed approx 6 years ago  - Started on Sandostatin, completed Lutathera about 1 year ago ---> scans end of 2022 noted POD; started back on Lutathera 12/07/2022  - Recently admitted to  01/2023 with concern for gastric outlet obstruction  - EGD 01/10/23 with severe esophagitis, no obvious obstruction seen with no stenting done  - s/p GJ tube placement  - completed Lutathera treatment ~ 2 months ago with Primary Onc Dr Llamas   - seen by Dr Siddiqi 04/12/23 with ongoing GOC/planning for a palliative procedure for a high gastric transection and Marcela-en-Y reconstruction. Currently dependent on using J tube for feeds & G port to decompress; unable to tolerate PO intake   - CT AP 04/17/23 revealed diffuse bilobar liver metastasis which have progressed since 01/06/2023; large pancreatic mass, not significantly changed  - Discussed with Primary Onc Dr Llamas who confirmed that patient received 6 doses of Lutathera, with plan to change treatment regimen to chemotherapy following palliative surgical procedure, likely distal gastrectomy per SurgOnc Dr Bonds.      # J Tube Malfunction with Nausea , Weakness , Weight Loss    - GJ tube placement 01/10/23 by GI Dr Doyle  - J tube for feedings, recently unable to pass anything through  - IR consulted with successful irrigation of existing J tube; advised at least 30cc flush before and after feedings  - Dietary evaluation completed   - Continue supportive measures including feedings at this time      # ZO with Hyponatremia & Suspected UTI    - Likely 2/2 dehydration from decreased feeding intake   - IVF  - Replace sodium   - Bladder scan q6h ---> patient self catheterizes  - Cefepime started; ID following  - Continue supportive measures      # Normocytic Anemia with Tachycardia and Hypotension    - HR 90s; BP stable  - Hgb 8.7 g/dL today; s/p 1U PRBC 04/26  - Likely 2/2 dehydration with dilutional effect 2/2 IVF  - Additional contributing factor including decreased feedings with weight loss   - No obvious signs of bleeding  - Can check hemolysis labs for completeness but not likely the cause (ordered)  - Can also check FOBT to r/o potential GIB (ordered)  - Continue to trend serial CBCs  - Transfuse blood as necessary; keep Hgb >7.5 g/dL and/or symptomatic   - Spoke with Hospitalist Dr Gonzalez; agree with transfusion at this point as patient is symptomatic with tachycardia & hypotension  67 y/o M with a PMHx of neuroendocrine tumor treated with Lutathera, presented for J tube obstruction with associated weakness, fatigue/lethargy.      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    - Primary Onc Dr Ac Llamas  - Initially diagnosed approx 6 years ago  - Started on Sandostatin, completed Lutathera about 1 year ago ---> scans end of 2022 noted POD; started back on Lutathera 12/07/2022  - Recently admitted to  01/2023 with concern for gastric outlet obstruction  - EGD 01/10/23 with severe esophagitis, no obvious obstruction seen with no stenting done  - S/p GJ tube placement  - Completed Lutathera treatment ~ 2 months ago with Primary Onc Dr Llamas   - Seen by Dr Siddiqi 04/12/23 with ongoing GOC/planning for a palliative procedure for a high gastric transection and Marcela-en-Y reconstruction; currently dependent on using J tube for feeds & G port to decompress; unable to tolerate PO intake   - CT AP 04/17/23 revealed diffuse bilobar liver metastasis which have progressed since 01/06/2023; large pancreatic mass, not significantly changed  - Discussed with Primary Onc Dr Llamas who confirmed that patient received 6 doses of Lutathera, with plan to change treatment regimen to chemotherapy following palliative surgical procedure, likely distal gastrectomy per SurgOnc Dr Bonds.      # J Tube Malfunction with Nausea , Weakness , Weight Loss    - GJ tube placement 01/10/23 by GI Dr Doyle  - J tube for feedings, recently unable to pass anything through  - IR consulted with successful irrigation of existing J tube; advised at least 30cc flush before and after feedings  - Dietary evaluation completed   - Continue supportive measures including feedings at this time      # ZO with Hyponatremia & Suspected UTI    - Likely 2/2 dehydration from decreased feeding intake   - IVF  - Replace sodium   - Bladder scan q6h ---> patient self catheterizes  - Cefepime started; ID following  - Continue supportive measures      # Normocytic Anemia with Tachycardia and Hypotension    - HR 90s; BP stable  - Hgb 8.7 g/dL today; s/p 1U PRBC 04/26  - Likely 2/2 dehydration with dilutional effect 2/2 IVF  - Additional contributing factor including decreased feedings with weight loss   - No obvious signs of bleeding  - Can check hemolysis labs for completeness but not likely the cause (ordered)  - Can also check FOBT to r/o potential GIB (ordered)  - Continue to trend serial CBCs  - Transfuse blood as necessary; keep Hgb >7.5 g/dL and/or symptomatic   - Spoke with Hospitalist Dr Gonzalez; agree with transfusion at this point as patient is symptomatic with tachycardia & hypotension  67 y/o M with a PMHx of neuroendocrine tumor treated with Lutathera, presented for J tube obstruction with associated weakness, fatigue/lethargy.      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    - Primary Onc Dr Ac Llamas  - Initially diagnosed approx 6 years ago  - Started on Sandostatin, completed Lutathera about 1 year ago ---> scans end of 2022 noted POD; started back on Lutathera 12/07/2022  - Recently admitted to  01/2023 with concern for gastric outlet obstruction  - EGD 01/10/23 with severe esophagitis, no obvious obstruction seen with no stenting done  - S/p GJ tube placement  - Completed Lutathera treatment ~ 2 months ago with Primary Onc Dr Llamas   - Seen by Dr Siddiqi 04/12/23 with ongoing GOC/planning for a palliative procedure for a high gastric transection and Marcela-en-Y reconstruction; currently dependent on using J tube for feeds & G port to decompress; unable to tolerate PO intake   - CT AP 04/17/23 revealed diffuse bilobar liver metastasis which have progressed since 01/06/2023; large pancreatic mass, not significantly changed  - Discussed with Primary Onc Dr Llamas who confirmed that patient received 6 doses of Lutathera, with plan to change treatment regimen to chemotherapy following palliative surgical procedure, likely distal gastrectomy per SurgOnc Dr Black.      # J Tube Malfunction with Nausea , Weakness , Weight Loss    - GJ tube placement 01/10/23 by GI Dr Doyle  - J tube for feedings, recently unable to pass anything through  - IR consulted with successful irrigation of existing J tube; advised at least 30cc flush before and after feedings  - Dietary evaluation completed   - Continue supportive measures including feedings at this time      # ZO with Hyponatremia & Suspected UTI    - Likely 2/2 dehydration from decreased feeding intake   - IVF  - Replace sodium   - Bladder scan q6h ---> patient self catheterizes  - Cefepime started; ID following  - Continue supportive measures      # Normocytic Anemia with Tachycardia and Hypotension    - HR 90s; BP stable  - Hgb 8.7 g/dL today; s/p 1U PRBC 04/26  - Likely 2/2 dehydration with dilutional effect 2/2 IVF  - Additional contributing factor including decreased feedings with weight loss   - No obvious signs of bleeding  - Can check hemolysis labs for completeness but not likely the cause (ordered)  - Can also check FOBT to r/o potential GIB (ordered)  - Continue to trend serial CBCs  - Transfuse blood as necessary; keep Hgb >7.5 g/dL and/or symptomatic   - Spoke with Hospitalist Dr Gonzalez; agree with transfusion at this point as patient is symptomatic with tachycardia & hypotension

## 2023-04-27 NOTE — PROGRESS NOTE ADULT - REASON FOR ADMISSION
J-tube malfunction; weakness; dehydration; hyponatremia; ZO

## 2023-04-27 NOTE — PROVIDER CONTACT NOTE (OTHER) - BACKGROUND
pt with tube feeds through PEG tube feed 80ml/hr and free water flush at 65ml/hr. . pt NPO. pt given famotidine 20mg as per Dr. Turk

## 2023-04-27 NOTE — PROGRESS NOTE ADULT - SUBJECTIVE AND OBJECTIVE BOX
HPI:    67 y/o M PMHx significant for neuroendocrine tumor diagnosed 6 years ago with mets to liver with associated chronic dysphagia with GJ tube (follows with Primary Onc Dr Llamas), HTN, HLD, T2DM, BPH and light chain nephropathy presented to the ED with clogged J tube. He admitted that the tube feeds stopped, unable to take PO including pleasure feeds, liquids and medications. In the ED, IR consulted with successful clearance of clogged tube with patent tube after. Patient reported ongoing weakness/lethargy but denied fever, chills, syncope, dizziness, CP, palpitations, SOB/LEBLANC, wheezing, melena, BRBPR, dysuria or any other acute c/o at the time. (21 Apr 2023 21:49)      04/23/2022: Seen at bedside, no acute distress, feeling better than yesterday    04/26/2023: Seen at bedside accompanied by wife, no acute distress    04/27/2023:      PAST MEDICAL & SURGICAL HISTORY:    Neuroendocrine tumor    Hypertension    BPH (benign prostatic hyperplasia)    Renal insufficiency    Light chain nephropathy    DM (diabetes mellitus)    HLD (hyperlipidemia)    GJ tube placement        MEDICATIONS  (STANDING):    cefepime  Injectable. 1000 milliGRAM(s) IV Push every 12 hours  dextrose 5%. 1000 milliLiter(s) (100 mL/Hr) IV Continuous <Continuous>  dextrose 5%. 1000 milliLiter(s) (50 mL/Hr) IV Continuous <Continuous>  dextrose 50% Injectable 25 Gram(s) IV Push once  dextrose 50% Injectable 12.5 Gram(s) IV Push once  dextrose 50% Injectable 25 Gram(s) IV Push once  glucagon  Injectable 1 milliGRAM(s) IntraMuscular once  heparin   Injectable 5000 Unit(s) SubCutaneous every 8 hours  insulin lispro (ADMELOG) corrective regimen sliding scale   SubCutaneous every 6 hours  ondansetron Injectable 4 milliGRAM(s) IV Push every 6 hours  sodium chloride 0.9%. 1000 milliLiter(s) (65 mL/Hr) IV Continuous <Continuous>      MEDICATIONS  (PRN):    acetaminophen   Oral Liquid .. 635 milliGRAM(s) Oral every 6 hours PRN Temp greater or equal to 38C (100.4F), Mild Pain (1 - 3)  aluminum hydroxide/magnesium hydroxide/simethicone Suspension 30 milliLiter(s) Oral every 6 hours PRN Dyspepsia  dextrose Oral Gel 15 Gram(s) Oral once PRN Blood Glucose LESS THAN 70 milliGRAM(s)/deciliter        ALLERGIES:    losartan (Angioedema)        FAMILY HISTORY:    breast cancer (Mother)  aneurysm (Father)        REVIEW OF SYSTEMS:    Constitutional, Eyes, ENT, Cardiovascular, Respiratory, Gastrointestinal, Genitourinary, Musculoskeletal, Integumentary, Neurological, Psychiatric, Endocrine, Heme/Lymph and Allergic/Immunologic review of systems are otherwise negative except as noted in HPI.       VITALS:    ICU Vital Signs Last 24 Hrs  T(C): 36.8 (27 Apr 2023 07:31), Max: 36.8 (26 Apr 2023 18:24)  T(F): 98.2 (27 Apr 2023 07:31), Max: 98.2 (26 Apr 2023 18:24)  HR: 92 (27 Apr 2023 07:31) (90 - 104)  BP: 113/78 (27 Apr 2023 07:31) (112/82 - 126/82)  BP(mean): --  ABP: --  ABP(mean): --  RR: 18 (27 Apr 2023 07:31) (17 - 20)  SpO2: 99% (27 Apr 2023 07:31) (99% - 100%)    O2 Parameters below as of 27 Apr 2023 07:31  Patient On (Oxygen Delivery Method): room air        PHYSICAL:    Constitutional: no acute distress  Eyes: PERRL, EOMI  ENT: pharynx is unremarkable  Neck: supple without JVD  Pulmonary: clear to auscultation bilaterally, no dullness, no wheezing  Cardiac: tachycardic rate, regular rhythm, normal S1S2  Vascular: no calf tenderness, venous stasis changes, varices.   Abdomen: normoactive bowel sounds, soft and nontender, no hepatosplenomegaly or masses appreciated; GJ in place  Lymphatic: no peripheral adenopathy appreciated  Musculoskeletal: full range of motion and no deformities appreciated  Skin: normal appearance, no rash/erythema   Neurology: grossly intact  Psychiatric: affect appropriate      LABS:                                                           8.7    4.53  )-----------( 118      ( 27 Apr 2023 06:52 )             27.5     04-27    141  |  112<H>  |  51<H>  ----------------------------<  176<H>  4.4   |  27  |  1.82<H>    Ca    8.4<L>      27 Apr 2023 06:52  Phos  2.0     04-26  Mg     2.0     04-26              RADIOLOGY & ADDITIONAL STUDIES:      Xray Chest 1 View-PORTABLE IMMEDIATE (04.21.23 @ 13:54    INTERPRETATION:  AP chest on April 21, 2023 at 1:44 PM. Patient has   sepsis.    Shallow inspiration crowds the chest.    Heartmagnified by technique.    Lungs remain clear.    Chest is similar to January 27 this year.    IMPRESSION: No acute finding or change.        CT AP 04/17/2023:    INTERPRETATION:  CLINICAL INFORMATION: Neuroendocrine mass of the pancreas. Liver metastasis. Follow-up scan prior to surgery.    COMPARISON: Multiple prior studies, most recently CT abdomen/pelvis 1/6/2023    CONTRAST/COMPLICATIONS:  IV Contrast: Omnipaque 350  90 cc administered   10 cc discarded  Oral Contrast: Water  Complications: None reported at time of study completion    PROCEDURE:  CT of the Abdomen and Pelvis was performed.  Arterial and Portal Venous phases were acquired.  Sagittal and coronal reformats were performed.    FINDINGS:  LOWER CHEST: Linear atelectasis or scarring right middle lobe. Coronary artery calcifications.    LIVER: Diffuse bilobar liver metastasis which has progressed since 1/6/2023; some of the lesions appear confluent. Comparison with the prior noncontrast study is limited, however lesions appear to be increased in size and number with a representative lesion in the right hepatic lobe measuring 8.3 x 7.9 cm, previously 3.9 x 4.7 cm (series 3 image 22).  BILE DUCTS: Normal caliber.  GALLBLADDER: Within normal limits.  SPLEEN: Within normal limits.  PANCREAS: Pancreatic tail mass measuring 10.9 x 6.9 cm, not significantly changed since 1/6/2023.  ADRENALS: Within normal limits.  KIDNEYS/URETERS: No hydronephrosis. 2 mm nonobstructing calculus in the lower pole of the left kidney.    BLADDER: Underdistended, limiting evaluation with wall thickening which can be secondary to underdistention.  REPRODUCTIVE ORGANS: Prostate not enlarged.    BOWEL: No bowel obstruction. Gastrostomy tube in the stomach with the tip in the jejunum.  PERITONEUM: No ascites.  VESSELS: Atherosclerotic changes. Abdominal aorta normal in caliber. Narrowed splenic vein in the region of the portosplenic confluence which is thrombosed in the region the pancreatic tail. Perisplenic, perigastric and anterior abdominal varices. There also appear to be varices either within the gastric wall or lumen of the proximal stomach.  RETROPERITONEUM/LYMPH NODES: No lymphadenopathy.  ABDOMINAL WALL: Gastrostomy tube.  BONES: Degenerative changes in the spine including severe endplate degenerative changes at T12-L1. Bilateral L5 spondylolysis with mild anterolisthesis of L5 on S1.    IMPRESSION:    Diffuse bilobar liver metastasis which have progressed since 1/6/2023.    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 He admitted that the tube feeds stopped, unable to take PO including pleasure feeds, liquids and medications. In the ED, IR consulted with successful clearance of clogged tube with patent tube after. Patient reported ongoing weakness/lethargy but denied fever, chills, syncope, dizziness, CP, palpitations, SOB/LEBLANC, wheezing, melena, BRBPR, dysuria or any other acute c/o at the time. (21 Apr 2023 21:49)      04/23/2022: Seen at bedside, no acute distress, feeling better than yesterday    04/26/2023: Seen at bedside accompanied by wife, no acute distress    04/27/2023: Seen at bedside, no acute distress      PAST MEDICAL & SURGICAL HISTORY:    Neuroendocrine tumor    Hypertension    BPH (benign prostatic hyperplasia)    Renal insufficiency    Light chain nephropathy    DM (diabetes mellitus)    HLD (hyperlipidemia)    GJ tube placement        MEDICATIONS  (STANDING):    cefepime  Injectable. 1000 milliGRAM(s) IV Push every 12 hours  dextrose 5%. 1000 milliLiter(s) (100 mL/Hr) IV Continuous <Continuous>  dextrose 5%. 1000 milliLiter(s) (50 mL/Hr) IV Continuous <Continuous>  dextrose 50% Injectable 25 Gram(s) IV Push once  dextrose 50% Injectable 12.5 Gram(s) IV Push once  dextrose 50% Injectable 25 Gram(s) IV Push once  glucagon  Injectable 1 milliGRAM(s) IntraMuscular once  heparin   Injectable 5000 Unit(s) SubCutaneous every 8 hours  insulin lispro (ADMELOG) corrective regimen sliding scale   SubCutaneous every 6 hours  ondansetron Injectable 4 milliGRAM(s) IV Push every 6 hours  sodium chloride 0.9%. 1000 milliLiter(s) (65 mL/Hr) IV Continuous <Continuous>      MEDICATIONS  (PRN):    acetaminophen   Oral Liquid .. 635 milliGRAM(s) Oral every 6 hours PRN Temp greater or equal to 38C (100.4F), Mild Pain (1 - 3)  aluminum hydroxide/magnesium hydroxide/simethicone Suspension 30 milliLiter(s) Oral every 6 hours PRN Dyspepsia  dextrose Oral Gel 15 Gram(s) Oral once PRN Blood Glucose LESS THAN 70 milliGRAM(s)/deciliter        ALLERGIES:    losartan (Angioedema)        FAMILY HISTORY:    breast cancer (Mother)  aneurysm (Father)        REVIEW OF SYSTEMS:    Constitutional, Eyes, ENT, Cardiovascular, Respiratory, Gastrointestinal, Genitourinary, Musculoskeletal, Integumentary, Neurological, Psychiatric, Endocrine, Heme/Lymph and Allergic/Immunologic review of systems are otherwise negative except as noted in HPI.       VITALS:    ICU Vital Signs Last 24 Hrs  T(C): 36.8 (27 Apr 2023 07:31), Max: 36.8 (26 Apr 2023 18:24)  T(F): 98.2 (27 Apr 2023 07:31), Max: 98.2 (26 Apr 2023 18:24)  HR: 92 (27 Apr 2023 07:31) (90 - 104)  BP: 113/78 (27 Apr 2023 07:31) (112/82 - 126/82)  BP(mean): --  ABP: --  ABP(mean): --  RR: 18 (27 Apr 2023 07:31) (17 - 20)  SpO2: 99% (27 Apr 2023 07:31) (99% - 100%)    O2 Parameters below as of 27 Apr 2023 07:31  Patient On (Oxygen Delivery Method): room air        PHYSICAL:    Constitutional: no acute distress  Eyes: PERRL, EOMI  ENT: pharynx is unremarkable  Neck: supple without JVD  Pulmonary: clear to auscultation bilaterally, no dullness, no wheezing  Cardiac: tachycardic rate, regular rhythm, normal S1S2  Vascular: no calf tenderness, venous stasis changes, varices.   Abdomen: normoactive bowel sounds, soft and nontender, no hepatosplenomegaly or masses appreciated; GJ in place  Lymphatic: no peripheral adenopathy appreciated  Musculoskeletal: full range of motion and no deformities appreciated  Skin: normal appearance, no rash/erythema   Neurology: grossly intact  Psychiatric: affect appropriate      LABS:                                                           8.7    4.53  )-----------( 118      ( 27 Apr 2023 06:52 )             27.5     04-27    141  |  112<H>  |  51<H>  ----------------------------<  176<H>  4.4   |  27  |  1.82<H>    Ca    8.4<L>      27 Apr 2023 06:52  Phos  2.0     04-26  Mg     2.0     04-26              RADIOLOGY & ADDITIONAL STUDIES:      Xray Chest 1 View-PORTABLE IMMEDIATE (04.21.23 @ 13:54    INTERPRETATION:  AP chest on April 21, 2023 at 1:44 PM. Patient has   sepsis.    Shallow inspiration crowds the chest.    Heartmagnified by technique.    Lungs remain clear.    Chest is similar to January 27 this year.    IMPRESSION: No acute finding or change.        CT AP 04/17/2023:    INTERPRETATION:  CLINICAL INFORMATION: Neuroendocrine mass of the pancreas. Liver metastasis. Follow-up scan prior to surgery.    COMPARISON: Multiple prior studies, most recently CT abdomen/pelvis 1/6/2023    CONTRAST/COMPLICATIONS:  IV Contrast: Omnipaque 350  90 cc administered   10 cc discarded  Oral Contrast: Water  Complications: None reported at time of study completion    PROCEDURE:  CT of the Abdomen and Pelvis was performed.  Arterial and Portal Venous phases were acquired.  Sagittal and coronal reformats were performed.    FINDINGS:  LOWER CHEST: Linear atelectasis or scarring right middle lobe. Coronary artery calcifications.    LIVER: Diffuse bilobar liver metastasis which has progressed since 1/6/2023; some of the lesions appear confluent. Comparison with the prior noncontrast study is limited, however lesions appear to be increased in size and number with a representative lesion in the right hepatic lobe measuring 8.3 x 7.9 cm, previously 3.9 x 4.7 cm (series 3 image 22).  BILE DUCTS: Normal caliber.  GALLBLADDER: Within normal limits.  SPLEEN: Within normal limits.  PANCREAS: Pancreatic tail mass measuring 10.9 x 6.9 cm, not significantly changed since 1/6/2023.  ADRENALS: Within normal limits.  KIDNEYS/URETERS: No hydronephrosis. 2 mm nonobstructing calculus in the lower pole of the left kidney.    BLADDER: Underdistended, limiting evaluation with wall thickening which can be secondary to underdistention.  REPRODUCTIVE ORGANS: Prostate not enlarged.    BOWEL: No bowel obstruction. Gastrostomy tube in the stomach with the tip in the jejunum.  PERITONEUM: No ascites.  VESSELS: Atherosclerotic changes. Abdominal aorta normal in caliber. Narrowed splenic vein in the region of the portosplenic confluence which is thrombosed in the region the pancreatic tail. Perisplenic, perigastric and anterior abdominal varices. There also appear to be varices either within the gastric wall or lumen of the proximal stomach.  RETROPERITONEUM/LYMPH NODES: No lymphadenopathy.  ABDOMINAL WALL: Gastrostomy tube.  BONES: Degenerative changes in the spine including severe endplate degenerative changes at T12-L1. Bilateral L5 spondylolysis with mild anterolisthesis of L5 on S1.    IMPRESSION:    Diffuse bilobar liver metastasis which have progressed since 1/6/2023.    Large pancreatic mass, not significantly changed

## 2023-04-27 NOTE — PROGRESS NOTE ADULT - PROVIDER SPECIALTY LIST ADULT
Hospitalist
Infectious Disease
Surgery
Heme/Onc
Infectious Disease
Hospitalist
Heme/Onc

## 2023-04-29 NOTE — PATIENT PROFILE ADULT - NSPRONUTRITIONRISK_GEN_A_NUR
Significant decrease of oral intake greater than 3 days prior to admission/Enteral/parenteral nutrition prior to admission

## 2023-04-29 NOTE — PROGRESS NOTE ADULT - SUBJECTIVE AND OBJECTIVE BOX
CC:    HPI: 68 male with metastatic neuroendocrine tumor of the pancreas, recently admitted with nonfunctioning GJ tube (patient is chronically obstructed).   Patient has had severe heartburn, and has had several episodes of vomiting small amount of dark blood. Patient was discharged on Thursday. Hb unchanged. CT angiography has no active bleeding, but demonstrates extensive tumor in distal pancreas.         : Lying in bed, awake, tachycardic.    (2023 12:17)       PAST MEDICAL & SURGICAL HISTORY:  Hypertension      BPH (benign prostatic hyperplasia)      Renal insufficiency      Light chain nephropathy      DM (diabetes mellitus)      HLD (hyperlipidemia)      No significant past surgical history          FAMILY HISTORY:  FH: breast cancer (Mother)    FH: aneurysm (Father)        Social Hx:    Allergies    losartan (Angioedema)    Intolerances        Height (cm): 170.2 ( @ 08:11)  Weight (kg): 63.458 ( @ 08:11)  BMI (kg/m2): 21.9 ( @ 08:11)    ICU Vital Signs Last 24 Hrs  T(C): 36.4 (2023 16:15), Max: 37 (2023 15:12)  T(F): 97.5 (2023 16:15), Max: 98.6 (2023 15:12)  HR: 113 (2023 16:17) (98 - 131)  BP: 124/85 (2023 16:17) (103/70 - 140/92)  BP(mean): 96 (2023 16:17) (82 - 106)  ABP: --  ABP(mean): --  RR: 19 (2023 16:17) (18 - 19)  SpO2: 97% (2023 16:17) (97% - 100%)    O2 Parameters below as of 2023 15:12  Patient On (Oxygen Delivery Method): room air                I&O's Summary    2023 07:01  -  2023 17:23  --------------------------------------------------------  IN: 0 mL / OUT: 400 mL / NET: -400 mL                              9.8    7.88  )-----------( 148      ( 2023 13:55 )             30.6           144  |  102  |  50<H>  ----------------------------<  204<H>  4.2   |  33<H>  |  2.47<H>    Ca    10.0      2023 08:37    TPro  8.1  /  Alb  2.9<L>  /  TBili  0.5  /  DBili  x   /  AST  140<H>  /  ALT  87<H>  /  AlkPhos  270<H>                  Urinalysis Basic - ( 2023 15:55 )    Color: Yellow / Appearance: Clear / S.010 / pH: x  Gluc: x / Ketone: Negative  / Bili: Negative / Urobili: Negative   Blood: x / Protein: 100 / Nitrite: Negative   Leuk Esterase: Negative / RBC: 3-5 /HPF / WBC 3-5 /HPF   Sq Epi: x / Non Sq Epi: x / Bacteria: Moderate        MEDICATIONS  (STANDING):  chlorhexidine 4% Liquid 1 Application(s) Topical <User Schedule>  pantoprazole Infusion 8 mG/Hr (10 mL/Hr) IV Continuous <Continuous>    MEDICATIONS  (PRN):  ondansetron Injectable 4 milliGRAM(s) IV Push every 6 hours PRN Nausea and/or Vomiting      DVT Prophylaxis: V    Advanced Directives:  Discussed with:    Visit Information: 30 min    ** Time is exclusive of billed procedures and/or teaching and/or routine family updates.

## 2023-04-29 NOTE — H&P ADULT - HISTORY OF PRESENT ILLNESS
S:    Pt seen and examined  HD # 1  FULL CODE  PMHx  metastatic neuroendocrine tumor of the pancreas, recently admitted with nonfunctioning GJ tube (patient is chronically obstructed).   Patient has had severe heartburn, and has had several episodes of vomiting small amount of dark blood. Patient was discharged on Thursday. Hb unchanged. CT angiography has no active bleeding, but demonstrates extensive tumor in distal pancreas.     ICU consult for UGIB    4/29: Lying in bed, awake, tachycardic.

## 2023-04-29 NOTE — ED ADULT TRIAGE NOTE - CHIEF COMPLAINT QUOTE
pt presents to Ed with complaints nausea, vomiting, weakness, blood in vomit, lethargy. pt takes tube feeds through PEG for hx of stomach CA. pt was D/C from  2 days ago where he received blood transfusions. pt also endorsing shaking.

## 2023-04-29 NOTE — CONSULT NOTE ADULT - ASSESSMENT
A 68 year old male with advanced metastatic pancreatic NEC  Hematemesis    Plan:  Admit under medicine  Keep NPO  IV fluids   Peripheral nutrition  GI evaluation  Rest of management as per primary team  Will discuss options of palliative surgery on Monday    Plan discussed with Dr Black

## 2023-04-29 NOTE — ED ADULT NURSE NOTE - OBJECTIVE STATEMENT
pt presents to Ed with complaints nausea, vomiting, weakness, blood in vomit, lethargy. pt takes tube feeds through PEG for hx of stomach CA. pt was D/C from  2 days ago where he received blood transfusions. pt also endorsing shaking. Pt is actively vomiting on arrival into the room, content is dark brown color. PEG tube in place. Pt is alert, oriented and answering questions appropriately. Pt reports that he had a bowel movement last night. Pt reports that he eliminates urine by straight catherizing himself. Wife at bedside. Cardiac monitor in place.

## 2023-04-29 NOTE — CONSULT NOTE ADULT - ASSESSMENT
69 y/o M with a MHX of neuroendocrine tumor, likely of GI/pancreatic origin, c/w gastric outlet obstruction, s/p GJ tube, p/    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    - Primary Onc Dr Ac Llamas  - Initially diagnosed in 2015, p/w abdominal pain and nausea scans revealed a large 20 cm abdominal mass   - Started on Sandostatin, and completed Lutathera about 1 year ago ---> scans end of 2022 noted POD; started back on Lutathera 12/07/2022  - Recently admitted to  01/2023 and 4/21-4/27 with concern for gastric outlet obstruction  - EGD 01/10/23 with severe esophagitis, no obvious obstruction seen with no stenting done  - S/p GJ tube placement  - Completed Lutathera treatment ~ 2 months ago with Primary Onc Dr Llamas   - Seen by Dr Siddiqi 04/12/23 with ongoing GOC/ planning for a palliative procedure for a high gastric transection and Marcela-en-Y reconstruction; currently dependent on using J tube for feeds & G port to decompress; unable to tolerate PO intake   - CT AP 04/17/23 revealed diffuse bilobar liver metastasis which have progressed since 01/06/2023; large pancreatic mass, not significantly changed  - Per Primary Onc Dr Llamas patient received 6 doses of Lutathera, with plan to change treatment regimen to chemotherapy following palliative surgical procedure, likely distal gastrectomy per SurgOnc Dr Black.    # Hematemesis    -Likely from the malignancy and the ulcerated esophagus.  -EGD-1/10/023- reflux esophagitis with severely ulcerated and bleeding esophagus.  -CT-4/17/23-Diffuse bilobar liver metastasis which have progressed since 1/6/2023. Large pancreatic mass, not significantly changed.  -CT-A/P-4/29/2023- results pending  -GI consult  -SurgOnc Consult     # Normocytic Anemia with Tachycardia    - -120s; BP stable  - Hgb 99 g/dL today  - Likely 2/2 dehydration and malnutrition, decreased feedings and GIB.   - FOBT- negative on 4/24  - Continue to trend serial CBCs  - Low iron sat, consider Venofer IV  - Transfuse blood as necessary; keep Hgb >7.5 g/dL and/or symptomatic   - Spoke with Dr Anderson in ED; agree with transfusion if patient is symptomatic with tachycardia & in setting of active GIB.     # ZO     -Cr- 24mg/dl  -Chronically elevated Cr- 1.5-2.0 since 2019  -Likely 2/2 dehydration from decreased feeding intake   -IVF  -Continue supportive measures 69 y/o M with a MHX of neuroendocrine tumor, likely of GI/pancreatic origin, c/w gastric outlet obstruction, s/p GJ tube, p/w hematemesis    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    - Primary Onc Dr Ac Llamas  - Initially diagnosed in 2015, p/w abdominal pain and nausea scans revealed a large 20 cm abdominal mass   - Started on Sandostatin, and completed Lutathera about 1 year ago ---> scans end of 2022 noted POD; started back on Lutathera 12/07/2022  - Recently admitted to  01/2023 and 4/21-4/27 with concern for gastric outlet obstruction  - EGD 01/10/23 with severe esophagitis, no obvious obstruction seen with no stenting done  - S/p GJ tube placement  - Completed Lutathera treatment ~ 2 months ago with Primary Onc Dr Llamas   - Seen by Dr Siddiqi 04/12/23 with ongoing GOC/ planning for a palliative procedure for a high gastric transection and Marcela-en-Y reconstruction; currently dependent on using J tube for feeds & G port to decompress; unable to tolerate PO intake   - CT AP 04/17/23 revealed diffuse bilobar liver metastasis which have progressed since 01/06/2023; large pancreatic mass, not significantly changed  - Per Primary Onc Dr Llamas patient received 6 doses of Lutathera, with plan to change treatment regimen to chemotherapy following palliative surgical procedure, likely distal gastrectomy per SurgOnc Dr Black.    # Hematemesis    -Likely from the malignancy and the ulcerated esophagus.  -EGD-1/10/023- reflux esophagitis with severely ulcerated and bleeding esophagus.  -CT-4/17/23-Diffuse bilobar liver metastasis which have progressed since 1/6/2023. Large pancreatic mass, not significantly changed.  -CT-A/P-4/29/2023- no active bleed  -GI consult-->will consider EGD if persistent hematemesis  -SurgOnc following-->will discuss options of palliative surgery.     # Normocytic Anemia with Tachycardia    - -120s; BP stable  - Hgb 99 g/dL today  -CT-A/P-4/29/2023- no active bleed.  - Likely 2/2 advanced malignancy, dehydration malnutrition, decreased feedings and GIB.   - FOBT- negative on 4/24  - Continue to trend serial CBCs  - Low iron sat, consider Venofer IV  - Transfuse blood as necessary; keep Hgb >7.5 g/dL and/or symptomatic   - d/w with Dr Anderson in ED; agree with transfusion if patient is symptomatic with tachycardia & in setting of active GIB.     # ZO     -Cr- 24mg/dl  -Chronically elevated Cr- 1.5-2.0 since 2019  -Likely 2/2 dehydration from decreased feeding intake   -IVF  -Continue supportive measures 67 y/o M with a MHX of neuroendocrine tumor, likely of GI/pancreatic origin, c/w gastric outlet obstruction, s/p GJ tube, p/w hematemesis    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    - Primary Onc Dr Ac Llamas  - Initially diagnosed in 2015, p/w abdominal pain and nausea scans revealed a large 20 cm abdominal mass   - Started on Sandostatin, and completed Lutathera about 1 year ago ---> scans end of 2022 noted POD; started back on Lutathera 12/07/2022  - Recently admitted to  01/2023 and 4/21-4/27 with concern for gastric outlet obstruction  - EGD 01/10/23 with severe esophagitis, no obvious obstruction seen with no stenting done  - S/p GJ tube placement  - Completed Lutathera treatment ~ 2 months ago with Primary Onc Dr Llamas   - Seen by Dr Siddiqi 04/12/23 with ongoing GOC/ planning for a palliative procedure for a high gastric transection and Marcela-en-Y reconstruction; currently dependent on using J tube for feeds & G port to decompress; unable to tolerate PO intake   - CT AP 04/17/23 revealed diffuse bilobar liver metastasis which have progressed since 01/06/2023; large pancreatic mass, not significantly changed  - Per Primary Onc Dr Llamas patient received 6 doses of Lutathera, with plan to change treatment regimen to chemotherapy following palliative surgical procedure, likely distal gastrectomy per SurgOnc Dr Black.    # Hematemesis    -Likely from the malignancy and the ulcerated esophagus.  -EGD-1/10/023- reflux esophagitis with severely ulcerated and bleeding esophagus.  -CT-4/17/23-Diffuse bilobar liver metastasis which have progressed since 1/6/2023. Large pancreatic mass, not significantly changed.  -CT-A/P-4/29/2023- no active bleed  -GI consult-->will consider EGD if persistent hematemesis  -SurgOnc following-->will discuss options of palliative surgery.     # Normocytic Anemia with Tachycardia    - -120s; BP stable  - Hgb 99 g/dL today  -CT-A/P-4/29/2023- no active bleed.  - Likely 2/2 advanced malignancy, dehydration malnutrition, decreased feedings and GIB.   - FOBT- negative on 4/24  - Continue to trend serial CBCs  - Low iron sat, consider Venofer IV  - Transfuse blood as necessary; keep Hgb >7.5 g/dL and/or symptomatic   - d/w with Dr Anderson in ED; agree with transfusion if patient is symptomatic with tachycardia & in setting of active GIB.     # ZO     -Cr- 2.4mg/dl  -Chronically elevated Cr- 1.5-2.0 since 2019  -Likely 2/2 dehydration from decreased feeding intake   -IVF  -Continue supportive measures

## 2023-04-29 NOTE — H&P ADULT - NSHPLABSRESULTS_GEN_ALL_CORE
LABS:    CBC Full  -  ( 29 Apr 2023 09:34 )  WBC Count : 8.79 K/uL  RBC Count : 3.48 M/uL  Hemoglobin : 9.9 g/dL  Hematocrit : 31.1 %  Platelet Count - Automated : 176 K/uL  Mean Cell Volume : 89.4 fl  Mean Cell Hemoglobin : 28.4 pg  Mean Cell Hemoglobin Concentration : 31.8 gm/dL  Auto Neutrophil # : 7.96 K/uL  Auto Lymphocyte # : 0.27 K/uL  Auto Monocyte # : 0.52 K/uL  Auto Eosinophil # : 0.00 K/uL  Auto Basophil # : 0.01 K/uL  Auto Neutrophil % : 90.6 %  Auto Lymphocyte % : 3.1 %  Auto Monocyte % : 5.9 %  Auto Eosinophil % : 0.0 %  Auto Basophil % : 0.1 %    04-29    144  |  102  |  50<H>  ----------------------------<  204<H>  4.2   |  33<H>  |  2.47<H>    Ca    10.0      29 Apr 2023 08:37    TPro  8.1  /  Alb  2.9<L>  /  TBili  0.5  /  DBili  x   /  AST  140<H>  /  ALT  87<H>  /  AlkPhos  270<H>  04-29    PT/INR - ( 29 Apr 2023 09:34 )   PT: 12.8 sec;   INR: 1.10 ratio         PTT - ( 29 Apr 2023 09:34 )  PTT:25.3 sec    CAPILLARY BLOOD GLUCOSE            LIVER FUNCTIONS - ( 29 Apr 2023 08:37 )  Alb: 2.9 g/dL / Pro: 8.1 gm/dL / ALK PHOS: 270 U/L / ALT: 87 U/L / AST: 140 U/L / GGT: x

## 2023-04-29 NOTE — CONSULT NOTE ADULT - SUBJECTIVE AND OBJECTIVE BOX
Patient is a 68y old  Male who presents with a chief complaint of hematemesis (29 Apr 2023 10:43). Patient has history of metastatic neuroendocrine tumor of the pancreas, recently admitted with nonfunctioning GJ tube (patient is chronically obstructed). Patient has had severe heartburn, and has had several episodes of vomiting small amount of dark blood. Patient was discharged on Thursday. Hb unchanged. CT angiography has no active bleeding, but demonstrates extensive tumor in distal pancreas.       HPI:      PAST MEDICAL & SURGICAL HISTORY:  Hypertension      BPH (benign prostatic hyperplasia)      Renal insufficiency      Light chain nephropathy      DM (diabetes mellitus)      HLD (hyperlipidemia)      No significant past surgical history          MEDICATIONS  (STANDING):  pantoprazole Infusion 8 mG/Hr (10 mL/Hr) IV Continuous <Continuous>    MEDICATIONS  (PRN):      Allergies    losartan (Angioedema)    Intolerances        SOCIAL HISTORY:    FAMILY HISTORY:  FH: breast cancer (Mother)    FH: aneurysm (Father)          Vital Signs Last 24 Hrs  T(C): 36.9 (29 Apr 2023 08:27), Max: 36.9 (29 Apr 2023 08:27)  T(F): 98.4 (29 Apr 2023 08:27), Max: 98.4 (29 Apr 2023 08:27)  HR: 114 (29 Apr 2023 10:30) (108 - 131)  BP: 130/94 (29 Apr 2023 10:30) (103/70 - 140/92)  BP(mean): 106 (29 Apr 2023 10:30) (82 - 106)  RR: 18 (29 Apr 2023 10:30) (18 - 18)  SpO2: 98% (29 Apr 2023 10:30) (98% - 100%)    Parameters below as of 29 Apr 2023 10:30  Patient On (Oxygen Delivery Method): room air        PHYSICAL EXAM:    chronically ill male in NAD  Respiratory: CTAB  Cardiovascular: S1 and S2, RRR, no M/G/R  Gastrointestinal: BS+, soft, tube in place    LABS:                        9.9    8.79  )-----------( 176      ( 29 Apr 2023 09:34 )             31.1     04-29    144  |  102  |  50<H>  ----------------------------<  204<H>  4.2   |  33<H>  |  2.47<H>    Ca    10.0      29 Apr 2023 08:37    TPro  8.1  /  Alb  2.9<L>  /  TBili  0.5  /  DBili  x   /  AST  140<H>  /  ALT  87<H>  /  AlkPhos  270<H>  04-29    PT/INR - ( 29 Apr 2023 09:34 )   PT: 12.8 sec;   INR: 1.10 ratio         PTT - ( 29 Apr 2023 09:34 )  PTT:25.3 sec  LIVER FUNCTIONS - ( 29 Apr 2023 08:37 )  Alb: 2.9 g/dL / Pro: 8.1 gm/dL / ALK PHOS: 270 U/L / ALT: 87 U/L / AST: 140 U/L / GGT: x             RADIOLOGY & ADDITIONAL STUDIES:

## 2023-04-29 NOTE — PATIENT PROFILE ADULT - NSPROHMDIABETHBA1C_GEN_A_NUR
Size Of Lesion In Cm (Optional): 0.8 unknown Treatment Number (Optional): 1 Render Post-Care Instructions In Note?: no Concentration (Mg/Ml): 25.0 Total Volume (Ccs): 0.1 Medication Injected: Methotrexate X Size Of Lesion In Cm (Optional): 0 Bill As A Line Item Or As Units: Line Item Post-Care Instructions: I reviewed with the patient in detail post-care instructions. Patient is to keep the site dry overnight, and then apply bacitracin twice daily until healed. Patient may apply hydrogen peroxide soaks to remove any crusting. Detail Level: Detailed Consent: The risks of medication reaction, injection site pain and lesion necrosis were reviewed with the patient. Bill J-Code?: Yes Administered By (Optional): NL

## 2023-04-29 NOTE — ED PROVIDER NOTE - CLINICAL SUMMARY MEDICAL DECISION MAKING FREE TEXT BOX
Pt with known endocrine tumor, J tube presents with hematemesis, tachycardic on arrival. Will need full set of labs, GI bleed, CT. Will consult with GI, surg/onc, will transfuse as needed provided Zofran, Protonix. Will require admission. Pt with known endocrine tumor, J tube presents with hematemesis, tachycardic on arrival. Will need full set of labs, GI bleed, CT. Will consult with GI, surg/onc, will transfuse as needed provided Zofran, Protonix. Will require admission.  -Consulted with Oncology, Surg Onc, as well as GI. Appreciate input from specialty services. Pt remains Mildly tachycardic, blood pressure has stabilized.  Hemoglobin is stable from previous. Consulted with ICU team for admission to SICU.   After my conversation with pt and wife, pt remains FULL CODE status

## 2023-04-29 NOTE — H&P ADULT - ASSESSMENT
A:    68M  HD # 1  FULL CODE    Here for:    1. UGIB in setting of  2. Metastatic CA 2/2 neuroendocrine tumor with liver and pancreatic mets  3. Anemia  4. NAGMA 2.2  5. Lactic acidosis  6. ZO  7. CKD  8. Dysphagia s/p PEG    This patient requires critical care for support of one or more vital organ systems with a high probability of imminent or life threatening deterioration in his/her condition    P:    UGIB, suspect 2/2 neuroendocrine tumor, known metastatic  Extensive Hx of obstructions s/p PEG    GI, surg/onc consults  PPI drip  IVF resuscitation  f/u Hgb, blood products PRN  Trend LA  EGD per GI  NPO, NGT feeds  CT A/P without active extrav; G-J in position  ISS  Trend Hgb  Trend LA  ZO; suspect 2/2 pre renal, trend, avoid renal toxic meds    Dispo: Cont critical care.    TOTAL CRITICAL CARE TIME: 45 minutes  (EXCLUSIVE of any non bundled procedures)    Note: This time spent INCLUDES time spent directly as this patient's bedside with evaluation, review of chart including review of laboratory and imaging studies, interpretation of vital signs and cardiac output measurements, any necessary ventilator management, and time spent discussing plan of care with patient and family, including goals of care discussion.   A:    68M  HD # 1  FULL CODE    Here for:    1. UGIB in setting of  2. Metastatic CA 2/2 neuroendocrine tumor with liver and pancreatic mets  3. Anemia  4. NAGMA 2.2  5. Lactic acidosis  6. ZO  7. CKD  8. Dysphagia s/p PEG  9. UTI    This patient requires critical care for support of one or more vital organ systems with a high probability of imminent or life threatening deterioration in his/her condition    P:    UGIB, suspect 2/2 neuroendocrine tumor, known metastatic  Extensive Hx of obstructions s/p PEG    GI, surg/onc consults  PPI drip  IVF resuscitation  f/u Hgb, blood products PRN  Trend LA  EGD per GI  NPO, NGT feeds  CT A/P without active extrav; G-J in position  On abx; here a week or so ago, tx for Pseudomonas UTI with cefepime, home ceftin; UA mildly positive here, will resume cefepime  ISS  Trend Hgb  Trend LA  ZO; suspect 2/2 pre renal, trend, avoid renal toxic meds    Dispo: Cont critical care.    TOTAL CRITICAL CARE TIME: 45 minutes  (EXCLUSIVE of any non bundled procedures)    Note: This time spent INCLUDES time spent directly as this patient's bedside with evaluation, review of chart including review of laboratory and imaging studies, interpretation of vital signs and cardiac output measurements, any necessary ventilator management, and time spent discussing plan of care with patient and family, including goals of care discussion.

## 2023-04-29 NOTE — CONSULT NOTE ADULT - NS ATTEND AMEND GEN_ALL_CORE FT
69 y/o M with a MHX of neuroendocrine tumor, likely of GI/pancreatic origin, c/w gastric outlet obstruction, s/p GJ tube, p/w hematemesis  S/p recent discharge, now again admitted with n/v, hematemesis    -CT AP 04/17/23 revealed diffuse bilobar liver metastasis which have progressed since 01/06/2023; large pancreatic mass, not significantly changed  -current CT abd w/o evidence of active bleed  -Hgb is improved to 9.8 g/dL  -monitor CBC  -await surg onc plan for possible palliative surgery  -GI also following    Please call with questions

## 2023-04-29 NOTE — ED PROVIDER NOTE - PHYSICAL EXAMINATION
General: AAOx3, appears fatigued  HEENT: NCAT  Cardiac: tachycardic, no murmurs, normal peripheral perfusion  Respiratory: Normal rate and effort. CTAB  GI: Soft, nondistended. J tube in place, no significant tenderness or guarding  Neuro: No focal deficits. FRIEDMAN equally x4, sensation to light touch intact throughout  MSK: FROMx4, no focal bony tenderness, no peripheral edema  Skin: No rash

## 2023-04-29 NOTE — CONSULT NOTE ADULT - SUBJECTIVE AND OBJECTIVE BOX
HPI:  69 y/o male with MHx significant for neuroendocrine tumor diagnosed 6 years ago with chronic gastric outlet obstruction and mets to liver, s/p GJ tube, HTN, HLD, T2DM, BPH and light chain nephropathy presented to the ED with hematemesis since last night.    Recent admission-from 4/21- 4/27/2023 for J-tube malfunction; weakness; dehydration; hyponatremia and ZO    4/29- Patient seen in ED, Mana spouse at bedside. Patients reports nausea and hematemesis since last night. Coffee brown color emesis of about 10 ml note dn the bag. Also with nausea and discomfort at the GJ site. Last feed was last night. Report loose bowel movement every 2-3 days, last BM was yesterday, denies hematochezia.       PAST MEDICAL & SURGICAL HISTORY:    Hypertension  BPH (benign prostatic hyperplasia)  Renal insufficiency  Light chain nephropathy  DM (diabetes mellitus  HLD (hyperlipidemia)  GJ tube placement    FAMILY HISTORY:    FH: breast cancer (Mother)  FH: aneurysm (Father)    MEDICATIONS  (STANDING):    pantoprazole Infusion 8 mG/Hr     MEDICATIONS  (PRN):      Allergies    losartan (Angioedema)      Review of Systems    Constitutional, Eyes, ENT, Cardiovascular, Respiratory, Gastrointestinal, Genitourinary, Musculoskeletal, Integumentary, Neurological, Psychiatric, Endocrine, Heme/Lymph and Allergic/Immunologic review of systems are otherwise negative except as noted in HPI.     Vital Signs Last 24 Hrs  T(C): 36.9 (29 Apr 2023 08:27), Max: 36.9 (29 Apr 2023 08:27)  T(F): 98.4 (29 Apr 2023 08:27), Max: 98.4 (29 Apr 2023 08:27)  HR: 118 (29 Apr 2023 08:27) (118 - 131)  BP: 140/92 (29 Apr 2023 08:27) (140/92 - 140/92)  BP(mean): 100 (29 Apr 2023 08:27) (100 - 100)  RR: 18 (29 Apr 2023 08:27) (18 - 18)  SpO2: 98% (29 Apr 2023 08:27) (98% - 100%)    Parameters below as of 29 Apr 2023 08:27  Patient On (Oxygen Delivery Method): room air    Physical Exam    Constitutional: Ill appearing  Eyes: no conjunctival infection, anicteric.   ENT: pharynx is unremarkable, moist mucus membrane, no oral lesions.   Neck: supple without JVD, no thyromegaly or masses appreciated.   Pulmonary: clear to auscultation bilaterally, no dullness, no wheezing.   Cardiac: RRR, normal S1S2, no murmurs, rubs, gallops. Tachy on monitor-110's  Vascular: no JVD, no calf tenderness, venous stasis changes, varices.   Abdomen: normoactive bowel sounds, soft and nontender, with GJ tube, gauge noted to be stained with old bleed, no active bleed in the tubing.  Lymphatic: no peripheral adenopathy appreciated.   Musculoskeletal: full range of motion and no deformities appreciated.   Skin: normal appearance, no rash, nodules, vesicles, ulcers, erythema.   Neurology: grossly intact.   Psychiatric: affect appropriate.     LABS:    CBC Full  -  ( 29 Apr 2023 09:34 )  WBC Count : 8.79 K/uL  RBC Count : 3.48 M/uL  Hemoglobin : 9.9 g/dL  Hematocrit : 31.1 %  Platelet Count - Automated : 176 K/uL  Mean Cell Volume : 89.4 fl  Mean Cell Hemoglobin : 28.4 pg  Mean Cell Hemoglobin Concentration : 31.8 gm/dL  Auto Neutrophil # : 7.96 K/uL  Auto Lymphocyte # : 0.27 K/uL  Auto Monocyte # : 0.52 K/uL  Auto Eosinophil # : 0.00 K/uL  Auto Basophil # : 0.01 K/uL  Auto Neutrophil % : 90.6 %  Auto Lymphocyte % : 3.1 %  Auto Monocyte % : 5.9 %  Auto Eosinophil % : 0.0 %  Auto Basophil % : 0.1 %    04-29    144  |  102  |  50<H>  ----------------------------<  204<H>  4.2   |  33<H>  |  2.47<H>    Ca    10.0      29 Apr 2023 08:37    TPro  8.1  /  Alb  2.9<L>  /  TBili  0.5  /  DBili  x   /  AST  140<H>  /  ALT  87<H>  /  AlkPhos  270<H>  04-29    PT/INR - ( 29 Apr 2023 09:34 )   PT: 12.8 sec;   INR: 1.10 ratio     PTT - ( 29 Apr 2023 09:34 )  PTT:25.3 sec      RADIOLOGY & ADDITIONAL STUDIES:     HPI:  67 y/o male with MHx significant for neuroendocrine tumor diagnosed 6 years ago with chronic gastric outlet obstruction and mets to liver, s/p GJ tube, HTN, HLD, T2DM, BPH and light chain nephropathy presented to the ED with hematemesis since last night.    Recent admission-from 4/21- 4/27/2023 for J-tube malfunction; weakness; dehydration; hyponatremia and ZO    4/29- Patient seen in ED, Mana spouse at bedside. Patients reports nausea and hematemesis since last night. Coffee brown color emesis of about 10 ml note dn the bag. Also with nausea and discomfort at the GJ site. Last feed was last night. Report loose bowel movement every 2-3 days, last BM was yesterday, denies hematochezia.       PAST MEDICAL & SURGICAL HISTORY:    Hypertension  BPH (benign prostatic hyperplasia)  Renal insufficiency  Light chain nephropathy  DM (diabetes mellitus  HLD (hyperlipidemia)  GJ tube placement    FAMILY HISTORY:    FH: breast cancer (Mother)  FH: aneurysm (Father)    MEDICATIONS  (STANDING):    pantoprazole Infusion 8 mG/Hr     MEDICATIONS  (PRN):      Allergies    losartan (Angioedema)      Review of Systems    Constitutional, Eyes, ENT, Cardiovascular, Respiratory, Gastrointestinal, Genitourinary, Musculoskeletal, Integumentary, Neurological, Psychiatric, Endocrine, Heme/Lymph and Allergic/Immunologic review of systems are otherwise negative except as noted in HPI.     Vital Signs Last 24 Hrs  T(C): 36.9 (29 Apr 2023 08:27), Max: 36.9 (29 Apr 2023 08:27)  T(F): 98.4 (29 Apr 2023 08:27), Max: 98.4 (29 Apr 2023 08:27)  HR: 118 (29 Apr 2023 08:27) (118 - 131)  BP: 140/92 (29 Apr 2023 08:27) (140/92 - 140/92)  BP(mean): 100 (29 Apr 2023 08:27) (100 - 100)  RR: 18 (29 Apr 2023 08:27) (18 - 18)  SpO2: 98% (29 Apr 2023 08:27) (98% - 100%)    Parameters below as of 29 Apr 2023 08:27  Patient On (Oxygen Delivery Method): room air    Physical Exam    Constitutional: Ill appearing  Eyes: no conjunctival infection, anicteric.   ENT: pharynx is unremarkable, moist mucus membrane, no oral lesions.   Neck: supple without JVD, no thyromegaly or masses appreciated.   Pulmonary: clear to auscultation bilaterally, no dullness, no wheezing.   Cardiac: RRR, normal S1S2, no murmurs, rubs, gallops. Tachy on monitor-110's  Vascular: no JVD, no calf tenderness, venous stasis changes, varices.   Abdomen: normoactive bowel sounds, soft and nontender, with GJ tube, gauge noted to be stained with old bleed, no active bleed in the tubing.  Lymphatic: no peripheral adenopathy appreciated.   Musculoskeletal: full range of motion and no deformities appreciated.   Skin: normal appearance, no rash, nodules, vesicles, ulcers, erythema.   Neurology: grossly intact.   Psychiatric: affect appropriate.     LABS:    CBC Full  -  ( 29 Apr 2023 09:34 )  WBC Count : 8.79 K/uL  RBC Count : 3.48 M/uL  Hemoglobin : 9.9 g/dL  Hematocrit : 31.1 %  Platelet Count - Automated : 176 K/uL  Mean Cell Volume : 89.4 fl  Mean Cell Hemoglobin : 28.4 pg  Mean Cell Hemoglobin Concentration : 31.8 gm/dL  Auto Neutrophil # : 7.96 K/uL  Auto Lymphocyte # : 0.27 K/uL  Auto Monocyte # : 0.52 K/uL  Auto Eosinophil # : 0.00 K/uL  Auto Basophil # : 0.01 K/uL  Auto Neutrophil % : 90.6 %  Auto Lymphocyte % : 3.1 %  Auto Monocyte % : 5.9 %  Auto Eosinophil % : 0.0 %  Auto Basophil % : 0.1 %    04-29    144  |  102  |  50<H>  ----------------------------<  204<H>  4.2   |  33<H>  |  2.47<H>    Ca    10.0      29 Apr 2023 08:37    TPro  8.1  /  Alb  2.9<L>  /  TBili  0.5  /  DBili  x   /  AST  140<H>  /  ALT  87<H>  /  AlkPhos  270<H>  04-29    PT/INR - ( 29 Apr 2023 09:34 )   PT: 12.8 sec;   INR: 1.10 ratio     PTT - ( 29 Apr 2023 09:34 )  PTT:25.3 sec      RADIOLOGY & ADDITIONAL STUDIES:    < from: CT Abdomen and Pelvis w/ IV Cont (04.29.23 @ 10:08) >  ACC: [id number] EXAM:  CT ABDOMEN AND PELVIS IC   ORDERED BY: JOSELITO NGUYEN     PROCEDURE DATE:  04/29/2023          INTERPRETATION:  CLINICAL INFORMATION: Upper GI bleed. History of gastric   cancer, GJ tube.    COMPARISON: 04/17/2023.    CONTRAST/COMPLICATIONS:  IV Contrast: Omnipaque 350  90 cc administered   10 cc discarded  Oral Contrast: NONE  Complications: None reported at time of study completion    PROCEDURE:  CT of the Abdomen and Pelvis was performed.  Precontrast, Arterial andDelayed phases were performed.  Sagittal and coronal reformats were performed.    FINDINGS:  LOWER CHEST: No lung nodules. Ill-defined increased haziness in the   paraesophageal region. Coronary artery calcification.    LIVER: Numerous hepatic metastases. A previously referenced confluent   lesion involving the segment 8 measures 9 x 8.7 cm, previously 7.9 x 8.3   cm (4:19).  BILE DUCTS: Normal caliber.  GALLBLADDER: Within normal limits.  SPLEEN: Within normal limits.  PANCREAS: Heterogeneously enhancing mass involving the distal body and   the tail of the pancreas measuring approximately 9.8 x 7 cm, previously   8.3 x 7.9 cm. Noncontrast imaging demonstrates hyperdense foci within the   lower portion of this mass that may be related to hemorrhagic content.   The mass cannot be completely  from the spleen and the stomach.  ADRENALS: Within normal limits.  KIDNEYS/URETERS: 2 mm nonobstructing calculus lower pole of the left   kidney. No hydronephrosis.    BLADDER: Within normal limits.  REPRODUCTIVE ORGANS: Prostate is enlarged.    BOWEL: No bowel obstruction. Redemonstration of a gastrojejunostomy in   stable position. No endoluminal extravasation or pooling of the contrast   to suggest an active GI bleed.  PERITONEUM: No ascites.  VESSELS: Atherosclerotic changes. Probable occlusion of the splenic vein   with evidence of associated collateral circulation. Patent portal veins.  RETROPERITONEUM/LYMPH NODES: No lymphadenopathy.  ABDOMINAL WALL: Gastrostomy tube.  BONES: Degenerative changes.    IMPRESSION:  No evidence of an active GI bleed.  Large pancreatic mass and extensive hepatic metastases.  Gastrojejunostomy

## 2023-04-29 NOTE — CONSULT NOTE ADULT - ASSESSMENT
67 yo male with metastatic neuroendocrine tumor of pancreas. Patient is currently stable, with unchanged hb and no extravasation on CT scan. Admit to ICU, IV PPI. Patient may need upper endoscopy if evidence of ongoing bleeding.

## 2023-04-29 NOTE — H&P ADULT - NSHPPHYSICALEXAM_GEN_ALL_CORE
O:    Adult M lying in bed  Appears emaciated and chronically ill  S1S2+ sinus tachycardia  CTA B/L  Abd soft NTND  NO leg swelling/edema noted  Awake, alert  Skin pink, warm

## 2023-04-29 NOTE — ED PROVIDER NOTE - PROGRESS NOTE DETAILS
Naseem GEORGE for Dr. Anderson  Vomit guaiac performed by Dr. Anderson. Lot #: [id number]; Result: positive with dark, brown colored emesis; QC- reactive; Exp Date: 10/31/23 Justin - OK to scan with contrast with decreased GFR, benefits>risk

## 2023-04-29 NOTE — ED PROVIDER NOTE - NS ED ROS FT
Constitutional: No fevers, chills, or sweats. +fatigue  Cardiac: No chest pain, exertional dyspnea, orthopnea  Respiratory: No shortness of breath, no cough  GI: No abdominal pain, +hematemesis   Neuro: No headaches, no neck pain/stiffness, no numbness  All other systems reviewed and are negative unless otherwise stated in the HPI.

## 2023-04-29 NOTE — CONSULT NOTE ADULT - SUBJECTIVE AND OBJECTIVE BOX
A 68 year old male with metastatic pancreatic NEC. Patient was recently discharged last week from the hospital after being admitted for malfunction of his GJ tube. Patient started having nausea and vomiting after starting his tube feeds last night. This morning he continued to have vomiting and also started noticing blood in the vomitus. Patient denies any abdominal pain, change in bowel habits, bleeding per rectum or bleeding from other areas.     PAST MEDICAL & SURGICAL HISTORY:  Hypertension  BPH (benign prostatic hyperplasia)  Renal insufficiency  Light chain nephropathy  DM (diabetes mellitus)  HLD (hyperlipidemia)    Physical:  Alert, conscious, oriented  Not in pain or distress  Pale, no jaundice or cyanosis  Afebrile, stable V/S  Alert, conscious, oriented  Chest clear, equal air entry bilaterally  Abdomen soft, not distended, no tenderness. GJ tube in place and was flushed with no issues  Extremities: No swelling or tenderness                          9.8    7.88  )-----------( 148      ( 29 Apr 2023 13:55 )             30.6   04-29    144  |  102  |  50<H>  ----------------------------<  204<H>  4.2   |  33<H>  |  2.47<H>    Ca    10.0      29 Apr 2023 08:37    TPro  8.1  /  Alb  2.9<L>  /  TBili  0.5  /  DBili  x   /  AST  140<H>  /  ALT  87<H>  /  AlkPhos  270<H>  04-29    < from: CT Abdomen and Pelvis w/ IV Cont (04.29.23 @ 10:08) >  No evidence of an active GI bleed.  Large pancreatic mass and extensive hepatic metastases.  Gastrojejunostomy.    < end of copied text >

## 2023-04-29 NOTE — ED PROVIDER NOTE - OBJECTIVE STATEMENT
67 yo male with PMHx of Neuroendocrine tumor diagnosed 6 years ago with mets to liver with associated chronic dysphagia with GJ tube, Hypertension, Hyperlipidemia, Diabetes Mellitus Type 2, BPH and light chain nephropathy presenting with vomiting. States that it started during his tube feed last night, and persisted through the night. Describes it as dark, brown almost black in color. No significant abd or chest pain. Pt still feeling nausea, and noted feeling weaker than baseline. Recently d/c from hospital for GJ tube obstruction.

## 2023-04-29 NOTE — PROGRESS NOTE ADULT - ASSESSMENT
A/P: 68 male with a NEC who is presenting with an UGIB    Plan:  SICu  NPO  PPI Drip  IVF  Seen by [facility] Surg.  Consideration for palliative surgery to be discuessed on monday Venodynes

## 2023-04-30 NOTE — PROGRESS NOTE ADULT - SUBJECTIVE AND OBJECTIVE BOX
Patient is a 68y old  Male who presents with a chief complaint of GIB (30 Apr 2023 09:01)      Subective: Patient seen and examined at bedside. No overnight events. No episodes of emesis and nausea has improved with meds. Denies abdominal pain.       PAST MEDICAL & SURGICAL HISTORY:  Hypertension      BPH (benign prostatic hyperplasia)      Renal insufficiency      Light chain nephropathy      DM (diabetes mellitus)      HLD (hyperlipidemia)      No significant past surgical history          MEDICATIONS  (STANDING):  cefepime   IVPB 500 milliGRAM(s) IV Intermittent every 12 hours  cefepime   IVPB      chlorhexidine 4% Liquid 1 Application(s) Topical <User Schedule>  dextrose 5%. 1000 milliLiter(s) (100 mL/Hr) IV Continuous <Continuous>  dextrose 5%. 1000 milliLiter(s) (50 mL/Hr) IV Continuous <Continuous>  dextrose 50% Injectable 12.5 Gram(s) IV Push once  dextrose 50% Injectable 25 Gram(s) IV Push once  dextrose 50% Injectable 25 Gram(s) IV Push once  glucagon  Injectable 1 milliGRAM(s) IntraMuscular once  insulin lispro (ADMELOG) corrective regimen sliding scale   SubCutaneous every 6 hours  pantoprazole Infusion 8 mG/Hr (10 mL/Hr) IV Continuous <Continuous>    MEDICATIONS  (PRN):  dextrose Oral Gel 15 Gram(s) Oral once PRN Blood Glucose LESS THAN 70 milliGRAM(s)/deciliter  ondansetron Injectable 4 milliGRAM(s) IV Push every 6 hours PRN Nausea and/or Vomiting      REVIEW OF SYSTEMS:    RESPIRATORY: No shortness of breath  CARDIOVASCULAR: No chest pain  All other review of systems is negative unless indicated above.    Vital Signs Last 24 Hrs  T(C): 36.6 (30 Apr 2023 06:24), Max: 37 (29 Apr 2023 15:12)  T(F): 97.9 (30 Apr 2023 06:24), Max: 98.6 (29 Apr 2023 15:12)  HR: 83 (30 Apr 2023 10:00) (76 - 115)  BP: 116/80 (30 Apr 2023 10:00) (106/71 - 134/81)  BP(mean): 91 (30 Apr 2023 10:00) (82 - 101)  RR: 13 (30 Apr 2023 10:00) (11 - 29)  SpO2: 97% (30 Apr 2023 10:00) (91% - 98%)    Parameters below as of 29 Apr 2023 18:00  Patient On (Oxygen Delivery Method): room air        PHYSICAL EXAM:    Constitutional: NAD  Respiratory: CTAB  Cardiovascular: S1 and S2, RRR  Gastrointestinal: BS+, soft, NT/ND, GJ tube intact  Extremities: No peripheral edema  Psychiatric: Normal mood, normal affect    LABS:                        8.7    6.67  )-----------( 128      ( 30 Apr 2023 05:18 )             27.7     04-30    145  |  113<H>  |  48<H>  ----------------------------<  141<H>  3.7   |  27  |  2.17<H>    Ca    9.0      30 Apr 2023 05:18  Phos  3.6     04-30  Mg     2.1     04-30    TPro  5.9<L>  /  Alb  2.1<L>  /  TBili  0.3  /  DBili  x   /  AST  103<H>  /  ALT  59  /  AlkPhos  175<H>  04-30    PT/INR - ( 29 Apr 2023 09:34 )   PT: 12.8 sec;   INR: 1.10 ratio         PTT - ( 29 Apr 2023 09:34 )  PTT:25.3 sec  LIVER FUNCTIONS - ( 30 Apr 2023 05:18 )  Alb: 2.1 g/dL / Pro: 5.9 gm/dL / ALK PHOS: 175 U/L / ALT: 59 U/L / AST: 103 U/L / GGT: x             RADIOLOGY & ADDITIONAL STUDIES:    ACC: [id number] EXAM:  CT ABDOMEN AND PELVIS IC   ORDERED BY: JOSELITO NGUYEN     PROCEDURE DATE:  04/29/2023          INTERPRETATION:  CLINICAL INFORMATION: Upper GI bleed. History of gastric   cancer, GJ tube.    COMPARISON: 04/17/2023.    CONTRAST/COMPLICATIONS:  IV Contrast: Omnipaque 350  90 cc administered   10 cc discarded  Oral Contrast: NONE  Complications: None reported at time of study completion    PROCEDURE:  CT of the Abdomen and Pelvis was performed.  Precontrast, Arterial andDelayed phases were performed.  Sagittal and coronal reformats were performed.    FINDINGS:  LOWER CHEST: No lung nodules. Ill-defined increased haziness in the   paraesophageal region. Coronary artery calcification.    LIVER: Numerous hepatic metastases. A previously referenced confluent   lesion involving the segment 8 measures 9 x 8.7 cm, previously 7.9 x 8.3   cm (4:19).  BILE DUCTS: Normal caliber.  GALLBLADDER: Within normal limits.  SPLEEN: Within normal limits.  PANCREAS: Heterogeneously enhancing mass involving the distal body and   the tail of the pancreas measuring approximately 9.8 x 7 cm, previously   8.3 x 7.9 cm. Noncontrast imaging demonstrates hyperdense foci within the   lower portion of this mass that may be related to hemorrhagic content.   The mass cannot be completely  from the spleen and the stomach.  ADRENALS: Within normal limits.  KIDNEYS/URETERS: 2 mm nonobstructing calculus lower pole of the left   kidney. No hydronephrosis.    BLADDER: Within normal limits.  REPRODUCTIVE ORGANS: Prostate is enlarged.    BOWEL: No bowel obstruction. Redemonstration of a gastrojejunostomy in   stable position. No endoluminal extravasation or pooling of the contrast   to suggest an active GI bleed.  PERITONEUM: No ascites.  VESSELS: Atherosclerotic changes. Probable occlusion of the splenic vein   with evidence of associated collateral circulation. Patent portal veins.  RETROPERITONEUM/LYMPH NODES: No lymphadenopathy.  ABDOMINAL WALL: Gastrostomy tube.  BONES: Degenerative changes.    IMPRESSION:  No evidence of an active GI bleed.  Large pancreatic mass and extensive hepatic metastases.  Gastrojejunostomy.        --- End of Report ---            ANUJ WOOD MD; Attending Radiologist  This document has been electronically signed. Apr 29 2023 11:07AM

## 2023-04-30 NOTE — PROGRESS NOTE ADULT - SUBJECTIVE AND OBJECTIVE BOX
Patient is a 68y old  Male who presents with a chief complaint of GIB (2023 11:54)    24 hour events:     Allergies    losartan (Angioedema)    Intolerances      REVIEW OF SYSTEMS: SEE BELOW       ICU Vital Signs Last 24 Hrs  T(C): 36.8 (2023 16:23), Max: 36.8 (2023 20:28)  T(F): 98.2 (2023 16:23), Max: 98.3 (2023 20:28)  HR: 97 (2023 16:00) (76 - 115)  BP: 112/73 (2023 16:00) (90/62 - 134/81)  BP(mean): 86 (2023 16:00) (73 - 101)  ABP: --  ABP(mean): --  RR: 19 (2023 16:00) (11 - 29)  SpO2: 100% (2023 16:00) (91% - 100%)    O2 Parameters below as of 2023 18:00  Patient On (Oxygen Delivery Method): room air            CAPILLARY BLOOD GLUCOSE      POCT Blood Glucose.: 108 mg/dL (2023 17:20)  POCT Blood Glucose.: 139 mg/dL (2023 12:28)  POCT Blood Glucose.: 117 mg/dL (2023 06:06)  POCT Blood Glucose.: 141 mg/dL (2023 23:26)      I&O's Summary    2023 07:  -  2023 07:00  --------------------------------------------------------  IN: 165 mL / OUT: 950 mL / NET: -785 mL    2023 07:01  -  2023 17:43  --------------------------------------------------------  IN: 113.5 mL / OUT: 400 mL / NET: -286.5 mL            MEDICATIONS  (STANDING):  cefepime   IVPB      cefepime   IVPB 500 milliGRAM(s) IV Intermittent every 12 hours  chlorhexidine 4% Liquid 1 Application(s) Topical <User Schedule>  dextrose 5%. 1000 milliLiter(s) (50 mL/Hr) IV Continuous <Continuous>  dextrose 5%. 1000 milliLiter(s) (100 mL/Hr) IV Continuous <Continuous>  dextrose 50% Injectable 12.5 Gram(s) IV Push once  dextrose 50% Injectable 25 Gram(s) IV Push once  dextrose 50% Injectable 25 Gram(s) IV Push once  glucagon  Injectable 1 milliGRAM(s) IntraMuscular once  insulin lispro (ADMELOG) corrective regimen sliding scale   SubCutaneous every 6 hours  pantoprazole  Injectable 40 milliGRAM(s) IV Push every 12 hours      MEDICATIONS  (PRN):  dextrose Oral Gel 15 Gram(s) Oral once PRN Blood Glucose LESS THAN 70 milliGRAM(s)/deciliter  ondansetron Injectable 4 milliGRAM(s) IV Push every 6 hours PRN Nausea and/or Vomiting      PHYSICAL EXAM: SEE BELOW                          8.7    6.67  )-----------( 128      ( 2023 05:18 )             27.7       04-30    145  |  113<H>  |  48<H>  ----------------------------<  141<H>  3.7   |  27  |  2.17<H>    Ca    9.0      2023 05:18  Phos  3.6     04-30  Mg     2.1     04-30    TPro  5.9<L>  /  Alb  2.1<L>  /  TBili  0.3  /  DBili  x   /  AST  103<H>  /  ALT  59  /  AlkPhos  175<H>  04-30          PT/INR - ( 2023 09:34 )   PT: 12.8 sec;   INR: 1.10 ratio         PTT - ( 2023 09:34 )  PTT:25.3 sec  Urinalysis Basic - ( 2023 15:55 )    Color: Yellow / Appearance: Clear / S.010 / pH: x  Gluc: x / Ketone: Negative  / Bili: Negative / Urobili: Negative   Blood: x / Protein: 100 / Nitrite: Negative   Leuk Esterase: Negative / RBC: 3-5 /HPF / WBC 3-5 /HPF   Sq Epi: x / Non Sq Epi: x / Bacteria: Moderate      .Blood None   No growth to date. --  @ 10:35      Rapid RVP Result: NotDetec ( @ 08:36)

## 2023-04-30 NOTE — PROGRESS NOTE ADULT - ASSESSMENT
69 y/o male with PMH neuroendocrine tumor, hepatic mets, gastric outlet obstruction s/p GJ tube  Admitted for upper GI bleed     CTA neg for acute hemorrhage     Not actively bleeding   H/H slightly lower than before, BP and HR are stable     NPO   PPI q12  Avoid AC, AP   No urgent GI intervention   Surgery to discuss surgical options tomorrow, will follow     Keep in SICU tonight     Family updated at bedside

## 2023-04-30 NOTE — PROGRESS NOTE ADULT - SUBJECTIVE AND OBJECTIVE BOX
Pt seen at bedside, no new complains  No vomiting overnight.    Vitals:  T(C): 36.6 ( @ 06:24), Max: 37 ( @ 15:12)  HR: 80 ( @ 07:00) (77 - 115)  BP: 108/71 ( @ 07:00) (106/71 - 136/96)  RR: 12 ( @ 07:00) (12 - 29)  SpO2: 96% ( @ 07:00) (91% - 98%)     @ 07:01  -   @ 07:00  --------------------------------------------------------  IN:    IV PiggyBack: 50 mL    Pantoprazole: 115 mL  Total IN: 165 mL    OUT:    Intermittent Catheterization - Urethral (mL): 550 mL    Voided (mL): 400 mL  Total OUT: 950 mL    Total NET: -785 mL    Physical Exam:  General: AAOx3, Well developed, NAD  Chest: Normal respiratory effort  Heart: RRR  Abdomen: Soft, NTND, no masses  Neuro/Psych: No localized deficits. Normal spech, normal tone  Skin: Normal, no rashes, no lesions noted.   Extremities: Warm, well perfused, no edema, Pulses intact     @ 05:18                    8.7  CBC: 6.67>)-------(<128                     27.7                 145 | 113 | 48    CMP:  ----------------------< 141               3.7 | 27 | 2.17                      Ca:9.0  Phos:3.6  M.1               0.3|      |103        LFTs:  ------|175|-----             -|      |-   @ 23:49                    -  CBC: ->)-------(<-                     -                 - | - | -    CMP:  ----------------------< 151               - | - | -                      Ca:-  Phos:-  Mg:-               -|      |-        LFTs:  ------|-|-----             -|      | @ 13:55                    9.8  CBC: 7.88>)-------(<148                     30.6                 - | - | -    CMP:  ----------------------< -               - | - | -                      Ca:-  Phos:-  Mg:-               -|      |-        LFTs:  ------|-|-----             -|      | @ 09:34                    9.9  CBC: 8.79>)-------(<176                     31.1                 - | - | -    CMP:  ----------------------< -               - | - | -                      Ca:-  Phos:-  Mg:-               -|      |-        LFTs:  ------|-|-----             -|      | @ 08:37                    -  CBC: ->)-------(<-                     -                 144 | 102 | 50    CMP:  ----------------------< 204               4.2 | 33 | 2.47                      Ca:10.0  Phos:-  Mg:-               0.5|      |140        LFTs:  ------|270|-----             -|      |-      Current Inpatient Medications:  cefepime   IVPB 500 milliGRAM(s) IV Intermittent every 12 hours  cefepime   IVPB      chlorhexidine 4% Liquid 1 Application(s) Topical <User Schedule>  dextrose 5%. 1000 milliLiter(s) (50 mL/Hr) IV Continuous <Continuous>  dextrose 5%. 1000 milliLiter(s) (100 mL/Hr) IV Continuous <Continuous>  dextrose 50% Injectable 12.5 Gram(s) IV Push once  dextrose 50% Injectable 25 Gram(s) IV Push once  dextrose 50% Injectable 25 Gram(s) IV Push once  dextrose Oral Gel 15 Gram(s) Oral once PRN  glucagon  Injectable 1 milliGRAM(s) IntraMuscular once  insulin lispro (ADMELOG) corrective regimen sliding scale   SubCutaneous every 6 hours  ondansetron Injectable 4 milliGRAM(s) IV Push every 6 hours PRN  pantoprazole Infusion 8 mG/Hr (10 mL/Hr) IV Continuous <Continuous>

## 2023-05-01 NOTE — PROGRESS NOTE ADULT - ASSESSMENT
68 year old man with large pancretic neuroendocrine tumor causing gastric outlet obstruction, with stable GI bleeding.     Patient with no further overt bleeding. Had coffee grind emesis mostly, which typically is from slow oozing and not an arterial lesion that would need intervention. In light of his overall state, would defer endoscopy for what is most likely a diagnostic exam.   Ok to resume feeds in J tube, oral food for pleasure. Use GJ tube for nausea as needed.   Dr. Siddiqi to address surgical gastrojej with family and patient.

## 2023-05-01 NOTE — PROGRESS NOTE ADULT - NS ATTEND AMEND GEN_ALL_CORE FT
Admitted for   1. Hematemesis   2. metastatic neuroendocrine tumor of pancreas   3. ZO on CKD      Plan:  PAin Control  Tube Feeds  Awaiting Surg Oncc Recs  LR @100  PT

## 2023-05-01 NOTE — DIETITIAN INITIAL EVALUATION ADULT - ADD RECOMMEND
1) initiate TF rx to above, 2) Monitor tolerance; maintain aspiration precautions, back of bed >45 degrees, 3) daily wts to track/trend changes, 4) monitor lytes/ min and replete prn, 5) Monitor bowel movements, if no BM for >3 days, consider implementing bowel regimen, 6) Obtain vitamin D 25OH level to assess nutriture, 7) Confirm goals of care regarding LONG-TERM nutrition support pending palliative surgery. RD will continue to monitor TF tolerance, labs, hydration, and wt prn.

## 2023-05-01 NOTE — PROGRESS NOTE ADULT - SUBJECTIVE AND OBJECTIVE BOX
Patient is a 68y old  Male who presents with a chief complaint of Gastrointestinal hemorrhage     (01 May 2023 10:54)      BRIEF HOSPITAL COURSE: 69 yo male with PMHx metastatic neuroendocrine tumor of the pancreas, recently admitted with nonfunctioning GJ tube (patient is chronically obstructed).   Patient has had severe heartburn, and has had several episodes of vomiting small amount of dark blood. Patient was discharged on Thursday. Hgb unchanged. CT angiography has no active bleeding, but demonstrates extensive tumor in distal pancreas.     5/1 pt feeling slightly nauseous, denies abd pain.    PAST MEDICAL & SURGICAL HISTORY:  Hypertension  BPH (benign prostatic hyperplasia)  Renal insufficiency  Light chain nephropathy  DM (diabetes mellitus)  HLD (hyperlipidemia)  No significant past surgical history      Medications:  cefepime   IVPB 500 milliGRAM(s) IV Intermittent every 12 hours  cefepime   IVPB      ondansetron Injectable 4 milliGRAM(s) IV Push every 6 hours PRN  pantoprazole  Injectable 40 milliGRAM(s) IV Push every 12 hours  dextrose 50% Injectable 25 Gram(s) IV Push once  dextrose 50% Injectable 12.5 Gram(s) IV Push once  dextrose 50% Injectable 25 Gram(s) IV Push once  dextrose Oral Gel 15 Gram(s) Oral once PRN  glucagon  Injectable 1 milliGRAM(s) IntraMuscular once  insulin lispro (ADMELOG) corrective regimen sliding scale   SubCutaneous every 6 hours  dextrose 5%. 1000 milliLiter(s) IV Continuous <Continuous>  dextrose 5%. 1000 milliLiter(s) IV Continuous <Continuous>  lactated ringers. 1000 milliLiter(s) IV Continuous <Continuous>  chlorhexidine 4% Liquid 1 Application(s) Topical <User Schedule>      ICU Vital Signs Last 24 Hrs  T(C): 36.4 (01 May 2023 09:39), Max: 36.8 (2023 16:23)  T(F): 97.6 (01 May 2023 09:39), Max: 98.2 (2023 16:23)  HR: 79 (01 May 2023 10:00) (79 - 104)  BP: 115/80 (01 May 2023 10:00) (90/62 - 130/90)  BP(mean): 92 (01 May 2023 10:00) (73 - 103)  ABP: --  ABP(mean): --  RR: 12 (01 May 2023 10:00) (11 - 19)  SpO2: 95% (01 May 2023 10:00) (92% - 100%)    O2 Parameters below as of 01 May 2023 10:00  Patient On (Oxygen Delivery Method): room air      I&O's Detail    2023 07:01  -  01 May 2023 07:00  --------------------------------------------------------  IN:    IV PiggyBack: 100 mL    Lactated Ringers: 1150 mL    Pantoprazole: 113.5 mL  Total IN: 1363.5 mL    OUT:    Intermittent Catheterization - Urethral (mL): 1200 mL  Total OUT: 1200 mL    Total NET: 163.5 mL      LABS:                        8.2    6.43  )-----------( 123      ( 01 May 2023 04:35 )             26.8     05-01    148<H>  |  116<H>  |  44<H>  ----------------------------<  127<H>  3.5   |  26  |  2.05<H>    Ca    8.8      01 May 2023 04:35  Phos  3.3     05-  Mg     2.1     05-    TPro  5.9<L>  /  Alb  2.1<L>  /  TBili  0.3  /  DBili  x   /  AST  103<H>  /  ALT  59  /  AlkPhos  175<H>            CAPILLARY BLOOD GLUCOSE      POCT Blood Glucose.: 121 mg/dL (01 May 2023 11:32)      Urinalysis Basic - ( 2023 15:55 )    Color: Yellow / Appearance: Clear / S.010 / pH: x  Gluc: x / Ketone: Negative  / Bili: Negative / Urobili: Negative   Blood: x / Protein: 100 / Nitrite: Negative   Leuk Esterase: Negative / RBC: 3-5 /HPF / WBC 3-5 /HPF   Sq Epi: x / Non Sq Epi: x / Bacteria: Moderate      CULTURES:  Culture Results:   No growth to date. ( @ 10:35)  Culture Results:   No growth to date. ( @ 10:35)  Rapid RVP Result: NotDetec ( @ 08:36)      Physical Examination:    General: No acute distress.    HEENT: Pupils equal, reactive to light.  Symmetric.  PULM: Clear to auscultation bilaterally, no wheezing  NECK: Supple, no lymphadenopathy, trachea midline  CVS: Regular rate and rhythm, no murmurs  ABD: Soft, nondistended, nontender, normoactive bowel sounds, J tube in place  EXT: No edema, nontender  SKIN: Warm and well perfused, no rashes noted.  NEURO: Alert, oriented, interactive, nonfocal    DEVICES:   J tube    RADIOLOGY:   < from: CT Abdomen and Pelvis w/ IV Cont (23 @ 10:08) >  IMPRESSION:  No evidence of an active GI bleed.  Large pancreatic mass and extensive hepatic metastases.  Gastrojejunostomy.    < end of copied text >

## 2023-05-01 NOTE — PROGRESS NOTE ADULT - ASSESSMENT
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

## 2023-05-01 NOTE — DIETITIAN INITIAL EVALUATION ADULT - PERTINENT LABORATORY DATA
05-01    148<H>  |  116<H>  |  44<H>  ----------------------------<  127<H>  3.5   |  26  |  2.05<H>    Ca    8.8      01 May 2023 04:35  Phos  3.3     05-01  Mg     2.1     05-01    TPro  5.9<L>  /  Alb  2.1<L>  /  TBili  0.3  /  DBili  x   /  AST  103<H>  /  ALT  59  /  AlkPhos  175<H>  04-30  POCT Blood Glucose.: 124 mg/dL (05-01-23 @ 05:52)  A1C with Estimated Average Glucose Result: 7.3 % (04-24-23 @ 06:35)  A1C with Estimated Average Glucose Result: 6.4 % (12-11-22 @ 07:19)

## 2023-05-01 NOTE — PROGRESS NOTE ADULT - SUBJECTIVE AND OBJECTIVE BOX
Patient was seen and examined at the bedside this morning  No acute events overnight  Pain is better controlled  No nausea or vomiting    O/E:  T(C): 36.4 (05-01-23 @ 09:39), Max: 36.8 (04-30-23 @ 16:23)  HR: 79 (05-01-23 @ 10:00) (79 - 104)  BP: 115/80 (05-01-23 @ 10:00) (90/62 - 130/90)  RR: 12 (05-01-23 @ 10:00) (11 - 19)  SpO2: 95% (05-01-23 @ 10:00) (92% - 100%)  Alert, conscious, oriented  No pallor, jaundice or cyanosis  Not in pain or distress  Chest clear, equal air entry bilaterally  Abdomen soft and lax, no tenderness. G-J tube in place, tube feeds on hold  Extremities: No swelling or tenderness    04-30-23 @ 07:01  -  05-01-23 @ 07:00  --------------------------------------------------------  IN: 1363.5 mL / OUT: 1200 mL / NET: 163.5 mL                            8.2    6.43  )-----------( 123      ( 01 May 2023 04:35 )             26.8   05-01    148<H>  |  116<H>  |  44<H>  ----------------------------<  127<H>  3.5   |  26  |  2.05<H>    Ca    8.8      01 May 2023 04:35  Phos  3.3     05-01  Mg     2.1     05-01    TPro  5.9<L>  /  Alb  2.1<L>  /  TBili  0.3  /  DBili  x   /  AST  103<H>  /  ALT  59  /  AlkPhos  175<H>  04-30    MEDICATIONS  (STANDING):  cefepime   IVPB 500 milliGRAM(s) IV Intermittent every 12 hours  cefepime   IVPB      chlorhexidine 4% Liquid 1 Application(s) Topical <User Schedule>  dextrose 5%. 1000 milliLiter(s) (100 mL/Hr) IV Continuous <Continuous>  dextrose 5%. 1000 milliLiter(s) (50 mL/Hr) IV Continuous <Continuous>  dextrose 50% Injectable 25 Gram(s) IV Push once  dextrose 50% Injectable 12.5 Gram(s) IV Push once  dextrose 50% Injectable 25 Gram(s) IV Push once  glucagon  Injectable 1 milliGRAM(s) IntraMuscular once  insulin lispro (ADMELOG) corrective regimen sliding scale   SubCutaneous every 6 hours  lactated ringers. 1000 milliLiter(s) (100 mL/Hr) IV Continuous <Continuous>  pantoprazole  Injectable 40 milliGRAM(s) IV Push every 12 hours    MEDICATIONS  (PRN):  dextrose Oral Gel 15 Gram(s) Oral once PRN Blood Glucose LESS THAN 70 milliGRAM(s)/deciliter  ondansetron Injectable 4 milliGRAM(s) IV Push every 6 hours PRN Nausea and/or Vomiting

## 2023-05-01 NOTE — DIETITIAN INITIAL EVALUATION ADULT - ENTERAL
Initiate Glucerna 1.5 @ goal rate of 90 cc/hr x 14 hours (total volume 1260 mL) met with 2 packets of Prosource TF. Will provide ~ 1970 kcal, 127 g protein, and   958 mL free water. Free water flush of 50 cc/hr x 14 hours (provides an additional 700 mL water daily). Adjust free water as per MD to maintain hydration.

## 2023-05-01 NOTE — DIETITIAN INITIAL EVALUATION ADULT - PERTINENT MEDS FT
MEDICATIONS  (STANDING):  cefepime   IVPB 500 milliGRAM(s) IV Intermittent every 12 hours  cefepime   IVPB      chlorhexidine 4% Liquid 1 Application(s) Topical <User Schedule>  dextrose 5%. 1000 milliLiter(s) (100 mL/Hr) IV Continuous <Continuous>  dextrose 5%. 1000 milliLiter(s) (50 mL/Hr) IV Continuous <Continuous>  dextrose 50% Injectable 25 Gram(s) IV Push once  dextrose 50% Injectable 12.5 Gram(s) IV Push once  dextrose 50% Injectable 25 Gram(s) IV Push once  glucagon  Injectable 1 milliGRAM(s) IntraMuscular once  insulin lispro (ADMELOG) corrective regimen sliding scale   SubCutaneous every 6 hours  lactated ringers. 1000 milliLiter(s) (100 mL/Hr) IV Continuous <Continuous>  pantoprazole  Injectable 40 milliGRAM(s) IV Push every 12 hours    MEDICATIONS  (PRN):  dextrose Oral Gel 15 Gram(s) Oral once PRN Blood Glucose LESS THAN 70 milliGRAM(s)/deciliter  ondansetron Injectable 4 milliGRAM(s) IV Push every 6 hours PRN Nausea and/or Vomiting

## 2023-05-01 NOTE — PROGRESS NOTE ADULT - ASSESSMENT
ASSESSMENT  69 yo male with PMHx metastatic neuroendocrine tumor of the pancreas, recently admitted with nonfunctioning GJ tube (patient is chronically obstructed).   Patient has had severe heartburn, and has had several episodes of vomiting small amount of dark blood.     Admitted for   1. Hematemesis   2. metastatic neuroendocrine tumor of pancreas   3. ZO on CKD    PLAN  - mentation intact. pain control prn. avoid NSAIDs  - BP stable  - on room air  - can resume feeds as per GI. hematemesis likely from esophagitis/gastritis. no plan for endoscope  - IV PPI bid  - surgical oncology to speak to patient and family about surgical options  - Cr trending down 2.0 today, baseline appears to be 1.8- 1.9. cont fluid resuscitation with LR @100  - Hgb stable. no chemical DVT ppx in setting d/t bleeding risk. cont SCDs  PT eval    Will discuss with Dr. Jean

## 2023-05-01 NOTE — DIETITIAN INITIAL EVALUATION ADULT - ORAL INTAKE PTA/DIET HISTORY
Reports only receiving EN at home, unable to tolerate pleasure feeds/ anything by mouth. Receives Glucerna 1.5 - 3 cans from 6pm -230 am (continuous through open system) and 1 can at 10 am (provides a total of 1424 kcals, 78g pro, and 720cc free water). Free water flushes of 65 mL/hr. Home TF order is NOT meeting ENN; <75%.   Previously was receiving Glucerna 1.5 @ 100 cc/hr to gain wt but began having bloating; c/o GERD as well

## 2023-05-01 NOTE — DIETITIAN INITIAL EVALUATION ADULT - OTHER INFO
69 YO M PMHx  metastatic neuroendocrine tumor of the pancreas, recently admitted with nonfunctioning GJ tube (patient is chronically obstructed). Patient has had severe heartburn, and has had several episodes of vomiting small amount of dark blood. Patient was discharged on Thursday. Hb unchanged. CT angiography has no active bleeding, but demonstrates extensive tumor in distal pancreas.     Currently, NPO but plan to restart TF today as per surgery. Pt known to RD service - previously met criteria for PCM and continues to meet criteria. Pt reports UBW in Dec 2022 at 174#. Lost a large amount of wt and has been stable ~139- 142# recently as per pt. RD took bed scale wt on 5/1 at 141#. Weight hx reviewed: 131# on 4/22; 139# on 1/7. NFPE reveals severe muscle/ fat wasting. Pt appears thin, frail, bony, cachetic. Plan pending for possible palliative surgery. Pt during previous admit pt c/o TF volume being too high and not tolerating - RD was unable to meet ENN. Will provide TF recs below.

## 2023-05-01 NOTE — DIETITIAN INITIAL EVALUATION ADULT - NSFNSGIIOFT_GEN_A_CORE
I&O's Detail    30 Apr 2023 07:01  -  01 May 2023 07:00  --------------------------------------------------------  IN:    IV PiggyBack: 100 mL    Lactated Ringers: 1150 mL    Pantoprazole: 113.5 mL  Total IN: 1363.5 mL    OUT:    Intermittent Catheterization - Urethral (mL): 1200 mL  Total OUT: 1200 mL    Total NET: 163.5 mL

## 2023-05-01 NOTE — PROGRESS NOTE ADULT - SUBJECTIVE AND OBJECTIVE BOX
Patient is a 68y old  Male who presents with a chief complaint of GIB (01 May 2023 11:35)    Follow up for: GI bleeding    Patient with no further overt bleeding. No abdominal pain. Patient and family with many questions regarding a definitive surgery to assist with the obstruction.       MEDICATIONS  (STANDING):  cefepime   IVPB 500 milliGRAM(s) IV Intermittent every 12 hours  cefepime   IVPB      chlorhexidine 4% Liquid 1 Application(s) Topical <User Schedule>  dextrose 5%. 1000 milliLiter(s) (100 mL/Hr) IV Continuous <Continuous>  dextrose 5%. 1000 milliLiter(s) (50 mL/Hr) IV Continuous <Continuous>  dextrose 50% Injectable 25 Gram(s) IV Push once  dextrose 50% Injectable 12.5 Gram(s) IV Push once  dextrose 50% Injectable 25 Gram(s) IV Push once  glucagon  Injectable 1 milliGRAM(s) IntraMuscular once  insulin lispro (ADMELOG) corrective regimen sliding scale   SubCutaneous every 6 hours  lactated ringers. 1000 milliLiter(s) (100 mL/Hr) IV Continuous <Continuous>  pantoprazole  Injectable 40 milliGRAM(s) IV Push every 12 hours    MEDICATIONS  (PRN):  dextrose Oral Gel 15 Gram(s) Oral once PRN Blood Glucose LESS THAN 70 milliGRAM(s)/deciliter  ondansetron Injectable 4 milliGRAM(s) IV Push every 6 hours PRN Nausea and/or Vomiting      Vital Signs Last 24 Hrs  T(C): 36.7 (01 May 2023 14:09), Max: 36.8 (30 Apr 2023 20:26)  T(F): 98.1 (01 May 2023 14:09), Max: 98.2 (30 Apr 2023 20:26)  HR: 103 (01 May 2023 15:00) (79 - 106)  BP: 126/84 (01 May 2023 15:00) (105/72 - 127/83)  BP(mean): 98 (01 May 2023 15:00) (81 - 99)  RR: 16 (01 May 2023 15:00) (11 - 22)  SpO2: 100% (01 May 2023 15:00) (92% - 100%)    Parameters below as of 01 May 2023 15:00  Patient On (Oxygen Delivery Method): room air        PHYSICAL EXAM:    Constitutional: No acute distress, chronically ill appearing, non-toxic appearing, comfortable lying in chair with family at bedside  HEENT: unmasked, good phonation, not icteric  Neck: supple, no lymphadenopathy  Respiratory: clear to ascultation bilaterally, no wheezing  Cardiovascular: S1 and S2, regular rate and rhythm, no murmurs rubs or gallops  Gastrointestinal: soft, tender to deep palpation, on-distended, +bowel sounds, no rebound or guarding, GJ tube c/d/i  Extremities: No peripheral edema, no cyanosis or clubbing  Vascular: 2+ peripheral pulses, no venous stasis  Neurological: A/O x 3, no focal deficits, no asterixis  Psychiatric: Normal mood, normal affect  Skin: No rashes, not jaundiced    LABS:                        8.2    6.43  )-----------( 123      ( 01 May 2023 04:35 )             26.8     05-01    148<H>  |  116<H>  |  44<H>  ----------------------------<  127<H>  3.5   |  26  |  2.05<H>    Ca    8.8      01 May 2023 04:35  Phos  3.3     05-01  Mg     2.1     05-01    TPro  5.9<L>  /  Alb  2.1<L>  /  TBili  0.3  /  DBili  x   /  AST  103<H>  /  ALT  59  /  AlkPhos  175<H>  04-30      LIVER FUNCTIONS - ( 30 Apr 2023 05:18 )  Alb: 2.1 g/dL / Pro: 5.9 gm/dL / ALK PHOS: 175 U/L / ALT: 59 U/L / AST: 103 U/L / GGT: x

## 2023-05-02 NOTE — CONSULT NOTE ADULT - ASSESSMENT
69 yo male with PMH as above admitted for GIB. Urology consulted for difficult coulter placement.   16 Fr coude coulter placed in a sterile manner. Pt tolerated procedure well. PVR of clear yellow 100 mL urine.  Recommend  - Keep coulter in place while in the hospital  - Discussed with pt he can follow up with Dr. Padron outpatient and discuss learning to intermittently catheterize himself with coude catheters instead of straight tip  - Followup with Dr. Padron outpt    Case discussed with Dr. Mattson

## 2023-05-02 NOTE — PROGRESS NOTE ADULT - ASSESSMENT
ASSESSMENT  67 yo male with PMHx metastatic neuroendocrine tumor of the pancreas, recently admitted with nonfunctioning GJ tube (patient is chronically obstructed).   Patient has had severe heartburn, and has had several episodes of vomiting small amount of dark blood.     Admitted for   1. Hematemesis   2. metastatic neuroendocrine tumor of pancreas   3. ZO on CKD    PLAN  - mentation intact. pain control prn. avoid NSAIDs  - BP stable  - on room air  - cont TF via J tube.   - hematemesis likely from esophagitis/gastritis. no plan for endoscopy at this time  - IV PPI bid  - surgical oncology following- plan for OR Thursday for palliative resection surgery  - Cr trending down 2.0 -> 1.99 today, baseline appears to be 1.8- 1.9. dc IV fluids  - repleted Phos today  - Hgb stable. no chemical DVT ppx in setting d/t bleeding risk. cont SCDs  PT eval    Dispo: SICU. Plan for palliative resection surgery Thursday. IV PPI bid. cont TF    Will discuss with Dr. Jean     ASSESSMENT  69 yo male with PMHx metastatic neuroendocrine tumor of the pancreas, recently admitted with nonfunctioning GJ tube (patient is chronically obstructed).   Patient has had severe heartburn, and has had several episodes of vomiting small amount of dark blood.     Admitted for   1. Hematemesis   2. metastatic neuroendocrine tumor of pancreas   3. ZO on CKD    PLAN  - mentation intact. pain control prn. avoid NSAIDs  - BP stable  - on room air  - cont TF via J tube.   - hematemesis likely from esophagitis/gastritis. no plan for endoscopy at this time  - IV PPI bid  - surgical oncology following- plan for OR Thursday for palliative resection surgery  - Cr trending down 2.0 -> 1.99 today, baseline appears to be 1.8- 1.9. dc IV fluids  - repleted K and Phos today  - Hgb stable. no chemical DVT ppx in setting d/t bleeding risk. cont SCDs  PT eval    Dispo: SICU. Plan for palliative resection surgery Thursday. IV PPI bid. cont TF    Will discuss with Dr. Jean

## 2023-05-02 NOTE — PROGRESS NOTE ADULT - SUBJECTIVE AND OBJECTIVE BOX
Patient was seen and examined at the bedside this morning  No acute events overnight  Pain is better controlled  No nausea or vomiting, spitting small amount of phlegm, no blood  Tolerating tube and passing bowel movements    O/E:  T(C): 36.6 (05-02-23 @ 09:30), Max: 36.8 (05-02-23 @ 06:00)  HR: 80 (05-02-23 @ 08:00) (79 - 106)  BP: 110/78 (05-02-23 @ 08:00) (109/83 - 126/84)  RR: 17 (05-02-23 @ 08:00) (12 - 22)  SpO2: 96% (05-02-23 @ 06:00) (91% - 100%)  Alert, conscious, oriented  No pallor, jaundice or cyanosis  Not in pain or distress  Chest clear, equal air entry bilaterally  Abdomen soft and lax, no tenderness, G-J in place  Extremities: No swelling or tenderness    05-01-23 @ 07:01  -  05-02-23 @ 07:00  --------------------------------------------------------  IN: 3510 mL / OUT: 1050 mL / NET: 2460 mL                          8.7    5.36  )-----------( 151      ( 02 May 2023 05:42 )             28.1   05-02    146<H>  |  115<H>  |  44<H>  ----------------------------<  207<H>  3.4<L>   |  28  |  1.99<H>    Ca    8.9      02 May 2023 05:42  Phos  1.9     05-02  Mg     1.9     05-02    MEDICATIONS  (STANDING):  cefepime   IVPB 500 milliGRAM(s) IV Intermittent every 12 hours  cefepime   IVPB      chlorhexidine 4% Liquid 1 Application(s) Topical <User Schedule>  dextrose 5%. 1000 milliLiter(s) (100 mL/Hr) IV Continuous <Continuous>  dextrose 5%. 1000 milliLiter(s) (50 mL/Hr) IV Continuous <Continuous>  dextrose 50% Injectable 25 Gram(s) IV Push once  dextrose 50% Injectable 12.5 Gram(s) IV Push once  dextrose 50% Injectable 25 Gram(s) IV Push once  glucagon  Injectable 1 milliGRAM(s) IntraMuscular once  insulin lispro (ADMELOG) corrective regimen sliding scale   SubCutaneous every 6 hours  lactated ringers. 1000 milliLiter(s) (100 mL/Hr) IV Continuous <Continuous>  pantoprazole  Injectable 40 milliGRAM(s) IV Push every 12 hours  potassium chloride   Powder 20 milliEquivalent(s) Enteral Tube once  potassium phosphate IVPB 30 milliMole(s) IV Intermittent once    MEDICATIONS  (PRN):  dextrose Oral Gel 15 Gram(s) Oral once PRN Blood Glucose LESS THAN 70 milliGRAM(s)/deciliter  ondansetron Injectable 4 milliGRAM(s) IV Push every 6 hours PRN Nausea and/or Vomiting

## 2023-05-02 NOTE — PROGRESS NOTE ADULT - ASSESSMENT
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  Will need palliative resection    Plan:  Booked for surgery on Thursday  Please preop tomorrow  Rest of management as per primary team    Plan discussed with Dr Siddiqi

## 2023-05-02 NOTE — CHART NOTE - NSCHARTNOTEFT_GEN_A_CORE
Clinical Nutrition TF BRIEF NOTE    * 67 YO M PMHx  metastatic neuroendocrine tumor of the pancreas, recently admitted with nonfunctioning GJ tube (patient is chronically obstructed). Patient has had severe heartburn, and has had several episodes of vomiting small amount of dark blood. Patient was discharged on Thursday. Hb unchanged. CT angiography has no active bleeding, but demonstrates extensive tumor in distal pancreas.     *current status: TF initiated (). Plan for OR Thursday for palliative resection surgery. Received call from PA re: pt having multiple bowel movements overnight and asking to change TF to 24 hours. Please see recommendations below.    *labs reviewed:      146<H>  |  115<H>  |  44<H>  ----------------------------<  207<H>  3.4<L>   |  28  |  1.99<H>    Ca    8.9      02 May 2023 05:42  Phos  1.9       Mg     1.9         *I&O's    23 @ 07:01  -  23 @ 07:00  --------------------------------------------------------  IN:    Enteral Tube Flush: 400 mL    Free Water: 110 mL    Glucerna 1.5: 700 mL    IV PiggyBack: 50 mL    Lactated Ringers: 2250 mL  Total IN: 3510 mL    OUT:    Intermittent Catheterization - Urethral (mL): 1050 mL  Total OUT: 1050 mL    Total NET: 2460 mL    *BM documented (): moderate, soft    *per flowsheet, pt has received 700 mL in past 24hours.  rec'd to change TF Rx to Glucerna 1.5 with a goal rate of 65cc/hr     *ESTIMATED NUTR NEEDS: 63.9 Kg based on bedscale wt taken by RD on   5531-9682 Kcal (30-35 Kcal/Kg)   g protein (1.5-2 g/Kg)  8102-6133 mL (25-30 mL/Kg)    Daily Weight in k.8 (02 May 2023 06:00)    RECOMMENDATIONS:  1) Initiate Glucerna 1.5 @ 20 cc/hr, increase by 10 cc/hr q6 hours until goal rate of 65cc/hr met with 2 packets of Prosource TF. Will provide ~ 2030 kcal, 129 g protein, and 987 mL free water.   2) monitor hydration status; adjust free water flushes prn, consider free water flushes of 40cc/hr (which provides ~ 960 mL of water per day)  3) monitor TF tolerance; keep back of bed > 35 degrees  4) monitor BM: if > 3 days without BM, add bowel regimen prn  5) daily wt checks to track/trend changes  6) Consider adding thiamine 100 mg daily 2/2 poor PO intake/ malnutrition  7) Monitor and replete lytes PRN     *will continue to monitor and adjust treatment plan prn*  Tamra Hernandez MS, RDN, [phone]

## 2023-05-02 NOTE — CONSULT NOTE ADULT - SUBJECTIVE AND OBJECTIVE BOX
HPI:  S:    Pt seen and examined  HD # 1  FULL CODE  PMHx  metastatic neuroendocrine tumor of the pancreas, recently admitted with nonfunctioning GJ tube (patient is chronically obstructed).   Patient has had severe heartburn, and has had several episodes of vomiting small amount of dark blood. Patient was discharged on Thursday. Hb unchanged. CT angiography has no active bleeding, but demonstrates extensive tumor in distal pancreas.     ICU consult for UGIB    4/29: Lying in bed, awake, tachycardic.    (29 Apr 2023 12:17)    69 yo male with PMH as above admitted for GIB. Urology consulted for difficult coulter placement. Pt seen at bedside reports he follows up with Dr. Padron for urinary retention and intermittently catheterizes himself 3-4x day. Patient states recently he was noticing some resistance when he was CICing and today upon catheterizing himself he noted bloody urine with clots but denies any pain with it. Primary team did not feel comfortable with catheterizing pt and called urology for coude coulter placement. Pt denies any abd/flank pain, fevers, chills, dysuria or hematuria otherwise.     PAST MEDICAL & SURGICAL HISTORY:  Hypertension      BPH (benign prostatic hyperplasia)      Renal insufficiency      Light chain nephropathy      DM (diabetes mellitus)      HLD (hyperlipidemia)      No significant past surgical history          REVIEW OF SYSTEMS  All other review of systems neg, except as noted in HPI	    MEDICATIONS  (STANDING):  cefepime   IVPB 500 milliGRAM(s) IV Intermittent every 12 hours  cefepime   IVPB      chlorhexidine 4% Liquid 1 Application(s) Topical <User Schedule>  dextrose 5%. 1000 milliLiter(s) (100 mL/Hr) IV Continuous <Continuous>  dextrose 5%. 1000 milliLiter(s) (50 mL/Hr) IV Continuous <Continuous>  dextrose 50% Injectable 25 Gram(s) IV Push once  dextrose 50% Injectable 12.5 Gram(s) IV Push once  dextrose 50% Injectable 25 Gram(s) IV Push once  glucagon  Injectable 1 milliGRAM(s) IntraMuscular once  insulin lispro (ADMELOG) corrective regimen sliding scale   SubCutaneous every 6 hours  lactated ringers. 1000 milliLiter(s) (100 mL/Hr) IV Continuous <Continuous>  pantoprazole  Injectable 40 milliGRAM(s) IV Push every 12 hours    MEDICATIONS  (PRN):  dextrose Oral Gel 15 Gram(s) Oral once PRN Blood Glucose LESS THAN 70 milliGRAM(s)/deciliter  ondansetron Injectable 4 milliGRAM(s) IV Push every 6 hours PRN Nausea and/or Vomiting      Allergies    losartan (Angioedema)    Intolerances        SOCIAL HISTORY:    FAMILY HISTORY:  FH: breast cancer (Mother)    FH: aneurysm (Father)        Vital Signs Last 24 Hrs  T(C): 36.7 (02 May 2023 14:24), Max: 36.8 (02 May 2023 06:00)  T(F): 98.1 (02 May 2023 14:24), Max: 98.2 (02 May 2023 06:00)  HR: 93 (02 May 2023 14:00) (80 - 106)  BP: 115/86 (02 May 2023 14:00) (92/68 - 122/84)  BP(mean): 96 (02 May 2023 14:00) (76 - 97)  RR: 22 (02 May 2023 14:00) (13 - 22)  SpO2: 96% (02 May 2023 06:00) (91% - 97%)    Parameters below as of 01 May 2023 18:00  Patient On (Oxygen Delivery Method): room air        PHYSICAL EXAM:    General: No distress, No anxiety  VITALS  T(C): 36.7 (05-02-23 @ 14:24), Max: 36.8 (05-02-23 @ 06:00)  HR: 93 (05-02-23 @ 14:00) (80 - 106)  BP: 115/86 (05-02-23 @ 14:00) (92/68 - 122/84)  RR: 22 (05-02-23 @ 14:00) (13 - 22)  SpO2: 96% (05-02-23 @ 06:00) (91% - 97%)            HEENT: Normocephalic, no icterus, No epistaxis  Lung    : No resp distress  Abdo:   : Soft, Non tender, No guarding, No distension   Back    : No CVAT b/l  Genitalia Male: No Coulter  Neuro   : A&Ox3      LABS:                        9.7    5.26  )-----------( 168      ( 02 May 2023 13:03 )             31.2     05-02    146<H>  |  115<H>  |  44<H>  ----------------------------<  207<H>  3.4<L>   |  28  |  1.99<H>    Ca    8.9      02 May 2023 05:42  Phos  1.9     05-02  Mg     1.9     05-02            RADIOLOGY & ADDITIONAL STUDIES:  < from: CT Abdomen and Pelvis w/ IV Cont (04.29.23 @ 10:08) >  ACC: [id number] EXAM:  CT ABDOMEN AND PELVIS IC   ORDERED BY: JOSEILTO NGUYEN     PROCEDURE DATE:  04/29/2023          INTERPRETATION:  CLINICAL INFORMATION: Upper GI bleed. History of gastric   cancer, GJ tube.    COMPARISON: 04/17/2023.    CONTRAST/COMPLICATIONS:  IV Contrast: Omnipaque 350  90 cc administered   10 cc discarded  Oral Contrast: NONE  Complications: None reported at time of study completion    PROCEDURE:  CT of the Abdomen and Pelvis was performed.  Precontrast, Arterial andDelayed phases were performed.  Sagittal and coronal reformats were performed.    FINDINGS:  LOWER CHEST: No lung nodules. Ill-defined increased haziness in the   paraesophageal region. Coronary artery calcification.    LIVER: Numerous hepatic metastases. A previously referenced confluent   lesion involving the segment 8 measures 9 x 8.7 cm, previously 7.9 x 8.3   cm (4:19).  BILE DUCTS: Normal caliber.  GALLBLADDER: Within normal limits.  SPLEEN: Within normal limits.  PANCREAS: Heterogeneously enhancing mass involving the distal body and   the tail of the pancreas measuring approximately 9.8 x 7 cm, previously   8.3 x 7.9 cm. Noncontrast imaging demonstrates hyperdense foci within the   lower portion of this mass that may be related to hemorrhagic content.   The mass cannot be completely  from the spleen and the stomach.  ADRENALS: Within normal limits.  KIDNEYS/URETERS: 2 mm nonobstructing calculus lower pole of the left   kidney. No hydronephrosis.    BLADDER: Within normal limits.  REPRODUCTIVE ORGANS: Prostate is enlarged.    BOWEL: No bowel obstruction. Redemonstration of a gastrojejunostomy in   stable position. No endoluminal extravasation or pooling of the contrast   to suggest an active GI bleed.  PERITONEUM: No ascites.  VESSELS: Atherosclerotic changes. Probable occlusion of the splenic vein   with evidence of associated collateral circulation. Patent portal veins.  RETROPERITONEUM/LYMPH NODES: No lymphadenopathy.  ABDOMINAL WALL: Gastrostomy tube.  BONES: Degenerative changes.    IMPRESSION:  No evidence of an active GI bleed.  Large pancreatic mass and extensive hepatic metastases.  Gastrojejunostomy.        --- End of Report ---            ANUJ WOOD MD; Attending Radiologist  This document has been electronically signed. Apr 29 2023 11:07AM    < end of copied text >

## 2023-05-02 NOTE — PROGRESS NOTE ADULT - SUBJECTIVE AND OBJECTIVE BOX
Patient is a 68y old  Male who presents with a chief complaint of Gastrointestinal hemorrhage (01 May 2023 10:54)      BRIEF HOSPITAL COURSE: 67 yo male with PMHx metastatic neuroendocrine tumor of the pancreas, recently admitted with nonfunctioning GJ tube (patient is chronically obstructed).   Patient has had severe heartburn, and has had several episodes of vomiting small amount of dark blood. Patient was discharged on Thursday. Hgb unchanged. CT angiography has no active bleeding, but demonstrates extensive tumor in distal pancreas.     5/1 pt feeling slightly nauseous, denies abd pain.  5/2 pt c/o frequent BM overnight causing him to not sleep well. denies abd pain , nausea    PAST MEDICAL & SURGICAL HISTORY:  Hypertension  BPH (benign prostatic hyperplasia)  Renal insufficiency  Light chain nephropathy  DM (diabetes mellitus)  HLD (hyperlipidemia)  No significant past surgical history      MEDICATIONS  (STANDING):  cefepime   IVPB 500 milliGRAM(s) IV Intermittent every 12 hours  cefepime   IVPB      chlorhexidine 4% Liquid 1 Application(s) Topical <User Schedule>  dextrose 5%. 1000 milliLiter(s) (100 mL/Hr) IV Continuous <Continuous>  dextrose 5%. 1000 milliLiter(s) (50 mL/Hr) IV Continuous <Continuous>  dextrose 50% Injectable 25 Gram(s) IV Push once  dextrose 50% Injectable 12.5 Gram(s) IV Push once  dextrose 50% Injectable 25 Gram(s) IV Push once  glucagon  Injectable 1 milliGRAM(s) IntraMuscular once  insulin lispro (ADMELOG) corrective regimen sliding scale   SubCutaneous every 6 hours  lactated ringers. 1000 milliLiter(s) (100 mL/Hr) IV Continuous <Continuous>  pantoprazole  Injectable 40 milliGRAM(s) IV Push every 12 hours    Vital Signs Last 24 Hrs  T(C): 36.6 (02 May 2023 09:30), Max: 36.8 (02 May 2023 06:00)  T(F): 97.8 (02 May 2023 09:30), Max: 98.2 (02 May 2023 06:00)  HR: 82 (02 May 2023 10:05) (80 - 106)  BP: 112/77 (02 May 2023 10:05) (109/83 - 126/84)  BP(mean): 88 (02 May 2023 10:05) (86 - 98)  RR: 13 (02 May 2023 10:05) (13 - 22)  SpO2: 96% (02 May 2023 06:00) (91% - 100%)    Parameters below as of 01 May 2023 18:00  Patient On (Oxygen Delivery Method): room air      I&O's Detail    01 May 2023 07:01  -  02 May 2023 07:00  --------------------------------------------------------  IN:    Enteral Tube Flush: 400 mL    Free Water: 110 mL    Glucerna 1.5: 700 mL    IV PiggyBack: 50 mL    Lactated Ringers: 2250 mL  Total IN: 3510 mL    OUT:    Intermittent Catheterization - Urethral (mL): 1050 mL  Total OUT: 1050 mL    Total NET: 2460 mL    LABS:                                  8.7    5.36  )-----------( 151      ( 02 May 2023 05:42 )             28.1     05-02    146<H>  |  115<H>  |  44<H>  ----------------------------<  207<H>  3.4<L>   |  28  |  1.99<H>    Ca    8.9      02 May 2023 05:42  Phos  1.9     05-02  Mg     1.9     05-02      CAPILLARY BLOOD GLUCOSE  POCT Blood Glucose.: 121 mg/dL (01 May 2023 11:32)      Urinalysis Basic - ( 2023 15:55 )    Color: Yellow / Appearance: Clear / S.010 / pH: x  Gluc: x / Ketone: Negative  / Bili: Negative / Urobili: Negative   Blood: x / Protein: 100 / Nitrite: Negative   Leuk Esterase: Negative / RBC: 3-5 /HPF / WBC 3-5 /HPF   Sq Epi: x / Non Sq Epi: x / Bacteria: Moderate      CULTURES:  Culture Results:   No growth to date. ( @ 10:35)  Culture Results:   No growth to date. ( @ 10:35)  Rapid RVP Result: NotDetec ( @ 08:36)      Physical Examination:    General: No acute distress.    PULM: Clear to auscultation bilaterally, no wheezing  CVS: Regular rate and rhythm, no murmurs  ABD: Soft, nondistended, nontender, normoactive bowel sounds, J tube in place  EXT: No edema, nontender  SKIN: Warm and well perfused, no rashes noted.  NEURO: Alert, oriented, interactive, nonfocal    DEVICES:   J tube    RADIOLOGY:   < from: CT Abdomen and Pelvis w/ IV Cont (23 @ 10:08) >  IMPRESSION:  No evidence of an active GI bleed.  Large pancreatic mass and extensive hepatic metastases.  Gastrojejunostomy.    < end of copied text >

## 2023-05-02 NOTE — PROGRESS NOTE ADULT - NS ATTEND AMEND GEN_ALL_CORE FT
Admitted for   1. Hematemesis   2. metastatic neuroendocrine tumor of pancreas   3. ZO on CKD      Plan:  SICU  Continue Feeds  OR Thursday for Palliative Surgery  PPI  Replace rod Schmidt

## 2023-05-03 NOTE — PROGRESS NOTE ADULT - ASSESSMENT
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  Intermittent UGI bleeding, possibly from gastric involvement by the tumor    Plan:  Patient is booked for surgery tomorrow  Preop labs tonight  Keep NPO after midnight tonight  Continue same medications    Plan discussed with Dr Siddiqi

## 2023-05-03 NOTE — CONSULT NOTE ADULT - SUBJECTIVE AND OBJECTIVE BOX
Chief complaints. admitted on  with GIB    HPI:  69 yo man with Neuroendocrine tumor of pancreas with mets to liver dx'd 6 years ago    67 y/o M PMHx significant for neuroendocrine tumor diagnosed 6 years ago with mets to liver with associated chronic dysphagia with GJ tube (follows with Primary Onc Dr Llamas), HTN, HLD, T2DM, BPH and light chain nephropathy presented to the ED with clogged J tube. He admitted that the tube feeds stopped, unable to take PO including pleasure feeds, liquids and medications. In the ED, IR consulted with successful clearance of clogged tube with patent tube after. Patient reported ongoing weakness/lethargy but denied fever, chills, syncope, dizziness, CP, palpitations, SOB/LEBLANC, wheezing, melena, BRBPR, dysuria or any other acute c/o at the time. (2023 21:49)    History of Present Illness:  Reason for Admission: GIB  History of Present Illness:   S:    Pt seen and examined  HD # 1  FULL CODE  PMHx  metastatic neuroendocrine tumor of the pancreas, recently admitted with nonfunctioning GJ tube (patient is chronically obstructed).   Patient has had severe heartburn, and has had several episodes of vomiting small amount of dark blood. Patient was discharged on Thursday. Hb unchanged. CT angiography has no active bleeding, but demonstrates extensive tumor in distal pancreas.     ICU consult for UGIB    : Lying in bed, awake, tachycardic.   PMHX and PSHX.  --Neuroendocrine tumor of pancreas with mets to liver --dx 6 years ago.  --GJ tube for nutrition in 2023 due to chronic dysphagia.  --Hx of HTN  --Hx of DM  --    Neuroendocrine tumor    Hypertension    BPH (benign prostatic hyperplasia)    Renal insufficiency    Light chain nephropathy    DM (diabetes mellitus)    HLD (hyperlipidemia)    GJ tube placement          Home Medications:   * Patient Currently Takes Medications as of 2023 15:16 documented in Structured Notes  · 	Xanax 0.25 mg oral tablet: 1 tab(s) orally 2 times a day as needed for  anxiety MDD: 2 tabs  · 	cefdinir 250 mg/5 mL oral liquid: 6 milliliter(s) orally 2 times a day    FAMILY HISTORY:  FH: breast cancer (Mother)  FH: aneurysm (Father)    SOCIAL HISTORY :  Remote former smoker    Allergies :  losartan (Angioedema)    REVIEW OF SYSTEMS :  comfortable  denies SOB  Denies abdominal pain      MEDICATIONS  (STANDING):  cefepime   IVPB 500 milliGRAM(s) IV Intermittent every 12 hours  cefepime   IVPB      chlorhexidine 4% Liquid 1 Application(s) Topical <User Schedule>  dextrose 5%. 1000 milliLiter(s) (50 mL/Hr) IV Continuous <Continuous>  dextrose 5%. 1000 milliLiter(s) (100 mL/Hr) IV Continuous <Continuous>  dextrose 50% Injectable 25 Gram(s) IV Push once  dextrose 50% Injectable 12.5 Gram(s) IV Push once  dextrose 50% Injectable 25 Gram(s) IV Push once  glucagon  Injectable 1 milliGRAM(s) IntraMuscular once  insulin lispro (ADMELOG) corrective regimen sliding scale   SubCutaneous every 6 hours  pantoprazole  Injectable 40 milliGRAM(s) IV Push every 12 hours    MEDICATIONS  (PRN):  dextrose Oral Gel 15 Gram(s) Oral once PRN Blood Glucose LESS THAN 70 milliGRAM(s)/deciliter  ondansetron Injectable 4 milliGRAM(s) IV Push every 6 hours PRN Nausea and/or Vomiting         Vital Signs Last 24 Hrs  T(C): 36.6 (03 May 2023 08:10), Max: 36.8 (03 May 2023 06:00)  T(F): 97.8 (03 May 2023 08:10), Max: 98.2 (03 May 2023 06:00)  HR: 99 (03 May 2023 08:00) (80 - 110)  BP: 118/80 (03 May 2023 08:00) (92/68 - 130/99)  BP(mean): 91 (03 May 2023 08:00) (66 - 110)  RR: 15 (03 May 2023 08:00) (13 - 22)  SpO2: 100% (03 May 2023 08:00) (95% - 100%)      Daily     Daily Weight in k (03 May 2023 06:00)  I&O's Summary    02 May 2023 07:01  -  03 May 2023 07:00  --------------------------------------------------------  IN: 490 mL / OUT: 1050 mL / NET: -560 mL        PHYSICAL EXAM:  GEN:   HEENT:   NECK   CV:   LUNGS:   ABD:   EXT:     LABS:                        9.7    5.09  )-----------( 185      ( 03 May 2023 06:21 )             31.5     05-03    147<H>  |  116<H>  |  37<H>  ----------------------------<  167<H>  4.3   |  27  |  2.01<H>    Ca    9.4      03 May 2023 06:21  Phos  2.7     -  Mg     2.0     -      PT/INR - ( 03 May 2023 06:21 )   PT: 12.8 sec;   INR: 1.10 ratio             Magnesium, Serum: 2.0 mg/dL ( @ 06:21)  Phosphorus Level, Serum: 2.7 mg/dL ( @ 06:21)       Chief complaints. admitted on  with GIB    HPI:  69 yo man with Neuroendocrine tumor of pancreas with mets to liver dx'd 6 years ago.  Treated with Lutathera one year ago and restarted in 2022  G-J tube placed for nutrition in January due to chronic dysphagia and severe esophagitis on EGD.  Post recent  admission due to G tube clogging.  D/mansi home on .    Returned to ED due to vomiting when night time enteral feeding via G tube started.  CT with IVC done due to concern for GIB.  Pt reports chronic self catheterization due to urinary retention.   ZO improved when catheterization started.  Follows mainly with EMELY Cortez.   Pt has been self cath'ing 3-4 x /day.   Indwelling Hale placed by EMELY due to resistance with blood clots with resistance reported by pt.  For palliative mass resection tomorrow.    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  Creat has been variable during previous admissions--ranging 1.1-2.2.    PMHX and PSHX.  --Neuroendocrine tumor of pancreas with mets to liver --dx 6 years ago.  --GJ tube for nutrition in 2023 due to chronic dysphagia.  --Hx of HTN  --Hx of DM  --Hx of light chain disease --unclear hx and no hx of renal biopsy.    Home Medications:   * Patient Currently Takes Medications as of 2023 15:16 documented in Structured Notes  · 	Xanax 0.25 mg oral tablet: 1 tab(s) orally 2 times a day as needed for  anxiety MDD: 2 tabs  · 	cefdinir 250 mg/5 mL oral liquid: 6 milliliter(s) orally 2 times a day    FAMILY HISTORY:  FH: breast cancer (Mother)  FH: aneurysm (Father)    SOCIAL HISTORY :  Remote former smoker    Allergies :  losartan (Angioedema)    REVIEW OF SYSTEMS :  comfortable  denies SOB  Denies abdominal pain      MEDICATIONS  (STANDING):  cefepime   IVPB 500 milliGRAM(s) IV Intermittent every 12 hours  cefepime   IVPB      chlorhexidine 4% Liquid 1 Application(s) Topical <User Schedule>  dextrose 5%. 1000 milliLiter(s) (50 mL/Hr) IV Continuous <Continuous>  dextrose 5%. 1000 milliLiter(s) (100 mL/Hr) IV Continuous <Continuous>  dextrose 50% Injectable 25 Gram(s) IV Push once  dextrose 50% Injectable 12.5 Gram(s) IV Push once  dextrose 50% Injectable 25 Gram(s) IV Push once  glucagon  Injectable 1 milliGRAM(s) IntraMuscular once  insulin lispro (ADMELOG) corrective regimen sliding scale   SubCutaneous every 6 hours  pantoprazole  Injectable 40 milliGRAM(s) IV Push every 12 hours    MEDICATIONS  (PRN):  dextrose Oral Gel 15 Gram(s) Oral once PRN Blood Glucose LESS THAN 70 milliGRAM(s)/deciliter  ondansetron Injectable 4 milliGRAM(s) IV Push every 6 hours PRN Nausea and/or Vomiting         Vital Signs Last 24 Hrs  T(C): 36.6 (03 May 2023 08:10), Max: 36.8 (03 May 2023 06:00)  T(F): 97.8 (03 May 2023 08:10), Max: 98.2 (03 May 2023 06:00)  HR: 99 (03 May 2023 08:00) (80 - 110)  BP: 118/80 (03 May 2023 08:00) (92/68 - 130/99)  BP(mean): 91 (03 May 2023 08:00) (66 - 110)  RR: 15 (03 May 2023 08:00) (13 - 22)  SpO2: 100% (03 May 2023 08:00) (95% - 100%)      Daily     Daily Weight in k (03 May 2023 06:00)  I&O's Summary    02 May 2023 07:01  -  03 May 2023 07:00  --------------------------------------------------------  IN: 490 mL / OUT: 1050 mL / NET: -560 mL    PHYSICAL EXAM:  GEN: No distress  HEENT: WNL  NECK : supple  CV: S1S2 RRR  LUNGS: clear to aus  ABD: soft  EXT: no edema    LABS:                        9.7    5.09  )-----------( 185      ( 03 May 2023 06:21 )             31.5     05-03    147<H>  |  116<H>  |  37<H>  ----------------------------<  167<H>  4.3   |  27  |  2.01<H>    Ca    9.4      03 May 2023 06:21  Phos  2.7       Mg     2.0           PT/INR - ( 03 May 2023 06:21 )   PT: 12.8 sec;   INR: 1.10 ratio             Magnesium, Serum: 2.0 mg/dL ( @ 06:21)  Phosphorus Level, Serum: 2.7 mg/dL ( @ 06:21)

## 2023-05-03 NOTE — PROGRESS NOTE ADULT - NS ATTEND AMEND GEN_ALL_CORE FT
Admitted for   1. Hematemesis   2. metastatic neuroendocrine tumor of pancreas   3. ZO on CKD    Plan:  SICU  NPO  TF  NPO after MN  OR 5/4  PPI BID  H & H stable  Hematuria resolved

## 2023-05-03 NOTE — PROGRESS NOTE ADULT - SUBJECTIVE AND OBJECTIVE BOX
Patient was seen and examined at the bedside this morning  No acute events overnight  Pain is better controlled  No nausea or vomiting  Tolerating tube feeds    O/E:  T(C): 36.6 (05-03-23 @ 08:10), Max: 36.8 (05-03-23 @ 06:00)  HR: 99 (05-03-23 @ 08:00) (80 - 110)  BP: 118/80 (05-03-23 @ 08:00) (92/68 - 130/99)  RR: 15 (05-03-23 @ 08:00) (13 - 22)  SpO2: 100% (05-03-23 @ 08:00) (95% - 100%)  Alert, conscious, oriented  No pallor, jaundice or cyanosis  Not in pain or distress  Chest clear, equal air entry bilaterally  Abdomen soft and lax, no tenderness, G-J tube in place with no redness or discharge  Extremities: No swelling or tenderness    05-02-23 @ 07:01  -  05-03-23 @ 07:00  --------------------------------------------------------  IN: 490 mL / OUT: 1050 mL / NET: -560 mL                            9.7    5.09  )-----------( 185      ( 03 May 2023 06:21 )             31.5   05-03    147<H>  |  116<H>  |  37<H>  ----------------------------<  167<H>  4.3   |  27  |  2.01<H>    Ca    9.4      03 May 2023 06:21  Phos  2.7     05-03  Mg     2.0     05-03    MEDICATIONS  (PRN):  dextrose Oral Gel 15 Gram(s) Oral once PRN Blood Glucose LESS THAN 70 milliGRAM(s)/deciliter  ondansetron Injectable 4 milliGRAM(s) IV Push every 6 hours PRN Nausea and/or Vomiting  MEDICATIONS  (STANDING):  cefepime   IVPB 500 milliGRAM(s) IV Intermittent every 12 hours  cefepime   IVPB      chlorhexidine 4% Liquid 1 Application(s) Topical <User Schedule>  dextrose 5%. 1000 milliLiter(s) (100 mL/Hr) IV Continuous <Continuous>  dextrose 5%. 1000 milliLiter(s) (50 mL/Hr) IV Continuous <Continuous>  dextrose 50% Injectable 25 Gram(s) IV Push once  dextrose 50% Injectable 12.5 Gram(s) IV Push once  dextrose 50% Injectable 25 Gram(s) IV Push once  glucagon  Injectable 1 milliGRAM(s) IntraMuscular once  insulin lispro (ADMELOG) corrective regimen sliding scale   SubCutaneous every 6 hours  pantoprazole  Injectable 40 milliGRAM(s) IV Push every 12 hours  sodium chloride 0.45%. 1000 milliLiter(s) (75 mL/Hr) IV Continuous <Continuous>

## 2023-05-03 NOTE — CONSULT NOTE ADULT - ASSESSMENT
67 yo man with Hx of Neuroendocrine tumor/Pancreatic mass and liver mets.  Now for palliative resection of pancreatic mass.    --ZO with variable creat level and unclear CKD.    Likely dependent on volume status.    Limited intake even with Enteral feeding.      Start gentle hydration.  -- : continue Hale drainage.  --Hx of light chain nephropathy  --No contraindication for surgery from renal standpoint.

## 2023-05-03 NOTE — PROGRESS NOTE ADULT - ASSESSMENT
ASSESSMENT  67 yo male with PMHx metastatic neuroendocrine tumor of the pancreas, recently admitted with nonfunctioning GJ tube (patient is chronically obstructed).   Patient has had severe heartburn, and has had several episodes of vomiting small amount of dark blood.     Admitted for   1. Hematemesis   2. metastatic neuroendocrine tumor of pancreas   3. ZO on CKD    PLAN  - mentation intact. pain control prn. avoid NSAIDs  - BP stable  - on room air  - cont TF via J tube. NPO after MN  - hematemesis likely from esophagitis/gastritis. no plan for endoscopy at this time  - IV PPI bid  - surgical oncology following- plan for OR tomrrow for palliative resection surgery  - Cr stable 2.0 today, baseline appears to be 1.8- 1.9. dc IV fluids.   - coulter placed for hematuria.  - Hgb stable. no chemical DVT ppx in setting d/t bleeding risk. cont SCDs  PT eval    Dispo: SICU. Plan for palliative resection surgery tomrrow. IV PPI bid. cont TF. NPO after MN    Will discuss with Dr. Jean     ASSESSMENT  69 yo male with PMHx metastatic neuroendocrine tumor of the pancreas, recently admitted with nonfunctioning GJ tube (patient is chronically obstructed).   Patient has had severe heartburn, and has had several episodes of vomiting small amount of dark blood.     Admitted for   1. Hematemesis   2. metastatic neuroendocrine tumor of pancreas   3. ZO on CKD    PLAN  - mentation intact. pain control prn. avoid NSAIDs  - BP stable  - on room air  - cont TF via J tube. NPO after MN  - hematemesis likely from esophagitis/gastritis. no plan for endoscopy at this time  - cont IV PPI bid  - surgical oncology following- plan for OR tomorrow for palliative resection surgery  - Cr stable 2.0 today, baseline appears to be 1.8- 1.9. hypernatremia, started on 1/2 NS as per nephro   - coulter placed for hematuria which has now resolved, likely d/t trauma from self straight cath  - Hgb stable. no chemical DVT ppx in setting d/t bleeding risk. cont SCDs  PT eval    Dispo: SICU. Plan for palliative resection surgery tomorrow. IV PPI bid. cont TF. NPO after MN    Will discuss with Dr. Jean

## 2023-05-03 NOTE — PROGRESS NOTE ADULT - SUBJECTIVE AND OBJECTIVE BOX
Patient is a 68y old  Male who presents with a chief complaint of Gastrointestinal hemorrhage (01 May 2023 10:54)      BRIEF HOSPITAL COURSE: 69 yo male with PMHx metastatic neuroendocrine tumor of the pancreas, recently admitted with nonfunctioning GJ tube (patient is chronically obstructed).   Patient has had severe heartburn, and has had several episodes of vomiting small amount of dark blood. Patient was discharged on Thursday. Hgb unchanged. CT angiography has no active bleeding, but demonstrates extensive tumor in distal pancreas.     5/1 pt feeling slightly nauseous, denies abd pain.  5/2 pt c/o frequent BM overnight causing him to not sleep well. denies abd pain , nausea  5/3    PAST MEDICAL & SURGICAL HISTORY:  Hypertension  BPH (benign prostatic hyperplasia)  Renal insufficiency  Light chain nephropathy  DM (diabetes mellitus)  HLD (hyperlipidemia)  No significant past surgical history      MEDICATIONS  (STANDING):  cefepime   IVPB 500 milliGRAM(s) IV Intermittent every 12 hours  cefepime   IVPB      chlorhexidine 4% Liquid 1 Application(s) Topical <User Schedule>  dextrose 5%. 1000 milliLiter(s) (100 mL/Hr) IV Continuous <Continuous>  dextrose 5%. 1000 milliLiter(s) (50 mL/Hr) IV Continuous <Continuous>  dextrose 50% Injectable 25 Gram(s) IV Push once  dextrose 50% Injectable 12.5 Gram(s) IV Push once  dextrose 50% Injectable 25 Gram(s) IV Push once  glucagon  Injectable 1 milliGRAM(s) IntraMuscular once  insulin lispro (ADMELOG) corrective regimen sliding scale   SubCutaneous every 6 hours  pantoprazole  Injectable 40 milliGRAM(s) IV Push every 12 hours  sodium chloride 0.45%. 1000 milliLiter(s) (75 mL/Hr) IV Continuous <Continuous>    Vital Signs Last 24 Hrs  T(C): 36.6 (03 May 2023 08:10), Max: 36.8 (03 May 2023 06:00)  T(F): 97.8 (03 May 2023 08:10), Max: 98.2 (03 May 2023 06:00)  HR: 99 (03 May 2023 08:00) (80 - 110)  BP: 118/80 (03 May 2023 08:00) (92/68 - 130/99)  BP(mean): 91 (03 May 2023 08:00) (66 - 110)  RR: 15 (03 May 2023 08:00) (13 - 22)  SpO2: 100% (03 May 2023 08:00) (95% - 100%)      I&O's Detail    02 May 2023 07:01  -  03 May 2023 07:00  --------------------------------------------------------  IN:    Enteral Tube Flush: 320 mL    Glucerna 1.5: 170 mL  Total IN: 490 mL    OUT:    Intermittent Catheterization - Urethral (mL): 450 mL    Voided (mL): 600 mL  Total OUT: 1050 mL    Total NET: -560 mL          LABS:                                      9.7    5.09  )-----------( 185      ( 03 May 2023 06:21 )             31.5     05-03    147<H>  |  116<H>  |  37<H>  ----------------------------<  167<H>  4.3   |  27  |  2.01<H>    Ca    9.4      03 May 2023 06:21  Phos  2.7     05-03  Mg     2.0     05-03      PT/INR - ( 03 May 2023 06:21 )   PT: 12.8 sec;   INR: 1.10 ratio           CULTURES:  Culture Results:   No growth to date. (04-29 @ 10:35)  Culture Results:   No growth to date. (04-29 @ 10:35)  Rapid RVP Result: NotDetec (04-29 @ 08:36)      Physical Examination:    General: No acute distress.    PULM: Clear to auscultation bilaterally, no wheezing  CVS: Regular rate and rhythm, no murmurs  ABD: Soft, nondistended, nontender, normoactive bowel sounds, J tube in place. coulter in place  EXT: No edema, nontender  SKIN: Warm and well perfused, no rashes noted.  NEURO: Alert, oriented, interactive, nonfocal    DEVICES:   J tube  coulter    RADIOLOGY:   < from: CT Abdomen and Pelvis w/ IV Cont (04.29.23 @ 10:08) >  IMPRESSION:  No evidence of an active GI bleed.  Large pancreatic mass and extensive hepatic metastases.  Gastrojejunostomy.    < end of copied text >       Patient is a 68y old  Male who presents with a chief complaint of Gastrointestinal hemorrhage (01 May 2023 10:54)      BRIEF HOSPITAL COURSE: 67 yo male with PMHx metastatic neuroendocrine tumor of the pancreas, recently admitted with nonfunctioning GJ tube (patient is chronically obstructed).   Patient has had severe heartburn, and has had several episodes of vomiting small amount of dark blood. Patient was discharged on Thursday. Hgb unchanged. CT angiography has no active bleeding, but demonstrates extensive tumor in distal pancreas.     5/1 pt feeling slightly nauseous, denies abd pain.  5/2 pt c/o frequent BM overnight causing him to not sleep well. denies abd pain , nausea  5/3 pt reports feeling well, hematuria resolved. coulter in place. not having as many BMs anymore.     PAST MEDICAL & SURGICAL HISTORY:  Hypertension  BPH (benign prostatic hyperplasia)  Renal insufficiency  Light chain nephropathy  DM (diabetes mellitus)  HLD (hyperlipidemia)  No significant past surgical history      MEDICATIONS  (STANDING):  cefepime   IVPB 500 milliGRAM(s) IV Intermittent every 12 hours  cefepime   IVPB      chlorhexidine 4% Liquid 1 Application(s) Topical <User Schedule>  dextrose 5%. 1000 milliLiter(s) (100 mL/Hr) IV Continuous <Continuous>  dextrose 5%. 1000 milliLiter(s) (50 mL/Hr) IV Continuous <Continuous>  dextrose 50% Injectable 25 Gram(s) IV Push once  dextrose 50% Injectable 12.5 Gram(s) IV Push once  dextrose 50% Injectable 25 Gram(s) IV Push once  glucagon  Injectable 1 milliGRAM(s) IntraMuscular once  insulin lispro (ADMELOG) corrective regimen sliding scale   SubCutaneous every 6 hours  pantoprazole  Injectable 40 milliGRAM(s) IV Push every 12 hours  sodium chloride 0.45%. 1000 milliLiter(s) (75 mL/Hr) IV Continuous <Continuous>    Vital Signs Last 24 Hrs  T(C): 36.6 (03 May 2023 08:10), Max: 36.8 (03 May 2023 06:00)  T(F): 97.8 (03 May 2023 08:10), Max: 98.2 (03 May 2023 06:00)  HR: 89 (03 May 2023 12:00) (80 - 110)  BP: 102/70 (03 May 2023 12:00) (92/68 - 130/99)  BP(mean): 81 (03 May 2023 12:00) (66 - 110)  RR: 15 (03 May 2023 12:00) (13 - 22)  SpO2: 98% (03 May 2023 12:00) (95% - 100%)      I&O's Detail    02 May 2023 07:01  -  03 May 2023 07:00  --------------------------------------------------------  IN:    Enteral Tube Flush: 320 mL    Glucerna 1.5: 170 mL  Total IN: 490 mL    OUT:    Intermittent Catheterization - Urethral (mL): 450 mL    Voided (mL): 600 mL  Total OUT: 1050 mL    Total NET: -560 mL      LABS:                               9.7    5.09  )-----------( 185      ( 03 May 2023 06:21 )             31.5     05-03    147<H>  |  116<H>  |  37<H>  ----------------------------<  167<H>  4.3   |  27  |  2.01<H>    Ca    9.4      03 May 2023 06:21  Phos  2.7     05-03  Mg     2.0     05-03      PT/INR - ( 03 May 2023 06:21 )   PT: 12.8 sec;   INR: 1.10 ratio        CULTURES:  Culture Results:   No growth to date. (04-29 @ 10:35)  Culture Results:   No growth to date. (04-29 @ 10:35)  Rapid RVP Result: NotDetec (04-29 @ 08:36)      Physical Examination:    General: No acute distress.    PULM: Clear to auscultation bilaterally, no wheezing  CVS: Regular rate and rhythm, no murmurs  ABD: Soft, nondistended, nontender, normoactive bowel sounds, J tube in place. coulter in place  EXT: No edema, nontender  SKIN: Warm and well perfused, no rashes noted.  NEURO: Alert, oriented, interactive, nonfocal    DEVICES:   J tube  coulter    RADIOLOGY:   < from: CT Abdomen and Pelvis w/ IV Cont (04.29.23 @ 10:08) >  IMPRESSION:  No evidence of an active GI bleed.  Large pancreatic mass and extensive hepatic metastases.  Gastrojejunostomy.    < end of copied text >

## 2023-05-04 NOTE — CONSULT NOTE ADULT - ASSESSMENT
A:    68M  HD # 7 POD # 0    Pt current conditions:    1. MSOF s/p Open distal pancreatectomy, splenectomy, partial colectomy, partial L adrenalectomy, temporary abdominal closure for a neuroendocrine tumor  2. Hypovolemic/hemorrhagic shock  3. ABLA  4. Acute hypoxic resp failure  5. HAGMA  6. Lactic acidosis  7. ZO  7. Hypovolemia    This patient requires critical care for support of one or more vital organ systems with a high probability of imminent or life threatening deterioration in his/her condition    P:    MSOF s/p extensive operation, see above  Pt remains on pressors  Intubated on NMB  Open abdomen  Has rec'd several units of blood and plasma thus far, ~5L crystalloid    Multiple organ systems being actively supported in attempt to save life    Give 2L LR bolus now  Trend Hgb, coags; will likely require more blood products  Vent mgmt; being actively titrated to optimize ventilation and oxygenation while minimizing airway pressures; f/u ABG, CXR  Deep sedation, NMB drip to TOF; no sedation vacations while under influence of NMB  Norepi, vasopressor drips; actively titrating to maintain MAP > 65; suspect combination hypovolemic + distributive shock  Albumin to protect intravascular oncotic pressure  Alakli therapy to balance acidosis  Anuric, no indications for emergent RRT at this time; f/u output, avoid renal toxic meds, trend  Maintain all invasive lines/catheters at this time  Broad spectrum abx  Hold chemical VTE ppx now given bleeding    Dispo: Active resuscitative phase of care, Trend endpoints/labs critical care. Vent mgmt, vasopressor mgmt, alkali therapy for acid/base, abx, +/- further blood products.    TOTAL CRITICAL CARE TIME: 110 minutes   (EXCLUSIVE of any non bundled procedures)    Note: This time spent INCLUDES time spent directly as this patient's bedside with evaluation, review of chart including review of laboratory and imaging studies, interpretation of vital signs and cardiac output measurements, any necessary ventilator management, and time spent discussing plan of care with patient and family, including goals of care discussion.

## 2023-05-04 NOTE — BRIEF OPERATIVE NOTE - OPERATION/FINDINGS
Open distal pancreatectomy, splenectomy, partial colectomy, partial L adrenalectomy, temporary abdominal closure    Large varices, 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 Liver laden with diffuse metastases and very friable, RP oozy. Patient on pressors at the end of the case, and hence, decided to leave colon in discontinuity with temporary closure.

## 2023-05-04 NOTE — PROGRESS NOTE ADULT - SUBJECTIVE AND OBJECTIVE BOX
Chief complaints. admitted on 4/29 with GIB    5/4- pt seen in PACU, PRBC and FFP, and LR resuscitation noted, coulter in place ~ 100 cc urine in coulter bag  case d/w pts nurse   Chart quote, operative note ""Open distal pancreatectomy, splenectomy, partial colectomy, partial L adrenalectomy, temporary abdominal closure; Large varices, 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 Liver laden with diffuse metastases and very friable, RP oozy. 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  Treated with Lutathera one year ago and restarted in 12/2022  G-J tube placed for nutrition in January due to chronic dysphagia and severe esophagitis on EGD.  Post recent  admission due to G tube clogging.  D/mansi home on 4/27.    Returned to ED due to vomiting when night time enteral feeding via G tube started.  CT with IVC done due to concern for GIB.  Pt reports chronic self catheterization due to urinary retention.   ZO improved when catheterization started.  Follows mainly with EMELY Cortez.   Pt has been self cath'ing 3-4 x /day.   Indwelling Coulter placed by EMELY due to resistance with blood clots with resistance reported by pt.  For palliative mass resection tomorrow.    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  Creat has been variable during previous admissions--ranging 1.1-2.2.    PMHX and PSHX.  --Neuroendocrine tumor of pancreas with mets to liver --dx 6 years ago.  --GJ tube for nutrition in 1/2023 due to chronic dysphagia.  --Hx of HTN  --Hx of DM  --Hx of light chain disease --unclear hx and no hx of renal biopsy.    Home Medications:   * Patient Currently Takes Medications as of 27-Apr-2023 15:16 documented in Structured Notes  · 	Xanax 0.25 mg oral tablet: 1 tab(s) orally 2 times a day as needed for  anxiety MDD: 2 tabs  · 	cefdinir 250 mg/5 mL oral liquid: 6 milliliter(s) orally 2 times a day    FAMILY HISTORY:  FH: breast cancer (Mother)  FH: aneurysm (Father)    SOCIAL HISTORY :  Remote former smoker    Allergies :  losartan (Angioedema)    REVIEW OF SYSTEMS :  comfortable  denies SOB  Denies abdominal pain      MEDICATIONS  (STANDING):  albumin human 25% IVPB 50 milliLiter(s) IV Intermittent once  chlorhexidine 0.12% Liquid 15 milliLiter(s) Oral Mucosa every 12 hours  chlorhexidine 4% Liquid 1 Application(s) Topical <User Schedule>  cisatracurium Infusion 3 MICROgram(s)/kG/Min (11.4 mL/Hr) IV Continuous <Continuous>  dextrose 5%. 1000 milliLiter(s) (100 mL/Hr) IV Continuous <Continuous>  dextrose 5%. 1000 milliLiter(s) (50 mL/Hr) IV Continuous <Continuous>  dextrose 50% Injectable 25 Gram(s) IV Push once  dextrose 50% Injectable 12.5 Gram(s) IV Push once  dextrose 50% Injectable 25 Gram(s) IV Push once  fentaNYL   Infusion 2 MICROgram(s)/kG/Hr (12.7 mL/Hr) IV Continuous <Continuous>  glucagon  Injectable 1 milliGRAM(s) IntraMuscular once  insulin lispro (ADMELOG) corrective regimen sliding scale   SubCutaneous every 6 hours  midazolam Infusion 0.02 mG/kG/Hr (1.27 mL/Hr) IV Continuous <Continuous>  norepinephrine Infusion 0.05 MICROgram(s)/kG/Min (5.95 mL/Hr) IV Continuous <Continuous>  pantoprazole  Injectable 40 milliGRAM(s) IV Push every 12 hours  propofol Infusion 30 MICROgram(s)/kG/Min (11.4 mL/Hr) IV Continuous <Continuous>  sodium bicarbonate  Infusion 0.079 mEq/kG/Hr (100 mL/Hr) IV Continuous <Continuous>  vasopressin Infusion 0.01 Unit(s)/Min (1.5 mL/Hr) IV Continuous <Continuous>    MEDICATIONS  (PRN):  dextrose Oral Gel 15 Gram(s) Oral once PRN Blood Glucose LESS THAN 70 milliGRAM(s)/deciliter  ondansetron Injectable 4 milliGRAM(s) IV Push every 6 hours PRN Nausea and/or Vomiting         Vital Signs Last 24 Hrs  T(C): 36.8 (04 May 2023 18:00), Max: 37 (04 May 2023 04:00)  T(F): 98.2 (04 May 2023 18:00), Max: 98.6 (04 May 2023 04:00)  HR: 105 (04 May 2023 19:00) (69 - 129)  BP: 93/60 (04 May 2023 19:00) (71/47 - 137/85)  BP(mean): 68 (04 May 2023 19:00) (68 - 92)  RR: 16 (04 May 2023 19:00) (14 - 24)  SpO2: 99% (04 May 2023 19:00) (96% - 100%)    Parameters below as of 04 May 2023 16:00  Patient On (Oxygen Delivery Method): ventilator    I&O's Detail    03 May 2023 07:01  -  04 May 2023 07:00  --------------------------------------------------------  IN:    Enteral Tube Flush: 40 mL    sodium chloride 0.45%: 825 mL  Total IN: 865 mL    OUT:    Intermittent Catheterization - Urethral (mL): 1275 mL  Total OUT: 1275 mL    Total NET: -410 mL      04 May 2023 07:01  -  04 May 2023 19:52  --------------------------------------------------------  IN:    Albumin 25%  -  50 mL: 50 mL    Albumin 5% - 50 mL: 50 mL    Cisatracurium: 10 mL    Lactated Ringers: 1250 mL    Lactated Ringers Bolus: 600 mL    Midazolam: 17 mL    Norepinephrine: 611 mL    Other (mL): 3400 mL    Plasma: 328 mL    PRBCs (Packed Red Blood Cells): 3729 mL    Propofol: 153 mL    Sodium Bicarbonate: 150 mL    Vasopressin: 18 mL  Total IN: 69272 mL    OUT:    Blood Loss (mL): 220 mL    Indwelling Catheter - Urethral (mL): 40 mL    Nasogastric/Oral tube (mL): 50 mL    VAC (Vacuum Assisted Closure) System (mL): 775 mL  Total OUT: 1085 mL    Total NET: 9281 mL          PHYSICAL EXAM:  GEN: intubated  HEENT orally intubated  CV: S1S2 RRR  LUNGS: clear to aus  ABD: soft  EXT: no edema  Coulter draining clear yellow urine ~ 100 ml    LABS:            05-04    146<H>  |  114<H>  |  31<H>  ----------------------------<  201<H>  4.4   |  16<L>  |  1.86<H>    Ca    8.3<L>      04 May 2023 14:30  Phos  2.2     05-04  Mg     1.9     05-04    TPro  5.2<L>  /  Alb  2.7<L>  /  TBili  1.0  /  DBili  x   /  AST  767<H>  /  ALT  332<H>  /  AlkPhos  107  05-04                             10.3   9.47  )-----------( 96       ( 04 May 2023 14:30 )             31.2       Magnesium, Serum: 2.0 mg/dL (05-03 @ 06:21)  Phosphorus Level, Serum: 2.7 mg/dL (05-03 @ 06:21)

## 2023-05-04 NOTE — PROGRESS NOTE ADULT - SUBJECTIVE AND OBJECTIVE BOX
Patient is a 68y old  Male who presents with a chief complaint of Gastrointestinal hemorrhage (01 May 2023 10:54)      HPI:  69 yo male with PMHx metastatic neuroendocrine tumor of the pancreas, recently admitted with nonfunctioning GJ tube (patient is chronically obstructed).   Patient has had severe heartburn, and has had several episodes of vomiting small amount of dark blood. Patient was discharged on Thursday. Hgb unchanged. CT angiography has no active bleeding, but demonstrates extensive tumor in distal pancreas.     5/1 pt feeling slightly nauseous, denies abd pain.  5/2 pt c/o frequent BM overnight causing him to not sleep well. denies abd pain , nausea  5/3 pt reports feeling well, hematuria resolved. coulter in place. not having as many BMs anymore.   5/4: Patient went to the OR today for a Open distal pancreatectomy, splenectomy, partial colectomy, partial L adrenalectomy, temporary abdominal closure.  Patient had a 2.2 L EBL.  Intra Op had 6U PRBC, 2FFP, 1 Platelets, 4L crystalloid  Post Op 1U PRBC ans 1 U FFP.  Post OP vented, with poor UO, on levo and Vaso.               PAST MEDICAL & SURGICAL HISTORY:  Hypertension      BPH (benign prostatic hyperplasia)      Renal insufficiency      Light chain nephropathy      DM (diabetes mellitus)      HLD (hyperlipidemia)      No significant past surgical history          FAMILY HISTORY:  FH: breast cancer (Mother)    FH: aneurysm (Father)        Social Hx:    Allergies    losartan (Angioedema)    Intolerances            ICU Vital Signs Last 24 Hrs  T(C): 36.4 (04 May 2023 06:00), Max: 37 (04 May 2023 04:00)  T(F): 97.6 (04 May 2023 06:00), Max: 98.6 (04 May 2023 04:00)  HR: 97 (04 May 2023 14:15) (69 - 129)  BP: 108/79 (04 May 2023 14:00) (71/47 - 119/76)  BP(mean): 82 (04 May 2023 07:00) (78 - 92)  ABP: 115/69 (04 May 2023 14:15) (65/47 - 122/83)  ABP(mean): --  RR: 16 (04 May 2023 14:15) (14 - 17)  SpO2: 100% (04 May 2023 14:15) (96% - 100%)    O2 Parameters below as of 04 May 2023 12:10  Patient On (Oxygen Delivery Method): ventilator    O2 Concentration (%): 50        Mode: AC/ CMV (Assist Control/ Continuous Mandatory Ventilation)  RR (machine): 14  TV (machine): 600  FiO2: 50  PEEP: 5  PIP: 19      I&O's Summary    03 May 2023 07:01  -  04 May 2023 07:00  --------------------------------------------------------  IN: 865 mL / OUT: 1275 mL / NET: -410 mL    04 May 2023 07:01  -  04 May 2023 14:44  --------------------------------------------------------  IN: 8313 mL / OUT: 220 mL / NET: 8093 mL                              10.8   5.80  )-----------( 112      ( 04 May 2023 12:50 )             33.1       05-04    146<H>  |  116<H>  |  30<H>  ----------------------------<  222<H>  5.3   |  16<L>  |  1.79<H>    Ca    8.5      04 May 2023 12:50  Phos  2.2     05-04  Mg     1.9     05-04    TPro  4.7<L>  /  Alb  2.0<L>  /  TBili  0.9  /  DBili  x   /  AST  763<H>  /  ALT  328<H>  /  AlkPhos  128<H>  05-04            ABG - ( 04 May 2023 12:20 )  pH, Arterial: 7.32  pH, Blood: x     /  pCO2: 31    /  pO2: 217   / HCO3: 16    / Base Excess: -8.9  /  SaO2: 100                     MEDICATIONS  (STANDING):  albumin human 25% IVPB 50 milliLiter(s) IV Intermittent once  chlorhexidine 4% Liquid 1 Application(s) Topical <User Schedule>  cisatracurium Infusion 3 MICROgram(s)/kG/Min (11.4 mL/Hr) IV Continuous <Continuous>  dextrose 5%. 1000 milliLiter(s) (100 mL/Hr) IV Continuous <Continuous>  dextrose 5%. 1000 milliLiter(s) (50 mL/Hr) IV Continuous <Continuous>  dextrose 50% Injectable 25 Gram(s) IV Push once  dextrose 50% Injectable 12.5 Gram(s) IV Push once  dextrose 50% Injectable 25 Gram(s) IV Push once  fentaNYL   Infusion 2 MICROgram(s)/kG/Hr (12.7 mL/Hr) IV Continuous <Continuous>  glucagon  Injectable 1 milliGRAM(s) IntraMuscular once  insulin lispro (ADMELOG) corrective regimen sliding scale   SubCutaneous every 6 hours  midazolam Infusion 0.02 mG/kG/Hr (1.27 mL/Hr) IV Continuous <Continuous>  norepinephrine Infusion 0.05 MICROgram(s)/kG/Min (5.95 mL/Hr) IV Continuous <Continuous>  pantoprazole  Injectable 40 milliGRAM(s) IV Push every 12 hours  propofol Infusion 10 MICROgram(s)/kG/Min (3.81 mL/Hr) IV Continuous <Continuous>  propofol Infusion 30 MICROgram(s)/kG/Min (11.4 mL/Hr) IV Continuous <Continuous>  vasopressin Infusion 0.01 Unit(s)/Min (1.5 mL/Hr) IV Continuous <Continuous>    MEDICATIONS  (PRN):  dextrose Oral Gel 15 Gram(s) Oral once PRN Blood Glucose LESS THAN 70 milliGRAM(s)/deciliter  ondansetron Injectable 4 milliGRAM(s) IV Push every 6 hours PRN Nausea and/or Vomiting      DVT Prophylaxis: V    Advanced Directives:  Discussed with:    Visit Information: 125    ** Time is exclusive of billed procedures and/or teaching and/or routine family updates.

## 2023-05-04 NOTE — PROGRESS NOTE ADULT - ASSESSMENT
67 yo man with Hx of Neuroendocrine tumor/Pancreatic mass and liver mets.  Now for palliative resection of pancreatic mass.    --ZO with variable creat level and unclear CKD.    Likely dependent on volume status.    Limited intake even with Enteral feeding.      Start gentle hydration.  -- : continue Hale drainage.  --Hx of light chain nephropathy  --No contraindication for surgery from renal standpoint.    5/4  s/p Palliative surgery for neuroendocrine mass  will watch for ZO, pt with low UO immediate post op  s/p multiple PRBC and FFP transfusion and volume resuscitation

## 2023-05-04 NOTE — PROGRESS NOTE ADULT - ASSESSMENT
69 yo male with PMHx metastatic neuroendocrine tumor of the pancreas, recently admitted with nonfunctioning GJ tube (patient is chronically obstructed).   Patient has had severe heartburn, and has had several episodes of vomiting small amount of dark blood.     now Post Op  after an open distal pancreatectomy, splenectomy, partial colectomy, partial L adrenalectomy, temporary abdominal closure.    With Post Op distributive Shock  oliguria    PLAN:  ICU after PACU    PULM: CXR reviewed and CVL and ET tube fine.  No infiltrates    Cardio: Will POCUS and assess EF, Continue Pressors and try to wean levo.  EKG post Op    Renal: LR at 75.  STAT BMP, Lactate.  last Bicarb was 16.  May need a bicarb drip.  Balanced IVF Post op as Cl is 116 and has a hyperchloremic metabolic acidosis.  Oliguria likely due to intra-op blood loss.  Continue resuscitations    GI: NPO, PPI.  Rising LFT likely from hypotension.  Will trend     Surgery: Back to OR in AM.  Will keep on paralytics because of the open abd.  Pain control with Fentanyl Drip.  Sedate with Versed for amnestic effect while on Paralytics and Diprivan      Venodynes    Repeating labs now    D/W Surgery and Anesthesia

## 2023-05-04 NOTE — CONSULT NOTE ADULT - SUBJECTIVE AND OBJECTIVE BOX
ICU Consult/Progress Note    HPI:    S:    Pt seen and examined  HD # 7  FULL CODE  68M  PMHx Neuroendocrine tumor of pancreas with mets to liver dx'd 6 years ago.  Treated with Lutathera one year ago and restarted in 12/2022  G-J tube placed for nutrition in January due to chronic dysphagia and severe esophagitis on EGD.  Post recent  admission due to G tube clogging.  D/mansi home on 4/27.    Returned to ED due to vomiting when night time enteral feeding via G tube started.  CT with IVC done due to concern for GIB.  Pt reports chronic self catheterization due to urinary retention.   ZO improved when catheterization started.  Follows mainly with EMELY Cortez.   Pt has been self cath'ing 3-4 x /day.   Indwelling Coulter placed by EMELY due to resistance with blood clots with resistance reported by pt.  For palliative mass resection 5/4    s/p OR today---> Open distal pancreatectomy, splenectomy, partial colectomy, partial L adrenalectomy, temporary abdominal closure; Large varices, 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 Liver laden with diffuse metastases and very friable, RP oozy. Patient on pressors at the end of the case, and hence, decided to leave colon in discontinuity with temporary closure.    5/4 PM: Open abdomen with vac, bloody output Hypotensive on pressures, on deep sedation and NMB. Anuric, acidotic. MSOF.    ROS: Unable to obtain 2/2 Pt status    Allergies    losartan (Angioedema)    Intolerances    MEDICATIONS  (STANDING):    albumin human 25% IVPB 50 milliLiter(s) IV Intermittent once  chlorhexidine 4% Liquid 1 Application(s) Topical <User Schedule>  cisatracurium Infusion 3 MICROgram(s)/kG/Min (11.4 mL/Hr) IV Continuous <Continuous>  dextrose 5%. 1000 milliLiter(s) (100 mL/Hr) IV Continuous <Continuous>  dextrose 5%. 1000 milliLiter(s) (50 mL/Hr) IV Continuous <Continuous>  dextrose 50% Injectable 25 Gram(s) IV Push once  dextrose 50% Injectable 12.5 Gram(s) IV Push once  dextrose 50% Injectable 25 Gram(s) IV Push once  fentaNYL   Infusion 2 MICROgram(s)/kG/Hr (12.7 mL/Hr) IV Continuous <Continuous>  glucagon  Injectable 1 milliGRAM(s) IntraMuscular once  insulin lispro (ADMELOG) corrective regimen sliding scale   SubCutaneous every 6 hours  lactated ringers Bolus 2000 milliLiter(s) IV Bolus once  midazolam Infusion 0.02 mG/kG/Hr (1.27 mL/Hr) IV Continuous <Continuous>  norepinephrine Infusion 0.05 MICROgram(s)/kG/Min (5.95 mL/Hr) IV Continuous <Continuous>  pantoprazole  Injectable 40 milliGRAM(s) IV Push every 12 hours  propofol Infusion 30 MICROgram(s)/kG/Min (11.4 mL/Hr) IV Continuous <Continuous>  sodium bicarbonate  Infusion 0.079 mEq/kG/Hr (100 mL/Hr) IV Continuous <Continuous>  vasopressin Infusion 0.01 Unit(s)/Min (1.5 mL/Hr) IV Continuous <Continuous>    MEDICATIONS  (PRN):    dextrose Oral Gel 15 Gram(s) Oral once PRN Blood Glucose LESS THAN 70 milliGRAM(s)/deciliter  ondansetron Injectable 4 milliGRAM(s) IV Push every 6 hours PRN Nausea and/or Vomiting    Drug Dosing Weight    Height (cm): 170.2 (29 Apr 2023 08:11)  Weight (kg): 63.458 (29 Apr 2023 08:11)  BMI (kg/m2): 21.9 (29 Apr 2023 08:11)  BSA (m2): 1.74 (29 Apr 2023 08:11)    PAST MEDICAL & SURGICAL HISTORY:    Hypertension  BPH (benign prostatic hyperplasia)  Renal insufficiency  Light chain nephropathy  DM (diabetes mellitus)  HLD (hyperlipidemia)    No significant past surgical history    FAMILY HISTORY:  FH: breast cancer (Mother)    FH: aneurysm (Father)    ROS: See HPI; otherwise, all systems reviewed and negative.    O:    ICU Vital Signs Last 24 Hrs  T(C): 36.3 (04 May 2023 16:00), Max: 37 (04 May 2023 04:00)  T(F): 97.4 (04 May 2023 16:00), Max: 98.6 (04 May 2023 04:00)  HR: 88 (04 May 2023 16:00) (69 - 129)  BP: 124/86 (04 May 2023 16:00) (71/47 - 137/85)  BP(mean): 82 (04 May 2023 07:00) (78 - 92)  ABP: 117/74 (04 May 2023 16:00) (65/47 - 137/85)  ABP(mean): --  RR: 16 (04 May 2023 16:00) (14 - 17)  SpO2: 100% (04 May 2023 16:00) (96% - 100%)    O2 Parameters below as of 04 May 2023 16:00  Patient On (Oxygen Delivery Method): ventilator    ABG - ( 04 May 2023 15:10 )  pH, Arterial: 7.36  pH, Blood: x     /  pCO2: 24    /  pO2: 209   / HCO3: 14    / Base Excess: -9.9  /  SaO2: 100       I&O's Detail    03 May 2023 07:01  -  04 May 2023 07:00  --------------------------------------------------------  IN:    Enteral Tube Flush: 40 mL    sodium chloride 0.45%: 825 mL  Total IN: 865 mL    OUT:    Intermittent Catheterization - Urethral (mL): 1275 mL  Total OUT: 1275 mL    Total NET: -410 mL    04 May 2023 07:01  -  04 May 2023 18:26  --------------------------------------------------------  IN:    Albumin 25%  -  50 mL: 50 mL    Albumin 5% - 50 mL: 50 mL    Lactated Ringers: 1250 mL    Norepinephrine: 275 mL    Other (mL): 3400 mL    Plasma: 328 mL    PRBCs (Packed Red Blood Cells): 3729 mL    Propofol: 63 mL    Vasopressin: 18 mL  Total IN: 9163 mL    OUT:    Blood Loss (mL): 220 mL    Indwelling Catheter - Urethral (mL): 35 mL    Nasogastric/Oral tube (mL): 50 mL    VAC (Vacuum Assisted Closure) System (mL): 450 mL  Total OUT: 755 mL    Total NET: 8408 mL    Mode: AC/ CMV (Assist Control/ Continuous Mandatory Ventilation)  RR (machine): 16  TV (machine): 400  FiO2: 30  PEEP: 6  ITime: 0.7  PIP: 24    PE:    Adult M lying in bed  + ETT + RIJ TLC in place  S1S2+  Coarse BS B/L  Abd---> open midline, wound car in place, bloody output; otherwise soft  No LE edema/swelling noted  Intubated, sedated  Skin pink  + coulter, ETT, CVC, dangelo in place, wound vac    POCUS, limited: + hyperdynamic myocardium, no obvious wall motion abnl; IVC not plump, not flat    LABS:    CBC Full  -  ( 04 May 2023 14:30 )  WBC Count : 9.47 K/uL  RBC Count : 3.63 M/uL  Hemoglobin : 10.3 g/dL  Hematocrit : 31.2 %  Platelet Count - Automated : 96 K/uL  Mean Cell Volume : 86.0 fl  Mean Cell Hemoglobin : 28.4 pg  Mean Cell Hemoglobin Concentration : 33.0 gm/dL  Auto Neutrophil # : 8.71 K/uL  Auto Lymphocyte # : 0.19 K/uL  Auto Monocyte # : 0.57 K/uL  Auto Eosinophil # : 0.00 K/uL  Auto Basophil # : 0.00 K/uL  Auto Neutrophil % : 79.0 %  Auto Lymphocyte % : 2.0 %  Auto Monocyte % : 6.0 %  Auto Eosinophil % : 0.0 %  Auto Basophil % : 0.0 %    05-04    146<H>  |  114<H>  |  31<H>  ----------------------------<  201<H>  4.4   |  16<L>  |  1.86<H>    Ca    8.3<L>      04 May 2023 14:30  Phos  2.2     05-04  Mg     1.9     05-04    TPro  5.2<L>  /  Alb  2.7<L>  /  TBili  1.0  /  DBili  x   /  AST  767<H>  /  ALT  332<H>  /  AlkPhos  107  05-04    PT/INR - ( 04 May 2023 14:30 )   PT: 14.5 sec;   INR: 1.25 ratio             CAPILLARY BLOOD GLUCOSE      POCT Blood Glucose.: 181 mg/dL (04 May 2023 10:47)  POCT Blood Glucose.: 153 mg/dL (04 May 2023 05:24)  POCT Blood Glucose.: 147 mg/dL (04 May 2023 00:05)        LIVER FUNCTIONS - ( 04 May 2023 14:30 )  Alb: 2.7 g/dL / Pro: 5.2 gm/dL / ALK PHOS: 107 U/L / ALT: 332 U/L / AST: 767 U/L / GGT: x

## 2023-05-04 NOTE — CHART NOTE - NSCHARTNOTEFT_GEN_A_CORE
on Pocus    LV hyperdynamic  RV not over loaded  IVC not collapsing    UO remains minimal    plan to give 2 L LR now    Repaeat Labs at 20:00

## 2023-05-05 NOTE — CHART NOTE - NSCHARTNOTEFT_GEN_A_CORE
I have seen and evaluated Mr Griggs again this afternoon. He is afebrile on pressors with MAP 70-80's and still not making urine. His Hb is 9.0 and there are no signs of bleeding and he has no source for sepsis. On exam his abdomen is not distended and the drainage has been lightly serosanguinous. His SBP has improved as he wakes up and begins to breath over the vent but we are keeping him paralysed for now but this may be contributing to the pressor requirement.  I have no concerns about an intraabdominlal source of sepsis or bleeding given the conduct of the operation and the intra operative findings. His renal failure was somewhat anticipated given his level of impairment to begin with combined with the large resection. I believe his renal function will return but I agree with dialysis given the metabolic acidosis. His blood gas has improved post dialysis, additional labs are pending.      Plan: We will wait to return to the OR at least 4 hrs as per anesthesia. I have spoken at length with the patients wife and daughter about the potential for a colostomy if his pressor requirements do not decrease. I want to get him off of the paralytics so getting him back to the OR to close the abdomen sooner than later is our goal.

## 2023-05-05 NOTE — PROGRESS NOTE ADULT - ASSESSMENT
69 yo male with PMHx metastatic neuroendocrine tumor of the pancreas, recently admitted with nonfunctioning GJ tube (patient is chronically obstructed).   Patient has had severe heartburn, and has had several episodes of vomiting small amount of dark blood.     now Post Op  after an open distal pancreatectomy, splenectomy, partial colectomy, partial L adrenalectomy, temporary abdominal closure.    With Post Op distributive Shock  oliguria    PLAN:  ICU after PACU    PULM: Fi O2 30%.  No Weaning    Cardio:  Continue Pressors and try to wean levo. LV was hyperdynamic on POCUS    Renal:  D/C Bicarb Drip.  NS at 75.  Anuric.  Likely will need HD at least in the short term.  Will D/W Renal    GI: NPO, PPI.  Rising LFT likely from hypotension.  Will trend     Surgery:  The plan had been to go to the OR today.  Will keep on paralytics because of the open abd.  Pain control with Fentanyl Drip.  Sedate with Versed for amnestic effect while on Paralytics and Diprivan      ID: No evidence of infection.  Will empirically start on Zosyn for continued unexplained hypotension     Venodynes    Repeating labs 10AM    D/W Surgery

## 2023-05-05 NOTE — BRIEF OPERATIVE NOTE - OPERATION/FINDINGS
No evidence of active bleeding  No evidence of bowel edema  Healthy looking bowel, pancreas and stomach  Fascia approximated with no tension No evidence of active bleeding  No Abscesses or evidence of infection   No evidence of bowel edema  Healthy looking bowel, pancreas and stomach  Fascia approximated with no tension

## 2023-05-05 NOTE — PROGRESS NOTE ADULT - ASSESSMENT
69 yo man with Hx of Neuroendocrine tumor/Pancreatic mass and liver mets.  Now for palliative resection of pancreatic mass.    --ZO with variable creat level and unclear CKD.    Likely dependent on volume status.    Limited intake even with Enteral feeding.      Start gentle hydration.  -- : continue Hale drainage.  --Hx of light chain nephropathy  --No contraindication for surgery from renal standpoint.    5/4  s/p Palliative surgery for neuroendocrine mass  will watch for ZO, pt with low UO immediate post op  s/p multiple PRBC and FFP transfusion and volume resuscitation    5/5  patient anuric, K 5.2  On bicarb drip  to go back to OR today  case d/w Dr Jean and Dr Siddiqi  Will HD x 2 hrs to correct potassium  >10 L infused in blood products and fluids  More fluids not indicated, UO 50 ml  no fluid removal on HD  Hep profile ordered

## 2023-05-05 NOTE — BRIEF OPERATIVE NOTE - NSICDXBRIEFPREOP_GEN_ALL_CORE_FT
PRE-OP DIAGNOSIS:  Neuroendocrine tumor 04-May-2023 12:21:57  Erica Hancock  
PRE-OP DIAGNOSIS:  Open wnd anterior abdomen 05-May-2023 21:55:29  Taylor Castro

## 2023-05-05 NOTE — PROGRESS NOTE ADULT - SUBJECTIVE AND OBJECTIVE BOX
Chief complaints. admitted on 4/29 with GIB    5/4- pt seen in PACU, PRBC and FFP, and LR resuscitation noted, coulter in place ~ 100 cc urine in coulter bag  case d/w pts nurse   Chart quote, operative note ""Open distal pancreatectomy, splenectomy, partial colectomy, partial L adrenalectomy, temporary abdominal closure; Large varices, 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 Liver laden with diffuse metastases and very friable, RP oozy. 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  Treated with Lutathera one year ago and restarted in 12/2022  G-J tube placed for nutrition in January due to chronic dysphagia and severe esophagitis on EGD.  Post recent  admission due to G tube clogging.  D/mansi home on 4/27.    Returned to ED due to vomiting when night time enteral feeding via G tube started.  CT with IVC done due to concern for GIB.  Pt reports chronic self catheterization due to urinary retention.   ZO improved when catheterization started.  Follows mainly with EMELY Cortez.   Pt has been self cath'ing 3-4 x /day.   Indwelling Coulter placed by EMELY due to resistance with blood clots with resistance reported by pt.  For palliative mass resection tomorrow.    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  Creat has been variable during previous admissions--ranging 1.1-2.2.    PMHX and PSHX.  --Neuroendocrine tumor of pancreas with mets to liver --dx 6 years ago.  --GJ tube for nutrition in 1/2023 due to chronic dysphagia.  --Hx of HTN  --Hx of DM  --Hx of light chain disease --unclear hx and no hx of renal biopsy.    Home Medications:   * Patient Currently Takes Medications as of 27-Apr-2023 15:16 documented in Structured Notes  · 	Xanax 0.25 mg oral tablet: 1 tab(s) orally 2 times a day as needed for  anxiety MDD: 2 tabs  · 	cefdinir 250 mg/5 mL oral liquid: 6 milliliter(s) orally 2 times a day    FAMILY HISTORY:  FH: breast cancer (Mother)  FH: aneurysm (Father)    SOCIAL HISTORY :  Remote former smoker    Allergies :  losartan (Angioedema)    REVIEW OF SYSTEMS :  UTO    MEDICATIONS  (STANDING):  albumin human 25% IVPB 50 milliLiter(s) IV Intermittent once  chlorhexidine 0.12% Liquid 15 milliLiter(s) Oral Mucosa every 12 hours  chlorhexidine 4% Liquid 1 Application(s) Topical <User Schedule>  cisatracurium Infusion 3 MICROgram(s)/kG/Min (11.4 mL/Hr) IV Continuous <Continuous>  dextrose 5%. 1000 milliLiter(s) (50 mL/Hr) IV Continuous <Continuous>  dextrose 5%. 1000 milliLiter(s) (100 mL/Hr) IV Continuous <Continuous>  dextrose 50% Injectable 12.5 Gram(s) IV Push once  dextrose 50% Injectable 25 Gram(s) IV Push once  dextrose 50% Injectable 25 Gram(s) IV Push once  fentaNYL   Infusion 2 MICROgram(s)/kG/Hr (12.7 mL/Hr) IV Continuous <Continuous>  glucagon  Injectable 1 milliGRAM(s) IntraMuscular once  insulin lispro (ADMELOG) corrective regimen sliding scale   SubCutaneous every 6 hours  midazolam Infusion 0.02 mG/kG/Hr (1.27 mL/Hr) IV Continuous <Continuous>  norepinephrine Infusion 0.05 MICROgram(s)/kG/Min (5.95 mL/Hr) IV Continuous <Continuous>  pantoprazole  Injectable 40 milliGRAM(s) IV Push every 12 hours  petrolatum Ophthalmic Ointment 1 Application(s) Both EYES four times a day  piperacillin/tazobactam IVPB.. 3.375 Gram(s) IV Intermittent every 8 hours  sodium chloride 0.9%. 1000 milliLiter(s) (75 mL/Hr) IV Continuous <Continuous>  vasopressin Infusion 0.04 Unit(s)/Min (6 mL/Hr) IV Continuous <Continuous>    MEDICATIONS  (PRN):  dextrose Oral Gel 15 Gram(s) Oral once PRN Blood Glucose LESS THAN 70 milliGRAM(s)/deciliter  ondansetron Injectable 4 milliGRAM(s) IV Push every 6 hours PRN Nausea and/or Vomiting    I&O's Detail    04 May 2023 07:01  -  05 May 2023 07:00  --------------------------------------------------------  IN:    Albumin 25%  -  50 mL: 50 mL    Albumin 5% - 50 mL: 100 mL    Cisatracurium: 139 mL    FentaNYL: 106 mL    Lactated Ringers: 1250 mL    Lactated Ringers Bolus: 600 mL    Midazolam: 31 mL    Norepinephrine: 1007 mL    Other (mL): 3400 mL    Plasma: 328 mL    PRBCs (Packed Red Blood Cells): 3729 mL    Propofol: 153 mL    Sodium Bicarbonate: 1240 mL    Vasopressin: 68 mL  Total IN: 48902 mL    OUT:    Blood Loss (mL): 220 mL    Indwelling Catheter - Urethral (mL): 50 mL    Nasogastric/Oral tube (mL): 350 mL    VAC (Vacuum Assisted Closure) System (mL): 1175 mL  Total OUT: 1795 mL    Total NET: 38439 mL      05 May 2023 07:01  -  05 May 2023 10:22  --------------------------------------------------------  IN:    Sodium Bicarbonate: 225 mL  Total IN: 225 mL    OUT:  Total OUT: 0 mL    Total NET: 225 mL          ICU Vital Signs Last 24 Hrs  T(C): 36.8 (05 May 2023 08:00), Max: 37.1 (05 May 2023 04:00)  T(F): 98.2 (05 May 2023 08:00), Max: 98.8 (05 May 2023 04:00)  HR: 82 (05 May 2023 09:00) (69 - 129)  BP: 102/65 (05 May 2023 09:00) (71/47 - 137/85)  BP(mean): 74 (05 May 2023 09:00) (58 - 85)  ABP: 93/53 (05 May 2023 09:00) (65/47 - 137/85)  ABP(mean): 69 (05 May 2023 09:00) (60 - 84)  RR: 20 (05 May 2023 09:00) (15 - 24)  SpO2: 99% (05 May 2023 09:00) (98% - 100%)    O2 Parameters below as of 05 May 2023 09:00  Patient On (Oxygen Delivery Method): ventilator    O2 Concentration (%): 30        PHYSICAL EXAM:  GEN: intubated  HEENT orally intubated  CV: S1S2 RRR  LUNGS: clear to aus  ABD: soft  EXT: no edema  Coulter draining clear yellow urine ~ 100 ml    LABS:            05-04    146<H>  |  114<H>  |  31<H>  ----------------------------<  201<H>  4.4   |  16<L>  |  1.86<H>    Ca    8.3<L>      04 May 2023 14:30  Phos  2.2     05-04  Mg     1.9     05-04    TPro  5.2<L>  /  Alb  2.7<L>  /  TBili  1.0  /  DBili  x   /  AST  767<H>  /  ALT  332<H>  /  AlkPhos  107  05-04                             10.3   9.47  )-----------( 96       ( 04 May 2023 14:30 )             31.2       Magnesium, Serum: 2.0 mg/dL (05-03 @ 06:21)  Phosphorus Level, Serum: 2.7 mg/dL (05-03 @ 06:21)

## 2023-05-05 NOTE — BRIEF OPERATIVE NOTE - NSICDXBRIEFPROCEDURE_GEN_ALL_CORE_FT
PROCEDURES:  Irrigation, abdominal cavity, with closure 05-May-2023 21:54:18  Taylor Castro  Creation of end colostomy 05-May-2023 21:54:39  Taylor Castro

## 2023-05-05 NOTE — PROGRESS NOTE ADULT - SUBJECTIVE AND OBJECTIVE BOX
Patient is a 68y old  Male who presents with a chief complaint of Gastrointestinal hemorrhage (01 May 2023 10:54)      HPI:  67 yo male with PMHx metastatic neuroendocrine tumor of the pancreas, recently admitted with nonfunctioning GJ tube (patient is chronically obstructed).   Patient has had severe heartburn, and has had several episodes of vomiting small amount of dark blood. Patient was discharged on Thursday. Hgb unchanged. CT angiography has no active bleeding, but demonstrates extensive tumor in distal pancreas.     5/1 pt feeling slightly nauseous, denies abd pain.  5/2 pt c/o frequent BM overnight causing him to not sleep well. denies abd pain , nausea  5/3 pt reports feeling well, hematuria resolved. coulter in place. not having as many BMs anymore.   5/4: Patient went to the OR today for a Open distal pancreatectomy, splenectomy, partial colectomy, partial L adrenalectomy, temporary abdominal closure.  Patient had a 2.2 L EBL.  Intra Op had 6U PRBC, 2FFP, 1 Platelets, 4L crystalloid  Post Op 1U PRBC ans 1 U FFP.  Post OP vented, with poor UO, on levo and Vaso.    5/5: He remains vented, on paralytics, 2 pressors, essentially anuric.         PAST MEDICAL & SURGICAL HISTORY:  Hypertension      BPH (benign prostatic hyperplasia)      Renal insufficiency      Light chain nephropathy      DM (diabetes mellitus)      HLD (hyperlipidemia)      No significant past surgical history          FAMILY HISTORY:  FH: breast cancer (Mother)    FH: aneurysm (Father)        Social Hx:    Allergies    losartan (Angioedema)    Intolerances            ICU Vital Signs Last 24 Hrs  T(C): 37.1 (05 May 2023 04:00), Max: 37.1 (05 May 2023 04:00)  T(F): 98.8 (05 May 2023 04:00), Max: 98.8 (05 May 2023 04:00)  HR: 78 (05 May 2023 07:00) (69 - 129)  BP: 98/61 (05 May 2023 07:00) (71/47 - 137/85)  BP(mean): 70 (05 May 2023 07:00) (58 - 85)  ABP: 94/54 (05 May 2023 07:00) (65/47 - 137/85)  ABP(mean): 69 (05 May 2023 07:00) (60 - 84)  RR: 20 (05 May 2023 07:00) (15 - 24)  SpO2: 99% (05 May 2023 07:00) (98% - 100%)    O2 Parameters below as of 05 May 2023 07:00  Patient On (Oxygen Delivery Method): ventilator    O2 Concentration (%): 30        Mode: AC/ CMV (Assist Control/ Continuous Mandatory Ventilation)  RR (machine): 20  TV (machine): 400  FiO2: 30  PEEP: 6  ITime: 0.7  MAP: 9  PIP: 18      I&O's Summary    04 May 2023 07:01  -  05 May 2023 07:00  --------------------------------------------------------  IN: 95478 mL / OUT: 1185 mL / NET: 9181 mL                              9.6    11.06 )-----------( 93       ( 05 May 2023 05:35 )             28.9       05-05    141  |  109<H>  |  37<H>  ----------------------------<  210<H>  5.2   |  23  |  2.46<H>    Ca    7.8<L>      05 May 2023 05:35  Phos  6.5     05-05  Mg     1.7     05-05    TPro  5.0<L>  /  Alb  2.6<L>  /  TBili  0.6  /  DBili  0.2  /  AST  2828<H>  /  ALT  881<H>  /  AlkPhos  128<H>  05-05            ABG - ( 04 May 2023 21:27 )  pH, Arterial: 7.23  pH, Blood: x     /  pCO2: 50    /  pO2: 147   / HCO3: 21    / Base Excess: -6.9  /  SaO2: 100                     MEDICATIONS  (STANDING):  albumin human 25% IVPB 50 milliLiter(s) IV Intermittent once  chlorhexidine 0.12% Liquid 15 milliLiter(s) Oral Mucosa every 12 hours  chlorhexidine 4% Liquid 1 Application(s) Topical <User Schedule>  cisatracurium Infusion 3 MICROgram(s)/kG/Min (11.4 mL/Hr) IV Continuous <Continuous>  dextrose 5%. 1000 milliLiter(s) (50 mL/Hr) IV Continuous <Continuous>  dextrose 5%. 1000 milliLiter(s) (100 mL/Hr) IV Continuous <Continuous>  dextrose 50% Injectable 12.5 Gram(s) IV Push once  dextrose 50% Injectable 25 Gram(s) IV Push once  dextrose 50% Injectable 25 Gram(s) IV Push once  fentaNYL   Infusion 2 MICROgram(s)/kG/Hr (12.7 mL/Hr) IV Continuous <Continuous>  glucagon  Injectable 1 milliGRAM(s) IntraMuscular once  insulin lispro (ADMELOG) corrective regimen sliding scale   SubCutaneous every 6 hours  midazolam Infusion 0.02 mG/kG/Hr (1.27 mL/Hr) IV Continuous <Continuous>  norepinephrine Infusion 0.05 MICROgram(s)/kG/Min (5.95 mL/Hr) IV Continuous <Continuous>  pantoprazole  Injectable 40 milliGRAM(s) IV Push every 12 hours  propofol Infusion 30 MICROgram(s)/kG/Min (11.4 mL/Hr) IV Continuous <Continuous>  sodium bicarbonate  Infusion 0.079 mEq/kG/Hr (100 mL/Hr) IV Continuous <Continuous>  vasopressin Infusion 0.01 Unit(s)/Min (1.5 mL/Hr) IV Continuous <Continuous>    MEDICATIONS  (PRN):  dextrose Oral Gel 15 Gram(s) Oral once PRN Blood Glucose LESS THAN 70 milliGRAM(s)/deciliter  ondansetron Injectable 4 milliGRAM(s) IV Push every 6 hours PRN Nausea and/or Vomiting      DVT Prophylaxis:    Advanced Directives:  Discussed with:    Visit Information: 125    ** Time is exclusive of billed procedures and/or teaching and/or routine family updates.

## 2023-05-05 NOTE — PROGRESS NOTE ADULT - SUBJECTIVE AND OBJECTIVE BOX
BRIEF HOSPITAL COURSE: 69 yo male, PMHx neuroendocrine tumor of pancreas with mets to liver diagnosed 6 years ago s/p treatment with Lutathera one year ago and restarted in 12/2022, dysphagia and severe esophagitis c/b severe protein calorie malnutrition s/p G-J tube placed 01/23, with recent  admission due to chronic G tube obstruction discharged home 4/27, chronic urinary retention requiring self-catheterization, who presented with hematemesis. CTA negative for active GI bleed, but revealing of extensive tumor burden in distal pancreas with secondary gastric outlet obstruction.     EVENTS:   POD #1 s/p Open distal pancreatectomy, splenectomy, partial colectomy, partial L adrenalectomy, found to have large varices, 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 Liver laden with diffuse metastases and very friable, RP oozing thus decision made to leave with open abdomen and colonic discontinuity. 2.2L EBL. Intra operative resuscitation with 6u PRBC, 2u FFP, 1u plt, 4L crystalloid. Post-operatively left intubated, paralyzed, in shock on vasopressors. Given additional 1u PRBC, 1u FFP, 1L crystalloid.    Worsening shock state today  On Norepinephrine gtt with increasing vasopressor requirements, titrating to targeted MAP goal >65, as well as fixed-dose physiologic Vasopressin  Labs with progressive ZO likely component of ATN related to hypoperfusion during shock state, anuric  Repeat ABG with developing metabolic acidosis; electrolytes remain stable and appears euvolemic on exam minimal FiO2 not fluid overloaded   Discussed with multidisciplinary team including Nephrology and Surgery Dr. Siddiqi, decision made to initiate urgent hemodialysis for optimization of acid-base balance prior to return to OR     Underwent 2hrs HD without UF, had escalating vasopressor requirements throughout but now able to titrate down  Repeat ABG post-HD with improvement in acidosis  Repeat CBC with progressive anemia and thrombocytopenia, monitoring vac outputs   For now he is to remain intubated on full vent support, deeply sedated and paralyzed on Nimbex infusion due to open abdomen  Discussed with Dr. Siddiqi, plan to return to OR this evening for definitive management of colon resection (anastomosis vs colostomy) and abdominal closure   If successful, plan will be to wean off paralytics post-op and begin lightening sedation burden  Will continue to monitor serial labs, ABGs to assess acid-base balance with subsequent vent manipulation as required to keep goal pH >7.25  SCDs for DVT ppx       cc time: 45 minutes, non-inclusive of time spent on procedures performed

## 2023-05-06 NOTE — PROGRESS NOTE ADULT - ASSESSMENT
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  S/P distal pancreatectomy, splenectomy, partial colectomy  S/P abdominal washout and colostomy creation    Plan:  Wean off ventilator  Observe drain output  Keep NG tube  Observe ostomy output  Critical care management as per ICU team  Follow up with nephrology    Plan discussed with Dr Siddiqi 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  S/P distal pancreatectomy, splenectomy, partial colectomy  S/P abdominal washout and colostomy creation    Plan:  Wean off ventilator  Observe drain output  Keep NG tube  Observe ostomy output  Critical care management as per ICU team  Follow up with nephrology for further dialysis  He will need TPN for now until we can use his gut for nutrition    Plan discussed with Dr Siddiqi

## 2023-05-06 NOTE — PROGRESS NOTE ADULT - ASSESSMENT
69 yo male with PMHx metastatic neuroendocrine tumor of the pancreas, recently admitted with nonfunctioning GJ tube (patient is chronically obstructed).   Patient has had severe heartburn, and has had several episodes of vomiting small amount of dark blood.     now Post Op  after an open distal pancreatectomy, splenectomy, partial colectomy, partial L adrenalectomy, temporary abdominal closure.    With Post Op distributive Shock  oliguria    PLAN:  ICU    PULM: Fi O2 30%.  Will try weaning today    Cardio:  Continue Pressors and try to wean levo.     Renal:  NS at 75.  Anuric.  Will not need HD today    GI: NPO, PPI.  Rising LFT likely from hypotension.  Will trend.  Likely PPN on 5/7    Surgery:  NPO.  D?C  fent Drip, Precedex, Dilaudid PRN, no IV APAP because of shock Liver      ID: No evidence of infection. Continue Zosyn    Venodynes    Repeating labs Noon    D/W Surgery

## 2023-05-06 NOTE — PROGRESS NOTE ADULT - SUBJECTIVE AND OBJECTIVE BOX
Chief complaints. admitted on 4/29 with GIB  5/6 s/p OR yesterday, ostomy creation  5/5- case d/w surgery and intensivist , anuric k 5.2 will dialyze preop, no fluid removal  5/4- pt seen in PACU, PRBC and FFP, and LR resuscitation noted, coulter in place ~ 100 cc urine in coulter bag  case d/w pts nurse   Chart quote, operative note ""Open distal pancreatectomy, splenectomy, partial colectomy, partial L adrenalectomy, temporary abdominal closure; Large varices, 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 Liver laden with diffuse metastases and very friable, RP oozy. Patient on pressors at the end of the case, and hence, decided to leave colon in discontinuity with temporary closure.""  HPI:  67 yo man with Neuroendocrine tumor of pancreas with mets to liver dx'd 6 years ago.  Treated with Lutathera one year ago and restarted in 12/2022  G-J tube placed for nutrition in January due to chronic dysphagia and severe esophagitis on EGD.  Post recent  admission due to G tube clogging.  D/mansi home on 4/27.    Returned to ED due to vomiting when night time enteral feeding via G tube started.  CT with IVC done due to concern for GIB.  Pt reports chronic self catheterization due to urinary retention.   ZO improved when catheterization started.  Follows mainly with EMELY Cortez.   Pt has been self cath'ing 3-4 x /day.   Indwelling Coulter placed by EMELY due to resistance with blood clots with resistance reported by pt.  For palliative mass resection tomorrow.    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  Creat has been variable during previous admissions--ranging 1.1-2.2.    PMHX and PSHX.  --Neuroendocrine tumor of pancreas with mets to liver --dx 6 years ago.  --GJ tube for nutrition in 1/2023 due to chronic dysphagia.  --Hx of HTN  --Hx of DM  --Hx of light chain disease --unclear hx and no hx of renal biopsy.    Home Medications:   * Patient Currently Takes Medications as of 27-Apr-2023 15:16 documented in Structured Notes  · 	Xanax 0.25 mg oral tablet: 1 tab(s) orally 2 times a day as needed for  anxiety MDD: 2 tabs  · 	cefdinir 250 mg/5 mL oral liquid: 6 milliliter(s) orally 2 times a day    FAMILY HISTORY:  FH: breast cancer (Mother)  FH: aneurysm (Father)    SOCIAL HISTORY :  Remote former smoker    Allergies :  losartan (Angioedema)    REVIEW OF SYSTEMS :  UTO    MEDICATIONS  (STANDING):  albumin human 25% IVPB 50 milliLiter(s) IV Intermittent once  chlorhexidine 0.12% Liquid 15 milliLiter(s) Oral Mucosa every 12 hours  chlorhexidine 4% Liquid 1 Application(s) Topical <User Schedule>  cisatracurium Infusion 3 MICROgram(s)/kG/Min (11.4 mL/Hr) IV Continuous <Continuous>  dextrose 5%. 1000 milliLiter(s) (50 mL/Hr) IV Continuous <Continuous>  dextrose 5%. 1000 milliLiter(s) (100 mL/Hr) IV Continuous <Continuous>  dextrose 50% Injectable 12.5 Gram(s) IV Push once  dextrose 50% Injectable 25 Gram(s) IV Push once  dextrose 50% Injectable 25 Gram(s) IV Push once  fentaNYL   Infusion 2 MICROgram(s)/kG/Hr (12.7 mL/Hr) IV Continuous <Continuous>  glucagon  Injectable 1 milliGRAM(s) IntraMuscular once  insulin lispro (ADMELOG) corrective regimen sliding scale   SubCutaneous every 6 hours  midazolam Infusion 0.02 mG/kG/Hr (1.27 mL/Hr) IV Continuous <Continuous>  norepinephrine Infusion 0.05 MICROgram(s)/kG/Min (5.95 mL/Hr) IV Continuous <Continuous>  pantoprazole  Injectable 40 milliGRAM(s) IV Push every 12 hours  petrolatum Ophthalmic Ointment 1 Application(s) Both EYES four times a day  piperacillin/tazobactam IVPB.. 3.375 Gram(s) IV Intermittent every 8 hours  sodium chloride 0.9%. 1000 milliLiter(s) (75 mL/Hr) IV Continuous <Continuous>  vasopressin Infusion 0.04 Unit(s)/Min (6 mL/Hr) IV Continuous <Continuous>    MEDICATIONS  (PRN):  dextrose Oral Gel 15 Gram(s) Oral once PRN Blood Glucose LESS THAN 70 milliGRAM(s)/deciliter  ondansetron Injectable 4 milliGRAM(s) IV Push every 6 hours PRN Nausea and/or Vomiting    I&O's Detail    04 May 2023 07:01  -  05 May 2023 07:00  --------------------------------------------------------  IN:    Albumin 25%  -  50 mL: 50 mL    Albumin 5% - 50 mL: 100 mL    Cisatracurium: 139 mL    FentaNYL: 106 mL    Lactated Ringers: 1250 mL    Lactated Ringers Bolus: 600 mL    Midazolam: 31 mL    Norepinephrine: 1007 mL    Other (mL): 3400 mL    Plasma: 328 mL    PRBCs (Packed Red Blood Cells): 3729 mL    Propofol: 153 mL    Sodium Bicarbonate: 1240 mL    Vasopressin: 68 mL  Total IN: 12310 mL    OUT:    Blood Loss (mL): 220 mL    Indwelling Catheter - Urethral (mL): 50 mL    Nasogastric/Oral tube (mL): 350 mL    VAC (Vacuum Assisted Closure) System (mL): 1175 mL  Total OUT: 1795 mL    Total NET: 27347 mL      05 May 2023 07:01  -  05 May 2023 10:22  --------------------------------------------------------  IN:    Sodium Bicarbonate: 225 mL  Total IN: 225 mL    OUT:  Total OUT: 0 mL    Total NET: 225 mL          ICU Vital Signs Last 24 Hrs  T(C): 36.8 (05 May 2023 08:00), Max: 37.1 (05 May 2023 04:00)  T(F): 98.2 (05 May 2023 08:00), Max: 98.8 (05 May 2023 04:00)  HR: 82 (05 May 2023 09:00) (69 - 129)  BP: 102/65 (05 May 2023 09:00) (71/47 - 137/85)  BP(mean): 74 (05 May 2023 09:00) (58 - 85)  ABP: 93/53 (05 May 2023 09:00) (65/47 - 137/85)  ABP(mean): 69 (05 May 2023 09:00) (60 - 84)  RR: 20 (05 May 2023 09:00) (15 - 24)  SpO2: 99% (05 May 2023 09:00) (98% - 100%)    O2 Parameters below as of 05 May 2023 09:00  Patient On (Oxygen Delivery Method): ventilator    O2 Concentration (%): 30        PHYSICAL EXAM:  GEN: intubated  HEENT orally intubated  CV: S1S2 RRR  LUNGS: clear to aus  ABD: soft  EXT: no edema  Coulter draining clear yellow urine ~ 100 ml    LABS:    05-06    143  |  110<H>  |  29<H>  ----------------------------<  139<H>  4.3   |  23  |  2.68<H>    Ca    7.6<L>      06 May 2023 04:55  Phos  5.0     05-06  Mg     1.8     05-06    TPro  4.5<L>  /  Alb  2.2<L>  /  TBili  1.1  /  DBili  0.6<H>  /  AST  3063<H>  /  ALT  854<H>  /  AlkPhos  145<H>  05-06 05-04    146<H>  |  114<H>  |  31<H>  ----------------------------<  201<H>  4.4   |  16<L>  |  1.86<H>    Ca    8.3<L>      04 May 2023 14:30  Phos  2.2     05-04  Mg     1.9     05-04    TPro  5.2<L>  /  Alb  2.7<L>  /  TBili  1.0  /  DBili  x   /  AST  767<H>  /  ALT  332<H>  /  AlkPhos  107  05-04                             10.3   9.47  )-----------( 96       ( 04 May 2023 14:30 )             31.2       Magnesium, Serum: 2.0 mg/dL (05-03 @ 06:21)  Phosphorus Level, Serum: 2.7 mg/dL (05-03 @ 06:21)

## 2023-05-06 NOTE — PROGRESS NOTE ADULT - ASSESSMENT
69 yo man with Hx of Neuroendocrine tumor/Pancreatic mass and liver mets.  Now for palliative resection of pancreatic mass.    --ZO with variable creat level and unclear CKD.    Likely dependent on volume status.    Limited intake even with Enteral feeding.      Start gentle hydration.  -- : continue Hale drainage.  --Hx of light chain nephropathy  --No contraindication for surgery from renal standpoint.    5/4  s/p Palliative surgery for neuroendocrine mass  will watch for ZO, pt with low UO immediate post op  s/p multiple PRBC and FFP transfusion and volume resuscitation    5/5  patient anuric, K 5.2  On bicarb drip  to go back to OR today  case d/w Dr Jean and Dr Siddiqi  Will HD x 2 hrs to correct potassium  >10 L infused in blood products and fluids  More fluids not indicated, UO 50 ml  no fluid removal on HD  Hep profile ordered    5/6  Ostomy created  will monitor i/o  labs stable   no need for HD today  d/w intensivist  will monitor daily for hd requirement

## 2023-05-06 NOTE — PROGRESS NOTE ADULT - SUBJECTIVE AND OBJECTIVE BOX
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 CTA - for active GIB, but w/ increasing tumor burden in distal pancreas w/ secondary gastric obstruction.     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 Liver laden with diffuse metastases and very friable, RP oozing thus decision made to leave with open abdomen and colonic discontinuity. 2.2L EBL. Intra operative resuscitation with 6u PRBC, 2u FFP, 1u plt, 4L crystalloid. Post-operatively left intubated, paralyzed, in shock on vasopressors. Given additional 1u PRBC, 1u FFP, 1L crystalloid.    Events last 24 hours: Pt w/ worsening shock w/ increasing levo and vaso requirements underwent HD today. Taken back to OR for abdominal cavity closure ad creation of end colostomy.     PAST MEDICAL & SURGICAL HISTORY:  Hypertension      BPH (benign prostatic hyperplasia)      Renal insufficiency      Light chain nephropathy      DM (diabetes mellitus)      HLD (hyperlipidemia)      No significant past surgical history          Review of Systems:  Unable to assess secondary to mentation.       Medications:  piperacillin/tazobactam IVPB.. 3.375 Gram(s) IV Intermittent every 8 hours    norepinephrine Infusion 0.05 MICROgram(s)/kG/Min IV Continuous <Continuous>      cisatracurium Infusion 3 MICROgram(s)/kG/Min IV Continuous <Continuous>  fentaNYL   Infusion 2 MICROgram(s)/kG/Hr IV Continuous <Continuous>  midazolam Infusion 0.02 mG/kG/Hr IV Continuous <Continuous>  ondansetron Injectable 4 milliGRAM(s) IV Push every 6 hours PRN        pantoprazole  Injectable 40 milliGRAM(s) IV Push every 12 hours      dextrose 50% Injectable 12.5 Gram(s) IV Push once  dextrose 50% Injectable 25 Gram(s) IV Push once  dextrose 50% Injectable 25 Gram(s) IV Push once  dextrose Oral Gel 15 Gram(s) Oral once PRN  glucagon  Injectable 1 milliGRAM(s) IntraMuscular once  insulin lispro (ADMELOG) corrective regimen sliding scale   SubCutaneous every 6 hours  vasopressin Infusion 0.04 Unit(s)/Min IV Continuous <Continuous>    albumin human 25% IVPB 50 milliLiter(s) IV Intermittent once  albumin human 25% IVPB 50 milliLiter(s) IV Intermittent every 1 hour PRN  dextrose 5%. 1000 milliLiter(s) IV Continuous <Continuous>  dextrose 5%. 1000 milliLiter(s) IV Continuous <Continuous>  lactated ringers Bolus 1000 milliLiter(s) IV Bolus once  magnesium sulfate  IVPB 1 Gram(s) IV Intermittent once  sodium chloride 0.9% lock flush 10 milliLiter(s) IV Push every 1 hour PRN  sodium chloride 0.9%. 1000 milliLiter(s) IV Continuous <Continuous>      chlorhexidine 0.12% Liquid 15 milliLiter(s) Oral Mucosa every 12 hours  chlorhexidine 4% Liquid 1 Application(s) Topical <User Schedule>  petrolatum Ophthalmic Ointment 1 Application(s) Both EYES four times a day        Mode: AC/ CMV (Assist Control/ Continuous Mandatory Ventilation)  RR (machine): 20  TV (machine): 400  FiO2: 30  PEEP: 6  ITime: 0.7  MAP: 11  PIP: 17      ICU Vital Signs Last 24 Hrs  T(C): 37.4 (06 May 2023 00:00), Max: 37.6 (05 May 2023 21:50)  T(F): 99.3 (06 May 2023 00:00), Max: 99.6 (05 May 2023 21:50)  HR: 123 (06 May 2023 00:00) (72 - 128)  BP: 95/58 (06 May 2023 00:00) (89/56 - 123/76)  BP(mean): 67 (06 May 2023 00:00) (64 - 87)  ABP: 84/51 (06 May 2023 00:00) (80/46 - 147/71)  ABP(mean): 64 (06 May 2023 00:00) (60 - 101)  RR: 15 (06 May 2023 00:00) (9 - 22)  SpO2: 100% (06 May 2023 00:00) (98% - 100%)    O2 Parameters below as of 05 May 2023 21:50  Patient On (Oxygen Delivery Method): ventilator    O2 Concentration (%): 30        ABG - ( 05 May 2023 17:15 )  pH, Arterial: 7.30  pH, Blood: x     /  pCO2: 47    /  pO2: 137   / HCO3: 23    / Base Excess: -3.6  /  SaO2: 99                  I&O's Detail    04 May 2023 07:01  -  05 May 2023 07:00  --------------------------------------------------------  IN:    Albumin 25%  -  50 mL: 50 mL    Albumin 5% - 50 mL: 100 mL    Cisatracurium: 139 mL    FentaNYL: 106 mL    Lactated Ringers: 1250 mL    Lactated Ringers Bolus: 600 mL    Midazolam: 31 mL    Norepinephrine: 1007 mL    Other (mL): 3400 mL    Plasma: 328 mL    PRBCs (Packed Red Blood Cells): 3729 mL    Propofol: 153 mL    Sodium Bicarbonate: 1240 mL    Vasopressin: 68 mL  Total IN: 61982 mL    OUT:    Blood Loss (mL): 220 mL    Indwelling Catheter - Urethral (mL): 50 mL    Nasogastric/Oral tube (mL): 350 mL    VAC (Vacuum Assisted Closure) System (mL): 1175 mL  Total OUT: 1795 mL    Total NET: 03922 mL      05 May 2023 07:01  -  06 May 2023 00:38  --------------------------------------------------------  IN:    Cisatracurium: 164 mL    FentaNYL: 122 mL    IV PiggyBack: 200 mL    Midazolam: 16.6 mL    Norepinephrine: 430.6 mL    Sodium Bicarbonate: 225 mL    sodium chloride 0.9%: 770 mL    Vasopressin: 75.3 mL  Total IN: 2003.5 mL    OUT:    Indwelling Catheter - Urethral (mL): 12 mL    Nasogastric/Oral tube (mL): 100 mL    VAC (Vacuum Assisted Closure) System (mL): 360 mL  Total OUT: 472 mL    Total NET: 1531.5 mL            LABS:                        7.7    9.44  )-----------( 101      ( 05 May 2023 23:00 )             23.6     05-05    144  |  111<H>  |  28<H>  ----------------------------<  136<H>  4.4   |  25  |  2.42<H>    Ca    7.8<L>      05 May 2023 23:00  Phos  4.8     05-05  Mg     1.5     05-05    TPro  5.0<L>  /  Alb  2.6<L>  /  TBili  0.6  /  DBili  0.2  /  AST  2828<H>  /  ALT  881<H>  /  AlkPhos  128<H>  05-05          CAPILLARY BLOOD GLUCOSE      POCT Blood Glucose.: 142 mg/dL (05 May 2023 23:10)    PT/INR - ( 05 May 2023 05:35 )   PT: 14.1 sec;   INR: 1.21 ratio             CULTURES:  Culture Results:   No Growth Final (04-29-23 @ 10:35)  Culture Results:   No Growth Final (04-29-23 @ 10:35)  Rapid RVP Result: NotDetec (04-29-23 @ 08:36)      Physical Examination:    General: Pt laying in hospital bed in no acute distress. Sedated on vent     HEENT: Pupils equal, reactive to light.  Symmetric.    PULM: Clear to auscultation bilaterally, no significant sputum production    CVS: Regular rate and rhythm, no murmurs, rubs, or gallops    ABD: Soft, nondistended, nontender, normoactive bowel sounds, no masses    EXT: No edema, nontender    SKIN: Warm and well perfused      RADIOLOGY: < from: Xray Chest 1 View-PORTABLE IMMEDIATE (Xray Chest 1 View-PORTABLE IMMEDIATE .) (05.04.23 @ 13:28) >  ACC: [id number] EXAM:  XR CHEST PORTABLE IMMED 1V   ORDERED BY: SAM TENORIO     PROCEDURE DATE:  05/04/2023          INTERPRETATION:  INDICATION: Intubation and NG tube    Portable chest at 1:03 PM    COMPARISON: 4/29/2023    FINDINGS:  Heart/Vascular: The heart size, mediastinum, hilum and aorta are within   normal limits for projection.  Pulmonary: Midline trachea. There is no focal infiltrate, congestion or   effusion. There is no pneumothorax.  The right diaphragm is elevated.    Bones: There isno fracture.  Lines and catheter: ET tube mid trachea. Tip and side-port of NG tube are   in the body of the stomach. Tip of right IJ catheter is in the right   atrium.    Impression:    No acute pulmonary disease.    ET tube mid trachea.  Tip and side-port of NG tube are in the body of the stomach.  Tip of right IJ catheter is in the right atrium.    --- End of Report ---            JERONIMO MURRAY DO; Attending Radiologist  This document has been electronically signed. May  4 2023  1:31PM            CRITICAL CARE TIME SPENT: 65 minutes assessing presenting problems of acute illness, which pose high probability of life threatening deterioration or end organ damage/dysfunction, as well as medical decision making including initiating plan of care, reviewing data, reviewing radiologic exams, discussing with multidisciplinary team,  discussing goals of care with patient/family, and writing this note.  Non-inclusive of procedures performed,

## 2023-05-06 NOTE — CHART NOTE - NSCHARTNOTEFT_GEN_A_CORE
Pt hypotensive in 2 pressor hypovolemic shock s/p OR.   Will transfuse 1 unit PRBC, attempted to call pt wife for consent, no answer, voicemail left.   Will transfuse in emergent situation.

## 2023-05-06 NOTE — PROGRESS NOTE ADULT - SUBJECTIVE AND OBJECTIVE BOX
Patient was seen and examined at the bedside this morning  Underwent ex lap with abdominal washout and colostomy creation with abdominal closure  Still intubated and sedated  Minimal UOP overnight  Received one unit of blood post op for a low H/H and soft BP  Still on Levophed and Vasopressin    O/E:  T(C): 36.8 (05-06-23 @ 08:00), Max: 37.6 (05-05-23 @ 21:50)  HR: 90 (05-06-23 @ 09:00) (79 - 128)  BP: 111/69 (05-06-23 @ 08:00) (84/55 - 123/76)  RR: 19 (05-06-23 @ 09:00) (5 - 26)  SpO2: 100% (05-06-23 @ 09:00) (98% - 100%)  Intubated and sedated  No pallor, jaundice or cyanosis  Not in pain or distress  Chest clear, equal air entry bilaterally  Abdomen soft and lax, no tenderness, clean and dry dressing. Ostomy pink and has small amount of stool. Drain with serosanguinous output  Extremities: No swelling or tenderness    05-05-23 @ 07:01  -  05-06-23 @ 07:00  --------------------------------------------------------  IN: 4444.5 mL / OUT: 896 mL / NET: 3548.5 mL                          8.3    11.02 )-----------( 81       ( 06 May 2023 04:55 )             25.1   05-06    143  |  110<H>  |  29<H>  ----------------------------<  139<H>  4.3   |  23  |  2.68<H>    Ca    7.6<L>      06 May 2023 04:55  Phos  5.0     05-06  Mg     1.8     05-06    TPro  4.5<L>  /  Alb  2.2<L>  /  TBili  1.1  /  DBili  0.6<H>  /  AST  3063<H>  /  ALT  854<H>  /  AlkPhos  145<H>  05-06  MEDICATIONS  (STANDING):  albumin human 25% IVPB 50 milliLiter(s) IV Intermittent once  chlorhexidine 0.12% Liquid 15 milliLiter(s) Oral Mucosa every 12 hours  chlorhexidine 4% Liquid 1 Application(s) Topical <User Schedule>  cisatracurium Infusion 3 MICROgram(s)/kG/Min (11.4 mL/Hr) IV Continuous <Continuous>  dexMEDEtomidine Infusion 0.04 MICROgram(s)/kG/Hr (0.64 mL/Hr) IV Continuous <Continuous>  dextrose 5%. 1000 milliLiter(s) (50 mL/Hr) IV Continuous <Continuous>  dextrose 5%. 1000 milliLiter(s) (100 mL/Hr) IV Continuous <Continuous>  dextrose 50% Injectable 12.5 Gram(s) IV Push once  dextrose 50% Injectable 25 Gram(s) IV Push once  dextrose 50% Injectable 25 Gram(s) IV Push once  fentaNYL   Infusion 2 MICROgram(s)/kG/Hr (12.7 mL/Hr) IV Continuous <Continuous>  glucagon  Injectable 1 milliGRAM(s) IntraMuscular once  insulin lispro (ADMELOG) corrective regimen sliding scale   SubCutaneous every 6 hours  midazolam Infusion 0.02 mG/kG/Hr (1.27 mL/Hr) IV Continuous <Continuous>  norepinephrine Infusion 0.05 MICROgram(s)/kG/Min (5.95 mL/Hr) IV Continuous <Continuous>  pantoprazole  Injectable 40 milliGRAM(s) IV Push every 12 hours  petrolatum Ophthalmic Ointment 1 Application(s) Both EYES four times a day  piperacillin/tazobactam IVPB.. 3.375 Gram(s) IV Intermittent every 8 hours  sodium chloride 0.9%. 1000 milliLiter(s) (75 mL/Hr) IV Continuous <Continuous>  vasopressin Infusion 0.04 Unit(s)/Min (6 mL/Hr) IV Continuous <Continuous>    MEDICATIONS  (PRN):  albumin human 25% IVPB 50 milliLiter(s) IV Intermittent every 1 hour PRN low b/p  dextrose Oral Gel 15 Gram(s) Oral once PRN Blood Glucose LESS THAN 70 milliGRAM(s)/deciliter  HYDROmorphone  Injectable 1 milliGRAM(s) IV Push every 4 hours PRN Moderate Pain (4 - 6)  ondansetron Injectable 4 milliGRAM(s) IV Push every 6 hours PRN Nausea and/or Vomiting  sodium chloride 0.9% lock flush 10 milliLiter(s) IV Push every 1 hour PRN Pre/post blood products, medications, blood draw, and to maintain line patency

## 2023-05-06 NOTE — PROGRESS NOTE ADULT - ASSESSMENT
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 CTA - for active GIB, but w/ increasing tumor burden in distal pancreas w/ secondary gastric obstruction.     1. Shock (hypovolemic vs. hemorrhagic)   2. ABLA   3. Acute hypoxic respiratory failure   4. ZO/ARF   5. 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 CTA - for active GIB, but w/ increasing tumor burden in distal pancreas w/ secondary gastric obstruction.     1. Shock (hypovolemic vs. hemorrhagic)   2. ABLA   3. Acute hypoxic respiratory failure   4. ZO/ARF   5. HAGMA   6. Hypomagnesemia     Plan:     Neuro: Sedated on vent w/ fent, versed and paralyzed w/ nimbex, taken off nimbex following OR and closure of abdomen. Will titrate off versed first then fent.     CV: Hypovolemic shock, on levo and vaso titrate to maintain MAP >65. Acutely dropped BP, given 1 unit PRBC and 1L LR w/ improvement.     Pulm: Acute hypoxic respiratory failure, intubated will use Low TV ventilation using 4-6cc/kg of IBW for lung protective strategies, will titrate FiO2 to maintain SpO2>90%.     GI: Diet: NPO, protonix for GI PPX. S/P OR today for colostomy and closure of abdomen. Surgery following.     Renal: ZO given profound shock state, now w/ ARF underwent dialysis today continue to monitor U/O, likely volume down, IV resuscitation w/ LR and 1 unit PRBC, avoid nephrotoxic meds.     Endo: Low mag post op given 1 gm IVPB, glucose <180, K>4 for arrythmia suppression.     Heme: ABLA following multiple procedures, transfuse 1 unit PRBC now given drop in Hb and hypotension, will transfuse for Hb <7 or if clinically indicated. SCD for DVT PPX following OR.     ID: Started on zosyn yesterday empirically for profound shock state, trend markers of infection daily.

## 2023-05-06 NOTE — PROGRESS NOTE ADULT - SUBJECTIVE AND OBJECTIVE BOX
HPI:  67 yo male with PMHx metastatic neuroendocrine tumor of the pancreas, recently admitted with nonfunctioning GJ tube (patient is chronically obstructed).   Patient has had severe heartburn, and has had several episodes of vomiting small amount of dark blood. Patient was discharged on Thursday. Hgb unchanged. CT angiography has no active bleeding, but demonstrates extensive tumor in distal pancreas.     5/1 pt feeling slightly nauseous, denies abd pain.  5/2 pt c/o frequent BM overnight causing him to not sleep well. denies abd pain , nausea  5/3 pt reports feeling well, hematuria resolved. coulter in place. not having as many BMs anymore.   5/4: Patient went to the OR today for a Open distal pancreatectomy, splenectomy, partial colectomy, partial L adrenalectomy, temporary abdominal closure.  Patient had a 2.2 L EBL.  Intra Op had 6U PRBC, 2FFP, 1 Platelets, 4L crystalloid  Post Op 1U PRBC ans 1 U FFP.  Post OP vented, with poor UO, on levo and Vaso.    5/5: He remains vented, on paralytics, 2 pressors, essentially anuric.    5/6: Completed the closure and ostomy yesterday.  Vented and on 2 Pressors, No UO.  Off Sedation awake and moving all 4 extremities           PAST MEDICAL & SURGICAL HISTORY:  Hypertension      BPH (benign prostatic hyperplasia)      Renal insufficiency      Light chain nephropathy      DM (diabetes mellitus)      HLD (hyperlipidemia)      No significant past surgical history          FAMILY HISTORY:  FH: breast cancer (Mother)    FH: aneurysm (Father)        Social Hx:    Allergies    losartan (Angioedema)    Intolerances          Weight (kg): 71.9 (05-06 @ 04:00)    ICU Vital Signs Last 24 Hrs  T(C): 36.8 (06 May 2023 08:00), Max: 37.6 (05 May 2023 21:50)  T(F): 98.2 (06 May 2023 08:00), Max: 99.6 (05 May 2023 21:50)  HR: 90 (06 May 2023 09:00) (79 - 128)  BP: 111/69 (06 May 2023 08:00) (84/55 - 123/76)  BP(mean): 79 (06 May 2023 08:00) (60 - 87)  ABP: 88/50 (06 May 2023 09:00) (75/46 - 147/71)  ABP(mean): 66 (06 May 2023 09:00) (57 - 101)  RR: 19 (06 May 2023 09:00) (5 - 26)  SpO2: 100% (06 May 2023 09:00) (98% - 100%)    O2 Parameters below as of 06 May 2023 04:00  Patient On (Oxygen Delivery Method): ventilator    O2 Concentration (%): 30        Mode: AC/ CMV (Assist Control/ Continuous Mandatory Ventilation)  RR (machine): 20  TV (machine): 400  FiO2: 30  PEEP: 6  ITime: 0.7  MAP: 9  PIP: 18      I&O's Summary    05 May 2023 07:01  -  06 May 2023 07:00  --------------------------------------------------------  IN: 4444.5 mL / OUT: 896 mL / NET: 3548.5 mL                              8.3    11.02 )-----------( 81       ( 06 May 2023 04:55 )             25.1       05-06    143  |  110<H>  |  29<H>  ----------------------------<  139<H>  4.3   |  23  |  2.68<H>    Ca    7.6<L>      06 May 2023 04:55  Phos  5.0     05-06  Mg     1.8     05-06    TPro  4.5<L>  /  Alb  2.2<L>  /  TBili  1.1  /  DBili  0.6<H>  /  AST  3063<H>  /  ALT  854<H>  /  AlkPhos  145<H>  05-06            ABG - ( 06 May 2023 06:01 )  pH, Arterial: 7.32  pH, Blood: x     /  pCO2: 38    /  pO2: 155   / HCO3: 20    / Base Excess: -6.0  /  SaO2: 99                      MEDICATIONS  (STANDING):  albumin human 25% IVPB 50 milliLiter(s) IV Intermittent once  chlorhexidine 0.12% Liquid 15 milliLiter(s) Oral Mucosa every 12 hours  chlorhexidine 4% Liquid 1 Application(s) Topical <User Schedule>  cisatracurium Infusion 3 MICROgram(s)/kG/Min (11.4 mL/Hr) IV Continuous <Continuous>  dexMEDEtomidine Infusion 0.04 MICROgram(s)/kG/Hr (0.64 mL/Hr) IV Continuous <Continuous>  dextrose 5%. 1000 milliLiter(s) (50 mL/Hr) IV Continuous <Continuous>  dextrose 5%. 1000 milliLiter(s) (100 mL/Hr) IV Continuous <Continuous>  dextrose 50% Injectable 12.5 Gram(s) IV Push once  dextrose 50% Injectable 25 Gram(s) IV Push once  dextrose 50% Injectable 25 Gram(s) IV Push once  fentaNYL   Infusion 2 MICROgram(s)/kG/Hr (12.7 mL/Hr) IV Continuous <Continuous>  glucagon  Injectable 1 milliGRAM(s) IntraMuscular once  insulin lispro (ADMELOG) corrective regimen sliding scale   SubCutaneous every 6 hours  midazolam Infusion 0.02 mG/kG/Hr (1.27 mL/Hr) IV Continuous <Continuous>  norepinephrine Infusion 0.05 MICROgram(s)/kG/Min (5.95 mL/Hr) IV Continuous <Continuous>  pantoprazole  Injectable 40 milliGRAM(s) IV Push every 12 hours  petrolatum Ophthalmic Ointment 1 Application(s) Both EYES four times a day  piperacillin/tazobactam IVPB.. 3.375 Gram(s) IV Intermittent every 8 hours  sodium chloride 0.9%. 1000 milliLiter(s) (75 mL/Hr) IV Continuous <Continuous>  vasopressin Infusion 0.04 Unit(s)/Min (6 mL/Hr) IV Continuous <Continuous>    MEDICATIONS  (PRN):  albumin human 25% IVPB 50 milliLiter(s) IV Intermittent every 1 hour PRN low b/p  dextrose Oral Gel 15 Gram(s) Oral once PRN Blood Glucose LESS THAN 70 milliGRAM(s)/deciliter  HYDROmorphone  Injectable 1 milliGRAM(s) IV Push every 4 hours PRN Moderate Pain (4 - 6)  ondansetron Injectable 4 milliGRAM(s) IV Push every 6 hours PRN Nausea and/or Vomiting  sodium chloride 0.9% lock flush 10 milliLiter(s) IV Push every 1 hour PRN Pre/post blood products, medications, blood draw, and to maintain line patency      DVT Prophylaxis:    Advanced Directives:  Discussed with:    Visit Information: 125    ** Time is exclusive of billed procedures and/or teaching and/or routine family updates.

## 2023-05-06 NOTE — CHART NOTE - NSCHARTNOTEFT_GEN_A_CORE
Lethargic with sonorus respirations    ABG showing hypercarbia    Narcan improved RR      pLACE ON niv    rEPEAT abd

## 2023-05-07 NOTE — PROGRESS NOTE ADULT - SUBJECTIVE AND OBJECTIVE BOX
HPI:  69 yo male with PMHx metastatic neuroendocrine tumor of the pancreas, recently admitted with nonfunctioning GJ tube (patient is chronically obstructed).   Patient has had severe heartburn, and has had several episodes of vomiting small amount of dark blood. Patient was discharged on Thursday. Hgb unchanged. CT angiography has no active bleeding, but demonstrates extensive tumor in distal pancreas.     5/1 pt feeling slightly nauseous, denies abd pain.  5/2 pt c/o frequent BM overnight causing him to not sleep well. denies abd pain , nausea  5/3 pt reports feeling well, hematuria resolved. coulter in place. not having as many BMs anymore.   5/4: Patient went to the OR today for a Open distal pancreatectomy, splenectomy, partial colectomy, partial L adrenalectomy, temporary abdominal closure.  Patient had a 2.2 L EBL.  Intra Op had 6U PRBC, 2FFP, 1 Platelets, 4L crystalloid  Post Op 1U PRBC ans 1 U FFP.  Post OP vented, with poor UO, on levo and Vaso.    5/5: He remains vented, on paralytics, 2 pressors, essentially anuric.    5/6: Completed the closure and ostomy yesterday.  Vented and on 2 Pressors, No UO.  Off Sedation awake and moving all 4 extremities  5/7: Extubated, required NIV for Hypercarbia, decreased H & H, still on 2 Pressors             PAST MEDICAL & SURGICAL HISTORY:  Hypertension      BPH (benign prostatic hyperplasia)      Renal insufficiency      Light chain nephropathy      DM (diabetes mellitus)      HLD (hyperlipidemia)      No significant past surgical history          FAMILY HISTORY:  FH: breast cancer (Mother)    FH: aneurysm (Father)        Social Hx:    Allergies    losartan (Angioedema)    Intolerances            ICU Vital Signs Last 24 Hrs  T(C): 36.1 (07 May 2023 08:00), Max: 37.4 (06 May 2023 17:00)  T(F): 97 (07 May 2023 08:00), Max: 99.3 (06 May 2023 17:00)  HR: 62 (07 May 2023 10:00) (62 - 108)  BP: --  BP(mean): --  ABP: 91/49 (07 May 2023 10:00) (88/48 - 129/75)  ABP(mean): 66 (07 May 2023 10:00) (62 - 98)  RR: 12 (07 May 2023 10:00) (9 - 24)  SpO2: 100% (07 May 2023 10:00) (92% - 100%)    O2 Parameters below as of 07 May 2023 08:00  Patient On (Oxygen Delivery Method): BiPAP/CPAP    O2 Concentration (%): 30        Mode: standby      I&O's Summary    06 May 2023 07:01  -  07 May 2023 07:00  --------------------------------------------------------  IN: 2915.5 mL / OUT: 1020 mL / NET: 1895.5 mL    07 May 2023 07:01  -  07 May 2023 10:39  --------------------------------------------------------  IN: 0 mL / OUT: 295 mL / NET: -295 mL                              7.2    7.55  )-----------( 69       ( 07 May 2023 06:10 )             22.1       05-07    142  |  111<H>  |  37<H>  ----------------------------<  132<H>  4.0   |  19<L>  |  3.42<H>    Ca    7.4<L>      07 May 2023 06:10  Phos  5.1     05-07  Mg     1.7     05-07    TPro  4.2<L>  /  Alb  1.8<L>  /  TBili  1.0  /  DBili  0.6<H>  /  AST  1641<H>  /  ALT  694<H>  /  AlkPhos  147<H>  05-07            ABG - ( 07 May 2023 01:12 )  pH, Arterial: 7.34  pH, Blood: x     /  pCO2: 35    /  pO2: 116   / HCO3: 19    / Base Excess: -6.1  /  SaO2: 100                     MEDICATIONS  (STANDING):  chlorhexidine 0.12% Liquid 15 milliLiter(s) Oral Mucosa every 12 hours  chlorhexidine 4% Liquid 1 Application(s) Topical <User Schedule>  dexMEDEtomidine Infusion 0.04 MICROgram(s)/kG/Hr (0.72 mL/Hr) IV Continuous <Continuous>  dextrose 5%. 1000 milliLiter(s) (50 mL/Hr) IV Continuous <Continuous>  dextrose 5%. 1000 milliLiter(s) (100 mL/Hr) IV Continuous <Continuous>  dextrose 50% Injectable 12.5 Gram(s) IV Push once  dextrose 50% Injectable 25 Gram(s) IV Push once  dextrose 50% Injectable 25 Gram(s) IV Push once  fat emulsion (Fish Oil and Plant Based) 20% Infusion 0.695 Gm/kG/Day (20.8 mL/Hr) IV Continuous <Continuous>  glucagon  Injectable 1 milliGRAM(s) IntraMuscular once  insulin lispro (ADMELOG) corrective regimen sliding scale   SubCutaneous every 6 hours  norepinephrine Infusion 0.05 MICROgram(s)/kG/Min (5.95 mL/Hr) IV Continuous <Continuous>  pantoprazole  Injectable 40 milliGRAM(s) IV Push every 12 hours  Parenteral Nutrition - Adult 1 Each (67 mL/Hr) TPN Continuous <Continuous>  petrolatum Ophthalmic Ointment 1 Application(s) Both EYES four times a day  piperacillin/tazobactam IVPB.. 3.375 Gram(s) IV Intermittent every 8 hours  sodium chloride 0.9%. 1000 milliLiter(s) (75 mL/Hr) IV Continuous <Continuous>  vasopressin Infusion 0.04 Unit(s)/Min (6 mL/Hr) IV Continuous <Continuous>    MEDICATIONS  (PRN):  dextrose Oral Gel 15 Gram(s) Oral once PRN Blood Glucose LESS THAN 70 milliGRAM(s)/deciliter  ondansetron Injectable 4 milliGRAM(s) IV Push every 6 hours PRN Nausea and/or Vomiting  sodium chloride 0.9% lock flush 10 milliLiter(s) IV Push every 1 hour PRN Pre/post blood products, medications, blood draw, and to maintain line patency      DVT Prophylaxis:    Advanced Directives:  Discussed with:    Visit Information: 125 min    ** Time is exclusive of billed procedures and/or teaching and/or routine family updates.

## 2023-05-07 NOTE — PROGRESS NOTE ADULT - SUBJECTIVE AND OBJECTIVE BOX
Patient is a 68y old  Male who presents with a chief complaint of GIB (06 May 2023 10:48)      BRIEF HOSPITAL COURSE: Pt is a 69 y/o M pmhx of NE tumor of pancreas w/ liver mets diagnosed 6 years prior, treated w/ Lutathera 1 year prior and restarted on in 12/2022, dysphagia w/ severe esophagitis s/p G-J tube, and recent hospital admission at  for chronic G tube obstruction presents to  w/ complaints of hematemesis. CTA - for active GIB, but w/ increasing tumor burden in distal pancreas w/ secondary gastric obstruction.    Events last 24 hours: Extubated during day shift, remains on vasopressor dependent shock state, started on trickle feeds by GI.     PAST MEDICAL & SURGICAL HISTORY:  Hypertension      BPH (benign prostatic hyperplasia)      Renal insufficiency      Light chain nephropathy      DM (diabetes mellitus)      HLD (hyperlipidemia)      No significant past surgical history          Review of Systems:  Unable to assess secondary to mentation.     Medications:  piperacillin/tazobactam IVPB.. 3.375 Gram(s) IV Intermittent every 8 hours    norepinephrine Infusion 0.05 MICROgram(s)/kG/Min IV Continuous <Continuous>      dexMEDEtomidine Infusion 0.04 MICROgram(s)/kG/Hr IV Continuous <Continuous>  ondansetron Injectable 4 milliGRAM(s) IV Push every 6 hours PRN        pantoprazole  Injectable 40 milliGRAM(s) IV Push every 12 hours      dextrose 50% Injectable 12.5 Gram(s) IV Push once  dextrose 50% Injectable 25 Gram(s) IV Push once  dextrose 50% Injectable 25 Gram(s) IV Push once  dextrose Oral Gel 15 Gram(s) Oral once PRN  glucagon  Injectable 1 milliGRAM(s) IntraMuscular once  insulin lispro (ADMELOG) corrective regimen sliding scale   SubCutaneous every 6 hours  vasopressin Infusion 0.04 Unit(s)/Min IV Continuous <Continuous>    dextrose 5%. 1000 milliLiter(s) IV Continuous <Continuous>  dextrose 5%. 1000 milliLiter(s) IV Continuous <Continuous>  sodium chloride 0.9% lock flush 10 milliLiter(s) IV Push every 1 hour PRN  sodium chloride 0.9%. 1000 milliLiter(s) IV Continuous <Continuous>      chlorhexidine 0.12% Liquid 15 milliLiter(s) Oral Mucosa every 12 hours  chlorhexidine 4% Liquid 1 Application(s) Topical <User Schedule>  petrolatum Ophthalmic Ointment 1 Application(s) Both EYES four times a day        Mode: NIV (Noninvasive Ventilation)  FiO2: 30  PEEP: 5  PS: 15  ITime: 0.9  MAP: 8  PIP: 20      ICU Vital Signs Last 24 Hrs  T(C): 36.5 (07 May 2023 00:00), Max: 37.4 (06 May 2023 01:15)  T(F): 97.7 (07 May 2023 00:00), Max: 99.3 (06 May 2023 01:15)  HR: 73 (07 May 2023 00:36) (73 - 128)  BP: 97/67 (06 May 2023 10:00) (84/55 - 118/71)  BP(mean): 74 (06 May 2023 10:00) (60 - 83)  ABP: 95/51 (07 May 2023 00:36) (75/46 - 132/72)  ABP(mean): 67 (07 May 2023 00:36) (57 - 98)  RR: 12 (07 May 2023 00:36) (10 - 26)  SpO2: 100% (07 May 2023 00:36) (92% - 100%)    O2 Parameters below as of 07 May 2023 00:00  Patient On (Oxygen Delivery Method): BiPAP/CPAP    O2 Concentration (%): 30        ABG - ( 06 May 2023 18:00 )  pH, Arterial: 7.15  pH, Blood: x     /  pCO2: 63    /  pO2: 149   / HCO3: 22    / Base Excess: -7.8  /  SaO2: 100                 I&O's Detail    05 May 2023 07:01  -  06 May 2023 07:00  --------------------------------------------------------  IN:    Cisatracurium: 164 mL    FentaNYL: 200 mL    IV PiggyBack: 300 mL    Lactated Ringers Bolus: 1000 mL    Midazolam: 16.6 mL    Norepinephrine: 647.6 mL    PRBCs (Packed Red Blood Cells): 310 mL    Sodium Bicarbonate: 225 mL    sodium chloride 0.9%: 1445 mL    Vasopressin: 136.3 mL  Total IN: 4444.5 mL    OUT:    Bulb (mL): 360 mL    Indwelling Catheter - Urethral (mL): 26 mL    Nasogastric/Oral tube (mL): 150 mL    VAC (Vacuum Assisted Closure) System (mL): 360 mL  Total OUT: 896 mL    Total NET: 3548.5 mL      06 May 2023 07:01  -  07 May 2023 00:53  --------------------------------------------------------  IN:    Norepinephrine: 575 mL    sodium chloride 0.9%: 879 mL    Vasopressin: 132 mL  Total IN: 1586 mL    OUT:    Bulb (mL): 580 mL    Indwelling Catheter - Urethral (mL): 15 mL    Nasogastric/Oral tube (mL): 50 mL  Total OUT: 645 mL    Total NET: 941 mL            LABS:                        7.5    8.16  )-----------( 84       ( 06 May 2023 19:30 )             23.3     05-06    142  |  110<H>  |  31<H>  ----------------------------<  158<H>  4.7   |  22  |  2.87<H>    Ca    7.5<L>      06 May 2023 12:10  Phos  5.0     05-06  Mg     1.8     05-06    TPro  4.5<L>  /  Alb  2.2<L>  /  TBili  1.1  /  DBili  0.6<H>  /  AST  3063<H>  /  ALT  854<H>  /  AlkPhos  145<H>  05-06          CAPILLARY BLOOD GLUCOSE      POCT Blood Glucose.: 112 mg/dL (06 May 2023 23:40)    PT/INR - ( 06 May 2023 04:55 )   PT: 14.8 sec;   INR: 1.27 ratio             CULTURES:      Physical Examination:    General: Pt laying in hospital bed in no acute distress.  Alert, responsive, lethargic.     HEENT: Pupils equal, reactive to light.  Symmetric.    PULM: Clear to auscultation bilaterally, no significant sputum production    CVS: Regular rate and rhythm, no murmurs, rubs, or gallops    ABD: Soft, nondistended, nontender, normoactive bowel sounds, no masses    EXT: No edema, nontender    SKIN: Warm and well perfused.       RADIOLOGY:  < from: Xray Chest 1 View- PORTABLE-Urgent (Xray Chest 1 View- PORTABLE-Urgent .) (05.06.23 @ 09:09) >  ACC: [id number] EXAM:  XR CHEST PORTABLE URGENT 1V   ORDERED BY: MARLENE MO     PROCEDURE DATE:  05/06/2023          INTERPRETATION:  Chest one view    HISTORY: Intubation    COMPARISON STUDY: 5/4/2023    Frontal expiratory view of the chest shows the heart to be normal in   size. Endotracheal tube reaches the lower half of the trachea. Right   jugular line and nasogastric tube are again noted.    The lungs are clear and there is no evidence of pneumothorax nor pleural   effusion.    IMPRESSION:  Lines as noted.        Thank you for the courtesy of this referral.    --- End of Report ---            MIRIAM MCGINNIS MD; Attending Interventional Radiologist  This document has been electronically signed. May  6 2023 11:57AM          CRITICAL CARE TIME SPENT: 45 minutes assessing presenting problems of acute illness, which pose high probability of life threatening deterioration or end organ damage/dysfunction, as well as medical decision making including initiating plan of care, reviewing data, reviewing radiologic exams, discussing with multidisciplinary team,  discussing goals of care with patient/family, and writing this note.  Non-inclusive of procedures performed,

## 2023-05-07 NOTE — PROGRESS NOTE ADULT - ASSESSMENT
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 CTA - for active GIB, but w/ increasing tumor burden in distal pancreas w/ secondary gastric obstruction.     1. Shock (hypovolemic vs. hemorrhagic)   2. ABLA   3. Acute hypoxic respiratory failure   4. ZO/ARF   5. HAGMA   6. Hypomagnesemia     Plan:     Neuro: Off sedation, extubated, was lethargic treated w/ narcan w/ improvement.     CV: Hypovolemic shock, remains on levo and vaso titrate to maintain MAP >65.    Pulm: Extubated today, remains hypoxic on BiPAP, respiratory acidosis on ABG, will repeat one stat to eval for responsiveness to NIV therapy, pt remains high risk for decompensation requiring reintubation.     GI: Diet: NPO, protonix for GI PPX.     Renal: ZO given profound shock state, now w/ ARF plan for dialysis tomorrow continue to monitor U/O, likely volume down, IV resuscitation w/ LR and 1 unit PRBC, avoid nephrotoxic meds.     Endo: glucose <180, mag>2, K>4 for arrythmia suppression.     Heme: ABLA following multiple procedures, will transfuse for Hb <7 or if clinically indicated. SCD for DVT PPX following OR.     ID: Continue on zosyn empirically for profound shock state, trend markers of infection daily.

## 2023-05-07 NOTE — CHART NOTE - NSCHARTNOTESELECT_GEN_ALL_CORE
----- Message from Jenny White MD sent at 7/16/2021  3:59 AM CDT -----  Please notify pt that her blood work is stable. She is anemic, so if she is not already taking an iron supplement, please recommend iron sulfate 325mg daily and sent script. Also please encourage her to go to the lab and leave a urine sample. Thank you  Jenny White MD    Dietitian F/U w/ TPN

## 2023-05-07 NOTE — PROGRESS NOTE ADULT - SUBJECTIVE AND OBJECTIVE BOX
Patient was seen and examined at the bedside this morning  Underwent ex lap with abdominal washout and colostomy creation with abdominal closure  S/p Extubation, now on BiPAP  Minimal UOP overnight; possible plan for HD today  Still on Levophed and Vasopressin        Vitals:  T(C): 36.1 ( @ 08:00), Max: 37.4 ( @ 17:00)  HR: 62 ( @ 10:00) (62 - 108)  BP: --  RR: 12 ( @ 10:00) (9 - 24)  SpO2: 100% ( @ 10:00) (92% - 100%)     @ 07:01  -   @ 07:00  --------------------------------------------------------  IN:    Dexmedetomidine: 33.4 mL    IV PiggyBack: 200 mL    Norepinephrine: 807.5 mL    sodium chloride 0.9%: 1678.2 mL    Vasopressin: 196.4 mL  Total IN: 2915.5 mL    OUT:    Bulb (mL): 760 mL    Colostomy (mL): 30 mL    Indwelling Catheter - Urethral (mL): 30 mL    Nasogastric/Oral tube (mL): 200 mL  Total OUT: 1020 mL    Total NET: 1895.5 mL       @ 07:01  -   @ 10:31  --------------------------------------------------------  IN:  Total IN: 0 mL    OUT:    Bulb (mL): 200 mL    Indwelling Catheter - Urethral (mL): 15 mL  Total OUT: 215 mL    Total NET: -215 mL          Physical Exam:  General: AAOx3, Well developed, NAD  Chest: Normal respiratory effort  Heart: RRR  Abdomen: Abdomen soft and lax, no tenderness, clean and dry dressing. Ostomy pink and has small amount of stool. Drain with serosanguinous output  Neuro/Psych: No localized deficits. Normal speech, normal tone  Skin: Normal, no rashes, no lesions noted.   Extremities: Warm, well perfused, no edema, Pulses intact    05-07 @ 06:10                    7.2  CBC: 7.55>)-------(<69                     22.1                 142 | 111 | 37    CMP:  ----------------------< 132               4.0 | 19 | 3.42                      Ca:7.4  Phos:5.1  M.7               1.0|      |1641        LFTs:  ------|147|-----             0.6|      |-   @ 19:30                    7.5  CBC: 8.16>)-------(<84                     23.3                 - | - | -    CMP:  ----------------------< -               - | - | -                      Ca:-  Phos:-  Mg:-               -|      |-        LFTs:  ------|-|-----             -|      |-   @ 12:10                    7.9  CBC: 11.31>)-------(<83                     24.2                 142 | 110 | 31    CMP:  ----------------------< 158               4.7 | 22 | 2.87                      Ca:7.5  Phos:-  Mg:-               -|      |-        LFTs:  ------|-|-----             -|      |-   @ 04:55                    8.3  CBC: 11.02>)-------(<81                     25.1                 143 | 110 | 29    CMP:  ----------------------< 139               4.3 | 23 | 2.68                      Ca:7.6  Phos:5.0  M.8               1.1|      |3063        LFTs:  ------|145|-----             0.6|      |-   @ 23:00                    7.7  CBC: 9.44>)-------(<101                     23.6                 144 | 111 | 28    CMP:  ----------------------< 136               4.4 | 25 | 2.42                      Ca:7.8  Phos:4.8  M.5               -|      |-        LFTs:  ------|-|-----             -|      |-      Current Inpatient Medications:  chlorhexidine 0.12% Liquid 15 milliLiter(s) Oral Mucosa every 12 hours  chlorhexidine 4% Liquid 1 Application(s) Topical <User Schedule>  dexMEDEtomidine Infusion 0.04 MICROgram(s)/kG/Hr (0.72 mL/Hr) IV Continuous <Continuous>  dextrose 5%. 1000 milliLiter(s) (50 mL/Hr) IV Continuous <Continuous>  dextrose 5%. 1000 milliLiter(s) (100 mL/Hr) IV Continuous <Continuous>  dextrose 50% Injectable 12.5 Gram(s) IV Push once  dextrose 50% Injectable 25 Gram(s) IV Push once  dextrose 50% Injectable 25 Gram(s) IV Push once  dextrose Oral Gel 15 Gram(s) Oral once PRN  glucagon  Injectable 1 milliGRAM(s) IntraMuscular once  insulin lispro (ADMELOG) corrective regimen sliding scale   SubCutaneous every 6 hours  norepinephrine Infusion 0.05 MICROgram(s)/kG/Min (5.95 mL/Hr) IV Continuous <Continuous>  ondansetron Injectable 4 milliGRAM(s) IV Push every 6 hours PRN  pantoprazole  Injectable 40 milliGRAM(s) IV Push every 12 hours  petrolatum Ophthalmic Ointment 1 Application(s) Both EYES four times a day  piperacillin/tazobactam IVPB.. 3.375 Gram(s) IV Intermittent every 8 hours  sodium chloride 0.9% lock flush 10 milliLiter(s) IV Push every 1 hour PRN  sodium chloride 0.9%. 1000 milliLiter(s) (75 mL/Hr) IV Continuous <Continuous>  vasopressin Infusion 0.04 Unit(s)/Min (6 mL/Hr) IV Continuous <Continuous>

## 2023-05-07 NOTE — PROGRESS NOTE ADULT - ASSESSMENT
69 yo male with PMHx metastatic neuroendocrine tumor of the pancreas, recently admitted with nonfunctioning GJ tube (patient is chronically obstructed).   Patient has had severe heartburn, and has had several episodes of vomiting small amount of dark blood.     now Post Op  after an open distal pancreatectomy, splenectomy, partial colectomy, partial L adrenalectomy, temporary abdominal closure.    With Post Op distributive Shock  oliguria    PLAN:  ICU    PULM: Fi O2 30%.  NIV as needed    Cardio:  Continue Pressors and try to wean levo. AM Cortisol PND    Renal:  D/C NS after TPN starts.  HD as per Renal    GI: NPO, PPI.  TPN today. Check NH3 today, LFTs improving      Surgery:  NPO.  D?C  fent Drip, Precedex, Dilaudid PRN, no IV APAP because of shock Liver      ID: Continue Zosyn    HREM: 1 U PRBC today.  CBC after PRBC    Venodynes    D/W Surgery

## 2023-05-07 NOTE — PROGRESS NOTE ADULT - SUBJECTIVE AND OBJECTIVE BOX
Chief complaints. admitted on 4/29 with GIB  5/7- no new events, anuric, CO2 trending down  5/6 s/p OR yesterday, ostomy creation  5/5- case d/w surgery and intensivist , anuric k 5.2 will dialyze preop, no fluid removal  5/4- pt seen in PACU, PRBC and FFP, and LR resuscitation noted, coulter in place ~ 100 cc urine in coulter bag  case d/w pts nurse   Chart quote, operative note ""Open distal pancreatectomy, splenectomy, partial colectomy, partial L adrenalectomy, temporary abdominal closure; Large varices, 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 Liver laden with diffuse metastases and very friable, RP oozy. 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  Treated with Lutathera one year ago and restarted in 12/2022  G-J tube placed for nutrition in January due to chronic dysphagia and severe esophagitis on EGD.  Post recent  admission due to G tube clogging.  D/mansi home on 4/27.    Returned to ED due to vomiting when night time enteral feeding via G tube started.  CT with IVC done due to concern for GIB.  Pt reports chronic self catheterization due to urinary retention.   ZO improved when catheterization started.  Follows mainly with EMELY Cortez.   Pt has been self cath'ing 3-4 x /day.   Indwelling Coulter placed by EMELY due to resistance with blood clots with resistance reported by pt.  For palliative mass resection tomorrow.    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  Creat has been variable during previous admissions--ranging 1.1-2.2.    PMHX and PSHX.  --Neuroendocrine tumor of pancreas with mets to liver --dx 6 years ago.  --GJ tube for nutrition in 1/2023 due to chronic dysphagia.  --Hx of HTN  --Hx of DM  --Hx of light chain disease --unclear hx and no hx of renal biopsy.    Home Medications:   * Patient Currently Takes Medications as of 27-Apr-2023 15:16 documented in Structured Notes  · 	Xanax 0.25 mg oral tablet: 1 tab(s) orally 2 times a day as needed for  anxiety MDD: 2 tabs  · 	cefdinir 250 mg/5 mL oral liquid: 6 milliliter(s) orally 2 times a day    FAMILY HISTORY:  FH: breast cancer (Mother)  FH: aneurysm (Father)    SOCIAL HISTORY :  Remote former smoker    Allergies :  losartan (Angioedema)    REVIEW OF SYSTEMS :  UTO    MEDICATIONS  (STANDING):  albumin human 25% IVPB 50 milliLiter(s) IV Intermittent once  chlorhexidine 0.12% Liquid 15 milliLiter(s) Oral Mucosa every 12 hours  chlorhexidine 4% Liquid 1 Application(s) Topical <User Schedule>  cisatracurium Infusion 3 MICROgram(s)/kG/Min (11.4 mL/Hr) IV Continuous <Continuous>  dextrose 5%. 1000 milliLiter(s) (50 mL/Hr) IV Continuous <Continuous>  dextrose 5%. 1000 milliLiter(s) (100 mL/Hr) IV Continuous <Continuous>  dextrose 50% Injectable 12.5 Gram(s) IV Push once  dextrose 50% Injectable 25 Gram(s) IV Push once  dextrose 50% Injectable 25 Gram(s) IV Push once  fentaNYL   Infusion 2 MICROgram(s)/kG/Hr (12.7 mL/Hr) IV Continuous <Continuous>  glucagon  Injectable 1 milliGRAM(s) IntraMuscular once  insulin lispro (ADMELOG) corrective regimen sliding scale   SubCutaneous every 6 hours  midazolam Infusion 0.02 mG/kG/Hr (1.27 mL/Hr) IV Continuous <Continuous>  norepinephrine Infusion 0.05 MICROgram(s)/kG/Min (5.95 mL/Hr) IV Continuous <Continuous>  pantoprazole  Injectable 40 milliGRAM(s) IV Push every 12 hours  petrolatum Ophthalmic Ointment 1 Application(s) Both EYES four times a day  piperacillin/tazobactam IVPB.. 3.375 Gram(s) IV Intermittent every 8 hours  sodium chloride 0.9%. 1000 milliLiter(s) (75 mL/Hr) IV Continuous <Continuous>  vasopressin Infusion 0.04 Unit(s)/Min (6 mL/Hr) IV Continuous <Continuous>    MEDICATIONS  (PRN):  dextrose Oral Gel 15 Gram(s) Oral once PRN Blood Glucose LESS THAN 70 milliGRAM(s)/deciliter  ondansetron Injectable 4 milliGRAM(s) IV Push every 6 hours PRN Nausea and/or Vomiting    I&O's Detail    ICU Vital Signs Last 24 Hrs  T(C): 36.7 (07 May 2023 20:00), Max: 36.7 (07 May 2023 04:00)  T(F): 98 (07 May 2023 20:00), Max: 98 (07 May 2023 04:00)  HR: 75 (07 May 2023 21:00) (61 - 121)  BP: --  BP(mean): --  ABP: 94/51 (07 May 2023 21:00) (81/45 - 186/139)  ABP(mean): 69 (07 May 2023 21:00) (59 - 158)  RR: 11 (07 May 2023 21:00) (9 - 24)  SpO2: 97% (07 May 2023 21:00) (94% - 100%)    O2 Parameters below as of 07 May 2023 19:00  Patient On (Oxygen Delivery Method): room air          I&O's Detail    06 May 2023 07:01  -  07 May 2023 07:00  --------------------------------------------------------  IN:    Dexmedetomidine: 33.4 mL    IV PiggyBack: 200 mL    Norepinephrine: 807.5 mL    sodium chloride 0.9%: 1678.2 mL    Vasopressin: 196.4 mL  Total IN: 2915.5 mL    OUT:    Bulb (mL): 760 mL    Colostomy (mL): 30 mL    Indwelling Catheter - Urethral (mL): 30 mL    Nasogastric/Oral tube (mL): 200 mL  Total OUT: 1020 mL    Total NET: 1895.5 mL      07 May 2023 07:01  -  07 May 2023 21:59  --------------------------------------------------------  IN:    Dexmedetomidine: 40 mL    IV PiggyBack: 100 mL    Norepinephrine: 120 mL    PRBCs (Packed Red Blood Cells): 299 mL    sodium chloride 0.9%: 1028 mL    Vasopressin: 82 mL  Total IN: 1669 mL    OUT:    Bulb (mL): 630 mL    Colostomy (mL): 15 mL    Indwelling Catheter - Urethral (mL): 35 mL    Nasogastric/Oral tube (mL): 50 mL    Voided (mL): 0 mL  Total OUT: 730 mL    Total NET: 939 mL          PHYSICAL EXAM:  GEN: intubated  HEENT orally intubated  CV: S1S2 RRR  LUNGS: clear to aus  ABD: soft  EXT: no edema      Coulter draining clear yellow urine ~ 100 ml    LABS:    05-07    142  |  111<H>  |  37<H>  ----------------------------<  132<H>  4.0   |  19<L>  |  3.42<H>    Ca    7.4<L>      07 May 2023 06:10  Phos  5.1     05-07  Mg     1.7     05-07    TPro  4.2<L>  /  Alb  1.8<L>  /  TBili  1.0  /  DBili  0.6<H>  /  AST  1641<H>  /  ALT  694<H>  /  AlkPhos  147<H>  05-07                          8.8    7.18  )-----------( 53       ( 07 May 2023 16:05 )             26.1       05-06    143  |  110<H>  |  29<H>  ----------------------------<  139<H>  4.3   |  23  |  2.68<H>    Ca    7.6<L>      06 May 2023 04:55  Phos  5.0     05-06  Mg     1.8     05-06    TPro  4.5<L>  /  Alb  2.2<L>  /  TBili  1.1  /  DBili  0.6<H>  /  AST  3063<H>  /  ALT  854<H>  /  AlkPhos  145<H>  05-06 05-04    146<H>  |  114<H>  |  31<H>  ----------------------------<  201<H>  4.4   |  16<L>  |  1.86<H>    Ca    8.3<L>      04 May 2023 14:30  Phos  2.2     05-04  Mg     1.9     05-04    TPro  5.2<L>  /  Alb  2.7<L>  /  TBili  1.0  /  DBili  x   /  AST  767<H>  /  ALT  332<H>  /  AlkPhos  107  05-04                             10.3   9.47  )-----------( 96       ( 04 May 2023 14:30 )             31.2       Magnesium, Serum: 2.0 mg/dL (05-03 @ 06:21)  Phosphorus Level, Serum: 2.7 mg/dL (05-03 @ 06:21)

## 2023-05-07 NOTE — CHART NOTE - NSCHARTNOTEFT_GEN_A_CORE
Clinical Nutrition PN Follow Up Note    * 68 year old M PMHx metastatic neuroendocrine tumor of the pancreas, recently admitted with nonfunctioning GJ tube (patient is chronically obstructed). Patient has had severe heartburn, and has had several episodes of vomiting small amount of dark blood. Patient was discharged on Thursday. Hgb unchanged. CT angiography has no active bleeding, but demonstrates extensive tumor in distal pancreas. Pt originally in SICU, receiving TF via GJ tube but having frequent BM's. On  - pt went to OR  for a Open distal pancreatectomy, splenectomy, partial colectomy, partial L adrenalectomy, temporary abdominal closure. Remained intubated, on paralytics, pressors, anuric and transferred to ICU.  s/p abd closure and colostomy.    *current status: pt extubated yesterday, remains on pressors. Has been NPO since  (x 5 days). Plan to initiate TPN today () 2/2 suspected prolonged NPO status s/p surgery via central line.  Long-term plan for pt to go home on TF - Dr. Doyle likely to place new tube. D/w Dr. Jean and Dr. Siddiqi. Plan for HD 2/2 ZO    *new pertinent meds: Precedex, ADMELOG (received 0 units x 24 hours), norepinephrine, pantoprazole, abx, vasopressin, zofran     *labs reviewed; Na WNL, K WNL - although was high-normal, now downtrending. Will satrt with 20 mEq in bag and adjust prn. Plan for pt to start HD.   Hyperphosphatemia, will leave out of TPN bag for now and monitor.   Mg low-end of normal, will start at 8 mEq and adjust based on tomorrow's labs.   Bili T WNL for trace elements.  corrected Ca WNL (9.2), rec'd add 10mEq of Calcium Gluconate outside of PN bag as CAPS is out.   POCT WNL.   Triglyceride level WNL to initiate lipids.   Will adjust all lytes/ min prn based on labs.         142  |  111<H>  |  37<H>  ----------------------------<  132<H>  4.0   |  19<L>  |  3.42<H>    Ca    7.4<L>      07 May 2023 06:10  Phos  5.1       Mg     1.7         TPro  4.2<L>  /  Alb  1.8<L>  /  TBili  1.0  /  DBili  0.6<H>  /  AST  1641<H>  /  ALT  694<H>  /  AlkPhos  147<H>        BMI: BMI (kg/m2): 24.8 (23 @ 04:00)  HbA1c: A1C with Estimated Average Glucose Result: 7.3 % (23 @ 06:35)    BP: 97/67 (23 @ 10:00) (71/47 - 137/85)  Lipid Panel: Date/Time: 23 @ 06:07  Triglycerides, Serum: 129    POCT Blood Glucose.: 131 mg/dL (23 @ 06:11)  POCT Blood Glucose.: 112 mg/dL (23 @ 23:40)  POCT Blood Glucose.: 164 mg/dL (23 @ 17:16)  POCT Blood Glucose.: 152 mg/dL (23 @ 12:25)    *I&O's Detail    06 May 2023 07:  -  07 May 2023 07:00  --------------------------------------------------------  IN:    Dexmedetomidine: 33.4 mL    IV PiggyBack: 200 mL    Norepinephrine: 807.5 mL    sodium chloride 0.9%: 1678.2 mL    Vasopressin: 196.4 mL  Total IN: 2915.5 mL    OUT:    Bulb (mL): 760 mL    Colostomy (mL): 30 mL    Indwelling Catheter - Urethral (mL): 30 mL    Nasogastric/Oral tube (mL): 200 mL  Total OUT: 1020 mL    Total NET: 1895.5 mL      07 May 2023 07:01  -  07 May 2023 09:27  --------------------------------------------------------  IN:  Total IN: 0 mL    OUT:    Bulb (mL): 100 mL    Indwelling Catheter - Urethral (mL): 10 mL  Total OUT: 110 mL    Total NET: -110 mL  * fluid status: positive; small amount of output from NGT, minimal output from colostomy noted. Strict I&O's are rec'd when pt is on PN as per protocol. Pt anuric, plan for HD, will start at low total volume and adjust prn to maintain fluid balance with recs from ICU intensivists.   *BM (+) on .  pt not on bowel regimen.  * 2+ generalized edema    *malnutrition: Pt continues to meet criteria for severe protein-calorie malnutrition in context of chronic disease r/t decreased ability to meet increased nutrient needs 2/2 mets cancer AEB severe muscle/ fat wasting, TF meeting <75% ENN x >/=1 month, 19% wt loss x 5 mos    *ESTIMATED NUTR NEEDS: 63.9 Kg based on bedscale wt taken by RD on   1214-2425 Kcal (30-35 Kcal/Kg)   g protein (1.5-2 g/Kg)  5064-0682 mL fluid (25-30 mL/Kg)    Diet, NPO (23 @ 04:06) [Active]    Weight History:  Daily Weight in k.8 (07 May 2023 01:00) - wt gain likely 2/2 fluid retention**  Height (cm): 170.2 (23 @ 08:11), 170.2 (23 @ 11:22)  Weight (kg): 71.9 (23 @ 04:00), 62.9 (23 @ 22:23)  BMI (kg/m2): 24.8 (23 @ 04:00), 21.7 (23 @ 08:11), 21.7 (23 @ 22:23)  BSA (m2): 1.83 (23 @ 04:00), 1.73 (23 @ 08:11), 1.73 (23 @ 22:23)  IBW: 148#/ 67.1 kg    TPN Recommendations: via central venous line  Total Volume: 1600 mL x 24 hours  128 g Amino Acids  100 g Dextrose (dextrose goal = 320g; dextrose infusion rate <3-4 mg/kg/min)  50 g Lipids 20%  123 mEq Sodium Chloride  0 mEq Sodium Acetate  0 mmol Sodium Phosphate  20 mEq Potassium Chloride  0 mEq Potassium Acetate  0 mmol Potassium Phosphate  0 mEq Calcium Gluconate (Caps out of PN solution)  8 mEq Magnesium Sulfate  200 mg Thiamine  1 ml Trace Elements  10 ml MVI    Total Calories      1352      (Meets    65%  of  Estimated Energy needs  and     100%  Protein needs)    Additional Recommendations:    1) Daily weights  2) Strict I & O's  3) Daily lyte checks including magnesium and phos - monitor closely for refeeding syndrome as pt is at HIGH RISK  4) Weekly triglycerides/LFT checks  5) POCT q6hrs; maintain 140-180mg/dL  6) Titrate dextrose up 50-75g/day until goal rate of 320g reached.   7) Confirm goals of care regarding LONG-TERM nutrition support - plan for new ? GJ tube; will provide recs when placed    *will continue to monitor and adjust PN prn*  Fabby Waller, MS, RDN, CNSC, [phone]  Certified Nutrition

## 2023-05-07 NOTE — PROGRESS NOTE ADULT - ASSESSMENT
69 yo man with Hx of Neuroendocrine tumor/Pancreatic mass and liver mets.  Now for palliative resection of pancreatic mass.    --ZO with variable creat level and unclear CKD.    Likely dependent on volume status.    Limited intake even with Enteral feeding.      Start gentle hydration.  -- : continue Hale drainage.  --Hx of light chain nephropathy  --No contraindication for surgery from renal standpoint.    5/4  s/p Palliative surgery for neuroendocrine mass  will watch for ZO, pt with low UO immediate post op  s/p multiple PRBC and FFP transfusion and volume resuscitation    5/5  patient anuric, K 5.2  On bicarb drip  to go back to OR today  case d/w Dr Jean and Dr Siddiqi  Will HD x 2 hrs to correct potassium  >10 L infused in blood products and fluids  More fluids not indicated, UO 50 ml  no fluid removal on HD  Hep profile ordered    5/6  Ostomy created  will monitor i/o  labs stable   no need for HD today  d/w intensivist  will monitor daily for hd requirement    5/7  seen earlier on HD   tolerating well  TPN  d/w Dr Jean  d/w pts fam at bedside

## 2023-05-07 NOTE — PROGRESS NOTE ADULT - ASSESSMENT
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  S/P distal pancreatectomy, splenectomy, partial colectomy  S/P abdominal washout and colostomy creation    Plan:  Optimize respiratory function  Bipap vs NC prn   Observe drain output  Keep NG tube  Observe ostomy output  Critical care management as per ICU team  Follow up with nephrology for further dialysis  He will need TPN for now until we can use his gut for nutrition    Plan discussed with Dr Siddiqi

## 2023-05-08 NOTE — PROGRESS NOTE ADULT - SUBJECTIVE AND OBJECTIVE BOX
Patient is a 68y old  Male who presents with a chief complaint of GIB (07 May 2023 21:57)      BRIEF HOSPITAL COURSE: Pt is a 69 y/o M pmhx of NE tumor of pancreas w/ liver mets diagnosed 6 years prior, treated w/ Lutathera 1 year prior and restarted on in 12/2022, dysphagia w/ severe esophagitis s/p G-J tube, and recent hospital admission at  for chronic G tube obstruction presents to  w/ complaints of hematemesis. CTA - for active GIB, but w/ increasing tumor burden in distal pancreas w/ secondary gastric obstruction.    Events last 24 hours: Pt remains in dual vasopressor dependent shock state, tolerated NC during day.     PAST MEDICAL & SURGICAL HISTORY:  Hypertension      BPH (benign prostatic hyperplasia)      Renal insufficiency      Light chain nephropathy      DM (diabetes mellitus)      HLD (hyperlipidemia)      No significant past surgical history          Review of Systems:  Unable to assess secondary to mentation.       Medications:  piperacillin/tazobactam IVPB.. 3.375 Gram(s) IV Intermittent every 8 hours    norepinephrine Infusion 0.05 MICROgram(s)/kG/Min IV Continuous <Continuous>      dexMEDEtomidine Infusion 0.04 MICROgram(s)/kG/Hr IV Continuous <Continuous>  ondansetron Injectable 4 milliGRAM(s) IV Push every 6 hours PRN        lactulose Syrup 20 Gram(s) Oral every 8 hours  pantoprazole  Injectable 40 milliGRAM(s) IV Push every 12 hours      dextrose 50% Injectable 12.5 Gram(s) IV Push once  dextrose 50% Injectable 25 Gram(s) IV Push once  dextrose 50% Injectable 25 Gram(s) IV Push once  dextrose Oral Gel 15 Gram(s) Oral once PRN  glucagon  Injectable 1 milliGRAM(s) IntraMuscular once  insulin lispro (ADMELOG) corrective regimen sliding scale   SubCutaneous every 6 hours  vasopressin Infusion 0.04 Unit(s)/Min IV Continuous <Continuous>    dextrose 5%. 1000 milliLiter(s) IV Continuous <Continuous>  dextrose 5%. 1000 milliLiter(s) IV Continuous <Continuous>  fat emulsion (Fish Oil and Plant Based) 20% Infusion 0.695 Gm/kG/Day IV Continuous <Continuous>  Parenteral Nutrition - Adult 1 Each TPN Continuous <Continuous>  sodium chloride 0.9% lock flush 10 milliLiter(s) IV Push every 1 hour PRN      chlorhexidine 4% Liquid 1 Application(s) Topical <User Schedule>        Mode: standby      ICU Vital Signs Last 24 Hrs  T(C): 36.9 (08 May 2023 00:00), Max: 36.9 (08 May 2023 00:00)  T(F): 98.4 (08 May 2023 00:00), Max: 98.4 (08 May 2023 00:00)  HR: 85 (08 May 2023 00:00) (61 - 121)  BP: --  BP(mean): --  ABP: 107/61 (08 May 2023 00:00) (81/45 - 186/139)  ABP(mean): 81 (08 May 2023 00:00) (59 - 158)  RR: 14 (08 May 2023 00:00) (9 - 26)  SpO2: 96% (08 May 2023 00:00) (94% - 100%)    O2 Parameters below as of 08 May 2023 00:00  Patient On (Oxygen Delivery Method): BiPAP/CPAP    O2 Concentration (%): 30        ABG - ( 07 May 2023 10:55 )  pH, Arterial: 7.35  pH, Blood: x     /  pCO2: 29    /  pO2: 144   / HCO3: 16    / Base Excess: -8.2  /  SaO2: 99.0                I&O's Detail    06 May 2023 07:01  -  07 May 2023 07:00  --------------------------------------------------------  IN:    Dexmedetomidine: 33.4 mL    IV PiggyBack: 200 mL    Norepinephrine: 807.5 mL    sodium chloride 0.9%: 1678.2 mL    Vasopressin: 196.4 mL  Total IN: 2915.5 mL    OUT:    Bulb (mL): 760 mL    Colostomy (mL): 30 mL    Indwelling Catheter - Urethral (mL): 30 mL    Nasogastric/Oral tube (mL): 200 mL  Total OUT: 1020 mL    Total NET: 1895.5 mL      07 May 2023 07:01  -  08 May 2023 00:10  --------------------------------------------------------  IN:    Dexmedetomidine: 40 mL    IV PiggyBack: 100 mL    Norepinephrine: 120 mL    PRBCs (Packed Red Blood Cells): 299 mL    sodium chloride 0.9%: 1028 mL    Vasopressin: 82 mL  Total IN: 1669 mL    OUT:    Bulb (mL): 830 mL    Colostomy (mL): 15 mL    Indwelling Catheter - Urethral (mL): 35 mL    Nasogastric/Oral tube (mL): 50 mL    Voided (mL): 0 mL  Total OUT: 930 mL    Total NET: 739 mL            LABS:                        8.8    7.18  )-----------( 53       ( 07 May 2023 16:05 )             26.1     05-07    142  |  111<H>  |  37<H>  ----------------------------<  132<H>  4.0   |  19<L>  |  3.42<H>    Ca    7.4<L>      07 May 2023 06:10  Phos  5.1     05-07  Mg     1.7     05-07    TPro  4.2<L>  /  Alb  1.8<L>  /  TBili  1.0  /  DBili  0.6<H>  /  AST  1641<H>  /  ALT  694<H>  /  AlkPhos  147<H>  05-07          CAPILLARY BLOOD GLUCOSE      POCT Blood Glucose.: 133 mg/dL (07 May 2023 22:56)    PT/INR - ( 06 May 2023 04:55 )   PT: 14.8 sec;   INR: 1.27 ratio             CULTURES:      Physical Examination:    General: Pt laying in hospital bed in no acute distress.  Lethargic on precedex, responsive to verbal stimuli.     HEENT: Pupils equal, reactive to light.  Symmetric.    PULM: Clear to auscultation bilaterally, no significant sputum production    CVS: Regular rate and rhythm, no murmurs, rubs, or gallops    ABD: Soft, nondistended, nontender, normoactive bowel sounds, no masses    EXT: No edema, nontender    SKIN: Warm and well perfused      RADIOLOGY:   < from: Xray Chest 1 View- PORTABLE-Urgent (Xray Chest 1 View- PORTABLE-Urgent .) (05.06.23 @ 09:09) >  ACC: [id number] EXAM:  XR CHEST PORTABLE URGENT 1V   ORDERED BY: MARLENE MO     PROCEDURE DATE:  05/06/2023          INTERPRETATION:  Chest one view    HISTORY: Intubation    COMPARISON STUDY: 5/4/2023    Frontal expiratory view of the chest shows the heart to be normal in   size. Endotracheal tube reaches the lower half of the trachea. Right   jugular line and nasogastric tube are again noted.    The lungs are clear and there is no evidence of pneumothorax nor pleural   effusion.    IMPRESSION:  Lines as noted.        Thank you for the courtesy of this referral.    --- End of Report ---            MIRIAM MCGINNIS MD; Attending Interventional Radiologist  This document has been electronically signed. May  6 2023 11:57AM        CRITICAL CARE TIME SPENT: 54 minutes assessing presenting problems of acute illness, which pose high probability of life threatening deterioration or end organ damage/dysfunction, as well as medical decision making including initiating plan of care, reviewing data, reviewing radiologic exams, discussing with multidisciplinary team,  discussing goals of care with patient/family, and writing this note.  Non-inclusive of procedures performed,

## 2023-05-08 NOTE — PROGRESS NOTE ADULT - ASSESSMENT
69 y/o M with a MHX of neuroendocrine tumor, likely of GI/pancreatic origin, c/w gastric outlet obstruction, s/p GJ tube, p/w hematemesis    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  -Primary Onc Dr Ac Llamas    - Initially diagnosed in 2015, p/w abdominal pain and nausea scans revealed a large 20 cm abdominal mass   - Started on Sandostatin, and completed Lutathera about 1 year ago ---> scans end of 2022 noted POD; started back on Lutathera 12/07/2022  - Admitted to  01/2023 and 4/21-4/27 with concern for gastric outlet obstruction  - EGD 01/10/23 with severe esophagitis, no obvious obstruction seen with no stenting done  - S/p GJ tube placement   - Seen by Dr Siddiqi 04/12/23 with ongoing GOC/ planning for a palliative procedure for a high gastric transection and Marcela-en-Y reconstruction; currently dependent on using J tube for feeds & G port to decompress; unable to tolerate PO intake   - CT AP 04/17/23 revealed diffuse bilobar liver metastasis which have progressed since 01/06/2023; large pancreatic mass, not significantly changed  - Per Primary Onc Dr Llamas patient received 6 doses of Lutathera, with plan to change treatment regimen to chemotherapy following palliative surgical procedure, likely distal gastrectomy per Surg/ Onc Dr Black.  -Surg/Onc following-->5/5-s/p distal pancreatectomy, splenectomy, partial colectomy and colostomy creation    # Hematemesis    -Likely from the malignancy and the ulcerated esophagus.  -EGD-1/10/023- reflux esophagitis with severely ulcerated and bleeding esophagus.  -CT-4/17/23-Diffuse bilobar liver metastasis which have progressed since 1/6/2023. Large pancreatic mass, not significantly changed.  -CT-A/P-4/29/2023- no active bleed  -GI consult-->will consider EGD if persistent hematemesis     # Normocytic Anemia with Tachycardia    - Hgb 8.3 g/dL today  - CT-A/P-4/29/2023- no active bleed.  - Likely 2/2 advanced malignancy, dehydration malnutrition, decreased feedings and GIB.   - FOBT- negative on 4/24  - Continue to trend serial CBCs  - Low iron sat, consider Venofer IV  - Transfuse blood as necessary; keep Hgb >7.5 g/dL and/or symptomatic     # ZO with anuria    -Nephrology following  -s/p HD-5/8    69 y/o M with a MHX of neuroendocrine tumor, likely of GI/pancreatic origin, c/w gastric outlet obstruction, s/p GJ tube, p/w hematemesis    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  -Primary Onc Dr Ac Llamas    - Initially diagnosed in 2015, p/w abdominal pain and nausea scans revealed a large 20 cm abdominal mass   - Started on Sandostatin, and completed Lutathera about 1 year ago ---> scans end of 2022 noted POD; started back on Lutathera 12/07/2022  - Admitted to  01/2023 and 4/21-4/27 with concern for gastric outlet obstruction  - EGD 01/10/23 with severe esophagitis, no obvious obstruction seen with no stenting done  - S/p GJ tube placement   - Seen by Dr Siddiqi 04/12/23 with ongoing GOC/ planning for a palliative procedure for a high gastric transection and Marcela-en-Y reconstruction; currently dependent on using J tube for feeds & G port to decompress; unable to tolerate PO intake   - CT AP 04/17/23 revealed diffuse bilobar liver metastasis which have progressed since 01/06/2023; large pancreatic mass, not significantly changed  - Per Primary Onc Dr Llamas patient received 6 doses of Lutathera, with plan to change treatment regimen to chemotherapy following palliative surgical procedure, likely distal gastrectomy per Surg/ Onc Dr Black.  -Surg/Onc following-->5/5-s/p distal pancreatectomy, splenectomy, partial colectomy and colostomy creation    # Hematemesis    -Likely from the malignancy and the ulcerated esophagus.  -EGD-1/10/023- reflux esophagitis with severely ulcerated and bleeding esophagus.  -CT-4/17/23-Diffuse bilobar liver metastasis which have progressed since 1/6/2023. Large pancreatic mass, not significantly changed.  -CT-A/P-4/29/2023- no active bleed  -GI consult-->will consider EGD if persistent hematemesis     # Normocytic Anemia with Tachycardia    - Hgb 8.3 g/dL today  - CT-A/P-4/29/2023- no active bleed.  - Likely 2/2 advanced malignancy, dehydration malnutrition, decreased feedings and GIB.   - FOBT- negative on 4/24  - Continue to trend serial CBCs  - Low iron sat, consider Venofer IV  - Transfuse blood as necessary; keep Hgb >7.5 g/dL and/or symptomatic     #Thrombocytopenia     -likely multifactorial- also has evidence of numerous hepatic metastases  -Inflammation / infection/drug-induced thrombocytopenia (non-immune) most common causes of acute decline in platelets. Other causes include immune-mediated platelet destruction/ ITP, TTP, bone marrow suppression, or platelet consumption/destruction. Also hemodilution due to massive transfusion.  -recent hypovolemic shock 2/2  blood loss from Sx-5/5/23  -Patient had a 2.2 L EBL. Intra Op-5/5/23-6U PRBC, 2FFP, 1 Platelets, 4L crystalloid. Post Op 1U PRBC ans 1 U FFP  -Platelets -<--54<--69K/ul, before Sx-5/5/23- plts ranged between 90-130K, with low normal on/before 5/3  -Thrombocytopenia may be associated with a variety of conditions, range from bleeding or thrombosis (eg, in heparin-induced thrombocytopenia [HIT])  -Will send #Thrombocytopenia     -likely multifactorial- also has evidence of numerous hepatic metastases  -Inflammation / infection/drug-induced thrombocytopenia (non-immune) most common causes of acute decline in platelets. Other causes include immune-mediated platelet destruction/ ITP, TTP, bone marrow suppression, or platelet consumption/destruction. Also hemodilution due to massive transfusion.  -recent hypovolemic shock 2/2  blood loss from Sx-5/5/23  -Patient had a 2.2 L EBL. Intra Op-5/5/23-6U PRBC, 2FFP, 1 Platelets, 4L crystalloid. Post Op 1U PRBC ans 1 U FFP  -Platelets -45K <--54<--69K/ul, before Sx-5/5/23- plts ranged between 90-130K, with low normal on/before 5/3  -Thrombocytopenia may be associated with a variety of conditions, range from bleeding or thrombosis (eg, in heparin-induced thrombocytopenia [HIT])  -HIT unlikely but - will send SAROJ for completion  -Daily CBC  -Transfuse for plts <10K and <20K with fevers/active bleeding.  -Daily CBC  -Transfuse for plts <10K and <20K with fevers/active bleeding.    # ZO with anuria    -Nephrology following  -s/p HD-5/8    69 y/o M with a MHX of neuroendocrine tumor, likely of GI/pancreatic origin, c/w gastric outlet obstruction, s/p GJ tube, p/w hematemesis    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  -Primary Onc Dr Ac Llamas    - Initially diagnosed in 2015, p/w abdominal pain and nausea scans revealed a large 20 cm abdominal mass   - Started on Sandostatin, and completed Lutathera about 1 year ago ---> scans end of 2022 noted POD; started back on Lutathera 12/07/2022  - Admitted to  01/2023 and 4/21-4/27 with concern for gastric outlet obstruction  - EGD 01/10/23 with severe esophagitis, no obvious obstruction seen with no stenting done  - S/p GJ tube placement   - Seen by Dr Siddiqi 04/12/23 with ongoing GOC/ planning for a palliative procedure for a high gastric transection and Marcela-en-Y reconstruction; currently dependent on using J tube for feeds & G port to decompress; unable to tolerate PO intake   - CT AP 04/17/23 revealed diffuse bilobar liver metastasis which have progressed since 01/06/2023; large pancreatic mass, not significantly changed  - Per Primary Onc Dr Llamas patient received 6 doses of Lutathera, with plan to change treatment regimen to chemotherapy following palliative surgical procedure, likely distal gastrectomy per Surg/ Onc Dr Black.  -Surg/Onc following-->5/5-s/p distal pancreatectomy, splenectomy, partial colectomy and colostomy creation    # Hematemesis    -Likely from the malignancy and the ulcerated esophagus.  -EGD-1/10/023- reflux esophagitis with severely ulcerated and bleeding esophagus.  -CT-4/17/23-Diffuse bilobar liver metastasis which have progressed since 1/6/2023. Large pancreatic mass, not significantly changed.  -CT-A/P-4/29/2023- no active bleed  -GI consult-->will consider EGD if persistent hematemesis     # Normocytic Anemia with Tachycardia    - Hgb 8.3 g/dL today  - CT-A/P-4/29/2023- no active bleed.  - Likely 2/2 advanced malignancy, dehydration malnutrition, decreased feedings and GIB.   - FOBT- negative on 4/24  - Continue to trend serial CBCs  - Low iron sat, consider Venofer IV  - Transfuse blood as necessary; keep Hgb >7.5 g/dL and/or symptomatic     #Thrombocytopenia     -likely multifactorial- also has evidence of numerous hepatic metastases  -Inflammation / infection/drug-induced thrombocytopenia (non-immune) most common causes of acute decline in platelets. Other causes include immune-mediated platelet destruction/ ITP, TTP, bone marrow suppression, or platelet consumption/destruction. Also hemodilution due to massive transfusion.  -recent hypovolemic shock 2/2  blood loss from Sx-5/5/23  -Patient had a 2.2 L EBL. Intra Op-5/5/23-6U PRBC, 2FFP, 1 Platelets, 4L crystalloid. Post Op 1U PRBC ans 1 U FFP  -Platelets -<--54<--69K/ul, before Sx-5/5/23- plts ranged between 90-130K, with low normal on/before 5/3  -Thrombocytopenia may be associated with a variety of conditions, range from bleeding or thrombosis (eg, in heparin-induced thrombocytopenia [HIT])  -HIT unlikely but - will send heparin ab for completion  -Daily CBC  -Transfuse for plts <10K and <20K with fevers/active bleeding.      # ZO with anuria    -Nephrology following  -s/p HD-5/8

## 2023-05-08 NOTE — PROGRESS NOTE ADULT - SUBJECTIVE AND OBJECTIVE BOX
HPI:  69 y/o male with MHx significant for neuroendocrine tumor diagnosed 6 years ago with chronic gastric outlet obstruction and mets to liver, s/p GJ tube, HTN, HLD, T2DM, BPH and light chain nephropathy presented to the ED with hematemesis since last night.    Recent admission-from 4/21- 4/27/2023 for J-tube malfunction; weakness; dehydration; hyponatremia and ZO    4/29- Patient seen in ED, [place] spouse at bedside. Patients reports nausea and hematemesis since last night. Coffee brown color emesis of about 10 ml note dn the bag. Also with nausea and discomfort at the GJ site. Last feed was last night. Report loose bowel movement every 2-3 days, last BM was yesterday, denies hematochezia.     5/8/2023- Seen at bedside in ICU, accompanied by spouse. With NGT, and upper extremities in restraints due to patient attempting to pull the NGT out.    PAST MEDICAL & SURGICAL HISTORY:    Hypertension  BPH (benign prostatic hyperplasia)  Renal insufficiency  Light chain nephropathy  DM (diabetes mellitus  HLD (hyperlipidemia)  GJ tube placement    FAMILY HISTORY:    FH: breast cancer (Mother)  FH: aneurysm (Father)    MEDICATIONS  (STANDING):    pantoprazole Infusion 8 mG/Hr     MEDICATIONS  (PRN):      Allergies    losartan (Angioedema)      Review of Systems    Constitutional, Eyes, ENT, Cardiovascular, Respiratory, Gastrointestinal, Genitourinary, Musculoskeletal, Integumentary, Neurological, Psychiatric, Endocrine, Heme/Lymph and Allergic/Immunologic review of systems are otherwise negative except as noted in HPI.     Vital Signs Last 24 Hrs  T(C): 36.9 (29 Apr 2023 08:27), Max: 36.9 (29 Apr 2023 08:27)  T(F): 98.4 (29 Apr 2023 08:27), Max: 98.4 (29 Apr 2023 08:27)  HR: 118 (29 Apr 2023 08:27) (118 - 131)  BP: 140/92 (29 Apr 2023 08:27) (140/92 - 140/92)  BP(mean): 100 (29 Apr 2023 08:27) (100 - 100)  RR: 18 (29 Apr 2023 08:27) (18 - 18)  SpO2: 98% (29 Apr 2023 08:27) (98% - 100%)    Parameters below as of 29 Apr 2023 08:27  Patient On (Oxygen Delivery Method): room air    Physical Exam    Constitutional: Ill appearing  Eyes: no conjunctival infection, anicteric.   ENT: pharynx is unremarkable, moist mucus membrane, no oral lesions.   Neck: supple without JVD, no thyromegaly or masses appreciated.   Pulmonary: clear to auscultation bilaterally, no dullness, no wheezing.   Cardiac: RRR, normal S1S2, no murmurs, rubs, gallops. Tachy on monitor-110's  Vascular: no JVD, no calf tenderness, venous stasis changes, varices.   Abdomen: normoactive bowel sounds, soft and nontender, with GJ tube, gauge noted to be stained with old bleed, no active bleed in the tubing.  Lymphatic: no peripheral adenopathy appreciated.   Musculoskeletal: full range of motion and no deformities appreciated.   Skin: normal appearance, no rash, nodules, vesicles, ulcers, erythema.   Neurology: grossly intact.   Psychiatric: affect appropriate.     LABS:                          8.9    10.31 )-----------( 45       ( 09 May 2023 05:29 )             26.0       05-09    142  |  110<H>  |  46<H>  ----------------------------<  264<H>  3.5   |  19<L>  |  3.88<H>    Ca    7.7<L>      09 May 2023 05:29  Phos  1.6     05-09  Mg     2.2     05-09    TPro  4.6<L>  /  Alb  1.7<L>  /  TBili  1.5<H>  /  DBili  x   /  AST  510<H>  /  ALT  401<H>  /  AlkPhos  209<H>  05-09      PT/INR - ( 29 Apr 2023 09:34 )   PT: 12.8 sec;   INR: 1.10 ratio     PTT - ( 29 Apr 2023 09:34 )  PTT:25.3 sec      RADIOLOGY & ADDITIONAL STUDIES:    EXAM:  CT ABDOMEN AND PELVIS IC     PROCEDURE DATE:  04/29/2023        INTERPRETATION:  CLINICAL INFORMATION: Upper GI bleed. History of gastric   cancer, GJ tube.    COMPARISON: 04/17/2023.    CONTRAST/COMPLICATIONS:  IV Contrast: Omnipaque 350  90 cc administered   10 cc discarded  Oral Contrast: NONE  Complications: None reported at time of study completion    PROCEDURE:  CT of the Abdomen and Pelvis was performed.  Precontrast, Arterial andDelayed phases were performed.  Sagittal and coronal reformats were performed.    FINDINGS:  LOWER CHEST: No lung nodules. Ill-defined increased haziness in the   paraesophageal region. Coronary artery calcification.    LIVER: Numerous hepatic metastases. A previously referenced confluent   lesion involving the segment 8 measures 9 x 8.7 cm, previously 7.9 x 8.3   cm (4:19).  BILE DUCTS: Normal caliber.  GALLBLADDER: Within normal limits.  SPLEEN: Within normal limits.  PANCREAS: Heterogeneously enhancing mass involving the distal body and   the tail of the pancreas measuring approximately 9.8 x 7 cm, previously   8.3 x 7.9 cm. Noncontrast imaging demonstrates hyperdense foci within the   lower portion of this mass that may be related to hemorrhagic content.   The mass cannot be completely  from the spleen and the stomach.  ADRENALS: Within normal limits.  KIDNEYS/URETERS: 2 mm non obstructing calculus lower pole of the left   kidney. No hydronephrosis.    BLADDER: Within normal limits.  REPRODUCTIVE ORGANS: Prostate is enlarged.    BOWEL: No bowel obstruction. Re demonstration of a gastrojejunostomy in   stable position. No endoluminal extravasation or pooling of the contrast   to suggest an active GI bleed.  PERITONEUM: No ascites.  VESSELS: Atherosclerotic changes. Probable occlusion of the splenic vein   with evidence of associated collateral circulation. Patent portal veins.  RETROPERITONEUM/LYMPH NODES: No lymphadenopathy.  ABDOMINAL WALL: Gastrostomy tube.  BONES: Degenerative changes.    IMPRESSION:  No evidence of an active GI bleed.  Large pancreatic mass and extensive hepatic metastases.  Gastrojejunostomy       HPI:  69 y/o male with MHx significant for neuroendocrine tumor diagnosed 6 years ago with chronic gastric outlet obstruction and mets to liver, s/p GJ tube, HTN, HLD, T2DM, BPH and light chain nephropathy presented to the ED with hematemesis since last night.    Recent admission-from 4/21- 4/27/2023 for J-tube malfunction; weakness; dehydration; hyponatremia and ZO    4/29- Patient seen in ED, [place] spouse at bedside. Patients reports nausea and hematemesis since last night. Coffee brown color emesis of about 10 ml note dn the bag. Also with nausea and discomfort at the GJ site. Last feed was last night. Report loose bowel movement every 2-3 days, last BM was yesterday, denies hematochezia.     5/8/2023- Seen at bedside in ICU, accompanied by spouse. With NGT, and upper extremities in restraints due to patient attempting to pull the NGT out.    PAST MEDICAL & SURGICAL HISTORY:    Hypertension  BPH (benign prostatic hyperplasia)  Renal insufficiency  Light chain nephropathy  DM (diabetes mellitus  HLD (hyperlipidemia)  GJ tube placement    FAMILY HISTORY:    FH: breast cancer (Mother)  FH: aneurysm (Father)    MEDICATIONS  (STANDING):    pantoprazole Infusion 8 mG/Hr     MEDICATIONS  (PRN):      Allergies    losartan (Angioedema)      Review of Systems    Constitutional, Eyes, ENT, Cardiovascular, Respiratory, Gastrointestinal, Genitourinary, Musculoskeletal, Integumentary, Neurological, Psychiatric, Endocrine, Heme/Lymph and Allergic/Immunologic review of systems are otherwise negative except as noted in HPI.     ICU Vital Signs Last 24 Hrs  T(C): 37.3 (09 May 2023 08:00), Max: 38.2 (08 May 2023 20:00)  T(F): 99.1 (09 May 2023 08:00), Max: 100.8 (08 May 2023 20:00)  HR: 120 (09 May 2023 08:00) (72 - 142)  BP: 101/68 (09 May 2023 08:00) (91/64 - 127/88)  BP(mean): 75 (09 May 2023 08:00) (68 - 96)  ABP: 88/77 (09 May 2023 07:00) (79/49 - 154/106)  ABP(mean): 63 (09 May 2023 07:00) (32 - 122)  RR: 24 (09 May 2023 08:00) (14 - 43)  SpO2: 100% (09 May 2023 08:00) (91% - 100%)    O2 Parameters below as of 09 May 2023 08:00  Patient On (Oxygen Delivery Method): room air      Physical Exam    Constitutional: Ill appearing  Eyes: no conjunctival infection, anicteric.   ENT: pharynx is unremarkable, moist mucus membrane, no oral lesions.   Neck: supple without JVD, no thyromegaly or masses appreciated.   Pulmonary: increase resp rate noted- On RA, monitor with SPO2-97%   Cardiac: RRR, normal S1S2, no murmurs, rubs, gallops. Tachy on monitor-110's  Vascular: no JVD, no calf tenderness, venous stasis changes, varices.   Abdomen: normoactive bowel sounds, soft and nontender, with GJ tube, Ostomy pink. Drain with serosanguinous output  Lymphatic: no peripheral adenopathy appreciated.   Musculoskeletal: full range of motion and no deformities appreciated.   Skin: normal appearance, no rash, nodules, vesicles, ulcers, erythema.   Neurology: grossly intact.   Psychiatric: affect appropriate.     LABS:                          8.9    10.31 )-----------( 45       ( 09 May 2023 05:29 )             26.0       05-09    142  |  110<H>  |  46<H>  ----------------------------<  264<H>  3.5   |  19<L>  |  3.88<H>    Ca    7.7<L>      09 May 2023 05:29  Phos  1.6     05-09  Mg     2.2     05-09    TPro  4.6<L>  /  Alb  1.7<L>  /  TBili  1.5<H>  /  DBili  x   /  AST  510<H>  /  ALT  401<H>  /  AlkPhos  209<H>  05-09      PT/INR - ( 29 Apr 2023 09:34 )   PT: 12.8 sec;   INR: 1.10 ratio     PTT - ( 29 Apr 2023 09:34 )  PTT:25.3 sec      RADIOLOGY & ADDITIONAL STUDIES:    EXAM:  CT ABDOMEN AND PELVIS IC     PROCEDURE DATE:  04/29/2023        INTERPRETATION:  CLINICAL INFORMATION: Upper GI bleed. History of gastric   cancer, GJ tube.    COMPARISON: 04/17/2023.    CONTRAST/COMPLICATIONS:  IV Contrast: Omnipaque 350  90 cc administered   10 cc discarded  Oral Contrast: NONE  Complications: None reported at time of study completion    PROCEDURE:  CT of the Abdomen and Pelvis was performed.  Precontrast, Arterial and Delayed phases were performed.  Sagittal and coronal reformats were performed.    FINDINGS:  LOWER CHEST: No lung nodules. Ill-defined increased haziness in the   paraesophageal region. Coronary artery calcification.    LIVER: Numerous hepatic metastases. A previously referenced confluent   lesion involving the segment 8 measures 9 x 8.7 cm, previously 7.9 x 8.3   cm (4:19).  BILE DUCTS: Normal caliber.  GALLBLADDER: Within normal limits.  SPLEEN: Within normal limits.  PANCREAS: Heterogeneously enhancing mass involving the distal body and   the tail of the pancreas measuring approximately 9.8 x 7 cm, previously   8.3 x 7.9 cm. Noncontrast imaging demonstrates hyperdense foci within the   lower portion of this mass that may be related to hemorrhagic content.   The mass cannot be completely  from the spleen and the stomach.  ADRENALS: Within normal limits.  KIDNEYS/URETERS: 2 mm non obstructing calculus lower pole of the left   kidney. No hydronephrosis.    BLADDER: Within normal limits.  REPRODUCTIVE ORGANS: Prostate is enlarged.    BOWEL: No bowel obstruction. Re demonstration of a gastrojejunostomy in   stable position. No endoluminal extravasation or pooling of the contrast   to suggest an active GI bleed.  PERITONEUM: No ascites.  VESSELS: Atherosclerotic changes. Probable occlusion of the splenic vein   with evidence of associated collateral circulation. Patent portal veins.  RETROPERITONEUM/LYMPH NODES: No lymphadenopathy.  ABDOMINAL WALL: Gastrostomy tube.  BONES: Degenerative changes.    IMPRESSION:  No evidence of an active GI bleed.  Large pancreatic mass and extensive hepatic metastases.  Gastrojejunostomy       HPI:  69 y/o male with MHx significant for neuroendocrine tumor diagnosed 6 years ago with chronic gastric outlet obstruction and mets to liver, s/p GJ tube, HTN, HLD, T2DM, BPH and light chain nephropathy presented to the ED with hematemesis since last night.    Recent admission-from 4/21- 4/27/2023 for J-tube malfunction; weakness; dehydration; hyponatremia and ZO    4/29- Patient seen in ED, [place] spouse at bedside. Patients reports nausea and hematemesis since last night. Coffee brown color emesis of about 10 ml note dn the bag. Also with nausea and discomfort at the GJ site. Last feed was last night. Report loose bowel movement every 2-3 days, last BM was yesterday, denies hematochezia.     5/8/2023- Seen at bedside in ICU, accompanied by spouse. With NGT, and upper extremities in restraints due to patient attempting to pull the NGT out.    PAST MEDICAL & SURGICAL HISTORY:    Hypertension  BPH (benign prostatic hyperplasia)  Renal insufficiency  Light chain nephropathy  DM (diabetes mellitus  HLD (hyperlipidemia)  GJ tube placement    FAMILY HISTORY:    FH: breast cancer (Mother)  FH: aneurysm (Father)    MEDICATIONS  (STANDING):    pantoprazole Infusion 8 mG/Hr     MEDICATIONS  (PRN):      Allergies    losartan (Angioedema)      Review of Systems    unable to obtain due to confused state    ICU Vital Signs Last 24 Hrs  T(C): 37.3 (09 May 2023 08:00), Max: 38.2 (08 May 2023 20:00)  T(F): 99.1 (09 May 2023 08:00), Max: 100.8 (08 May 2023 20:00)  HR: 120 (09 May 2023 08:00) (72 - 142)  BP: 101/68 (09 May 2023 08:00) (91/64 - 127/88)  BP(mean): 75 (09 May 2023 08:00) (68 - 96)  ABP: 88/77 (09 May 2023 07:00) (79/49 - 154/106)  ABP(mean): 63 (09 May 2023 07:00) (32 - 122)  RR: 24 (09 May 2023 08:00) (14 - 43)  SpO2: 100% (09 May 2023 08:00) (91% - 100%)    O2 Parameters below as of 09 May 2023 08:00  Patient On (Oxygen Delivery Method): room air      Physical Exam    Constitutional: Ill appearing  Eyes: no conjunctival infection, anicteric.   ENT: pharynx is unremarkable, moist mucus membrane, no oral lesions.   Neck: supple without JVD, no thyromegaly or masses appreciated.   Pulmonary: increase resp rate noted- On RA, monitor with SPO2-97%   Cardiac: RRR, normal S1S2, no murmurs, rubs, gallops. Tachy on monitor-110's  Vascular: no JVD, no calf tenderness, venous stasis changes, varices.   Abdomen: normoactive bowel sounds, soft and nontender, with GJ tube, Ostomy pink. Drain with serosanguinous output  Lymphatic: no peripheral adenopathy appreciated.   Musculoskeletal: full range of motion and no deformities appreciated.   Skin: normal appearance, no rash, nodules, vesicles, ulcers, erythema.   Neurology: grossly intact.   Psychiatric: affect appropriate.     LABS:                          8.9    10.31 )-----------( 45       ( 09 May 2023 05:29 )             26.0       05-09    142  |  110<H>  |  46<H>  ----------------------------<  264<H>  3.5   |  19<L>  |  3.88<H>    Ca    7.7<L>      09 May 2023 05:29  Phos  1.6     05-09  Mg     2.2     05-09    TPro  4.6<L>  /  Alb  1.7<L>  /  TBili  1.5<H>  /  DBili  x   /  AST  510<H>  /  ALT  401<H>  /  AlkPhos  209<H>  05-09      PT/INR - ( 29 Apr 2023 09:34 )   PT: 12.8 sec;   INR: 1.10 ratio     PTT - ( 29 Apr 2023 09:34 )  PTT:25.3 sec      RADIOLOGY & ADDITIONAL STUDIES:    EXAM:  CT ABDOMEN AND PELVIS IC     PROCEDURE DATE:  04/29/2023        INTERPRETATION:  CLINICAL INFORMATION: Upper GI bleed. History of gastric   cancer, GJ tube.    COMPARISON: 04/17/2023.    CONTRAST/COMPLICATIONS:  IV Contrast: Omnipaque 350  90 cc administered   10 cc discarded  Oral Contrast: NONE  Complications: None reported at time of study completion    PROCEDURE:  CT of the Abdomen and Pelvis was performed.  Precontrast, Arterial and Delayed phases were performed.  Sagittal and coronal reformats were performed.    FINDINGS:  LOWER CHEST: No lung nodules. Ill-defined increased haziness in the   paraesophageal region. Coronary artery calcification.    LIVER: Numerous hepatic metastases. A previously referenced confluent   lesion involving the segment 8 measures 9 x 8.7 cm, previously 7.9 x 8.3   cm (4:19).  BILE DUCTS: Normal caliber.  GALLBLADDER: Within normal limits.  SPLEEN: Within normal limits.  PANCREAS: Heterogeneously enhancing mass involving the distal body and   the tail of the pancreas measuring approximately 9.8 x 7 cm, previously   8.3 x 7.9 cm. Noncontrast imaging demonstrates hyperdense foci within the   lower portion of this mass that may be related to hemorrhagic content.   The mass cannot be completely  from the spleen and the stomach.  ADRENALS: Within normal limits.  KIDNEYS/URETERS: 2 mm non obstructing calculus lower pole of the left   kidney. No hydronephrosis.    BLADDER: Within normal limits.  REPRODUCTIVE ORGANS: Prostate is enlarged.    BOWEL: No bowel obstruction. Re demonstration of a gastrojejunostomy in   stable position. No endoluminal extravasation or pooling of the contrast   to suggest an active GI bleed.  PERITONEUM: No ascites.  VESSELS: Atherosclerotic changes. Probable occlusion of the splenic vein   with evidence of associated collateral circulation. Patent portal veins.  RETROPERITONEUM/LYMPH NODES: No lymphadenopathy.  ABDOMINAL WALL: Gastrostomy tube.  BONES: Degenerative changes.    IMPRESSION:  No evidence of an active GI bleed.  Large pancreatic mass and extensive hepatic metastases.  Gastrojejunostomy

## 2023-05-08 NOTE — PROGRESS NOTE ADULT - SUBJECTIVE AND OBJECTIVE BOX
Patient is a 68y old  Male who presents with a chief complaint of GIB (08 May 2023 11:29)      BRIEF HOSPITAL COURSE: 67 yo male with PMHx metastatic neuroendocrine tumor of the pancreas, recently admitted with nonfunctioning GJ tube (patient is chronically obstructed).   Patient was admitted with severe heartburn, and has had several episodes of vomiting small amount of dark blood. Patient was discharged on Thursday. Hgb unchanged. CT angiography has no active bleeding, but demonstrates extensive tumor in distal pancreas.     5/4: Patient went to the OR today for a Open distal pancreatectomy, splenectomy, partial colectomy, partial L adrenalectomy, temporary abdominal closure.  Patient had a 2.2 L EBL.  Intra Op had 6U PRBC, 2FFP, 1 Platelets, 4L crystalloid  Post Op 1U PRBC ans 1 U FFP.  Post OP vented, with poor UO, on levo and Vaso.    5/5:  went to OR and had closure and colostomy   5/8 patient with hyperammoniaemia, on lactulose, rifaximin added, now on dialysis      Events last 24 hours: Noted to be slightly more awake. Temp of 100.8, tachycardia to 140s since afternoon. Patient seen and examined at the bedside. Frail male lying in bed, awake. HR 140s, NGT to suction.     PAST MEDICAL & SURGICAL HISTORY:  Hypertension      BPH (benign prostatic hyperplasia)      Renal insufficiency      Light chain nephropathy      DM (diabetes mellitus)      HLD (hyperlipidemia)      No significant past surgical history          Review of Systems:  CONSTITUTIONAL: No fever, chills, or fatigue  EYES: No eye pain, visual disturbances, or discharge  ENMT:  No difficulty hearing, tinnitus, vertigo; No sinus or throat pain  NECK: No pain or stiffness  RESPIRATORY: No cough, wheezing, chills or hemoptysis; No shortness of breath  CARDIOVASCULAR: No chest pain, palpitations, dizziness, or leg swelling  GASTROINTESTINAL: No abdominal or epigastric pain. No nausea, vomiting, or hematemesis; No diarrhea or constipation. No melena or hematochezia.  GENITOURINARY: No dysuria, frequency, hematuria, or incontinence  NEUROLOGICAL: No headaches, memory loss, loss of strength, numbness, or tremors  SKIN: No itching, burning, rashes, or lesions   MUSCULOSKELETAL: No joint pain or swelling; No muscle, back, or extremity pain  PSYCHIATRIC: No depression, anxiety, mood swings, or difficulty sleeping      Medications:  rifAXIMin 550 milliGRAM(s) Oral two times a day    norepinephrine Infusion 0.05 MICROgram(s)/kG/Min IV Continuous <Continuous>      ondansetron Injectable 4 milliGRAM(s) IV Push every 6 hours PRN        lactulose Syrup 20 Gram(s) Oral every 8 hours  pantoprazole  Injectable 40 milliGRAM(s) IV Push every 12 hours      dextrose 50% Injectable 12.5 Gram(s) IV Push once  dextrose 50% Injectable 25 Gram(s) IV Push once  dextrose 50% Injectable 25 Gram(s) IV Push once  dextrose Oral Gel 15 Gram(s) Oral once PRN  glucagon  Injectable 1 milliGRAM(s) IntraMuscular once  insulin lispro (ADMELOG) corrective regimen sliding scale   SubCutaneous every 6 hours    albumin human 25% IVPB 50 milliLiter(s) IV Intermittent every 1 hour PRN  dextrose 5%. 1000 milliLiter(s) IV Continuous <Continuous>  dextrose 5%. 1000 milliLiter(s) IV Continuous <Continuous>  fat emulsion (Fish Oil and Plant Based) 20% Infusion 0.6954 Gm/kG/Day IV Continuous <Continuous>  Parenteral Nutrition - Adult 1 Each TPN Continuous <Continuous>  Parenteral Nutrition - Adult 1 Each TPN Continuous <Continuous>  sodium chloride 0.9% lock flush 10 milliLiter(s) IV Push every 1 hour PRN      chlorhexidine 4% Liquid 1 Application(s) Topical <User Schedule>            ICU Vital Signs Last 24 Hrs  T(C): 37.2 (08 May 2023 22:00), Max: 38.2 (08 May 2023 20:00)  T(F): 99 (08 May 2023 22:00), Max: 100.8 (08 May 2023 20:00)  HR: 121 (08 May 2023 23:00) (70 - 142)  BP: 127/84 (08 May 2023 23:00) (98/66 - 127/84)  BP(mean): 92 (08 May 2023 23:00) (70 - 92)  ABP: 139/78 (08 May 2023 23:00) (79/49 - 154/106)  ABP(mean): 53 (08 May 2023 23:00) (32 - 122)  RR: 28 (08 May 2023 23:00) (12 - 43)  SpO2: 98% (08 May 2023 23:00) (91% - 100%)    O2 Parameters below as of 08 May 2023 20:00  Patient On (Oxygen Delivery Method): room air            ABG - ( 07 May 2023 10:55 )  pH, Arterial: 7.35  pH, Blood: x     /  pCO2: 29    /  pO2: 144   / HCO3: 16    / Base Excess: -8.2  /  SaO2: 99.0                I&O's Detail    07 May 2023 07:01  -  08 May 2023 07:00  --------------------------------------------------------  IN:    Dexmedetomidine: 110 mL    Fat Emulsion (Fish Oil &amp; Plant Based) 20% Infusion: 107 mL    IV PiggyBack: 200 mL    Norepinephrine: 206 mL    PRBCs (Packed Red Blood Cells): 299 mL    sodium chloride 0.9%: 1358 mL    Vasopressin: 133 mL  Total IN: 2413 mL    OUT:    Bulb (mL): 980 mL    Colostomy (mL): 30 mL    Indwelling Catheter - Urethral (mL): 90 mL    Nasogastric/Oral tube (mL): 150 mL    Voided (mL): 0 mL  Total OUT: 1250 mL    Total NET: 1163 mL      08 May 2023 07:01  -  08 May 2023 23:22  --------------------------------------------------------  IN:    Fat Emulsion (Fish Oil &amp; Plant Based) 20% Infusion: 107 mL    Free Water: 200 mL    IV PiggyBack: 250 mL    Norepinephrine: 78.6 mL    TPN (Total Parenteral Nutrition): 723 mL  Total IN: 1358.6 mL    OUT:    Bulb (mL): 470 mL    Indwelling Catheter - Urethral (mL): 5 mL    Nasogastric/Oral tube (mL): 450 mL  Total OUT: 925 mL    Total NET: 433.6 mL            LABS:                        8.3    7.65  )-----------( 54       ( 08 May 2023 05:00 )             24.4     05-08    143  |  112<H>  |  36<H>  ----------------------------<  203<H>  4.1   |  19<L>  |  3.33<H>    Ca    7.6<L>      08 May 2023 05:00  Phos  3.9     05-08  Mg     1.7     05-08    TPro  4.3<L>  /  Alb  1.6<L>  /  TBili  1.2  /  DBili  0.7<H>  /  AST  871<H>  /  ALT  534<H>  /  AlkPhos  195<H>  05-08          CAPILLARY BLOOD GLUCOSE      POCT Blood Glucose.: 204 mg/dL (08 May 2023 23:18)        CULTURES:      Physical Examination:    General: No acute distress.  Alert, oriented, interactive, nonfocal    HEENT: Pupils equal, reactive to light.  Symmetric.    PULM: Clear to auscultation bilaterally, no significant sputum production    CVS: Regular rate and rhythm, no murmurs, rubs, or gallops    ABD: Soft, nondistended, nontender, normoactive bowel sounds, no masses    EXT: No edema, nontender    SKIN: Warm and well perfused, no rashes noted.    NEURO: A&Ox3, strength 5/5 all extremities, cranial nerves grossly intact, no focal deficits      RADIOLOGY: ***      CRITICAL CARE TIME SPENT: ***  Evaluating/treating patient, reviewing data/labs/imaging, discussing case with multidisciplinary team, discussing plan/goals of care with patient/family. Non-inclusive of procedure time.   Patient is a 68y old  Male who presents with a chief complaint of GIB (08 May 2023 11:29)      BRIEF HOSPITAL COURSE: 69 yo male with PMHx metastatic neuroendocrine tumor of the pancreas, recently admitted with nonfunctioning GJ tube (patient is chronically obstructed).   Patient was admitted with severe heartburn, and has had several episodes of vomiting small amount of dark blood. Patient was discharged on Thursday. Hgb unchanged. CT angiography has no active bleeding, but demonstrates extensive tumor in distal pancreas.     5/4: Patient went to the OR today for a Open distal pancreatectomy, splenectomy, partial colectomy, partial L adrenalectomy, temporary abdominal closure.  Patient had a 2.2 L EBL.  Intra Op had 6U PRBC, 2FFP, 1 Platelets, 4L crystalloid  Post Op 1U PRBC ans 1 U FFP.  Post OP vented, with poor UO, on levo and Vaso.    5/5:  went to OR and had closure and colostomy   5/8 patient with hyperammoniaemia, on lactulose, rifaximin added, now on dialysis      Events last 24 hours: Noted to be slightly more awake. Temp of 100.8, tachycardia to 140s since afternoon. Patient seen and examined at the bedside. Frail male lying in bed, awake. HR 140s, NGT to suction.     PAST MEDICAL & SURGICAL HISTORY:  Hypertension      BPH (benign prostatic hyperplasia)      Renal insufficiency      Light chain nephropathy      DM (diabetes mellitus)      HLD (hyperlipidemia)      No significant past surgical history          Review of Systems:  unable to obtain due to patient's clinical condition       Medications:  rifAXIMin 550 milliGRAM(s) Oral two times a day  norepinephrine Infusion 0.05 MICROgram(s)/kG/Min IV Continuous <Continuous>  ondansetron Injectable 4 milliGRAM(s) IV Push every 6 hours PRN  lactulose Syrup 20 Gram(s) Oral every 8 hours  pantoprazole  Injectable 40 milliGRAM(s) IV Push every 12 hours  dextrose 50% Injectable 12.5 Gram(s) IV Push once  dextrose 50% Injectable 25 Gram(s) IV Push once  dextrose 50% Injectable 25 Gram(s) IV Push once  dextrose Oral Gel 15 Gram(s) Oral once PRN  glucagon  Injectable 1 milliGRAM(s) IntraMuscular once  insulin lispro (ADMELOG) corrective regimen sliding scale   SubCutaneous every 6 hours  albumin human 25% IVPB 50 milliLiter(s) IV Intermittent every 1 hour PRN  dextrose 5%. 1000 milliLiter(s) IV Continuous <Continuous>  dextrose 5%. 1000 milliLiter(s) IV Continuous <Continuous>  fat emulsion (Fish Oil and Plant Based) 20% Infusion 0.6954 Gm/kG/Day IV Continuous <Continuous>  Parenteral Nutrition - Adult 1 Each TPN Continuous <Continuous>  Parenteral Nutrition - Adult 1 Each TPN Continuous <Continuous>  sodium chloride 0.9% lock flush 10 milliLiter(s) IV Push every 1 hour PRN  chlorhexidine 4% Liquid 1 Application(s) Topical <User Schedule>        ICU Vital Signs Last 24 Hrs  T(C): 37.2 (08 May 2023 22:00), Max: 38.2 (08 May 2023 20:00)  T(F): 99 (08 May 2023 22:00), Max: 100.8 (08 May 2023 20:00)  HR: 121 (08 May 2023 23:00) (70 - 142)  BP: 127/84 (08 May 2023 23:00) (98/66 - 127/84)  BP(mean): 92 (08 May 2023 23:00) (70 - 92)  ABP: 139/78 (08 May 2023 23:00) (79/49 - 154/106)  ABP(mean): 53 (08 May 2023 23:00) (32 - 122)  RR: 28 (08 May 2023 23:00) (12 - 43)  SpO2: 98% (08 May 2023 23:00) (91% - 100%)    O2 Parameters below as of 08 May 2023 20:00  Patient On (Oxygen Delivery Method): room air      ABG - ( 07 May 2023 10:55 )  pH, Arterial: 7.35  pH, Blood: x     /  pCO2: 29    /  pO2: 144   / HCO3: 16    / Base Excess: -8.2  /  SaO2: 99.0        I&O's Detail    07 May 2023 07:01  -  08 May 2023 07:00  --------------------------------------------------------  IN:    Dexmedetomidine: 110 mL    Fat Emulsion (Fish Oil &amp; Plant Based) 20% Infusion: 107 mL    IV PiggyBack: 200 mL    Norepinephrine: 206 mL    PRBCs (Packed Red Blood Cells): 299 mL    sodium chloride 0.9%: 1358 mL    Vasopressin: 133 mL  Total IN: 2413 mL    OUT:    Bulb (mL): 980 mL    Colostomy (mL): 30 mL    Indwelling Catheter - Urethral (mL): 90 mL    Nasogastric/Oral tube (mL): 150 mL    Voided (mL): 0 mL  Total OUT: 1250 mL    Total NET: 1163 mL      08 May 2023 07:01  -  08 May 2023 23:22  --------------------------------------------------------  IN:    Fat Emulsion (Fish Oil &amp; Plant Based) 20% Infusion: 107 mL    Free Water: 200 mL    IV PiggyBack: 250 mL    Norepinephrine: 78.6 mL    TPN (Total Parenteral Nutrition): 723 mL  Total IN: 1358.6 mL    OUT:    Bulb (mL): 470 mL    Indwelling Catheter - Urethral (mL): 5 mL    Nasogastric/Oral tube (mL): 450 mL  Total OUT: 925 mL    Total NET: 433.6 mL    LABS:                        8.3    7.65  )-----------( 54       ( 08 May 2023 05:00 )             24.4     05-08    143  |  112<H>  |  36<H>  ----------------------------<  203<H>  4.1   |  19<L>  |  3.33<H>    Ca    7.6<L>      08 May 2023 05:00  Phos  3.9     05-08  Mg     1.7     05-08    TPro  4.3<L>  /  Alb  1.6<L>  /  TBili  1.2  /  DBili  0.7<H>  /  AST  871<H>  /  ALT  534<H>  /  AlkPhos  195<H>  05-08          CAPILLARY BLOOD GLUCOSE      POCT Blood Glucose.: 204 mg/dL (08 May 2023 23:18)      CULTURES:      Physical Examination:    General: Awake    HEENT: Pupils equal, reactive to light.  Symmetric.    PULM: Clear to auscultation bilaterally, no significant sputum production    CVS: tachycardic rate, regular rhythm     ABD: mildly tender, dressing clean dry intact, TAYLA drain with serosang     EXT: mild edema     SKIN: Warm     POCUS: A lines b/l, LV function appears preserved      RADIOLOGY: no recent radiology       CRITICAL CARE TIME SPENT: 60 minutes assessing presenting problems of acute illness, which pose high probability of life threatening deterioration or end organ damage/dysfunction, as well as medical decision making including initiating plan of care, reviewing data, reviewing radiologic exams, discussing with multidisciplinary team,  discussing goals of care with patient/family, and writing this note.  Non-inclusive of procedures performed,

## 2023-05-08 NOTE — PHYSICAL THERAPY INITIAL EVALUATION ADULT - ADDITIONAL COMMENTS
(-) , Community Ambulator with spouse. Patient reported stairs inside the home and 3 steps to enter the home with rails. Info obtained from eval 4/2023. (-) , Community Ambulator with spouse. Patient reported stairs inside the home and 3 steps to enter the home with rails. Info obtained from eval 4/2023. update 5/8/2023. Pt is not a good historian of PLOF or home environment.

## 2023-05-08 NOTE — PROGRESS NOTE ADULT - SUBJECTIVE AND OBJECTIVE BOX
NEPHROLOGY INTERVAL HPI/OVERNIGHT EVENTS:  23 @ 11:29  HPI:     seen at hd, events noted, anuric, TPN infusing with lipids, on solo dec dose of pressors, encepha with starting of lactulose and xifaxin   - no new events, anuric, CO2 trending down   s/p OR yesterday, ostomy creation  - case d/w surgery and intensivist , anuric k 5.2 will dialyze preop, no fluid removal  - pt seen in PACU, PRBC and FFP, and LR resuscitation noted, coulter in place ~ 100 cc urine in coulter bag  case d/w pts nurse   Chart quote, operative note ""Open distal pancreatectomy, splenectomy, partial colectomy, partial L adrenalectomy, temporary abdominal closure; Large varices, 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 Liver laden with diffuse metastases and very friable, RP oozy. 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  Treated with Lutathera one year ago and restarted in 2022  G-J tube placed for nutrition in January due to chronic dysphagia and severe esophagitis on EGD.  Post recent  admission due to G tube clogging.  D/mansi home on .    Returned to ED due to vomiting when night time enteral feeding via G tube started.  CT with IVC done due to concern for GIB.  Pt reports chronic self catheterization due to urinary retention.   ZO improved when catheterization started.  Follows mainly with EMELY Cortez.   Pt has been self cath'ing 3-4 x /day.   Indwelling Coulter placed by EMELY due to resistance with blood clots with resistance reported by pt.  For palliative mass resection tomorrow.    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  Creat has been variable during previous admissions--ranging 1.1-2.2.    PMHX and PSHX.  --Neuroendocrine tumor of pancreas with mets to liver --dx 6 years ago.  --GJ tube for nutrition in 2023 due to chronic dysphagia.  --Hx of HTN  --Hx of DM  --Hx of light chain disease --unclear hx and no hx of renal biopsy.      MEDICATIONS  (STANDING):  chlorhexidine 4% Liquid 1 Application(s) Topical <User Schedule>  dextrose 5%. 1000 milliLiter(s) (50 mL/Hr) IV Continuous <Continuous>  dextrose 5%. 1000 milliLiter(s) (100 mL/Hr) IV Continuous <Continuous>  dextrose 50% Injectable 12.5 Gram(s) IV Push once  dextrose 50% Injectable 25 Gram(s) IV Push once  dextrose 50% Injectable 25 Gram(s) IV Push once  fat emulsion (Fish Oil and Plant Based) 20% Infusion 0.695 Gm/kG/Day (20.8 mL/Hr) IV Continuous <Continuous>  fat emulsion (Fish Oil and Plant Based) 20% Infusion 0.6954 Gm/kG/Day (20.83 mL/Hr) IV Continuous <Continuous>  glucagon  Injectable 1 milliGRAM(s) IntraMuscular once  insulin lispro (ADMELOG) corrective regimen sliding scale   SubCutaneous every 6 hours  lactulose Syrup 20 Gram(s) Oral every 8 hours  norepinephrine Infusion 0.05 MICROgram(s)/kG/Min (5.95 mL/Hr) IV Continuous <Continuous>  pantoprazole  Injectable 40 milliGRAM(s) IV Push every 12 hours  Parenteral Nutrition - Adult 1 Each (67 mL/Hr) TPN Continuous <Continuous>  Parenteral Nutrition - Adult 1 Each (67 mL/Hr) TPN Continuous <Continuous>  rifAXIMin 550 milliGRAM(s) Oral two times a day    MEDICATIONS  (PRN):  albumin human 25% IVPB 50 milliLiter(s) IV Intermittent every 1 hour PRN low b/p  dextrose Oral Gel 15 Gram(s) Oral once PRN Blood Glucose LESS THAN 70 milliGRAM(s)/deciliter  ondansetron Injectable 4 milliGRAM(s) IV Push every 6 hours PRN Nausea and/or Vomiting  sodium chloride 0.9% lock flush 10 milliLiter(s) IV Push every 1 hour PRN Pre/post blood products, medications, blood draw, and to maintain line patency      Allergies    losartan (Angioedema)    Intolerances        I&O's Detail    07 May 2023 07:01  -  08 May 2023 07:00  --------------------------------------------------------  IN:    Dexmedetomidine: 110 mL    Fat Emulsion (Fish Oil &amp; Plant Based) 20% Infusion: 107 mL    IV PiggyBack: 200 mL    Norepinephrine: 206 mL    PRBCs (Packed Red Blood Cells): 299 mL    sodium chloride 0.9%: 1358 mL    Vasopressin: 133 mL  Total IN: 2413 mL    OUT:    Bulb (mL): 980 mL    Colostomy (mL): 30 mL    Indwelling Catheter - Urethral (mL): 90 mL    Nasogastric/Oral tube (mL): 150 mL    Voided (mL): 0 mL  Total OUT: 1250 mL    Total NET: 1163 mL      Patient was seen and evaluated on dialysis.   Patient is tolerating the procedure well.   Continue dialysis:   Dialyzer:     f180 k protocol uf goal of 2-3 kg   spa with hd   femoral shiley     Vital Signs Last 24 Hrs  T(C): 36.6 (08 May 2023 10:40), Max: 36.9 (08 May 2023 00:00)  T(F): 97.8 (08 May 2023 10:40), Max: 98.4 (08 May 2023 00:00)  HR: 81 (08 May 2023 11:00) (65 - 121)  BP: --  BP(mean): --  RR: 16 (08 May 2023 11:00) (10 - 32)  SpO2: 96% (08 May 2023 11:00) (93% - 100%)    Parameters below as of 08 May 2023 11:00  Patient On (Oxygen Delivery Method): room air      Daily     Daily Weight in k.1 (08 May 2023 06:00)    PHYSICAL EXAM:  General: encephalopathy  HEENT: MMM  CV: s1s2 rrr  LUNGS: B/L CTA  EXT: no edema    LABS:                        8.3    7.65  )-----------( 54       ( 08 May 2023 05:00 )             24.4     05-08    143  |  112<H>  |  36<H>  ----------------------------<  203<H>  4.1   |  19<L>  |  3.33<H>    Ca    7.6<L>      08 May 2023 05:00  Phos  3.9       Mg     1.7         TPro  4.3<L>  /  Alb  1.6<L>  /  TBili  1.2  /  DBili  0.7<H>  /  AST  871<H>  /  ALT  534<H>  /  AlkPhos  195<H>          Magnesium, Serum: 1.7 mg/dL ( @ 05:00)  Phosphorus Level, Serum: 3.9 mg/dL ( @ 05:00)    ABG - ( 07 May 2023 10:55 )  pH, Arterial: 7.35  pH, Blood: x     /  pCO2: 29    /  pO2: 144   / HCO3: 16    / Base Excess: -8.2  /  SaO2: 99.0

## 2023-05-08 NOTE — PROGRESS NOTE ADULT - SUBJECTIVE AND OBJECTIVE BOX
Patient seen and examined at bedside. Patient has no complaints. Patient remains NPO with NGT in place to low wall suction. Ostomy not yet functioning. Nurse denies any acute events. Vitals reviewed and WNL. Remains on levophed, titrated off vasopressin.      Vitals:  T(C): 36.7 (- @ 04:00), Max: 36.9 ( @ 00:00)  HR: 93 (:) (61 - 121)  BP: --  RR: 27 ( @ :00) (10 - 27)  SpO2: 96% (:00) (93% - 100%)     @ :01  -   07:00  --------------------------------------------------------  IN:    Dexmedetomidine: 110 mL    Fat Emulsion (Fish Oil &amp; Plant Based) 20% Infusion: 107 mL    IV PiggyBack: 200 mL    Norepinephrine: 206 mL    PRBCs (Packed Red Blood Cells): 299 mL    sodium chloride 0.9%: 1358 mL    Vasopressin: 133 mL  Total IN: 2413 mL    OUT:    Bulb (mL): 980 mL    Colostomy (mL): 30 mL    Indwelling Catheter - Urethral (mL): 90 mL    Nasogastric/Oral tube (mL): 150 mL    Voided (mL): 0 mL  Total OUT: 1250 mL    Total NET: 1163 mL        Physical Exam:  General: AAOx3, Well developed, NAD  Chest: Normal respiratory effort  Heart: RRR  Abdomen: Abdomen soft and lax, no tenderness, clean and dry dressing. Ostomy pink and has small amount of stool. Drain with serosanguinous output  Neuro/Psych: No localized deficits. Normal speech, normal tone  Skin: Normal, no rashes, no lesions noted.   Extremities: Warm, well perfused, no edema, Pulses intact      0508 @ 05:00                    8.3  CBC: 7.65>)-------(<54                     24.4                 143 | 112 | 36    CMP:  ----------------------< 203               4.1 | 19 | 3.33                      Ca:7.6  Phos:3.9  M.7               1.2|      |871        LFTs:  ------|195|-----             0.7|      |-   @ 16:05                    8.8  CBC: 7.18>)-------(<53                     26.1                 - | - | -    CMP:  ----------------------< -               - | - | -                      Ca:-  Phos:-  Mg:-               -|      |-        LFTs:  ------|-|-----             -|      |-   @ 06:10                    7.2  CBC: 7.55>)-------(<69                     22.1                 142 | 111 | 37    CMP:  ----------------------< 132               4.0 | 19 | 3.42                      Ca:7.4  Phos:5.1  M.7               1.0|      |1641        LFTs:  ------|147|-----             0.6|      |-      Current Inpatient Medications:  chlorhexidine 4% Liquid 1 Application(s) Topical <User Schedule>  dexMEDEtomidine Infusion 0.04 MICROgram(s)/kG/Hr (0.72 mL/Hr) IV Continuous <Continuous>  dextrose 5%. 1000 milliLiter(s) (100 mL/Hr) IV Continuous <Continuous>  dextrose 5%. 1000 milliLiter(s) (50 mL/Hr) IV Continuous <Continuous>  dextrose 50% Injectable 12.5 Gram(s) IV Push once  dextrose 50% Injectable 25 Gram(s) IV Push once  dextrose 50% Injectable 25 Gram(s) IV Push once  dextrose Oral Gel 15 Gram(s) Oral once PRN  fat emulsion (Fish Oil and Plant Based) 20% Infusion 0.695 Gm/kG/Day (20.8 mL/Hr) IV Continuous <Continuous>  glucagon  Injectable 1 milliGRAM(s) IntraMuscular once  insulin lispro (ADMELOG) corrective regimen sliding scale   SubCutaneous every 6 hours  lactulose Syrup 20 Gram(s) Oral every 8 hours  norepinephrine Infusion 0.05 MICROgram(s)/kG/Min (5.95 mL/Hr) IV Continuous <Continuous>  ondansetron Injectable 4 milliGRAM(s) IV Push every 6 hours PRN  pantoprazole  Injectable 40 milliGRAM(s) IV Push every 12 hours  Parenteral Nutrition - Adult 1 Each (67 mL/Hr) TPN Continuous <Continuous>  piperacillin/tazobactam IVPB.. 3.375 Gram(s) IV Intermittent every 8 hours  sodium chloride 0.9% lock flush 10 milliLiter(s) IV Push every 1 hour PRN  vasopressin Infusion 0.04 Unit(s)/Min (6 mL/Hr) IV Continuous <Continuous>

## 2023-05-08 NOTE — PHYSICAL THERAPY INITIAL EVALUATION ADULT - PASSIVE RANGE OF MOTION EXAMINATION, REHAB EVAL
Bilateral shoulder flex ~ 90 degrees. Bilateral hip flex ~ 90 degrees and bilateral DF neutral. Increase resistance to movement bilateral UE's and LE's. Pt having difficulty following commands verbal and tactile to assess ROM./Left UE Passive ROM was WFL (within functional limits)/Right UE Passive ROM was WFL (within functional limits)/Left LE Passive ROM was WFL (within functional limits)/Right LE Passive ROM was WFL (within functional limits)

## 2023-05-08 NOTE — PROGRESS NOTE ADULT - ASSESSMENT
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 CTA - for active GIB, but w/ increasing tumor burden in distal pancreas w/ secondary gastric obstruction.     1. Shock (hypovolemic vs. hemorrhagic)   2. ABLA   3. Acute hypoxic respiratory failure   4. ZO/ARF   5. HAGMA   6. Hypomagnesemia--> resolved    Plan:     Neuro: On precedex for agitation, continue to titrate as needed.     CV: Hypovolemic shock, remains on levo and vaso titrate to maintain MAP >65.    Pulm: Tolerated NC during day, will place back on BiPAP for respiratory support, pt remains high risk for decompensation requiring reintubation.     GI: Diet: NPO w/ tube feeds, protonix for GI PPX.     Renal: ZO given profound shock state, now w/ ARF dialyzed today continue to monitor U/O, likely volume down, avoid nephrotoxic meds.     Endo: glucose <180, mag>2, K>4 for arrythmia suppression.     Heme: ABLA following multiple procedures, will transfuse for Hb <7 or if clinically indicated. SCD for DVT PPX following OR.     ID: Continue on zosyn empirically for profound shock state, trend markers of infection daily.

## 2023-05-08 NOTE — PROGRESS NOTE ADULT - ASSESSMENT
69 yo male with PMHx metastatic neuroendocrine tumor of the pancreas, recently admitted with nonfunctioning GJ tube (patient is chronically obstructed).   Patient has had severe heartburn, and has had several episodes of vomiting small amount of dark blood.     now Post Op  after an open distal pancreatectomy, splenectomy, partial colectomy, partial L adrenalectomy,colostomy    With Post Op distributive Shock  oliguria/renal failure requiring dialysis    PLAN:  ICU    PULM: Fi O2 30%.  now extubated, breathing ok    Cardio:  Continue Pressors and try to wean levo. AM Cortisol 20, doubt renal insufficiency, will add midodrine when gut functioning    Renal:   HD today to help clear ammonia, will then d/c dialysis catheter and reassexx    GI: NPO, PPI.  TPN today. Check f/u ammonia    Surgery:  NPO. d/c  Precedex, shock liver,     ID:  off abx    HREM: no further bleeding    Venodynes/start heparin subq

## 2023-05-08 NOTE — PROGRESS NOTE ADULT - ASSESSMENT
Problem List:  1) ARF  2) Dehydration   3) Hypovolemic shock     Appear dry on clinical exam, will try to bolus 2L IVF and assess response in BP and HR   Replace mag with 2g  temp of 100.8, had femoral dialysis catheter removed, finished abx today, will hold off on further abx and cultures for now  Treat with ofirmev   lactulose and xifaxin for hepatic encephalopathy

## 2023-05-08 NOTE — PROGRESS NOTE ADULT - ASSESSMENT
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  S/P distal pancreatectomy, splenectomy, partial colectomy  S/P abdominal washout and colostomy creation    Plan:  Optimize respiratory function  Supplementary oxygen prn   Observe drain output  Keep NG tube  Observe ostomy output  Critical care management as per ICU team  Follow up with nephrology for further dialysis  He will need TPN for now until we can use his gut for nutrition    Plan discussed with Dr Black

## 2023-05-08 NOTE — PHYSICAL THERAPY INITIAL EVALUATION ADULT - LEVEL OF INDEPENDENCE: SUPINE/SIT, REHAB EVAL
Did not assess OOB at this time pt having difficulty following commands. Will attempt to increase mobility on 5/9/2023 if possible.

## 2023-05-08 NOTE — PHYSICAL THERAPY INITIAL EVALUATION ADULT - DID THE PATIENT HAVE SURGERY?
Pt s/p distal pancreatectomy, splenectomy, partial colectomy, and NG tube placement. Creation of end colostomy and irrigation./yes

## 2023-05-08 NOTE — CHART NOTE - NSCHARTNOTEFT_GEN_A_CORE
Clinical Nutrition PN Follow Up Note    *  year old admitted for    *current status: TPN initiated yesterday 2/2 suspected long NPO status, possible new placement of GJ tube. Pt remains on levophed and was titrated off vasopressin. Remains NPO with NGT to LWS. Ostomy still not functioning. HD initiated yesterday and tolerated well as per nephrology note. Plan to c/w TPN today.     *pertinent meds: Admelog sliding scale (received 1 unit x 24 hours), lactulose, norepinephrine, protonix, zofran, PRBC 1 unit    *labs reviewed; Na WNL however on high end of normal, will continue to monitor and adjust prn as per labs. K+ WNL, will c/w 20 mEq in bag and will monitor/ adjust prn, currently on HD. PO4 WNL however received HD yesterday, will initiate 10 mmol in PN bag, will closely monitor and adjust prn as per labs. Mg remains on low end of normal, will increase to 10 mEq and will adjust prn as per labs. T Bili WNL for trace elements. Corrected Ca WNL (9.5), rec'd add 10mEq of Calcium Gluconate outside of PN bag as CAPS is out. POCT WNL x 24 hrs. New triglyceride level pending, last TG obtained on 1/12/23 - will initiate 50g lipids if <400.    05-08    143  |  112<H>  |  36<H>  ----------------------------<  203<H>  4.1   |  19<L>  |  3.33<H>    Ca    7.6<L>      08 May 2023 05:00  Phos  3.9     05-08  Mg     1.7     05-08    TPro  4.3<L>  /  Alb  1.6<L>  /  TBili  1.2  /  DBili  0.7<H>  /  AST  871<H>  /  ALT  534<H>  /  AlkPhos  195<H>  05-08      BMI: BMI (kg/m2): 24.8 (05-06-23 @ 04:00)  HbA1c: A1C with Estimated Average Glucose Result: 7.3 % (04-24-23 @ 06:35)    Glucose: POCT Blood Glucose.: 192 mg/dL (05-08-23 @ 05:50)    BP: 97/67 (05-06-23 @ 10:00) (84/55 - 123/76)  Lipid Panel: Date/Time: 01-12-23 @ 06:07  Triglycerides, Serum: 129 *pending new TG level    POCT Blood Glucose.: 192 mg/dL (05-08-23 @ 05:50)  POCT Blood Glucose.: 133 mg/dL (05-07-23 @ 22:56)  POCT Blood Glucose.: 112 mg/dL (05-07-23 @ 18:02)  POCT Blood Glucose.: 133 mg/dL (05-07-23 @ 11:56)    *I&O's Detail    07 May 2023 07:01  -  08 May 2023 07:00  --------------------------------------------------------  IN:    Dexmedetomidine: 110 mL    Fat Emulsion (Fish Oil &amp; Plant Based) 20% Infusion: 107 mL    IV PiggyBack: 200 mL    Norepinephrine: 206 mL    PRBCs (Packed Red Blood Cells): 299 mL    sodium chloride 0.9%: 1358 mL    Vasopressin: 133 mL  Total IN: 2413 mL    OUT:    Bulb (mL): 980 mL    Colostomy (mL): 30 mL    Indwelling Catheter - Urethral (mL): 90 mL    Nasogastric/Oral tube (mL): 150 mL    Voided (mL): 0 mL  Total OUT: 1250 mL    Total NET: 1163 mL    * fluid status: positive; minimal output from NGT and colostomy noted. Strict I&O's are rec'd when pt is on PN as per protocol. C/w low total volume for PN and will adjust prn to maintain fluid balance with recs from ICU intensivists.   *BM (+) on 5/7 - small liquid brown stool in LUQ colostomy. pt on bowel regimen (lactulose)  * 1+ L/R ankle edema    *malnutrition: Pt continues to meet criteria for severe protein-calorie malnutrition in context of chronic disease r/t decreased ability to meet increased nutrient needs 2/2 mets cancer AEB severe muscle/ fat wasting, TF meeting <75% ENN x >/=1 month, 19% wt loss x 5 mos    Estimated Needs: based on 63.9 Kg (wt from 5/1 - bed scale taken by RD)  Calories: 2545-7633 Kcal (30-35 Kcal/Kg)  Protein:  g protein (1.5-2 g/Kg)  Fluids: 2886-7090 mL (25-30 mL/Kg)    Diet, NPO with Tube Feed: *TF on hold for now  Tube Feeding Modality: Gastro-Jejunostomy  Glucerna 1.5 Chidi (GLUCERNA1.5)  Total Volume for 24 Hours (mL): 1560  Continuous  Starting Tube Feed Rate {mL per Hour}: 20  Increase Tube Feed Rate by (mL): 10     Every 4 hours  Until Goal Tube Feed Rate (mL per Hour): 65  Tube Feed Duration (in Hours): 24  Tube Feed Start Time: 00:00  Free Water Flush  Free Water Flush Instructions:  Free water flushes of 40cc/hr (provides ~960cc/day); monitor and adjust free water flushes PRN per MD  No Carb Prosource TF     Qty per Day:  2 (05-02-23 @ 11:28) [Active]    Weight History:  Height (cm): 170.2 (04-29-23 @ 08:11), 170.2 (04-21-23 @ 11:22)  Weight (kg): 71.9 (05-06-23 @ 04:00), 62.9 (04-21-23 @ 22:23)  BMI (kg/m2): 24.8 (05-06-23 @ 04:00), 21.7 (04-29-23 @ 08:11), 21.7 (04-21-23 @ 22:23)  BSA (m2): 1.83 (05-06-23 @ 04:00), 1.73 (04-29-23 @ 08:11), 1.73 (04-21-23 @ 22:23)  IBW: 148#  IBW %: 95.3%    TPN Recommendations: via central venous line  Total Volume: 1600 mL x 24 hrs  128 g Amino Acids  150 g Dextrose (titrate dextrose 50-75 g/day until goal of 320g is met)  0 g Lipids 20% *pending new TG level  123 mEq Sodium Chloride  0 mEq Sodium Acetate  0 mmol Sodium Phosphate  25 mEq Potassium Chloride  0 mEq Potassium Acetate  10 mmol Potassium Phosphate  0 mEq Calcium Gluconate *CAPS is out of PN solution  10 mEq Magnesium Sulfate  200 mg Thiamine  1 ml Trace Elements  10 ml MVI    Total Calories  1522 kcal (Meets ~76%  of Estimated Energy needs and 100% Protein needs)    Additional Recommendations:    1) Daily weights  2) Strict I & O's  3) Daily lyte checks including magnesium and phos - monitor closely for refeeding syndrome as pt is at HIGH RISK  4) Weekly triglycerides/LFT checks - pending new TG level  5) POCT q6hrs; maintain 140-180mg/dL  6) Titrate dextrose up 50-75g/day until goal rate of 320g reached.   7) Confirm goals of care regarding LONG-TERM nutrition support - plan for new ? GJ tube; will provide recs when placed    *will continue to monitor and adjust PN prn*  Nina Christensen, RDN, CDN [phone] Clinical Nutrition PN Follow Up Note    * 68 year old M PMHx metastatic neuroendocrine tumor of the pancreas, recently admitted with nonfunctioning GJ tube (patient is chronically obstructed). Patient has had severe heartburn, and has had several episodes of vomiting small amount of dark blood. Patient was discharged on Thursday. Hgb unchanged. CT angiography has no active bleeding, but demonstrates extensive tumor in distal pancreas. Pt originally in SICU, receiving TF via GJ tube but having frequent BM's. On 5/4 - pt went to OR  for a Open distal pancreatectomy, splenectomy, partial colectomy, partial L adrenalectomy, temporary abdominal closure. Remained intubated, on paralytics, pressors, anuric and transferred to ICU. 5/6 s/p abd closure and colostomy.    **TPN initiated 2/2 suspected long NPO status, possible new placement of GJ tube    *current status: Pt remains on levophed and was titrated off vasopressin. Remains NPO with NGT to LWS. Ostomy still not functioning. HD initiated yesterday and tolerated well as per nephrology note. Plan to c/w TPN today.     *pertinent meds: Admelog sliding scale (received 1 unit x 24 hours), lactulose, norepinephrine, protonix, zofran, PRBC 1 unit    *labs reviewed; Na WNL however on high end of normal, will continue to monitor and adjust prn as per labs. K+ WNL, will c/w 20 mEq in bag and will monitor/ adjust prn, currently on HD. PO4 WNL however received HD yesterday, will continue to not add phos in PN bag today - if WNL tomorrow, can consider adding 10 mmol in PN bag. Mg remains on low end of normal, will increase to 10 mEq and will adjust prn as per labs. T Bili WNL for trace elements. Corrected Ca WNL (9.5), rec'd add 10mEq of Calcium Gluconate outside of PN bag as CAPS is out. POCT WNL x 24 hrs. New triglyceride level pending, last TG obtained on 1/12/23 - will c/w lipids in PN bag today.    05-08    143  |  112<H>  |  36<H>  ----------------------------<  203<H>  4.1   |  19<L>  |  3.33<H>    Ca    7.6<L>      08 May 2023 05:00  Phos  3.9     05-08  Mg     1.7     05-08    TPro  4.3<L>  /  Alb  1.6<L>  /  TBili  1.2  /  DBili  0.7<H>  /  AST  871<H>  /  ALT  534<H>  /  AlkPhos  195<H>  05-08      BMI: BMI (kg/m2): 24.8 (05-06-23 @ 04:00)  HbA1c: A1C with Estimated Average Glucose Result: 7.3 % (04-24-23 @ 06:35)    Glucose: POCT Blood Glucose.: 192 mg/dL (05-08-23 @ 05:50)    BP: 97/67 (05-06-23 @ 10:00) (84/55 - 123/76)  Lipid Panel: Date/Time: 01-12-23 @ 06:07  Triglycerides, Serum: 129 *pending new TG level    POCT Blood Glucose.: 192 mg/dL (05-08-23 @ 05:50)  POCT Blood Glucose.: 133 mg/dL (05-07-23 @ 22:56)  POCT Blood Glucose.: 112 mg/dL (05-07-23 @ 18:02)  POCT Blood Glucose.: 133 mg/dL (05-07-23 @ 11:56)    *I&O's Detail    07 May 2023 07:01  -  08 May 2023 07:00  --------------------------------------------------------  IN:    Dexmedetomidine: 110 mL    Fat Emulsion (Fish Oil &amp; Plant Based) 20% Infusion: 107 mL    IV PiggyBack: 200 mL    Norepinephrine: 206 mL    PRBCs (Packed Red Blood Cells): 299 mL    sodium chloride 0.9%: 1358 mL    Vasopressin: 133 mL  Total IN: 2413 mL    OUT:    Bulb (mL): 980 mL    Colostomy (mL): 30 mL    Indwelling Catheter - Urethral (mL): 90 mL    Nasogastric/Oral tube (mL): 150 mL    Voided (mL): 0 mL  Total OUT: 1250 mL    Total NET: 1163 mL    * fluid status: positive; minimal output from NGT and colostomy noted. TPN not doc'd; Strict I&O's are rec'd when pt is on PN as per protocol. C/w low total volume for PN and will adjust prn to maintain fluid balance with recs from ICU intensivists.   *BM (+) on 5/7 - small liquid brown stool in LUQ colostomy. pt on bowel regimen (lactulose)  * 1+ L/R ankle edema    *malnutrition: Pt continues to meet criteria for severe protein-calorie malnutrition in context of chronic disease r/t decreased ability to meet increased nutrient needs 2/2 mets cancer AEB severe muscle/ fat wasting, TF meeting <75% ENN x >/=1 month, 19% wt loss x 5 mos    Estimated Needs: based on 63.9 Kg (wt from 5/1 - bed scale taken by RD)  Calories: 7356-4376 Kcal (30-35 Kcal/Kg)  Protein:  g protein (1.5-2 g/Kg)  Fluids: 7669-0482 mL (25-30 mL/Kg)    Diet, NPO with Tube Feed: *TF on hold for now  Tube Feeding Modality: Gastro-Jejunostomy  Glucerna 1.5 Chidi (GLUCERNA1.5)  Total Volume for 24 Hours (mL): 1560  Continuous  Starting Tube Feed Rate {mL per Hour}: 20  Increase Tube Feed Rate by (mL): 10     Every 4 hours  Until Goal Tube Feed Rate (mL per Hour): 65  Tube Feed Duration (in Hours): 24  Tube Feed Start Time: 00:00  Free Water Flush  Free Water Flush Instructions:  Free water flushes of 40cc/hr (provides ~960cc/day); monitor and adjust free water flushes PRN per MD Squires Carb Prosource TF     Qty per Day:  2 (05-02-23 @ 11:28) [Active]    Weight History:  Height (cm): 170.2 (04-29-23 @ 08:11), 170.2 (04-21-23 @ 11:22)  Weight (kg): 71.9 (05-06-23 @ 04:00), 62.9 (04-21-23 @ 22:23)  BMI (kg/m2): 24.8 (05-06-23 @ 04:00), 21.7 (04-29-23 @ 08:11), 21.7 (04-21-23 @ 22:23)  BSA (m2): 1.83 (05-06-23 @ 04:00), 1.73 (04-29-23 @ 08:11), 1.73 (04-21-23 @ 22:23)  IBW: 148#  IBW %: 95.3%    TPN Recommendations: via central venous line  Total Volume: 1600 mL x 24 hrs  128 g Amino Acids  150 g Dextrose (titrate dextrose 50-75 g/day until goal of 320g is met)  50 g Lipids 20% *pending new TG level  123 mEq Sodium Chloride  0 mEq Sodium Acetate  0 mmol Sodium Phosphate  20 mEq Potassium Chloride  0 mEq Potassium Acetate  0 mmol Potassium Phosphate  0 mEq Calcium Gluconate *CAPS is out of PN solution  10 mEq Magnesium Sulfate  200 mg Thiamine  1 ml Trace Elements  10 ml MVI    Total Calories  1522 kcal (Meets ~76%  of Estimated Energy needs and 100% Protein needs)    Additional Recommendations:    1) Daily weights  2) Strict I & O's  3) Daily lyte checks including magnesium and phos - monitor closely for refeeding syndrome as pt is at HIGH RISK  4) Weekly triglycerides/LFT checks - pending new TG level  5) POCT q6hrs; maintain 140-180mg/dL  6) Titrate dextrose up 50-75g/day until goal rate of 320g reached.   7) Confirm goals of care regarding LONG-TERM nutrition support - plan for new ? GJ tube; will provide recs when placed    *will continue to monitor and adjust PN prn*  Nina Christensen, BRADYN, CDN [phone]

## 2023-05-08 NOTE — PROGRESS NOTE ADULT - ASSESSMENT
69 yo man with Hx of Neuroendocrine tumor/Pancreatic mass and liver mets.  Now for palliative resection of pancreatic mass.    --ZO with variable creat level and unclear CKD.    Likely dependent on volume status.    Limited intake even with Enteral feeding.      Start gentle hydration.  -- : continue Hale drainage.  --Hx of light chain nephropathy  --No contraindication for surgery from renal standpoint.    5/4  s/p Palliative surgery for neuroendocrine mass  will watch for ZO, pt with low UO immediate post op  s/p multiple PRBC and FFP transfusion and volume resuscitation    5/5  patient anuric, K 5.2  On bicarb drip  to go back to OR today  case d/w Dr Jean and Dr Siddiqi  Will HD x 2 hrs to correct potassium  >10 L infused in blood products and fluids  More fluids not indicated, UO 50 ml  no fluid removal on HD  Hep profile ordered    5/6  Ostomy created  will monitor i/o  labs stable   no need for HD today  d/w intensivist  will monitor daily for hd requirement    5/7  seen earlier on HD   tolerating well  TPN  d/w Dr Jean  d/w pts fam at bedside    5/8 MK   - ZO with anuria, remains HD dependent with hx of light chain nephropathy    tolerating hd     dc femoral shiley today after hd   - enceph monitor response to hd and xifaxan and lactulose

## 2023-05-08 NOTE — PHYSICAL THERAPY INITIAL EVALUATION ADULT - MANUAL MUSCLE TESTING RESULTS, REHAB EVAL
Unable to assess strength at this time via MMT secondary to pt not following commands verbal and tactile.

## 2023-05-08 NOTE — PHYSICAL THERAPY INITIAL EVALUATION ADULT - GENERAL OBSERVATIONS, REHAB EVAL
Pt found supine in bed with NG tube, IV, tube feeding G-J tube, bilateral venodynes, and bed alarm activated. Pt non-verbal at this time and having difficulty following commands verbal and tactile. PAINAD- 4/10.

## 2023-05-08 NOTE — PHYSICAL THERAPY INITIAL EVALUATION ADULT - PLANNED THERAPY INTERVENTIONS, PT EVAL
Increase mobility when and if possible. Eval. Pt left in bed with all observation section equipment/material intact and bed alarm activated. PAINAD- 4/10. Will cont to follow pt and increase as tolerated./bed mobility training/ROM/strengthening/transfer training

## 2023-05-08 NOTE — PROGRESS NOTE ADULT - SUBJECTIVE AND OBJECTIVE BOX
Events Overnight: patient remains lethargic, colostomy not functioning, no fevers, was dialyzed yesterday     now only on phenylephrine    HPI:   67 yo male with PMHx metastatic neuroendocrine tumor of the pancreas, recently admitted with nonfunctioning GJ tube (patient is chronically obstructed).   Patient was admitted with severe heartburn, and has had several episodes of vomiting small amount of dark blood. Patient was discharged on Thursday. Hgb unchanged. CT angiography has no active bleeding, but demonstrates extensive tumor in distal pancreas.     5/4: Patient went to the OR today for a Open distal pancreatectomy, splenectomy, partial colectomy, partial L adrenalectomy, temporary abdominal closure.  Patient had a 2.2 L EBL.  Intra Op had 6U PRBC, 2FFP, 1 Platelets, 4L crystalloid  Post Op 1U PRBC ans 1 U FFP.  Post OP vented, with poor UO, on levo and Vaso.    5/5:  went to OR and had closure and colostomy   5/9 patient with hyperammoniaemia, on lactulose, rifaximin added, now on dialysis    PMH:       as above       MEDICATIONS  (STANDING):  chlorhexidine 4% Liquid 1 Application(s) Topical <User Schedule>  dextrose 5%. 1000 milliLiter(s) (50 mL/Hr) IV Continuous <Continuous>  dextrose 5%. 1000 milliLiter(s) (100 mL/Hr) IV Continuous <Continuous>  dextrose 50% Injectable 12.5 Gram(s) IV Push once  dextrose 50% Injectable 25 Gram(s) IV Push once  dextrose 50% Injectable 25 Gram(s) IV Push once  fat emulsion (Fish Oil and Plant Based) 20% Infusion 0.695 Gm/kG/Day (20.8 mL/Hr) IV Continuous <Continuous>  glucagon  Injectable 1 milliGRAM(s) IntraMuscular once  insulin lispro (ADMELOG) corrective regimen sliding scale   SubCutaneous every 6 hours  lactulose Syrup 20 Gram(s) Oral every 8 hours  norepinephrine Infusion 0.05 MICROgram(s)/kG/Min (5.95 mL/Hr) IV Continuous <Continuous>  pantoprazole  Injectable 40 milliGRAM(s) IV Push every 12 hours  Parenteral Nutrition - Adult 1 Each (67 mL/Hr) TPN Continuous <Continuous>  rifAXIMin 550 milliGRAM(s) Oral two times a day    MEDICATIONS  (PRN):  dextrose Oral Gel 15 Gram(s) Oral once PRN Blood Glucose LESS THAN 70 milliGRAM(s)/deciliter  ondansetron Injectable 4 milliGRAM(s) IV Push every 6 hours PRN Nausea and/or Vomiting  sodium chloride 0.9% lock flush 10 milliLiter(s) IV Push every 1 hour PRN Pre/post blood products, medications, blood draw, and to maintain line patency    ICU Vital Signs Last 24 Hrs  T(C): 36.7 (08 May 2023 04:00), Max: 36.9 (08 May 2023 00:00)  T(F): 98 (08 May 2023 04:00), Max: 98.4 (08 May 2023 00:00)  HR: 70 (08 May 2023 08:30) (61 - 121)  BP: --  BP(mean): --  ABP: 84/47 (08 May 2023 08:30) (81/45 - 186/139)  ABP(mean): 61 (08 May 2023 08:30) (59 - 158)  RR: 14 (08 May 2023 08:30) (10 - 29)  SpO2: 97% (08 May 2023 08:30) (93% - 100%)    O2 Parameters below as of 08 May 2023 06:00  Patient On (Oxygen Delivery Method): room air    Physical Exam:            Mode: standby      I&O's Summary    07 May 2023 07:01  -  08 May 2023 07:00  --------------------------------------------------------  IN: 2413 mL / OUT: 1250 mL / NET: 1163 mL                                       8.3    7.65  )-----------( 54       ( 08 May 2023 05:00 )             24.4       05-08    143  |  112<H>  |  36<H>  ----------------------------<  203<H>  4.1   |  19<L>  |  3.33<H>    Ca    7.6<L>      08 May 2023 05:00  Phos  3.9     05-08  Mg     1.7     05-08    TPro  4.3<L>  /  Alb  1.6<L>  /  TBili  1.2  /  DBili  0.7<H>  /  AST  871<H>  /  ALT  534<H>  /  AlkPhos  195<H>  05-08            ABG - ( 07 May 2023 10:55 )  pH, Arterial: 7.35  pH, Blood: x     /  pCO2: 29    /  pO2: 144   / HCO3: 16    / Base Excess: -8.2  /  SaO2: 99.0                    DVT Prophylaxis:                                                                 Advanced Directives:

## 2023-05-08 NOTE — PHYSICAL THERAPY INITIAL EVALUATION ADULT - PERTINENT HX OF CURRENT PROBLEM, REHAB EVAL
Pt admitted to  secondary to GIB. Pt with a hx of pancreatic ca with liver mets s/o G-J tube. H/H- 8.3/24.4. CT of abdomen: acute GIB with increasing tumor burden in distal pancreas, gastric obstruction. Pt has been on precedex for agitation and has been on nc during day and bipap as well. Pt admitted to  secondary to GIB. Pt with a hx of pancreatic ca with liver mets s/p G-J tube. H/H- 8.3/24.4. CT of abdomen: acute GIB with increasing tumor burden in distal pancreas, gastric obstruction. Pt has been on precedex for agitation and has been on nc during day and bipap as well.

## 2023-05-09 NOTE — PROGRESS NOTE ADULT - ASSESSMENT
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  S/P distal pancreatectomy, splenectomy, partial colectomy  S/P abdominal washout and colostomy creation    Plan:  Optimize respiratory function  Supplementary oxygen prn   Observe drain output  NGT   monitor ostomy output  Critical care management as per ICU team  Follow up with nephrology for further dialysis  TPN    Plan discussed with Dr Black

## 2023-05-09 NOTE — PROGRESS NOTE ADULT - ASSESSMENT
67 yo man with Hx of Neuroendocrine tumor/Pancreatic mass and liver mets.  Now for palliative resection of pancreatic mass.    --ZO with variable creat level and unclear CKD.    Likely dependent on volume status.    Limited intake even with Enteral feeding.      Start gentle hydration.  -- : continue Hale drainage.  --Hx of light chain nephropathy  --No contraindication for surgery from renal standpoint.    5/4  s/p Palliative surgery for neuroendocrine mass  will watch for ZO, pt with low UO immediate post op  s/p multiple PRBC and FFP transfusion and volume resuscitation    5/5  patient anuric, K 5.2  On bicarb drip  to go back to OR today  case d/w Dr Jean and Dr Siddiqi  Will HD x 2 hrs to correct potassium  >10 L infused in blood products and fluids  More fluids not indicated, UO 50 ml  no fluid removal on HD  Hep profile ordered    5/6  Ostomy created  will monitor i/o  labs stable   no need for HD today  d/w intensivist  will monitor daily for hd requirement    5/7  seen earlier on HD   tolerating well  TPN  d/w Dr Jean  d/w pts fam at bedside    5/8 MK   - ZO with anuria, remains HD dependent with hx of light chain nephropathy    tolerating hd     dc femoral shiley today after hd   - enceph monitor response to hd and xifaxan and lactulose     69 yo man with Hx of Neuroendocrine tumor/Pancreatic mass and liver mets.  Now for palliative resection of pancreatic mass.    --ZO with variable creat level and unclear CKD.    Likely dependent on volume status.    Limited intake even with Enteral feeding.      Start gentle hydration.  -- : continue Hale drainage.  --Hx of light chain nephropathy  --No contraindication for surgery from renal standpoint.    5/4  s/p Palliative surgery for neuroendocrine mass  will watch for ZO, pt with low UO immediate post op  s/p multiple PRBC and FFP transfusion and volume resuscitation    5/5  patient anuric, K 5.2  On bicarb drip  to go back to OR today  case d/w Dr Jean and Dr Siddiqi  Will HD x 2 hrs to correct potassium  >10 L infused in blood products and fluids  More fluids not indicated, UO 50 ml  no fluid removal on HD  Hep profile ordered    5/6  Ostomy created  will monitor i/o  labs stable   no need for HD today  d/w intensivist  will monitor daily for hd requirement    5/7  seen earlier on HD   tolerating well  TPN  d/w Dr Jean  d/w pts fam at bedside    5/8 MK   - ZO with anuria, remains HD dependent with hx of light chain nephropathy    tolerating hd     dc femoral shiley today after hd   - enceph monitor response to hd and xifaxan and lactulose    5/9 MK   - ZO with anuria, remains HD dependent with hx of light chain nephropathy    no indication for hd    off pressor     monitor electrolytes on TPN     NG in place for TF initiation   - enceph, improving monitor response to hd and xifaxan and lactulose    ostomy fx with liquid stool  dw dr marley

## 2023-05-09 NOTE — CHART NOTE - NSCHARTNOTEFT_GEN_A_CORE
Clinical Nutrition TF BRIEF NOTE    * 68 year old M PMHx metastatic neuroendocrine tumor of the pancreas, recently admitted with nonfunctioning GJ tube (patient is chronically obstructed). Patient has had severe heartburn, and has had several episodes of vomiting small amount of dark blood. Patient was discharged on Thursday. Hgb unchanged. CT angiography has no active bleeding, but demonstrates extensive tumor in distal pancreas. Pt originally in SICU, receiving TF via GJ tube but having frequent BM's. On 5/4 - pt went to OR  for a Open distal pancreatectomy, splenectomy, partial colectomy, partial L adrenalectomy, temporary abdominal closure. Remained intubated, on paralytics, pressors, anuric and transferred to ICU. 5/6 s/p abd closure and colostomy.    **TPN initiated on 5/7 2/2 suspected long NPO status s/p multiple GI surgeries, possible new placement of ?GJ tube for long-term nutrition    *current status: TPN d/c-ed today and NGT placed by ICU intensivist. Spoke w/ RN, plan to initiate TF tonight. See below for TF recommendations.     *labs reviewed:  05-09    142  |  110<H>  |  46<H>  ----------------------------<  264<H>  3.5   |  19<L>  |  3.88<H>    Ca    7.7<L>      09 May 2023 05:29  Phos  1.6     05-09  Mg     2.2     05-09    TPro  4.6<L>  /  Alb  1.7<L>  /  TBili  1.5<H>  /  DBili  x   /  AST  510<H>  /  ALT  401<H>  /  AlkPhos  209<H>  05-09      *I&O's    05-08-23 @ 07:01  -  05-09-23 @ 07:00  --------------------------------------------------------  IN:    Fat Emulsion (Fish Oil &amp; Plant Based) 20% Infusion: 260 mL    Free Water: 240 mL    IV PiggyBack: 250 mL    Lactated Ringers Bolus: 2000 mL    Norepinephrine: 128.6 mL    TPN (Total Parenteral Nutrition): 1582 mL  Total IN: 4460.6 mL    OUT:    Bulb (mL): 870 mL    Colostomy (mL): 125 mL    Indwelling Catheter - Urethral (mL): 5 mL    Nasogastric/Oral tube (mL): 1000 mL  Total OUT: 2000 mL    Total NET: 2460.6 mL      05-09-23 @ 07:01  -  05-09-23 @ 14:51  --------------------------------------------------------  IN:    Fat Emulsion (Fish Oil &amp; Plant Based) 20% Infusion: 96 mL  Total IN: 96 mL    OUT:    Bulb (mL): 220 mL  Total OUT: 220 mL    Total NET: -124 mL    *BM documented on 5/9 x 3 - liquid via colostomy.  pt on bowel regimen (lactulose).    *malnutrition: Pt continues to meet criteria for severe protein-calorie malnutrition in context of chronic disease r/t decreased ability to meet increased nutrient needs 2/2 mets cancer AEB severe muscle/ fat wasting, TF meeting <75% ENN x >/=1 month, 19% wt loss x 5 mos    *ESTIMATED NUTR NEEDS:  based on 63.9 Kg (wt from 5/1 - bed scale taken by RD)  Calories: 7550-1815 Kcal (30-35 Kcal/Kg)  Protein:  g protein (1.5-2 g/Kg)  Fluids: 2278-5757 mL (25-30 mL/Kg)    RECOMMENDATIONS:  1) Initiate Vital 1.5 @ 20 cc/hr, increase by 10 cc/hr q6 hours until goal rate of 65 cc/hr is met (total volume = 1300 mL) with 3 packets of Prosource TF. Will provide ~ 2070 kcal, 121 g protein, and 993 mL free water.   2) monitor hydration status; provide free water flushes prn as per MD  3) monitor TF tolerance; keep back of bed > 45 degrees  4) monitor BM: if > 3 days without BM, add bowel regimen prn  5) daily wt checks to track/trend changes. Strict I&O's  6) Monitor daily lytes/min and replete prn - monitor closely for refeeding syndrome as pt is at HIGH RISK  7) Confirm goals of care regarding LONG-TERM nutrition support - plan for new ? GJ tube    *will continue to monitor and adjust treatment plan prn*  Nina Christensen, BRADYN, CDN [phone]

## 2023-05-09 NOTE — PROGRESS NOTE ADULT - SUBJECTIVE AND OBJECTIVE BOX
HPI:  69 y/o male with MHx significant for neuroendocrine tumor diagnosed 6 years ago with chronic gastric outlet obstruction and mets to liver, s/p GJ tube, HTN, HLD, T2DM, BPH and light chain nephropathy presented to the ED with hematemesis since last night.    Recent admission-from 4/21- 4/27/2023 for J-tube malfunction; weakness; dehydration; hyponatremia and ZO    4/29- Patient seen in ED, Mana spouse at bedside. Patients reports nausea and hematemesis since last night. Coffee brown color emesis of about 10 ml note dn the bag. Also with nausea and discomfort at the GJ site. Last feed was last night. Report loose bowel movement every 2-3 days, last BM was yesterday, denies hematochezia.     5/8/2023- Seen at bedside in ICU, accompanied by spouse. With NGT, and upper extremities in restraints due to patient attempting to pull the NGT out.    PAST MEDICAL & SURGICAL HISTORY:    Hypertension  BPH (benign prostatic hyperplasia)  Renal insufficiency  Light chain nephropathy  DM (diabetes mellitus  HLD (hyperlipidemia)  GJ tube placement    FAMILY HISTORY:    FH: breast cancer (Mother)  FH: aneurysm (Father)    MEDICATIONS  (STANDING):    pantoprazole Infusion 8 mG/Hr     MEDICATIONS  (PRN):      Allergies    losartan (Angioedema)      Review of Systems    Unable to obtain due to confused state    VITAL SIGNS    ICU Vital Signs Last 24 Hrs  T(C): 37.3 (09 May 2023 12:00), Max: 38.2 (08 May 2023 20:00)  T(F): 99.1 (09 May 2023 12:00), Max: 100.8 (08 May 2023 20:00)  HR: 114 (09 May 2023 15:00) (110 - 140)  BP: 120/81 (09 May 2023 15:00) (91/64 - 127/88)  BP(mean): 89 (09 May 2023 15:00) (68 - 96)  ABP: 104/101 (09 May 2023 12:00) (79/49 - 139/78)  ABP(mean): 98 (09 May 2023 12:00) (32 - 122)  RR: 22 (09 May 2023 15:00) (18 - 43)  SpO2: 96% (09 May 2023 15:00) (92% - 100%)    O2 Parameters below as of 09 May 2023 08:00  Patient On (Oxygen Delivery Method): room air      Physical Exam    Constitutional: Ill appearing  Eyes: no conjunctival infection, anicteric.   ENT: pharynx is unremarkable, moist mucus membrane, no oral lesions.   Neck: supple without JVD, no thyromegaly or masses appreciated.   Pulmonary: increase resp rate noted- On RA, monitor with SPO2-97%   Cardiac: RRR, normal S1S2, no murmurs, rubs, gallops. Tachy on monitor-110's  Vascular: no JVD, no calf tenderness, venous stasis changes, varices.   Abdomen: normoactive bowel sounds, soft and nontender, with GJ tube, Ostomy pink. Drain with serosanguinous output  Lymphatic: no peripheral adenopathy appreciated.   Musculoskeletal: full range of motion and no deformities appreciated.   Skin: normal appearance, no rash, nodules, vesicles, ulcers, erythema.   Neurology: grossly intact.   Psychiatric: affect appropriate.     LABS:                          8.9    10.31 )-----------( 45       ( 09 May 2023 05:29 )             26.0       05-09    142  |  110<H>  |  46<H>  ----------------------------<  264<H>  3.5   |  19<L>  |  3.88<H>    Ca    7.7<L>      09 May 2023 05:29  Phos  1.6     05-09  Mg     2.2     05-09    TPro  4.6<L>  /  Alb  1.7<L>  /  TBili  1.5<H>  /  DBili  x   /  AST  510<H>  /  ALT  401<H>  /  AlkPhos  209<H>  05-09      PT/INR - ( 29 Apr 2023 09:34 )   PT: 12.8 sec;   INR: 1.10 ratio     PTT - ( 29 Apr 2023 09:34 )  PTT:25.3 sec      RADIOLOGY & ADDITIONAL STUDIES:    EXAM:  CT ABDOMEN AND PELVIS IC     PROCEDURE DATE:  04/29/2023        INTERPRETATION:  CLINICAL INFORMATION: Upper GI bleed. History of gastric   cancer, GJ tube.    COMPARISON: 04/17/2023.    CONTRAST/COMPLICATIONS:  IV Contrast: Omnipaque 350  90 cc administered   10 cc discarded  Oral Contrast: NONE  Complications: None reported at time of study completion    PROCEDURE:  CT of the Abdomen and Pelvis was performed.  Precontrast, Arterial and Delayed phases were performed.  Sagittal and coronal reformats were performed.    FINDINGS:  LOWER CHEST: No lung nodules. Ill-defined increased haziness in the   paraesophageal region. Coronary artery calcification.    LIVER: Numerous hepatic metastases. A previously referenced confluent   lesion involving the segment 8 measures 9 x 8.7 cm, previously 7.9 x 8.3   cm (4:19).  BILE DUCTS: Normal caliber.  GALLBLADDER: Within normal limits.  SPLEEN: Within normal limits.  PANCREAS: Heterogeneously enhancing mass involving the distal body and   the tail of the pancreas measuring approximately 9.8 x 7 cm, previously   8.3 x 7.9 cm. Noncontrast imaging demonstrates hyperdense foci within the   lower portion of this mass that may be related to hemorrhagic content.   The mass cannot be completely  from the spleen and the stomach.  ADRENALS: Within normal limits.  KIDNEYS/URETERS: 2 mm non obstructing calculus lower pole of the left   kidney. No hydronephrosis.    BLADDER: Within normal limits.  REPRODUCTIVE ORGANS: Prostate is enlarged.    BOWEL: No bowel obstruction. Re demonstration of a gastrojejunostomy in   stable position. No endoluminal extravasation or pooling of the contrast   to suggest an active GI bleed.  PERITONEUM: No ascites.  VESSELS: Atherosclerotic changes. Probable occlusion of the splenic vein   with evidence of associated collateral circulation. Patent portal veins.  RETROPERITONEUM/LYMPH NODES: No lymphadenopathy.  ABDOMINAL WALL: Gastrostomy tube.  BONES: Degenerative changes.    IMPRESSION:  No evidence of an active GI bleed.  Large pancreatic mass and extensive hepatic metastases.  Gastrojejunostomy

## 2023-05-09 NOTE — PROGRESS NOTE ADULT - ASSESSMENT
69 yo male with PMHx metastatic neuroendocrine tumor of the pancreas, recently admitted with nonfunctioning GJ tube (patient is chronically obstructed).   Patient has had severe heartburn, and has had several episodes of vomiting small amount of dark blood.     now Post Op  after an open distal pancreatectomy, splenectomy, partial colectomy, partial L adrenalectomy,colostomy    With Post Op distributive Shock  oliguria/renal failure requiring dialysis  pressors now off  hepatic encephalopathy    PLAN:  ICU    PULM: Fi O2 30%.  now extubated, breathing ok    Cardio:   Pressors off, M Cortisol 20,    Renal:   HD held will likely need new catheter in day or so    GI:  PPI.  TPN today. ammonia slowly improving, continue rifaximin, lactulose    Endo cortical level was ok    Neuro hepatic encephalopthy slighly improved          ID:  off abx    H EM: no further bleeding    Venodynes/start heparin subq

## 2023-05-09 NOTE — PROGRESS NOTE ADULT - ASSESSMENT
69 y/o M with a MHX of neuroendocrine tumor, likely of GI/pancreatic origin, c/w gastric outlet obstruction, s/p GJ tube, p/w hematemesis    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  -Primary Onc Dr Ac Llamas    - Initially diagnosed in 2015, p/w abdominal pain and nausea scans revealed a large 20 cm abdominal mass   - Started on Sandostatin, and completed Lutathera about 1 year ago ---> scans end of 2022 noted POD; started back on Lutathera 12/07/2022  - Admitted to  01/2023 and 4/21-4/27 with concern for gastric outlet obstruction  - EGD 01/10/23 with severe esophagitis, no obvious obstruction seen with no stenting done  - S/p GJ tube placement   - Seen by Dr Siddiqi 04/12/23 with ongoing GOC/ planning for a palliative procedure for a high gastric transection and Marcela-en-Y reconstruction; currently dependent on using J tube for feeds & G port to decompress; unable to tolerate PO intake   - CT AP 04/17/23 revealed diffuse bilobar liver metastasis which have progressed since 01/06/2023; large pancreatic mass, not significantly changed  - Per Primary Onc Dr Llamas patient received 6 doses of Lutathera, with plan to change treatment regimen to chemotherapy following palliative surgical procedure, likely distal gastrectomy per Surg/ Onc Dr Black.  -Surg/Onc following-->5/5-s/p distal pancreatectomy, splenectomy, partial colectomy and colostomy creation    # Hematemesis    -Likely from the malignancy and the ulcerated esophagus.  -EGD-1/10/023- reflux esophagitis with severely ulcerated and bleeding esophagus.  -CT-4/17/23-Diffuse bilobar liver metastasis which have progressed since 1/6/2023. Large pancreatic mass, not significantly changed.  -CT-A/P-4/29/2023- no active bleed  -GI consult-->will consider EGD if persistent hematemesis     # Normocytic Anemia    - Hgb 8.9 g/dL today  - Patient had a 2.2 L EBL. Intra Op-5/5/23-6U PRBC, 2FFP, 1 Platelets, 4L crystalloid. Post Op 1U PRBC ans 1 U FFP.  - CT-A/P-4/29/2023- no active bleed.  - Likely 2/2 advanced malignancy, dehydration malnutrition, decreased feedings and GIB.   - FOBT- negative on 4/24  - Continue to trend serial CBCs  - Low iron sat, consider Venofer IV  - Transfuse blood as necessary; keep Hgb >7.5 g/dL and/or symptomatic     #Thrombocytopenia     -likely multifactorial- also has evidence of numerous hepatic metastases  -Inflammation / infection/drug-induced thrombocytopenia (non-immune) most common causes of acute decline in platelets. Other causes include immune-mediated platelet destruction/ ITP, bone marrow suppression, or platelet consumption/destruction.  -recent hypovolemic shock 2/2  blood loss from Sx-5/5/23  -Platelets -45K <--54<--69K/ul, before Sx-5/5/23- plts ranged between 90-130K, with low normal on/before 5/3  -Thrombocytopenia may be associated with a variety of conditions, range from bleeding or thrombosis (eg, in heparin-induced thrombocytopenia [HIT])  -HIT unlikely but - will send SAROJ for completion  -Daily CBC  -Transfuse for plts <10K and <20K with fevers/active bleeding.    # ZO with anuria    -Nephrology following  -s/p HD-5/8 ZO with anuria, remains HD dependent with hx of light chain nephropathy    # Hepatic encephalopathy    -On rifaximin, lactulose     69 y/o M with a MHX of neuroendocrine tumor, likely of GI/pancreatic origin, c/w gastric outlet obstruction, s/p GJ tube, p/w hematemesis    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  -Primary Onc Dr Ac Llamas    - Initially diagnosed in 2015, p/w abdominal pain and nausea scans revealed a large 20 cm abdominal mass   - Started on Sandostatin, and completed Lutathera about 1 year ago ---> scans end of 2022 noted POD; started back on Lutathera 12/07/2022  - Admitted to  01/2023 and 4/21-4/27 with concern for gastric outlet obstruction  - EGD 01/10/23 with severe esophagitis, no obvious obstruction seen with no stenting done  - S/p GJ tube placement   - Seen by Dr Siddiqi 04/12/23 with ongoing GOC/ planning for a palliative procedure for a high gastric transection and Marcela-en-Y reconstruction; currently dependent on using J tube for feeds & G port to decompress; unable to tolerate PO intake   - CT AP 04/17/23 revealed diffuse bilobar liver metastasis which have progressed since 01/06/2023; large pancreatic mass, not significantly changed  - Per Primary Onc Dr Llamas patient received 6 doses of Lutathera, with plan to change treatment regimen to chemotherapy following palliative surgical procedure, likely distal gastrectomy per Surg/ Onc Dr Black.  -Surg/Onc following-->5/5-s/p distal pancreatectomy, splenectomy, partial colectomy and colostomy creation    # Hematemesis    -Likely from the malignancy and the ulcerated esophagus.  -EGD-1/10/023- reflux esophagitis with severely ulcerated and bleeding esophagus.  -CT-4/17/23-Diffuse bilobar liver metastasis which have progressed since 1/6/2023. Large pancreatic mass, not significantly changed.  -CT-A/P-4/29/2023- no active bleed  -GI consult-->will consider EGD if persistent hematemesis     # Normocytic Anemia    - Hgb 8.9 g/dL today  - Patient had a 2.2 L EBL. Intra Op-5/5/23-6U PRBC, 2FFP, 1 Platelets, 4L crystalloid. Post Op 1U PRBC ans 1 U FFP.  - CT-A/P-4/29/2023- no active bleed.  - Likely 2/2 advanced malignancy, dehydration malnutrition, decreased feedings and GIB.   - FOBT- negative on 4/24  - Continue to trend serial CBCs  - Low iron sat, consider Venofer IV  - Transfuse blood as necessary; keep Hgb >7.5 g/dL and/or symptomatic     #Thrombocytopenia     -likely multifactorial- also has evidence of numerous hepatic metastases  -Inflammation / infection/drug-induced thrombocytopenia (non-immune) most common causes of acute decline in platelets. Other causes include immune-mediated platelet destruction/ ITP, TTP, bone marrow suppression, or platelet consumption/destruction. Also hemodilution due to massive transfusion.  -recent hypovolemic shock 2/2  blood loss from Sx-5/5/23  -Patient had a 2.2 L EBL. Intra Op-5/5/23-6U PRBC, 2FFP, 1 Platelets, 4L crystalloid. Post Op 1U PRBC ans 1 U FFP  -Platelets -45K <--54<--69K/ul, before Sx-5/5/23- plts ranged between 90-130K, with low normal on/before 5/3  -Thrombocytopenia may be associated with a variety of conditions, range from bleeding or thrombosis (eg, in heparin-induced thrombocytopenia [HIT])  -HIT unlikely but - will send SAROJ for completion  -Daily CBC  -Transfuse for plts <10K and <20K with fevers/active bleeding.    # ZO with anuria    -Nephrology following  -s/p HD-5/8 ZO with anuria, remains HD dependent with hx of light chain nephropathy    # Hepatic encephalopathy    -On rifaximin, lactulose     67 y/o M with a MHX of neuroendocrine tumor, likely of GI/pancreatic origin, c/w gastric outlet obstruction, s/p GJ tube, p/w hematemesis    # Neuroendocrine Tumor with Liver Metsastases & Pancreatic Mass  -Primary Onc Dr Ac Llamas    - Initially diagnosed in 2015, p/w abdominal pain and nausea scans revealed a large 20 cm abdominal mass   - Started on Sandostatin, and completed Lutathera about 1 year ago ---> scans end of 2022 noted POD; started back on Lutathera 12/07/2022  - Admitted to  01/2023 and 4/21-4/27 with concern for gastric outlet obstruction  - EGD 01/10/23 with severe esophagitis, no obvious obstruction seen with no stenting done  - S/p GJ tube placement   - Seen by Dr Siddiqi 04/12/23 with ongoing GOC/ planning for a palliative procedure for a high gastric transection and Marcela-en-Y reconstruction; currently dependent on using J tube for feeds & G port to decompress; unable to tolerate PO intake   - CT AP 04/17/23 revealed diffuse bilobar liver metastasis which have progressed since 01/06/2023; large pancreatic mass, not significantly changed  - Per Primary Onc Dr Llamas patient received 6 doses of Lutathera, with plan to change treatment regimen to chemotherapy following palliative surgical procedure, likely distal gastrectomy per Surg/ Onc Dr Black.  -Surg/Onc following-->5/5/23-s/p distal pancreatectomy, splenectomy, partial colectomy and colostomy creation    # Hematemesis    -Likely from the malignancy and the ulcerated esophagus.  -EGD-1/10/023- reflux esophagitis with severely ulcerated and bleeding esophagus.  -CT-4/17/23-Diffuse bilobar liver metastasis which have progressed since 1/6/2023. Large pancreatic mass, not significantly changed.  -CT-A/P-4/29/2023- no active bleed  -GI consult-->will consider EGD if persistent hematemesis     # Normocytic Anemia    - Hgb 8.9 g/dL today  - Patient had a 2.2 L EBL. Intra Op-5/5/23-6U PRBC, 2FFP, 1 Platelets, 4L crystalloid. Post Op 1U PRBC ans 1 U FFP.  - CT-A/P-4/29/2023- no active bleed.  - Likely 2/2 advanced malignancy, dehydration malnutrition, decreased feedings and GIB.   - FOBT- negative on 4/24  - Continue to trend serial CBCs  - Low iron sat, consider Venofer IV  - Transfuse blood as necessary; keep Hgb >7.5 g/dL and/or symptomatic     #Thrombocytopenia     -inflammation / infection vs drug-induced thrombocytopenia (non-immune) most common causes of acute decline in platelets.  Other causes include immune-mediated platelet destruction/ ITP, bone marrow suppression, or platelet consumption/destruction, hemodilution due to massive transfusion/ recent hypovolemic shock 2/2  blood loss from Sx-5/5/23 with distributive shock:  5/5/23-6U PRBC, 2FFP, 1 Platelets, 4L crystalloid. Post Op 1U PRBC ans 1 U FFP  -Platelets -45K <--54<--69K/ul, before Sx-5/5/23- plts ranged between 90-130K, with low normal on/before 5/3/23.  -r/o DIC --> check PT, aPTT, fibrinogen and LDH  -if due to increased destruction of platelets, patients are generally acutely ill, and treatment is directed at the underlying infection. Thrombocytopenia may be an incidental finding that resolves spontaneously as the patient recovers; however, in some persistent infections,  thrombocytopenia may also persist.    -send PF4 ab to r/o HIT if any recent heparin exposure  -check folate and B12 and iron studies to r/o nutritional deficiencies  -cont to monitor serial CBC  -repeat CBC using a non-EDTA anticoagulant- platelet count, blue top  -check HIV  -check US abdomen to evaluate hypersplenism/ possible liver disease  -transfuse for plts <10K and <20K with fevers/active bleeding.  -will reach out to his primary oncologist Dr. Llamas     # ZO with anuria    -Nephrology following  -s/p HD-5/8 ZO with anuria  -hx of light chain nephropathy    # Hepatic encephalopathy    -On rifaximin, lactulose

## 2023-05-09 NOTE — PROGRESS NOTE ADULT - SUBJECTIVE AND OBJECTIVE BOX
Events Overnight:  Patient remains lethargic, weak, slightly more arousable, afebrile, Levophed off this am, ammonia 82 improving    HPI:     69 yo male with PMHx metastatic neuroendocrine tumor of the pancreas, recently admitted with nonfunctioning GJ tube (patient is chronically obstructed).   Patient was admitted with severe heartburn, and has had several episodes of vomiting small amount of dark blood. Patient was discharged on Thursday. Hgb unchanged. CT angiography has no active bleeding, but demonstrates extensive tumor in distal pancreas.     5/4: Patient went to the OR today for a Open distal pancreatectomy, splenectomy, partial colectomy, partial L adrenalectomy, temporary abdominal closure.  Patient had a 2.2 L EBL.  Intra Op had 6U PRBC, 2FFP, 1 Platelets, 4L crystalloid  Post Op 1U PRBC ans 1 U FFP.  Post OP vented, with poor UO, on levo and Vaso.    5/5:  went to OR and had closure and colostomy   5/9 patient with hyperammoniaemia, on lactulose, rifaximin dialysis catheters out.       PMH:       as above    ROS cant obtain due to encephalopathy     MEDICATIONS  (STANDING):  chlorhexidine 4% Liquid 1 Application(s) Topical <User Schedule>  dextrose 5%. 1000 milliLiter(s) (50 mL/Hr) IV Continuous <Continuous>  dextrose 5%. 1000 milliLiter(s) (100 mL/Hr) IV Continuous <Continuous>  dextrose 50% Injectable 12.5 Gram(s) IV Push once  dextrose 50% Injectable 25 Gram(s) IV Push once  dextrose 50% Injectable 25 Gram(s) IV Push once  fat emulsion (Fish Oil and Plant Based) 20% Infusion 0.6954 Gm/kG/Day (20.83 mL/Hr) IV Continuous <Continuous>  glucagon  Injectable 1 milliGRAM(s) IntraMuscular once  insulin lispro (ADMELOG) corrective regimen sliding scale   SubCutaneous every 6 hours  lactulose Syrup 20 Gram(s) Oral every 8 hours  norepinephrine Infusion 0.05 MICROgram(s)/kG/Min (5.95 mL/Hr) IV Continuous <Continuous>  pantoprazole  Injectable 40 milliGRAM(s) IV Push every 12 hours  Parenteral Nutrition - Adult 1 Each (67 mL/Hr) TPN Continuous <Continuous>  rifAXIMin 550 milliGRAM(s) Oral two times a day    MEDICATIONS  (PRN):  albumin human 25% IVPB 50 milliLiter(s) IV Intermittent every 1 hour PRN low b/p  dextrose Oral Gel 15 Gram(s) Oral once PRN Blood Glucose LESS THAN 70 milliGRAM(s)/deciliter  ondansetron Injectable 4 milliGRAM(s) IV Push every 6 hours PRN Nausea and/or Vomiting  sodium chloride 0.9% lock flush 10 milliLiter(s) IV Push every 1 hour PRN Pre/post blood products, medications, blood draw, and to maintain line patency    ICU Vital Signs Last 24 Hrs  T(C): 37.3 (09 May 2023 08:00), Max: 38.2 (08 May 2023 20:00)  T(F): 99.1 (09 May 2023 08:00), Max: 100.8 (08 May 2023 20:00)  HR: 115 (09 May 2023 09:00) (72 - 142)  BP: 102/68 (09 May 2023 09:00) (91/64 - 127/88)  BP(mean): 76 (09 May 2023 09:00) (68 - 96)  ABP: 103/98 (09 May 2023 09:00) (79/49 - 154/106)  ABP(mean): 122 (09 May 2023 09:00) (32 - 122)  RR: 25 (09 May 2023 09:00) (14 - 43)  SpO2: 98% (09 May 2023 09:00) (91% - 100%)    O2 Parameters below as of 09 May 2023 08:00  Patient On (Oxygen Delivery Method): room air    Physical Exam    General - weak, lethargivc  HEENT - nc/at NG tube in place  neck supple  lungs - clear  cv rrr  abdomen - dressing dry, colostomy viable starting to function   voiding minimal urine output  Extremity trace edema    I&O's Summary    08 May 2023 07:01  -  09 May 2023 07:00  --------------------------------------------------------  IN: 4460.6 mL / OUT: 2000 mL / NET: 2460.6 mL                        8.9    10.31 )-----------( 45       ( 09 May 2023 05:29 )             26.0       05-09    142  |  110<H>  |  46<H>  ----------------------------<  264<H>  3.5   |  19<L>  |  3.88<H>    Ca    7.7<L>      09 May 2023 05:29  Phos  1.6     05-09  Mg     2.2     05-09    TPro  4.6<L>  /  Alb  1.7<L>  /  TBili  1.5<H>  /  DBili  x   /  AST  510<H>  /  ALT  401<H>  /  AlkPhos  209<H>  05-09    ABG - ( 07 May 2023 10:55 )  pH, Arterial: 7.35  pH, Blood: x     /  pCO2: 29    /  pO2: 144   / HCO3: 16    / Base Excess: -8.2  /  SaO2: 99.0      DVT Prophylaxis:   Heparin subq                                                              Advanced Directives: Full Code

## 2023-05-09 NOTE — PROGRESS NOTE ADULT - SUBJECTIVE AND OBJECTIVE BOX
NEPHROLOGY INTERVAL HPI/OVERNIGHT EVENTS:  05-09-23 @ 09:08    5/8 seen at hd, events noted, anuric, TPN infusing with lipids, on solo dec dose of pressors, encepha with starting of lactulose and xifaxin   5/7- no new events, anuric, CO2 trending down  5/6 s/p OR yesterday, ostomy creation  5/5- case d/w surgery and intensivist , anuric k 5.2 will dialyze preop, no fluid removal  5/4- pt seen in PACU, PRBC and FFP, and LR resuscitation noted, coulter in place ~ 100 cc urine in coulter bag  case d/w pts nurse   Chart quote, operative note ""Open distal pancreatectomy, splenectomy, partial colectomy, partial L adrenalectomy, temporary abdominal closure; Large varices, 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 Liver laden with diffuse metastases and very friable, RP oozy. Patient on pressors at the end of the case, and hence, decided to leave colon in discontinuity with temporary closure.""  HPI:  67 yo man with Neuroendocrine tumor of pancreas with mets to liver dx'd 6 years ago.  Treated with Lutathera one year ago and restarted in 12/2022  G-J tube placed for nutrition in January due to chronic dysphagia and severe esophagitis on EGD.  Post recent  admission due to G tube clogging.  D/mansi home on 4/27.    Returned to ED due to vomiting when night time enteral feeding via G tube started.  CT with IVC done due to concern for GIB.  Pt reports chronic self catheterization due to urinary retention.   ZO improved when catheterization started.  Follows mainly with EMELY Cortez.   Pt has been self cath'ing 3-4 x /day.   Indwelling Coulter placed by EMELY due to resistance with blood clots with resistance reported by pt.  For palliative mass resection tomorrow.    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  Creat has been variable during previous admissions--ranging 1.1-2.2.    PMHX and PSHX.  --Neuroendocrine tumor of pancreas with mets to liver --dx 6 years ago.  --GJ tube for nutrition in 1/2023 due to chronic dysphagia.  --Hx of HTN  --Hx of DM  --Hx of light chain disease --unclear hx and no hx of renal biopsy.      MEDICATIONS  (STANDING):  chlorhexidine 4% Liquid 1 Application(s) Topical <User Schedule>  dextrose 5%. 1000 milliLiter(s) (50 mL/Hr) IV Continuous <Continuous>  dextrose 5%. 1000 milliLiter(s) (100 mL/Hr) IV Continuous <Continuous>  dextrose 50% Injectable 12.5 Gram(s) IV Push once  dextrose 50% Injectable 25 Gram(s) IV Push once  dextrose 50% Injectable 25 Gram(s) IV Push once  fat emulsion (Fish Oil and Plant Based) 20% Infusion 0.6954 Gm/kG/Day (20.83 mL/Hr) IV Continuous <Continuous>  glucagon  Injectable 1 milliGRAM(s) IntraMuscular once  insulin lispro (ADMELOG) corrective regimen sliding scale   SubCutaneous every 6 hours  lactulose Syrup 20 Gram(s) Oral every 8 hours  norepinephrine Infusion 0.05 MICROgram(s)/kG/Min (5.95 mL/Hr) IV Continuous <Continuous>  pantoprazole  Injectable 40 milliGRAM(s) IV Push every 12 hours  Parenteral Nutrition - Adult 1 Each (67 mL/Hr) TPN Continuous <Continuous>  rifAXIMin 550 milliGRAM(s) Oral two times a day    MEDICATIONS  (PRN):  albumin human 25% IVPB 50 milliLiter(s) IV Intermittent every 1 hour PRN low b/p  dextrose Oral Gel 15 Gram(s) Oral once PRN Blood Glucose LESS THAN 70 milliGRAM(s)/deciliter  ondansetron Injectable 4 milliGRAM(s) IV Push every 6 hours PRN Nausea and/or Vomiting  sodium chloride 0.9% lock flush 10 milliLiter(s) IV Push every 1 hour PRN Pre/post blood products, medications, blood draw, and to maintain line patency      Allergies    losartan (Angioedema)    Intolerances        I&O's Detail    08 May 2023 07:01  -  09 May 2023 07:00  --------------------------------------------------------  IN:    Fat Emulsion (Fish Oil &amp; Plant Based) 20% Infusion: 260 mL    Free Water: 240 mL    IV PiggyBack: 250 mL    Lactated Ringers Bolus: 2000 mL    Norepinephrine: 128.6 mL    TPN (Total Parenteral Nutrition): 1582 mL  Total IN: 4460.6 mL    OUT:    Bulb (mL): 870 mL    Colostomy (mL): 125 mL    Indwelling Catheter - Urethral (mL): 5 mL    Nasogastric/Oral tube (mL): 1000 mL  Total OUT: 2000 mL    Total NET: 2460.6 mL          .    Patient was seen and evaluated on dialysis.   Patient is tolerating the procedure well.   Continue dialysis:   Dialyzer:          QB:        QD:   Goal UF ___ over ___ Hours       Vital Signs Last 24 Hrs  T(C): 37.3 (09 May 2023 08:00), Max: 38.2 (08 May 2023 20:00)  T(F): 99.1 (09 May 2023 08:00), Max: 100.8 (08 May 2023 20:00)  HR: 120 (09 May 2023 08:00) (72 - 142)  BP: 101/68 (09 May 2023 08:00) (91/64 - 127/88)  BP(mean): 75 (09 May 2023 08:00) (68 - 96)  RR: 24 (09 May 2023 08:00) (14 - 43)  SpO2: 100% (09 May 2023 08:00) (91% - 100%)    Parameters below as of 09 May 2023 08:00  Patient On (Oxygen Delivery Method): room air      Daily     Daily     PHYSICAL EXAM:  General: alert. awake Ox3  HEENT: MMM  CV: s1s2 rrr  LUNGS: B/L CTA  EXT: no edema    LABS:                        8.9    10.31 )-----------( 45       ( 09 May 2023 05:29 )             26.0     05-09    142  |  110<H>  |  46<H>  ----------------------------<  264<H>  3.5   |  19<L>  |  3.88<H>    Ca    7.7<L>      09 May 2023 05:29  Phos  1.6     05-09  Mg     2.2     05-09    TPro  4.6<L>  /  Alb  1.7<L>  /  TBili  1.5<H>  /  DBili  x   /  AST  510<H>  /  ALT  401<H>  /  AlkPhos  209<H>  05-09    PT/INR - ( 09 May 2023 05:29 )   PT: 14.3 sec;   INR: 1.23 ratio             Magnesium, Serum: 2.2 mg/dL (05-09 @ 05:29)  Phosphorus Level, Serum: 1.6 mg/dL (05-09 @ 05:29)    ABG - ( 07 May 2023 10:55 )  pH, Arterial: 7.35  pH, Blood: x     /  pCO2: 29    /  pO2: 144   / HCO3: 16    / Base Excess: -8.2  /  SaO2: 99.0               NEPHROLOGY INTERVAL HPI/OVERNIGHT EVENTS:  05-09-23 @ 09:08    5/9 more alert and awake, ostomy fx pressors off since this am unable to complete hd yesterday and only received 1/2 due to malfunctioning cathtere    pulled after HD   5/8 seen at hd, events noted, anuric, TPN infusing with lipids, on solo dec dose of pressors, encepha with starting of lactulose and xifaxin   5/7- no new events, anuric, CO2 trending down  5/6 s/p OR yesterday, ostomy creation  5/5- case d/w surgery and intensivist , anuric k 5.2 will dialyze preop, no fluid removal  5/4- pt seen in PACU, PRBC and FFP, and LR resuscitation noted, coulter in place ~ 100 cc urine in coulter bag  case d/w pts nurse   Chart quote, operative note ""Open distal pancreatectomy, splenectomy, partial colectomy, partial L adrenalectomy, temporary abdominal closure; Large varices, 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 Liver laden with diffuse metastases and very friable, RP oozy. Patient on pressors at the end of the case, and hence, decided to leave colon in discontinuity with temporary closure.""  HPI:  67 yo man with Neuroendocrine tumor of pancreas with mets to liver dx'd 6 years ago.  Treated with Lutathera one year ago and restarted in 12/2022  G-J tube placed for nutrition in January due to chronic dysphagia and severe esophagitis on EGD.  Post recent  admission due to G tube clogging.  D/mansi home on 4/27.    Returned to ED due to vomiting when night time enteral feeding via G tube started.  CT with IVC done due to concern for GIB.  Pt reports chronic self catheterization due to urinary retention.   ZO improved when catheterization started.  Follows mainly with EMELY Cortez.   Pt has been self cath'ing 3-4 x /day.   Indwelling Coulter placed by EMELY due to resistance with blood clots with resistance reported by pt.  For palliative mass resection tomorrow.    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  Creat has been variable during previous admissions--ranging 1.1-2.2.    PMHX and PSHX.  --Neuroendocrine tumor of pancreas with mets to liver --dx 6 years ago.  --GJ tube for nutrition in 1/2023 due to chronic dysphagia.  --Hx of HTN  --Hx of DM  --Hx of light chain disease --unclear hx and no hx of renal biopsy.      MEDICATIONS  (STANDING):  chlorhexidine 4% Liquid 1 Application(s) Topical <User Schedule>  dextrose 5%. 1000 milliLiter(s) (50 mL/Hr) IV Continuous <Continuous>  dextrose 5%. 1000 milliLiter(s) (100 mL/Hr) IV Continuous <Continuous>  dextrose 50% Injectable 12.5 Gram(s) IV Push once  dextrose 50% Injectable 25 Gram(s) IV Push once  dextrose 50% Injectable 25 Gram(s) IV Push once  fat emulsion (Fish Oil and Plant Based) 20% Infusion 0.6954 Gm/kG/Day (20.83 mL/Hr) IV Continuous <Continuous>  glucagon  Injectable 1 milliGRAM(s) IntraMuscular once  insulin lispro (ADMELOG) corrective regimen sliding scale   SubCutaneous every 6 hours  lactulose Syrup 20 Gram(s) Oral every 8 hours  norepinephrine Infusion 0.05 MICROgram(s)/kG/Min (5.95 mL/Hr) IV Continuous <Continuous>  pantoprazole  Injectable 40 milliGRAM(s) IV Push every 12 hours  Parenteral Nutrition - Adult 1 Each (67 mL/Hr) TPN Continuous <Continuous>  rifAXIMin 550 milliGRAM(s) Oral two times a day    MEDICATIONS  (PRN):  albumin human 25% IVPB 50 milliLiter(s) IV Intermittent every 1 hour PRN low b/p  dextrose Oral Gel 15 Gram(s) Oral once PRN Blood Glucose LESS THAN 70 milliGRAM(s)/deciliter  ondansetron Injectable 4 milliGRAM(s) IV Push every 6 hours PRN Nausea and/or Vomiting  sodium chloride 0.9% lock flush 10 milliLiter(s) IV Push every 1 hour PRN Pre/post blood products, medications, blood draw, and to maintain line patency      Allergies    losartan (Angioedema)    Intolerances        I&O's Detail    08 May 2023 07:01  -  09 May 2023 07:00  --------------------------------------------------------  IN:    Fat Emulsion (Fish Oil &amp; Plant Based) 20% Infusion: 260 mL    Free Water: 240 mL    IV PiggyBack: 250 mL    Lactated Ringers Bolus: 2000 mL    Norepinephrine: 128.6 mL    TPN (Total Parenteral Nutrition): 1582 mL  Total IN: 4460.6 mL    OUT:    Bulb (mL): 870 mL    Colostomy (mL): 125 mL    Indwelling Catheter - Urethral (mL): 5 mL    Nasogastric/Oral tube (mL): 1000 mL  Total OUT: 2000 mL    Total NET: 2460.6 mL        Vital Signs Last 24 Hrs  T(C): 37.3 (09 May 2023 08:00), Max: 38.2 (08 May 2023 20:00)  T(F): 99.1 (09 May 2023 08:00), Max: 100.8 (08 May 2023 20:00)  HR: 120 (09 May 2023 08:00) (72 - 142)  BP: 101/68 (09 May 2023 08:00) (91/64 - 127/88)  BP(mean): 75 (09 May 2023 08:00) (68 - 96)  RR: 24 (09 May 2023 08:00) (14 - 43)  SpO2: 100% (09 May 2023 08:00) (91% - 100%)    Parameters below as of 09 May 2023 08:00  Patient On (Oxygen Delivery Method): room air      Daily     Daily     PHYSICAL EXAM:  General: opening eyes   HEENT: MMM  CV: s1s2 rrr  LUNGS: B/L CTA  EXT: no edema    LABS:                        8.9    10.31 )-----------( 45       ( 09 May 2023 05:29 )             26.0     05-09    142  |  110<H>  |  46<H>  ----------------------------<  264<H>  3.5   |  19<L>  |  3.88<H>    Ca    7.7<L>      09 May 2023 05:29  Phos  1.6     05-09  Mg     2.2     05-09    TPro  4.6<L>  /  Alb  1.7<L>  /  TBili  1.5<H>  /  DBili  x   /  AST  510<H>  /  ALT  401<H>  /  AlkPhos  209<H>  05-09    PT/INR - ( 09 May 2023 05:29 )   PT: 14.3 sec;   INR: 1.23 ratio             Magnesium, Serum: 2.2 mg/dL (05-09 @ 05:29)  Phosphorus Level, Serum: 1.6 mg/dL (05-09 @ 05:29)    ABG - ( 07 May 2023 10:55 )  pH, Arterial: 7.35  pH, Blood: x     /  pCO2: 29    /  pO2: 144   / HCO3: 16    / Base Excess: -8.2  /  SaO2: 99.0

## 2023-05-09 NOTE — PROGRESS NOTE ADULT - SUBJECTIVE AND OBJECTIVE BOX
Patient seen and examined at bedside. Patient has no complaints. Patient remains NPO with NGT in place to low wall suction. Ostomy now functioning. Nurse denies any acute events. Vitals reviewed and WNL. Off pressors and sedation.    Vital Signs Last 24 Hrs  T(C): 37.3 (09 May 2023 08:00), Max: 38.2 (08 May 2023 20:00)  T(F): 99.1 (09 May 2023 08:00), Max: 100.8 (08 May 2023 20:00)  HR: 115 (09 May 2023 09:00) (72 - 142)  BP: 102/68 (09 May 2023 09:00) (91/64 - 127/88)  BP(mean): 76 (09 May 2023 09:00) (68 - 96)  RR: 25 (09 May 2023 09:00) (14 - 43)  SpO2: 98% (09 May 2023 09:00) (91% - 100%)    Parameters below as of 09 May 2023 08:00  Patient On (Oxygen Delivery Method): room air                            8.9    10.31 )-----------( 45       ( 09 May 2023 05:29 )             26.0     05-09    142  |  110<H>  |  46<H>  ----------------------------<  264<H>  3.5   |  19<L>  |  3.88<H>    Ca    7.7<L>      09 May 2023 05:29  Phos  1.6     05-09  Mg     2.2     05-09    TPro  4.6<L>  /  Alb  1.7<L>  /  TBili  1.5<H>  /  DBili  x   /  AST  510<H>  /  ALT  401<H>  /  AlkPhos  209<H>  05-09    I&O's Summary    08 May 2023 07:01  -  09 May 2023 07:00  --------------------------------------------------------  IN: 4460.6 mL / OUT: 2000 mL / NET: 2460.6 mL            Physical Exam:  General: AAOx3, Well developed, NAD  Chest: Normal respiratory effort  Heart: RRR  Abdomen: Abdomen soft, nondistended, no tenderness, clean and dry dressing. Ostomy pink and with small amount of stool. Drain with serosanguinous output  Neuro/Psych: No localized deficits. Normal speech, normal tone  Skin: Normal, no rashes, no lesions noted.   Extremities: Warm, well perfused, no edema, Pulses intact

## 2023-05-09 NOTE — CHART NOTE - NSCHARTNOTEFT_GEN_A_CORE
Clinical Nutrition PN Follow Up Note    * 68 year old M PMHx metastatic neuroendocrine tumor of the pancreas, recently admitted with nonfunctioning GJ tube (patient is chronically obstructed). Patient has had severe heartburn, and has had several episodes of vomiting small amount of dark blood. Patient was discharged on Thursday. Hgb unchanged. CT angiography has no active bleeding, but demonstrates extensive tumor in distal pancreas. Pt originally in SICU, receiving TF via GJ tube but having frequent BM's. On 5/4 - pt went to OR  for a Open distal pancreatectomy, splenectomy, partial colectomy, partial L adrenalectomy, temporary abdominal closure. Remained intubated, on paralytics, pressors, anuric and transferred to ICU. 5/6 s/p abd closure and colostomy.    **TPN initiated 2/2 suspected long NPO status s/p multiple GI surgeries, possible new placement of ?GJ tube for long-term nutrition    *5/8: Pt remains on levophed and was titrated off vasopressin. Remains NPO with NGT to LWS. Ostomy still not functioning. HD initiated yesterday and tolerated well as per nephrology note.    *current status: ostomy now with liquid stool, levophed weaned down, remains w/ NGT to HIWS as per RN note. LLQ TAYLA with large amount of output. Received LR boluses. TPN continued, see changes below.     *pertinent meds: Admelog sliding scale (received 9 unit x 24 hours), lactulose, norepinephrine, protonix, zofran, abx, IV albumin prn    *labs reviewed; Na WNL however on high end of normal, will continue to monitor and adjust prn as per labs - may need an increase of total TPN volume tomorrow.  K on lowest-end of normal, will increase in bag - on HD.  Now with hypophosphatemia, will add 20 mmol to bag, s/p HD yesterday.  Mg WNL.    T Bili WNL for trace elements (<2) but uptrending, will monitor closely.    Corrected Ca WNL, rec'd add 10mEq of Calcium Gluconate outside of PN bag as CAPS is out.     POCT elevated, will add 10 units of insulin to PN bag - d/w Dr. Mcclendon.  Dextrose being slowly titrated up to goal of 320g.   New triglyceride level pending, last TG obtained on 1/12/23 - will c/w lipids in PN bag today.    05-09    142  |  110<H>  |  46<H>  ----------------------------<  264<H>  3.5   |  19<L>  |  3.88<H>    Ca    7.7<L>      09 May 2023 05:29  Phos  1.6     05-09  Mg     2.2     05-09    TPro  4.6<L>  /  Alb  1.7<L>  /  TBili  1.5<H>  /  DBili  x   /  AST  510<H>  /  ALT  401<H>  /  AlkPhos  209<H>  05-09    BMI: BMI (kg/m2): 24.8 (05-06-23 @ 04:00)  HbA1c: A1C with Estimated Average Glucose Result: 7.3 % (04-24-23 @ 06:35)    BP: 102/68 (05-09-23 @ 09:00) (91/64 - 127/88)  Lipid Panel: Date/Time: 01-12-23 @ 06:07  Cholesterol, Serum: --  Direct LDL: --  HDL Cholesterol, Serum: --  Total Cholesterol/HDL Ration Measurement: --  Triglycerides, Serum: 129    POCT Blood Glucose.: 259 mg/dL (09 May 2023 05:18)  POCT Blood Glucose.: 204 mg/dL (08 May 2023 23:18)  POCT Blood Glucose.: 204 mg/dL (08 May 2023 17:46)  POCT Blood Glucose.: 181 mg/dL (08 May 2023 12:03)      *I&O's Detail    08 May 2023 07:01  -  09 May 2023 07:00  --------------------------------------------------------  IN:    Fat Emulsion (Fish Oil &amp; Plant Based) 20% Infusion: 260 mL    Free Water: 240 mL    IV PiggyBack: 250 mL    Lactated Ringers Bolus: 2000 mL    Norepinephrine: 128.6 mL    TPN (Total Parenteral Nutrition): 1582 mL  Total IN: 4460.6 mL    OUT:    Bulb (mL): 870 mL    Colostomy (mL): 125 mL    Indwelling Catheter - Urethral (mL): 5 mL    Nasogastric/Oral tube (mL): 1000 mL  Total OUT: 2000 mL    Total NET: 2460.6 mL  * fluid status: positive; colostomy w/ small amount of output noted.  NGT output 1000 mL.   Will monitor fluid balance closely and adjust total TPN volume prn.    *BM (+) on 5/9 x 3 - liquid via colostomy.  pt on bowel regimen (lactulose).  * 2+ L/R foot edema    *malnutrition: Pt continues to meet criteria for severe protein-calorie malnutrition in context of chronic disease r/t decreased ability to meet increased nutrient needs 2/2 mets cancer AEB severe muscle/ fat wasting, TF meeting <75% ENN x >/=1 month, 19% wt loss x 5 mos    Estimated Needs: based on 63.9 Kg (wt from 5/1 - bed scale taken by RD)  Calories: 6895-3534 Kcal (30-35 Kcal/Kg)  Protein:  g protein (1.5-2 g/Kg)  Fluids: 6419-1116 mL (25-30 mL/Kg)    *Diet: NPO w/ TPN    Weight History:  Height (cm): 170.2 (04-29-23 @ 08:11), 170.2 (04-21-23 @ 11:22)  Weight (kg): 71.9 (05-06-23 @ 04:00), 62.9 (04-21-23 @ 22:23)  BMI (kg/m2): 24.8 (05-06-23 @ 04:00), 21.7 (04-29-23 @ 08:11), 21.7 (04-21-23 @ 22:23)  BSA (m2): 1.83 (05-06-23 @ 04:00), 1.73 (04-29-23 @ 08:11), 1.73 (04-21-23 @ 22:23)  IBW: 148#  IBW %: 95.3%    TPN Recommendations: via central venous line  Total Volume: 1600 mL x 24 hrs  128 g Amino Acids  200 g Dextrose (titrate dextrose 50-75 g/day until goal of 320g is met)  50 g Lipids 20% *pending new TG level  123 mEq Sodium Chloride  0 mEq Sodium Acetate  0 mmol Sodium Phosphate  0 mEq Potassium Chloride  0 mEq Potassium Acetate  20 mmol Potassium Phosphate  0 mEq Calcium Gluconate *CAPS is out of PN solution  10 mEq Magnesium Sulfate  200 mg Thiamine  1 ml Trace Elements  10 ml MVI  10 units Regular Insulin    Total Calories 1692 kcal   (Meets ~82%  of Estimated Energy needs and 100% Protein needs)    Additional Recommendations:    1) Daily weights  2) Strict I & O's  3) Daily lyte checks including magnesium and phos - monitor closely for refeeding syndrome as pt is at HIGH RISK  4) Weekly triglycerides/LFT checks - pending new TG level  5) POCT q6hrs; maintain 140-180mg/dL  6) Titrate dextrose up 50-75g/day until goal rate of 320g reached.   7) Confirm goals of care regarding LONG-TERM nutrition support - plan for new ? GJ tube; will provide recs when placed    *will continue to monitor and adjust PN prn*  Fabby Waller, MS, RDN, CNSC, [phone]  Certified Nutrition

## 2023-05-10 NOTE — PROGRESS NOTE ADULT - ASSESSMENT
67 yo male with PMHx metastatic neuroendocrine tumor of the pancreas, recently admitted with nonfunctioning GJ tube (patient is chronically obstructed).   Patient has had severe heartburn, and has had several episodes of vomiting small amount of dark blood.     now Post Op  after an open distal pancreatectomy, splenectomy, partial colectomy, partial L adrenalectomy,colostomy    With Post Op distributive Shock  oliguria/renal failure requiring dialysis  now back on pressors  hepatic encephalopathy    PLAN:  ICU    PULM: Fi O2 30%.  now extubated, breathing ok    Cardio:  back on pressors, will give back some fluid    Renal:   HD gentle hydration, on bicarbonate, no immediate need for dialysis    GI:  PPI.  had high residual, start PPN, add reglan, check amylase from silvio    Endo cortical level was ok    Neuro hepatic encephalopthy slighly improved      ID:  off abx monitor    H EM: no further bleeding    Venodynes/start heparin subq

## 2023-05-10 NOTE — PROVIDER CONTACT NOTE (EICU) - ASSESSMENT
Post Op distributive Shock  oliguria/renal failure requiring dialysis  hepatic encephalopathy  Hypokalemia and hypophosphatemia

## 2023-05-10 NOTE — PROGRESS NOTE ADULT - ASSESSMENT
69 yo man with Hx of Neuroendocrine tumor/Pancreatic mass and liver mets.  Now for palliative resection of pancreatic mass.    --ZO with variable creat level and unclear CKD.    Likely dependent on volume status.    Limited intake even with Enteral feeding.      Start gentle hydration.  -- : continue Hale drainage.  --Hx of light chain nephropathy  --No contraindication for surgery from renal standpoint.    5/4  s/p Palliative surgery for neuroendocrine mass  will watch for ZO, pt with low UO immediate post op  s/p multiple PRBC and FFP transfusion and volume resuscitation    5/5  patient anuric, K 5.2  On bicarb drip  to go back to OR today  case d/w Dr Jean and Dr Siddiqi  Will HD x 2 hrs to correct potassium  >10 L infused in blood products and fluids  More fluids not indicated, UO 50 ml  no fluid removal on HD  Hep profile ordered    5/6  Ostomy created  will monitor i/o  labs stable   no need for HD today  d/w intensivist  will monitor daily for hd requirement    5/7  seen earlier on HD   tolerating well  TPN  d/w Dr Jean  d/w pts fam at bedside    5/8 MK   - ZO with anuria, remains HD dependent with hx of light chain nephropathy    tolerating hd     dc femoral shiley today after hd   - enceph monitor response to hd and xifaxan and lactulose    5/9 MK   - ZO with anuria, remains HD dependent with hx of light chain nephropathy    no indication for hd    off pressor     monitor electrolytes on TPN     NG in place for TF initiation   - enceph, improving monitor response to hd and xifaxan and lactulose    ostomy fx with liquid stool  dw dr marley     5/10 SY  --ZO : remains oligoanuric.     Follow with repeat bladder scans--pt dependent on catheterization.    Will hold HD today and assess daily.    Trial of gentle hydration due to increased fluid loss.  --Metabolic acidosis : trial of HCO3 in addition to TPN   --Monitor encephalopathy.         67 yo man with Hx of Neuroendocrine tumor/Pancreatic mass and liver mets.  Now for palliative resection of pancreatic mass.    --ZO with variable creat level and unclear CKD.    Likely dependent on volume status.    Limited intake even with Enteral feeding.      Start gentle hydration.  -- : continue Hale drainage.  --Hx of light chain nephropathy  --No contraindication for surgery from renal standpoint.    5/4  s/p Palliative surgery for neuroendocrine mass  will watch for ZO, pt with low UO immediate post op  s/p multiple PRBC and FFP transfusion and volume resuscitation    5/5  patient anuric, K 5.2  On bicarb drip  to go back to OR today  case d/w Dr Jean and Dr Siddiqi  Will HD x 2 hrs to correct potassium  >10 L infused in blood products and fluids  More fluids not indicated, UO 50 ml  no fluid removal on HD  Hep profile ordered    5/6  Ostomy created  will monitor i/o  labs stable   no need for HD today  d/w intensivist  will monitor daily for hd requirement    5/7  seen earlier on HD   tolerating well  TPN  d/w Dr Jean  d/w pts fam at bedside    5/8 MK   - ZO with anuria, remains HD dependent with hx of light chain nephropathy    tolerating hd     dc femoral shiley today after hd   - enceph monitor response to hd and xifaxan and lactulose    5/9 MK   - ZO with anuria, remains HD dependent with hx of light chain nephropathy    no indication for hd    off pressor     monitor electrolytes on TPN     NG in place for TF initiation   - enceph, improving monitor response to hd and xifaxan and lactulose    ostomy fx with liquid stool  dw dr marley     5/10 SY  --ZO : remains oligoanuric.     Follow with repeat bladder scans--no retention.    Will hold HD today and assess daily.    Trial of gentle hydration due to increased fluid loss.  --Metabolic acidosis : trial of HCO3 in addition to TPN   --Monitor encephalopathy.

## 2023-05-10 NOTE — PROGRESS NOTE ADULT - SUBJECTIVE AND OBJECTIVE BOX
NEPHROLOGY INTERVAL HPI/OVERNIGHT EVENTS:    Date of Service: 05-10-23 @ 08:46    5/10--No acute distress.  calm, off sedation and off pressor.  Colostomy output 250cc and Drain 850cc.  Oligoanuric.   more alert and awake, ostomy fx pressors off since this am unable to complete hd yesterday and only received 1/2 due to malfunctioning cathtere    pulled after HD    seen at hd, events noted, anuric, TPN infusing with lipids, on solo dec dose of pressors, encepha with starting of lactulose and xifaxin   - no new events, anuric, CO2 trending down   s/p OR yesterday, ostomy creation  - case d/w surgery and intensivist , anuric k 5.2 will dialyze preop, no fluid removal  - pt seen in PACU, PRBC and FFP, and LR resuscitation noted, coulter in place ~ 100 cc urine in coulter bag  case d/w pts nurse   Chart quote, operative note ""Open distal pancreatectomy, splenectomy, partial colectomy, partial L adrenalectomy, temporary abdominal closure; Large varices, 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 Liver laden with diffuse metastases and very friable, RP oozy. 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  Treated with Lutathera one year ago and restarted in 2022  G-J tube placed for nutrition in January due to chronic dysphagia and severe esophagitis on EGD.  Post recent  admission due to G tube clogging.  D/mansi home on .    Returned to ED due to vomiting when night time enteral feeding via G tube started.  CT with IVC done due to concern for GIB.  Pt reports chronic self catheterization due to urinary retention.   ZO improved when catheterization started.  Follows mainly with EMELY Cortez.   Pt has been self cath'ing 3-4 x /day.   Indwelling Coulter placed by EMELY due to resistance with blood clots with resistance reported by pt.  For palliative mass resection tomorrow.    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  Creat has been variable during previous admissions--ranging 1.1-2.2.    PMHX and PSHX.  --Neuroendocrine tumor of pancreas with mets to liver --dx 6 years ago.  --GJ tube for nutrition in 2023 due to chronic dysphagia.  --Hx of HTN  --Hx of DM  --Hx of light chain disease --unclear hx and no hx of renal biopsy.      MEDICATIONS  (STANDING):  chlorhexidine 4% Liquid 1 Application(s) Topical <User Schedule>  dextrose 5%. 1000 milliLiter(s) (50 mL/Hr) IV Continuous <Continuous>  dextrose 5%. 1000 milliLiter(s) (100 mL/Hr) IV Continuous <Continuous>  dextrose 50% Injectable 12.5 Gram(s) IV Push once  dextrose 50% Injectable 25 Gram(s) IV Push once  dextrose 50% Injectable 25 Gram(s) IV Push once  glucagon  Injectable 1 milliGRAM(s) IntraMuscular once  heparin   Injectable 5000 Unit(s) SubCutaneous every 12 hours  insulin lispro (ADMELOG) corrective regimen sliding scale   SubCutaneous every 6 hours  lactulose Syrup 20 Gram(s) Oral every 8 hours  pantoprazole  Injectable 40 milliGRAM(s) IV Push every 12 hours  rifAXIMin 550 milliGRAM(s) Oral two times a day    MEDICATIONS  (PRN):  albumin human 25% IVPB 50 milliLiter(s) IV Intermittent every 1 hour PRN low b/p  dextrose Oral Gel 15 Gram(s) Oral once PRN Blood Glucose LESS THAN 70 milliGRAM(s)/deciliter  ondansetron Injectable 4 milliGRAM(s) IV Push every 6 hours PRN Nausea and/or Vomiting  sodium chloride 0.9% lock flush 10 milliLiter(s) IV Push every 1 hour PRN Pre/post blood products, medications, blood draw, and to maintain line patency    Vital Signs Last 24 Hrs  T(C): 36.9 (10 May 2023 06:00), Max: 37.3 (09 May 2023 12:00)  T(F): 98.5 (10 May 2023 06:00), Max: 99.1 (09 May 2023 12:00)  HR: 114 (10 May 2023 08:00) (109 - 120)  BP: 86/55 (10 May 2023 08:00) (74/54 - 123/80)  BP(mean): 62 (10 May 2023 08:00) (58 - 89)  RR: 26 (10 May 2023 08:00) (19 - 34)  SpO2: 98% (10 May 2023 08:00) (94% - 100%)    Parameters below as of 10 May 2023 07:00  Patient On (Oxygen Delivery Method): room air      Daily     Daily Weight in k.8 (10 May 2023 06:00)     @ 07:01  -  05-10 @ 07:00  --------------------------------------------------------  IN: 1346.5 mL / OUT: 1165 mL / NET: 181.5 mL    PHYSICAL EXAM:  GENERAL: no acute distress  CHEST/LUNG: poor insp effort  HEART: S1S2 RRR  ABDOMEN: soft  EXTREMITIES: no edema  SKIN:     LABS:                        8.8    11.04 )-----------( 41       ( 10 May 2023 05:15 )             26.1     05-10    145  |  114<H>  |  64<H>  ----------------------------<  223<H>  3.4<L>   |  17<L>  |  4.67<H>    Ca    8.5      10 May 2023 05:15  Phos  1.7     05-10  Mg     2.4     10    TPro  4.8<L>  /  Alb  1.7<L>  /  TBili  1.3<H>  /  DBili  x   /  AST  337<H>  /  ALT  316<H>  /  AlkPhos  270<H>  0510    PT/INR - ( 09 May 2023 05:29 )   PT: 14.3 sec;   INR: 1.23 ratio             Magnesium, Serum: 2.4 mg/dL (05-10 @ 05:15)  Phosphorus Level, Serum: 1.7 mg/dL (05-10 @ 05:15)          RADIOLOGY & ADDITIONAL TESTS:

## 2023-05-10 NOTE — PROGRESS NOTE ADULT - SUBJECTIVE AND OBJECTIVE BOX
Events Overnight:  Patient remains lethargic, temp to 99,  large drainage out of SILVIO      increased residual from SILVIO, ammonia 33, creatinine continues to rise, restarted on low dose phenylephrine    HPI:     69 yo male with PMHx metastatic neuroendocrine tumor of the pancreas, recently admitted with nonfunctioning GJ tube (patient is chronically obstructed).   Patient was admitted with severe heartburn, and has had several episodes of vomiting small amount of dark blood. Patient was discharged on Thursday. Hgb unchanged. CT angiography has no active bleeding, but demonstrates extensive tumor in distal pancreas.     5/4: Patient went to the OR today for a Open distal pancreatectomy, splenectomy, partial colectomy, partial L adrenalectomy, temporary abdominal closure.  Patient had a 2.2 L EBL.  Intra Op had 6U PRBC, 2FFP, 1 Platelets, 4L crystalloid  Post Op 1U PRBC ans 1 U FFP.  Post OP vented, with poor UO, on levo and Vaso.    5/5:  went to OR and had closure and colostomy   5/10  ammonia better, , on lactulose, rifaximin dialysis catheters out.     PMH:       as above    ROS cant obtain     MEDICATIONS  (STANDING):  chlorhexidine 4% Liquid 1 Application(s) Topical <User Schedule>  dextrose 5% 1000 milliLiter(s) (75 mL/Hr) IV Continuous <Continuous>  dextrose 5%. 1000 milliLiter(s) (50 mL/Hr) IV Continuous <Continuous>  dextrose 5%. 1000 milliLiter(s) (100 mL/Hr) IV Continuous <Continuous>  dextrose 50% Injectable 12.5 Gram(s) IV Push once  dextrose 50% Injectable 25 Gram(s) IV Push once  dextrose 50% Injectable 25 Gram(s) IV Push once  glucagon  Injectable 1 milliGRAM(s) IntraMuscular once  heparin   Injectable 5000 Unit(s) SubCutaneous every 12 hours  insulin lispro (ADMELOG) corrective regimen sliding scale   SubCutaneous every 6 hours  lactulose Syrup 20 Gram(s) Oral every 8 hours  metoclopramide 10 milliGRAM(s) Oral every 6 hours  pantoprazole  Injectable 40 milliGRAM(s) IV Push every 12 hours  phenylephrine    Infusion 0.2 MICROgram(s)/kG/Min (5.39 mL/Hr) IV Continuous <Continuous>  rifAXIMin 550 milliGRAM(s) Oral two times a day    MEDICATIONS  (PRN):  albumin human 25% IVPB 50 milliLiter(s) IV Intermittent every 1 hour PRN low b/p  dextrose Oral Gel 15 Gram(s) Oral once PRN Blood Glucose LESS THAN 70 milliGRAM(s)/deciliter  ondansetron Injectable 4 milliGRAM(s) IV Push every 6 hours PRN Nausea and/or Vomiting  sodium chloride 0.9% lock flush 10 milliLiter(s) IV Push every 1 hour PRN Pre/post blood products, medications, blood draw, and to maintain line patency    ICU Vital Signs Last 24 Hrs  T(C): 37.3 (10 May 2023 09:00), Max: 37.3 (09 May 2023 12:00)  T(F): 99.1 (10 May 2023 09:00), Max: 99.1 (09 May 2023 12:00)  HR: 109 (10 May 2023 10:05) (108 - 120)  BP: 93/58 (10 May 2023 10:05) (74/54 - 123/80)  BP(mean): 65 (10 May 2023 10:05) (54 - 89)  ABP: 104/101 (09 May 2023 12:00) (104/101 - 104/101)  ABP(mean): 98 (09 May 2023 12:00) (98 - 98)  RR: 18 (10 May 2023 10:05) (16 - 34)  SpO2: 99% (10 May 2023 10:05) (94% - 100%)    O2 Parameters below as of 10 May 2023 07:00  Patient On (Oxygen Delivery Method): room air    Physical Exam    General - weak, lethargivc  neuro lethargic confused  HEENT - nc/at NG tube in place  neck supple  lungs - clear  cv rrr  abdomen - dressing dry, colostomy viable starting to function, silvio with copioius output   voiding minimal urine output  Extremity trace edema      I&O's Summary    09 May 2023 07:01  -  10 May 2023 07:00  --------------------------------------------------------  IN: 1346.5 mL / OUT: 1165 mL / NET: 181.5 mL    10 May 2023 07:01  -  10 May 2023 11:44  --------------------------------------------------------  IN: 0 mL / OUT: 100 mL / NET: -100 mL                          8.7    9.61  )-----------( 44       ( 10 May 2023 11:25 )             26.3       05-10    145  |  114<H>  |  64<H>  ----------------------------<  223<H>  3.4<L>   |  17<L>  |  4.67<H>    Ca    8.5      10 May 2023 05:15  Phos  1.7     05-10  Mg     2.4     05-10    TPro  4.8<L>  /  Alb  1.7<L>  /  TBili  1.3<H>  /  DBili  x   /  AST  337<H>  /  ALT  316<H>  /  AlkPhos  270<H>  05-10    DVT Prophylaxis:  Heparin subq                                                               Advanced Directives: Full Code

## 2023-05-10 NOTE — CHART NOTE - NSCHARTNOTEFT_GEN_A_CORE
Clinical Nutrition TF BRIEF NOTE    * 68 year old M PMHx metastatic neuroendocrine tumor of the pancreas, recently admitted with nonfunctioning GJ tube (patient is chronically obstructed). Patient has had severe heartburn, and has had several episodes of vomiting small amount of dark blood. Patient was discharged on Thursday. Hgb unchanged. CT angiography has no active bleeding, but demonstrates extensive tumor in distal pancreas. Pt originally in SICU, receiving TF via GJ tube but having frequent BM's. On 5/4 - pt went to OR  for a Open distal pancreatectomy, splenectomy, partial colectomy, partial L adrenalectomy, temporary abdominal closure. Remained intubated, on paralytics, pressors, anuric and transferred to ICU. 5/6 s/p abd closure and colostomy.    *current status: TPN ran until ~6 PM yesterday and then was stopped 2/2 hyperglycemia. POCT (180) this AM.  TF initiated via NGT yesterday, Vital 1.5 2/2 multiple GI surgeries and renal lytes WNL (K, Phos). Tolerating at 40 cc now as per RN, will increase to 50 cc/hr at 12:00 PM. Pt more awake today, ostomy functioning w/ increasing output.     *labs reviewed:  05-10    145  |  114<H>  |  64<H>  ----------------------------<  223<H>  3.4<L>   |  17<L>  |  4.67<H>    Ca    8.5      10 May 2023 05:15  Phos  1.7     05-10  Mg     2.4     05-10    TPro  4.8<L>  /  Alb  1.7<L>  /  TBili  1.3<H>  /  DBili  x   /  AST  337<H>  /  ALT  316<H>  /  AlkPhos  270<H>  05-10      *I&O's    05-09-23 @ 07:01  -  05-10-23 @ 07:00  --------------------------------------------------------  IN:    Fat Emulsion (Fish Oil &amp; Plant Based) 20% Infusion: 96 mL    Free Water: 60 mL    TPN (Total Parenteral Nutrition): 832.5 mL    Vital1.5: 358 mL  Total IN: 1346.5 mL    OUT:    Bulb (mL): 855 mL    Colostomy (mL): 260 mL    Nasogastric/Oral tube (mL): 50 mL  Total OUT: 1165 mL    Total NET: 181.5 mL    *BM documented: (+) BM 5/9 x 4 via ostomy     *malnutrition: Pt continues to meet criteria for severe protein-calorie malnutrition in context of chronic disease r/t decreased ability to meet increased nutrient needs 2/2 mets cancer AEB severe muscle/ fat wasting, TF meeting <75% ENN x >/=1 month, 19% wt loss x 5 mos    *ESTIMATED NUTR NEEDS:  based on 63.9 Kg (wt from 5/1 - bed scale taken by RD)  Calories: 7528-9533 Kcal (30-35 Kcal/Kg)  Protein:  g protein (1.5-2 g/Kg)  Fluids: 3301-5294 mL (25-30 mL/Kg)    RECOMMENDATIONS:  1) C/w Vital 1.5 @ 20 cc/hr, increase by 10 cc/hr q6 hours until goal rate of 65 cc/hr is met (total volume = 1300 mL) with 3 packets of Prosource TF. Will provide ~ 2070 kcal, 121 g protein, and 993 mL free water.   2) monitor hydration status; adjust free water flushes prn as per MD to maintain hydration - pt on HD  3) monitor TF tolerance; keep back of bed > 45 degrees  4) monitor BM: if > 3 days without BM, add bowel regimen prn  5) daily wt checks to track/trend changes  6) monitor POCT q6 hours - maintain between 140- 180 mg/dL; add insulin prn - will monitor closely with Vital 1.5 TF formula    *will continue to monitor and adjust treatment plan prn*  Fabby Waller, MS, RDN, CNSC, [phone]  Certified Nutrition  Clinical Nutrition TF BRIEF NOTE    * 68 year old M PMHx metastatic neuroendocrine tumor of the pancreas, recently admitted with nonfunctioning GJ tube (patient is chronically obstructed). Patient has had severe heartburn, and has had several episodes of vomiting small amount of dark blood. Patient was discharged on Thursday. Hgb unchanged. CT angiography has no active bleeding, but demonstrates extensive tumor in distal pancreas. Pt originally in SICU, receiving TF via GJ tube but having frequent BM's. On  - pt went to OR  for a Open distal pancreatectomy, splenectomy, partial colectomy, partial L adrenalectomy, temporary abdominal closure. Remained intubated, on paralytics, pressors, anuric and transferred to ICU.  s/p abd closure and colostomy.  **TPN initiated on  2/2 suspected long NPO status s/p multiple GI surgeries, possible new placement of ?GJ tube for long-term     *current status: TPN ran until ~6 PM yesterday and then was stopped 2/2 hyperglycemia. POCT (180) this AM.  TF initiated via NGT yesterday, Vital 1.5 2/2 multiple GI surgeries and renal lytes WNL (K, Phos). Running @ 40 cc now - as per RN, 350 mL residuals noted this AM. D/w IDT, will re-start PN - no longer has central line so will resume w/ PPN.  Hold TF's for now, Dr. Mcclendon to f/u w/ surgery for long-term nutrition plan and possibly re-start TF pending residuals and surgery recs.  No HD today - on IVF for now, will d/c when PPN to start as per Dr. Mcclendon. Still w/ no UO.  Pt more awake today, ostomy functioning w/ increasing output.  See recs below for PPN.    *pertinent meds: ADMELOG (received 9 units x 24 hours), lactulose, pantoprazole, abx, phenylephrine, IV albumin, zofran     *labs reviewed: Na higher-end of normal, will start with standard dose in bag and adjust based on tomorrow's labs.  Hypokalemia and hypophosphatemia noted, will start with low amounts in bag and increase prn as per labs.   Mg WNL.   Bili T <2, OK for trace elements.   POCT variable - monitor closely. May need lantus? Can also add insulin to PN bag if needed.   No new triglyceride level, will c/w 50g lipids while new level pending to provide kcal as pt continues to meet criteria for severe PCM and has been meeting <75% ENN x >5 days since admit.     05-10    145  |  114<H>  |  64<H>  ----------------------------<  223<H>  3.4<L>   |  17<L>  |  4.67<H>    Ca    8.5      10 May 2023 05:15  Phos  1.7     05-10  Mg     2.4     05-10    TPro  4.8<L>  /  Alb  1.7<L>  /  TBili  1.3<H>  /  DBili  x   /  AST  337<H>  /  ALT  316<H>  /  AlkPhos  270<H>  05-10    BMI: BMI (kg/m2): 24.8 (23 @ 04:00)  HbA1c: A1C with Estimated Average Glucose Result: 7.3 % (23 @ 06:35)    BP: 93/58 (05-10-23 @ 10:05) (74/54 - 127/88)  Lipid Panel: Date/Time: 23 @ 06:07  Cholesterol, Serum: --  Direct LDL: --  HDL Cholesterol, Serum: --  Total Cholesterol/HDL Ration Measurement: --  Triglycerides, Serum: 129    POCT Blood Glucose.: 180 mg/dL (10 May 2023 05:26)  POCT Blood Glucose.: 250 mg/dL (09 May 2023 22:19)  POCT Blood Glucose.: 296 mg/dL (09 May 2023 18:00)  POCT Blood Glucose.: 274 mg/dL (09 May 2023 12:14)      *I&O's    23 @ 07:01  -  05-10-23 @ 07:00  --------------------------------------------------------  IN:    Fat Emulsion (Fish Oil &amp; Plant Based) 20% Infusion: 96 mL    Free Water: 60 mL    TPN (Total Parenteral Nutrition): 832.5 mL    Vital1.5: 358 mL  Total IN: 1346.5 mL    OUT:    Bulb (mL): 855 mL    Colostomy (mL): 260 mL    Nasogastric/Oral tube (mL): 50 mL  Total OUT: 1165 mL    Total NET: 181.5 mL    *BM documented: (+) BM 5/9 x 4 via ostomy     *malnutrition: Pt continues to meet criteria for severe protein-calorie malnutrition in context of chronic disease r/t decreased ability to meet increased nutrient needs 2/2 mets cancer AEB severe muscle/ fat wasting, TF meeting <75% ENN x >/=1 month, 19% wt loss x 5 mos    *ESTIMATED NUTR NEEDS:  based on 63.9 Kg (wt from 51 - bed scale taken by RD)  Calories: 3486-5284 Kcal (30-35 Kcal/Kg)  Protein:  g protein (1.5-2 g/Kg)  Fluids: 4378-4122 mL (25-30 mL/Kg)    Weight History:  Daily Weight in k.8 (10 May 2023 06:00)  Height (cm): 170.2 (23 @ 08:11), 170.2 (23 @ 11:22)  Weight (kg): 71.9 (23 @ 04:00), 62.9 (23 @ 22:23)  BMI (kg/m2): 24.8 (23 @ 04:00), 21.7 (23 @ 08:11), 21.7 (23 @ 22:23)  BSA (m2): 1.83 (23 @ 04:00), 1.73 (23 @ 08:11), 1.73 (23 @ 22:23)    PPN Recommendations: via peripheral venous line   Total Volume: 1600 mL x 24 hours  50 g  Amino Acids  100 g Dextrose  50 g Lipids 20%  76 mEq Sodium Chloride  20 mEq Sodium Acetate  20 mmol Sodium Phosphate  30 mEq Potassium Chloride  10 mEq Potassium Acetate  0 mmol Potassium Phosphate  0 mEq Calcium Gluconate (CAPS out of PN solution)  5 mEq Magnesium Sulfate  200 mg Thiamine  1 ml Trace Elements  10 ml MVI    Total Calories     1040      (Meets   50%  of  Estimated Energy needs  and   45%  Protein needs)     (osmolarity <900)    RECOMMENDATIONS:  1) HOLD CURRENT TF order of Vital 1.5 @ 20 cc/hr, increase by 10 cc/hr q6 hours until goal rate of 65 cc/hr is met (total volume = 1300 mL) with 3 packets of Prosource TF. Will provide ~ 2070 kcal, 121 g protein, and 993 mL free water. **resume as per intensivist and surgery  2) Daily weights  3) Strict I & O's  4) Daily lyte checks including magnesium and phos  5) Weekly triglycerides/LFT checks  6) POCT q6hrs; maintain 140-180mg/dL - may need to add LANTUS to insulin to PN bag  7) Confirm goals of care regarding nutrition support - if on PN > 6 days, consider placing PICC line to meet 100% of nutr needs; will need long-term feeding tube ? new GJ tube ?  8) D/c IVF while on PPN to avoid fluid overload 2/2 HD and no urine production    *will continue to monitor and adjust treatment plan prn*  Fabby Waller, MS, RDN, CNSC, [phone]  Certified Nutrition

## 2023-05-11 NOTE — PROGRESS NOTE ADULT - SUBJECTIVE AND OBJECTIVE BOX
Patient seen and examined at bedside. Nurse denies any acute events. Vitals reviewed and WNL. Off pressors and sedation.    Vital Signs Last 24 Hrs  T(C): 37.3 (11 May 2023 08:00), Max: 37.7 (11 May 2023 00:00)  T(F): 99.2 (11 May 2023 08:00), Max: 99.8 (11 May 2023 00:00)  HR: 107 (11 May 2023 09:00) (107 - 121)  BP: 102/68 (11 May 2023 09:00) (72/60 - 110/70)  BP(mean): 75 (11 May 2023 09:00) (54 - 81)  RR: 27 (11 May 2023 09:00) (16 - 33)  SpO2: 97% (11 May 2023 09:00) (78% - 99%)    Parameters below as of 11 May 2023 09:00  Patient On (Oxygen Delivery Method): room air                            8.0    7.02  )-----------( 50       ( 11 May 2023 04:56 )             25.1     05-11    143  |  111<H>  |  74<H>  ----------------------------<  257<H>  4.2   |  16<L>  |  5.41<H>    Ca    8.9      11 May 2023 04:56  Phos  2.9     05-11  Mg     2.5     05-11    TPro  4.9<L>  /  Alb  1.6<L>  /  TBili  1.1  /  DBili  x   /  AST  240<H>  /  ALT  258<H>  /  AlkPhos  297<H>  05-11    I&O's Summary    10 May 2023 07:01  -  11 May 2023 07:00  --------------------------------------------------------  IN: 2858 mL / OUT: 2180 mL / NET: 678 mL            Physical Exam:  General: AAOx3, Well developed, NAD  Chest: Normal respiratory effort  Heart: RRR  Abdomen: Abdomen soft, nondistended, no tenderness, clean and dry dressing. Ostomy pink and with small amount of stool. Drain with serosanguinous output  Neuro/Psych: No localized deficits. Normal speech, normal tone  Skin: Normal, no rashes, no lesions noted.   Extremities: Warm, well perfused, no edema, Pulses intact

## 2023-05-11 NOTE — PROGRESS NOTE ADULT - ASSESSMENT
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  S/P distal pancreatectomy, splenectomy, partial colectomy  S/P abdominal washout and colostomy creation    Plan:  Optimize respiratory function  Supplementary oxygen prn   Observe drain output  NGT   monitor ostomy output  Critical care management as per ICU team  recommend dialysis to clear metabolites causing altered mental status  TPN    Plan discussed with Dr Black

## 2023-05-11 NOTE — PROGRESS NOTE ADULT - SUBJECTIVE AND OBJECTIVE BOX
Events Overnight: still lethargic confused, rising creatinine, on low dose phenylephrine    HPI:     69 yo male with PMHx metastatic neuroendocrine tumor of the pancreas, recently admitted with nonfunctioning GJ tube (patient is chronically obstructed).   Patient was admitted with severe heartburn, and has had several episodes of vomiting small amount of dark blood. Patient was discharged on Thursday. Hgb unchanged. CT angiography has no active bleeding, but demonstrates extensive tumor in distal pancreas.     5/4: Patient went to the OR today for a Open distal pancreatectomy, splenectomy, partial colectomy, partial L adrenalectomy, temporary abdominal closure.  Patient had a 2.2 L EBL.  Intra Op had 6U PRBC, 2FFP, 1 Platelets, 4L crystalloid  Post Op 1U PRBC ans 1 U FFP.  Post OP vented, with poor UO, on levo and Vaso.    5/5:  went to OR and had closure and colostomy   5/11  ammonia better, , on rifaximin  worsening creatinine, tolerating tube feeds this am, lethargic confused     PMH:       as above    MEDICATIONS  (STANDING):  chlorhexidine 4% Liquid 1 Application(s) Topical <User Schedule>  dextrose 5% 1000 milliLiter(s) (75 mL/Hr) IV Continuous <Continuous>  dextrose 5%. 1000 milliLiter(s) (50 mL/Hr) IV Continuous <Continuous>  dextrose 5%. 1000 milliLiter(s) (100 mL/Hr) IV Continuous <Continuous>  dextrose 50% Injectable 12.5 Gram(s) IV Push once  dextrose 50% Injectable 25 Gram(s) IV Push once  dextrose 50% Injectable 25 Gram(s) IV Push once  fat emulsion (Fish Oil and Plant Based) 20% Infusion 0.695 Gm/kG/Day (20.83 mL/Hr) IV Continuous <Continuous>  glucagon  Injectable 1 milliGRAM(s) IntraMuscular once  heparin   Injectable 5000 Unit(s) SubCutaneous every 12 hours  insulin lispro (ADMELOG) corrective regimen sliding scale   SubCutaneous every 6 hours  metoclopramide Injectable 5 milliGRAM(s) IV Push every 6 hours  pantoprazole  Injectable 40 milliGRAM(s) IV Push every 12 hours  Parenteral Nutrition - Adult 1 Each (67 mL/Hr) TPN Continuous <Continuous>  phenylephrine    Infusion 0.2 MICROgram(s)/kG/Min (5.39 mL/Hr) IV Continuous <Continuous>  rifAXIMin 550 milliGRAM(s) Oral two times a day    MEDICATIONS  (PRN):  albumin human 25% IVPB 50 milliLiter(s) IV Intermittent every 1 hour PRN low b/p  dextrose Oral Gel 15 Gram(s) Oral once PRN Blood Glucose LESS THAN 70 milliGRAM(s)/deciliter  ondansetron Injectable 4 milliGRAM(s) IV Push every 6 hours PRN Nausea and/or Vomiting  sodium chloride 0.9% lock flush 10 milliLiter(s) IV Push every 1 hour PRN Pre/post blood products, medications, blood draw, and to maintain line patency    ICU Vital Signs Last 24 Hrs  T(C): 37.3 (11 May 2023 08:00), Max: 37.7 (11 May 2023 00:00)  T(F): 99.2 (11 May 2023 08:00), Max: 99.8 (11 May 2023 00:00)  HR: 112 (11 May 2023 10:00) (107 - 121)  BP: 93/54 (11 May 2023 10:00) (72/60 - 110/70)  BP(mean): 64 (11 May 2023 10:00) (54 - 81)  ABP: --  ABP(mean): --  RR: 33 (11 May 2023 10:00) (16 - 33)  SpO2: 96% (11 May 2023 10:00) (78% - 99%)    O2 Parameters below as of 11 May 2023 10:00  Patient On (Oxygen Delivery Method): room air    Physical Exam    General - weak, lethargivc  neuro lethargic confused  HEENT - nc/at NG tube in place  neck supple  lungs - clear  cv rrr  abdomen - dressing dry, colostomy viable starting to function, silvio with copioius output   voiding minimal urine output  Extremity trace edema    I&O's Summary    10 May 2023 07:01  -  11 May 2023 07:00  --------------------------------------------------------  IN: 2858 mL / OUT: 2180 mL / NET: 678 mL    11 May 2023 07:01  -  11 May 2023 10:28  --------------------------------------------------------  IN: 0 mL / OUT: 360 mL / NET: -360 mL                          8.0    7.02  )-----------( 50       ( 11 May 2023 04:56 )             25.1       05-11    143  |  111<H>  |  74<H>  ----------------------------<  257<H>  4.2   |  16<L>  |  5.41<H>    Ca    8.9      11 May 2023 04:56  Phos  2.9     05-11  Mg     2.5     05-11    TPro  4.9<L>  /  Alb  1.6<L>  /  TBili  1.1  /  DBili  x   /  AST  240<H>  /  ALT  258<H>  /  AlkPhos  297<H>  05-11    DVT Prophylaxis:  Heparin subq                                                               Advanced Directives: Full Code

## 2023-05-11 NOTE — PROGRESS NOTE ADULT - ASSESSMENT
69 yo male with PMHx metastatic neuroendocrine tumor of the pancreas, recently admitted with nonfunctioning GJ tube (patient is chronically obstructed).   Patient has had severe heartburn, and has had several episodes of vomiting small amount of dark blood.     now Post Op  after an open distal pancreatectomy, splenectomy, partial colectomy, partial L adrenalectomy,colostomy    With Post Op distributive Shock  oliguria/renal failure requiring dialysis  now back on pressors  hepatic encephalopathy    PLAN:  ICU    PULM: Fi O2 30%.  now extubated, breathing ok    Cardio:  back on pressors, track fluid loses from ileostomy    Renal:   HD gentle hydration, on bicarbonate, dialysis recommended to wife, awaiting answer, will initiate if family want    GI:  reglan was added yesterday for high residual,  tolerating feeds now    Endo cortical level was ok, sliding scale for hyperglycemia    Neurometabolic encephalopthy  Profoundly ill from illness andCA, ? whether dialysis will help ammonia level is better

## 2023-05-11 NOTE — PROGRESS NOTE ADULT - SUBJECTIVE AND OBJECTIVE BOX
Lucien sign and send to pharmacy    Patient is a 68y old  Male who presents with a chief complaint of GIB (11 May 2023 10:28)      BRIEF HOSPITAL COURSE: 69 yo male with PMHx metastatic neuroendocrine tumor of the pancreas, recently admitted with nonfunctioning GJ tube (patient is chronically obstructed). During hospitalization had a CT angiography has no active bleeding, but demonstrates extensive tumor in distal pancreas. Went to OR on 4/4 for open distal pancreatectomy, splenectomy, partial colectomy, partial L adrenalectomy, temporary abdominal closure. Complicated by hemorrhage shock and post op respiratory failure. Back to OR on 5/9 for closure and colostomy.  Further complicated by worsening renal failure ZO requiring HD and hepatic encephalopathy,       Events last 24 hours: Called to bedside for hypoxia. NT suctioned w/ tube feed looking secretions. Now requiring supplemental O2, stopped TF. Remains on phenylephrine for BP support.  Added midodrine.     PAST MEDICAL & SURGICAL HISTORY:  Hypertension      BPH (benign prostatic hyperplasia)      Renal insufficiency      Light chain nephropathy      DM (diabetes mellitus)      HLD (hyperlipidemia)      No significant past surgical history          Review of Systems:  Unable to obtain due to clinical condition       Medications:  rifAXIMin 550 milliGRAM(s) Oral two times a day  phenylephrine    Infusion 0.2 MICROgram(s)/kG/Min IV Continuous <Continuous>  metoclopramide Injectable 5 milliGRAM(s) IV Push every 6 hours  ondansetron Injectable 4 milliGRAM(s) IV Push every 6 hours PRN  heparin   Injectable 5000 Unit(s) SubCutaneous every 12 hours  pantoprazole  Injectable 40 milliGRAM(s) IV Push every 12 hours  dextrose 50% Injectable 12.5 Gram(s) IV Push once  dextrose 50% Injectable 25 Gram(s) IV Push once  dextrose 50% Injectable 25 Gram(s) IV Push once  dextrose Oral Gel 15 Gram(s) Oral once PRN  glucagon  Injectable 1 milliGRAM(s) IntraMuscular once  insulin glargine Injectable (LANTUS) 15 Unit(s) SubCutaneous at bedtime  insulin lispro (ADMELOG) corrective regimen sliding scale   SubCutaneous every 6 hours  dextrose 5% 1000 milliLiter(s) IV Continuous <Continuous>  dextrose 5%. 1000 milliLiter(s) IV Continuous <Continuous>  dextrose 5%. 1000 milliLiter(s) IV Continuous <Continuous>  Parenteral Nutrition - Adult 1 Each TPN Continuous <Continuous>  sodium chloride 0.9% lock flush 10 milliLiter(s) IV Push every 1 hour PRN  chlorhexidine 4% Liquid 1 Application(s) Topical <User Schedule>            ICU Vital Signs Last 24 Hrs  T(C): 36.9 (11 May 2023 18:35), Max: 37.7 (11 May 2023 00:00)  T(F): 98.4 (11 May 2023 18:35), Max: 99.8 (11 May 2023 00:00)  HR: 106 (11 May 2023 21:00) (99 - 144)  BP: 95/60 (11 May 2023 21:00) (81/56 - 120/72)  BP(mean): 68 (11 May 2023 21:00) (54 - 86)  RR: 30 (11 May 2023 21:00) (16 - 33)  SpO2: 97% (11 May 2023 21:00) (91% - 100%)    O2 Parameters below as of 11 May 2023 20:00  Patient On (Oxygen Delivery Method): room air                I&O's Detail    10 May 2023 07:01  -  11 May 2023 07:00  --------------------------------------------------------  IN:    dextrose 5% w/ Additives: 1441 mL    Fat Emulsion (Fish Oil &amp; Plant Based) 20% Infusion: 146 mL    Free Water: 50 mL    Phenylephrine: 275 mL    TPN (Total Parenteral Nutrition): 645 mL    Vital1.5: 301 mL  Total IN: 2858 mL    OUT:    Bulb (mL): 630 mL    Colostomy (mL): 1550 mL  Total OUT: 2180 mL    Total NET: 678 mL      11 May 2023 07:01  -  11 May 2023 22:58  --------------------------------------------------------  IN:    dextrose 5% w/ Additives: 897 mL    Phenylephrine: 316 mL    TPN (Total Parenteral Nutrition): 802 mL    Vital1.5: 660 mL  Total IN: 2675 mL    OUT:    Bulb (mL): 350 mL    Colostomy (mL): 475 mL  Total OUT: 825 mL    Total NET: 1850 mL            LABS:                        8.0    7.02  )-----------( 50       ( 11 May 2023 04:56 )             25.1     05-11    143  |  111<H>  |  74<H>  ----------------------------<  257<H>  4.2   |  16<L>  |  5.41<H>    Ca    8.9      11 May 2023 04:56  Phos  2.9     05-11  Mg     2.5     05-11    TPro  4.9<L>  /  Alb  1.6<L>  /  TBili  1.1  /  DBili  x   /  AST  240<H>  /  ALT  258<H>  /  AlkPhos  297<H>  05-11          CAPILLARY BLOOD GLUCOSE      POCT Blood Glucose.: 271 mg/dL (11 May 2023 17:32)    PT/INR - ( 10 May 2023 11:25 )   PT: 12.6 sec;   INR: 1.09 ratio         PTT - ( 10 May 2023 11:25 )  PTT:33.0 sec    CULTURES:      Physical Examination:    General: Lethargic. Weak     HEENT: Pupils equal, reactive to light.  Symmetric.    PULM: Clear to auscultation bilaterally    CVS: Regular rate and rhythm    ABD: Soft, dressing C/D/I.  Colostomy pink. . + TAYLA drain     EXT: No edema, nontender    SKIN: Warm and well perfused, no rashes noted.    NEURO: Awake, not oriented. Follows simple commands.     RADIOLOGY: < from: Xray Chest 1 View- PORTABLE-Urgent (Xray Chest 1 View- PORTABLE-Urgent .) (05.11.23 @ 13:48) >  ACC: [id number] EXAM:  XR CHEST PORTABLE URGENT 1V   ORDERED BY: PRIYA HALL     PROCEDURE DATE:  05/11/2023          INTERPRETATION:  INDICATION: Dialysis catheter insertion    Portable chest 1:28 PM    COMPARISON: 5/6/2023    FINDINGS:  Heart/Vascular: The heart size, mediastinum, hilum and aorta are stable.  Pulmonary: Midline trachea. There is mild pulmonary venous congestion and   low lung volumes.    Bones: There is no fracture.  Lines and catheter: Tip of the right IJ dialysis catheteris in the right   atrium. Tip and side-port of NG tube are in the body of the stomach.    Impression:    There is mild pulmonary venous congestion and low lung volumes.    Tip of the right IJ dialysis catheter is in the right atrium. There is no   pneumothorax.    --- End of Report ---        < end of copied text >      CRITICAL CARE TIME SPENT: 45 min  Evaluating/treating patient, reviewing data/labs/imaging, discussing case with multidisciplinary team, discussing plan/goals of care with patient/family. Non-inclusive of procedure time.

## 2023-05-11 NOTE — PROGRESS NOTE ADULT - SUBJECTIVE AND OBJECTIVE BOX
NEPHROLOGY INTERVAL HPI/OVERNIGHT EVENTS:    Date of Service: 23 @ 08:45    --Lethargic.  No acute distress.   Colostomy output 1500cc.  No UO.  5/10--No acute distress.  calm, off sedation and off pressor.  Colostomy output 250cc and Drain 850cc.  Oligoanuric.   more alert and awake, ostomy fx pressors off since this am unable to complete hd yesterday and only received 1/2 due to malfunctioning cathtere    pulled after HD    seen at hd, events noted, anuric, TPN infusing with lipids, on solo dec dose of pressors, encepha with starting of lactulose and xifaxin   - no new events, anuric, CO2 trending down   s/p OR yesterday, ostomy creation  - case d/w surgery and intensivist , anuric k 5.2 will dialyze preop, no fluid removal  - pt seen in PACU, PRBC and FFP, and LR resuscitation noted, coulter in place ~ 100 cc urine in coulter bag  case d/w pts nurse   Chart quote, operative note ""Open distal pancreatectomy, splenectomy, partial colectomy, partial L adrenalectomy, temporary abdominal closure; Large varices, 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 Liver laden with diffuse metastases and very friable, RP oozy. 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  Treated with Lutathera one year ago and restarted in 2022  G-J tube placed for nutrition in January due to chronic dysphagia and severe esophagitis on EGD.  Post recent  admission due to G tube clogging.  D/mansi home on .    Returned to ED due to vomiting when night time enteral feeding via G tube started.  CT with IVC done due to concern for GIB.  Pt reports chronic self catheterization due to urinary retention.   ZO improved when catheterization started.  Follows mainly with EMELY Cortez.   Pt has been self cath'ing 3-4 x /day.   Indwelling Coulter placed by  due to resistance with blood clots with resistance reported by pt.  For palliative mass resection tomorrow.    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  Creat has been variable during previous admissions--ranging 1.1-2.2.    PMHX and PSHX.  --Neuroendocrine tumor of pancreas with mets to liver --dx 6 years ago.  --GJ tube for nutrition in 2023 due to chronic dysphagia.  --Hx of HTN  --Hx of DM  --Hx of light chain disease --unclear hx and no hx of renal biopsy.    MEDICATIONS  (STANDING):  chlorhexidine 4% Liquid 1 Application(s) Topical <User Schedule>  dextrose 5% 1000 milliLiter(s) (75 mL/Hr) IV Continuous <Continuous>  dextrose 5%. 1000 milliLiter(s) (50 mL/Hr) IV Continuous <Continuous>  dextrose 5%. 1000 milliLiter(s) (100 mL/Hr) IV Continuous <Continuous>  dextrose 50% Injectable 12.5 Gram(s) IV Push once  dextrose 50% Injectable 25 Gram(s) IV Push once  dextrose 50% Injectable 25 Gram(s) IV Push once  fat emulsion (Fish Oil and Plant Based) 20% Infusion 0.695 Gm/kG/Day (20.83 mL/Hr) IV Continuous <Continuous>  glucagon  Injectable 1 milliGRAM(s) IntraMuscular once  heparin   Injectable 5000 Unit(s) SubCutaneous every 12 hours  insulin lispro (ADMELOG) corrective regimen sliding scale   SubCutaneous every 6 hours  metoclopramide Injectable 5 milliGRAM(s) IV Push every 6 hours  pantoprazole  Injectable 40 milliGRAM(s) IV Push every 12 hours  Parenteral Nutrition - Adult 1 Each (67 mL/Hr) TPN Continuous <Continuous>  phenylephrine    Infusion 0.2 MICROgram(s)/kG/Min (5.39 mL/Hr) IV Continuous <Continuous>  rifAXIMin 550 milliGRAM(s) Oral two times a day    MEDICATIONS  (PRN):  albumin human 25% IVPB 50 milliLiter(s) IV Intermittent every 1 hour PRN low b/p  dextrose Oral Gel 15 Gram(s) Oral once PRN Blood Glucose LESS THAN 70 milliGRAM(s)/deciliter  HYDROmorphone  Injectable 0.1 milliGRAM(s) IV Push every 3 hours PRN Moderate Pain (4 - 6)  ondansetron Injectable 4 milliGRAM(s) IV Push every 6 hours PRN Nausea and/or Vomiting  sodium chloride 0.9% lock flush 10 milliLiter(s) IV Push every 1 hour PRN Pre/post blood products, medications, blood draw, and to maintain line patency    Vital Signs Last 24 Hrs  T(C): 36.6 (11 May 2023 04:00), Max: 37.7 (11 May 2023 00:00)  T(F): 97.9 (11 May 2023 04:00), Max: 99.8 (11 May 2023 00:00)  HR: 114 (11 May 2023 06:00) (108 - 121)  BP: 85/52 (11 May 2023 06:00) (72/60 - 110/70)  BP(mean): 61 (11 May 2023 06:00) (54 - 81)  RR: 21 (11 May 2023 06:00) (16 - 33)  SpO2: 98% (11 May 2023 06:00) (78% - 99%)    Parameters below as of 11 May 2023 04:00  Patient On (Oxygen Delivery Method): room air      Daily     Daily Weight in k.5 (11 May 2023 05:00)    05-10 @ 07:01  -   @ 07:00  --------------------------------------------------------  IN: 2858 mL / OUT: 2180 mL / NET: 678 mL    PHYSICAL EXAM:  GENERAL: lethargic  CHEST/LUNG: Poor insp effort  HEART: S1S2 tachy  ABDOMEN: colostomy intact  EXTREMITIES: no edema  SKIN:     LABS:                        8.0    7.02  )-----------( 50       ( 11 May 2023 04:56 )             25.1     05-11    143  |  111<H>  |  74<H>  ----------------------------<  257<H>  4.2   |  16<L>  |  5.41<H>    Ca    8.9      11 May 2023 04:56  Phos  2.9       Mg     2.5         TPro  4.9<L>  /  Alb  1.6<L>  /  TBili  1.1  /  DBili  x   /  AST  240<H>  /  ALT  258<H>  /  AlkPhos  297<H>      PT/INR - ( 10 May 2023 11:25 )   PT: 12.6 sec;   INR: 1.09 ratio         PTT - ( 10 May 2023 11:25 )  PTT:33.0 sec    Magnesium, Serum: 2.5 mg/dL ( @ 04:56)  Phosphorus Level, Serum: 2.9 mg/dL ( @ 04:56)          RADIOLOGY & ADDITIONAL TESTS:

## 2023-05-11 NOTE — CHART NOTE - NSCHARTNOTEFT_GEN_A_CORE
Clinical Nutrition TF BRIEF NOTE    * 68 year old M PMHx metastatic neuroendocrine tumor of the pancreas, recently admitted with nonfunctioning GJ tube (patient is chronically obstructed). Patient has had severe heartburn, and has had several episodes of vomiting small amount of dark blood. Patient was discharged on Thursday. Hgb unchanged. CT angiography has no active bleeding, but demonstrates extensive tumor in distal pancreas. Pt originally in SICU, receiving TF via GJ tube but having frequent BM's. On  - pt went to OR  for a Open distal pancreatectomy, splenectomy, partial colectomy, partial L adrenalectomy, temporary abdominal closure. Remained intubated, on paralytics, pressors, anuric and transferred to ICU.  s/p abd closure and colostomy.  **TPN initiated on  2/2 suspected long NPO status s/p multiple GI surgeries, possible new placement of ?GJ tube for long-term   *: TPN ran until ~6 PM yesterday and then was stopped 2/2 hyperglycemia. POCT (180) this AM.  TF initiated via NGT yesterday, Vital 1.5 2/2 multiple GI surgeries and renal lytes WNL (K, Phos). Running @ 40 cc now - as per RN, 350 mL residuals noted this AM. D/w IDT, will re-start PN - no longer has central line so will resume w/ PPN.  Hold TF's for now, Dr. Mcclendon to f/u w/ surgery for long-term nutrition plan and possibly re-start TF pending residuals and surgery recs.  No HD today - on IVF for now, will d/c when PPN to start as per Dr. Mcclendon. Still w/ no UO.  Pt more awake today, ostomy functioning w/ increasing output.  See recs below for PPN.    *current status: PPN running until bag finishes. Reglan added and TF restarted this AM with minimal residuals. Tolerating at 40 cc/hr, will increase to goal rate slowly.  Will hold off on renewing PPN for now.  Will receive HD today.   POCT elevated, with continued hyperglycemia, on admelog. Will add lantus, discussed during IDR.  RD to follow TF tolerance.     *pertinent meds: ADMELOG (received 10 units x 24 hours), lactulose, pantoprazole, abx, phenylephrine, IV albumin, zofran     *labs reviewed:     143  |  111<H>  |  74<H>  ----------------------------<  257<H>  4.2   |  16<L>  |  5.41<H>    Ca    8.9      11 May 2023 04:56  Phos  2.9       Mg     2.5         TPro  4.9<L>  /  Alb  1.6<L>  /  TBili  1.1  /  DBili  x   /  AST  240<H>  /  ALT  258<H>  /  AlkPhos  297<H>        BMI: BMI (kg/m2): 24.8 (23 @ 04:00)  HbA1c: A1C with Estimated Average Glucose Result: 7.3 % (23 @ 06:35)    BP: 93/58 (05-10-23 @ 10:05) (74/54 - 127/88)  Lipid Panel: Date/Time: 23 @ 06:07  Triglycerides, Serum: 129    POCT Blood Glucose.: 323 mg/dL (11 May 2023 12:34)  POCT Blood Glucose.: 254 mg/dL (11 May 2023 05:09)  POCT Blood Glucose.: 215 mg/dL (10 May 2023 23:11)  POCT Blood Glucose.: 242 mg/dL (10 May 2023 18:51)  POCT Blood Glucose.: 243 mg/dL (10 May 2023 12:53)    *I&O's Detail    10 May 2023 07:01  -  11 May 2023 07:00  --------------------------------------------------------  IN:    dextrose 5% w/ Additives: 1441 mL    Fat Emulsion (Fish Oil &amp; Plant Based) 20% Infusion: 146 mL    Free Water: 50 mL    Phenylephrine: 275 mL    TPN (Total Parenteral Nutrition): 645 mL    Vital1.5: 301 mL  Total IN: 2858 mL    OUT:    Bulb (mL): 630 mL    Colostomy (mL): 1550 mL  Total OUT: 2180 mL    Total NET: 678 mL      11 May 2023 07:01  -  11 May 2023 12:42  --------------------------------------------------------  IN:  Total IN: 0 mL    OUT:    Bulb (mL): 160 mL    Colostomy (mL): 200 mL  Total OUT: 360 mL    Total NET: -360 mL    *BM documented:  - loose.     *malnutrition: Pt continues to meet criteria for severe protein-calorie malnutrition in context of chronic disease r/t decreased ability to meet increased nutrient needs 2/2 mets cancer AEB severe muscle/ fat wasting, TF meeting <75% ENN x >/=1 month, 19% wt loss x 5 mos    *ESTIMATED NUTR NEEDS:  based on 63.9 Kg (wt from  - bed scale taken by RD)  Calories: 8275-8060 Kcal (30-35 Kcal/Kg)  Protein:  g protein (1.5-2 g/Kg)  Fluids: 0006-8503 mL (25-30 mL/Kg)    Weight History:  Daily Weight in k.5 (11 May 2023 05:00)  Daily Weight in k.8 (10 May 2023 06:00)  Height (cm): 170.2 (23 @ 08:11), 170.2 (23 @ 11:22)  Weight (kg): 71.9 (23 @ 04:00), 62.9 (23 @ 22:23)  BMI (kg/m2): 24.8 (23 @ 04:00), 21.7 (23 @ 08:11), 21.7 (23 @ 22:23)  BSA (m2): 1.83 (23 @ 04:00), 1.73 (23 @ 08:11), 1.73 (23 @ 22:23)    RECOMMENDATIONS:  1) Re-start TF order of Vital 1.5 @ 20 cc/hr, increase by 10 cc/hr q6 hours until goal rate of 65 cc/hr is met (total volume = 1300 mL) with 3 packets of Prosource TF. Will provide ~ 2070 kcal, 121 g protein, and 993 mL free water. **resume as per intensivist and surgery  2) Daily weights  3) Strict I & O's  4) Daily lyte checks including magnesium and phos  5) Weekly triglycerides/LFT checks  6) POCT q6hrs; maintain 140-180mg/dL - may need to add LANTUS to insulin to PN bag  7) Confirm goals of care regarding nutrition support - will need long-term feeding tube ? new GJ tube vs PEG tube ?  8) Monitor TF tolerance closely - Maintain aspiration precautions, back of bed >45 degrees. Re-start PPN/ TPN (pending accesss) if not tolerating TF    *will continue to monitor and adjust treatment plan prn*  Fabby Waller, MS, RDN, CNSC, [phone]  Certified Nutrition

## 2023-05-11 NOTE — PROGRESS NOTE ADULT - ASSESSMENT
69 yo man with Hx of Neuroendocrine tumor/Pancreatic mass and liver mets.  Now for palliative resection of pancreatic mass.    --ZO with variable creat level and unclear CKD.    Likely dependent on volume status.    Limited intake even with Enteral feeding.      Start gentle hydration.  -- : continue Hale drainage.  --Hx of light chain nephropathy  --No contraindication for surgery from renal standpoint.    5/4  s/p Palliative surgery for neuroendocrine mass  will watch for ZO, pt with low UO immediate post op  s/p multiple PRBC and FFP transfusion and volume resuscitation    5/5  patient anuric, K 5.2  On bicarb drip  to go back to OR today  case d/w Dr Jean and Dr Siddiqi  Will HD x 2 hrs to correct potassium  >10 L infused in blood products and fluids  More fluids not indicated, UO 50 ml  no fluid removal on HD  Hep profile ordered    5/6  Ostomy created  will monitor i/o  labs stable   no need for HD today  d/w intensivist  will monitor daily for hd requirement    5/7  seen earlier on HD   tolerating well  TPN  d/w Dr Jean  d/w pts fam at bedside    5/8 MK   - ZO with anuria, remains HD dependent with hx of light chain nephropathy    tolerating hd     dc femoral shiley today after hd   - enceph monitor response to hd and xifaxan and lactulose    5/9 MK   - ZO with anuria, remains HD dependent with hx of light chain nephropathy    no indication for hd    off pressor     monitor electrolytes on TPN     NG in place for TF initiation   - enceph, improving monitor response to hd and xifaxan and lactulose    ostomy fx with liquid stool  dw dr marley     5/10 SY  --ZO : remains oligoanuric.     Follow with repeat bladder scans--no retention.    Will hold HD today and assess daily.    Trial of gentle hydration due to increased fluid loss.  --Metabolic acidosis : trial of HCO3 in addition to TPN   --Monitor encephalopathy.    5/11 SY  --ZO : remains oligoanuric.    Renal parameters further increased with no improvement in acidosis with HCO3 infusion.    D/w pt's wife over phone.    Explained his poor prognosis from Oncology aspect due to large tumor burden in liver and that surgery was palliative in nature.    Explained added burden of continued dialysis adding to pt's suffering at this point.    Pt's wife requested not to proceed with HD catheter placement and dialysis until she can d/w Dr Siddiqi and rest of family.    Will discuss further later today.

## 2023-05-11 NOTE — PROGRESS NOTE ADULT - ASSESSMENT
67 yo male with PMHx metastatic neuroendocrine tumor of the pancreas, recently admitted with nonfunctioning GJ tube (patient is chronically obstructed). During hospitalization had a CT angiography has no active bleeding, but demonstrates extensive tumor in distal pancreas    Assessment:  1. Shock, distributive vs hemorrhagic   2. Post op respiratory failure, resolved  3. ZO  4. Hepatic encephalopathy   5. S/p distal pancreatectomy, splenectomy, partial colectomy, partial L adrenalectomy,colostomy  6. Hyperglycemia       Plan:  Neuro: Hepatic encephalopathy, slowly improving. c/w rifaximin BID, unable to use lactulose   CV: Shock, actively titrating phenylephrine to maintain MAP >65. Added PO midodrine to offload IV vasopressor requirements.   Resp: Supplemental O2 to maintain O2 sat >92%. Now on 5L NC, will change to HFNC if O2 requirements continue to rise. Aspiration precautions. HOB >30.   GI: Holding tube feeds now w/ suspected aspiration. Started on reglan for high residuals. PPI BID. Surgery following. Monitor TAYLA drain output.  Renal: ZO 2/2 ATN, s/p HD. Remains oliguric w/ poor clearance.  c/w Bicarb gtt. family deciding if they want to pursue HD again.   ID: Cultures w/ no growth to date. s/p course of cefepime.   Endo: Hypergylcemia, started lantus 15 units bedtime w/ ISS. Goal BG <180   Heme: H/H stable.  heparin subq started for DVT ppx.

## 2023-05-12 NOTE — PROGRESS NOTE ADULT - SUBJECTIVE AND OBJECTIVE BOX
HPI:    67 y/o male with MHx significant for neuroendocrine tumor diagnosed 6 years ago with chronic gastric outlet obstruction and mets to liver, s/p GJ tube, HTN, HLD, T2DM, BPH and light chain nephropathy presented to the ED with hematemesis since last night.    Recent admission-from 04/21 - 04/27 for J-tube malfunction; weakness; dehydration; hyponatremia and ZO    04/29/2023 - Patient seen in ED, Mana spouse at bedside. Patients reports nausea and hematemesis since last night. Coffee brown color emesis of about 10 ml note dn the bag. Also with nausea and discomfort at the GJ site. Last feed was last night. Report loose bowel movement every 2-3 days, last BM was yesterday, denies hematochezia.     05/08/2023 - Seen at bedside in ICU, accompanied by spouse. With NGT, and upper extremities in restraints due to patient attempting to pull the NGT out    05/12/2023:      PAST MEDICAL & SURGICAL HISTORY:    Hypertension  BPH (benign prostatic hyperplasia)  Renal insufficiency  Light chain nephropathy  DM (diabetes mellitus  HLD (hyperlipidemia)  GJ tube placement      FAMILY HISTORY:    FH: breast cancer (Mother)  FH: aneurysm (Father)      MEDICATIONS  (STANDING):    chlorhexidine 4% Liquid 1 Application(s) Topical <User Schedule>  dextrose 5% 1000 milliLiter(s) (75 mL/Hr) IV Continuous <Continuous>  dextrose 5%. 1000 milliLiter(s) (50 mL/Hr) IV Continuous <Continuous>  dextrose 5%. 1000 milliLiter(s) (100 mL/Hr) IV Continuous <Continuous>  dextrose 50% Injectable 25 Gram(s) IV Push once  dextrose 50% Injectable 25 Gram(s) IV Push once  dextrose 50% Injectable 12.5 Gram(s) IV Push once  glucagon  Injectable 1 milliGRAM(s) IntraMuscular once  heparin   Injectable 5000 Unit(s) SubCutaneous every 12 hours  insulin glargine Injectable (LANTUS) 25 Unit(s) SubCutaneous at bedtime  insulin lispro (ADMELOG) corrective regimen sliding scale   SubCutaneous every 6 hours  metoclopramide Injectable 5 milliGRAM(s) IV Push every 6 hours  pantoprazole  Injectable 40 milliGRAM(s) IV Push every 12 hours  phenylephrine    Infusion 0.2 MICROgram(s)/kG/Min (5.39 mL/Hr) IV Continuous <Continuous>  piperacillin/tazobactam IVPB.- 3.375 Gram(s) IV Intermittent once  rifAXIMin 550 milliGRAM(s) Oral two times a day      MEDICATIONS  (PRN):    dextrose Oral Gel 15 Gram(s) Oral once PRN Blood Glucose LESS THAN 70 milliGRAM(s)/deciliter  ondansetron Injectable 4 milliGRAM(s) IV Push every 6 hours PRN Nausea and/or Vomiting  sodium chloride 0.9% lock flush 10 milliLiter(s) IV Push every 1 hour PRN Pre/post blood products, medications, blood draw, and to maintain line patency        ALLERGIES:    losartan (Angioedema)      REVIEW OF SYSTEMS:    Unable to obtain at this time      VITALS:    ICU Vital Signs Last 24 Hrs  T(C): 38.4 (12 May 2023 08:00), Max: 38.6 (12 May 2023 04:00)  T(F): 101.1 (12 May 2023 08:00), Max: 101.4 (12 May 2023 04:00)  HR: 98 (12 May 2023 09:45) (96 - 144)  BP: 105/60 (12 May 2023 09:45) (68/51 - 126/77)  BP(mean): 70 (12 May 2023 09:45) (47 - 87)  ABP: --  ABP(mean): --  RR: 21 (12 May 2023 09:45) (16 - 40)  SpO2: 96% (12 May 2023 09:45) (85% - 100%)    O2 Parameters below as of 12 May 2023 09:45  Patient On (Oxygen Delivery Method): nasal cannula  O2 Flow (L/min): 3      PHYSICAL:    Constitutional: ill/lethargic appearing  Eyes: no conjunctival infection, anicteric  ENT: pharynx is unremarkable; NGT in place  Neck: supple without JVD  Pulmonary: no acute distress or accessory muscle use  Cardiac: RRR, normal S1S2  Vascular: no calf tenderness, venous stasis changes, varices  Abdomen: normoactive bowel sounds, soft and nontender; dressing C/D/I, colostomy pink, TAYLA drain   Lymphatic: no peripheral adenopathy appreciated  Musculoskeletal: full range of motion and no deformities appreciated  Skin: normal appearance, no rash/erythema  Neurology: poorly assessed  Psychiatric: poorly assessed      LABS:                                              7.0    5.44  )-----------( 52       ( 12 May 2023 04:20 )             20.4     05-12    140  |  107  |  50<H>  ----------------------------<  255<H>  3.6   |  20<L>  |  4.05<H>    Ca    8.4<L>      12 May 2023 04:20  Phos  2.2     05-12  Mg     2.5     05-11    TPro  x   /  Alb  2.2<L>  /  TBili  x   /  DBili  x   /  AST  x   /  ALT  x   /  AlkPhos  x   05-12        RADIOLOGY & ADDITIONAL STUDIES:    EXAM:  CT ABDOMEN AND PELVIS IC     PROCEDURE DATE:  04/29/2023        INTERPRETATION:  CLINICAL INFORMATION: Upper GI bleed. History of gastric   cancer, GJ tube.    COMPARISON: 04/17/2023.    CONTRAST/COMPLICATIONS:  IV Contrast: Omnipaque 350  90 cc administered   10 cc discarded  Oral Contrast: NONE  Complications: None reported at time of study completion    PROCEDURE:  CT of the Abdomen and Pelvis was performed.  Precontrast, Arterial and Delayed phases were performed.  Sagittal and coronal reformats were performed.    FINDINGS:  LOWER CHEST: No lung nodules. Ill-defined increased haziness in the   paraesophageal region. Coronary artery calcification.    LIVER: Numerous hepatic metastases. A previously referenced confluent   lesion involving the segment 8 measures 9 x 8.7 cm, previously 7.9 x 8.3   cm (4:19).  BILE DUCTS: Normal caliber.  GALLBLADDER: Within normal limits.  SPLEEN: Within normal limits.  PANCREAS: Heterogeneously enhancing mass involving the distal body and   the tail of the pancreas measuring approximately 9.8 x 7 cm, previously   8.3 x 7.9 cm. Noncontrast imaging demonstrates hyperdense foci within the   lower portion of this mass that may be related to hemorrhagic content.   The mass cannot be completely  from the spleen and the stomach.  ADRENALS: Within normal limits.  KIDNEYS/URETERS: 2 mm non obstructing calculus lower pole of the left   kidney. No hydronephrosis.    BLADDER: Within normal limits.  REPRODUCTIVE ORGANS: Prostate is enlarged.    BOWEL: No bowel obstruction. Re demonstration of a gastrojejunostomy in   stable position. No endoluminal extravasation or pooling of the contrast   to suggest an active GI bleed.  PERITONEUM: No ascites.  VESSELS: Atherosclerotic changes. Probable occlusion of the splenic vein   with evidence of associated collateral circulation. Patent portal veins.  RETROPERITONEUM/LYMPH NODES: No lymphadenopathy.  ABDOMINAL WALL: Gastrostomy tube.  BONES: Degenerative changes.    IMPRESSION:  No evidence of an active GI bleed.  Large pancreatic mass and extensive hepatic metastases.  Gastrojejunostomy          US Kidney and Bladder (05.08.23 @ 15:51)     INTERPRETATION:  CLINICAL INFORMATION: History of gastric cancer.   Evaluate for renal injury.    COMPARISON: CT dated 04/29/2023    TECHNIQUE: Sonography of the kidneys and bladder.    FINDINGS:  Right kidney: 8.7 cm. No renal mass, hydronephrosis or calculi.    Left kidney: 8.5 cm. No renal mass, hydronephrosis or calculi.    Urinary bladder: Urinary bladder is contracted limiting its evaluation.    Incidentally noted are diffuse hepatic metastases as noted on the prior   CT.    IMPRESSION:  No hydronephrosis or renal mass is identified to suggest renal injury.   Please note that contrast enhanced CT is more sensitive for detection of   renal injury.    Hepatic metastasis.       HPI:    67 y/o male with MHx significant for neuroendocrine tumor diagnosed 6 years ago with chronic gastric outlet obstruction and mets to liver, s/p GJ tube, HTN, HLD, T2DM, BPH and light chain nephropathy presented to the ED with hematemesis since last night.    Recent admission-from 04/21 - 04/27 for J-tube malfunction; weakness; dehydration; hyponatremia and ZO    04/29/2023 - Patient seen in ED, Mana spouse at bedside. Patients reports nausea and hematemesis since last night. Coffee brown color emesis of about 10 ml note dn the bag. Also with nausea and discomfort at the GJ site. Last feed was last night. Report loose bowel movement every 2-3 days, last BM was yesterday, denies hematochezia.     05/08/2023 - Seen at bedside in ICU, accompanied by spouse. With NGT, and upper extremities in restraints due to patient attempting to pull the NGT out    05/12/2023: Seen at bedside with nursing assistant, ill appearing, lethargic, awake       PAST MEDICAL & SURGICAL HISTORY:    Hypertension  BPH (benign prostatic hyperplasia)  Renal insufficiency  Light chain nephropathy  DM (diabetes mellitus  HLD (hyperlipidemia)  GJ tube placement      FAMILY HISTORY:    FH: breast cancer (Mother)  FH: aneurysm (Father)      MEDICATIONS  (STANDING):    chlorhexidine 4% Liquid 1 Application(s) Topical <User Schedule>  dextrose 5% 1000 milliLiter(s) (75 mL/Hr) IV Continuous <Continuous>  dextrose 5%. 1000 milliLiter(s) (50 mL/Hr) IV Continuous <Continuous>  dextrose 5%. 1000 milliLiter(s) (100 mL/Hr) IV Continuous <Continuous>  dextrose 50% Injectable 25 Gram(s) IV Push once  dextrose 50% Injectable 25 Gram(s) IV Push once  dextrose 50% Injectable 12.5 Gram(s) IV Push once  glucagon  Injectable 1 milliGRAM(s) IntraMuscular once  heparin   Injectable 5000 Unit(s) SubCutaneous every 12 hours  insulin glargine Injectable (LANTUS) 25 Unit(s) SubCutaneous at bedtime  insulin lispro (ADMELOG) corrective regimen sliding scale   SubCutaneous every 6 hours  metoclopramide Injectable 5 milliGRAM(s) IV Push every 6 hours  pantoprazole  Injectable 40 milliGRAM(s) IV Push every 12 hours  phenylephrine    Infusion 0.2 MICROgram(s)/kG/Min (5.39 mL/Hr) IV Continuous <Continuous>  piperacillin/tazobactam IVPB.- 3.375 Gram(s) IV Intermittent once  rifAXIMin 550 milliGRAM(s) Oral two times a day      MEDICATIONS  (PRN):    dextrose Oral Gel 15 Gram(s) Oral once PRN Blood Glucose LESS THAN 70 milliGRAM(s)/deciliter  ondansetron Injectable 4 milliGRAM(s) IV Push every 6 hours PRN Nausea and/or Vomiting  sodium chloride 0.9% lock flush 10 milliLiter(s) IV Push every 1 hour PRN Pre/post blood products, medications, blood draw, and to maintain line patency        ALLERGIES:    losartan (Angioedema)      REVIEW OF SYSTEMS:    Unable to obtain at this time      VITALS:    ICU Vital Signs Last 24 Hrs  T(C): 38.4 (12 May 2023 08:00), Max: 38.6 (12 May 2023 04:00)  T(F): 101.1 (12 May 2023 08:00), Max: 101.4 (12 May 2023 04:00)  HR: 98 (12 May 2023 09:45) (96 - 144)  BP: 105/60 (12 May 2023 09:45) (68/51 - 126/77)  BP(mean): 70 (12 May 2023 09:45) (47 - 87)  ABP: --  ABP(mean): --  RR: 21 (12 May 2023 09:45) (16 - 40)  SpO2: 96% (12 May 2023 09:45) (85% - 100%)    O2 Parameters below as of 12 May 2023 09:45  Patient On (Oxygen Delivery Method): nasal cannula  O2 Flow (L/min): 3      PHYSICAL:    Constitutional: ill/lethargic appearing  Eyes: no conjunctival infection, anicteric  ENT: pharynx is unremarkable; NGT in place  Neck: supple without JVD  Pulmonary: no acute distress or accessory muscle use  Cardiac: RRR, normal S1S2  Vascular: no calf tenderness, venous stasis changes, varices  Abdomen: normoactive bowel sounds, soft and nontender; dressing C/D/I, colostomy pink, TAYLA drain   Lymphatic: no peripheral adenopathy appreciated  Musculoskeletal: full range of motion and no deformities appreciated  Skin: normal appearance, no rash/erythema  Neurology: poorly assessed  Psychiatric: poorly assessed      LABS:                                              7.0    5.44  )-----------( 52       ( 12 May 2023 04:20 )             20.4     05-12    140  |  107  |  50<H>  ----------------------------<  255<H>  3.6   |  20<L>  |  4.05<H>    Ca    8.4<L>      12 May 2023 04:20  Phos  2.2     05-12  Mg     2.5     05-11    TPro  x   /  Alb  2.2<L>  /  TBili  x   /  DBili  x   /  AST  x   /  ALT  x   /  AlkPhos  x   05-12        RADIOLOGY & ADDITIONAL STUDIES:    EXAM:  CT ABDOMEN AND PELVIS IC     PROCEDURE DATE:  04/29/2023        INTERPRETATION:  CLINICAL INFORMATION: Upper GI bleed. History of gastric   cancer, GJ tube.    COMPARISON: 04/17/2023.    CONTRAST/COMPLICATIONS:  IV Contrast: Omnipaque 350  90 cc administered   10 cc discarded  Oral Contrast: NONE  Complications: None reported at time of study completion    PROCEDURE:  CT of the Abdomen and Pelvis was performed.  Precontrast, Arterial and Delayed phases were performed.  Sagittal and coronal reformats were performed.    FINDINGS:  LOWER CHEST: No lung nodules. Ill-defined increased haziness in the   paraesophageal region. Coronary artery calcification.    LIVER: Numerous hepatic metastases. A previously referenced confluent   lesion involving the segment 8 measures 9 x 8.7 cm, previously 7.9 x 8.3   cm (4:19).  BILE DUCTS: Normal caliber.  GALLBLADDER: Within normal limits.  SPLEEN: Within normal limits.  PANCREAS: Heterogeneously enhancing mass involving the distal body and   the tail of the pancreas measuring approximately 9.8 x 7 cm, previously   8.3 x 7.9 cm. Noncontrast imaging demonstrates hyperdense foci within the   lower portion of this mass that may be related to hemorrhagic content.   The mass cannot be completely  from the spleen and the stomach.  ADRENALS: Within normal limits.  KIDNEYS/URETERS: 2 mm non obstructing calculus lower pole of the left   kidney. No hydronephrosis.    BLADDER: Within normal limits.  REPRODUCTIVE ORGANS: Prostate is enlarged.    BOWEL: No bowel obstruction. Re demonstration of a gastrojejunostomy in   stable position. No endoluminal extravasation or pooling of the contrast   to suggest an active GI bleed.  PERITONEUM: No ascites.  VESSELS: Atherosclerotic changes. Probable occlusion of the splenic vein   with evidence of associated collateral circulation. Patent portal veins.  RETROPERITONEUM/LYMPH NODES: No lymphadenopathy.  ABDOMINAL WALL: Gastrostomy tube.  BONES: Degenerative changes.    IMPRESSION:  No evidence of an active GI bleed.  Large pancreatic mass and extensive hepatic metastases.  Gastrojejunostomy          US Kidney and Bladder (05.08.23 @ 15:51)     INTERPRETATION:  CLINICAL INFORMATION: History of gastric cancer.   Evaluate for renal injury.    COMPARISON: CT dated 04/29/2023    TECHNIQUE: Sonography of the kidneys and bladder.    FINDINGS:  Right kidney: 8.7 cm. No renal mass, hydronephrosis or calculi.    Left kidney: 8.5 cm. No renal mass, hydronephrosis or calculi.    Urinary bladder: Urinary bladder is contracted limiting its evaluation.    Incidentally noted are diffuse hepatic metastases as noted on the prior   CT.    IMPRESSION:  No hydronephrosis or renal mass is identified to suggest renal injury.   Please note that contrast enhanced CT is more sensitive for detection of   renal injury.    Hepatic metastasis.

## 2023-05-12 NOTE — PROGRESS NOTE ADULT - SUBJECTIVE AND OBJECTIVE BOX
Patient seen and examined at bedside. Overnight febrile, increased drainage from midline incision, hypotensive.    Vital Signs Last 24 Hrs  T(C): 38.4 (12 May 2023 08:00), Max: 38.6 (12 May 2023 04:00)  T(F): 101.1 (12 May 2023 08:00), Max: 101.4 (12 May 2023 04:00)  HR: 107 (12 May 2023 10:40) (94 - 144)  BP: 93/57 (12 May 2023 10:40) (68/51 - 126/77)  BP(mean): 65 (12 May 2023 10:40) (47 - 87)  RR: 31 (12 May 2023 10:40) (16 - 40)  SpO2: 95% (12 May 2023 10:40) (85% - 100%)    Parameters below as of 12 May 2023 10:00  Patient On (Oxygen Delivery Method): nasal cannula  O2 Flow (L/min): 3                          7.0    5.44  )-----------( 52       ( 12 May 2023 04:20 )             20.4     05-12    140  |  107  |  50<H>  ----------------------------<  255<H>  3.6   |  20<L>  |  4.05<H>    Ca    8.4<L>      12 May 2023 04:20  Phos  2.2     05-12  Mg     2.5     05-11    TPro  x   /  Alb  2.2<L>  /  TBili  x   /  DBili  x   /  AST  x   /  ALT  x   /  AlkPhos  x   05-12    I&O's Summary    11 May 2023 07:01  -  12 May 2023 07:00  --------------------------------------------------------  IN: 3928 mL / OUT: 1735 mL / NET: 2193 mL    12 May 2023 07:01  -  12 May 2023 10:45  --------------------------------------------------------  IN: 850 mL / OUT: 70 mL / NET: 780 mL                Physical Exam:  General: AAOx3, Well developed, NAD  Chest: Normal respiratory effort  Heart: RRR  Abdomen: Abdomen soft, nondistended, no tenderness, dressing with serosanguinous drainge. Ostomy with small amount of stool. Drain with serosanguinous output  Neuro/Psych: No localized deficits. Normal speech, normal tone  Skin: Normal, no rashes, no lesions noted.   Extremities: Warm, well perfused, no edema, Pulses intact

## 2023-05-12 NOTE — PROGRESS NOTE ADULT - SUBJECTIVE AND OBJECTIVE BOX
NEPHROLOGY INTERVAL HPI/OVERNIGHT EVENTS:    Date of Service: 05-11-23 @ 08:45  5/12- seen w family and ID at bedside, plans for HD discussed w family  5/11--Lethargic.  No acute distress.   Colostomy output 1500cc.  No UO.  5/10--No acute distress.  calm, off sedation and off pressor.  Colostomy output 250cc and Drain 850cc.  Oligoanuric.  5/9 more alert and awake, ostomy fx pressors off since this am unable to complete hd yesterday and only received 1/2 due to malfunctioning cathtere    pulled after HD   5/8 seen at hd, events noted, anuric, TPN infusing with lipids, on solo dec dose of pressors, encepha with starting of lactulose and xifaxin   5/7- no new events, anuric, CO2 trending down  5/6 s/p OR yesterday, ostomy creation  5/5- case d/w surgery and intensivist , anuric k 5.2 will dialyze preop, no fluid removal  5/4- pt seen in PACU, PRBC and FFP, and LR resuscitation noted, coulter in place ~ 100 cc urine in cuolter bag  case d/w pts nurse   Chart quote, operative note ""Open distal pancreatectomy, splenectomy, partial colectomy, partial L adrenalectomy, temporary abdominal closure; Large varices, 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 Liver laden with diffuse metastases and very friable, RP oozy. Patient on pressors at the end of the case, and hence, decided to leave colon in discontinuity with temporary closure.""    HPI:  69 yo man with Neuroendocrine tumor of pancreas with mets to liver dx'd 6 years ago.  Treated with Lutathera one year ago and restarted in 12/2022  G-J tube placed for nutrition in January due to chronic dysphagia and severe esophagitis on EGD.  Post recent  admission due to G tube clogging.  D/mansi home on 4/27.    Returned to ED due to vomiting when night time enteral feeding via G tube started.  CT with IVC done due to concern for GIB.  Pt reports chronic self catheterization due to urinary retention.   ZO improved when catheterization started.  Follows mainly with EMELY Cortez.   Pt has been self cath'ing 3-4 x /day.   Indwelling Coulter placed by  due to resistance with blood clots with resistance reported by pt.  For palliative mass resection tomorrow.    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  Creat has been variable during previous admissions--ranging 1.1-2.2.    PMHX and PSHX.  --Neuroendocrine tumor of pancreas with mets to liver --dx 6 years ago.  --GJ tube for nutrition in 1/2023 due to chronic dysphagia.  --Hx of HTN  --Hx of DM  --Hx of light chain disease --unclear hx and no hx of renal biopsy.    MEDICATIONS  (STANDING):  chlorhexidine 4% Liquid 1 Application(s) Topical <User Schedule>  dextrose 5% 1000 milliLiter(s) (75 mL/Hr) IV Continuous <Continuous>  dextrose 5%. 1000 milliLiter(s) (50 mL/Hr) IV Continuous <Continuous>  dextrose 5%. 1000 milliLiter(s) (100 mL/Hr) IV Continuous <Continuous>  dextrose 50% Injectable 12.5 Gram(s) IV Push once  dextrose 50% Injectable 25 Gram(s) IV Push once  dextrose 50% Injectable 25 Gram(s) IV Push once  fat emulsion (Fish Oil and Plant Based) 20% Infusion 0.695 Gm/kG/Day (20.83 mL/Hr) IV Continuous <Continuous>  glucagon  Injectable 1 milliGRAM(s) IntraMuscular once  heparin   Injectable 5000 Unit(s) SubCutaneous every 12 hours  insulin lispro (ADMELOG) corrective regimen sliding scale   SubCutaneous every 6 hours  metoclopramide Injectable 5 milliGRAM(s) IV Push every 6 hours  pantoprazole  Injectable 40 milliGRAM(s) IV Push every 12 hours  Parenteral Nutrition - Adult 1 Each (67 mL/Hr) TPN Continuous <Continuous>  phenylephrine    Infusion 0.2 MICROgram(s)/kG/Min (5.39 mL/Hr) IV Continuous <Continuous>  rifAXIMin 550 milliGRAM(s) Oral two times a day    MEDICATIONS  (PRN):  albumin human 25% IVPB 50 milliLiter(s) IV Intermittent every 1 hour PRN low b/p  dextrose Oral Gel 15 Gram(s) Oral once PRN Blood Glucose LESS THAN 70 milliGRAM(s)/deciliter  HYDROmorphone  Injectable 0.1 milliGRAM(s) IV Push every 3 hours PRN Moderate Pain (4 - 6)  ondansetron Injectable 4 milliGRAM(s) IV Push every 6 hours PRN Nausea and/or Vomiting  sodium chloride 0.9% lock flush 10 milliLiter(s) IV Push every 1 hour PRN Pre/post blood products, medications, blood draw, and to maintain line patency    Vital Signs Last 24 Hrs  T(C): 36.8 (12 May 2023 20:00), Max: 38.6 (12 May 2023 04:00)  T(F): 98.2 (12 May 2023 20:00), Max: 101.4 (12 May 2023 04:00)  HR: 92 (12 May 2023 23:00) (84 - 127)  BP: 92/61 (12 May 2023 23:00) (68/51 - 126/77)  BP(mean): 67 (12 May 2023 23:00) (47 - 87)  RR: 29 (12 May 2023 23:00) (17 - 40)  SpO2: 100% (12 May 2023 23:00) (91% - 100%)    Parameters below as of 12 May 2023 22:00  Patient On (Oxygen Delivery Method): nasal cannula  O2 Flow (L/min): 3      PHYSICAL EXAM:  GENERAL: lethargic  CHEST/LUNG: Poor insp effort  HEART: S1S2 tachy  ABDOMEN: colostomy intact  EXTREMITIES: no edema  SKIN:     LABS:                          8.0    18.49 )-----------( 43       ( 12 May 2023 18:20 )             24.0                           8.0    7.02  )-----------( 50       ( 11 May 2023 04:56 )             25.1     05-12    140  |  107  |  50<H>  ----------------------------<  255<H>  3.6   |  20<L>  |  4.05<H>    Ca    8.4<L>      12 May 2023 04:20  Phos  2.2     05-12  Mg     2.5     05-11    TPro  x   /  Alb  2.2<L>  /  TBili  x   /  DBili  x   /  AST  x   /  ALT  x   /  AlkPhos  x   05-12 05-11    143  |  111<H>  |  74<H>  ----------------------------<  257<H>  4.2   |  16<L>  |  5.41<H>    Ca    8.9      11 May 2023 04:56  Phos  2.9     05-11  Mg     2.5     05-11    TPro  4.9<L>  /  Alb  1.6<L>  /  TBili  1.1  /  DBili  x   /  AST  240<H>  /  ALT  258<H>  /  AlkPhos  297<H>  05-11    PT/INR - ( 10 May 2023 11:25 )   PT: 12.6 sec;   INR: 1.09 ratio         PTT - ( 10 May 2023 11:25 )  PTT:33.0 sec    Magnesium, Serum: 2.5 mg/dL (05-11 @ 04:56)  Phosphorus Level, Serum: 2.9 mg/dL (05-11 @ 04:56)          RADIOLOGY & ADDITIONAL TESTS:

## 2023-05-12 NOTE — PROGRESS NOTE ADULT - ASSESSMENT
67 y/o M with a MHX of neuroendocrine tumor, likely of GI/pancreatic origin, c/w gastric outlet obstruction, s/p GJ tube, p/w hematemesis    # Neuroendocrine Tumor with Liver Metastases & Pancreatic Mass    -Primary Onc Dr Ac Llamas    - Initially diagnosed in 2015, p/w abdominal pain and nausea scans revealed a large 20 cm abdominal mass   - Started on Sandostatin, and completed Lutathera about 1 year ago ---> scans end of 2022 noted POD; started back on Lutathera 12/07/2022  - Admitted to  01/2023 and 4/21-4/27 with concern for gastric outlet obstruction  - EGD 01/10/23 with severe esophagitis, no obvious obstruction seen with no stenting done  - S/p GJ tube placement   - Seen by Dr Siddiqi 04/12/23 with ongoing GOC/ planning for a palliative procedure for a high gastric transection and Marcela-en-Y reconstruction; currently dependent on using J tube for feeds & G port to decompress; unable to tolerate PO intake   - CT AP 04/17/23 revealed diffuse bilobar liver metastasis which have progressed since 01/06/2023; large pancreatic mass, not significantly changed  - Per Primary Onc Dr Llamas patient received 6 doses of Lutathera, with plan to change treatment regimen to chemotherapy following palliative surgical procedure, likely distal gastrectomy per SurgOnc Dr Black.  - SurgOnc following ---> s/p distal pancreatectomy, splenectomy, partial colectomy and colostomy creation 05/05  - Patient remains critically ill in ICU requiring supportive care      # Normocytic Anemia in setting of Malignancy, Intraoperative Blood Loss and Previous Hematemesis     - Hgb down to 7.0 g/dL  - Patient had a 2.2L EBL; received 6U PRBC, 2FFP, 1 Platelets, 4L crystalloid intraoperatively and then 1U PRBC and 1U FFP post op ---> now pending additional 1U PRBC 05/12  - FOBT negative on 04/24  - Iron studies completed; low sat  - Likely 2/2 advanced malignancy, dehydration malnutrition, decreased feedings, GIB and hypovolemic / distributive shock  - Continue intensive care measures  - Transfuse blood as necessary; keep Hgb >7.5 g/dL and/or symptomatic       # Thrombocytopenia     - Plt 52K, low but stable  - Inflammation / infection vs drug-induced thrombocytopenia (non-immune) most common causes of acute decline in platelets; other causes include immune-mediated platelet destruction / ITP, bone marrow suppression, or platelet consumption / destruction, hemodilution due to massive transfusion (recent hypovolemic shock 2/2 blood loss during surgery 05/05)  - Labs inconsistent with DIC  - Heparin PF4 ab negative  - Folate and B12 normal  - Iron studies completed; low total and sat  - Hepatitis Panel and Rapid HIV negative  - No evidence of splenomegaly   - Etiology multifactorial in setting of acutely ill patient with underlying comorbidities including malignancy also following intraoperative blood loss  - Thrombocytopenia in this case likely to persist, currently stable  - Continue to trend serial CBCs using non EDTA anticoagulant platelet count, blue top  - Transfuse Plts as necessary; <10K or <20K with bleeding       # Hematemesis    - Likely from the malignancy and the ulcerated esophagus  - S/p EGD 01/10/23 reflux esophagitis with severely ulcerated and bleeding esophagus  - CT imaging 04/17 with diffuse bilobar liver metastasis which have progressed since 01/6/2023. Large pancreatic mass, not significantly changed.  - CT imaging 04/29 with no evidence of an active GI bleed. Large pancreatic mass and extensive hepatic metastases.  - GI consulted; will consider EGD if hematemesis persists ---> deferred       # ZO with Anuria    - Nephro following  - Started HD 05/05  - Has hx of light chain nephropathy   - Patient's wife requested not to proceed with HD catheter placement and dialysis until she can d/w Dr Siddiqi and rest of family      # Hepatic Encephalopathy    - Large tumor burden in liver  - S/p palliative surgery 05/05 with [facility]  - Currently receiving Rifaximin BID       67 y/o M with a MHX of neuroendocrine tumor, likely of GI/pancreatic origin, c/w gastric outlet obstruction, s/p GJ tube, p/w hematemesis    # Neuroendocrine Tumor with Liver Metastases & Pancreatic Mass    -Primary Onc Dr Ac Llamas    - Initially diagnosed in 2015, p/w abdominal pain and nausea scans revealed a large 20 cm abdominal mass   - Started on Sandostatin, and completed Lutathera about 1 year ago ---> scans end of 2022 noted POD; started back on Lutathera 12/07/2022  - Admitted to  01/2023 and 4/21-4/27 with concern for gastric outlet obstruction  - EGD 01/10/23 with severe esophagitis, no obvious obstruction seen with no stenting done  - S/p GJ tube placement   - Seen by Dr Siddiqi 04/12/23 with ongoing GOC/ planning for a palliative procedure for a high gastric transection and Marcela-en-Y reconstruction; currently dependent on using J tube for feeds & G port to decompress; unable to tolerate PO intake   - CT AP 04/17/23 revealed diffuse bilobar liver metastasis which have progressed since 01/06/2023; large pancreatic mass, not significantly changed  - Per Primary Onc Dr Llamas patient received 6 doses of Lutathera, with plan to change treatment regimen to chemotherapy following palliative surgical procedure, likely distal gastrectomy per SurgOnc Dr Black.  - SurgOnc following ---> s/p distal pancreatectomy, splenectomy, partial colectomy and colostomy creation 05/05  - Patient remains critically ill in ICU requiring supportive care      # Normocytic Anemia in setting of Malignancy, Intraoperative Blood Loss and Previous Hematemesis     - Hgb down to 7.0 g/dL  - Patient had a 2.2L EBL; received 6U PRBC, 2FFP, 1 Platelets, 4L crystalloid intraoperatively and then 1U PRBC and 1U FFP post op ---> now pending additional 1U PRBC 05/12  - FOBT negative on 04/24  - Iron studies completed; low sat  - Likely 2/2 advanced malignancy, dehydration malnutrition, decreased feedings, GIB and hypovolemic / distributive shock  - Continue intensive care measures  - Transfuse blood as necessary; keep Hgb >7.5 g/dL and/or symptomatic       # Thrombocytopenia     - Plt 52K, low but stable  - Inflammation / infection vs drug-induced thrombocytopenia (non-immune) most common causes of acute decline in platelets; other causes include immune-mediated platelet destruction / ITP, bone marrow suppression, or platelet consumption / destruction, hemodilution due to massive transfusion (recent hypovolemic shock 2/2 blood loss during surgery 05/05)  - Labs inconsistent with DIC (PT, PTT, INR normal, Fibrinogen normal)  - Heparin PF4 ab negative  - Folate and B12 normal  - Iron studies completed; low total and sat  - Hepatitis Panel and Rapid HIV negative  - No evidence of splenomegaly   - Etiology multifactorial in setting of acutely ill patient with underlying comorbidities including malignancy also following intraoperative blood loss  - Thrombocytopenia in this case likely to persist, currently stable  - Continue to trend serial CBCs using non EDTA anticoagulant platelet count, blue top  - Transfuse Plts as necessary; <10K or <20K with bleeding       # Hematemesis    - Likely from the malignancy and the ulcerated esophagus  - S/p EGD 01/10/23 reflux esophagitis with severely ulcerated and bleeding esophagus  - CT imaging 04/17 with diffuse bilobar liver metastasis which have progressed since 01/6/2023. Large pancreatic mass, not significantly changed.  - CT imaging 04/29 with no evidence of an active GI bleed. Large pancreatic mass and extensive hepatic metastases.  - GI consulted; will consider EGD if hematemesis persists ---> deferred       # ZO with Anuria    - Nephro following  - Started HD 05/05  - Has hx of light chain nephropathy   - Patient's wife requested not to proceed with HD catheter placement and dialysis until she can d/w Dr Siddiqi and rest of family      # Hepatic Encephalopathy    - Large tumor burden in liver  - S/p palliative surgery 05/05 with [facility]  - Currently receiving Rifaximin BID       67 y/o M with a MHX of neuroendocrine tumor, likely of GI/pancreatic origin, c/w gastric outlet obstruction, s/p GJ tube, p/w hematemesis    # Neuroendocrine Tumor with Liver Metastases & Pancreatic Mass    -Primary Onc Dr Ac Llamas    - Initially diagnosed in 2015, p/w abdominal pain and nausea scans revealed a large 20 cm abdominal mass   - Started on Sandostatin, and completed Lutathera about 1 year ago ---> scans end of 2022 noted POD; started back on Lutathera 12/07/2022  - Admitted to  01/2023 and 4/21-4/27 with concern for gastric outlet obstruction  - EGD 01/10/23 with severe esophagitis, no obvious obstruction seen with no stenting done  - S/p GJ tube placement   - Seen by Dr Siddiqi 04/12/23 with ongoing GOC/ planning for a palliative procedure for a high gastric transection and Marcela-en-Y reconstruction; currently dependent on using J tube for feeds & G port to decompress; unable to tolerate PO intake   - CT AP 04/17/23 revealed diffuse bilobar liver metastasis which have progressed since 01/06/2023; large pancreatic mass, not significantly changed  - Per Primary Onc Dr Llamas patient received 6 doses of Lutathera, with plan to change treatment regimen to chemotherapy following palliative surgical procedure, likely distal gastrectomy per SurgOnc Dr Black.  - SurgOnc following ---> s/p distal pancreatectomy, splenectomy, partial colectomy and colostomy creation 05/05  - Patient remains critically ill in ICU requiring supportive care  - Spoke with attending Dr Cunha who will reach out to Primary Onc Dr Llamas to speak with family      # Normocytic Anemia in setting of Malignancy, Intraoperative Blood Loss and Previous Hematemesis     - Hgb down to 7.0 g/dL  - Patient had a 2.2L EBL; received 6U PRBC, 2FFP, 1 Platelets, 4L crystalloid intraoperatively and then 1U PRBC and 1U FFP post op ---> now pending additional 1U PRBC 05/12  - FOBT negative on 04/24  - Iron studies completed; low sat  - Likely 2/2 advanced malignancy, dehydration malnutrition, decreased feedings, GIB and hypovolemic / distributive shock  - Continue intensive care measures  - Transfuse blood as necessary; keep Hgb >7.5 g/dL and/or symptomatic       # Thrombocytopenia     - Plt 52K, low but stable  - Inflammation / infection vs drug-induced thrombocytopenia (non-immune) most common causes of acute decline in platelets; other causes include immune-mediated platelet destruction / ITP, bone marrow suppression, or platelet consumption / destruction, hemodilution due to massive transfusion (recent hypovolemic shock 2/2 blood loss during surgery 05/05)  - Labs inconsistent with DIC (PT, PTT, INR normal, Fibrinogen normal)  - Heparin PF4 ab negative  - Folate and B12 normal  - Iron studies completed; low total and sat  - Hepatitis Panel and Rapid HIV negative  - No evidence of splenomegaly   - Etiology multifactorial in setting of acutely ill patient with underlying comorbidities including malignancy also following intraoperative blood loss  - Thrombocytopenia in this case likely to persist, currently stable  - Continue to trend serial CBCs using non EDTA anticoagulant platelet count, blue top  - Transfuse Plts as necessary; <10K or <20K with bleeding       # Hematemesis    - Likely from the malignancy and the ulcerated esophagus  - S/p EGD 01/10/23 reflux esophagitis with severely ulcerated and bleeding esophagus  - CT imaging 04/17 with diffuse bilobar liver metastasis which have progressed since 01/6/2023. Large pancreatic mass, not significantly changed.  - CT imaging 04/29 with no evidence of an active GI bleed. Large pancreatic mass and extensive hepatic metastases.  - GI consulted; will consider EGD if hematemesis persists ---> deferred       # ZO with Anuria    - Nephro following  - Started HD 05/05  - Has hx of light chain nephropathy   - Patient's wife requested not to proceed with HD catheter placement and dialysis until she can d/w Dr Siddiqi and rest of family      # Hepatic Encephalopathy    - Large tumor burden in liver  - S/p palliative surgery 05/05 with [facility]  - Currently receiving Rifaximin BID       67 y/o M with a MHX of neuroendocrine tumor, likely of GI/pancreatic origin, c/w gastric outlet obstruction, s/p GJ tube, p/w hematemesis    # Neuroendocrine Tumor with Liver Metastases & Pancreatic Mass    -Primary Onc Dr Ac Llamas    - Initially diagnosed in 2015, p/w abdominal pain and nausea scans revealed a large 20 cm abdominal mass   - Started on Sandostatin, and completed Lutathera about 1 year ago ---> scans end of 2022 noted POD; started back on Lutathera 12/07/2022  - Admitted to  01/2023 and 4/21-4/27 with concern for gastric outlet obstruction  - EGD 01/10/23 with severe esophagitis, no obvious obstruction seen with no stenting done  - S/p GJ tube placement   - Seen by Dr Siddiqi 04/12/23 with ongoing GOC / planning for a palliative procedure for a high gastric transection and Marcela-en-Y reconstruction; currently dependent on using J tube for feeds & G port to decompress; unable to tolerate PO intake   - CT AP 04/17/23 revealed diffuse bilobar liver metastasis which have progressed since 01/06/2023; large pancreatic mass, not significantly changed  - Per Primary Onc Dr Llamas patient received 6 doses of Lutathera, with plan to change treatment regimen to chemotherapy following palliative surgical procedure, likely distal gastrectomy per SurgOnc Dr Black.  - SurgOnc following ---> s/p distal pancreatectomy, splenectomy, partial colectomy and colostomy creation 05/04 & 05/05  - Patient remains critically ill in ICU requiring supportive care  - Spoke with attending Dr Cunha who will reach out to Primary Onc Dr Llamas to speak with family      # Normocytic Anemia in setting of Malignancy, Intraoperative Blood Loss and Previous Hematemesis     - Hgb down to 7.0 g/dL  - Patient had a 2.2L EBL; received 6U PRBC, 2FFP, 1 Platelets, 4L crystalloid intraoperatively and then 1U PRBC and 1U FFP post op ---> now pending additional 1U PRBC 05/12  - FOBT negative on 04/24  - Iron studies completed; low sat  - Likely 2/2 advanced malignancy, dehydration malnutrition, decreased feedings, GIB and hypovolemic / distributive shock  - Continue intensive care measures  - Transfuse blood as necessary; keep Hgb >7.5 g/dL and/or symptomatic       # Thrombocytopenia     - Plt 52K, low but stable  - Inflammation / infection vs drug-induced thrombocytopenia (non-immune) most common causes of acute decline in platelets; other causes include immune-mediated platelet destruction / ITP, bone marrow suppression, or platelet consumption / destruction, hemodilution due to massive transfusion (recent hypovolemic shock 2/2 blood loss during surgery 05/04)  - Labs inconsistent with DIC (PT, PTT, INR normal, Fibrinogen normal)  - Heparin PF4 ab negative  - Folate and B12 normal  - Iron studies completed; low total and sat  - Hepatitis Panel and Rapid HIV negative  - No evidence of splenomegaly   - Etiology multifactorial in setting of acutely ill patient with underlying comorbidities including malignancy also following intraoperative blood loss  - Thrombocytopenia in this case likely to persist, currently stable  - Continue to trend serial CBCs using non EDTA anticoagulant platelet count, blue top  - Transfuse Plts as necessary; <10K or <20K with bleeding       # Hematemesis    - Likely from the malignancy and the ulcerated esophagus  - S/p EGD 01/10/23 reflux esophagitis with severely ulcerated and bleeding esophagus  - CT imaging 04/17 with diffuse bilobar liver metastasis which have progressed since 01/6/2023. Large pancreatic mass, not significantly changed.  - CT imaging 04/29 with no evidence of an active GI bleed. Large pancreatic mass and extensive hepatic metastases.  - GI consulted; will consider EGD if hematemesis persists ---> deferred       # ZO with Anuria    - Nephro following  - Started HD 05/05  - Has hx of light chain nephropathy   - Patient's wife requested not to proceed with HD catheter placement and dialysis until she can d/w Dr Siddiqi and rest of family      # Hepatic Encephalopathy    - Large tumor burden in liver  - S/p palliative surgery 05/04 & 05/05 with [facility]  - Currently receiving Rifaximin BID       none

## 2023-05-12 NOTE — CHART NOTE - NSCHARTNOTEFT_GEN_A_CORE
Clinical Nutrition TF BRIEF NOTE    * 68 year old M PMHx metastatic neuroendocrine tumor of the pancreas, recently admitted with nonfunctioning GJ tube (patient is chronically obstructed). Patient has had severe heartburn, and has had several episodes of vomiting small amount of dark blood. Patient was discharged on Thursday. Hgb unchanged. CT angiography has no active bleeding, but demonstrates extensive tumor in distal pancreas. Pt originally in SICU, receiving TF via GJ tube but having frequent BM's. On  - pt went to OR  for a Open distal pancreatectomy, splenectomy, partial colectomy, partial L adrenalectomy, temporary abdominal closure. Remained intubated, on paralytics, pressors, anuric and transferred to ICU.  s/p abd closure and colostomy.  **TPN initiated on  2/2 suspected long NPO status s/p multiple GI surgeries, possible new placement of ?GJ tube for long-term   *: TPN ran until ~6 PM yesterday and then was stopped 2/2 hyperglycemia. POCT (180) this AM.  TF initiated via NGT yesterday, Vital 1.5 2/2 multiple GI surgeries and renal lytes WNL (K, Phos). Running @ 40 cc now - as per RN, 350 mL residuals noted this AM. D/w IDT, will re-start PN - no longer has central line so will resume w/ PPN.  Hold TF's for now, Dr. Mcclendon to f/u w/ surgery for long-term nutrition plan and possibly re-start TF pending residuals and surgery recs.  No HD today - on IVF for now, will d/c when PPN to start as per Dr. Mcclendon. Still w/ no UO.  Pt more awake today, ostomy functioning w/ increasing output.  See recs below for PPN.  *: PPN running until bag finishes. Reglan added and TF restarted this AM with minimal residuals. Tolerating at 40 cc/hr, will increase to goal rate slowly.  Will hold off on renewing PPN for now.  Will receive HD today.   POCT elevated, with continued hyperglycemia, on admelog. Will add lantus, discussed during IDR.  RD to follow TF tolerance.     *current status: pt vomited overnight, TF currently on hold. As per RN note, secretions appear to be TF - ? aspiration. Has been on reglan.  D/w MD Mcclendon, residuals remain low. Will sit up and re-start TF.   RD STRONGLY rec'd to confirm goals of care regarding LONG-TERM nutrition support as pt has been meeting <75% ENN since admit (x 14 days). TF has been shut off for intolerance/ HD/ procedures.   Received HD yesterday, still no UO.   Pt remains full code.     *labs reviewed:     140  |  107  |  50<H>  ----------------------------<  255<H>  3.6   |  20<L>  |  4.05<H>    Ca    8.4<L>      12 May 2023 04:20  Phos  2.2       Mg     2.5         TPro  x   /  Alb  2.2<L>  /  TBili  x   /  DBili  x   /  AST  x   /  ALT  x   /  AlkPhos  x       BMI: BMI (kg/m2): 24.8 (23 @ 04:00)  HbA1c: A1C with Estimated Average Glucose Result: 7.3 % (23 @ 06:35)    BP: 93/58 (05-10-23 @ 10:05) (74/54 - 127/88)  Lipid Panel: Date/Time: 23 @ 06:07  Triglycerides, Serum: 129    POCT Blood Glucose.: 247 mg/dL (12 May 2023 05:06)  POCT Blood Glucose.: 245 mg/dL (11 May 2023 23:49)  POCT Blood Glucose.: 271 mg/dL (11 May 2023 17:32)  POCT Blood Glucose.: 323 mg/dL (11 May 2023 12:34)    *I&O's Detail    11 May 2023 07:01  -  12 May 2023 07:00  --------------------------------------------------------  IN:    dextrose 5% w/ Additives: 1788 mL    Phenylephrine: 678 mL    TPN (Total Parenteral Nutrition): 802 mL    Vital1.5: 660 mL  Total IN: 3928 mL    OUT:    Bulb (mL): 560 mL    Colostomy (mL): 1175 mL  Total OUT: 1735 mL    Total NET: 2193 mL      12 May 2023 07:01  -  12 May 2023 09:46  --------------------------------------------------------  IN:    IV PiggyBack: 500 mL  Total IN: 500 mL    OUT:  Total OUT: 0 mL    Total NET: 500 mL    *BM documented:  - loose.     *malnutrition: Pt continues to meet criteria for severe protein-calorie malnutrition in context of chronic disease r/t decreased ability to meet increased nutrient needs 2/2 mets cancer AEB severe muscle/ fat wasting, TF meeting <75% ENN x >/=1 month, 19% wt loss x 5 mos    *ESTIMATED NUTR NEEDS:  based on 63.9 Kg (wt from  - bed scale taken by BRADY)  Calories: 2093-2214 Kcal (30-35 Kcal/Kg)  Protein:  g protein (1.5-2 g/Kg)  Fluids: 2980-8735 mL (25-30 mL/Kg)    Weight History:  No new wt   Daily Weight in k.5 (11 May 2023 05:00)  Daily Weight in k.8 (10 May 2023 06:00)  Height (cm): 170.2 (23 @ 08:11), 170.2 (23 @ 11:22)  Weight (kg): 71.9 (23 @ 04:00), 62.9 (23 @ 22:23)  BMI (kg/m2): 24.8 (23 @ 04:00), 21.7 (23 @ 08:11), 21.7 (23 @ 22:23)  BSA (m2): 1.83 (23 @ 04:00), 1.73 (23 @ 08:11), 1.73 (23 @ 22:23)    RECOMMENDATIONS:  1) Re-start TF order as per MD of Vital 1.5 @ 20 cc/hr, increase by 10 cc/hr q6 hours until goal rate of 65 cc/hr is met (total volume = 1300 mL) with 3 packets of Prosource TF. Will provide ~ 2070 kcal, 121 g protein, and 993 mL free water. **resume as per intensivist and surgery  2) Daily weights  3) Strict I & O's  4) Daily lyte checks including magnesium and phos  5) Weekly triglycerides/LFT checks  6) POCT q6hrs; maintain 140-180mg/dL - may need to add LANTUS to insulin to PN bag  7) Confirm goals of care regarding nutrition support - will need long-term feeding tube ? new GJ tube vs PEG tube ?  8) Monitor TF tolerance closely - Maintain STRICT aspiration precautions, back of bed >45 degrees. Re-start PPN/ TPN (pending access and if within GOC) if continues to not tolerate TF    *will continue to monitor and adjust treatment plan prn*  Fabby Waller, MS, RDN, CNSC, [phone]  Certified Nutrition

## 2023-05-12 NOTE — PROGRESS NOTE ADULT - SUBJECTIVE AND OBJECTIVE BOX
Events Overnight:  Patient on 1.5 of phenylephrine, hgb dec ezra 7, from 8, no active bleeding, fevers to 101  HPI:       67 yo male with PMHx metastatic neuroendocrine tumor of the pancreas, recently admitted with nonfunctioning GJ tube (patient is chronically obstructed).   Patient was admitted with severe heartburn, and has had several episodes of vomiting small amount of dark blood. Patient was discharged on Thursday. Hgb unchanged. CT angiography has no active bleeding, but demonstrates extensive tumor in distal pancreas.     5/4: Patient went to the OR today for a Open distal pancreatectomy, splenectomy, partial colectomy, partial L adrenalectomy, temporary abdominal closure.  Patient had a 2.2 L EBL.  Intra Op had 6U PRBC, 2FFP, 1 Platelets, 4L crystalloid  Post Op 1U PRBC ans 1 U FFP.  Post OP vented, with poor UO, on levo and Vaso.    5/5:  went to OR and had closure and colostomy   5/12   remains lethargic, encephalopathic, on pressors, dec hgb was diayzed yesterday, 560 cc from bulb, had ? episode emesis overnight     PMH:       as above    MEDICATIONS  (STANDING):  chlorhexidine 4% Liquid 1 Application(s) Topical <User Schedule>  dextrose 5% 1000 milliLiter(s) (75 mL/Hr) IV Continuous <Continuous>  dextrose 5%. 1000 milliLiter(s) (100 mL/Hr) IV Continuous <Continuous>  dextrose 5%. 1000 milliLiter(s) (50 mL/Hr) IV Continuous <Continuous>  dextrose 50% Injectable 12.5 Gram(s) IV Push once  dextrose 50% Injectable 25 Gram(s) IV Push once  dextrose 50% Injectable 25 Gram(s) IV Push once  glucagon  Injectable 1 milliGRAM(s) IntraMuscular once  heparin   Injectable 5000 Unit(s) SubCutaneous every 12 hours  insulin glargine Injectable (LANTUS) 25 Unit(s) SubCutaneous at bedtime  insulin lispro (ADMELOG) corrective regimen sliding scale   SubCutaneous every 6 hours  metoclopramide Injectable 5 milliGRAM(s) IV Push every 6 hours  pantoprazole  Injectable 40 milliGRAM(s) IV Push every 12 hours  phenylephrine    Infusion 0.2 MICROgram(s)/kG/Min (5.39 mL/Hr) IV Continuous <Continuous>  piperacillin/tazobactam IVPB. 3.375 Gram(s) IV Intermittent once  piperacillin/tazobactam IVPB.- 3.375 Gram(s) IV Intermittent once  rifAXIMin 550 milliGRAM(s) Oral two times a day  vancomycin  IVPB 1000 milliGRAM(s) IV Intermittent once    MEDICATIONS  (PRN):  dextrose Oral Gel 15 Gram(s) Oral once PRN Blood Glucose LESS THAN 70 milliGRAM(s)/deciliter  ondansetron Injectable 4 milliGRAM(s) IV Push every 6 hours PRN Nausea and/or Vomiting  sodium chloride 0.9% lock flush 10 milliLiter(s) IV Push every 1 hour PRN Pre/post blood products, medications, blood draw, and to maintain line patency    ICU Vital Signs Last 24 Hrs  T(C): 38.3 (12 May 2023 05:00), Max: 38.6 (12 May 2023 04:00)  T(F): 100.9 (12 May 2023 05:00), Max: 101.4 (12 May 2023 04:00)  HR: 121 (12 May 2023 07:00) (99 - 144)  BP: 95/49 (12 May 2023 07:00) (81/56 - 126/77)  BP(mean): 58 (12 May 2023 07:00) (56 - 87)  ABP: --  ABP(mean): --  RR: 26 (12 May 2023 07:00) (16 - 33)  SpO2: 96% (12 May 2023 07:00) (85% - 100%)    O2 Parameters below as of 12 May 2023 07:00  Patient On (Oxygen Delivery Method): nasal cannula  O2 Flow (L/min): 3    Physical exam    General - weak, lethargivc  neuro lethargic confused  HEENT - nc/at NG tube in place  neck supple  lungs - clear  cv rrr  abdomen - dressing dry, colostomy functioning , silvio with copioius output, amylase was normal   voiding minimal urine output  Extremity trace edema      I&O's Summary    11 May 2023 07:01  -  12 May 2023 07:00  --------------------------------------------------------  IN: 3928 mL / OUT: 1735 mL / NET: 2193 mL                          7.0    5.44  )-----------( 52       ( 12 May 2023 04:20 )             20.4       05-12    140  |  107  |  50<H>  ----------------------------<  255<H>  3.6   |  20<L>  |  4.05<H>    Ca    8.4<L>      12 May 2023 04:20  Phos  2.2     05-12  Mg     2.5     05-11    TPro  x   /  Alb  2.2<L>  /  TBili  x   /  DBili  x   /  AST  x   /  ALT  x   /  AlkPhos  x   05-12      DVT Prophylaxis:    Heparin sq                                                             Advanced Directives: Full Code d/w wife yesterday

## 2023-05-12 NOTE — CONSULT NOTE ADULT - SUBJECTIVE AND OBJECTIVE BOX
Patient is a 68y old  Male who presents with a chief complaint of GIB (12 May 2023 10:43)    HPI:  S:    Pt seen and examined  HD # 1  FULL CODE  PMHx  metastatic neuroendocrine tumor of the pancreas, recently admitted with nonfunctioning GJ tube (patient is chronically obstructed).   Patient has had severe heartburn, and has had several episodes of vomiting small amount of dark blood. Patient was discharged on Thursday. Hb unchanged. CT angiography has no active bleeding, but demonstrates extensive tumor in distal pancreas.     ICU consult for UGIB      PMH: as above  PSH: as above  Meds: per reconciliation sheet, noted below  MEDICATIONS  (STANDING):  chlorhexidine 4% Liquid 1 Application(s) Topical <User Schedule>  dextrose 5% 1000 milliLiter(s) (75 mL/Hr) IV Continuous <Continuous>  dextrose 5%. 1000 milliLiter(s) (50 mL/Hr) IV Continuous <Continuous>  dextrose 5%. 1000 milliLiter(s) (100 mL/Hr) IV Continuous <Continuous>  dextrose 50% Injectable 12.5 Gram(s) IV Push once  dextrose 50% Injectable 25 Gram(s) IV Push once  dextrose 50% Injectable 25 Gram(s) IV Push once  glucagon  Injectable 1 milliGRAM(s) IntraMuscular once  heparin   Injectable 5000 Unit(s) SubCutaneous every 12 hours  insulin glargine Injectable (LANTUS) 25 Unit(s) SubCutaneous at bedtime  insulin lispro (ADMELOG) corrective regimen sliding scale   SubCutaneous every 6 hours  metoclopramide Injectable 5 milliGRAM(s) IV Push every 6 hours  pantoprazole  Injectable 40 milliGRAM(s) IV Push every 12 hours  phenylephrine    Infusion 0.2 MICROgram(s)/kG/Min (5.39 mL/Hr) IV Continuous <Continuous>  piperacillin/tazobactam IVPB.- 3.375 Gram(s) IV Intermittent once  rifAXIMin 550 milliGRAM(s) Oral two times a day    Allergies    losartan (Angioedema)    Intolerances      Social: no smoking, no alcohol, no illegal drugs; no recent travel, no exposure to TB  FAMILY HISTORY:  FH: breast cancer (Mother)    FH: aneurysm (Father)       no history of premature cardiovascular disease in first degree relatives    ROS: the patient denies fever, no chills, no HA, no dizziness, no sore throat, no blurry vision, no CP, no palpitations, no SOB, no cough, no abdominal pain, no diarrhea, no N/V, no dysuria, no leg pain, no claudication, no rash, no joint aches, no rectal pain or bleeding, no night sweats    All other systems reviewed and are negative    Vital Signs Last 24 Hrs  T(C): 37.2 (12 May 2023 12:20), Max: 38.6 (12 May 2023 04:00)  T(F): 99 (12 May 2023 12:20), Max: 101.4 (12 May 2023 04:00)  HR: 88 (12 May 2023 13:00) (88 - 144)  BP: 96/63 (12 May 2023 13:00) (68/51 - 126/77)  BP(mean): 69 (12 May 2023 13:00) (47 - 87)  RR: 18 (12 May 2023 13:00) (16 - 40)  SpO2: 99% (12 May 2023 13:00) (85% - 100%)    Parameters below as of 12 May 2023 13:00  Patient On (Oxygen Delivery Method): nasal cannula  O2 Flow (L/min): 3    Daily     Daily     PE:  Constitutional: frail looking  HEENT: NC/AT, EOMI, PERRLA, conjunctivae clear; ears and nose atraumatic; pharynx benign  Neck: supple; thyroid not palpable  Back: no tenderness  Respiratory: respiratory effort normal; clear to auscultation  Cardiovascular: S1S2 regular, no murmurs  Abdomen: soft, not tender, not distended, positive BS; liver and spleen WNL  Genitourinary: no suprapubic tenderness  Lymphatic: no LN palpable  Musculoskeletal: no muscle tenderness, no joint swelling or tenderness  Extremities: no pedal edema  Neurological/ Psychiatric:  moving all extremities  Skin: no rashes; no palpable lesions    Labs: all available labs reviewed                        7.0    5.44  )-----------( 52       ( 12 May 2023 04:20 )             20.4     05-12    140  |  107  |  50<H>  ----------------------------<  255<H>  3.6   |  20<L>  |  4.05<H>    Ca    8.4<L>      12 May 2023 04:20  Phos  2.2     05-12  Mg     2.5     05-11    TPro  x   /  Alb  2.2<L>  /  TBili  x   /  DBili  x   /  AST  x   /  ALT  x   /  AlkPhos  x   05-12     LIVER FUNCTIONS - ( 12 May 2023 04:20 )  Alb: 2.2 g/dL / Pro: x     / ALK PHOS: x     / ALT: x     / AST: x     / GGT: x                 Radiology: all available radiological tests reviewed  < from: Xray Chest 1 View- PORTABLE-Urgent (Xray Chest 1 View- PORTABLE-Urgent .) (05.11.23 @ 13:48) >    ACC: [id number] EXAM:  XR CHEST PORTABLE URGENT 1V   ORDERED BY: PRIYA HALL     PROCEDURE DATE:  05/11/2023          INTERPRETATION:  INDICATION: Dialysis catheter insertion    Portable chest 1:28 PM    COMPARISON: 5/6/2023    FINDINGS:  Heart/Vascular: The heart size, mediastinum, hilum and aorta are stable.  Pulmonary: Midline trachea. There is mild pulmonary venous congestion and   low lung volumes.    Bones: There is no fracture.  Lines and catheter: Tip of the right IJ dialysis catheteris in the right   atrium. Tip and side-port of NG tube are in the body of the stomach.    Impression:    There is mild pulmonary venous congestion and low lung volumes.    Tip of the right IJ dialysis catheter is in the right atrium. There is no   pneumothorax.    --- End of Report ---      < end of copied text >  < from: US Kidney and Bladder (05.08.23 @ 15:51) >    ACC: [id number] EXAM:  US KIDNEYS AND BLADDER   ORDERED BY: GWENDOLYN SINGH     PROCEDURE DATE:  05/08/2023          INTERPRETATION:  CLINICAL INFORMATION: History of gastric cancer.   Evaluate for renal injury.    COMPARISON: CT dated 04/29/2023    TECHNIQUE: Sonography of the kidneys and bladder.    FINDINGS:  Right kidney: 8.7 cm. No renal mass, hydronephrosis or calculi.    Left kidney: 8.5 cm. No renal mass, hydronephrosis or calculi.    Urinary bladder: Urinary bladder is contracted limiting its evaluation.    Incidentally noted are diffuse hepatic metastases as noted on the prior   CT.    IMPRESSION:  No hydronephrosis or renal mass is identified to suggest renal injury.   Please note that contrast enhanced CT is more sensitive for detection of   renal injury.    Hepatic metastasis.        < end of copied text >    Advanced directives addressed: full resuscitation     67 yo male with PMHx metastatic neuroendocrine tumor of the pancreas, recently admitted with nonfunctioning GJ tube (patient is chronically obstructed). Patient was admitted with severe heartburn, and has had several episodes of vomiting small amount of dark blood. Patient was discharged on Thursday. Hgb unchanged. CT angiography has no active bleeding, but demonstrates extensive tumor in distal pancreas.  Patient went to the OR 5/4 for a Open distal pancreatectomy, splenectomy, partial colectomy, partial L adrenalectomy, temporary abdominal closure.  Patient had a 2.2 L EBL.  Intra Op had 6U PRBC, 2FFP, 1 Platelets, 4L crystalloid  Post Op 1U PRBC ans 1 U FFP.  Post OP vented, with poor UO, on levo and Vaso.  5/5 Pt went back to OR and had closure and colostomy. Hospital course c/b  fever lethargy, encephalopathic, on pressors, dec hgb was diayzed 5/11 560 cc from bulb, had ? episode emesis overnight, imaging shows some congestion, concern for asp pna raised started on IV vancomycin/zosyn.     PMH: as above  PSH: as above    Meds: per reconciliation sheet, noted below  MEDICATIONS  (STANDING):  chlorhexidine 4% Liquid 1 Application(s) Topical <User Schedule>  dextrose 5% 1000 milliLiter(s) (75 mL/Hr) IV Continuous <Continuous>  dextrose 5%. 1000 milliLiter(s) (50 mL/Hr) IV Continuous <Continuous>  dextrose 5%. 1000 milliLiter(s) (100 mL/Hr) IV Continuous <Continuous>  dextrose 50% Injectable 12.5 Gram(s) IV Push once  dextrose 50% Injectable 25 Gram(s) IV Push once  dextrose 50% Injectable 25 Gram(s) IV Push once  glucagon  Injectable 1 milliGRAM(s) IntraMuscular once  heparin   Injectable 5000 Unit(s) SubCutaneous every 12 hours  insulin glargine Injectable (LANTUS) 25 Unit(s) SubCutaneous at bedtime  insulin lispro (ADMELOG) corrective regimen sliding scale   SubCutaneous every 6 hours  metoclopramide Injectable 5 milliGRAM(s) IV Push every 6 hours  pantoprazole  Injectable 40 milliGRAM(s) IV Push every 12 hours  phenylephrine    Infusion 0.2 MICROgram(s)/kG/Min (5.39 mL/Hr) IV Continuous <Continuous>  piperacillin/tazobactam IVPB.- 3.375 Gram(s) IV Intermittent once  rifAXIMin 550 milliGRAM(s) Oral two times a day    Allergies    losartan (Angioedema)    Intolerances      Social: no smoking, no alcohol, no illegal drugs; no recent travel, no exposure to TB  FAMILY HISTORY:  FH: breast cancer (Mother)    FH: aneurysm (Father)       no history of premature cardiovascular disease in first degree relatives    ROS: unable to obtain d/t medical condition    All other systems reviewed and are negative    Vital Signs Last 24 Hrs  T(C): 37.2 (12 May 2023 12:20), Max: 38.6 (12 May 2023 04:00)  T(F): 99 (12 May 2023 12:20), Max: 101.4 (12 May 2023 04:00)  HR: 88 (12 May 2023 13:00) (88 - 144)  BP: 96/63 (12 May 2023 13:00) (68/51 - 126/77)  BP(mean): 69 (12 May 2023 13:00) (47 - 87)  RR: 18 (12 May 2023 13:00) (16 - 40)  SpO2: 99% (12 May 2023 13:00) (85% - 100%)    Parameters below as of 12 May 2023 13:00  Patient On (Oxygen Delivery Method): nasal cannula  O2 Flow (L/min): 3    Daily     Daily     PE:  Constitutional: frail looking  HEENT: NC/AT, EOMI, PERRLA, conjunctivae clear; ears and nose atraumatic; pharynx benign  Neck: supple; thyroid not palpable  Back: no tenderness  Respiratory: decreased breath sounds, rales   Cardiovascular: S1S2 regular, no murmurs  Abdomen: soft, not tender, not distended, positive BS; + ostomy + peg tube   Genitourinary: no suprapubic tenderness  Lymphatic: no LN palpable  Musculoskeletal: no muscle tenderness, no joint swelling or tenderness  Extremities: no pedal edema  Neurological/ Psychiatric: no focal deficits   Skin: no rashes; no palpable lesions    Labs: all available labs reviewed                        7.0    5.44  )-----------( 52       ( 12 May 2023 04:20 )             20.4     05-12    140  |  107  |  50<H>  ----------------------------<  255<H>  3.6   |  20<L>  |  4.05<H>    Ca    8.4<L>      12 May 2023 04:20  Phos  2.2     05-12  Mg     2.5     05-11    TPro  x   /  Alb  2.2<L>  /  TBili  x   /  DBili  x   /  AST  x   /  ALT  x   /  AlkPhos  x   05-12     LIVER FUNCTIONS - ( 12 May 2023 04:20 )  Alb: 2.2 g/dL / Pro: x     / ALK PHOS: x     / ALT: x     / AST: x     / GGT: x                 Radiology: all available radiological tests reviewed  < from: Xray Chest 1 View- PORTABLE-Urgent (Xray Chest 1 View- PORTABLE-Urgent .) (05.11.23 @ 13:48) >    ACC: [id number] EXAM:  XR CHEST PORTABLE URGENT 1V   ORDERED BY: PRIYA HALL     PROCEDURE DATE:  05/11/2023          INTERPRETATION:  INDICATION: Dialysis catheter insertion    Portable chest 1:28 PM    COMPARISON: 5/6/2023    FINDINGS:  Heart/Vascular: The heart size, mediastinum, hilum and aorta are stable.  Pulmonary: Midline trachea. There is mild pulmonary venous congestion and   low lung volumes.    Bones: There is no fracture.  Lines and catheter: Tip of the right IJ dialysis catheteris in the right   atrium. Tip and side-port of NG tube are in the body of the stomach.    Impression:    There is mild pulmonary venous congestion and low lung volumes.    Tip of the right IJ dialysis catheter is in the right atrium. There is no   pneumothorax.    --- End of Report ---      < end of copied text >  < from: US Kidney and Bladder (05.08.23 @ 15:51) >    ACC: [id number] EXAM:  US KIDNEYS AND BLADDER   ORDERED BY: GWENDOLYN SINGH     PROCEDURE DATE:  05/08/2023          INTERPRETATION:  CLINICAL INFORMATION: History of gastric cancer.   Evaluate for renal injury.    COMPARISON: CT dated 04/29/2023    TECHNIQUE: Sonography of the kidneys and bladder.    FINDINGS:  Right kidney: 8.7 cm. No renal mass, hydronephrosis or calculi.    Left kidney: 8.5 cm. No renal mass, hydronephrosis or calculi.    Urinary bladder: Urinary bladder is contracted limiting its evaluation.    Incidentally noted are diffuse hepatic metastases as noted on the prior   CT.    IMPRESSION:  No hydronephrosis or renal mass is identified to suggest renal injury.   Please note that contrast enhanced CT is more sensitive for detection of   renal injury.    Hepatic metastasis.        < end of copied text >    Advanced directives addressed: full resuscitation

## 2023-05-12 NOTE — PROGRESS NOTE ADULT - ASSESSMENT
69 yo man with Hx of Neuroendocrine tumor/Pancreatic mass and liver mets.  Now for palliative resection of pancreatic mass.    --ZO with variable creat level and unclear CKD.    Likely dependent on volume status.    Limited intake even with Enteral feeding.      Start gentle hydration.  -- : continue Hale drainage.  --Hx of light chain nephropathy  --No contraindication for surgery from renal standpoint.    5/4  s/p Palliative surgery for neuroendocrine mass  will watch for ZO, pt with low UO immediate post op  s/p multiple PRBC and FFP transfusion and volume resuscitation    5/5  patient anuric, K 5.2  On bicarb drip  to go back to OR today  case d/w Dr Jean and Dr Siddiqi  Will HD x 2 hrs to correct potassium  >10 L infused in blood products and fluids  More fluids not indicated, UO 50 ml  no fluid removal on HD  Hep profile ordered    5/6  Ostomy created  will monitor i/o  labs stable   no need for HD today  d/w intensivist  will monitor daily for hd requirement    5/7  seen earlier on HD   tolerating well  TPN  d/w Dr Jean  d/w pts fam at bedside    5/8 MK   - ZO with anuria, remains HD dependent with hx of light chain nephropathy    tolerating hd     dc femoral shiley today after hd   - enceph monitor response to hd and xifaxan and lactulose    5/9 MK   - ZO with anuria, remains HD dependent with hx of light chain nephropathy    no indication for hd    off pressor     monitor electrolytes on TPN     NG in place for TF initiation   - enceph, improving monitor response to hd and xifaxan and lactulose    ostomy fx with liquid stool  dw dr marley     5/10 SY  --ZO : remains oligoanuric.     Follow with repeat bladder scans--no retention.    Will hold HD today and assess daily.    Trial of gentle hydration due to increased fluid loss.  --Metabolic acidosis : trial of HCO3 in addition to TPN   --Monitor encephalopathy.    5/11 SY  --ZO : remains oligoanuric.    Renal parameters further increased with no improvement in acidosis with HCO3 infusion.    D/w pt's wife over phone.    Explained his poor prognosis from Oncology aspect due to large tumor burden in liver and that surgery was palliative in nature.    Explained added burden of continued dialysis adding to pt's suffering at this point.    Pt's wife requested not to proceed with HD catheter placement and dialysis until she can d/w Dr Siddiqi and rest of family.    Will discuss further later today.    5/12  HD yesterday, planned for HD Sat  case d/w family, and ID, pts nurse  Dr Velez covering this weekend

## 2023-05-12 NOTE — PROGRESS NOTE ADULT - ASSESSMENT
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  S/P distal pancreatectomy, splenectomy, partial colectomy  S/P abdominal washout and colostomy creation  now with aspiration pneumonia    Plan:  intubated and sedate  NGT   hold tube feeds for now  monitor ostomy output  Critical care management as per ICU team  recommend dialysis to clear metabolites causing altered mental status  TPN    Plan discussed with Dr Black

## 2023-05-12 NOTE — CONSULT NOTE ADULT - ASSESSMENT
69 yo male with PMHx metastatic neuroendocrine tumor of the pancreas, recently admitted with nonfunctioning GJ tube (patient is chronically obstructed). Patient was admitted with severe heartburn, and has had several episodes of vomiting small amount of dark blood. Patient was discharged on Thursday. Hgb unchanged. CT angiography has no active bleeding, but demonstrates extensive tumor in distal pancreas.  Patient went to the OR 5/4 for a Open distal pancreatectomy, splenectomy, partial colectomy, partial L adrenalectomy, temporary abdominal closure.  Patient had a 2.2 L EBL.  Intra Op had 6U PRBC, 2FFP, 1 Platelets, 4L crystalloid  Post Op 1U PRBC ans 1 U FFP.  Post OP vented, with poor UO, on levo and Vaso.  5/5 Pt went back to OR and had closure and colostomy. Hospital course c/b  fever lethargy, encephalopathic, on pressors, dec hgb was diayzed 5/11 560 cc from bulb, had ? episode emesis overnight, imaging shows some congestion, concern for asp pna raised started on IV vancomycin/zosyn.    1. Septic shock. Aspiration pneumonitis/pneumonia. Metastatic neuroendocrine pancreatic tumor s/p surgery. ZO  - imaging reviewed  - CT chest when able  - agree with IV zosyn [id number] renally dose adjusted   - s/p vancomycin x 1 f/u level and re-dose  - monitor temps  - tolerating abx well so far; no side effects noted  - reason for abx use and side effects reviewed with patient  - aspiration precautions  - on ppn/tube feeds  - fu cbc    2. other issues - care per medicine

## 2023-05-13 NOTE — PROGRESS NOTE ADULT - SUBJECTIVE AND OBJECTIVE BOX
Date of service: 05-13-23 @ 10:25    pt seen and examined   blood cultures growing enterobacter  temps down  on pressors  lethargic, encephalopathic  has drainage from midline abd wound      ROS: unable to obtain d/t medical condition    MEDICATIONS  (STANDING):  chlorhexidine 4% Liquid 1 Application(s) Topical <User Schedule>  dextrose 5% 1000 milliLiter(s) (75 mL/Hr) IV Continuous <Continuous>  dextrose 5%. 1000 milliLiter(s) (50 mL/Hr) IV Continuous <Continuous>  dextrose 5%. 1000 milliLiter(s) (100 mL/Hr) IV Continuous <Continuous>  dextrose 50% Injectable 12.5 Gram(s) IV Push once  dextrose 50% Injectable 25 Gram(s) IV Push once  dextrose 50% Injectable 25 Gram(s) IV Push once  glucagon  Injectable 1 milliGRAM(s) IntraMuscular once  insulin glargine Injectable (LANTUS) 35 Unit(s) SubCutaneous at bedtime  insulin lispro (ADMELOG) corrective regimen sliding scale   SubCutaneous every 6 hours  metoclopramide Injectable 5 milliGRAM(s) IV Push every 6 hours  midodrine 10 milliGRAM(s) Oral every 8 hours  pantoprazole  Injectable 40 milliGRAM(s) IV Push every 12 hours  phenylephrine    Infusion 0.2 MICROgram(s)/kG/Min (5.39 mL/Hr) IV Continuous <Continuous>  piperacillin/tazobactam IVPB.. 3.375 Gram(s) IV Intermittent every 12 hours  rifAXIMin 550 milliGRAM(s) Oral two times a day    Vital Signs Last 24 Hrs  T(C): 37.6 (13 May 2023 12:00), Max: 37.6 (13 May 2023 12:00)  T(F): 99.6 (13 May 2023 12:00), Max: 99.6 (13 May 2023 12:00)  HR: 98 (13 May 2023 13:15) (80 - 109)  BP: 92/60 (13 May 2023 13:15) (77/50 - 113/64)  BP(mean): 66 (13 May 2023 13:15) (56 - 85)  RR: 25 (13 May 2023 13:15) (17 - 34)  SpO2: 99% (13 May 2023 13:15) (90% - 100%)    Parameters below as of 13 May 2023 13:15  Patient On (Oxygen Delivery Method): nasal cannula  O2 Flow (L/min): 3    PE:  Constitutional: frail looking  HEENT: NC/AT, EOMI, PERRLA, conjunctivae clear; ears and nose atraumatic; pharynx benign  Neck: supple; thyroid not palpable  Back: no tenderness  Respiratory: decreased breath sounds, rales   Cardiovascular: S1S2 regular, no murmurs  Abdomen: soft, not tender, not distended, positive BS; + ostomy + peg tube midline abd wound with drainage  Genitourinary: no suprapubic tenderness  Lymphatic: no LN palpable  Musculoskeletal: no muscle tenderness, no joint swelling or tenderness  Extremities: no pedal edema  Neurological/ Psychiatric: no focal deficits   Skin: no rashes; no palpable lesions    Labs: all available labs reviewed                                   7.5    16.65 )-----------( 42       ( 13 May 2023 05:18 )             22.4     05-13    140  |  105  |  57<H>  ----------------------------<  282<H>  3.3<L>   |  22  |  4.45<H>    Ca    8.7      13 May 2023 05:18  Phos  2.2     05-12    TPro  4.7<L>  /  Alb  1.8<L>  /  TBili  1.5<H>  /  DBili  x   /  AST  175<H>  /  ALT  133<H>  /  AlkPhos  182<H>  05-13    Culture - Blood (05.12.23 @ 09:01)   - Enterobacter cloacae complex: Detec  Gram Stain: Culture - Blood (05.12.23 @ 08:59)   Gram Stain:   Growth in aerobic bottle: Gram Negative Rods   Growth in anaerobic bottle: Gram Negative Rods  Specimen Source: .Blood None  Culture Results:   Growth in aerobic bottle: Gram Negative Rods   Growth in anaerobic bottle: Gram Negative Rods       Radiology: all available radiological tests reviewed    < from: Xray Chest 1 View- PORTABLE-Urgent (Xray Chest 1 View- PORTABLE-Urgent .) (05.11.23 @ 13:48) >    ACC: [id number] EXAM:  XR CHEST PORTABLE URGENT 1V   ORDERED BY: PRIYA HALL     PROCEDURE DATE:  05/11/2023          INTERPRETATION:  INDICATION: Dialysis catheter insertion    Portable chest 1:28 PM    COMPARISON: 5/6/2023    FINDINGS:  Heart/Vascular: The heart size, mediastinum, hilum and aorta are stable.  Pulmonary: Midline trachea. There is mild pulmonary venous congestion and   low lung volumes.    Bones: There is no fracture.  Lines and catheter: Tip of the right IJ dialysis catheteris in the right   atrium. Tip and side-port of NG tube are in the body of the stomach.    Impression:    There is mild pulmonary venous congestion and low lung volumes.    Tip of the right IJ dialysis catheter is in the right atrium. There is no   pneumothorax.          < end of copied text >  < from: US Kidney and Bladder (05.08.23 @ 15:51) >    ACC: [id number] EXAM:  US KIDNEYS AND BLADDER   ORDERED BY: GWENDOLYN SINGH     PROCEDURE DATE:  05/08/2023          INTERPRETATION:  CLINICAL INFORMATION: History of gastric cancer.   Evaluate for renal injury.    COMPARISON: CT dated 04/29/2023    TECHNIQUE: Sonography of the kidneys and bladder.    FINDINGS:  Right kidney: 8.7 cm. No renal mass, hydronephrosis or calculi.    Left kidney: 8.5 cm. No renal mass, hydronephrosis or calculi.    Urinary bladder: Urinary bladder is contracted limiting its evaluation.    Incidentally noted are diffuse hepatic metastases as noted on the prior   CT.    IMPRESSION:  No hydronephrosis or renal mass is identified to suggest renal injury.   Please note that contrast enhanced CT is more sensitive for detection of   renal injury.    Hepatic metastasis.        < end of copied text >    Advanced directives addressed: full resuscitation

## 2023-05-13 NOTE — PROGRESS NOTE ADULT - ASSESSMENT
67 yo male with PMHx metastatic neuroendocrine tumor of the pancreas, recently admitted with nonfunctioning GJ tube  due to external mechanical  obstruction  P    now Post Op  after an open distal pancreatectomy, splenectomy, partial colectomy, partial L adrenalectomy,colostomy  With Post Op distributive Shock  oliguria/renal failure now  requiring dialysis  now back on pressors septic shock gram negative bacteremmia  hepatic encephalopathy    PLAN:  ICU    PULM: inc O2, ? aspiration hypoxia , chest pt    Cardio:  back on pressors, track fluid loses from ileostomy, on phenylephrine,septic shock    Renal:   HD gentle hydration, dialysis  possible today    GI:  reglan was added yesterday for high residual, residuals ok, keep upright for feeds     TAYLA with copoious drainage, amylase normal,      given positive blood culture will get ct abdomen pelvis  Endo cortical level was ok, sliding scale for hyperglycemia, increase Lantus  Heme - thrombocytopenia from sepsis, d/c heparine subq till recovers  Neurometabolic encephalopthy  Profoundly ill from illness    ID febrile, Zosyn, check sensitivies gram negative rods  Full Code d/w wife   69 yo male with PMHx metastatic neuroendocrine tumor of the pancreas, recently admitted with nonfunctioning GJ tube  due to external mechanical  obstruction  P    now Post Op  after an open distal pancreatectomy, splenectomy, partial colectomy, partial L adrenalectomy,colostomy  With Post Op distributive Shock  oliguria/renal failure now  requiring dialysis  now back on pressors septic shock gram negative bacteremmia  hepatic encephalopathy    PLAN:  ICU    PULM: inc O2, ? aspiration hypoxia , chest pt    Cardio:  back on pressors, track fluid loses from ileostomy, on phenylephrine,septic shock    Renal:   HD gentle hydration, dialysis  possible today    GI:  reglan was added yesterday for high residual, residuals ok, keep upright for feeds     TAYLA with copoious drainage, amylase normal,      given positive blood culture will get ct abdomen pelvis     foul draiange from abdominal wound, surgery team notified  Endo cortical level was ok, sliding scale for hyperglycemia, increase Lantus  Heme - thrombocytopenia from sepsis, d/c heparine subq till recovers  Neurometabolic encephalopthy  Profoundly ill from illness    ID febrile, Zosyn, check sensitivies gram negative rods  Full Code d/w wife   Completed Therapy?: No

## 2023-05-13 NOTE — PROGRESS NOTE ADULT - SUBJECTIVE AND OBJECTIVE BOX
Patient is a 68y old  Male who presents with a chief complaint of GIB (12 May 2023 23:10)      BRIEF HOSPITAL COURSE: 69 yo male with PMHx metastatic neuroendocrine tumor of the pancreas, recently admitted with nonfunctioning GJ tube (patient is chronically obstructed). During hospitalization had a CT angiography has no active bleeding, but demonstrates extensive tumor in distal pancreas. Went to OR on 4/4 for open distal pancreatectomy, splenectomy, partial colectomy, partial L adrenalectomy, temporary abdominal closure. Complicated by hemorrhage shock and post op respiratory failure. Back to OR on 5/9 for closure and colostomy.  Further complicated by worsening renal failure ZO requiring HD and hepatic encephalopathy,       Events last 24 hours: Increasing pressor requirements, improved w/ volume. s/p 1 PRBC w/ appropriate response. Tube feeds restarted, low residuals. Blood cultures resulted w/ GNR.     PAST MEDICAL & SURGICAL HISTORY:  Hypertension      BPH (benign prostatic hyperplasia)      Renal insufficiency      Light chain nephropathy      DM (diabetes mellitus)      HLD (hyperlipidemia)      No significant past surgical history          Review of Systems:  CONSTITUTIONAL: No fever, chills, or fatigue  EYES: No eye pain, visual disturbances, or discharge  ENMT:  No difficulty hearing, tinnitus, vertigo; No sinus or throat pain  NECK: No pain or stiffness  RESPIRATORY: No cough, wheezing, chills or hemoptysis; No shortness of breath  CARDIOVASCULAR: No chest pain, palpitations, dizziness, or leg swelling  GASTROINTESTINAL: No abdominal or epigastric pain. No nausea, vomiting, or hematemesis; No diarrhea or constipation. No melena or hematochezia.  GENITOURINARY: No dysuria, frequency, hematuria, or incontinence  NEUROLOGICAL: No headaches, memory loss, loss of strength, numbness, or tremors  SKIN: No itching, burning, rashes, or lesions   MUSCULOSKELETAL: No joint pain or swelling; No muscle, back, or extremity pain  PSYCHIATRIC: No depression, anxiety, mood swings, or difficulty sleeping      Medications:  piperacillin/tazobactam IVPB.. 3.375 Gram(s) IV Intermittent every 12 hours  rifAXIMin 550 milliGRAM(s) Oral two times a day    phenylephrine    Infusion 0.2 MICROgram(s)/kG/Min IV Continuous <Continuous>      acetaminophen     Tablet .. 650 milliGRAM(s) Oral every 6 hours PRN  metoclopramide Injectable 5 milliGRAM(s) IV Push every 6 hours  ondansetron Injectable 4 milliGRAM(s) IV Push every 6 hours PRN      heparin   Injectable 5000 Unit(s) SubCutaneous every 12 hours    pantoprazole  Injectable 40 milliGRAM(s) IV Push every 12 hours      dextrose 50% Injectable 25 Gram(s) IV Push once  dextrose 50% Injectable 25 Gram(s) IV Push once  dextrose 50% Injectable 12.5 Gram(s) IV Push once  dextrose Oral Gel 15 Gram(s) Oral once PRN  glucagon  Injectable 1 milliGRAM(s) IntraMuscular once  insulin glargine Injectable (LANTUS) 25 Unit(s) SubCutaneous at bedtime  insulin lispro (ADMELOG) corrective regimen sliding scale   SubCutaneous every 6 hours    dextrose 5% 1000 milliLiter(s) IV Continuous <Continuous>  dextrose 5%. 1000 milliLiter(s) IV Continuous <Continuous>  dextrose 5%. 1000 milliLiter(s) IV Continuous <Continuous>  sodium chloride 0.9% lock flush 10 milliLiter(s) IV Push every 1 hour PRN      chlorhexidine 4% Liquid 1 Application(s) Topical <User Schedule>            ICU Vital Signs Last 24 Hrs  T(C): 37 (13 May 2023 00:00), Max: 38.6 (12 May 2023 04:00)  T(F): 98.6 (13 May 2023 00:00), Max: 101.4 (12 May 2023 04:00)  HR: 96 (13 May 2023 01:00) (84 - 127)  BP: 113/64 (13 May 2023 01:00) (68/51 - 113/64)  BP(mean): 74 (13 May 2023 01:00) (47 - 76)  ABP: --  ABP(mean): --  RR: 20 (13 May 2023 01:00) (17 - 40)  SpO2: 98% (13 May 2023 01:00) (93% - 100%)    O2 Parameters below as of 13 May 2023 00:00  Patient On (Oxygen Delivery Method): nasal cannula  O2 Flow (L/min): 3              I&O's Detail    11 May 2023 07:01  -  12 May 2023 07:00  --------------------------------------------------------  IN:    dextrose 5% w/ Additives: 1788 mL    Phenylephrine: 678 mL    TPN (Total Parenteral Nutrition): 802 mL    Vital1.5: 660 mL  Total IN: 3928 mL    OUT:    Bulb (mL): 560 mL    Colostomy (mL): 1175 mL  Total OUT: 1735 mL    Total NET: 2193 mL      12 May 2023 07:01  -  13 May 2023 01:36  --------------------------------------------------------  IN:    dextrose 5% w/ Additives: 913 mL    Free Water: 70 mL    IV PiggyBack: 950 mL    Phenylephrine: 788 mL    PRBCs (Packed Red Blood Cells): 304 mL    Vital1.5: 603 mL  Total IN: 3628 mL    OUT:    Bulb (mL): 150 mL    Colostomy (mL): 50 mL  Total OUT: 200 mL    Total NET: 3428 mL            LABS:                        8.0    18.49 )-----------( 43       ( 12 May 2023 18:20 )             24.0     05-12    140  |  107  |  50<H>  ----------------------------<  255<H>  3.6   |  20<L>  |  4.05<H>    Ca    8.4<L>      12 May 2023 04:20  Phos  2.2     05-12  Mg     2.5     05-11    TPro  x   /  Alb  2.2<L>  /  TBili  x   /  DBili  x   /  AST  x   /  ALT  x   /  AlkPhos  x   05-12          CAPILLARY BLOOD GLUCOSE      POCT Blood Glucose.: 266 mg/dL (12 May 2023 23:10)        CULTURES:  Culture Results:   Growth in aerobic and anaerobic bottles: Gram Negative Rods  ***Blood Panel PCR results on this specimen are available  approximately 3 hours after the Gram stain result.***  Gram stain, PCR, and/or culture results may not always  correspond due todifference in methodologies.  ************************************************************  This PCR assay was performed by multiplex PCR. This  Assay tests for 66 bacterial and resistance gene targets.  Please refer to the [facility] testdirectory  at [email or web address] for details. (05-12-23 @ 09:01)  Culture Results:   Growth in aerobic bottle: Gram Negative Rods  Growth in anaerobic bottle: Gram Negative Rods (05-12-23 @ 08:59)      Physical Examination:    General: Lethargic. Weak     HEENT: Pupils equal, reactive to light.  Symmetric.    PULM: Clear to auscultation bilaterally    CVS: Regular rate and rhythm    ABD: Soft, dressing C/D/I.  Colostomy pink. . + TAYLA drain     EXT: No edema, nontender    SKIN: Warm and well perfused, no rashes noted.    NEURO: Awake, not oriented. Follows simple commands.       CRITICAL CARE TIME SPENT: 34 min  Evaluating/treating patient, reviewing data/labs/imaging, discussing case with multidisciplinary team, discussing plan/goals of care with patient/family. Non-inclusive of procedure time.

## 2023-05-13 NOTE — PROGRESS NOTE ADULT - ASSESSMENT
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  S/P distal pancreatectomy, splenectomy, partial colectomy  S/P abdominal washout and colostomy creation  now with aspiration pneumonia  midline wound with fat necrosis  on phenylephrine   s/p TPN    Plan:  CT abd/pelvis w/IV contrast   cont TF via NGT   monitor ostomy output  f/u Nephro recommend appreciated  monitor midline wound for drainage  cont great Critical care management as per ICU team  surgery will follow    Plan discussed with Dr Black

## 2023-05-13 NOTE — PROGRESS NOTE ADULT - ASSESSMENT
69 yo male with PMHx metastatic neuroendocrine tumor of the pancreas, recently admitted with nonfunctioning GJ tube (patient is chronically obstructed). During hospitalization had a CT angiography has no active bleeding, but demonstrates extensive tumor in distal pancreas    Assessment:  1. Shock, distributive vs hemorrhagic   2. Post op respiratory failure, resolved  3. ZO  4. Hepatic encephalopathy   5. S/p distal pancreatectomy, splenectomy, partial colectomy, partial L adrenalectomy,colostomy  6. Hyperglycemia       Plan:  Neuro: Hepatic encephalopathy, slowly improving. c/w rifaximin BID,  CV: Shock, actively titrating phenylephrine to maintain MAP >65. PO midodrine to offload IV vasopressor requirements. Given 1L IVF bolus w/ improvement in BP.   Resp: Supplemental O2 to maintain O2 sat >92%. on 4L NC.  Aspiration precautions. HOB >30.   GI: Tube feeds restartd, low residuals when checked. c/w reglan. PPI BID. Surgery following. Monitor TAYLA drain output.  Renal: ZO 2/2 ATN, s/p HD. Remains oliguric w/ poor clearance.  c/w Bicarb gtt. family deciding if they want to pursue HD again.   ID: Blood cultures resulted w/ GNR . Started on zosyn.   Endo: Hypergylcemia, started lantus 15 units bedtime w/ ISS. Goal BG <180   Heme: H/H stable.  heparin subq started for DVT ppx.

## 2023-05-13 NOTE — PROGRESS NOTE ADULT - ASSESSMENT
67 yo man with Hx of Neuroendocrine tumor/Pancreatic mass and liver mets.  Now for palliative resection of pancreatic mass.    --ZO with variable creat level and unclear CKD.    Likely dependent on volume status.    Limited intake even with Enteral feeding.      Start gentle hydration.  -- : continue Hale drainage.  --Hx of light chain nephropathy  --No contraindication for surgery from renal standpoint.    5/4  s/p Palliative surgery for neuroendocrine mass  will watch for ZO, pt with low UO immediate post op  s/p multiple PRBC and FFP transfusion and volume resuscitation    5/5  patient anuric, K 5.2  On bicarb drip  to go back to OR today  case d/w Dr Jean and Dr Siddiqi  Will HD x 2 hrs to correct potassium  >10 L infused in blood products and fluids  More fluids not indicated, UO 50 ml  no fluid removal on HD  Hep profile ordered    5/6  Ostomy created  will monitor i/o  labs stable   no need for HD today  d/w intensivist  will monitor daily for hd requirement    5/7  seen earlier on HD   tolerating well  TPN  d/w Dr Jean  d/w pts fam at bedside    5/8 MK   - ZO with anuria, remains HD dependent with hx of light chain nephropathy    tolerating hd     dc femoral shiley today after hd   - enceph monitor response to hd and xifaxan and lactulose    5/9 MK   - ZO with anuria, remains HD dependent with hx of light chain nephropathy    no indication for hd    off pressor     monitor electrolytes on TPN     NG in place for TF initiation   - enceph, improving monitor response to hd and xifaxan and lactulose    ostomy fx with liquid stool  dw dr marley     5/10 SY  --ZO : remains oligoanuric.     Follow with repeat bladder scans--no retention.    Will hold HD today and assess daily.    Trial of gentle hydration due to increased fluid loss.  --Metabolic acidosis : trial of HCO3 in addition to TPN   --Monitor encephalopathy.    5/11 SY  --ZO : remains oligoanuric.    Renal parameters further increased with no improvement in acidosis with HCO3 infusion.    D/w pt's wife over phone.    Explained his poor prognosis from Oncology aspect due to large tumor burden in liver and that surgery was palliative in nature.    Explained added burden of continued dialysis adding to pt's suffering at this point.    Pt's wife requested not to proceed with HD catheter placement and dialysis until she can d/w Dr Siddiqi and rest of family.    Will discuss further later today.    5/12  HD yesterday, planned for HD Sat  case d/w family, and ID, pts nurse  Dr Velez covering this weekend    5/13 SY  --ZO : remains oligoanuric.   HD today : HD order and tx reviewed.     --Positive fluid balance due to hypotension.  RR at 30 today.   Check follow up CXR.  Will plan for 1-2 kg UF with HD if tolerated.  --Continue enteral feeding.  --D/w Dr Mcclendon : positive blood culture/ GNR-- pre current HD catheter.   Plan HD today and on Monday,, then remove catheter for bacteremia clearance.

## 2023-05-13 NOTE — PROGRESS NOTE ADULT - SUBJECTIVE AND OBJECTIVE BOX
Events Overnight: on 1.6 of phenylephrine, decreased dose,  Blood culture positive for gram negative rods, temp better 99.    HPI:      67 yo male with PMHx metastatic neuroendocrine tumor of the pancreas, recently admitted with nonfunctioning GJ tube (patient is chronically obstructed).   Patient was admitted with severe heartburn, and has had several episodes of vomiting small amount of dark blood     5/4: Patient went to the OR today for a Open distal pancreatectomy, splenectomy, partial colectomy, partial L adrenalectomy, temporary abdominal closure.  Patient had a 2.2 L EBL.  Intra Op had 6U PRBC, 2FFP, 1 Platelets, 4L crystalloid  Post Op 1U PRBC ans 1 U FFP.  Post OP vented, with poor UO, on levo and Vaso.    5/5:  went to OR and had closure and colostomy   5/13   remains lethargic, encephalopathic, weak but more appropriate, on pressors dec dose, ,         PMH:       as above    ROS difficult to obtain due to profound weakness      MEDICATIONS  (STANDING):  chlorhexidine 4% Liquid 1 Application(s) Topical <User Schedule>  dextrose 5% 1000 milliLiter(s) (75 mL/Hr) IV Continuous <Continuous>  dextrose 5%. 1000 milliLiter(s) (50 mL/Hr) IV Continuous <Continuous>  dextrose 5%. 1000 milliLiter(s) (100 mL/Hr) IV Continuous <Continuous>  dextrose 50% Injectable 12.5 Gram(s) IV Push once  dextrose 50% Injectable 25 Gram(s) IV Push once  dextrose 50% Injectable 25 Gram(s) IV Push once  glucagon  Injectable 1 milliGRAM(s) IntraMuscular once  insulin glargine Injectable (LANTUS) 25 Unit(s) SubCutaneous at bedtime  insulin lispro (ADMELOG) corrective regimen sliding scale   SubCutaneous every 6 hours  metoclopramide Injectable 5 milliGRAM(s) IV Push every 6 hours  midodrine 10 milliGRAM(s) Oral every 8 hours  pantoprazole  Injectable 40 milliGRAM(s) IV Push every 12 hours  phenylephrine    Infusion 0.2 MICROgram(s)/kG/Min (5.39 mL/Hr) IV Continuous <Continuous>  piperacillin/tazobactam IVPB.. 3.375 Gram(s) IV Intermittent every 12 hours  rifAXIMin 550 milliGRAM(s) Oral two times a day    MEDICATIONS  (PRN):  acetaminophen     Tablet .. 650 milliGRAM(s) Oral every 6 hours PRN Temp greater or equal to 38.5C (101.3F)  dextrose Oral Gel 15 Gram(s) Oral once PRN Blood Glucose LESS THAN 70 milliGRAM(s)/deciliter  ondansetron Injectable 4 milliGRAM(s) IV Push every 6 hours PRN Nausea and/or Vomiting  sodium chloride 0.9% lock flush 10 milliLiter(s) IV Push every 1 hour PRN Pre/post blood products, medications, blood draw, and to maintain line patency      ICU Vital Signs Last 24 Hrs  T(C): 36.8 (13 May 2023 08:00), Max: 37.2 (12 May 2023 12:00)  T(F): 98.3 (13 May 2023 08:00), Max: 99 (12 May 2023 12:00)  HR: 100 (13 May 2023 08:00) (80 - 107)  BP: 103/55 (13 May 2023 08:00) (68/51 - 113/64)  BP(mean): 67 (13 May 2023 08:00) (55 - 85)  ABP: --  ABP(mean): --  RR: 30 (13 May 2023 08:00) (17 - 34)  SpO2: 98% (13 May 2023 08:00) (95% - 100%)    O2 Parameters below as of 13 May 2023 08:00  Patient On (Oxygen Delivery Method): nasal cannula  O2 Flow (L/min): 3    General - weak, lethargiic  neuro weak, answering simple questions, moves all extremtiies  HEENT - nc/at NG tube in place, slightly icteric  neck supple  lungs - scattered rhonchi  cv rrr  abdomen - dressing dry, colostomy functioning , silvio with copioius output serous, amylase was normal   voiding minimal urine output  Extremity anasarca    I&O's Summary    12 May 2023 07:01  -  13 May 2023 07:00  --------------------------------------------------------  IN: 5912 mL / OUT: 1050 mL / NET: 4862 mL                            7.5    16.65 )-----------( 42       ( 13 May 2023 05:18 )             22.4       05-13    140  |  105  |  57<H>  ----------------------------<  282<H>  3.3<L>   |  22  |  4.45<H>    Ca    8.7      13 May 2023 05:18  Phos  2.2     05-12    TPro  4.7<L>  /  Alb  1.8<L>  /  TBili  1.5<H>  /  DBili  x   /  AST  175<H>  /  ALT  133<H>  /  AlkPhos  182<H>  05-13    DVT Prophylaxis:   thrombocytopenia from sepsis                                                              Advanced Directives: Full Code                      Events Overnight: on 1.6 of phenylephrine, decreased dose,  Blood culture positive for gram negative rods, temp better 99.    HPI:      67 yo male with PMHx metastatic neuroendocrine tumor of the pancreas, recently admitted with nonfunctioning GJ tube (patient is chronically obstructed).   Patient was admitted with severe heartburn, and has had several episodes of vomiting small amount of dark blood     5/4: Patient went to the OR today for a Open distal pancreatectomy, splenectomy, partial colectomy, partial L adrenalectomy, temporary abdominal closure.  Patient had a 2.2 L EBL.  Intra Op had 6U PRBC, 2FFP, 1 Platelets, 4L crystalloid  Post Op 1U PRBC ans 1 U FFP.  Post OP vented, with poor UO, on levo and Vaso.    5/5:  went to OR and had closure and colostomy   5/13   remains lethargic, encephalopathic, weak but more appropriate, on pressors dec dose, ,         PMH:       as above    ROS difficult to obtain due to profound weakness      MEDICATIONS  (STANDING):  chlorhexidine 4% Liquid 1 Application(s) Topical <User Schedule>  dextrose 5% 1000 milliLiter(s) (75 mL/Hr) IV Continuous <Continuous>  dextrose 5%. 1000 milliLiter(s) (50 mL/Hr) IV Continuous <Continuous>  dextrose 5%. 1000 milliLiter(s) (100 mL/Hr) IV Continuous <Continuous>  dextrose 50% Injectable 12.5 Gram(s) IV Push once  dextrose 50% Injectable 25 Gram(s) IV Push once  dextrose 50% Injectable 25 Gram(s) IV Push once  glucagon  Injectable 1 milliGRAM(s) IntraMuscular once  insulin glargine Injectable (LANTUS) 25 Unit(s) SubCutaneous at bedtime  insulin lispro (ADMELOG) corrective regimen sliding scale   SubCutaneous every 6 hours  metoclopramide Injectable 5 milliGRAM(s) IV Push every 6 hours  midodrine 10 milliGRAM(s) Oral every 8 hours  pantoprazole  Injectable 40 milliGRAM(s) IV Push every 12 hours  phenylephrine    Infusion 0.2 MICROgram(s)/kG/Min (5.39 mL/Hr) IV Continuous <Continuous>  piperacillin/tazobactam IVPB.. 3.375 Gram(s) IV Intermittent every 12 hours  rifAXIMin 550 milliGRAM(s) Oral two times a day    MEDICATIONS  (PRN):  acetaminophen     Tablet .. 650 milliGRAM(s) Oral every 6 hours PRN Temp greater or equal to 38.5C (101.3F)  dextrose Oral Gel 15 Gram(s) Oral once PRN Blood Glucose LESS THAN 70 milliGRAM(s)/deciliter  ondansetron Injectable 4 milliGRAM(s) IV Push every 6 hours PRN Nausea and/or Vomiting  sodium chloride 0.9% lock flush 10 milliLiter(s) IV Push every 1 hour PRN Pre/post blood products, medications, blood draw, and to maintain line patency      ICU Vital Signs Last 24 Hrs  T(C): 36.8 (13 May 2023 08:00), Max: 37.2 (12 May 2023 12:00)  T(F): 98.3 (13 May 2023 08:00), Max: 99 (12 May 2023 12:00)  HR: 100 (13 May 2023 08:00) (80 - 107)  BP: 103/55 (13 May 2023 08:00) (68/51 - 113/64)  BP(mean): 67 (13 May 2023 08:00) (55 - 85)  ABP: --  ABP(mean): --  RR: 30 (13 May 2023 08:00) (17 - 34)  SpO2: 98% (13 May 2023 08:00) (95% - 100%)    O2 Parameters below as of 13 May 2023 08:00  Patient On (Oxygen Delivery Method): nasal cannula  O2 Flow (L/min): 3    General - weak, lethargiic  neuro weak, answering simple questions, moves all extremtiies  HEENT - nc/at NG tube in place, slightly icteric  neck supple  lungs - scattered rhonchi  cv rrr  abdomen - dressing dry, colostomy functioning , silvio with copioius output serous, amylase was normal      purulent drainage from abdominal wound,     voiding minimal urine output  Extremity anasarca    I&O's Summary    12 May 2023 07:01  -  13 May 2023 07:00  --------------------------------------------------------  IN: 5912 mL / OUT: 1050 mL / NET: 4862 mL                            7.5    16.65 )-----------( 42       ( 13 May 2023 05:18 )             22.4       05-13    140  |  105  |  57<H>  ----------------------------<  282<H>  3.3<L>   |  22  |  4.45<H>    Ca    8.7      13 May 2023 05:18  Phos  2.2     05-12    TPro  4.7<L>  /  Alb  1.8<L>  /  TBili  1.5<H>  /  DBili  x   /  AST  175<H>  /  ALT  133<H>  /  AlkPhos  182<H>  05-13    DVT Prophylaxis:   thrombocytopenia from sepsis                                                              Advanced Directives: Full Code

## 2023-05-13 NOTE — PROGRESS NOTE ADULT - SUBJECTIVE AND OBJECTIVE BOX
SURGERY DAILY PROGRESS NOTE:     Subjective:  Patient seen and examined at bedside during am rounds doing well. pt is following command. Still on phenylephrine and on TF at goal 65. AVSS. Pt midline abdomen wound had fluid expressed, less than prior day. Denies any fevers, chills, n/v/d, chest pain or shortness of breath    Objective:    MEDICATIONS  (STANDING):  chlorhexidine 4% Liquid 1 Application(s) Topical <User Schedule>  dextrose 5% 1000 milliLiter(s) (75 mL/Hr) IV Continuous <Continuous>  dextrose 5%. 1000 milliLiter(s) (50 mL/Hr) IV Continuous <Continuous>  dextrose 5%. 1000 milliLiter(s) (100 mL/Hr) IV Continuous <Continuous>  dextrose 50% Injectable 12.5 Gram(s) IV Push once  dextrose 50% Injectable 25 Gram(s) IV Push once  dextrose 50% Injectable 25 Gram(s) IV Push once  glucagon  Injectable 1 milliGRAM(s) IntraMuscular once  insulin glargine Injectable (LANTUS) 35 Unit(s) SubCutaneous at bedtime  insulin lispro (ADMELOG) corrective regimen sliding scale   SubCutaneous every 6 hours  metoclopramide Injectable 5 milliGRAM(s) IV Push every 6 hours  midodrine 10 milliGRAM(s) Oral every 8 hours  pantoprazole  Injectable 40 milliGRAM(s) IV Push every 12 hours  phenylephrine    Infusion 0.2 MICROgram(s)/kG/Min (5.39 mL/Hr) IV Continuous <Continuous>  piperacillin/tazobactam IVPB.. 3.375 Gram(s) IV Intermittent every 12 hours  rifAXIMin 550 milliGRAM(s) Oral two times a day    MEDICATIONS  (PRN):  acetaminophen     Tablet .. 650 milliGRAM(s) Oral every 6 hours PRN Temp greater or equal to 38.5C (101.3F)  dextrose Oral Gel 15 Gram(s) Oral once PRN Blood Glucose LESS THAN 70 milliGRAM(s)/deciliter  ondansetron Injectable 4 milliGRAM(s) IV Push every 6 hours PRN Nausea and/or Vomiting  sodium chloride 0.9% lock flush 10 milliLiter(s) IV Push every 1 hour PRN Pre/post blood products, medications, blood draw, and to maintain line patency      Vital Signs Last 24 Hrs  T(C): 36.8 (13 May 2023 08:00), Max: 37.2 (12 May 2023 12:00)  T(F): 98.3 (13 May 2023 08:00), Max: 99 (12 May 2023 12:00)  HR: 109 (13 May 2023 09:00) (80 - 109)  BP: 111/65 (13 May 2023 09:00) (86/67 - 113/64)  BP(mean): 72 (13 May 2023 09:00) (58 - 85)  RR: 26 (13 May 2023 09:00) (17 - 34)  SpO2: 97% (13 May 2023 09:00) (95% - 100%)    Parameters below as of 13 May 2023 08:00  Patient On (Oxygen Delivery Method): nasal cannula  O2 Flow (L/min): 3        General:   AAOx3, NAD  Chest: Normal respiratory effort  Heart: RRR  Abdomen: Abdomen soft, nondistended, no tenderness, dressing with serosanguinous drainge, midline wound with fat necrosis drainage . Ostomy with bag full of stool. TAYLA Drain with serosanguinous output  Neuro/Psych: No localized deficits. Normal speech, normal tone  Skin: Normal, no rashes, no lesions noted.   Extremities: Warm, well perfused, no edema, Pulses intact      I&O's Detail    12 May 2023 07:01  -  13 May 2023 07:00  --------------------------------------------------------  IN:    dextrose 5% w/ Additives: 1764 mL    Free Water: 160 mL    IV PiggyBack: 950 mL    Phenylephrine: 1454 mL    PRBCs (Packed Red Blood Cells): 304 mL    Vital1.5: 1280 mL  Total IN: 5912 mL    OUT:    Bulb (mL): 400 mL    Colostomy (mL): 650 mL  Total OUT: 1050 mL    Total NET: 4862 mL          Daily     Daily Weight in k.5 (13 May 2023 02:00)    LABS:                        7.5    16.65 )-----------( 42       ( 13 May 2023 05:18 )             22.4     05-13    140  |  105  |  57<H>  ----------------------------<  282<H>  3.3<L>   |  22  |  4.45<H>    Ca    8.7      13 May 2023 05:18  Phos  2.2         TPro  4.7<L>  /  Alb  1.8<L>  /  TBili  1.5<H>  /  DBili  x   /  AST  175<H>  /  ALT  133<H>  /  AlkPhos  182<H>            RADIOLOGY & ADDITIONAL STUDIES:    ASSESSMENT/PLAN:

## 2023-05-13 NOTE — PROGRESS NOTE ADULT - SUBJECTIVE AND OBJECTIVE BOX
NEPHROLOGY INTERVAL HPI/OVERNIGHT EVENTS:    Date of Service: 23 @ 09:21    --Awake but lethargic.  Unclear if in pain.  RR 30.  On NG feeding.  No urine output  - seen w family and ID at bedside, plans for HD discussed w family  --Lethargic.  No acute distress.   Colostomy output 1500cc.  No UO.  5/10--No acute distress.  calm, off sedation and off pressor.  Colostomy output 250cc and Drain 850cc.  Oligoanuric.   more alert and awake, ostomy fx pressors off since this am unable to complete hd yesterday and only received 1 due to malfunctioning cathtere    pulled after HD    seen at hd, events noted, anuric, TPN infusing with lipids, on solo dec dose of pressors, encepha with starting of lactulose and xifaxin   - no new events, anuric, CO2 trending down   s/p OR yesterday, ostomy creation  - case d/w surgery and intensivist , anuric k 5.2 will dialyze preop, no fluid removal  - pt seen in PACU, PRBC and FFP, and LR resuscitation noted, coulter in place ~ 100 cc urine in coulter bag  case d/w pts nurse   Chart quote, operative note ""Open distal pancreatectomy, splenectomy, partial colectomy, partial L adrenalectomy, temporary abdominal closure; Large varices, 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 Liver laden with diffuse metastases and very friable, RP oozy. 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  Treated with Lutathera one year ago and restarted in 2022  G-J tube placed for nutrition in January due to chronic dysphagia and severe esophagitis on EGD.  Post recent  admission due to G tube clogging.  D/mansi home on .    Returned to ED due to vomiting when night time enteral feeding via G tube started.  CT with IVC done due to concern for GIB.  Pt reports chronic self catheterization due to urinary retention.   ZO improved when catheterization started.  Follows mainly with EMELY Cortez.   Pt has been self cath'ing 3-4 x /day.   Indwelling Coulter placed by  due to resistance with blood clots with resistance reported by pt.  For palliative mass resection tomorrow.    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  Creat has been variable during previous admissions--ranging 1.1-2.2.    PMHX and PSHX.  --Neuroendocrine tumor of pancreas with mets to liver --dx 6 years ago.  --GJ tube for nutrition in 2023 due to chronic dysphagia.  --Hx of HTN  --Hx of DM  --Hx of light chain disease --unclear hx and no hx of renal biopsy.        MEDICATIONS  (STANDING):  chlorhexidine 4% Liquid 1 Application(s) Topical <User Schedule>  dextrose 5% 1000 milliLiter(s) (75 mL/Hr) IV Continuous <Continuous>  dextrose 5%. 1000 milliLiter(s) (50 mL/Hr) IV Continuous <Continuous>  dextrose 5%. 1000 milliLiter(s) (100 mL/Hr) IV Continuous <Continuous>  dextrose 50% Injectable 12.5 Gram(s) IV Push once  dextrose 50% Injectable 25 Gram(s) IV Push once  dextrose 50% Injectable 25 Gram(s) IV Push once  glucagon  Injectable 1 milliGRAM(s) IntraMuscular once  insulin glargine Injectable (LANTUS) 35 Unit(s) SubCutaneous at bedtime  insulin lispro (ADMELOG) corrective regimen sliding scale   SubCutaneous every 6 hours  metoclopramide Injectable 5 milliGRAM(s) IV Push every 6 hours  midodrine 10 milliGRAM(s) Oral every 8 hours  pantoprazole  Injectable 40 milliGRAM(s) IV Push every 12 hours  phenylephrine    Infusion 0.2 MICROgram(s)/kG/Min (5.39 mL/Hr) IV Continuous <Continuous>  piperacillin/tazobactam IVPB.. 3.375 Gram(s) IV Intermittent every 12 hours  rifAXIMin 550 milliGRAM(s) Oral two times a day    MEDICATIONS  (PRN):  acetaminophen     Tablet .. 650 milliGRAM(s) Oral every 6 hours PRN Temp greater or equal to 38.5C (101.3F)  dextrose Oral Gel 15 Gram(s) Oral once PRN Blood Glucose LESS THAN 70 milliGRAM(s)/deciliter  ondansetron Injectable 4 milliGRAM(s) IV Push every 6 hours PRN Nausea and/or Vomiting  sodium chloride 0.9% lock flush 10 milliLiter(s) IV Push every 1 hour PRN Pre/post blood products, medications, blood draw, and to maintain line patency      Vital Signs Last 24 Hrs  T(C): 36.8 (13 May 2023 08:00), Max: 37.2 (12 May 2023 12:00)  T(F): 98.3 (13 May 2023 08:00), Max: 99 (12 May 2023 12:00)  HR: 109 (13 May 2023 09:00) (80 - 109)  BP: 111/65 (13 May 2023 09:00) (86/67 - 113/64)  BP(mean): 72 (13 May 2023 09:00) (58 - 85)  RR: 26 (13 May 2023 09:00) (17 - 34)  SpO2: 97% (13 May 2023 09:00) (95% - 100%)    Parameters below as of 13 May 2023 08:00  Patient On (Oxygen Delivery Method): nasal cannula  O2 Flow (L/min): 3    Daily     Daily Weight in k.5 (13 May 2023 02:00)    05-12 @ 07:01  -  05-13 @ 07:00  --------------------------------------------------------  IN: 5912 mL / OUT: 1050 mL / NET: 4862 mL    PHYSICAL EXAM:  GENERAL: Lethargic  CHEST/LUNG: scattered rhonchi  HEART: S1S2 RRR  ABDOMEN: soft  EXTREMITIES: trace to 1+ edema  SKIN:     LABS:                        7.5    16.65 )-----------( 42       ( 13 May 2023 05:18 )             22.4     05-13    140  |  105  |  57<H>  ----------------------------<  282<H>  3.3<L>   |  22  |  4.45<H>    Ca    8.7      13 May 2023 05:18  Phos  2.2     -    TPro  4.7<L>  /  Alb  1.8<L>  /  TBili  1.5<H>  /  DBili  x   /  AST  175<H>  /  ALT  133<H>  /  AlkPhos  182<H>                  RADIOLOGY & ADDITIONAL TESTS:

## 2023-05-13 NOTE — PROGRESS NOTE ADULT - ASSESSMENT
69 yo male with PMHx metastatic neuroendocrine tumor of the pancreas, recently admitted with nonfunctioning GJ tube (patient is chronically obstructed). Patient was admitted with severe heartburn, and has had several episodes of vomiting small amount of dark blood. Patient was discharged on Thursday. Hgb unchanged. CT angiography has no active bleeding, but demonstrates extensive tumor in distal pancreas.  Patient went to the OR 5/4 for a Open distal pancreatectomy, splenectomy, partial colectomy, partial L adrenalectomy, temporary abdominal closure.  Patient had a 2.2 L EBL.  Intra Op had 6U PRBC, 2FFP, 1 Platelets, 4L crystalloid  Post Op 1U PRBC ans 1 U FFP.  Post OP vented, with poor UO, on levo and Vaso.  5/5 Pt went back to OR and had closure and colostomy. Hospital course c/b  fever lethargy, encephalopathic, on pressors, dec hgb was diayzed 5/11 560 cc from bulb, had ? episode emesis overnight, imaging shows some congestion, concern for asp pna raised started on IV vancomycin/zosyn.    1. Septic shock with Enterobacter. Abd wound infection. Aspiration pneumonitis/pneumonia. Metastatic neuroendocrine pancreatic tumor s/p surgery. ZO  - imaging reviewed  - blood cx growing enterobacter source likely gut translocation   - f/u CT abd/pelvis;  surgery f/u   - on IV zosyn [id number] renally dose adjusted #2   - continue with antibiotic coverage   - s/p vancomycin x 1 -  hold further vancomycin   - monitor temps  - tolerating abx well so far; no side effects noted  - reason for abx use and side effects reviewed with patient  - aspiration precautions  - fu cbc    2. other issues - care per medicine

## 2023-05-14 NOTE — PROGRESS NOTE ADULT - SUBJECTIVE AND OBJECTIVE BOX
Events Overnight: T amx 99.6,  has draiange from midportionof wound 1470. TAYLA 90, was dialyzed yesterday    HPI:      69 yo male with PMHx metastatic neuroendocrine tumor of the pancreas, recently admitted with nonfunctioning GJ tube (patient is chronically obstructed).   Patient was admitted with severe heartburn, and has had several episodes of vomiting small amount of dark blood     5/4: Patient went to the OR today for a Open distal pancreatectomy, splenectomy, partial colectomy, partial L adrenalectomy, temporary abdominal closure.  Patient had a 2.2 L EBL.  Intra Op had 6U PRBC, 2FFP, 1 Platelets, 4L crystalloid  Post Op 1U PRBC ans 1 U FFP.  Post OP vented, with poor UO, on levo and Vaso.    5/5:  went to OR and had closure and colostomy   5/14   remains lethargic, encephalopathic, weak but more appropriate, on pressors  3 mcg/kg/min. ,         PMH:       as above    ROS difficult to obtain due to profound weakness    MEDICATIONS  (STANDING):  chlorhexidine 4% Liquid 1 Application(s) Topical <User Schedule>  dextrose 5%. 1000 milliLiter(s) (100 mL/Hr) IV Continuous <Continuous>  dextrose 5%. 1000 milliLiter(s) (50 mL/Hr) IV Continuous <Continuous>  dextrose 50% Injectable 12.5 Gram(s) IV Push once  dextrose 50% Injectable 25 Gram(s) IV Push once  dextrose 50% Injectable 25 Gram(s) IV Push once  glucagon  Injectable 1 milliGRAM(s) IntraMuscular once  insulin glargine Injectable (LANTUS) 35 Unit(s) SubCutaneous at bedtime  insulin lispro (ADMELOG) corrective regimen sliding scale   SubCutaneous every 6 hours  metoclopramide Injectable 5 milliGRAM(s) IV Push every 6 hours  midodrine 15 milliGRAM(s) Oral every 8 hours  pantoprazole  Injectable 40 milliGRAM(s) IV Push every 12 hours  phenylephrine    Infusion 0.2 MICROgram(s)/kG/Min (5.39 mL/Hr) IV Continuous <Continuous>  piperacillin/tazobactam IVPB.. 3.375 Gram(s) IV Intermittent every 12 hours  rifAXIMin 550 milliGRAM(s) Oral two times a day    MEDICATIONS  (PRN):  acetaminophen     Tablet .. 650 milliGRAM(s) Oral every 6 hours PRN Temp greater or equal to 38.5C (101.3F)  dextrose Oral Gel 15 Gram(s) Oral once PRN Blood Glucose LESS THAN 70 milliGRAM(s)/deciliter  ondansetron Injectable 4 milliGRAM(s) IV Push every 6 hours PRN Nausea and/or Vomiting  sodium chloride 0.9% lock flush 10 milliLiter(s) IV Push every 1 hour PRN Pre/post blood products, medications, blood draw, and to maintain line patency    ICU Vital Signs Last 24 Hrs  T(C): 36.8 (14 May 2023 04:00), Max: 37.6 (13 May 2023 12:00)  T(F): 98.3 (14 May 2023 04:00), Max: 99.6 (13 May 2023 12:00)  HR: 93 (14 May 2023 10:00) (75 - 105)  BP: 88/57 (14 May 2023 10:00) (77/50 - 116/67)  BP(mean): 64 (14 May 2023 10:00) (56 - 78)  ABP: --  ABP(mean): --  RR: 21 (14 May 2023 10:00) (17 - 33)  SpO2: 99% (14 May 2023 10:00) (91% - 100%)    O2 Parameters below as of 14 May 2023 10:00  Patient On (Oxygen Delivery Method): nasal cannula  O2 Flow (L/min): 6    Physical exam    General - ill cachetic, slightly Jaundice  Neuro profoundly weak, alert, weakly responds, non focal  NEck right shile  lungs dec bs at bases bilaterally  cv rrr  abdomen -  ileostomy functioning, drainage from midportion of wound, TAYLA with blood tinged draiange  extremities - anasarcic          I&O's Summary    13 May 2023 07:01  -  14 May 2023 07:00  --------------------------------------------------------  IN: 5469 mL / OUT: 3170 mL / NET: 2299 mL                          8.0    9.07  )-----------( 30       ( 14 May 2023 05:37 )             23.8       05-14    140  |  105  |  38<H>  ----------------------------<  177<H>  3.4<L>   |  24  |  3.29<H>    Ca    8.4<L>      14 May 2023 05:37  Phos  2.7     05-14  Mg     2.0     05-14    TPro  4.7<L>  /  Alb  2.1<L>  /  TBili  1.9<H>  /  DBili  x   /  AST  137<H>  /  ALT  92<H>  /  AlkPhos  129<H>  05-14      DVT Prophylaxis:   Heparin subq                                                              Advanced Directives: Full Code

## 2023-05-14 NOTE — PROGRESS NOTE ADULT - SUBJECTIVE AND OBJECTIVE BOX
Date of service: 05-14-23 @ 14:10    pt seen and examined   blood cultures growing enterobacter  temps down  lethargic, encephalopathic  had HD 5/13  has drainage from midline abd wound 1470 since o/n    ROS: unable to obtain d/t medical condition    MEDICATIONS  (STANDING):  chlorhexidine 4% Liquid 1 Application(s) Topical <User Schedule>  dextrose 5%. 1000 milliLiter(s) (50 mL/Hr) IV Continuous <Continuous>  dextrose 5%. 1000 milliLiter(s) (100 mL/Hr) IV Continuous <Continuous>  dextrose 50% Injectable 12.5 Gram(s) IV Push once  dextrose 50% Injectable 25 Gram(s) IV Push once  dextrose 50% Injectable 25 Gram(s) IV Push once  glucagon  Injectable 1 milliGRAM(s) IntraMuscular once  insulin glargine Injectable (LANTUS) 35 Unit(s) SubCutaneous at bedtime  insulin lispro (ADMELOG) corrective regimen sliding scale   SubCutaneous every 6 hours  metoclopramide Injectable 5 milliGRAM(s) IV Push every 6 hours  midodrine 15 milliGRAM(s) Oral every 8 hours  pantoprazole  Injectable 40 milliGRAM(s) IV Push every 12 hours  phenylephrine    Infusion 0.2 MICROgram(s)/kG/Min (5.39 mL/Hr) IV Continuous <Continuous>  piperacillin/tazobactam IVPB.. 3.375 Gram(s) IV Intermittent every 12 hours  rifAXIMin 550 milliGRAM(s) Oral two times a day    Vital Signs Last 24 Hrs  T(C): 37.6 (14 May 2023 10:00), Max: 37.6 (14 May 2023 10:00)  T(F): 99.7 (14 May 2023 10:00), Max: 99.7 (14 May 2023 10:00)  HR: 78 (14 May 2023 14:00) (75 - 105)  BP: 87/51 (14 May 2023 14:00) (87/51 - 116/67)  BP(mean): 59 (14 May 2023 14:00) (56 - 78)  RR: 17 (14 May 2023 14:00) (17 - 33)  SpO2: 100% (14 May 2023 14:00) (91% - 100%)    Parameters below as of 14 May 2023 14:00  Patient On (Oxygen Delivery Method): nasal cannula  O2 Flow (L/min): 6    PE:  Constitutional: frail looking  HEENT: NC/AT, EOMI, PERRLA, conjunctivae clear; ears and nose atraumatic; pharynx benign  Neck: supple; thyroid not palpable  Back: no tenderness  Respiratory: decreased breath sounds, rales   Cardiovascular: S1S2 regular, no murmurs  Abdomen: soft, not tender, not distended, positive BS; + ostomy + peg tube midline abd wound with drainage  Genitourinary: no suprapubic tenderness  Lymphatic: no LN palpable  Musculoskeletal: no muscle tenderness, no joint swelling or tenderness  Extremities: no pedal edema  Neurological/ Psychiatric: no focal deficits   Skin: no rashes; no palpable lesions    Labs: all available labs reviewed                                              8.0    9.07  )-----------( 30       ( 14 May 2023 05:37 )             23.8     05-14    140  |  105  |  38<H>  ----------------------------<  177<H>  3.4<L>   |  24  |  3.29<H>    Ca    8.4<L>      14 May 2023 05:37  Phos  2.7     05-14  Mg     2.0     05-14    TPro  4.7<L>  /  Alb  2.1<L>  /  TBili  1.9<H>  /  DBili  x   /  AST  137<H>  /  ALT  92<H>  /  AlkPhos  129<H>  05-14      Culture - Blood (05.12.23 @ 09:01)   - Enterobacter cloacae complex: Detec  Gram Stain: Culture - Blood (05.12.23 @ 08:59)   Gram Stain:   Growth in aerobic bottle: Gram Negative Rods   Growth in anaerobic bottle: Gram Negative Rods  Specimen Source: .Blood None  Culture Results:   Growth in aerobic bottle: Gram Negative Rods   Growth in anaerobic bottle: Gram Negative Rods       Radiology: all available radiological tests reviewed    < from: Xray Chest 1 View- PORTABLE-Urgent (Xray Chest 1 View- PORTABLE-Urgent .) (05.11.23 @ 13:48) >    ACC: [id number] EXAM:  XR CHEST PORTABLE URGENT 1V   ORDERED BY: PRIYA HALL     PROCEDURE DATE:  05/11/2023          INTERPRETATION:  INDICATION: Dialysis catheter insertion    Portable chest 1:28 PM    COMPARISON: 5/6/2023    FINDINGS:  Heart/Vascular: The heart size, mediastinum, hilum and aorta are stable.  Pulmonary: Midline trachea. There is mild pulmonary venous congestion and   low lung volumes.    Bones: There is no fracture.  Lines and catheter: Tip of the right IJ dialysis catheteris in the right   atrium. Tip and side-port of NG tube are in the body of the stomach.    Impression:    There is mild pulmonary venous congestion and low lung volumes.    Tip of the right IJ dialysis catheter is in the right atrium. There is no   pneumothorax.          < end of copied text >  < from: US Kidney and Bladder (05.08.23 @ 15:51) >    ACC: [id number] EXAM:  US KIDNEYS AND BLADDER   ORDERED BY: GWENDOLYN SINGH     PROCEDURE DATE:  05/08/2023          INTERPRETATION:  CLINICAL INFORMATION: History of gastric cancer.   Evaluate for renal injury.    COMPARISON: CT dated 04/29/2023    TECHNIQUE: Sonography of the kidneys and bladder.    FINDINGS:  Right kidney: 8.7 cm. No renal mass, hydronephrosis or calculi.    Left kidney: 8.5 cm. No renal mass, hydronephrosis or calculi.    Urinary bladder: Urinary bladder is contracted limiting its evaluation.    Incidentally noted are diffuse hepatic metastases as noted on the prior   CT.    IMPRESSION:  No hydronephrosis or renal mass is identified to suggest renal injury.   Please note that contrast enhanced CT is more sensitive for detection of   renal injury.    Hepatic metastasis.        < end of copied text >    Advanced directives addressed: full resuscitation

## 2023-05-14 NOTE — PROGRESS NOTE ADULT - ASSESSMENT
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  S/P distal pancreatectomy, splenectomy, partial colectomy  S/P abdominal washout and colostomy creation  now with aspiration pneumonia  midline wound with drarinage  on phenylephrine and midodrine  s/p TPN    Plan:  cont TF via NGT   monitor ostomy output  f/u Nephro recommend appreciated  monitor midline wound  no plans at this time for surgical intervention  optimize nutrition/TPN  cont great Critical care management as per ICU team  surgery will follow    Plan discussed with Dr Black

## 2023-05-14 NOTE — PROGRESS NOTE ADULT - ASSESSMENT
69 yo male with PMHx metastatic neuroendocrine tumor of the pancreas, recently admitted with nonfunctioning GJ tube (patient is chronically obstructed). Patient was admitted with severe heartburn, and has had several episodes of vomiting small amount of dark blood. Patient was discharged on Thursday. Hgb unchanged. CT angiography has no active bleeding, but demonstrates extensive tumor in distal pancreas.  Patient went to the OR 5/4 for a Open distal pancreatectomy, splenectomy, partial colectomy, partial L adrenalectomy, temporary abdominal closure.  Patient had a 2.2 L EBL.  Intra Op had 6U PRBC, 2FFP, 1 Platelets, 4L crystalloid  Post Op 1U PRBC ans 1 U FFP.  Post OP vented, with poor UO, on levo and Vaso.  5/5 Pt went back to OR and had closure and colostomy. Hospital course c/b  fever lethargy, encephalopathic, on pressors, dec hgb was diayzed 5/11 560 cc from bulb, had ? episode emesis overnight, imaging shows some congestion, concern for asp pna raised started on IV vancomycin/zosyn.    1. Septic shock with Enterobacter. Abd wound infection. Aspiration pneumonitis/pneumonia. Metastatic neuroendocrine pancreatic tumor s/p surgery. ZO  - imaging reviewed  - blood cx growing enterobacter source likely gut translocation - f/u sensitivities   - Ct abd/pelvis post surgical changes pneumoperitoneum/enteritis;  surgery f/u noted  - on IV zosyn [id number] renally dose adjusted #3  - continue with antibiotic coverage   - monitor temps  - tolerating abx well so far; no side effects noted  - reason for abx use and side effects reviewed with patient  - aspiration precautions  - fu cbc    2. other issues - care per medicine

## 2023-05-14 NOTE — PROGRESS NOTE ADULT - SUBJECTIVE AND OBJECTIVE BOX
SURGERY DAILY PROGRESS NOTE:     Subjective:  Patient seen and examined at bedside during am rounds doing well. pt is following commands. Still on phenylephrine and started on midodrine and on TF at goal 65. AVSS. Pt midline abdomen wound producing high volume serous fluid/ascites. Denies any fevers, chills, n/v/d, chest pain or shortness of breath    Objective:    Vital Signs Last 24 Hrs  T(C): 36.8 (14 May 2023 04:00), Max: 37.6 (13 May 2023 12:00)  T(F): 98.3 (14 May 2023 04:00), Max: 99.6 (13 May 2023 12:00)  HR: 96 (14 May 2023 08:00) (75 - 105)  BP: 100/59 (14 May 2023 08:00) (77/50 - 116/67)  BP(mean): 68 (14 May 2023 08:00) (56 - 78)  RR: 25 (14 May 2023 08:00) (17 - 33)  SpO2: 100% (14 May 2023 08:00) (90% - 100%)    Parameters below as of 14 May 2023 08:00  Patient On (Oxygen Delivery Method): nasal cannula  O2 Flow (L/min): 6                          8.0    9.07  )-----------( 30       ( 14 May 2023 05:37 )             23.8     05-14    140  |  105  |  38<H>  ----------------------------<  177<H>  3.4<L>   |  24  |  3.29<H>    Ca    8.4<L>      14 May 2023 05:37  Phos  2.7     05-14  Mg     2.0     05-14    TPro  4.7<L>  /  Alb  2.1<L>  /  TBili  1.9<H>  /  DBili  x   /  AST  137<H>  /  ALT  92<H>  /  AlkPhos  129<H>  05-14    I&O's Summary    13 May 2023 07:01  -  14 May 2023 07:00  --------------------------------------------------------  IN: 5469 mL / OUT: 3170 mL / NET: 2299 mL        Culture - Blood (collected 12 May 2023 09:01)  Source: .Blood None  Gram Stain (12 May 2023 22:34):    Growth in aerobic and anaerobic bottles: Gram Negative Rods  Preliminary Report (13 May 2023 17:52):    Growth in aerobic and anaerobic bottles: Enterobacter cloacae complex    ***Blood Panel PCR results on this specimen are available    approximately 3 hours after the Gram stain result.***    Gram stain, PCR, and/or culture results may not always    correspond due to difference in methodologies.    ************************************************************    This PCR assay was performed by multiplex PCR. This    Assay tests for 66 bacterial and resistance gene targets.    Please refer to the [facility] test directory    at [email or web address] for details.  Organism: Blood Culture PCR (12 May 2023 23:38)  Organism: Blood Culture PCR (12 May 2023 23:38)    Culture - Blood (collected 12 May 2023 08:59)  Source: .Blood None  Gram Stain (12 May 2023 23:04):    Growth in aerobic bottle: Gram Negative Rods    Growth in anaerobic bottle: Gram Negative Rods  Preliminary Report (13 May 2023 17:50):    Growth in aerobic and anaerobic bottles: Enterobacter cloacae complex    See previous culture [id number]        General:   AAOx3, NAD  Chest: Normal respiratory effort  Heart: RRR  Abdomen: Abdomen soft, nondistended, no tenderness, dressing with serosanguinous drainage, midline wound with serous/ascitic fluid . Ostomy with bag full of stool. staples intact.  Neuro/Psych: No localized deficits. Normal speech, normal tone  Skin: Normal, no rashes, no lesions noted.   Extremities: Warm, well perfused, no edema, Pulses intact

## 2023-05-14 NOTE — PROGRESS NOTE ADULT - ASSESSMENT
67 yo male with PMHx metastatic neuroendocrine tumor of the pancreas, recently admitted with nonfunctioning GJ tube  due to external mechanical  obstruction   now Post Op  after an open distal pancreatectomy, splenectomy, partial colectomy, partial L adrenalectomy,colostomy  With Post Op distributive Shock/bleeding developed ZO now requiring dialysis  onow back on pressors septic shock gram negative bacterem ia Enterobacter, Cloacae  CT 5/14 no distinct collection  dialysis dependent plan T/h/S    PLAN:  ICU    Neuro - encephalopathic and profoundly weak, but non focal, on rifaximin for previously elevated pneumonia  PULM: inc O2, infiltrate on ct continue abx, chest PT  Cardio:  back on pressors, track fluid loses from ileostomy, drains, anasarcic on phenylephrine,septic shock     May need CVP  Renal:  last dialysed on Thursday, now off bicarbonate drip  GI:   Tolerating tube feeds     TAYLA with copoious drainage, amylase normal,      CT abdomen pelvis no clear collectin to drain     foul draiange from abdominal wound, bagged monitor dranage  Endo cortical level was ok, sliding scale for hyperglycemia, increase Lantus  Heme - thrombocytopenia from sepsis, d/c heparine subq till recovers  Neurometabolic encephalopthy  Profoundly ill from illness    ID febrile, Zosyn, check sensitivies enterobacter cloacae  Full Code d/w wife, patient is full code     67 yo male with PMHx metastatic neuroendocrine tumor of the pancreas, recently admitted with nonfunctioning GJ tube  due to external mechanical  obstruction   now Post Op  after an open distal pancreatectomy, splenectomy, partial colectomy, partial L adrenalectomy,colostomy  With Post Op distributive Shock/bleeding developed ZO now requiring dialysis  onow back on pressors septic shock gram negative bacterem ia Enterobacter, Cloacae  CT 5/14 no distinct collection  dialysis dependent plan T/h/S    PLAN:  ICU    Neuro - encephalopathic and profoundly weak, but non focal, on rifaximin for previously elevated pneumonia  PULM: inc O2, infiltrate on ct continue abx, chest PT  Cardio:  back on pressors, track fluid loses from ileostomy, drains, anasarcic on phenylephrine,septic shock     May need CVP  Renal:  last dialysed on Thursday, now off bicarbonate drip  GI:   Tolerating tube feeds     TAYLA with copoious drainage, amylase normal,      CT abdomen pelvis no clear collectin to drain     foul draiange from abdominal wound, bagged monitor dranage  Endo cortical level was ok, sliding scale for hyperglycemia, increase Lantus  Heme - thrombocytopenia from sepsis, d/c heparine subq till recovers  Neurometabolic encephalopthy  Profoundly ill from illness    ID blood cullture postive for enterobacter cloacae will change zosyn to Meropenem  ID febrile, Zosyn, check sensitivies enterobacter cloacae  Full Code d/w wife, patient is full code

## 2023-05-14 NOTE — PROGRESS NOTE ADULT - SUBJECTIVE AND OBJECTIVE BOX
Patient is a 68y old  Male who presents with a chief complaint of GIB (14 May 2023 00:16)    BRIEF HOSPITAL COURSE:   69 yo male with PMHx metastatic neuroendocrine tumor of the pancreas, recently admitted with nonfunctioning GJ tube (patient is chronically obstructed). During hospitalization had a CT angiography has no active bleeding, but demonstrates extensive tumor in distal pancreas. Went to OR on 4/4 for open distal pancreatectomy, splenectomy, partial colectomy, partial L adrenalectomy, temporary abdominal closure. Complicated by hemorrhage shock and post op respiratory failure. Back to OR on 5/9 for closure and colostomy.  Further complicated by worsening renal failure ZO requiring HD and hepatic encephalopathy.    Events last 24 hours:   -Remains in vasopressor-dependent shock state w/ Phenylephrine infusion, requirements increasing s/p HD session yesterday.  -CT A/P performed without fluid collection. Midline wound and TAYLA drain w/ increased output.   -Satting well on NC. Afebrile.     PAST MEDICAL & SURGICAL HISTORY:  Hypertension  BPH (benign prostatic hyperplasia)  Renal insufficiency  Light chain nephropathy  DM (diabetes mellitus)  HLD (hyperlipidemia)  No significant past surgical history    Review of Systems:  CONSTITUTIONAL: No fever, chills, or fatigue  EYES: No eye pain, visual disturbances, or discharge  ENMT:  No difficulty hearing, tinnitus, vertigo; No sinus or throat pain  NECK: No pain or stiffness  RESPIRATORY: No cough, wheezing, chills or hemoptysis; No shortness of breath  CARDIOVASCULAR: No chest pain, palpitations, dizziness, or leg swelling  GASTROINTESTINAL: No abdominal or epigastric pain. No nausea, vomiting, or hematemesis; No diarrhea or constipation. No melena or hematochezia.  GENITOURINARY: No dysuria, frequency, hematuria, or incontinence  NEUROLOGICAL: No headaches, memory loss, loss of strength, numbness, or tremors  SKIN: No itching, burning, rashes, or lesions   MUSCULOSKELETAL: No joint pain or swelling; No muscle, back, or extremity pain  PSYCHIATRIC: No depression, anxiety, mood swings, or difficulty sleeping    Medications:  piperacillin/tazobactam IVPB.. 3.375 Gram(s) IV Intermittent every 12 hours  rifAXIMin 550 milliGRAM(s) Oral two times a day  midodrine 15 milliGRAM(s) Oral every 8 hours  phenylephrine    Infusion 0.2 MICROgram(s)/kG/Min IV Continuous <Continuous>  acetaminophen     Tablet .. 650 milliGRAM(s) Oral every 6 hours PRN  metoclopramide Injectable 5 milliGRAM(s) IV Push every 6 hours  ondansetron Injectable 4 milliGRAM(s) IV Push every 6 hours PRN  pantoprazole  Injectable 40 milliGRAM(s) IV Push every 12 hours  dextrose 50% Injectable 12.5 Gram(s) IV Push once  dextrose 50% Injectable 25 Gram(s) IV Push once  dextrose 50% Injectable 25 Gram(s) IV Push once  dextrose Oral Gel 15 Gram(s) Oral once PRN  glucagon  Injectable 1 milliGRAM(s) IntraMuscular once  insulin glargine Injectable (LANTUS) 35 Unit(s) SubCutaneous at bedtime  insulin lispro (ADMELOG) corrective regimen sliding scale   SubCutaneous every 6 hours  dextrose 5% 1000 milliLiter(s) IV Continuous <Continuous>  dextrose 5%. 1000 milliLiter(s) IV Continuous <Continuous>  dextrose 5%. 1000 milliLiter(s) IV Continuous <Continuous>  sodium chloride 0.9% lock flush 10 milliLiter(s) IV Push every 1 hour PRN  chlorhexidine 4% Liquid 1 Application(s) Topical <User Schedule>    ICU Vital Signs Last 24 Hrs  T(C): 36.4 (14 May 2023 00:00), Max: 37.6 (13 May 2023 12:00)  T(F): 97.5 (14 May 2023 00:00), Max: 99.6 (13 May 2023 12:00)  HR: 105 (14 May 2023 01:00) (80 - 109)  BP: 101/57 (14 May 2023 01:00) (77/50 - 116/67)  BP(mean): 66 (14 May 2023 01:00) (56 - 85)  ABP: --  ABP(mean): --  RR: 31 (14 May 2023 01:00) (19 - 34)  SpO2: 97% (14 May 2023 01:00) (90% - 100%)  O2 Parameters below as of 14 May 2023 00:00  Patient On (Oxygen Delivery Method): nasal cannula  O2 Flow (L/min): 6    I&O's Detail  12 May 2023 07:01  -  13 May 2023 07:00  --------------------------------------------------------  IN:    dextrose 5% w/ Additives: 1764 mL    Free Water: 160 mL    IV PiggyBack: 950 mL    Phenylephrine: 1454 mL    PRBCs (Packed Red Blood Cells): 304 mL    Vital1.5: 1280 mL  Total IN: 5912 mL  OUT:    Bulb (mL): 400 mL    Colostomy (mL): 650 mL  Total OUT: 1050 mL  Total NET: 4862 mL    13 May 2023 07:01  -  14 May 2023 01:47  --------------------------------------------------------  IN:    dextrose 5% w/ Additives: 917 mL    Free Water: 170 mL    IV PiggyBack: 100 mL    Other (mL): 250 mL    Phenylephrine: 690 mL    Sodium Chloride 0.9% Bolus: 500 mL    Vital1.5: 409 mL  Total IN: 3036 mL  OUT:    Bulb (mL): 340 mL    Colostomy (mL): 700 mL    Drain (mL): 1050 mL    Other (mL): 250 mL  Total OUT: 2340 mL  Total NET: 696 mL    LABS:                      7.5    16.65 )-----------( 42       ( 13 May 2023 05:18 )             22.4     05-13  140  |  105  |  57<H>  ----------------------------<  282<H>  3.3<L>   |  22  |  4.45<H>  Ca    8.7      13 May 2023 05:18  Phos  2.2     05-12  TPro  4.7<L>  /  Alb  1.8<L>  /  TBili  1.5<H>  /  DBili  x   /  AST  175<H>  /  ALT  133<H>  /  AlkPhos  182<H>  05-13    CAPILLARY BLOOD GLUCOSE  POCT Blood Glucose.: 215 mg/dL (13 May 2023 22:12)    CULTURES:  Culture Results:   Growth in aerobic and anaerobic bottles: Enterobacter cloacae complex  ***Blood Panel PCR results on this specimen are available  approximately 3 hours after the Gram stain result.***  Gram stain, PCR, and/or culture results may not always  correspond due to difference in methodologies.  ************************************************************  This PCR assay was performed by multiplex PCR. This  Assay tests for 66 bacterial and resistance gene targets.  Please refer to the [facility] test directory  at [email or web address] for details. (05-12-23 @ 09:01)  Culture Results:   Growth in aerobic and anaerobic bottles: Enterobacter cloacae complex  See previous culture [id number] (05-12-23 @ 08:59)    Physical Examination:  General: Cachetic adult male.   HEENT: PERRL.   PULM: Clear to auscultation bilaterally.  CVS: s1/s2.   ABD: Soft, nondistended, nontender, normoactive bowel sounds. +Ostomy, midline incision c/d/i, +fluid output. +TAYLA w/ ss fluid.   EXT: No edema, nontender.  SKIN: Warm.  NEURO: Awake, encephalopathic, follows simple commands.     RADIOLOGY:   < from: CT Abdomen and Pelvis No Cont (05.13.23 @ 09:53) >  ACC: [id number] EXAM:  CT ABDOMEN AND PELVIS   ORDERED BY: MARIA VICTORIA AMBRIZ   PROCEDURE DATE:  05/13/2023    IMPRESSION:  Status post left hemicolectomy with small volume ascites and   pneumoperitoneum, likely postoperative.  Moderate segment of pelvic small bowel thickening, nonspecific and may   represent inflammation, infection, or ischemia. Please correlate with   lactate levels.  Innumerable hepatic metastases are better visualized on recent   contrast-enhanced CT.  Patchy lung opacities in right lower lobe consolidation concerning for   pneumonia.      69 yo male with PMHx metastatic neuroendocrine tumor of the pancreas, recently admitted with nonfunctioning GJ tube (patient is chronically obstructed). During hospitalization had a CT angiography has no active bleeding, but demonstrates extensive tumor in distal pancreas. Went to OR on 4/4 for open distal pancreatectomy, splenectomy, partial colectomy, partial L adrenalectomy, temporary abdominal closure. Complicated by hemorrhage shock and post op respiratory failure. Back to OR on 5/9 for closure and colostomy.  Further complicated by worsening renal failure ZO requiring HD and hepatic encephalopathy.  Assessment:  1. Shock, Septic vs Hemorrhagic   2. ZO on HD  3. Hepatic encephalopathy   4. Metastatic Neuroendocrine Tumor of the Pancreas s/p distal pancreatectomy, splenectomy, partial colectomy, partial L adrenalectomy, colostomy  5. Hyperglycemia   6. Enterobacter Bacteremia    Plan:  NEURO:   -Rifaximin for Hepatic Encephalopathy.   -Monitor mental status closely, avoid neurosuppresants. Serial neurologic assessments.     CV:   -Remains in vasopressor-dependent shock state w/ Phenylephrine infusion - actively titrating to maintain goal MAP >65 monitoring end points of organ perfusion. Midodrine increased to 15mg q8h to offload vasopressor requirements and further facilitate weaning off.    -Keep K~4 and Mg>2 for optimal arrhythmia suppression.    PULM:   -Satting well on NC, actively supplemental O2 to maintain goal SpO2 >88%.   -Incentive spirometry, Chest PT/Pulmonary toilet, HOB >30'. Albuterol PRN.     GI:   -TF.   -Protonix BID. Anti-emetics PRN.   -Foul draiange from abdominal wound, Surgery team following and aware. CT A/P w/o fluid collection. Monitor Abdominal wound and TAYLA drain output.     RENAL:   -Renal function currently w/ ZO requiring HD. S/p session yesterday. Nephrology following, HD per them.   -trend lytes/Scr daily with BMP  -I's and O's, goal UOP 0.5 cc/kg/hr  -renal dose meds and avoid nephrotoxins   -Sodium Bicarb infusion.     ENDO:   -Aggressive glycemic control to limit FS glucose to <180mg/dl. ISS. Lantus increased.   -Keep Euthyroid.     ID:   -Afebrile. BloodCx w/ Enterobacter. Zosyn course.   -ABX use and/or discontinuation based on discussion with ID in conjunction with clinical features, culture data, and judicious procalcitonin monitoring.    HEME:  -Hgb stable s/p 1u PRBC 5/12. Transfuse for Hgb <7. Serial CBC, keep active t/s.   -Thrombocytopenia likely septic-induced, stable. Transfuse for Plt <10k or <50k w/ active bleeding.   -DVT ppx w/ SCDs.     GOC: Full Code.     CRITICAL CARE TIME SPENT: 38 minutes of critical care time spent providing medical care for patient's acute illness/conditions that impairs at least one vital organ system and/or poses a high risk of imminent or life threatening deterioration in the patient's condition. It includes time spent evaluating and treating the patient's acute illness as well as time spent reviewing labs, radiology, discussing goals of care with patient and/or patient's family, and discussing the case with a multidisciplinary team, in an effort to prevent further life threatening deterioration or end organ damage. This time is independent of any procedures performed.

## 2023-05-14 NOTE — PROGRESS NOTE ADULT - SUBJECTIVE AND OBJECTIVE BOX
NEPHROLOGY INTERVAL HPI/OVERNIGHT EVENTS:    Date of Service: 23 @ 09:34    --No acute distress.  No urine output.   Drains 1700cc.  colostomy 800cc  --Awake but lethargic.  Unclear if in pain.  RR 30.  On NG feeding.  No urine output  - seen w family and ID at bedside, plans for HD discussed w family  --Lethargic.  No acute distress.   Colostomy output 1500cc.  No UO.  5/10--No acute distress.  calm, off sedation and off pressor.  Colostomy output 250cc and Drain 850cc.  Oligoanuric.   more alert and awake, ostomy fx pressors off since this am unable to complete hd yesterday and only received 1/2 due to malfunctioning cathtere    pulled after HD    seen at hd, events noted, anuric, TPN infusing with lipids, on solo dec dose of pressors, encepha with starting of lactulose and xifaxin   - no new events, anuric, CO2 trending down   s/p OR yesterday, ostomy creation  - case d/w surgery and intensivist , anuric k 5.2 will dialyze preop, no fluid removal  - pt seen in PACU, PRBC and FFP, and LR resuscitation noted, coulter in place ~ 100 cc urine in coulter bag  case d/w pts nurse   Chart quote, operative note ""Open distal pancreatectomy, splenectomy, partial colectomy, partial L adrenalectomy, temporary abdominal closure; Large varices, 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 Liver laden with diffuse metastases and very friable, RP oozy. Patient on pressors at the end of the case, and hence, decided to leave colon in discontinuity with temporary closure.""    HPI:  69 yo man with Neuroendocrine tumor of pancreas with mets to liver dx'd 6 years ago.  Treated with Lutathera one year ago and restarted in 2022  G-J tube placed for nutrition in January due to chronic dysphagia and severe esophagitis on EGD.  Post recent  admission due to G tube clogging.  D/mansi home on .    Returned to ED due to vomiting when night time enteral feeding via G tube started.  CT with IVC done due to concern for GIB.  Pt reports chronic self catheterization due to urinary retention.   ZO improved when catheterization started.  Follows mainly with EEMLY Cortez.   Pt has been self cath'ing 3-4 x /day.   Indwelling Coulter placed by EEMLY due to resistance with blood clots with resistance reported by pt.  For palliative mass resection tomorrow.    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  Creat has been variable during previous admissions--ranging 1.1-2.2.    PMHX and PSHX.  --Neuroendocrine tumor of pancreas with mets to liver --dx 6 years ago.  --GJ tube for nutrition in 2023 due to chronic dysphagia.  --Hx of HTN  --Hx of DM  --Hx of light chain disease --unclear hx and no hx of renal biopsy.    MEDICATIONS  (STANDING):  chlorhexidine 4% Liquid 1 Application(s) Topical <User Schedule>  dextrose 5% 1000 milliLiter(s) (75 mL/Hr) IV Continuous <Continuous>  dextrose 5%. 1000 milliLiter(s) (50 mL/Hr) IV Continuous <Continuous>  dextrose 5%. 1000 milliLiter(s) (100 mL/Hr) IV Continuous <Continuous>  dextrose 50% Injectable 12.5 Gram(s) IV Push once  dextrose 50% Injectable 25 Gram(s) IV Push once  dextrose 50% Injectable 25 Gram(s) IV Push once  glucagon  Injectable 1 milliGRAM(s) IntraMuscular once  insulin glargine Injectable (LANTUS) 35 Unit(s) SubCutaneous at bedtime  insulin lispro (ADMELOG) corrective regimen sliding scale   SubCutaneous every 6 hours  metoclopramide Injectable 5 milliGRAM(s) IV Push every 6 hours  midodrine 15 milliGRAM(s) Oral every 8 hours  pantoprazole  Injectable 40 milliGRAM(s) IV Push every 12 hours  phenylephrine    Infusion 0.2 MICROgram(s)/kG/Min (5.39 mL/Hr) IV Continuous <Continuous>  piperacillin/tazobactam IVPB.. 3.375 Gram(s) IV Intermittent every 12 hours  rifAXIMin 550 milliGRAM(s) Oral two times a day    MEDICATIONS  (PRN):  acetaminophen     Tablet .. 650 milliGRAM(s) Oral every 6 hours PRN Temp greater or equal to 38.5C (101.3F)  dextrose Oral Gel 15 Gram(s) Oral once PRN Blood Glucose LESS THAN 70 milliGRAM(s)/deciliter  ondansetron Injectable 4 milliGRAM(s) IV Push every 6 hours PRN Nausea and/or Vomiting  sodium chloride 0.9% lock flush 10 milliLiter(s) IV Push every 1 hour PRN Pre/post blood products, medications, blood draw, and to maintain line patency    Vital Signs Last 24 Hrs  T(C): 36.8 (14 May 2023 04:00), Max: 37.6 (13 May 2023 12:00)  T(F): 98.3 (14 May 2023 04:00), Max: 99.6 (13 May 2023 12:00)  HR: 78 (14 May 2023 09:00) (75 - 105)  BP: 95/55 (14 May 2023 09:00) (77/50 - 116/67)  BP(mean): 63 (14 May 2023 09:00) (56 - 78)  RR: 18 (14 May 2023 09:00) (17 - 33)  SpO2: 100% (14 May 2023 09:00) (90% - 100%)    Parameters below as of 14 May 2023 09:00  Patient On (Oxygen Delivery Method): nasal cannula  O2 Flow (L/min): 6    Daily     Daily Weight in k.9 (14 May 2023 05:00)    05-13 @ 07:01  -  05-14 @ 07:00  --------------------------------------------------------  IN: 5469 mL / OUT: 3170 mL / NET: 2299 mL    PHYSICAL EXAM:  GENERAL: No distress  CHEST/LUNG: fair air entry  HEART: S1S2 RRR  ABDOMEN: soft  EXTREMITIES: 1+ edema  SKIN:     LABS:                        8.0    9.07  )-----------( 30       ( 14 May 2023 05:37 )             23.8     -14    140  |  105  |  38<H>  ----------------------------<  177<H>  3.4<L>   |  24  |  3.29<H>    Ca    8.4<L>      14 May 2023 05:37  Phos  2.7       Mg     2.0         TPro  4.7<L>  /  Alb  2.1<L>  /  TBili  1.9<H>  /  DBili  x   /  AST  137<H>  /  ALT  92<H>  /  AlkPhos  129<H>          Magnesium, Serum: 2.0 mg/dL ( @ 05:37)  Phosphorus Level, Serum: 2.7 mg/dL ( @ 05:37)          RADIOLOGY & ADDITIONAL TESTS:

## 2023-05-14 NOTE — PROGRESS NOTE ADULT - ASSESSMENT
69 yo man with Hx of Neuroendocrine tumor/Pancreatic mass and liver mets.  Now for palliative resection of pancreatic mass.    --ZO with variable creat level and unclear CKD.    Likely dependent on volume status.    Limited intake even with Enteral feeding.      Start gentle hydration.  -- : continue Hale drainage.  --Hx of light chain nephropathy  --No contraindication for surgery from renal standpoint.    5/4  s/p Palliative surgery for neuroendocrine mass  will watch for ZO, pt with low UO immediate post op  s/p multiple PRBC and FFP transfusion and volume resuscitation    5/5  patient anuric, K 5.2  On bicarb drip  to go back to OR today  case d/w Dr Jean and Dr Siddiqi  Will HD x 2 hrs to correct potassium  >10 L infused in blood products and fluids  More fluids not indicated, UO 50 ml  no fluid removal on HD  Hep profile ordered    5/6  Ostomy created  will monitor i/o  labs stable   no need for HD today  d/w intensivist  will monitor daily for hd requirement    5/7  seen earlier on HD   tolerating well  TPN  d/w Dr Jean  d/w pts fam at bedside    5/8 MK   - ZO with anuria, remains HD dependent with hx of light chain nephropathy    tolerating hd     dc femoral shiley today after hd   - enceph monitor response to hd and xifaxan and lactulose    5/9 MK   - ZO with anuria, remains HD dependent with hx of light chain nephropathy    no indication for hd    off pressor     monitor electrolytes on TPN     NG in place for TF initiation   - enceph, improving monitor response to hd and xifaxan and lactulose    ostomy fx with liquid stool  dw dr marley     5/10 SY  --ZO : remains oligoanuric.     Follow with repeat bladder scans--no retention.    Will hold HD today and assess daily.    Trial of gentle hydration due to increased fluid loss.  --Metabolic acidosis : trial of HCO3 in addition to TPN   --Monitor encephalopathy.    5/11 SY  --ZO : remains oligoanuric.    Renal parameters further increased with no improvement in acidosis with HCO3 infusion.    D/w pt's wife over phone.    Explained his poor prognosis from Oncology aspect due to large tumor burden in liver and that surgery was palliative in nature.    Explained added burden of continued dialysis adding to pt's suffering at this point.    Pt's wife requested not to proceed with HD catheter placement and dialysis until she can d/w Dr Siddiqi and rest of family.    Will discuss further later today.    5/12  HD yesterday, planned for HD Sat  case d/w family, and ID, pts nurse  Dr Velez covering this weekend    5/13 SY  --ZO : remains oligoanuric.   HD today : HD order and tx reviewed.     --Positive fluid balance due to hypotension.  RR at 30 today.   Check follow up CXR.  Will plan for 1-2 kg UF with HD if tolerated.  --Continue enteral feeding.  --D/w Dr Mcclendon : positive blood culture/ GNR-- pre current HD catheter.   Plan HD today and on Monday,, then remove catheter for bacteremia clearance.    5/14 SY  --ZO : reamins dialysis dependent for now.    Plan HD in am and remove temporary HD catheter due to bacteremia to allow clearance.  --CT with patch lower lung inf : concern for PNA : continue abtx.  --Continue enteral feeding.  --Enterobacter bacteremia : continue abtx.

## 2023-05-14 NOTE — PROGRESS NOTE ADULT - SUBJECTIVE AND OBJECTIVE BOX
Events Overnight:      HPI:         PMH:                       MEDICATIONS  (STANDING):  chlorhexidine 4% Liquid 1 Application(s) Topical <User Schedule>  dextrose 5% 1000 milliLiter(s) (75 mL/Hr) IV Continuous <Continuous>  dextrose 5%. 1000 milliLiter(s) (100 mL/Hr) IV Continuous <Continuous>  dextrose 5%. 1000 milliLiter(s) (50 mL/Hr) IV Continuous <Continuous>  dextrose 50% Injectable 25 Gram(s) IV Push once  dextrose 50% Injectable 25 Gram(s) IV Push once  dextrose 50% Injectable 12.5 Gram(s) IV Push once  glucagon  Injectable 1 milliGRAM(s) IntraMuscular once  insulin glargine Injectable (LANTUS) 35 Unit(s) SubCutaneous at bedtime  insulin lispro (ADMELOG) corrective regimen sliding scale   SubCutaneous every 6 hours  metoclopramide Injectable 5 milliGRAM(s) IV Push every 6 hours  midodrine 15 milliGRAM(s) Oral every 8 hours  pantoprazole  Injectable 40 milliGRAM(s) IV Push every 12 hours  phenylephrine    Infusion 0.2 MICROgram(s)/kG/Min (5.39 mL/Hr) IV Continuous <Continuous>  piperacillin/tazobactam IVPB.. 3.375 Gram(s) IV Intermittent every 12 hours  rifAXIMin 550 milliGRAM(s) Oral two times a day    MEDICATIONS  (PRN):  acetaminophen     Tablet .. 650 milliGRAM(s) Oral every 6 hours PRN Temp greater or equal to 38.5C (101.3F)  dextrose Oral Gel 15 Gram(s) Oral once PRN Blood Glucose LESS THAN 70 milliGRAM(s)/deciliter  ondansetron Injectable 4 milliGRAM(s) IV Push every 6 hours PRN Nausea and/or Vomiting  sodium chloride 0.9% lock flush 10 milliLiter(s) IV Push every 1 hour PRN Pre/post blood products, medications, blood draw, and to maintain line patency                    ICU Vital Signs Last 24 Hrs  T(C): 36.8 (14 May 2023 04:00), Max: 37.6 (13 May 2023 12:00)  T(F): 98.3 (14 May 2023 04:00), Max: 99.6 (13 May 2023 12:00)  HR: 78 (14 May 2023 09:00) (75 - 105)  BP: 95/55 (14 May 2023 09:00) (77/50 - 116/67)  BP(mean): 63 (14 May 2023 09:00) (56 - 78)  ABP: --  ABP(mean): --  RR: 18 (14 May 2023 09:00) (17 - 33)  SpO2: 100% (14 May 2023 09:00) (90% - 100%)    O2 Parameters below as of 14 May 2023 09:00  Patient On (Oxygen Delivery Method): nasal cannula  O2 Flow (L/min): 6              I&O's Summary    13 May 2023 07:01  -  14 May 2023 07:00  --------------------------------------------------------  IN: 5469 mL / OUT: 3170 mL / NET: 2299 mL                                       8.0    9.07  )-----------( 30       ( 14 May 2023 05:37 )             23.8       05-14    140  |  105  |  38<H>  ----------------------------<  177<H>  3.4<L>   |  24  |  3.29<H>    Ca    8.4<L>      14 May 2023 05:37  Phos  2.7     05-14  Mg     2.0     05-14    TPro  4.7<L>  /  Alb  2.1<L>  /  TBili  1.9<H>  /  DBili  x   /  AST  137<H>  /  ALT  92<H>  /  AlkPhos  129<H>  05-14                    DVT Prophylaxis:                                                                 Advanced Directives:

## 2023-05-14 NOTE — PHARMACOTHERAPY INTERVENTION NOTE - COMMENTS
Medication history complete. Medications and allergies reviewed with patient's wife, Mana at bedside and confirmed with . According to Mana, the patient has not taken medication at home for weeks due to an increase of symptoms. 
Recommended escalating from piperacillin-tazobactam to meropenem 500mg IV q24h, dosed based on iHD, in the setting of Enterobacter cloacae complex demonstrating resistance to piperacillin-tazobactam and cefepime.    Kevin Vasquez, PharmD  Clinical Pharmacy Specialist, Infectious Diseases  Tele-Antimicrobial Stewardship Program (Tele-[facility])  Tele-[facility] Phone: [phone]

## 2023-05-15 NOTE — PROGRESS NOTE ADULT - SUBJECTIVE AND OBJECTIVE BOX
HPI:  67 yo male with PMHx metastatic neuroendocrine tumor of the pancreas, recently admitted with nonfunctioning GJ tube (patient is chronically obstructed).   Patient has had severe heartburn, and has had several episodes of vomiting small amount of dark blood. Patient was discharged on Thursday. Hgb unchanged. CT angiography has no active bleeding, but demonstrates extensive tumor in distal pancreas.     5/1 pt feeling slightly nauseous, denies abd pain.  5/2 pt c/o frequent BM overnight causing him to not sleep well. denies abd pain , nausea  5/3 pt reports feeling well, hematuria resolved. coulter in place. not having as many BMs anymore.   5/4: Patient went to the OR today for a Open distal pancreatectomy, splenectomy, partial colectomy, partial L adrenalectomy, temporary abdominal closure.  Patient had a 2.2 L EBL.  Intra Op had 6U PRBC, 2FFP, 1 Platelets, 4L crystalloid  Post Op 1U PRBC ans 1 U FFP.  Post OP vented, with poor UO, on levo and Vaso.    5/5: He remains vented, on paralytics, 2 pressors, essentially anuric.    5/6: Completed the closure and ostomy yesterday.  Vented and on 2 Pressors, No UO.  Off Sedation awake and moving all 4 extremities  5/7: Extubated, required NIV for Hypercarbia, decreased H & H, still on 2 Pressors    5/15: Still on Pressors and HD dependent       PAST MEDICAL & SURGICAL HISTORY:  Hypertension      BPH (benign prostatic hyperplasia)      Renal insufficiency      Light chain nephropathy      DM (diabetes mellitus)      HLD (hyperlipidemia)      No significant past surgical history          FAMILY HISTORY:  FH: breast cancer (Mother)    FH: aneurysm (Father)        Social Hx:    Allergies    losartan (Angioedema)    Intolerances            ICU Vital Signs Last 24 Hrs  T(C): 36.7 (15 May 2023 12:00), Max: 37.6 (14 May 2023 14:15)  T(F): 98 (15 May 2023 12:00), Max: 99.7 (14 May 2023 18:15)  HR: 89 (15 May 2023 13:04) (76 - 104)  BP: 89/55 (15 May 2023 13:04) (79/44 - 125/71)  BP(mean): 62 (15 May 2023 13:04) (50 - 103)  ABP: --  ABP(mean): --  RR: 23 (15 May 2023 13:04) (15 - 31)  SpO2: 100% (15 May 2023 13:04) (96% - 100%)    O2 Parameters below as of 15 May 2023 13:04  Patient On (Oxygen Delivery Method): nasal cannula  O2 Flow (L/min): 6              I&O's Summary    14 May 2023 07:01  -  15 May 2023 07:00  --------------------------------------------------------  IN: 5362.5 mL / OUT: 3190 mL / NET: 2172.5 mL                              7.0    9.44  )-----------( 20       ( 15 May 2023 06:43 )             20.7       05-15    143  |  108  |  46<H>  ----------------------------<  250<H>  3.5   |  21<L>  |  3.69<H>    Ca    8.4<L>      15 May 2023 04:49  Phos  3.3     05-15  Mg     2.0     05-14    TPro  x   /  Alb  2.0<L>  /  TBili  x   /  DBili  x   /  AST  x   /  ALT  x   /  AlkPhos  x   05-15                    MEDICATIONS  (STANDING):  albumin human 25% IVPB 100 milliLiter(s) IV Intermittent every 4 hours  chlorhexidine 4% Liquid 1 Application(s) Topical <User Schedule>  dextrose 10% + sodium chloride 0.9%. 1000 milliLiter(s) (100 mL/Hr) IV Continuous <Continuous>  dextrose 5%. 1000 milliLiter(s) (50 mL/Hr) IV Continuous <Continuous>  dextrose 5%. 1000 milliLiter(s) (100 mL/Hr) IV Continuous <Continuous>  dextrose 50% Injectable 12.5 Gram(s) IV Push once  dextrose 50% Injectable 25 Gram(s) IV Push once  dextrose 50% Injectable 25 Gram(s) IV Push once  glucagon  Injectable 1 milliGRAM(s) IntraMuscular once  insulin lispro (ADMELOG) corrective regimen sliding scale   SubCutaneous every 6 hours  meropenem  IVPB 500 milliGRAM(s) IV Intermittent every 24 hours  metoclopramide Injectable 5 milliGRAM(s) IV Push every 6 hours  midodrine 15 milliGRAM(s) Oral every 8 hours  norepinephrine Infusion 0.05 MICROgram(s)/kG/Min (6.74 mL/Hr) IV Continuous <Continuous>  pantoprazole  Injectable 40 milliGRAM(s) IV Push every 12 hours  phenylephrine    Infusion 0.2 MICROgram(s)/kG/Min (5.39 mL/Hr) IV Continuous <Continuous>  rifAXIMin 550 milliGRAM(s) Oral two times a day    MEDICATIONS  (PRN):  acetaminophen     Tablet .. 650 milliGRAM(s) Oral every 6 hours PRN Temp greater or equal to 38.5C (101.3F)  albumin human 25% IVPB 50 milliLiter(s) IV Intermittent every 1 hour PRN SBP less than 100  dextrose Oral Gel 15 Gram(s) Oral once PRN Blood Glucose LESS THAN 70 milliGRAM(s)/deciliter  ondansetron Injectable 4 milliGRAM(s) IV Push every 6 hours PRN Nausea and/or Vomiting  sodium chloride 0.9% lock flush 10 milliLiter(s) IV Push every 1 hour PRN Pre/post blood products, medications, blood draw, and to maintain line patency      DVT Prophylaxis:    Advanced Directives:  Discussed with:    Visit Information: 30 min    ** Time is exclusive of billed procedures and/or teaching and/or routine family updates.

## 2023-05-15 NOTE — PROGRESS NOTE ADULT - ASSESSMENT
69 yo male with PMHx metastatic neuroendocrine tumor of the pancreas, recently admitted with nonfunctioning GJ tube (patient is chronically obstructed). Patient was admitted with severe heartburn, and has had several episodes of vomiting small amount of dark blood. Patient was discharged on Thursday. Hgb unchanged. CT angiography has no active bleeding, but demonstrates extensive tumor in distal pancreas.  Patient went to the OR 5/4 for a Open distal pancreatectomy, splenectomy, partial colectomy, partial L adrenalectomy, temporary abdominal closure.  Patient had a 2.2 L EBL.  Intra Op had 6U PRBC, 2FFP, 1 Platelets, 4L crystalloid  Post Op 1U PRBC ans 1 U FFP.  Post OP vented, with poor UO, on levo and Vaso.  5/5 Pt went back to OR and had closure and colostomy. Hospital course c/b  fever lethargy, encephalopathic, on pressors, dec hgb was dialyzed 5/11 560 cc from bulb, had ? episode emesis overnight, imaging shows some congestion, concern for asp pna raised - started on IV vancomycin/zosyn.    1. Septic shock with Enterobacter. Abd ascites/Post op pneumoperitoneum. Aspiration pneumonitis/pneumonia. Metastatic neuroendocrine pancreatic tumor s/p surgery. ZO  - imaging reviewed  - blood cx growing MDR enterobacter source likely gut translocation   - CT abd/pelvis post surgical changes pneumoperitoneum/enteritis; surgery f/u noted  - s/p IV zosyn [id number] renally dose adjusted #4 changed to merrem [id number] #2  - continue with antibiotic coverage   - tolerating abx well so far; no side effects noted  - reason for abx use and side effects reviewed with patient  - aspiration precautions  - contact precautions   - fu cbc    2. other issues - care per medicine

## 2023-05-15 NOTE — PROGRESS NOTE ADULT - SUBJECTIVE AND OBJECTIVE BOX
Patient is a 68y old  Male who presents with a chief complaint of GIB (14 May 2023 14:10)    BRIEF HOSPITAL COURSE:   67 yo male with PMHx metastatic neuroendocrine tumor of the pancreas, recently admitted with nonfunctioning GJ tube (patient is chronically obstructed). During hospitalization had a CT angiography has no active bleeding, but demonstrates extensive tumor in distal pancreas. Went to OR on 4/4 for open distal pancreatectomy, splenectomy, partial colectomy, partial L adrenalectomy, temporary abdominal closure. Complicated by hemorrhage shock and post op respiratory failure. Back to OR on 5/9 for closure and colostomy.  Further complicated by worsening renal failure ZO requiring HD and hepatic encephalopathy.    Events last 24 hours:   -Remains in dual-vasopressor-dependent shock state w/ Levophed and Phenylephrine infusions.   -Started on D10NS infusion for persistent hypoglycemia this evening.   -Abdominal wound drain + TAYLA drain output starting to slightly decreased compared to days prior.  -Satting well on NC. Afebrile.     PAST MEDICAL & SURGICAL HISTORY:  Hypertension  BPH (benign prostatic hyperplasia)  Renal insufficiency  Light chain nephropathy  DM (diabetes mellitus)  HLD (hyperlipidemia)  No significant past surgical history    Review of Systems:  CONSTITUTIONAL: No fever, chills, or fatigue  EYES: No eye pain, visual disturbances, or discharge  ENMT:  No difficulty hearing, tinnitus, vertigo; No sinus or throat pain  NECK: No pain or stiffness  RESPIRATORY: No cough, wheezing, chills or hemoptysis; No shortness of breath  CARDIOVASCULAR: No chest pain, palpitations, dizziness, or leg swelling  GASTROINTESTINAL: No abdominal or epigastric pain. No nausea, vomiting, or hematemesis; No diarrhea or constipation. No melena or hematochezia.  GENITOURINARY: No dysuria, frequency, hematuria, or incontinence  NEUROLOGICAL: No headaches, memory loss, loss of strength, numbness, or tremors  SKIN: No itching, burning, rashes, or lesions   MUSCULOSKELETAL: No joint pain or swelling; No muscle, back, or extremity pain  PSYCHIATRIC: No depression, anxiety, mood swings, or difficulty sleeping    Medications:  meropenem  IVPB 500 milliGRAM(s) IV Intermittent every 24 hours  rifAXIMin 550 milliGRAM(s) Oral two times a day  midodrine 15 milliGRAM(s) Oral every 8 hours  norepinephrine Infusion 0.05 MICROgram(s)/kG/Min IV Continuous <Continuous>  phenylephrine    Infusion 0.2 MICROgram(s)/kG/Min IV Continuous <Continuous>  acetaminophen     Tablet .. 650 milliGRAM(s) Oral every 6 hours PRN  metoclopramide Injectable 5 milliGRAM(s) IV Push every 6 hours  ondansetron Injectable 4 milliGRAM(s) IV Push every 6 hours PRN  pantoprazole  Injectable 40 milliGRAM(s) IV Push every 12 hours  dextrose 50% Injectable 12.5 Gram(s) IV Push once  dextrose 50% Injectable 25 Gram(s) IV Push once  dextrose 50% Injectable 25 Gram(s) IV Push once  dextrose Oral Gel 15 Gram(s) Oral once PRN  glucagon  Injectable 1 milliGRAM(s) IntraMuscular once  insulin lispro (ADMELOG) corrective regimen sliding scale   SubCutaneous every 6 hours  albumin human 25% IVPB 100 milliLiter(s) IV Intermittent every 4 hours  dextrose 10% + sodium chloride 0.9%. 1000 milliLiter(s) IV Continuous <Continuous>  dextrose 5%. 1000 milliLiter(s) IV Continuous <Continuous>  dextrose 5%. 1000 milliLiter(s) IV Continuous <Continuous>  dextrose 5%. 1000 milliLiter(s) IV Continuous <Continuous>  sodium chloride 0.9% lock flush 10 milliLiter(s) IV Push every 1 hour PRN  chlorhexidine 4% Liquid 1 Application(s) Topical <User Schedule>    ICU Vital Signs Last 24 Hrs  T(C): 36.6 (15 May 2023 00:00), Max: 37.6 (14 May 2023 10:00)  T(F): 97.9 (15 May 2023 00:00), Max: 99.7 (14 May 2023 10:00)  HR: 101 (15 May 2023 01:00) (75 - 104)  BP: 104/70 (15 May 2023 01:00) (79/44 - 115/67)  BP(mean): 74 (15 May 2023 01:00) (50 - 82)  ABP: --  ABP(mean): --  RR: 27 (15 May 2023 01:00) (15 - 29)  SpO2: 96% (15 May 2023 01:00) (96% - 100%)  O2 Parameters below as of 15 May 2023 00:00  Patient On (Oxygen Delivery Method): nasal cannula  O2 Flow (L/min): 6    I&O's Detail  13 May 2023 07:01  -  14 May 2023 07:00  --------------------------------------------------------  IN:    dextrose 5% w/ Additives: 1754 mL    Free Water: 170 mL    IV PiggyBack: 100 mL    Oral Fluid: 90 mL    Other (mL): 250 mL    Phenylephrine: 1586 mL    Sodium Chloride 0.9% Bolus: 500 mL    Vital1.5: 1109 mL  Total IN: 5559 mL  OUT:    Bulb (mL): 370 mL    Colostomy (mL): 800 mL    Drain (mL): 1750 mL    Other (mL): 250 mL  Total OUT: 3170 mL  Total NET: 2389 mL    14 May 2023 07:01  -  15 May 2023 01:11  --------------------------------------------------------  IN:    dextrose 5%: 202.5 mL    dextrose 5% w/ Additives: 350 mL    Free Water: 60 mL    IV PiggyBack: 1250 mL    Norepinephrine: 22.3 mL    Phenylephrine: 187 mL    Vital1.5: 860 mL  Total IN: 2931.8 mL  OUT:    Bulb (mL): 30 mL    Colostomy (mL): 650 mL    Drain (mL): 1400 mL  Total OUT: 2080 mL  Total NET: 851.8 mL    LABS:                      8.0    9.07  )-----------( 30       ( 14 May 2023 05:37 )             23.8     05-14  140  |  105  |  38<H>  ----------------------------<  177<H>  3.4<L>   |  24  |  3.29<H>  Ca    8.4<L>      14 May 2023 05:37  Phos  2.7     05-14  Mg     2.0     05-14  TPro  4.7<L>  /  Alb  2.1<L>  /  TBili  1.9<H>  /  DBili  x   /  AST  137<H>  /  ALT  92<H>  /  AlkPhos  129<H>  05-14    CAPILLARY BLOOD GLUCOSE  POCT Blood Glucose.: 88 mg/dL (14 May 2023 23:11)    CULTURES:  Culture Results:   Growth in aerobic and anaerobic bottles: Enterobacter cloacae complex  ***Blood Panel PCR results on this specimen are available  approximately 3 hours after the Gram stain result.***  Gram stain, PCR, and/or culture results may not always  correspond due to difference in methodologies.  ************************************************************  This PCR assay was performed by multiplex PCR. This  Assay tests for 66 bacterial and resistance gene targets.  Please refer to the [facility] test directory  at [email or web address] for details. (05-12-23 @ 09:01)  Culture Results:   Growth in aerobic and anaerobic bottles: Enterobacter cloacae complex  See previous culture [id number] (05-12-23 @ 08:59)      Physical Examination:  General: Cachetic adult male.   HEENT: PERRL.   PULM: Clear to auscultation bilaterally.  CVS: s1/s2.   ABD: Soft, nondistended, nontender, normoactive bowel sounds. +Ostomy, midline incision c/d/i, +fluid output. +TAYLA w/ ss fluid.   EXT: No edema, nontender.  SKIN: Warm.  NEURO: Awake, encephalopathic, follows simple commands.     RADIOLOGY:   < from: CT Abdomen and Pelvis No Cont (05.13.23 @ 09:53) >  ACC: [id number] EXAM:  CT ABDOMEN AND PELVIS   ORDERED BY: MARIA VICTORIA AMBRIZ   PROCEDURE DATE:  05/13/2023    IMPRESSION:  Status post left hemicolectomy with small volume ascites and   pneumoperitoneum, likely postoperative.  Moderate segment of pelvic small bowel thickening, nonspecific and may   represent inflammation, infection, or ischemia. Please correlate with   lactate levels.  Innumerable hepatic metastases are better visualized on recent   contrast-enhanced CT.  Patchy lung opacities in right lower lobe consolidation concerning for   pneumonia.      67 yo male with PMHx metastatic neuroendocrine tumor of the pancreas, recently admitted with nonfunctioning GJ tube (patient is chronically obstructed). During hospitalization had a CT angiography has no active bleeding, but demonstrates extensive tumor in distal pancreas. Went to OR on 4/4 for open distal pancreatectomy, splenectomy, partial colectomy, partial L adrenalectomy, temporary abdominal closure. Complicated by hemorrhage shock and post op respiratory failure. Back to OR on 5/9 for closure and colostomy.  Further complicated by worsening renal failure ZO requiring HD and hepatic encephalopathy.  Assessment:  1. Shock, Septic vs Hypovolemic  2. ZO on HD  3. Hepatic encephalopathy   4. Metastatic Neuroendocrine Tumor of the Pancreas s/p distal pancreatectomy, splenectomy, partial colectomy, partial L adrenalectomy, colostomy  5. Hypoglycemia   6. Enterobacter Bacteremia    Plan:  NEURO:   -Rifaximin for Hepatic Encephalopathy.   -Monitor mental status closely, avoid neurosuppresants. Serial neurologic assessments.     CV:   -Remains in vasopressor-dependent shock state w/ Levophed and Phenylephrine infusions - actively titrating to maintain goal MAP >65 monitoring end points of organ perfusion. Midodrine 15mg q8h to offload vasopressor requirements and further facilitate weaning off. Albumin 25% 100cc q4h x3 added to keep up w/ fluid losses and help wean vasopressors.   -Keep K~4 and Mg>2 for optimal arrhythmia suppression.    PULM:   -Satting well on NC, actively supplemental O2 to maintain goal SpO2 >88%.   -Incentive spirometry, Chest PT/Pulmonary toilet, HOB >30'. Albuterol PRN.     GI:   -TF.   -Protonix BID. Anti-emetics PRN.   -Foul draiange from abdominal wound, Surgery team following and aware. CT A/P w/o fluid collection. Monitor Abdominal wound and TAYLA drain output.     RENAL:   -Renal function currently w/ ZO requiring HD. Nephrology following, HD per them. Plan for HD tentatively in AM.   -trend lytes/Scr daily with BMP  -I's and O's, goal UOP 0.5 cc/kg/hr  -renal dose meds and avoid nephrotoxins   -Sodium Bicarb infusion.     ENDO:   -Aggressive glycemic control to limit FS glucose to <180mg/dl. Started on D10NS infusion this evening for persistent hypoglycemia. Insulin/Lantus regimen discontinued.   -Keep Euthyroid.     ID:   -Afebrile. BloodCx w/ Enterobacter. Zosyn course.   -ABX use and/or discontinuation based on discussion with ID in conjunction with clinical features, culture data, and judicious procalcitonin monitoring.    HEME:  -Hgb stable s/p 1u PRBC 5/12. Transfuse for Hgb <7. Serial CBC, keep active t/s.   -Thrombocytopenia likely septic-induced, stable. Transfuse for Plt <10k or <50k w/ active bleeding.   -DVT ppx w/ SCDs.     GOC: Full Code.     CRITICAL CARE TIME SPENT: 40 minutes of critical care time spent providing medical care for patient's acute illness/conditions that impairs at least one vital organ system and/or poses a high risk of imminent or life threatening deterioration in the patient's condition. It includes time spent evaluating and treating the patient's acute illness as well as time spent reviewing labs, radiology, discussing goals of care with patient and/or patient's family, and discussing the case with a multidisciplinary team, in an effort to prevent further life threatening deterioration or end organ damage. This time is independent of any procedures performed.

## 2023-05-15 NOTE — PROGRESS NOTE ADULT - SUBJECTIVE AND OBJECTIVE BOX
NEPHROLOGY INTERVAL HPI/OVERNIGHT EVENTS:  05-15-23 @ 10:48    5/15  750 ostomy output with low bs on d10 at 100, unclear if absorbing, on solo pressors   --No acute distress.  No urine output.   Drains 1700cc.  colostomy 800cc  --Awake but lethargic.  Unclear if in pain.  RR 30.  On NG feeding.  No urine output  - seen w family and ID at bedside, plans for HD discussed w family  --Lethargic.  No acute distress.   Colostomy output 1500cc.  No UO.  5/10--No acute distress.  calm, off sedation and off pressor.  Colostomy output 250cc and Drain 850cc.  Oligoanuric.   more alert and awake, ostomy fx pressors off since this am unable to complete hd yesterday and only received 1/2 due to malfunctioning cathtere    pulled after HD    seen at hd, events noted, anuric, TPN infusing with lipids, on solo dec dose of pressors, encepha with starting of lactulose and xifaxin   - no new events, anuric, CO2 trending down   s/p OR yesterday, ostomy creation  - case d/w surgery and intensivist , anuric k 5.2 will dialyze preop, no fluid removal  - pt seen in PACU, PRBC and FFP, and LR resuscitation noted, coulter in place ~ 100 cc urine in coulter bag  case d/w pts nurse   Chart quote, operative note ""Open distal pancreatectomy, splenectomy, partial colectomy, partial L adrenalectomy, temporary abdominal closure; Large varices, 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 Liver laden with diffuse metastases and very friable, RP oozy. Patient on pressors at the end of the case, and hence, decided to leave colon in discontinuity with temporary closure.""    HPI:  69 yo man with Neuroendocrine tumor of pancreas with mets to liver dx'd 6 years ago.  Treated with Lutathera one year ago and restarted in 2022  G-J tube placed for nutrition in January due to chronic dysphagia and severe esophagitis on EGD.  Post recent  admission due to G tube clogging.  D/mansi home on .    Returned to ED due to vomiting when night time enteral feeding via G tube started.  CT with IVC done due to concern for GIB.  Pt reports chronic self catheterization due to urinary retention.   ZO improved when catheterization started.  Follows mainly with EMELY Cortez.   Pt has been self cath'ing 3-4 x /day.   Indwelling Coulter placed by  due to resistance with blood clots with resistance reported by pt.  For palliative mass resection tomorrow.    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  Creat has been variable during previous admissions--ranging 1.1-2.2.    PMHX and PSHX.  --Neuroendocrine tumor of pancreas with mets to liver --dx 6 years ago.  --GJ tube for nutrition in 2023 due to chronic dysphagia.  --Hx of HTN  --Hx of DM  --Hx of light chain disease --unclear hx and no hx of renal biopsy.  MEDICATIONS  (STANDING):  albumin human 25% IVPB 100 milliLiter(s) IV Intermittent every 4 hours  chlorhexidine 4% Liquid 1 Application(s) Topical <User Schedule>  dextrose 10% + sodium chloride 0.9%. 1000 milliLiter(s) (100 mL/Hr) IV Continuous <Continuous>  dextrose 5%. 1000 milliLiter(s) (50 mL/Hr) IV Continuous <Continuous>  dextrose 5%. 1000 milliLiter(s) (100 mL/Hr) IV Continuous <Continuous>  dextrose 50% Injectable 12.5 Gram(s) IV Push once  dextrose 50% Injectable 25 Gram(s) IV Push once  dextrose 50% Injectable 25 Gram(s) IV Push once  glucagon  Injectable 1 milliGRAM(s) IntraMuscular once  insulin lispro (ADMELOG) corrective regimen sliding scale   SubCutaneous every 6 hours  meropenem  IVPB 500 milliGRAM(s) IV Intermittent every 24 hours  metoclopramide Injectable 5 milliGRAM(s) IV Push every 6 hours  midodrine 15 milliGRAM(s) Oral every 8 hours  norepinephrine Infusion 0.05 MICROgram(s)/kG/Min (6.74 mL/Hr) IV Continuous <Continuous>  pantoprazole  Injectable 40 milliGRAM(s) IV Push every 12 hours  phenylephrine    Infusion 0.2 MICROgram(s)/kG/Min (5.39 mL/Hr) IV Continuous <Continuous>  rifAXIMin 550 milliGRAM(s) Oral two times a day    MEDICATIONS  (PRN):  acetaminophen     Tablet .. 650 milliGRAM(s) Oral every 6 hours PRN Temp greater or equal to 38.5C (101.3F)  dextrose Oral Gel 15 Gram(s) Oral once PRN Blood Glucose LESS THAN 70 milliGRAM(s)/deciliter  ondansetron Injectable 4 milliGRAM(s) IV Push every 6 hours PRN Nausea and/or Vomiting  sodium chloride 0.9% lock flush 10 milliLiter(s) IV Push every 1 hour PRN Pre/post blood products, medications, blood draw, and to maintain line patency      Allergies    losartan (Angioedema)    Intolerances        I&O's Detail    14 May 2023 07:01  -  15 May 2023 07:00  --------------------------------------------------------  IN:    dextrose 10% + sodium chloride 0.9%: 790.5 mL    dextrose 5%: 202.5 mL    dextrose 5% w/ Additives: 350 mL    Free Water: 120 mL    IV PiggyBack: 1300 mL    Norepinephrine: 110.9 mL    Phenylephrine: 1049.6 mL    Vital1.5: 1439 mL  Total IN: 5362.5 mL    OUT:    Bulb (mL): 40 mL    Colostomy (mL): 750 mL    Drain (mL): 2400 mL  Total OUT: 3190 mL    Total NET: 2172.5 mL          .    Patient was seen and evaluated on dialysis.   Patient is tolerating the procedure well.   Continue dialysis:   Dialyzer:   ff180 k protocol uf goal of 1-2 kg bfr 350 rt IJ    Vital Signs Last 24 Hrs  T(C): 36.7 (15 May 2023 09:45), Max: 37.6 (14 May 2023 14:15)  T(F): 98.1 (15 May 2023 09:45), Max: 99.7 (14 May 2023 18:15)  HR: 86 (15 May 2023 10:32) (76 - 104)  BP: 103/69 (15 May 2023 10:32) (79/44 - 125/71)  BP(mean): 77 (15 May 2023 10:32) (50 - 84)  RR: 20 (15 May 2023 10:32) (15 - 31)  SpO2: 99% (15 May 2023 10:32) (96% - 100%)    Parameters below as of 15 May 2023 10:32    O2 Flow (L/min): 6    Daily     Daily Weight in k.6 (15 May 2023 02:00)    PHYSICAL EXAM:  General: alert. awake,   HEENT: NG tube   CV: s1s2 rrr  LUNGS: B/L CTA  EXT: no edema    LABS:                        7.0    9.44  )-----------( 20       ( 15 May 2023 06:43 )             20.7     05-15    143  |  108  |  46<H>  ----------------------------<  250<H>  3.5   |  21<L>  |  3.69<H>    Ca    8.4<L>      15 May 2023 04:49  Phos  3.3     -15  Mg     2.0         TPro  x   /  Alb  2.0<L>  /  TBili  x   /  DBili  x   /  AST  x   /  ALT  x   /  AlkPhos  x   -15        Phosphorus Level, Serum: 3.3 mg/dL (05-15 @ 04:49)

## 2023-05-15 NOTE — PROGRESS NOTE ADULT - SUBJECTIVE AND OBJECTIVE BOX
SURGERY DAILY PROGRESS NOTE:     Subjective:  Patient seen and examined at bedside during am rounds doing well. pt is following commands. Still on phenylephrine and started on midodrine. TF were noted to be coming from midline wound, tube feeds then held. AVSS. Pt midline abdomen wound producing high volume serous fluid/ascites. Denies any fevers, chills, n/v/d, chest pain or shortness of breath    Objective:    Vital Signs Last 24 Hrs  T(C): 36.6 (15 May 2023 04:00), Max: 37.6 (14 May 2023 10:00)  T(F): 97.9 (15 May 2023 04:00), Max: 99.7 (14 May 2023 10:00)  HR: 88 (15 May 2023 07:00) (76 - 104)  BP: 100/66 (15 May 2023 07:00) (79/44 - 125/71)  BP(mean): 72 (15 May 2023 07:00) (50 - 84)  RR: 24 (15 May 2023 07:00) (15 - 31)  SpO2: 99% (15 May 2023 07:00) (96% - 100%)    Parameters below as of 15 May 2023 06:00  Patient On (Oxygen Delivery Method): nasal cannula  O2 Flow (L/min): 6                          7.0    9.44  )-----------( 20       ( 15 May 2023 06:43 )             20.7     05-15    143  |  108  |  46<H>  ----------------------------<  250<H>  3.5   |  21<L>  |  3.69<H>    Ca    8.4<L>      15 May 2023 04:49  Phos  3.3     05-15  Mg     2.0     05-14    TPro  x   /  Alb  2.0<L>  /  TBili  x   /  DBili  x   /  AST  x   /  ALT  x   /  AlkPhos  x   05-15    I&O's Summary    14 May 2023 07:01  -  15 May 2023 07:00  --------------------------------------------------------  IN: 5362.5 mL / OUT: 3190 mL / NET: 2172.5 mL        Culture - Blood (collected 12 May 2023 09:01)  Source: .Blood None  Gram Stain (12 May 2023 22:34):    Growth in aerobic and anaerobic bottles: Gram Negative Rods  Final Report (14 May 2023 15:33):    Growth in aerobic and anaerobic bottles: Enterobacter cloacae complex    ***Blood Panel PCR results on this specimen are available    approximately 3 hours after the Gram stain result.***    Gram stain, PCR, and/or culture results may not always    correspond due to difference in methodologies.    ************************************************************    This PCR assay was performed by multiplex PCR. This    Assay tests for 66 bacterial and resistance gene targets.    Please refer to the [facility] test directory    at [email or web address] for details.  Organism: Blood Culture PCR  Enterobacter cloacae complex (14 May 2023 15:33)  Organism: Enterobacter cloacae complex (14 May 2023 15:33)  Organism: Blood Culture PCR (14 May 2023 15:33)    Culture - Blood (collected 12 May 2023 08:59)  Source: .Blood None  Gram Stain (12 May 2023 23:04):    Growth in aerobic bottle: Gram Negative Rods    Growth in anaerobic bottle: Gram Negative Rods  Final Report (14 May 2023 16:11):    Growth in aerobic and anaerobic bottles: Enterobacter cloacae complex    See previous culture [id number]        General:   AAOx3, NAD  Chest: Normal respiratory effort  Heart: RRR  Abdomen: Abdomen soft, nondistended, no tenderness, dressing with serosanguinous drainage, midline wound with serous/ascitic fluid, now thicker . Ostomy with bag full of stool. staples intact.  Neuro/Psych: No localized deficits. Normal speech, normal tone  Skin: Normal, no rashes, no lesions noted.   Extremities: Warm, well perfused, no edema, Pulses intact

## 2023-05-15 NOTE — PROGRESS NOTE ADULT - ASSESSMENT
69 yo man with Hx of Neuroendocrine tumor/Pancreatic mass and liver mets.  Now for palliative resection of pancreatic mass.    --ZO with variable creat level and unclear CKD.    Likely dependent on volume status.    Limited intake even with Enteral feeding.      Start gentle hydration.  -- : continue Hale drainage.  --Hx of light chain nephropathy  --No contraindication for surgery from renal standpoint.    5/4  s/p Palliative surgery for neuroendocrine mass  will watch for ZO, pt with low UO immediate post op  s/p multiple PRBC and FFP transfusion and volume resuscitation    5/5  patient anuric, K 5.2  On bicarb drip  to go back to OR today  case d/w Dr Jean and Dr Siddiqi  Will HD x 2 hrs to correct potassium  >10 L infused in blood products and fluids  More fluids not indicated, UO 50 ml  no fluid removal on HD  Hep profile ordered    5/6  Ostomy created  will monitor i/o  labs stable   no need for HD today  d/w intensivist  will monitor daily for hd requirement    5/7  seen earlier on HD   tolerating well  TPN  d/w Dr Jean  d/w pts fam at bedside    5/8 MK   - ZO with anuria, remains HD dependent with hx of light chain nephropathy    tolerating hd     dc femoral shiley today after hd   - enceph monitor response to hd and xifaxan and lactulose    5/9 MK   - ZO with anuria, remains HD dependent with hx of light chain nephropathy    no indication for hd    off pressor     monitor electrolytes on TPN     NG in place for TF initiation   - enceph, improving monitor response to hd and xifaxan and lactulose    ostomy fx with liquid stool  dw dr marley     5/10 SY  --ZO : remains oligoanuric.     Follow with repeat bladder scans--no retention.    Will hold HD today and assess daily.    Trial of gentle hydration due to increased fluid loss.  --Metabolic acidosis : trial of HCO3 in addition to TPN   --Monitor encephalopathy.    5/11 SY  --ZO : remains oligoanuric.    Renal parameters further increased with no improvement in acidosis with HCO3 infusion.    D/w pt's wife over phone.    Explained his poor prognosis from Oncology aspect due to large tumor burden in liver and that surgery was palliative in nature.    Explained added burden of continued dialysis adding to pt's suffering at this point.    Pt's wife requested not to proceed with HD catheter placement and dialysis until she can d/w Dr Siddiqi and rest of family.    Will discuss further later today.    5/12  HD yesterday, planned for HD Sat  case d/w family, and ID, pts nurse  Dr Velez covering this weekend    5/13 SY  --ZO : remains oligoanuric.   HD today : HD order and tx reviewed.     --Positive fluid balance due to hypotension.  RR at 30 today.   Check follow up CXR.  Will plan for 1-2 kg UF with HD if tolerated.  --Continue enteral feeding.  --D/w Dr Mcclendon : positive blood culture/ GNR-- pre current HD catheter.   Plan HD today and on Monday,, then remove catheter for bacteremia clearance.    5/14 SY  --ZO : reamins dialysis dependent for now.    Plan HD in am and remove temporary HD catheter due to bacteremia to allow clearance.  --CT with patch lower lung inf : concern for PNA : continue abtx.  --Continue enteral feeding.  --Enterobacter bacteremia : continue abtx.    5/15 MK   - ZO for now HD dependent    tolerating hd and will attempt uf with hd   - enterobacter sepsis on meropenem renally dosed, aspiration pna     for dc of [place] post hd today   - anemia transfusion ongoing   - FEN On vitality with ostomy output noted

## 2023-05-15 NOTE — PROGRESS NOTE ADULT - ASSESSMENT
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  S/P distal pancreatectomy, splenectomy, partial colectomy  S/P abdominal washout and colostomy creation  now with aspiration pneumonia  midline wound with drainage  on phenylephrine and midodrine  s/p TPN    Plan:  TF via NGT   monitor ostomy output  f/u Nephro recommend appreciated  monitor midline wound  no plans at this time for surgical intervention  optimize nutrition/TPN  cont great Critical care management as per ICU team  surgery will follow    Plan discussed with Dr Black

## 2023-05-15 NOTE — PROGRESS NOTE ADULT - SUBJECTIVE AND OBJECTIVE BOX
Date of service: 05-15-23 @ 14:28    pt seen and examined   blood cultures growing MDR enterobacter  temps down  lethargic, encephalopathic  remains on dialysis   has drainage from midline abd wound    ROS: unable to obtain d/t medical condition    MEDICATIONS  (STANDING):  chlorhexidine 4% Liquid 1 Application(s) Topical <User Schedule>  dextrose 10% + sodium chloride 0.9%. 1000 milliLiter(s) (100 mL/Hr) IV Continuous <Continuous>  dextrose 5%. 1000 milliLiter(s) (50 mL/Hr) IV Continuous <Continuous>  dextrose 5%. 1000 milliLiter(s) (100 mL/Hr) IV Continuous <Continuous>  dextrose 50% Injectable 12.5 Gram(s) IV Push once  dextrose 50% Injectable 25 Gram(s) IV Push once  dextrose 50% Injectable 25 Gram(s) IV Push once  glucagon  Injectable 1 milliGRAM(s) IntraMuscular once  insulin lispro (ADMELOG) corrective regimen sliding scale   SubCutaneous every 6 hours  meropenem  IVPB 500 milliGRAM(s) IV Intermittent every 24 hours  metoclopramide Injectable 5 milliGRAM(s) IV Push every 6 hours  midodrine 15 milliGRAM(s) Oral every 8 hours  norepinephrine Infusion 0.05 MICROgram(s)/kG/Min (6.74 mL/Hr) IV Continuous <Continuous>  pantoprazole  Injectable 40 milliGRAM(s) IV Push every 12 hours  phenylephrine    Infusion 0.2 MICROgram(s)/kG/Min (5.39 mL/Hr) IV Continuous <Continuous>  rifAXIMin 550 milliGRAM(s) Oral two times a day    Vital Signs Last 24 Hrs  T(C): 36.3 (15 May 2023 13:54), Max: 37.6 (14 May 2023 18:15)  T(F): 97.4 (15 May 2023 13:54), Max: 99.7 (14 May 2023 18:15)  HR: 88 (15 May 2023 14:15) (76 - 104)  BP: 110/65 (15 May 2023 14:15) (79/44 - 125/71)  BP(mean): 76 (15 May 2023 14:15) (50 - 103)  RR: 18 (15 May 2023 14:15) (15 - 31)  SpO2: 100% (15 May 2023 14:15) (96% - 100%)    Parameters below as of 15 May 2023 14:10  Patient On (Oxygen Delivery Method): nasal cannula  O2 Flow (L/min): 6    PE:  Constitutional: frail looking  HEENT: NC/AT, EOMI, PERRLA, conjunctivae clear; ears and nose atraumatic; pharynx benign  Neck: supple; thyroid not palpable  Back: no tenderness  Respiratory: decreased breath sounds, rales   Cardiovascular: S1S2 regular, no murmurs  Abdomen: soft, not tender, not distended, positive BS; + ostomy + peg tube midline abd wound with drainage  Genitourinary: no suprapubic tenderness  Lymphatic: no LN palpable  Musculoskeletal: no muscle tenderness, no joint swelling or tenderness  Extremities: no pedal edema  Neurological/ Psychiatric: no focal deficits   Skin: no rashes; no palpable lesions    Labs: all available labs reviewed                                              7.0    9.44  )-----------( 20       ( 15 May 2023 06:43 )             20.7     05-15    143  |  108  |  46<H>  ----------------------------<  250<H>  3.5   |  21<L>  |  3.69<H>    Ca    8.4<L>      15 May 2023 04:49  Phos  3.3     05-15  Mg     2.0     05-14    TPro  x   /  Alb  2.0<L>  /  TBili  x   /  DBili  x   /  AST  x   /  ALT  x   /  AlkPhos  x   05-15      Culture - Blood (05.12.23 @ 09:01)   - Enterobacter cloacae complex: Detec  Gram Stain: Culture - Blood (05.12.23 @ 08:59)   Gram Stain:   Growth in aerobic bottle: Gram Negative Rods   Growth in anaerobic bottle: Gram Negative Rods  Specimen Source: .Blood None  Culture Results:   Growth in aerobic bottle: Gram Negative Rods   Growth in anaerobic bottle: Gram Negative Rods     Radiology: all available radiological tests reviewed    < from: Xray Chest 1 View- PORTABLE-Urgent (Xray Chest 1 View- PORTABLE-Urgent .) (05.11.23 @ 13:48) >    ACC: [id number] EXAM:  XR CHEST PORTABLE URGENT 1V   ORDERED BY: PRIYA HALL     PROCEDURE DATE:  05/11/2023          INTERPRETATION:  INDICATION: Dialysis catheter insertion    Portable chest 1:28 PM    COMPARISON: 5/6/2023    FINDINGS:  Heart/Vascular: The heart size, mediastinum, hilum and aorta are stable.  Pulmonary: Midline trachea. There is mild pulmonary venous congestion and   low lung volumes.    Bones: There is no fracture.  Lines and catheter: Tip of the right IJ dialysis catheteris in the right   atrium. Tip and side-port of NG tube are in the body of the stomach.    Impression:    There is mild pulmonary venous congestion and low lung volumes.    Tip of the right IJ dialysis catheter is in the right atrium. There is no   pneumothorax.          < end of copied text >  < from: US Kidney and Bladder (05.08.23 @ 15:51) >    ACC: [id number] EXAM:  US KIDNEYS AND BLADDER   ORDERED BY: GWENDOLYN SINGH     PROCEDURE DATE:  05/08/2023          INTERPRETATION:  CLINICAL INFORMATION: History of gastric cancer.   Evaluate for renal injury.    COMPARISON: CT dated 04/29/2023    TECHNIQUE: Sonography of the kidneys and bladder.    FINDINGS:  Right kidney: 8.7 cm. No renal mass, hydronephrosis or calculi.    Left kidney: 8.5 cm. No renal mass, hydronephrosis or calculi.    Urinary bladder: Urinary bladder is contracted limiting its evaluation.    Incidentally noted are diffuse hepatic metastases as noted on the prior   CT.    IMPRESSION:  No hydronephrosis or renal mass is identified to suggest renal injury.   Please note that contrast enhanced CT is more sensitive for detection of   renal injury.    Hepatic metastasis.        < end of copied text >    Advanced directives addressed: full resuscitation

## 2023-05-15 NOTE — PROGRESS NOTE ADULT - ASSESSMENT
69 yo male with PMHx metastatic neuroendocrine tumor of the pancreas, recently admitted with nonfunctioning GJ tube  due to external mechanical  obstruction   now Post Op  after an open distal pancreatectomy, splenectomy, partial colectomy, partial L adrenalectomy,colostomy  With Post Op distributive Shock/bleeding developed ZO now requiring dialysis  onow back on pressors septic shock gram negative bacterem ia Enterobacter, Cloacae  CT 5/14 no distinct collection  dialysis dependent plan T/h/S    PLAN:  ICU    PULM: inc O2,  Cardio:  back on pressors, track fluid loses from ileostomy, drains, anasarca on phenylephrine septic shock  Renal: HD as per renal  GI:   Tolerating tube feeds.  Drainage continues  Endo RISS  Heme - thrombocytopenia from sepsis, d/c heparine subq till recovers  Neurometabolic encephalopthy    ID blood cullture postive for enterobacter cloacae will changed zosyn to Meropenem  Full Code d/w wife, patient is full code

## 2023-05-16 NOTE — PROGRESS NOTE ADULT - ASSESSMENT
67 yo male with PMHx metastatic neuroendocrine tumor of the pancreas, recently admitted with nonfunctioning GJ tube  due to external mechanical  obstruction   now Post Op  after an open distal pancreatectomy, splenectomy, partial colectomy, partial L adrenalectomy,colostomy  With Post Op distributive Shock/bleeding developed ZO now requiring dialysis  onow back on pressors septic shock gram negative bacterem ia Enterobacter, Cloacae  CT 5/14 no distinct collection  dialysis dependent plan T/h/S    PLAN:  ICU    PULM: NC O2  Cardio:  Wean pressors, track fluid loses from ileostomy, drains, anasarca on phenylephrine septic shock  Renal: HD as per renal  GI:   Will have a N-J placed  Endo RISS  Heme - thrombocytopenia from sepsis, d/c heparin subq till recovers  Neurometabolic encephalopthy    ID blood cullture postive for enterobacter cloacae will changed zosyn to Meropenem  Full Code d/w wife, patient is full code

## 2023-05-16 NOTE — PROGRESS NOTE ADULT - ASSESSMENT
1. septic shock  2. enterobacter bacteremia  3. ZO  3. hepatic encephalopathy  4. metastatic neuroendocrine tumor of pancreas- sp distal pancreatectomy, splenectomy, partial colectomy, partial L adrenalectomy, colostomy  5. severe protein malnutrition  6. anemia  7. thrombocytopenia      Plan:  Neuro - Sedation neuromuscular blockade to facilitate safe ventilation    CV -  Pressor support as needed to maintain MAP 65           Avoiding fluid challenges          QTC monitoring while on Azithromycin and Hydroxychloroquine.    Pulm -  ARDS-NET 4-6cc/kg IBW TV as able to maintain plateau pressures <30               Prone ventilation consideration as feasible  Pa02/Fi02 < 150 on Fi02 >60% and PEEP at least 5                 Vent bundle Reviewed     GI -  PPI  Enteric feeds as tolerated in tandem with NMB and prone ventilation    Renal - Even to negative fluid balance as tolerated by hemodynamics and renal fx.  Feeds to be provided in lieu of IVF.     Heme -  Pharmacologic DVT PPx  in addition to SCD's    ID - ABX discontinuation based on discussion with ID in conjunction with clinical features, culture data, and judicious procalcitonin monitoring.      Endo -  Aggressive glycemic control to limit FS glucose to < 180mg/dl.     1. septic shock  2. enterobacter bacteremia  3. ZO  3. hepatic encephalopathy  4. metastatic neuroendocrine tumor of pancreas- sp distal pancreatectomy, splenectomy, partial colectomy, partial L adrenalectomy, colostomy  5. severe protein malnutrition  6. anemia  7. thrombocytopenia      Plan:  Neuro - avoiding neurosuppressants              ammonia down and holding, cont rifaximin                CV -  actively titrating pressors to keep MAP>65          midodrine to facilitate pressor weaning          cortisol prior OK          Echo prior EF normal     Pulm -  stable on NC    GI -  PPI           NGT         IV hydration with D10 for hypoglycemia now resolved         ? fistula on NGT feeds, stopped earlier yesterday, plan for tiger/distal GI tube placement hopefully tomorrow, if unable will need to consider TPN         f/u surgery for wound care/drain management    Renal - remains HD dependent, sp HD yesterday, avoid nephrotoxins, renally adjust all meds, HD cath removed for line holiday, replacement with next HD, f/u renal    Heme -  Pharmacologic DVT PPx d/c'd due to severe thrombocytopenia secondary to sepsis, SCDs, tx for Hbg<7 or signs of active bleeding, tx plts for <10k or <50k with active bleeding.    ID - afebrile, + enterobacter bacteremia., abx changed to Mariza based on sensitivities, abx adjustments/discontinuation based on discussion with ID in conjunction with clinical features and culture data    Endo -  BS improved on D10 infusion, cont at current rate, ongoing FSG checks.

## 2023-05-16 NOTE — PROGRESS NOTE ADULT - SUBJECTIVE AND OBJECTIVE BOX
HPI:  67 yo male with PMHx metastatic neuroendocrine tumor of the pancreas, recently admitted with nonfunctioning GJ tube (patient is chronically obstructed).   Patient has had severe heartburn, and has had several episodes of vomiting small amount of dark blood. Patient was discharged on Thursday. Hgb unchanged. CT angiography has no active bleeding, but demonstrates extensive tumor in distal pancreas.     5/1 pt feeling slightly nauseous, denies abd pain.  5/2 pt c/o frequent BM overnight causing him to not sleep well. denies abd pain , nausea  5/3 pt reports feeling well, hematuria resolved. coulter in place. not having as many BMs anymore.   5/4: Patient went to the OR today for a Open distal pancreatectomy, splenectomy, partial colectomy, partial L adrenalectomy, temporary abdominal closure.  Patient had a 2.2 L EBL.  Intra Op had 6U PRBC, 2FFP, 1 Platelets, 4L crystalloid  Post Op 1U PRBC ans 1 U FFP.  Post OP vented, with poor UO, on levo and Vaso.    5/5: He remains vented, on paralytics, 2 pressors, essentially anuric.    5/6: Completed the closure and ostomy yesterday.  Vented and on 2 Pressors, No UO.  Off Sedation awake and moving all 4 extremities  5/7: Extubated, required NIV for Hypercarbia, decreased H & H, still on 2 Pressors    5/15: Still on Pressors and HD dependent  5/16: He remains on pressors, draining from wound                  PAST MEDICAL & SURGICAL HISTORY:  Hypertension      BPH (benign prostatic hyperplasia)      Renal insufficiency      Light chain nephropathy      DM (diabetes mellitus)      HLD (hyperlipidemia)      No significant past surgical history          FAMILY HISTORY:  FH: breast cancer (Mother)    FH: aneurysm (Father)        Social Hx:    Allergies    losartan (Angioedema)    Intolerances            ICU Vital Signs Last 24 Hrs  T(C): 36.6 (16 May 2023 08:00), Max: 37.1 (15 May 2023 20:00)  T(F): 97.9 (16 May 2023 08:00), Max: 98.7 (15 May 2023 20:00)  HR: 109 (16 May 2023 13:00) (78 - 113)  BP: 89/61 (16 May 2023 13:00) (85/63 - 114/74)  BP(mean): 67 (16 May 2023 13:00) (66 - 82)  ABP: --  ABP(mean): --  RR: 27 (16 May 2023 13:00) (14 - 27)  SpO2: 96% (16 May 2023 13:00) (94% - 100%)    O2 Parameters below as of 16 May 2023 13:00    O2 Flow (L/min): 2              I&O's Summary    15 May 2023 07:01  -  16 May 2023 07:00  --------------------------------------------------------  IN: 3095 mL / OUT: 5780 mL / NET: -2685 mL                              8.4    7.80  )-----------( 22       ( 16 May 2023 05:20 )             24.6       05-16    144  |  108  |  32<H>  ----------------------------<  151<H>  3.5   |  21<L>  |  2.87<H>    Ca    9.1      16 May 2023 05:20  Phos  3.5     05-16  Mg     1.9     05-16    TPro  x   /  Alb  2.0<L>  /  TBili  x   /  DBili  x   /  AST  x   /  ALT  x   /  AlkPhos  x   05-15                    MEDICATIONS  (STANDING):  chlorhexidine 4% Liquid 1 Application(s) Topical <User Schedule>  dextrose 10% + sodium chloride 0.45%. 1000 milliLiter(s) (30 mL/Hr) IV Continuous <Continuous>  dextrose 5%. 1000 milliLiter(s) (50 mL/Hr) IV Continuous <Continuous>  dextrose 50% Injectable 12.5 Gram(s) IV Push once  dextrose 50% Injectable 25 Gram(s) IV Push once  dextrose 50% Injectable 25 Gram(s) IV Push once  glucagon  Injectable 1 milliGRAM(s) IntraMuscular once  insulin lispro (ADMELOG) corrective regimen sliding scale   SubCutaneous every 6 hours  meropenem  IVPB 500 milliGRAM(s) IV Intermittent every 24 hours  metoclopramide Injectable 5 milliGRAM(s) IV Push every 6 hours  midodrine 15 milliGRAM(s) Oral every 8 hours  pantoprazole  Injectable 40 milliGRAM(s) IV Push every 12 hours  phenylephrine    Infusion 0.05 MICROgram(s)/kG/Min (0.67 mL/Hr) IV Continuous <Continuous>  rifAXIMin 550 milliGRAM(s) Oral two times a day    MEDICATIONS  (PRN):  acetaminophen     Tablet .. 650 milliGRAM(s) Oral every 6 hours PRN Temp greater or equal to 38.5C (101.3F)  albumin human 25% IVPB 50 milliLiter(s) IV Intermittent every 1 hour PRN SBP less than 100  dextrose Oral Gel 15 Gram(s) Oral once PRN Blood Glucose LESS THAN 70 milliGRAM(s)/deciliter  ondansetron Injectable 4 milliGRAM(s) IV Push every 6 hours PRN Nausea and/or Vomiting  sodium chloride 0.9% lock flush 10 milliLiter(s) IV Push every 1 hour PRN Pre/post blood products, medications, blood draw, and to maintain line patency      DVT Prophylaxis:    Advanced Directives:  Discussed with:    Visit Information: 30 min    ** Time is exclusive of billed procedures and/or teaching and/or routine family updates.

## 2023-05-16 NOTE — PROGRESS NOTE ADULT - ASSESSMENT
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  S/P distal pancreatectomy, splenectomy, partial colectomy  S/P abdominal washout and colostomy creation  now with aspiration pneumonia  midline wound with drainage  on phenylephrine and midodrine  s/p TPN  TF held this morning     Plan:  TF via NGT   monitor ostomy output  monitor midline wound lay  f/u Nephro recommend appreciated  no plans at this time for surgical intervention  optimize nutrition/TPN  cont great Critical care management as per ICU team  f/u PT consult reccs  surgery will follow    Plan discussed with Dr Black

## 2023-05-16 NOTE — PROGRESS NOTE ADULT - SUBJECTIVE AND OBJECTIVE BOX
Patient is a 68y old  Male who presents with a chief complaint of GIB (15 May 2023 14:26)      BRIEF HOSPITAL COURSE:   67 yo male with PMHx metastatic neuroendocrine tumor of the pancreas, recently admitted with nonfunctioning GJ tube (patient is chronically obstructed). During hospitalization had a CT angiography has no active bleeding, but demonstrates extensive tumor in distal pancreas. Went to OR on 4/4 for open distal pancreatectomy, splenectomy, partial colectomy, partial L adrenalectomy, temporary abdominal closure. Complicated by hemorrhage shock and post op respiratory failure. Back to OR on 5/9 for closure and colostomy.  Further complicated by worsening renal failure ZO requiring HD and hepatic encephalopathy.    Events last 24 hours:   -Remains in dual-vasopressor-dependent shock state w/ Levophed and Phenylephrine infusions.   -Started on D10NS infusion for persistent hypoglycemia this evening.   -Abdominal wound drain + TAYLA drain output starting to slightly decreased compared to days prior.  -Satting well on NC. Afebrile.     Events last 24 hours: ***    PAST MEDICAL & SURGICAL HISTORY:  Hypertension      BPH (benign prostatic hyperplasia)      Renal insufficiency      Light chain nephropathy      DM (diabetes mellitus)      HLD (hyperlipidemia)      No significant past surgical history          Review of Systems:  12 pt ROS negative unless otherwise stated above      Medications:  meropenem  IVPB 500 milliGRAM(s) IV Intermittent every 24 hours  rifAXIMin 550 milliGRAM(s) Oral two times a day    midodrine 15 milliGRAM(s) Oral every 8 hours  norepinephrine Infusion 0.05 MICROgram(s)/kG/Min IV Continuous <Continuous>  phenylephrine    Infusion 0.05 MICROgram(s)/kG/Min IV Continuous <Continuous>      acetaminophen     Tablet .. 650 milliGRAM(s) Oral every 6 hours PRN  metoclopramide Injectable 5 milliGRAM(s) IV Push every 6 hours  ondansetron Injectable 4 milliGRAM(s) IV Push every 6 hours PRN        pantoprazole  Injectable 40 milliGRAM(s) IV Push every 12 hours      dextrose 50% Injectable 12.5 Gram(s) IV Push once  dextrose 50% Injectable 25 Gram(s) IV Push once  dextrose 50% Injectable 25 Gram(s) IV Push once  dextrose Oral Gel 15 Gram(s) Oral once PRN  glucagon  Injectable 1 milliGRAM(s) IntraMuscular once  insulin lispro (ADMELOG) corrective regimen sliding scale   SubCutaneous every 6 hours    albumin human 25% IVPB 50 milliLiter(s) IV Intermittent every 1 hour PRN  dextrose 10% + sodium chloride 0.45%. 1000 milliLiter(s) IV Continuous <Continuous>  dextrose 5%. 1000 milliLiter(s) IV Continuous <Continuous>  sodium chloride 0.9% lock flush 10 milliLiter(s) IV Push every 1 hour PRN      chlorhexidine 4% Liquid 1 Application(s) Topical <User Schedule>            ICU Vital Signs Last 24 Hrs  T(C): 36.7 (16 May 2023 00:00), Max: 37.1 (15 May 2023 20:00)  T(F): 98 (16 May 2023 00:00), Max: 98.7 (15 May 2023 20:00)  HR: 103 (16 May 2023 02:00) (78 - 103)  BP: 114/74 (16 May 2023 02:00) (85/55 - 123/96)  BP(mean): 82 (16 May 2023 02:00) (62 - 103)  ABP: --  ABP(mean): --  RR: 25 (16 May 2023 02:00) (16 - 30)  SpO2: 97% (16 May 2023 02:00) (94% - 100%)    O2 Parameters below as of 16 May 2023 02:00  Patient On (Oxygen Delivery Method): nasal cannula        I&O's Summary    14 May 2023 07:01  -  15 May 2023 07:00  --------------------------------------------------------  IN: 5362.5 mL / OUT: 3190 mL / NET: 2172.5 mL    15 May 2023 07:01  -  16 May 2023 03:18  --------------------------------------------------------  IN: 2465 mL / OUT: 5160 mL / NET: -2695 mL        LABS:                        7.0    9.44  )-----------( 20       ( 15 May 2023 06:43 )             20.7     05-15    143  |  108  |  46<H>  ----------------------------<  250<H>  3.5   |  21<L>  |  3.69<H>    Ca    8.4<L>      15 May 2023 04:49  Phos  3.3     05-15  Mg     2.0     05-14    TPro  x   /  Alb  2.0<L>  /  TBili  x   /  DBili  x   /  AST  x   /  ALT  x   /  AlkPhos  x   05-15          CAPILLARY BLOOD GLUCOSE      POCT Blood Glucose.: 158 mg/dL (16 May 2023 00:18)        CULTURES:  Culture Results:   Growth in aerobic and anaerobic bottles: Enterobacter cloacae complex  ***Blood Panel PCR results on this specimen are available  approximately 3 hours after the Gram stain result.***  Gram stain, PCR, and/or culture results may not always  correspond due to difference in methodologies.  ************************************************************  This PCR assay was performed by multiplex PCR. This  Assay tests for 66 bacterial and resistance gene targets.  Please refer to the [facility] test directory  at [email or web address] for details. (05-12 @ 09:01)  Culture Results:   Growth in aerobic and anaerobic bottles: Enterobacter cloacae complex  See previous culture [id number] (05-12 @ 08:59)      Physical Examination:    General: No acute distress.  Alert, oriented, interactive, nonfocal    HEENT: Pupils equal, reactive to light.  Symmetric.    PULM: Clear to auscultation bilaterally, no significant sputum production    CVS: Regular rate and rhythm, no murmurs, rubs, or gallops    ABD: Soft, nondistended, nontender, normoactive bowel sounds, no masses    EXT: No edema, nontender    SKIN: Warm and well perfused, no rashes noted.    RADIOLOGY:     CRITICAL CARE TIME SPENT: 35 mins assessing presenting problems of acute illness that poses high probability of life threatening deterioration or end organ damage/dysfunction.  Medical decision making including Initiating plan of care, reviewing data, reviewing radiology, direct patient bedside evaluation and interpretation of vital signs, discussion with multidisciplinary team, all non inclusive of procedures.    Patient is a 68y old  Male who presents with a chief complaint of GIB (15 May 2023 14:26)      BRIEF HOSPITAL COURSE:   68M with PMHx metastatic neuroendocrine tumor of pancreas, HTN, BPH, CKD, DM, HLD admitted with nonfunctioning GJ tube. On workup found to have extensive tumor in distal pancreas. Taken to OR for open distal pancreatectomy/splenectomy, partial colectomy, partial L adrenalectomy. Complicated postop by hemorrhagic shock, respiratory failure, ZO on CKD requiring HD, hepatic encephalopathy, enterbacter bacteremia, septic shock, hypoglycemia      Events last 24 hours: afebrile, remains on dual pressors, BS improved on D10, on NC. Denies CP, SOB, Nausea/V     PAST MEDICAL & SURGICAL HISTORY:  Hypertension      BPH (benign prostatic hyperplasia)      Renal insufficiency      Light chain nephropathy      DM (diabetes mellitus)      HLD (hyperlipidemia)      No significant past surgical history          Review of Systems:  12 pt ROS negative unless otherwise stated above      Medications:  meropenem  IVPB 500 milliGRAM(s) IV Intermittent every 24 hours  rifAXIMin 550 milliGRAM(s) Oral two times a day    midodrine 15 milliGRAM(s) Oral every 8 hours  norepinephrine Infusion 0.05 MICROgram(s)/kG/Min IV Continuous <Continuous>  phenylephrine    Infusion 0.05 MICROgram(s)/kG/Min IV Continuous <Continuous>      acetaminophen     Tablet .. 650 milliGRAM(s) Oral every 6 hours PRN  metoclopramide Injectable 5 milliGRAM(s) IV Push every 6 hours  ondansetron Injectable 4 milliGRAM(s) IV Push every 6 hours PRN        pantoprazole  Injectable 40 milliGRAM(s) IV Push every 12 hours      dextrose 50% Injectable 12.5 Gram(s) IV Push once  dextrose 50% Injectable 25 Gram(s) IV Push once  dextrose 50% Injectable 25 Gram(s) IV Push once  dextrose Oral Gel 15 Gram(s) Oral once PRN  glucagon  Injectable 1 milliGRAM(s) IntraMuscular once  insulin lispro (ADMELOG) corrective regimen sliding scale   SubCutaneous every 6 hours    albumin human 25% IVPB 50 milliLiter(s) IV Intermittent every 1 hour PRN  dextrose 10% + sodium chloride 0.45%. 1000 milliLiter(s) IV Continuous <Continuous>  dextrose 5%. 1000 milliLiter(s) IV Continuous <Continuous>  sodium chloride 0.9% lock flush 10 milliLiter(s) IV Push every 1 hour PRN      chlorhexidine 4% Liquid 1 Application(s) Topical <User Schedule>            ICU Vital Signs Last 24 Hrs  T(C): 36.7 (16 May 2023 00:00), Max: 37.1 (15 May 2023 20:00)  T(F): 98 (16 May 2023 00:00), Max: 98.7 (15 May 2023 20:00)  HR: 103 (16 May 2023 02:00) (78 - 103)  BP: 114/74 (16 May 2023 02:00) (85/55 - 123/96)  BP(mean): 82 (16 May 2023 02:00) (62 - 103)  ABP: --  ABP(mean): --  RR: 25 (16 May 2023 02:00) (16 - 30)  SpO2: 97% (16 May 2023 02:00) (94% - 100%)    O2 Parameters below as of 16 May 2023 02:00  Patient On (Oxygen Delivery Method): nasal cannula        I&O's Summary    14 May 2023 07:01  -  15 May 2023 07:00  --------------------------------------------------------  IN: 5362.5 mL / OUT: 3190 mL / NET: 2172.5 mL    15 May 2023 07:01  -  16 May 2023 03:18  --------------------------------------------------------  IN: 2465 mL / OUT: 5160 mL / NET: -2695 mL        LABS:                        7.0    9.44  )-----------( 20       ( 15 May 2023 06:43 )             20.7     05-15    143  |  108  |  46<H>  ----------------------------<  250<H>  3.5   |  21<L>  |  3.69<H>    Ca    8.4<L>      15 May 2023 04:49  Phos  3.3     05-15  Mg     2.0     05-14    TPro  x   /  Alb  2.0<L>  /  TBili  x   /  DBili  x   /  AST  x   /  ALT  x   /  AlkPhos  x   05-15          CAPILLARY BLOOD GLUCOSE      POCT Blood Glucose.: 158 mg/dL (16 May 2023 00:18)        CULTURES:  Culture Results:   Growth in aerobic and anaerobic bottles: Enterobacter cloacae complex  ***Blood Panel PCR results on this specimen are available  approximately 3 hours after the Gram stain result.***  Gram stain, PCR, and/or culture results may not always  correspond due to difference in methodologies.  ************************************************************  This PCR assay was performed by multiplex PCR. This  Assay tests for 66 bacterial and resistance gene targets.  Please refer to the [facility] test directory  at [email or web address] for details. (05-12 @ 09:01)  Culture Results:   Growth in aerobic and anaerobic bottles: Enterobacter cloacae complex  See previous culture [id number] (05-12 @ 08:59)      Physical Examination:    General: No acute distress.  Alert, interactive, nonfocal but weak throughout    HEENT: Pupils equal, reactive to light.  Symmetric.    PULM: Diminished at bases bilaterally, no wheezing or rales    CVS: Regular rate and rhythm    ABD: drains and ostomy bags in place, serous/mild sanguinous drainage, ostomy pink and viable, hypoactive BS Soft,    EXT: + LE edema, SCDs    SKIN: Warm and well perfused    RADIOLOGY:   ACC: [id number] EXAM:  XR CHEST PORTABLE IMMED 1V   ORDERED BY: MARIA VICTORIA AMBRIZ     PROCEDURE DATE:  05/14/2023          INTERPRETATION:  TIME OF EXAM: May 14, 2023 at 4:27 PM and 4:30 PM.    CLINICAL INFORMATION: Surgery. Line placement.    COMPARISON:  May 11, 2023.    TECHNIQUE:   AP Portable chest x-ray.    INTERPRETATION:    Heart size and the mediastinum cannot be accurately evaluated on this   projection.  Tip of right IJ catheter is at the SVC/right atrial junction. Left   subclavian line with tip in the SVC.  Enteric tube extends into the abdomen. Tip not included on the image.  Abdominal surgical clips, skin staples, and a drain are seen.  Elevated right hemidiaphragm again seen.  Bilateral lung opacities are slightly worse.  There is a new small loculated right pleural effusion. No left pleural   effusion is seen.  No pneumothorax is noted.  No acute bony abnormality.        IMPRESSION:  Left subclavian line with tip in the SVC. No pneumothorax.   Other line and tube as above.    Low lung volumes.    Slight worsening of bilateral lung opacities, nonspecific in appearance   but possibly due to pulmonary vascular congestion or multifocal infection.    New small loculated right pleural effusion.    --- End of Report ---            DARLENE HUNTER MD; Attending Radiologist  This document has been electronically signed. May 15 2023  9:40AMCRITICAL CARE TIME SPENT: 35 mins assessing presenting problems of acute illness that poses high probability of life threatening deterioration or end organ damage/dysfunction.  Medical decision making including Initiating plan of care, reviewing data, reviewing radiology, direct patient bedside evaluation and interpretation of vital signs, discussion with multidisciplinary team, all non inclusive of procedures.

## 2023-05-16 NOTE — PROGRESS NOTE ADULT - SUBJECTIVE AND OBJECTIVE BOX
SURGERY DAILY PROGRESS NOTE:     Subjective:  Patient seen and examined at bedside during am rounds reports feels better. AVSS. Denies any fevers, chills, n/v/d, chest pain or shortness of breath    Objective:    MEDICATIONS  (STANDING):  chlorhexidine 4% Liquid 1 Application(s) Topical <User Schedule>  dextrose 10% + sodium chloride 0.45%. 1000 milliLiter(s) (30 mL/Hr) IV Continuous <Continuous>  dextrose 5%. 1000 milliLiter(s) (50 mL/Hr) IV Continuous <Continuous>  dextrose 50% Injectable 12.5 Gram(s) IV Push once  dextrose 50% Injectable 25 Gram(s) IV Push once  dextrose 50% Injectable 25 Gram(s) IV Push once  glucagon  Injectable 1 milliGRAM(s) IntraMuscular once  insulin lispro (ADMELOG) corrective regimen sliding scale   SubCutaneous every 6 hours  meropenem  IVPB 500 milliGRAM(s) IV Intermittent every 24 hours  metoclopramide Injectable 5 milliGRAM(s) IV Push every 6 hours  midodrine 15 milliGRAM(s) Oral every 8 hours  pantoprazole  Injectable 40 milliGRAM(s) IV Push every 12 hours  phenylephrine    Infusion 0.05 MICROgram(s)/kG/Min (0.67 mL/Hr) IV Continuous <Continuous>  rifAXIMin 550 milliGRAM(s) Oral two times a day    MEDICATIONS  (PRN):  acetaminophen     Tablet .. 650 milliGRAM(s) Oral every 6 hours PRN Temp greater or equal to 38.5C (101.3F)  albumin human 25% IVPB 50 milliLiter(s) IV Intermittent every 1 hour PRN SBP less than 100  dextrose Oral Gel 15 Gram(s) Oral once PRN Blood Glucose LESS THAN 70 milliGRAM(s)/deciliter  ondansetron Injectable 4 milliGRAM(s) IV Push every 6 hours PRN Nausea and/or Vomiting  sodium chloride 0.9% lock flush 10 milliLiter(s) IV Push every 1 hour PRN Pre/post blood products, medications, blood draw, and to maintain line patency      Vital Signs Last 24 Hrs  T(C): 36.6 (16 May 2023 08:00), Max: 37.1 (15 May 2023 20:00)  T(F): 97.9 (16 May 2023 08:00), Max: 98.7 (15 May 2023 20:00)  HR: 88 (16 May 2023 09:00) (78 - 103)  BP: 101/66 (16 May 2023 09:00) (85/55 - 123/96)  BP(mean): 74 (16 May 2023 09:00) (62 - 103)  RR: 16 (16 May 2023 09:00) (14 - 26)  SpO2: 97% (16 May 2023 09:00) (94% - 100%)    Parameters below as of 16 May 2023 09:00    O2 Flow (L/min): 2        PHYSICAL EXAM   Gen: well-appearing, in no acute distress  CV: pulse regularly present   Resp: airway patent, non-labored breathing  Abd: soft, non-tender, midline wound with 2 colostomy bags over the open wounds with drainage of pink/serosu fluids, L colostomy in place w/stool, TAYLA drain with serosangous fluid  ext. no cyanosis or edema      I&O's Detail    15 May 2023 07:01  -  16 May 2023 07:00  --------------------------------------------------------  IN:    dextrose 10% + sodium chloride 0.45%: 875 mL    IV PiggyBack: 50 mL    Norepinephrine: 400 mL    Other (mL): 400 mL    Phenylephrine: 1004 mL    Phenylephrine: 366 mL  Total IN: 3095 mL    OUT:    Bulb (mL): 10 mL    Colostomy (mL): 50 mL    Drain (mL): 1070 mL    Nasogastric/Oral tube (mL): 1150 mL    Other (mL): 3500 mL  Total OUT: 5780 mL    Total NET: -2685 mL          Daily     Daily Weight in k.7 (16 May 2023 03:00)    LABS:                        8.4    7.80  )-----------( 22       ( 16 May 2023 05:20 )             24.6     05-16    144  |  108  |  32<H>  ----------------------------<  151<H>  3.5   |  21<L>  |  2.87<H>    Ca    9.1      16 May 2023 05:20  Phos  3.5     05-16  Mg     1.9     05-16    TPro  x   /  Alb  2.0<L>  /  TBili  x   /  DBili  x   /  AST  x   /  ALT  x   /  AlkPhos  x   05-15          RADIOLOGY & ADDITIONAL STUDIES:    ASSESSMENT/PLAN:

## 2023-05-16 NOTE — PROGRESS NOTE ADULT - ASSESSMENT
67 yo man with Hx of Neuroendocrine tumor/Pancreatic mass and liver mets.  Now for palliative resection of pancreatic mass.    --ZO with variable creat level and unclear CKD.    Likely dependent on volume status.    Limited intake even with Enteral feeding.      Start gentle hydration.  -- : continue Hale drainage.  --Hx of light chain nephropathy  --No contraindication for surgery from renal standpoint.    5/4  s/p Palliative surgery for neuroendocrine mass  will watch for ZO, pt with low UO immediate post op  s/p multiple PRBC and FFP transfusion and volume resuscitation    5/5  patient anuric, K 5.2  On bicarb drip  to go back to OR today  case d/w Dr Jean and Dr Siddiqi  Will HD x 2 hrs to correct potassium  >10 L infused in blood products and fluids  More fluids not indicated, UO 50 ml  no fluid removal on HD  Hep profile ordered    5/6  Ostomy created  will monitor i/o  labs stable   no need for HD today  d/w intensivist  will monitor daily for hd requirement    5/7  seen earlier on HD   tolerating well  TPN  d/w Dr Jean  d/w pts fam at bedside    5/8 MK   - ZO with anuria, remains HD dependent with hx of light chain nephropathy    tolerating hd     dc femoral shiley today after hd   - enceph monitor response to hd and xifaxan and lactulose    5/9 MK   - ZO with anuria, remains HD dependent with hx of light chain nephropathy    no indication for hd    off pressor     monitor electrolytes on TPN     NG in place for TF initiation   - enceph, improving monitor response to hd and xifaxan and lactulose    ostomy fx with liquid stool  dw dr marley     5/10 SY  --ZO : remains oligoanuric.     Follow with repeat bladder scans--no retention.    Will hold HD today and assess daily.    Trial of gentle hydration due to increased fluid loss.  --Metabolic acidosis : trial of HCO3 in addition to TPN   --Monitor encephalopathy.    5/11 SY  --ZO : remains oligoanuric.    Renal parameters further increased with no improvement in acidosis with HCO3 infusion.    D/w pt's wife over phone.    Explained his poor prognosis from Oncology aspect due to large tumor burden in liver and that surgery was palliative in nature.    Explained added burden of continued dialysis adding to pt's suffering at this point.    Pt's wife requested not to proceed with HD catheter placement and dialysis until she can d/w Dr Siddiqi and rest of family.    Will discuss further later today.    5/12  HD yesterday, planned for HD Sat  case d/w family, and ID, pts nurse  Dr Velez covering this weekend    5/13 SY  --ZO : remains oligoanuric.   HD today : HD order and tx reviewed.     --Positive fluid balance due to hypotension.  RR at 30 today.   Check follow up CXR.  Will plan for 1-2 kg UF with HD if tolerated.  --Continue enteral feeding.  --D/w Dr Mcclendon : positive blood culture/ GNR-- pre current HD catheter.   Plan HD today and on Monday,, then remove catheter for bacteremia clearance.    5/14 SY  --ZO : reamins dialysis dependent for now.    Plan HD in am and remove temporary HD catheter due to bacteremia to allow clearance.  --CT with patch lower lung inf : concern for PNA : continue abtx.  --Continue enteral feeding.  --Enterobacter bacteremia : continue abtx.    5/15 MK   - ZO for now HD dependent    tolerating hd and will attempt uf with hd   - enterobacter sepsis on meropenem renally dosed, aspiration pna     for dc of [place] post hd today   - anemia transfusion ongoing   - FEN On vitality with ostomy output noted     5/16 MK   - ZO Hd dependent    for hd in am with replacement of catheter to occur   - enterobacter sepsis on meropenem, renally dosed     asp pna   - anemia h/h stable   - FEN: on d10 ng tube pulled out

## 2023-05-17 NOTE — PROGRESS NOTE ADULT - NS ATTEND OPT1 GEN_ALL_CORE
I independently performed the documented:
I attest my time as attending is greater than 50% of the total combined time spent on qualifying patient care activities by the PA/NP and attending.
I independently performed the documented:
I independently performed the documented:
I attest my time as attending is greater than 50% of the total combined time spent on qualifying patient care activities by the PA/NP and attending.
I attest my time as attending is greater than 50% of the total combined time spent on qualifying patient care activities by the PA/NP and attending.
I independently performed the documented:

## 2023-05-17 NOTE — PROGRESS NOTE ADULT - NS ATTEND AMEND GEN_ALL_CORE FT
Addended by: Thais Nina on: 8/27/2019 04:59 PM     Modules accepted: Orders per A/P in progress note

## 2023-05-17 NOTE — PROGRESS NOTE ADULT - ASSESSMENT
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

## 2023-05-17 NOTE — PROGRESS NOTE ADULT - ASSESSMENT
67 yo man with Hx of Neuroendocrine tumor/Pancreatic mass and liver mets.  Now for palliative resection of pancreatic mass.    --ZO with variable creat level and unclear CKD.    Likely dependent on volume status.    Limited intake even with Enteral feeding.      Start gentle hydration.  -- : continue Hale drainage.  --Hx of light chain nephropathy  --No contraindication for surgery from renal standpoint.    5/4  s/p Palliative surgery for neuroendocrine mass  will watch for ZO, pt with low UO immediate post op  s/p multiple PRBC and FFP transfusion and volume resuscitation    5/5  patient anuric, K 5.2  On bicarb drip  to go back to OR today  case d/w Dr Jean and Dr Siddiqi  Will HD x 2 hrs to correct potassium  >10 L infused in blood products and fluids  More fluids not indicated, UO 50 ml  no fluid removal on HD  Hep profile ordered    5/6  Ostomy created  will monitor i/o  labs stable   no need for HD today  d/w intensivist  will monitor daily for hd requirement    5/7  seen earlier on HD   tolerating well  TPN  d/w Dr Jean  d/w pts fam at bedside    5/8 MK   - ZO with anuria, remains HD dependent with hx of light chain nephropathy    tolerating hd     dc femoral shiley today after hd   - enceph monitor response to hd and xifaxan and lactulose    5/9 MK   - ZO with anuria, remains HD dependent with hx of light chain nephropathy    no indication for hd    off pressor     monitor electrolytes on TPN     NG in place for TF initiation   - enceph, improving monitor response to hd and xifaxan and lactulose    ostomy fx with liquid stool  dw dr marley     5/10 SY  --ZO : remains oligoanuric.     Follow with repeat bladder scans--no retention.    Will hold HD today and assess daily.    Trial of gentle hydration due to increased fluid loss.  --Metabolic acidosis : trial of HCO3 in addition to TPN   --Monitor encephalopathy.    5/11 SY  --ZO : remains oligoanuric.    Renal parameters further increased with no improvement in acidosis with HCO3 infusion.    D/w pt's wife over phone.    Explained his poor prognosis from Oncology aspect due to large tumor burden in liver and that surgery was palliative in nature.    Explained added burden of continued dialysis adding to pt's suffering at this point.    Pt's wife requested not to proceed with HD catheter placement and dialysis until she can d/w Dr Siddiqi and rest of family.    Will discuss further later today.    5/12  HD yesterday, planned for HD Sat  case d/w family, and ID, pts nurse  Dr Velez covering this weekend    5/13 SY  --ZO : remains oligoanuric.   HD today : HD order and tx reviewed.     --Positive fluid balance due to hypotension.  RR at 30 today.   Check follow up CXR.  Will plan for 1-2 kg UF with HD if tolerated.  --Continue enteral feeding.  --D/w Dr Mcclendon : positive blood culture/ GNR-- pre current HD catheter.   Plan HD today and on Monday,, then remove catheter for bacteremia clearance.    5/14 SY  --ZO : reamins dialysis dependent for now.    Plan HD in am and remove temporary HD catheter due to bacteremia to allow clearance.  --CT with patch lower lung inf : concern for PNA : continue abtx.  --Continue enteral feeding.  --Enterobacter bacteremia : continue abtx.    5/15 MK   - ZO for now HD dependent    tolerating hd and will attempt uf with hd   - enterobacter sepsis on meropenem renally dosed, aspiration pna     for dc of [place] post hd today   - anemia transfusion ongoing   - FEN On vitality with ostomy output noted     5/16 MK   - ZO Hd dependent    for hd in am with replacement of catheter to occur   - enterobacter sepsis on meropenem, renally dosed     asp pna   - anemia h/h stable   - FEN: on d10 ng tube pulled out    5/17 SY  --ZO : continue TIW HD--will maintain on MWF schedule this week.  --Metabolic acidosis : mainly due to GI loss,  will need to correct with HD.  --Enterobacter bacteremia : continue meropenem  --Post TPN.  NGT pulled out.  For J tube placement.

## 2023-05-17 NOTE — PROGRESS NOTE ADULT - SUBJECTIVE AND OBJECTIVE BOX
Events Overnight:  Patient on 1.8 of Phenylephrine , more awake, no fevers    HPI:      69 yo male with PMHx metastatic neuroendocrine tumor of the pancreas, recently admitted with nonfunctioning GJ tube (patient is chronically obstructed).   Patient has had severe heartburn, and has had several episodes of vomiting small amount of dark blood. Patient was discharged on Thursday. Hgb unchanged. CT angiography has no active bleeding, but demonstrates extensive tumor in distal pancreas.     5/1 pt feeling slightly nauseous, denies abd pain.  5/2 pt c/o frequent BM overnight causing him to not sleep well. denies abd pain , nausea  5/3 pt reports feeling well, hematuria resolved. coulter in place. not having as many BMs anymore.   5/4: Patient went to the OR today for a Open distal pancreatectomy, splenectomy, partial colectomy, partial L adrenalectomy, temporary abdominal closure.  Patient had a 2.2 L EBL.  Intra Op had 6U PRBC, 2FFP, 1 Platelets, 4L crystalloid  Post Op 1U PRBC ans 1 U FFP.  Post OP vented, with poor UO, on levo and Vaso.    5/5: He remains vented, on paralytics, 2 pressors, essentially anuric.    5/6: Completed the closure and ostomy yesterday.  Vented and on 2 Pressors, No UO.  Off Sedation awake and moving all 4 extremities  5/7: Extubated, required NIV for Hypercarbia, decreased H & H, still on 2 Pressors    5/15: Still on Pressors and HD dependent  5/17: He remains on pressors, drainage from wound       PMH:                       MEDICATIONS  (STANDING):  chlorhexidine 4% Liquid 1 Application(s) Topical <User Schedule>  dextrose 10% + sodium chloride 0.45%. 1000 milliLiter(s) (30 mL/Hr) IV Continuous <Continuous>  dextrose 5%. 1000 milliLiter(s) (50 mL/Hr) IV Continuous <Continuous>  dextrose 50% Injectable 12.5 Gram(s) IV Push once  dextrose 50% Injectable 25 Gram(s) IV Push once  dextrose 50% Injectable 25 Gram(s) IV Push once  glucagon  Injectable 1 milliGRAM(s) IntraMuscular once  insulin lispro (ADMELOG) corrective regimen sliding scale   SubCutaneous every 6 hours  meropenem  IVPB 500 milliGRAM(s) IV Intermittent every 24 hours  metoclopramide Injectable 5 milliGRAM(s) IV Push every 6 hours  midodrine 15 milliGRAM(s) Oral every 8 hours  pantoprazole  Injectable 40 milliGRAM(s) IV Push every 12 hours  Parenteral Nutrition - Adult 1 Each (67 mL/Hr) TPN Continuous <Continuous>  phenylephrine    Infusion 0.05 MICROgram(s)/kG/Min (0.67 mL/Hr) IV Continuous <Continuous>  rifAXIMin 550 milliGRAM(s) Oral two times a day    MEDICATIONS  (PRN):  acetaminophen     Tablet .. 650 milliGRAM(s) Oral every 6 hours PRN Temp greater or equal to 38.5C (101.3F)  albumin human 25% IVPB 50 milliLiter(s) IV Intermittent every 1 hour PRN SBP less than 100  dextrose Oral Gel 15 Gram(s) Oral once PRN Blood Glucose LESS THAN 70 milliGRAM(s)/deciliter  ondansetron Injectable 4 milliGRAM(s) IV Push every 6 hours PRN Nausea and/or Vomiting  sodium chloride 0.9% lock flush 10 milliLiter(s) IV Push every 1 hour PRN Pre/post blood products, medications, blood draw, and to maintain line patency                    ICU Vital Signs Last 24 Hrs  T(C): 36.9 (17 May 2023 08:00), Max: 37.2 (16 May 2023 16:00)  T(F): 98.5 (17 May 2023 08:00), Max: 99 (16 May 2023 16:00)  HR: 100 (17 May 2023 09:30) (95 - 126)  BP: 105/67 (17 May 2023 09:30) (84/63 - 137/66)  BP(mean): 76 (17 May 2023 09:30) (66 - 102)  ABP: --  ABP(mean): --  RR: 16 (17 May 2023 09:30) (15 - 36)  SpO2: 99% (17 May 2023 09:30) (88% - 100%)    O2 Parameters below as of 17 May 2023 09:00  Patient On (Oxygen Delivery Method): room air                I&O's Summary    16 May 2023 07:01  -  17 May 2023 07:00  --------------------------------------------------------  IN: 1495 mL / OUT: 775 mL / NET: 720 mL                                       8.6    8.77  )-----------( 31       ( 17 May 2023 05:29 )             25.7       05-17    144  |  110<H>  |  41<H>  ----------------------------<  151<H>  3.9   |  16<L>  |  3.65<H>    Ca    9.7      17 May 2023 05:29  Phos  4.4     05-17  Mg     2.0     05-17                      DVT Prophylaxis:                                                                 Advanced Directives:                      Events Overnight:  Patient on 1.8 of Phenylephrine , more awake, no fevers    HPI:      69 yo male with PMHx metastatic neuroendocrine tumor of the pancreas, recently admitted with nonfunctioning GJ tube (patient is chronically obstructed).   Patient has had severe heartburn, and has had several episodes of vomiting small amount of dark blood. Patient was discharged on Thursday. Hgb unchanged. CT angiography has no active bleeding, but demonstrates extensive tumor in distal pancreas.     5/1 pt feeling slightly nauseous, denies abd pain.  5/2 pt c/o frequent BM overnight causing him to not sleep well. denies abd pain , nausea  5/3 pt reports feeling well, hematuria resolved. coulter in place. not having as many BMs anymore.   5/4: Patient went to the OR today for a Open distal pancreatectomy, splenectomy, partial colectomy, partial L adrenalectomy, temporary abdominal closure.  Patient had a 2.2 L EBL.  Intra Op had 6U PRBC, 2FFP, 1 Platelets, 4L crystalloid  Post Op 1U PRBC ans 1 U FFP.  Post OP vented, with poor UO, on levo and Vaso.    5/5: He remains vented, on paralytics, 2 pressors, essentially anuric.    5/6: Completed the closure and ostomy yesterday.  Vented and on 2 Pressors, No UO.  Off Sedation awake and moving all 4 extremities  5/7: Extubated, required NIV for Hypercarbia, decreased H & H, still on 2 Pressors    5/15: Still on Pressors and HD dependent  5/17: He remains on pressors, drainage from wound         MEDICATIONS  (STANDING):  chlorhexidine 4% Liquid 1 Application(s) Topical <User Schedule>  dextrose 10% + sodium chloride 0.45%. 1000 milliLiter(s) (30 mL/Hr) IV Continuous <Continuous>  dextrose 5%. 1000 milliLiter(s) (50 mL/Hr) IV Continuous <Continuous>  dextrose 50% Injectable 12.5 Gram(s) IV Push once  dextrose 50% Injectable 25 Gram(s) IV Push once  dextrose 50% Injectable 25 Gram(s) IV Push once  glucagon  Injectable 1 milliGRAM(s) IntraMuscular once  insulin lispro (ADMELOG) corrective regimen sliding scale   SubCutaneous every 6 hours  meropenem  IVPB 500 milliGRAM(s) IV Intermittent every 24 hours  metoclopramide Injectable 5 milliGRAM(s) IV Push every 6 hours  midodrine 15 milliGRAM(s) Oral every 8 hours  pantoprazole  Injectable 40 milliGRAM(s) IV Push every 12 hours  Parenteral Nutrition - Adult 1 Each (67 mL/Hr) TPN Continuous <Continuous>  phenylephrine    Infusion 0.05 MICROgram(s)/kG/Min (0.67 mL/Hr) IV Continuous <Continuous>  rifAXIMin 550 milliGRAM(s) Oral two times a day    MEDICATIONS  (PRN):  acetaminophen     Tablet .. 650 milliGRAM(s) Oral every 6 hours PRN Temp greater or equal to 38.5C (101.3F)  albumin human 25% IVPB 50 milliLiter(s) IV Intermittent every 1 hour PRN SBP less than 100  dextrose Oral Gel 15 Gram(s) Oral once PRN Blood Glucose LESS THAN 70 milliGRAM(s)/deciliter  ondansetron Injectable 4 milliGRAM(s) IV Push every 6 hours PRN Nausea and/or Vomiting  sodium chloride 0.9% lock flush 10 milliLiter(s) IV Push every 1 hour PRN Pre/post blood products, medications, blood draw, and to maintain line patency      ICU Vital Signs Last 24 Hrs  T(C): 36.9 (17 May 2023 08:00), Max: 37.2 (16 May 2023 16:00)  T(F): 98.5 (17 May 2023 08:00), Max: 99 (16 May 2023 16:00)  HR: 100 (17 May 2023 09:30) (95 - 126)  BP: 105/67 (17 May 2023 09:30) (84/63 - 137/66)  BP(mean): 76 (17 May 2023 09:30) (66 - 102)  ABP: --  ABP(mean): --  RR: 16 (17 May 2023 09:30) (15 - 36)  SpO2: 99% (17 May 2023 09:30) (88% - 100%)    O2 Parameters below as of 17 May 2023 09:00  Patient On (Oxygen Delivery Method): room air    Physical Exam    general weak  Neuro more alert  neck supple,   Lungs dec bs at bases  cv rrr  abdomen drainage from wound, (less)  extremiteis edematous  heent nc/at   voiding    I&O's Summary    16 May 2023 07:01  -  17 May 2023 07:00  --------------------------------------------------------  IN: 1495 mL / OUT: 775 mL / NET: 720 mL                          8.6    8.77  )-----------( 31       ( 17 May 2023 05:29 )             25.7       05-17    144  |  110<H>  |  41<H>  ----------------------------<  151<H>  3.9   |  16<L>  |  3.65<H>    Ca    9.7      17 May 2023 05:29  Phos  4.4     05-17  Mg     2.0     05-17    DVT Prophylaxis:  Held for thrombocytopenia                                                               Advanced Directives: Full Code

## 2023-05-17 NOTE — PROGRESS NOTE ADULT - SUBJECTIVE AND OBJECTIVE BOX
Date of service: 05-17-23 @ 11:58    pt seen and examined   blood cultures growing MDR enterobacter  temps down  ms better awake, alert   has drainage from midline abd wound  tpn on hold, on pressors    ROS: no fever or chills; denies dizziness, no HA, no SOB or cough, + abdominal pain, no diarrhea or constipation; no dysuria, no urinary frequency, no legs pain, no rashes    MEDICATIONS  (STANDING):  chlorhexidine 4% Liquid 1 Application(s) Topical <User Schedule>  dextrose 10% + sodium chloride 0.45%. 1000 milliLiter(s) (30 mL/Hr) IV Continuous <Continuous>  dextrose 5%. 1000 milliLiter(s) (50 mL/Hr) IV Continuous <Continuous>  dextrose 50% Injectable 12.5 Gram(s) IV Push once  dextrose 50% Injectable 25 Gram(s) IV Push once  dextrose 50% Injectable 25 Gram(s) IV Push once  glucagon  Injectable 1 milliGRAM(s) IntraMuscular once  insulin lispro (ADMELOG) corrective regimen sliding scale   SubCutaneous every 6 hours  meropenem  IVPB 500 milliGRAM(s) IV Intermittent every 24 hours  metoclopramide Injectable 5 milliGRAM(s) IV Push every 6 hours  pantoprazole  Injectable 40 milliGRAM(s) IV Push every 12 hours  Parenteral Nutrition - Adult 1 Each (67 mL/Hr) TPN Continuous <Continuous>  phenylephrine    Infusion 0.05 MICROgram(s)/kG/Min (0.67 mL/Hr) IV Continuous <Continuous>  rifAXIMin 550 milliGRAM(s) Oral two times a day    Vital Signs Last 24 Hrs  T(C): 36.9 (17 May 2023 08:00), Max: 37.2 (16 May 2023 16:00)  T(F): 98.5 (17 May 2023 08:00), Max: 99 (16 May 2023 16:00)  HR: 111 (17 May 2023 10:30) (95 - 126)  BP: 95/64 (17 May 2023 10:30) (84/63 - 137/66)  BP(mean): 71 (17 May 2023 10:30) (66 - 102)  RR: 25 (17 May 2023 10:30) (15 - 36)  SpO2: 96% (17 May 2023 10:30) (88% - 100%)    Parameters below as of 17 May 2023 10:30  Patient On (Oxygen Delivery Method): room air      PE:  Constitutional: frail looking  HEENT: NC/AT, EOMI, PERRLA, conjunctivae clear; ears and nose atraumatic; pharynx benign  Neck: supple; thyroid not palpable  Back: no tenderness  Respiratory: decreased breath sounds, rales   Cardiovascular: S1S2 regular, no murmurs  Abdomen: soft, not tender, not distended, positive BS; + ostomy + peg tube midline abd wound with drainage  Genitourinary: no suprapubic tenderness  Lymphatic: no LN palpable  Musculoskeletal: no muscle tenderness, no joint swelling or tenderness  Extremities: no pedal edema  Neurological/ Psychiatric: no focal deficits   Skin: no rashes; no palpable lesions    Labs: all available labs reviewed                                              8.6    8.77  )-----------( 31       ( 17 May 2023 05:29 )             25.7     05-17    144  |  110<H>  |  41<H>  ----------------------------<  151<H>  3.9   |  16<L>  |  3.65<H>    Ca    9.7      17 May 2023 05:29  Phos  4.4     05-17  Mg     2.0     05-17        Culture - Blood (05.12.23 @ 09:01)   - Enterobacter cloacae complex: Detec  Gram Stain: Culture - Blood (05.12.23 @ 08:59)   Gram Stain:   Growth in aerobic bottle: Gram Negative Rods   Growth in anaerobic bottle: Gram Negative Rods  Specimen Source: .Blood None  Culture Results:   Growth in aerobic bottle: Gram Negative Rods   Growth in anaerobic bottle: Gram Negative Rods     Radiology: all available radiological tests reviewed    < from: Xray Chest 1 View- PORTABLE-Urgent (Xray Chest 1 View- PORTABLE-Urgent .) (05.11.23 @ 13:48) >    ACC: [id number] EXAM:  XR CHEST PORTABLE URGENT 1V   ORDERED BY: PRIYA HALL     PROCEDURE DATE:  05/11/2023          INTERPRETATION:  INDICATION: Dialysis catheter insertion    Portable chest 1:28 PM    COMPARISON: 5/6/2023    FINDINGS:  Heart/Vascular: The heart size, mediastinum, hilum and aorta are stable.  Pulmonary: Midline trachea. There is mild pulmonary venous congestion and   low lung volumes.    Bones: There is no fracture.  Lines and catheter: Tip of the right IJ dialysis catheteris in the right   atrium. Tip and side-port of NG tube are in the body of the stomach.    Impression:    There is mild pulmonary venous congestion and low lung volumes.    Tip of the right IJ dialysis catheter is in the right atrium. There is no   pneumothorax.          < end of copied text >  < from: US Kidney and Bladder (05.08.23 @ 15:51) >    ACC: [id number] EXAM:  US KIDNEYS AND BLADDER   ORDERED BY: GWENDOLYN SINGH     PROCEDURE DATE:  05/08/2023          INTERPRETATION:  CLINICAL INFORMATION: History of gastric cancer.   Evaluate for renal injury.    COMPARISON: CT dated 04/29/2023    TECHNIQUE: Sonography of the kidneys and bladder.    FINDINGS:  Right kidney: 8.7 cm. No renal mass, hydronephrosis or calculi.    Left kidney: 8.5 cm. No renal mass, hydronephrosis or calculi.    Urinary bladder: Urinary bladder is contracted limiting its evaluation.    Incidentally noted are diffuse hepatic metastases as noted on the prior   CT.    IMPRESSION:  No hydronephrosis or renal mass is identified to suggest renal injury.   Please note that contrast enhanced CT is more sensitive for detection of   renal injury.    Hepatic metastasis.        < end of copied text >    Advanced directives addressed: full resuscitation

## 2023-05-17 NOTE — PROGRESS NOTE ADULT - SUBJECTIVE AND OBJECTIVE BOX
NEPHROLOGY INTERVAL HPI/OVERNIGHT EVENTS:    Date of Service: 23 @ 10:21    --Awake.  No acute distress.  for OR tomorrow for J tube placement.   HD catheter removed yesterday, more confused removed NG tube   5/15  750 ostomy output with low bs on d10 at 100, unclear if absorbing, on solo pressors   --No acute distress.  No urine output.   Drains 1700cc.  colostomy 800cc  --Awake but lethargic.  Unclear if in pain.  RR 30.  On NG feeding.  No urine output  - seen w family and ID at bedside, plans for HD discussed w family  --Lethargic.  No acute distress.   Colostomy output 1500cc.  No UO.  5/10--No acute distress.  calm, off sedation and off pressor.  Colostomy output 250cc and Drain 850cc.  Oligoanuric.   more alert and awake, ostomy fx pressors off since this am unable to complete hd yesterday and only received 1/2 due to malfunctioning cathtere    pulled after HD    seen at hd, events noted, anuric, TPN infusing with lipids, on solo dec dose of pressors, encepha with starting of lactulose and xifaxin   - no new events, anuric, CO2 trending down   s/p OR yesterday, ostomy creation  - case d/w surgery and intensivist , anuric k 5.2 will dialyze preop, no fluid removal  - pt seen in PACU, PRBC and FFP, and LR resuscitation noted, coulter in place ~ 100 cc urine in coulter bag  case d/w pts nurse   Chart quote, operative note ""Open distal pancreatectomy, splenectomy, partial colectomy, partial L adrenalectomy, temporary abdominal closure; Large varices, 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 Liver laden with diffuse metastases and very friable, RP oozy. Patient on pressors at the end of the case, and hence, decided to leave colon in discontinuity with temporary closure.""    HPI:  69 yo man with Neuroendocrine tumor of pancreas with mets to liver dx'd 6 years ago.  Treated with Lutathera one year ago and restarted in 2022  G-J tube placed for nutrition in January due to chronic dysphagia and severe esophagitis on EGD.  Post recent  admission due to G tube clogging.  D/mansi home on .    Returned to ED due to vomiting when night time enteral feeding via G tube started.  CT with IVC done due to concern for GIB.  Pt reports chronic self catheterization due to urinary retention.   ZO improved when catheterization started.  Follows mainly with EMELY Cortez.   Pt has been self cath'ing 3-4 x /day.   Indwelling Couletr placed by EMELY due to resistance with blood clots with resistance reported by pt.  For palliative mass resection tomorrow.    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  Creat has been variable during previous admissions--ranging 1.1-2.2.    PMHX and PSHX.  --Neuroendocrine tumor of pancreas with mets to liver --dx 6 years ago.  --GJ tube for nutrition in 2023 due to chronic dysphagia.  --Hx of HTN  --Hx of DM  --Hx of light chain disease --unclear hx and no hx of renal biopsy.      MEDICATIONS  (STANDING):  chlorhexidine 4% Liquid 1 Application(s) Topical <User Schedule>  dextrose 10% + sodium chloride 0.45%. 1000 milliLiter(s) (30 mL/Hr) IV Continuous <Continuous>  dextrose 5%. 1000 milliLiter(s) (50 mL/Hr) IV Continuous <Continuous>  dextrose 50% Injectable 12.5 Gram(s) IV Push once  dextrose 50% Injectable 25 Gram(s) IV Push once  dextrose 50% Injectable 25 Gram(s) IV Push once  glucagon  Injectable 1 milliGRAM(s) IntraMuscular once  insulin lispro (ADMELOG) corrective regimen sliding scale   SubCutaneous every 6 hours  meropenem  IVPB 500 milliGRAM(s) IV Intermittent every 24 hours  metoclopramide Injectable 5 milliGRAM(s) IV Push every 6 hours  pantoprazole  Injectable 40 milliGRAM(s) IV Push every 12 hours  Parenteral Nutrition - Adult 1 Each (67 mL/Hr) TPN Continuous <Continuous>  phenylephrine    Infusion 0.05 MICROgram(s)/kG/Min (0.67 mL/Hr) IV Continuous <Continuous>  rifAXIMin 550 milliGRAM(s) Oral two times a day    MEDICATIONS  (PRN):  acetaminophen     Tablet .. 650 milliGRAM(s) Oral every 6 hours PRN Temp greater or equal to 38.5C (101.3F)  albumin human 25% IVPB 50 milliLiter(s) IV Intermittent every 1 hour PRN SBP less than 100  dextrose Oral Gel 15 Gram(s) Oral once PRN Blood Glucose LESS THAN 70 milliGRAM(s)/deciliter  ondansetron Injectable 4 milliGRAM(s) IV Push every 6 hours PRN Nausea and/or Vomiting  sodium chloride 0.9% lock flush 10 milliLiter(s) IV Push every 1 hour PRN Pre/post blood products, medications, blood draw, and to maintain line patency    Vital Signs Last 24 Hrs  T(C): 36.9 (17 May 2023 08:00), Max: 37.2 (16 May 2023 16:00)  T(F): 98.5 (17 May 2023 08:00), Max: 99 (16 May 2023 16:00)  HR: 96 (17 May 2023 10:15) (95 - 126)  BP: 87/61 (17 May 2023 10:15) (84/63 - 137/66)  BP(mean): 66 (17 May 2023 10:15) (66 - 102)  RR: 19 (17 May 2023 10:15) (15 - 36)  SpO2: 100% (17 May 2023 10:15) (88% - 100%)    Parameters below as of 17 May 2023 10:15  Patient On (Oxygen Delivery Method): room air      Daily     Daily Weight in k.3 (17 May 2023 06:00)    -16 @ 07:01  -   @ 07:00  --------------------------------------------------------  IN: 1495 mL / OUT: 775 mL / NET: 720 mL    PHYSICAL EXAM:  GENERAL: awake.  no acute distress.  CHEST/LUNG: scattered rhonchi  HEART: S1S2 RRR  ABDOMEN: wound with drainage  EXTREMITIES: trace edema  SKIN:     LABS:                        8.6    8.77  )-----------( 31       ( 17 May 2023 05:29 )             25.7         144  |  110<H>  |  41<H>  ----------------------------<  151<H>  3.9   |  16<L>  |  3.65<H>    Ca    9.7      17 May 2023 05:29  Phos  4.4       Mg     2.0               Magnesium, Serum: 2.0 mg/dL ( @ 05:29)  Phosphorus Level, Serum: 4.4 mg/dL ( @ 05:29)          RADIOLOGY & ADDITIONAL TESTS:

## 2023-05-17 NOTE — PROGRESS NOTE ADULT - ASSESSMENT
69 y/o M with a MHX of neuroendocrine tumor, likely of GI/pancreatic origin, c/w gastric outlet obstruction, s/p GJ tube, p/w hematemesis    # Neuroendocrine Tumor with Liver Metastases & Pancreatic Mass    -Primary Onc Dr Ac Llamas    - Initially diagnosed in 2015, p/w abdominal pain and nausea scans revealed a large 20 cm abdominal mass   - Started on Sandostatin, and completed Lutathera about 1 year ago ---> scans end of 2022 noted POD; started back on Lutathera 12/07/2022  - Admitted to  01/2023 and 4/21-4/27 with concern for gastric outlet obstruction  - EGD 01/10/23 with severe esophagitis, no obvious obstruction seen with no stenting done  - S/p GJ tube placement   - Seen by Dr Siddiqi 04/12/23 with ongoing GOC / planning for a palliative procedure for a high gastric transection and Marcela-en-Y reconstruction; currently dependent on using J tube for feeds & G port to decompress; unable to tolerate PO intake   - CT AP 04/17/23 revealed diffuse bilobar liver metastasis which have progressed since 01/06/2023; large pancreatic mass, not significantly changed.  - Surg/ Onc following ---> s/p distal pancreatectomy, splenectomy, partial colectomy and colostomy creation 05/04 & 05/05      # Normocytic Anemia in setting of Malignancy, Intraoperative Blood Loss and Previous Hematemesis     - Hgb down to 8.6 g/dL  - Patient had a 2.2L EBL; received 6U PRBC, 2FFP, 1 Platelets, 4L crystalloid intraoperatively and then 1U PRBC and 1U FFP post op, additional 1U PRBC 05/12  - FOBT negative on 04/24  - Iron studies completed; low sat  - Likely 2/2 advanced malignancy, dehydration malnutrition, decreased feedings, GIB and hypovolemic / distributive shock  - Continue intensive care measures  - Transfuse blood as necessary; keep Hgb >7.5 g/dL and/or symptomatic     # Thrombocytopenia     - Plt 31 K  - Inflammation / infection vs drug-induced thrombocytopenia (non-immune) most common causes of acute decline in platelets; other causes include immune-mediated platelet destruction / ITP, bone marrow suppression, or platelet consumption / destruction, hemodilution due to massive transfusion (recent hypovolemic shock 2/2 blood loss during surgery 05/04)  - Blood cullture postive for enterobacter cloacae--on Meropenem  - Labs inconsistent with DIC (PT, PTT, INR normal, Fibrinogen normal)  - Heparin PF4 ab negative  - Folate and B12 normal  - Iron studies completed; low total and sat  - Hepatitis Panel and Rapid HIV negative  - No evidence of splenomegaly   - Etiology multifactorial in setting of acutely ill patient with underlying comorbidities including malignancy also following intraoperative blood loss  - Thrombocytopenia in this case likely to persist, currently stable  - Continue to trend serial CBCs using non EDTA anticoagulant platelet count, blue top  - Transfuse Plts as necessary; <10K or <20K with bleeding       # Hematemesis    - Likely from the malignancy and the ulcerated esophagus  - S/p EGD 01/10/23 reflux esophagitis with severely ulcerated and bleeding esophagus  - CT imaging 04/17 with diffuse bilobar liver metastasis which have progressed since 01/6/2023. Large pancreatic mass, not significantly changed.  - CT imaging 04/29 with no evidence of an active GI bleed. Large pancreatic mass and extensive hepatic metastases.      # ZO with Anuria    -Nephro following  -ZO : HD- TIW HD  -Has hx of light chain nephropathy       # Hepatic Encephalopathy    -Large tumor burden in liver  -S/p palliative surgery 05/04 & 05/05 with Surg/ Onc  -On Rifaximin 550 mg two times a day    # Nutrition support    -Parenteral Nutrition  -GI-->F/U with Dr Doyle for possible post pyloric feeding access-5/18     69 y/o M with a MHX of neuroendocrine tumor, likely of GI/pancreatic origin, c/w gastric outlet obstruction, s/p GJ tube, p/w hematemesis    # Neuroendocrine Tumor with Liver Metastases & Pancreatic Mass    -Primary Onc Dr Ac Llamas    - Initially diagnosed in 2015, p/w abdominal pain and nausea scans revealed a large 20 cm abdominal mass   - Started on Sandostatin, and completed Lutathera about 1 year ago ---> scans end of 2022 noted POD; started back on Lutathera 12/07/2022  - Admitted to  01/2023 and 4/21-4/27 with concern for gastric outlet obstruction  - EGD 01/10/23 with severe esophagitis, no obvious obstruction seen with no stenting done  - S/p GJ tube placement   - Seen by Dr Siddiqi 04/12/23 with ongoing GOC / planning for a palliative procedure for a high gastric transection and Marcela-en-Y reconstruction; currently dependent on using J tube for feeds & G port to decompress; unable to tolerate PO intake   - CT AP 04/17/23 revealed diffuse bilobar liver metastasis which have progressed since 01/06/2023; large pancreatic mass, not significantly changed.  - Surg/ Onc following ---> s/p distal pancreatectomy, splenectomy, partial colectomy and colostomy creation 05/04 & 05/05  Cancer directed therapy on hold for now.       # Normocytic Anemia in setting of Malignancy, Intraoperative Blood Loss and Previous Hematemesis     - Hgb down to 8.6 g/dL  - Patient had a 2.2L EBL; received 6U PRBC, 2FFP, 1 Platelets, 4L crystalloid intraoperatively and then 1U PRBC and 1U FFP post op, additional 1U PRBC 05/12  - FOBT negative on 04/24  - Iron studies completed; low sat  - Likely 2/2 advanced malignancy, dehydration malnutrition, decreased feedings, GIB and hypovolemic / distributive shock  - Continue intensive care measures  - Transfuse blood as necessary; keep Hgb >7.5 g/dL and/or symptomatic     # Thrombocytopenia     - Plt 31 K  - Inflammation / infection vs drug-induced thrombocytopenia (non-immune) most common causes of acute decline in platelets; other causes include immune-mediated platelet destruction / ITP, bone marrow suppression, or platelet consumption / destruction, hemodilution due to massive transfusion (recent hypovolemic shock 2/2 blood loss during surgery 05/04)  - Blood cullture postive for enterobacter cloacae--on Meropenem  - Labs inconsistent with DIC (PT, PTT, INR normal, Fibrinogen normal)  - Heparin PF4 ab negative  - Folate and B12 normal  - Iron studies completed; low total and sat  - Hepatitis Panel and Rapid HIV negative  - No evidence of splenomegaly   - Etiology multifactorial in setting of acutely ill patient with underlying comorbidities including malignancy also following intraoperative blood loss  - Continue to trend serial CBCs using non EDTA anticoagulant platelet count, blue top  - Transfuse Plts as necessary; <10K or <20K with bleeding       # Hematemesis    - Likely from the malignancy and the ulcerated esophagus  - S/p EGD 01/10/23 reflux esophagitis with severely ulcerated and bleeding esophagus  - CT imaging 04/17 with diffuse bilobar liver metastasis which have progressed since 01/6/2023. Large pancreatic mass, not significantly changed.  - CT imaging 04/29 with no evidence of an active GI bleed. Large pancreatic mass and extensive hepatic metastases.      # ZO with Anuria    -Nephro following  -ZO : HD- TIW HD        # Hepatic Encephalopathy    -Large tumor burden in liver  -S/p palliative surgery 05/04 & 05/05 with Surg/ Onc  -On Rifaximin 550 mg two times a day    # Nutrition support    -Parenteral Nutrition  -GI-->F/U with Dr Doyle for possible post pyloric feeding access-5/18

## 2023-05-17 NOTE — PROGRESS NOTE ADULT - ASSESSMENT
69 yo male with PMHx metastatic neuroendocrine tumor of the pancreas, recently admitted with nonfunctioning GJ tube  due to external mechanical  obstruction   now Post Op  after an open distal pancreatectomy, splenectomy, partial colectomy, partial L adrenalectomy,colostomy  With Post Op distributive Shock/bleeding developed ZO now requiring dialysis  onow back on pressors septic shock gram negative bacterem ia Enterobacter, Cloacae sens meropenem,   CT 5/14 no distinct collection  dialysis dependent plan T/h/S  leakage from abdomen ? from G-J Site  PLAN:  ICU    PULM: room air  Cardio:  Wean pressors, track fluid loses from ileostomy, drains, anasarca on phenylephrine septic shock  Renal: HD as per renal will replace catheter today  GI:   Will have a N-J placed placed in OR tomorrow, TPN today  Endo RISS  Heme - thrombocytopenia from sepsis, d/c heparin subq till recovers  Neurometabolic encephalopthy   improving  ID blood cullture postive for enterobacter cloacae  Meropenem   patient is full code

## 2023-05-17 NOTE — PROGRESS NOTE ADULT - SUBJECTIVE AND OBJECTIVE BOX
Patient was seen and examined at the bedside this morning  No acute events overnight  Pain is better controlled  No nausea or vomiting  Tube feeds on hold, still having output from the midline wound    O/E:  T(C): 36.9 (05-17-23 @ 04:00), Max: 37.2 (05-16-23 @ 16:00)  HR: 110 (05-17-23 @ 07:00) (95 - 126)  BP: 102/80 (05-17-23 @ 07:00) (85/63 - 137/66)  RR: 25 (05-17-23 @ 07:00) (17 - 36)  SpO2: 99% (05-17-23 @ 07:00) (88% - 100%)  Alert, conscious, oriented  No pallor, jaundice or cyanosis  Not in pain or distress  Chest clear, equal air entry bilaterally  Abdomen Ostomy pink and having small amount of output. Midline wound open superiorly and inferiorly with evidence of enteric contents from the wound. No evidence of wound infection  Extremities: No swelling or tenderness    05-16-23 @ 07:01  -  05-17-23 @ 07:00  --------------------------------------------------------  IN: 1495 mL / OUT: 775 mL / NET: 720 mL                            8.6    8.77  )-----------( 31       ( 17 May 2023 05:29 )             25.7   05-17    144  |  110<H>  |  41<H>  ----------------------------<  151<H>  3.9   |  16<L>  |  3.65<H>    Ca    9.7      17 May 2023 05:29  Phos  4.4     05-17  Mg     2.0     05-17    MEDICATIONS  (STANDING):  chlorhexidine 4% Liquid 1 Application(s) Topical <User Schedule>  dextrose 10% + sodium chloride 0.45%. 1000 milliLiter(s) (30 mL/Hr) IV Continuous <Continuous>  dextrose 5%. 1000 milliLiter(s) (50 mL/Hr) IV Continuous <Continuous>  dextrose 50% Injectable 12.5 Gram(s) IV Push once  dextrose 50% Injectable 25 Gram(s) IV Push once  dextrose 50% Injectable 25 Gram(s) IV Push once  glucagon  Injectable 1 milliGRAM(s) IntraMuscular once  insulin lispro (ADMELOG) corrective regimen sliding scale   SubCutaneous every 6 hours  meropenem  IVPB 500 milliGRAM(s) IV Intermittent every 24 hours  metoclopramide Injectable 5 milliGRAM(s) IV Push every 6 hours  midodrine 15 milliGRAM(s) Oral every 8 hours  pantoprazole  Injectable 40 milliGRAM(s) IV Push every 12 hours  phenylephrine    Infusion 0.05 MICROgram(s)/kG/Min (0.67 mL/Hr) IV Continuous <Continuous>  rifAXIMin 550 milliGRAM(s) Oral two times a day    MEDICATIONS  (PRN):  acetaminophen     Tablet .. 650 milliGRAM(s) Oral every 6 hours PRN Temp greater or equal to 38.5C (101.3F)  albumin human 25% IVPB 50 milliLiter(s) IV Intermittent every 1 hour PRN SBP less than 100  dextrose Oral Gel 15 Gram(s) Oral once PRN Blood Glucose LESS THAN 70 milliGRAM(s)/deciliter  ondansetron Injectable 4 milliGRAM(s) IV Push every 6 hours PRN Nausea and/or Vomiting  sodium chloride 0.9% lock flush 10 milliLiter(s) IV Push every 1 hour PRN Pre/post blood products, medications, blood draw, and to maintain line patency

## 2023-05-17 NOTE — CHART NOTE - NSCHARTNOTEFT_GEN_A_CORE
Clinical Nutrition TF BRIEF NOTE    * 68 year old M PMHx metastatic neuroendocrine tumor of the pancreas, recently admitted with nonfunctioning GJ tube (patient is chronically obstructed). Patient has had severe heartburn, and has had several episodes of vomiting small amount of dark blood. Patient was discharged on Thursday. Hgb unchanged. CT angiography has no active bleeding, but demonstrates extensive tumor in distal pancreas. Pt originally in SICU, receiving TF via GJ tube but having frequent BM's. On  - pt went to OR  for a Open distal pancreatectomy, splenectomy, partial colectomy, partial L adrenalectomy, temporary abdominal closure. Remained intubated, on paralytics, pressors, anuric and transferred to ICU.  s/p abd closure and colostomy.  **TPN initiated on  2/2 suspected long NPO status s/p multiple GI surgeries, possible new placement of ?GJ tube for long-term   *5/10: TPN ran until ~6 PM yesterday and then was stopped 2/2 hyperglycemia. POCT (180) this AM.  TF initiated via NGT yesterday, Vital 1.5 2/2 multiple GI surgeries and renal lytes WNL (K, Phos). Running @ 40 cc now - as per RN, 350 mL residuals noted this AM. D/w IDT, will re-start PN - no longer has central line so will resume w/ PPN.  Hold TF's for now, Dr. Mcclendon to f/u w/ surgery for long-term nutrition plan and possibly re-start TF pending residuals and surgery recs.  No HD today - on IVF for now, will d/c when PPN to start as per Dr. Mcclendon. Still w/ no UO.  Pt more awake today, ostomy functioning w/ increasing output.  See recs below for PPN.  *: PPN running until bag finishes. Reglan added and TF restarted this AM with minimal residuals. Tolerating at 40 cc/hr, will increase to goal rate slowly.  Will hold off on renewing PPN for now.  Will receive HD today.   POCT elevated, with continued hyperglycemia, on admelog. Will add lantus, discussed during IDR.  RD to follow TF tolerance.   *: pt vomited overnight, TF currently on hold. As per RN note, secretions appear to be TF - ? aspiration. Has been on reglan.  D/w MD Mcclendon, residuals remain low. Will sit up and re-start TF.   RD STRONGLY rec'd to confirm goals of care regarding LONG-TERM nutrition support as pt has been meeting <75% ENN since admit (x 14 days). TF has been shut off for intolerance/ HD/ procedures.   Received HD yesterday, still no UO.   Pt remains full code.     *current status: pt has been receiving TF but not tolerating well - even with sitting up and keeping L side up. Having drainage from abd wounds that appears to be tube feed. Pt midline abdomen wound producing high volume serous fluid/ascites. TF held yesterday, pt will need J tube placed. Pt likely aspirating on TF.  D/w Dr. Mcclendon this AM, will be NPO and will initiate TPN again via central line. See recs below.  Is HD dependent.    *pertinent meds: dextrose 10% + NaCl 0.45%, ADMELOG, metoclopramide, pantoprazole, phenylephrine, abx, IV albumin, zofran     *labs reviewed: Na on higher end of normal (144), will start at standard amount and monitor/ adjust based on tomorrow's labs.   K and Phos WNL - receiving HD, will start low in bag and monitor.   Mg WNL.  Last T bili 1.9 - will add trace elements and adjust based on new T bili level.  Last triglyceride level on  378 - will hold off on adding lipids until new level obtained.   POCT WNL, was elevated yesterday AM - will monitor.   Dextrose goal (with lipids) = 320g; titrate dextrose up 50-75g/day until goal rate of  reached.          144  |  110<H>  |  41<H>  ----------------------------<  151<H>  3.9   |  16<L>  |  3.65<H>    Ca    9.7      17 May 2023 05:29  Phos  4.4       Mg     2.0         BMI: BMI (kg/m2): 24.8 (23 @ 04:00)  HbA1c: A1C with Estimated Average Glucose Result: 7.3 % (23 @ 06:35)    Glucose: POCT Blood Glucose.: 99 mg/dL (23 @ 05:57)    BP: 102/80 (23 @ 07:00) (79/44 - 137/66)  Lipid Panel: Date/Time: 23 @ 04:56  Triglycerides, Serum: 378    POCT Blood Glucose.: 99 mg/dL (17 May 2023 05:57)  POCT Blood Glucose.: 139 mg/dL (17 May 2023 00:09)  POCT Blood Glucose.: 136 mg/dL (16 May 2023 17:22)  POCT Blood Glucose.: 147 mg/dL (16 May 2023 12:20)    *I&O's Detail    16 May 2023 07:01  -  17 May 2023 07:00  --------------------------------------------------------  IN:    dextrose 10% + sodium chloride 0.45%: 723 mL    IV PiggyBack: 50 mL    Phenylephrine: 722 mL  Total IN: 1495 mL    OUT:    Bulb (mL): 30 mL    Colostomy (mL): 20 mL    Drain (mL): 725 mL  Total OUT: 775 mL    Total NET: 720 mL  *fluid balance: positive; minimal ostomy output noted. Will monitor/ adjust total TPN volume prn  *BM documented: 5/16 x 4 - liquid; not on bowel regimen.  *edema: 3+ generalized and 4+ R/L leg    *malnutrition: Pt continues to meet criteria for severe protein-calorie malnutrition in context of chronic disease r/t decreased ability to meet increased nutrient needs 2/2 mets cancer AEB severe muscle/ fat wasting, TF meeting <75% ENN x >/=1 month, 19% wt loss x 5 mos    *ESTIMATED NUTR NEEDS:  based on 63.9 Kg (wt from 5 - bed scale taken by RD; "dry wt")  Calories: 4254-0901 Kcal (30-35 Kcal/Kg)  Protein:  g protein (1.5-2 g/Kg)  Fluids: 6748-6647 mL (25-30 mL/Kg)    Weight History:  Daily Weight in k.3 (17 May 2023 06:00)  **wt gain likely 2/2 worsening edema/ anasarca   Daily Weight in k.8 (10 May 2023 06:00)  Height (cm): 170.2 (23 @ 08:11), 170.2 (23 @ 11:22)  Weight (kg): 71.9 (23 @ 04:00), 62.9 (23 @ 22:23)  BMI (kg/m2): 24.8 (23 @ 04:00), 21.7 (23 @ 08:11), 21.7 (23 @ 22:23)  BSA (m2): 1.83 (23 @ 04:00), 1.73 (23 @ 08:11), 1.73 (23 @ 22:23)    TPN Recommendations: via central line   Total Volume: 1600 mL x 24 hours  128 g  Amino Acids  100 g Dextrose (Titrate dextrose up 50-75g/day until goal rate of 320g reached)  0 g Lipids 20% (pending triglyceride level)  123 mEq Sodium Chloride  0 mEq Sodium Acetate  0 mmol Sodium Phosphate  20 mEq Potassium Chloride  0 mEq Potassium Acetate  10 mmol Potassium Phosphate  0 mEq Calcium Gluconate (CAPS out of PN solution)  8 mEq Magnesium Sulfate  200 mg Thiamine  1 ml Trace Elements  10 ml MVI    Total Calories     852     (Meets   43%  of  Estimated Energy needs  and   100%  Protein needs)    RECOMMENDATIONS:  1) HOLD CURRENT TF order of Vital 1.5 @ goal rate of 65 cc/hr is met (total volume = 1300 mL) with 3 packets of Prosource TF. Will provide ~ 2070 kcal, 121 g protein, and 993 mL free water. ***resume as per intensivist and surgery  2) Daily weights  3) Strict I & O's  4) Daily lyte checks including magnesium and phos  5) Weekly triglycerides/LFT checks  6) POCT q6hrs; maintain 140-180mg/dL  7) Confirm goals of care regarding LONG-TERM nutrition support - will need new feeding tube ? GJ vs J tube ?  8) D/c IVF while on TPN to avoid fluid overload 2/2 HD and no urine production - with anasarca     *will continue to monitor and adjust treatment plan prn*  Fabby Waller, MS, RDN, CNSC, [phone]  Certified Nutrition

## 2023-05-17 NOTE — PROGRESS NOTE ADULT - SUBJECTIVE AND OBJECTIVE BOX
HPI:    69 y/o male with MHx significant for neuroendocrine tumor diagnosed 6 years ago with chronic gastric outlet obstruction and mets to liver, s/p GJ tube, HTN, HLD, T2DM, BPH and light chain nephropathy presented to the ED with hematemesis since last night.    Recent admission-from 04/21 - 04/27 for J-tube malfunction; weakness; dehydration; hyponatremia and ZO    04/29/2023 - Patient seen in ED, Mana spouse at bedside. Patients reports nausea and hematemesis since last night. Coffee brown color emesis of about 10 ml note dn the bag. Also with nausea and discomfort at the GJ site. Last feed was last night. Report loose bowel movement every 2-3 days, last BM was yesterday, denies hematochezia.     05/08/2023 - Seen at bedside in ICU, accompanied by spouse. With NGT, and upper extremities in restraints due to patient attempting to pull the NGT out    05/12/2023: Seen at bedside with nursing assistant, ill appearing, lethargic, awake     05/17/ 2023- Seen at bedside, alert, awake, in no acute distress. Undergoing HD at [facility]. Spouse (Mana) at bedside, appreciating the care he receives.     PAST MEDICAL & SURGICAL HISTORY:    Hypertension  BPH (benign prostatic hyperplasia)  Renal insufficiency  Light chain nephropathy  DM (diabetes mellitus  HLD (hyperlipidemia)  GJ tube placement      FAMILY HISTORY:    FH: breast cancer (Mother)  FH: aneurysm (Father)    MEDICATIONS  (STANDING):    chlorhexidine 4% Liquid 1 Application(s) Topical <User Schedule>  dextrose 10% + sodium chloride 0.45%. 1000 milliLiter(s) (30 mL/Hr) IV Continuous <Continuous>  dextrose 5%. 1000 milliLiter(s) (50 mL/Hr) IV Continuous <Continuous>  dextrose 50% Injectable 12.5 Gram(s) IV Push once  dextrose 50% Injectable 25 Gram(s) IV Push once  dextrose 50% Injectable 25 Gram(s) IV Push once  glucagon  Injectable 1 milliGRAM(s) IntraMuscular once  insulin lispro (ADMELOG) corrective regimen sliding scale   SubCutaneous every 6 hours  meropenem  IVPB 500 milliGRAM(s) IV Intermittent every 24 hours  metoclopramide Injectable 5 milliGRAM(s) IV Push every 6 hours  pantoprazole  Injectable 40 milliGRAM(s) IV Push every 12 hours  Parenteral Nutrition - Adult 1 Each (67 mL/Hr) TPN Continuous <Continuous>  phenylephrine    Infusion 0.05 MICROgram(s)/kG/Min (0.67 mL/Hr) IV Continuous <Continuous>  rifAXIMin 550 milliGRAM(s) Oral two times a day    MEDICATIONS  (PRN):    acetaminophen     Tablet .. 650 milliGRAM(s) Oral every 6 hours PRN Temp greater or equal to 38.5C (101.3F)  albumin human 25% IVPB 50 milliLiter(s) IV Intermittent every 1 hour PRN SBP less than 100  dextrose Oral Gel 15 Gram(s) Oral once PRN Blood Glucose LESS THAN 70 milliGRAM(s)/deciliter  ondansetron Injectable 4 milliGRAM(s) IV Push every 6 hours PRN Nausea and/or Vomiting  sodium chloride 0.9% lock flush 10 milliLiter(s) IV Push every 1 hour PRN Pre/post blood products, medications, blood draw, and to maintain line patency    ALLERGIES:    losartan (Angioedema)    REVIEW OF SYSTEMS:    Unable to obtain at this time      VITALS:    Vital Signs Last 24 Hrs  T(C): 36.7 (17 May 2023 13:24), Max: 37.2 (16 May 2023 16:00)  T(F): 98 (17 May 2023 13:24), Max: 99 (16 May 2023 16:00)  HR: 83 (17 May 2023 15:45) (83 - 126)  BP: 90/52 (17 May 2023 15:45) (74/51 - 140/125)  BP(mean): 61 (17 May 2023 15:45) (55 - 128)  RR: 17 (17 May 2023 15:45) (15 - 36)  SpO2: 100% (17 May 2023 15:45) (88% - 100%)    Parameters below as of 17 May 2023 14:15  Patient On (Oxygen Delivery Method): room air      PHYSICAL:    Constitutional: ill appearing  Eyes: no conjunctival infection, anicteric  ENT: pharynx is unremarkable; NGT in place  Neck: supple without JVD  Pulmonary: no acute distress or accessory muscle use  Cardiac: RRR, normal S1S2  Vascular: b/l lower extremities with + 2 edema  Abdomen: normoactive bowel sounds, soft and nontender; dressing C/D/I, colostomy pink with minimal output.  Lymphatic: no peripheral adenopathy appreciated  Musculoskeletal: full range of motion and no deformities appreciated  Skin: pale appearance, no rash/erythema  Neurology: poorly assessed  Psychiatric: poorly assessed      LABS:                          8.6    8.77  )-----------( 31       ( 17 May 2023 05:29 )             25.7     05-17    144  |  110<H>  |  41<H>  ----------------------------<  151<H>  3.9   |  16<L>  |  3.65<H>    Ca    9.7      17 May 2023 05:29  Phos  4.4     05-17  Mg     2.0     05-17        RADIOLOGY & ADDITIONAL STUDIES:    EXAM:  CT ABDOMEN AND PELVIS IC     PROCEDURE DATE:  04/29/2023        INTERPRETATION:  CLINICAL INFORMATION: Upper GI bleed. History of gastric   cancer, GJ tube.    COMPARISON: 04/17/2023.    CONTRAST/COMPLICATIONS:  IV Contrast: Omnipaque 350  90 cc administered   10 cc discarded  Oral Contrast: NONE  Complications: None reported at time of study completion    PROCEDURE:  CT of the Abdomen and Pelvis was performed.  Precontrast, Arterial and Delayed phases were performed.  Sagittal and coronal reformats were performed.    FINDINGS:  LOWER CHEST: No lung nodules. Ill-defined increased haziness in the   paraesophageal region. Coronary artery calcification.    LIVER: Numerous hepatic metastases. A previously referenced confluent   lesion involving the segment 8 measures 9 x 8.7 cm, previously 7.9 x 8.3   cm (4:19).  BILE DUCTS: Normal caliber.  GALLBLADDER: Within normal limits.  SPLEEN: Within normal limits.  PANCREAS: Heterogeneously enhancing mass involving the distal body and   the tail of the pancreas measuring approximately 9.8 x 7 cm, previously   8.3 x 7.9 cm. Noncontrast imaging demonstrates hyperdense foci within the   lower portion of this mass that may be related to hemorrhagic content.   The mass cannot be completely  from the spleen and the stomach.  ADRENALS: Within normal limits.  KIDNEYS/URETERS: 2 mm non obstructing calculus lower pole of the left   kidney. No hydronephrosis.    BLADDER: Within normal limits.  REPRODUCTIVE ORGANS: Prostate is enlarged.    BOWEL: No bowel obstruction. Re demonstration of a gastrojejunostomy in   stable position. No endoluminal extravasation or pooling of the contrast   to suggest an active GI bleed.  PERITONEUM: No ascites.  VESSELS: Atherosclerotic changes. Probable occlusion of the splenic vein   with evidence of associated collateral circulation. Patent portal veins.  RETROPERITONEUM/LYMPH NODES: No lymphadenopathy.  ABDOMINAL WALL: Gastrostomy tube.  BONES: Degenerative changes.    IMPRESSION:  No evidence of an active GI bleed.  Large pancreatic mass and extensive hepatic metastases.  Gastrojejunostomy          US Kidney and Bladder (05.08.23 @ 15:51)     INTERPRETATION:  CLINICAL INFORMATION: History of gastric cancer.   Evaluate for renal injury.    COMPARISON: CT dated 04/29/2023    TECHNIQUE: Sonography of the kidneys and bladder.    FINDINGS:  Right kidney: 8.7 cm. No renal mass, hydronephrosis or calculi.    Left kidney: 8.5 cm. No renal mass, hydronephrosis or calculi.    Urinary bladder: Urinary bladder is contracted limiting its evaluation.    Incidentally noted are diffuse hepatic metastases as noted on the prior   CT.    IMPRESSION:  No hydronephrosis or renal mass is identified to suggest renal injury.   Please note that contrast enhanced CT is more sensitive for detection of   renal injury.    Hepatic metastasis.

## 2023-05-17 NOTE — PROGRESS NOTE ADULT - ASSESSMENT
69 yo male with PMHx metastatic neuroendocrine tumor of the pancreas, recently admitted with nonfunctioning GJ tube (patient is chronically obstructed). Patient was admitted with severe heartburn, and has had several episodes of vomiting small amount of dark blood. Patient was discharged on Thursday. Hgb unchanged. CT angiography has no active bleeding, but demonstrates extensive tumor in distal pancreas.  Patient went to the OR 5/4 for a Open distal pancreatectomy, splenectomy, partial colectomy, partial L adrenalectomy, temporary abdominal closure.  Patient had a 2.2 L EBL.  Intra Op had 6U PRBC, 2FFP, 1 Platelets, 4L crystalloid  Post Op 1U PRBC ans 1 U FFP.  Post OP vented, with poor UO, on levo and Vaso.  5/5 Pt went back to OR and had closure and colostomy. Hospital course c/b  fever lethargy, encephalopathic, on pressors, dec hgb was dialyzed 5/11 560 cc from bulb, had ? episode emesis overnight, imaging shows some congestion, concern for asp pna raised - started on IV vancomycin/zosyn.    1. Septic shock with Enterobacter. Abd ascites/Post op pneumoperitoneum. Aspiration pneumonitis/pneumonia. Metastatic neuroendocrine pancreatic tumor s/p surgery. ZO  - imaging reviewed   - blood cx growing MDR enterobacter source likely gut translocation   - CT abd/pelvis post surgical changes pneumoperitoneum/enteritis; surgery f/u noted  - s/p IV zosyn [id number] #4 --> 5/12  - on merrem [id number] #5  - continue with antibiotic coverage   - tolerating abx well so far; no side effects noted  - reason for abx use and side effects reviewed with patient  - aspiration precautions  - contact precautions   - fu cbc    2. other issues - care per medicine

## 2023-05-18 NOTE — PROGRESS NOTE ADULT - SUBJECTIVE AND OBJECTIVE BOX
Date of service: 05-18-23 @ 11:28    pt seen and examined   afebrile  lethargic   has drainage from midline abd wound  restarted on tpn, plan for post pyloric feeding tube placement    ROS: unable to obtain d/t medical condition    MEDICATIONS  (STANDING):  albumin human 25% IVPB 50 milliLiter(s) IV Intermittent every 30 minutes  chlorhexidine 4% Liquid 1 Application(s) Topical <User Schedule>  dextrose 10% + sodium chloride 0.45%. 1000 milliLiter(s) (30 mL/Hr) IV Continuous <Continuous>  dextrose 5%. 1000 milliLiter(s) (50 mL/Hr) IV Continuous <Continuous>  dextrose 50% Injectable 12.5 Gram(s) IV Push once  dextrose 50% Injectable 25 Gram(s) IV Push once  dextrose 50% Injectable 25 Gram(s) IV Push once  glucagon  Injectable 1 milliGRAM(s) IntraMuscular once  insulin lispro (ADMELOG) corrective regimen sliding scale   SubCutaneous every 6 hours  meropenem  IVPB 500 milliGRAM(s) IV Intermittent every 24 hours  metoclopramide Injectable 5 milliGRAM(s) IV Push every 6 hours  pantoprazole  Injectable 40 milliGRAM(s) IV Push every 12 hours  Parenteral Nutrition - Adult 1 Each (67 mL/Hr) TPN Continuous <Continuous>  Parenteral Nutrition - Adult 1 Each (67 mL/Hr) TPN Continuous <Continuous>  phenylephrine    Infusion 0.05 MICROgram(s)/kG/Min (0.67 mL/Hr) IV Continuous <Continuous>  rifAXIMin 550 milliGRAM(s) Oral two times a day    Vital Signs Last 24 Hrs  T(C): 37.1 (18 May 2023 08:00), Max: 37.1 (17 May 2023 12:00)  T(F): 98.8 (18 May 2023 08:00), Max: 98.8 (17 May 2023 12:00)  HR: 108 (18 May 2023 10:00) (75 - 117)  BP: 108/71 (18 May 2023 10:00) (73/51 - 140/125)  BP(mean): 79 (18 May 2023 10:00) (55 - 128)  RR: 19 (18 May 2023 10:00) (15 - 33)  SpO2: 93% (18 May 2023 10:00) (90% - 100%)    Parameters below as of 18 May 2023 08:00  Patient On (Oxygen Delivery Method): room air    PE:  Constitutional: frail looking  HEENT: NC/AT, EOMI, PERRLA, conjunctivae clear; ears and nose atraumatic; pharynx benign  Neck: supple; thyroid not palpable  Back: no tenderness  Respiratory: decreased breath sounds, rales   Cardiovascular: S1S2 regular, no murmurs  Abdomen: soft, not tender, not distended, positive BS; + ostomy + peg tube midline abd wound with drainage  Genitourinary: no suprapubic tenderness  Lymphatic: no LN palpable  Musculoskeletal: no muscle tenderness, no joint swelling or tenderness  Extremities: no pedal edema  Neurological/ Psychiatric: no focal deficits   Skin: no rashes; no palpable lesions    Labs: all available labs reviewed                                              7.8    8.82  )-----------( 30       ( 18 May 2023 05:26 )             23.6     05-18    142  |  108  |  32<H>  ----------------------------<  147<H>  3.9   |  18<L>  |  2.78<H>    Ca    9.1      18 May 2023 05:26  Phos  4.0     05-18  Mg     2.0     05-18    TPro  5.0<L>  /  Alb  2.3<L>  /  TBili  5.8<H>  /  DBili  x   /  AST  446<H>  /  ALT  113<H>  /  AlkPhos  121<H>  05-18        Culture - Blood (05.12.23 @ 09:01)   - Enterobacter cloacae complex: Detec  Gram Stain: Culture - Blood (05.12.23 @ 08:59)   Gram Stain:   Growth in aerobic bottle: Gram Negative Rods   Growth in anaerobic bottle: Gram Negative Rods  Specimen Source: .Blood None  Culture Results:   Growth in aerobic bottle: Gram Negative Rods   Growth in anaerobic bottle: Gram Negative Rods     Radiology: all available radiological tests reviewed    < from: Xray Chest 1 View- PORTABLE-Urgent (Xray Chest 1 View- PORTABLE-Urgent .) (05.11.23 @ 13:48) >    ACC: [id number] EXAM:  XR CHEST PORTABLE URGENT 1V   ORDERED BY: PRIYA HALL     PROCEDURE DATE:  05/11/2023          INTERPRETATION:  INDICATION: Dialysis catheter insertion    Portable chest 1:28 PM    COMPARISON: 5/6/2023    FINDINGS:  Heart/Vascular: The heart size, mediastinum, hilum and aorta are stable.  Pulmonary: Midline trachea. There is mild pulmonary venous congestion and   low lung volumes.    Bones: There is no fracture.  Lines and catheter: Tip of the right IJ dialysis catheteris in the right   atrium. Tip and side-port of NG tube are in the body of the stomach.    Impression:    There is mild pulmonary venous congestion and low lung volumes.    Tip of the right IJ dialysis catheter is in the right atrium. There is no   pneumothorax.          < end of copied text >  < from: US Kidney and Bladder (05.08.23 @ 15:51) >    ACC: [id number] EXAM:  US KIDNEYS AND BLADDER   ORDERED BY: GWENDOLYN SINGH     PROCEDURE DATE:  05/08/2023          INTERPRETATION:  CLINICAL INFORMATION: History of gastric cancer.   Evaluate for renal injury.    COMPARISON: CT dated 04/29/2023    TECHNIQUE: Sonography of the kidneys and bladder.    FINDINGS:  Right kidney: 8.7 cm. No renal mass, hydronephrosis or calculi.    Left kidney: 8.5 cm. No renal mass, hydronephrosis or calculi.    Urinary bladder: Urinary bladder is contracted limiting its evaluation.    Incidentally noted are diffuse hepatic metastases as noted on the prior   CT.    IMPRESSION:  No hydronephrosis or renal mass is identified to suggest renal injury.   Please note that contrast enhanced CT is more sensitive for detection of   renal injury.    Hepatic metastasis.        < end of copied text >    Advanced directives addressed: full resuscitation

## 2023-05-18 NOTE — BRIEF OPERATIVE NOTE - SPECIMENS
none
None
distal pancreas, spleen, partial Left adrenal, splenic flexure of colon, additional splenic flexure

## 2023-05-18 NOTE — PROGRESS NOTE ADULT - ASSESSMENT
69 yo man with Hx of Neuroendocrine tumor/Pancreatic mass and liver mets.  Now for palliative resection of pancreatic mass.    --ZO with variable creat level and unclear CKD.    Likely dependent on volume status.    Limited intake even with Enteral feeding.      Start gentle hydration.  -- : continue Hale drainage.  --Hx of light chain nephropathy  --No contraindication for surgery from renal standpoint.    5/4  s/p Palliative surgery for neuroendocrine mass  will watch for ZO, pt with low UO immediate post op  s/p multiple PRBC and FFP transfusion and volume resuscitation    5/5  patient anuric, K 5.2  On bicarb drip  to go back to OR today  case d/w Dr Jean and Dr Siddiqi  Will HD x 2 hrs to correct potassium  >10 L infused in blood products and fluids  More fluids not indicated, UO 50 ml  no fluid removal on HD  Hep profile ordered    5/6  Ostomy created  will monitor i/o  labs stable   no need for HD today  d/w intensivist  will monitor daily for hd requirement    5/7  seen earlier on HD   tolerating well  TPN  d/w Dr Jaen  d/w pts fam at bedside    5/8 MK   - ZO with anuria, remains HD dependent with hx of light chain nephropathy    tolerating hd     dc femoral shiley today after hd   - enceph monitor response to hd and xifaxan and lactulose    5/9 MK   - ZO with anuria, remains HD dependent with hx of light chain nephropathy    no indication for hd    off pressor     monitor electrolytes on TPN     NG in place for TF initiation   - enceph, improving monitor response to hd and xifaxan and lactulose    ostomy fx with liquid stool  dw dr marley     5/10 SY  --ZO : remains oligoanuric.     Follow with repeat bladder scans--no retention.    Will hold HD today and assess daily.    Trial of gentle hydration due to increased fluid loss.  --Metabolic acidosis : trial of HCO3 in addition to TPN   --Monitor encephalopathy.    5/11 SY  --ZO : remains oligoanuric.    Renal parameters further increased with no improvement in acidosis with HCO3 infusion.    D/w pt's wife over phone.    Explained his poor prognosis from Oncology aspect due to large tumor burden in liver and that surgery was palliative in nature.    Explained added burden of continued dialysis adding to pt's suffering at this point.    Pt's wife requested not to proceed with HD catheter placement and dialysis until she can d/w Dr Siddiqi and rest of family.    Will discuss further later today.    5/12  HD yesterday, planned for HD Sat  case d/w family, and ID, pts nurse  Dr Velez covering this weekend    5/13 SY  --ZO : remains oligoanuric.   HD today : HD order and tx reviewed.     --Positive fluid balance due to hypotension.  RR at 30 today.   Check follow up CXR.  Will plan for 1-2 kg UF with HD if tolerated.  --Continue enteral feeding.  --D/w Dr Mcclendon : positive blood culture/ GNR-- pre current HD catheter.   Plan HD today and on Monday,, then remove catheter for bacteremia clearance.    5/14 SY  --ZO : reamins dialysis dependent for now.    Plan HD in am and remove temporary HD catheter due to bacteremia to allow clearance.  --CT with patch lower lung inf : concern for PNA : continue abtx.  --Continue enteral feeding.  --Enterobacter bacteremia : continue abtx.    5/15 MK   - ZO for now HD dependent    tolerating hd and will attempt uf with hd   - enterobacter sepsis on meropenem renally dosed, aspiration pna     for dc of [place] post hd today   - anemia transfusion ongoing   - FEN On vitality with ostomy output noted     5/16 MK   - ZO Hd dependent    for hd in am with replacement of catheter to occur   - enterobacter sepsis on meropenem, renally dosed     asp pna   - anemia h/h stable   - FEN: on d10 ng tube pulled out    5/17 SY  --ZO : continue TIW HD--will maintain on MWF schedule this week.  --Metabolic acidosis : mainly due to GI loss,  will need to correct with HD.  --Enterobacter bacteremia : continue meropenem  --Post TPN.  NGT pulled out.  For J tube placement.    5/18 SY  --ZO : Continue HD support .    Next tx in am.  Will attempt to start UF with HD as tolerated   --Metabolic acidosis : due to GI loss : increase HCO3 in dialysate  --Enterobacter bacteremia : continue Meropenem  --Continue TPN for now.

## 2023-05-18 NOTE — PROGRESS NOTE ADULT - ASSESSMENT
"   LOS: 4 days    Patient Care Team:  Cam Duran MD as PCP - General (Family Medicine)    Chief Complaint:    Chief Complaint   Patient presents with   • Back Pain     Follow UP KLAUDIA  Subjective     Interval History:   Mild orthopnea, better overall.  SBP 90s.  No n/v or chest pain    Objective     Vital Signs  Temp:  [97.7 °F (36.5 °C)-98.3 °F (36.8 °C)] 98.2 °F (36.8 °C)  Heart Rate:  [54-71] 69  Resp:  [16-18] 18  BP: ()/(56-81) 95/70    Flowsheet Rows      First Filed Value   Admission Height  177.8 cm (70\") Documented at 08/18/2019 1328   Admission Weight  113 kg (249 lb) Documented at 08/18/2019 1352          I/O this shift:  In: -   Out: 550 [Urine:550]  I/O last 3 completed shifts:  In: 4636 [P.O.:3120; I.V.:1516]  Out: 1400 [Urine:1400]    Intake/Output Summary (Last 24 hours) at 8/22/2019 0610  Last data filed at 8/22/2019 0400  Gross per 24 hour   Intake 880 ml   Output 1525 ml   Net -645 ml       Physical Exam:  GEN nad, alert  Neck supple no JVD  Lungs CTA bilat  CV RRR   abd soft NT/ND  vasc no pedal/ankle edema      Results Review:    Results from last 7 days   Lab Units 08/22/19  0358 08/21/19  1240 08/21/19  0540  08/18/19  1339   SODIUM mmol/L 124* 124* 122*   < > 139   POTASSIUM mmol/L 3.9 3.8 4.0   < > 3.4*   CHLORIDE mmol/L 89* 89* 86*   < > 95*   CO2 mmol/L 25.0 26.1 19.6*   < > 26.7   BUN mg/dL 45* 43* 42*   < > 12   CREATININE mg/dL 1.73* 1.86* 1.75*   < > 1.40*   CALCIUM mg/dL 8.6 8.8 8.8   < > 9.7   BILIRUBIN mg/dL  --   --   --   --  0.4   ALK PHOS U/L  --   --   --   --  70   ALT (SGPT) U/L  --   --   --   --  29   AST (SGOT) U/L  --   --   --   --  35   GLUCOSE mg/dL 135* 156* 148*   < > 223*    < > = values in this interval not displayed.       Estimated Creatinine Clearance: 54.7 mL/min (A) (by C-G formula based on SCr of 1.73 mg/dL (H)).    Results from last 7 days   Lab Units 08/21/19  0540 08/18/19 2020   MAGNESIUM mg/dL  --  1.6   PHOSPHORUS mg/dL 2.6  --  "       Results from last 7 days   Lab Units 08/22/19  0358 08/21/19  0540   URIC ACID mg/dL 12.7* 11.3*       Results from last 7 days   Lab Units 08/22/19  0358 08/21/19  0540 08/20/19  0445 08/19/19  0259 08/18/19  1339   WBC 10*3/mm3 15.64* 17.89* 21.31* 17.11* 16.30*   HEMOGLOBIN g/dL 12.1* 13.5 14.4 14.8 15.9   PLATELETS 10*3/mm3 349 284 346 403 450       Results from last 7 days   Lab Units 08/18/19  1339   INR  0.91         Imaging Results (last 24 hours)     ** No results found for the last 24 hours. **          aspirin 81 mg Oral Daily   atorvastatin 40 mg Oral Nightly   clopidogrel 75 mg Oral Daily   metoprolol succinate XL 25 mg Oral Q24H   pantoprazole 40 mg Oral QAM          Medication Review:   Current Facility-Administered Medications   Medication Dose Route Frequency Provider Last Rate Last Dose   • acetaminophen (TYLENOL) tablet 650 mg  650 mg Oral Q4H PRN Rod Gomes MD   650 mg at 08/19/19 1326   • aspirin EC tablet 81 mg  81 mg Oral Daily Rod Gomes MD   81 mg at 08/21/19 0857   • atorvastatin (LIPITOR) tablet 40 mg  40 mg Oral Nightly Rod Gomes MD   40 mg at 08/21/19 1935   • clopidogrel (PLAVIX) tablet 75 mg  75 mg Oral Daily Rod Gomes MD   75 mg at 08/21/19 0857   • HYDROcodone-acetaminophen (NORCO) 5-325 MG per tablet 2 tablet  2 tablet Oral Q6H PRN Rod Gomes MD   2 tablet at 08/22/19 0221   • magnesium hydroxide (MILK OF MAGNESIA) suspension 2400 mg/10mL 10 mL  10 mL Oral Daily PRN Rod Gomes MD       • Magnesium Sulfate 2 gram infusion - Mg less than or equal to 1.5 mg/dL  2 g Intravenous PRN Rod Gomes MD        Or   • Magnesium Sulfate 1 gram infusion - Mg 1.6-1.9 mg/dL  1 g Intravenous PRN Rod Gomes  mL/hr at 08/18/19 2350 1 g at 08/18/19 2350   • metoprolol succinate XL (TOPROL-XL) 24 hr tablet 25 mg  25 mg Oral Q24H Rod Gomes MD   25 mg at 08/21/19 5609   • ondansetron (ZOFRAN) injection 4 mg  4 mg Intravenous Q4H PRN  67 yo male with PMHx metastatic neuroendocrine tumor of the pancreas, recently admitted with nonfunctioning GJ tube (patient is chronically obstructed). Patient was admitted with severe heartburn, and has had several episodes of vomiting small amount of dark blood. Patient was discharged on Thursday. Hgb unchanged. CT angiography has no active bleeding, but demonstrates extensive tumor in distal pancreas.  Patient went to the OR 5/4 for a Open distal pancreatectomy, splenectomy, partial colectomy, partial L adrenalectomy, temporary abdominal closure.  Patient had a 2.2 L EBL.  Intra Op had 6U PRBC, 2FFP, 1 Platelets, 4L crystalloid  Post Op 1U PRBC ans 1 U FFP.  Post OP vented, with poor UO, on levo and Vaso.  5/5 Pt went back to OR and had closure and colostomy. Hospital course c/b  fever lethargy, encephalopathic, on pressors, dec hgb was dialyzed 5/11 560 cc from bulb, had ? episode emesis overnight, imaging shows some congestion, concern for asp pna raised - started on IV vancomycin/zosyn.    1. Septic shock with Enterobacter. Abd ascites/Post op pneumoperitoneum. Aspiration pneumonitis/pneumonia. Metastatic neuroendocrine pancreatic tumor s/p surgery. ZO on HD  - imaging reviewed   - blood cx growing MDR enterobacter source likely gut translocation   - CT abd/pelvis post surgical changes pneumoperitoneum/enteritis; surgery f/u noted  - plan for feeding tube placement per GI  - s/p IV zosyn [id number] #4 --> 5/12  - on merrem [id number] #6  - continue with antibiotic coverage   - tolerating abx well so far; no side effects noted  - reason for abx use and side effects reviewed with patient  - aspiration precautions  - contact precautions   - fu cbc    2. other issues - care per medicine  Rod Gomes MD   4 mg at 08/19/19 0452   • pantoprazole (PROTONIX) EC tablet 40 mg  40 mg Oral Felipe Canales MD   40 mg at 08/21/19 0706   • potassium chloride (KLOR-CON) packet 40 mEq  40 mEq Oral PRN Rod Gomes MD       • potassium chloride (MICRO-K) CR capsule 40 mEq  40 mEq Oral PRN Rod Gomes MD       • sodium chloride 0.9 % flush 10 mL  10 mL Intravenous PRN Godfrey Alegre MD       • sodium chloride 0.9 % flush 10 mL  10 mL Intravenous PRN Godfrey Alegre MD           Assessment/Plan   Oliguric KLAUDIA - Cr stagnant but stable 1.7 (peak 2.4); suspect ATN related to contrast exposure , mild hypotension, and impaired hemodynamic compensation from chronic NSAID use.   Good UOP.  Mild orthopnea has improved     CKD stage 2 or 3 - BL Cr 1.3 - 1.6 since early 2018, likely related in large part to longterm NSAID, as well as hypertensive nephrosclerosis  STEMI, s/p LHC/PCI on 8/18/19  LV dysfunction, EF 35%   Hyponatremia - ? Hypervolemic in setting of MI & LV dysfcn -- no periph edema but has had mild orthopnea   Hypotension - SBP 90s  GERD - PPI been on hold so not contributory to current picture  Dyslipidemia - on statin  BPH - on statin, bladder scan ok 75cc  H/o nephrolithiasis - quiescent , and US NEG    Chronic NSAID use     Plan  - add low dose PO lasix, see if gentle diuresis improves Na  - cont fluid restriction  - check Kenisha/osm      Acute MI, inferoposterior wall (CMS/HCC)    Ventricular fibrillation (CMS/HCC)              Fidel Good MD  08/22/19  6:10 AM

## 2023-05-18 NOTE — BRIEF OPERATIVE NOTE - COMMENTS
Drain management per surgery  NJ tube for feeds only.
Colostomy created instead of primary closure due to need for ongoing pressor support

## 2023-05-18 NOTE — PROGRESS NOTE ADULT - ASSESSMENT
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  S/P distal pancreatectomy, splenectomy, partial colectomy  S/P abdominal washout and colostomy creation  Now with aspiration pneumonia  Midline wound with drainage  on phenylephrine   On midodrine  s/p TPN    Plan:  hold TF  Monitor ostomy output  Monitor midline wound drainage  F/u Nephro recommend appreciated  No plans at this time for surgical intervention  Optimize nutrition/TPN  Cont great Critical care management as per ICU team  PT evaluation  F/U with Dr Doyle for possible post pyloric feeding access  Surgery will follow    Plan discussed with Dr. Siddiqi     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  S/P distal pancreatectomy, splenectomy, partial colectomy  S/P abdominal washout and colostomy creation  Now with aspiration pneumonia  Midline wound with drainage  on phenylephrine   On midodrine  s/p TPN  INR 2.29  plat 30K      Plan:  pls transfuse 2 bags FFP, 1 bag of platelets  hold TF  NPO  Monitor ostomy output  Monitor midline wound drainage  F/u Nephro recommend appreciated  No plans at this time for surgical intervention  Optimize nutrition/TPN  Cont great Critical care management as per ICU team  PT evaluation  F/U with Dr Doyle for possible post pyloric feeding access  Surgery will follow    Plan discussed with Dr. Siddiqi

## 2023-05-18 NOTE — PROGRESS NOTE ADULT - SUBJECTIVE AND OBJECTIVE BOX
NEPHROLOGY INTERVAL HPI/OVERNIGHT EVENTS:    Date of Service: 23 @ 10:38    --Awake.  No acute distress. Appears very frail and shallow breathing   For OR for placement of feeding tube.  Restarted on TPN.  --Awake.  No acute distress.  for OR tomorrow for J tube placement.   HD catheter removed yesterday, more confused removed NG tube   5/15  750 ostomy output with low bs on d10 at 100, unclear if absorbing, on solo pressors   --No acute distress.  No urine output.   Drains 1700cc.  colostomy 800cc  --Awake but lethargic.  Unclear if in pain.  RR 30.  On NG feeding.  No urine output  - seen w family and ID at bedside, plans for HD discussed w family  --Lethargic.  No acute distress.   Colostomy output 1500cc.  No UO.  5/10--No acute distress.  calm, off sedation and off pressor.  Colostomy output 250cc and Drain 850cc.  Oligoanuric.   more alert and awake, ostomy fx pressors off since this am unable to complete hd yesterday and only received 1/2 due to malfunctioning cathtere    pulled after HD    seen at hd, events noted, anuric, TPN infusing with lipids, on solo dec dose of pressors, encepha with starting of lactulose and xifaxin   - no new events, anuric, CO2 trending down   s/p OR yesterday, ostomy creation  - case d/w surgery and intensivist , anuric k 5.2 will dialyze preop, no fluid removal  - pt seen in PACU, PRBC and FFP, and LR resuscitation noted, coulter in place ~ 100 cc urine in coulter bag  case d/w pts nurse   Chart quote, operative note ""Open distal pancreatectomy, splenectomy, partial colectomy, partial L adrenalectomy, temporary abdominal closure; Large varices, 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 Liver laden with diffuse metastases and very friable, RP oozy. Patient on pressors at the end of the case, and hence, decided to leave colon in discontinuity with temporary closure.""    HPI:  69 yo man with Neuroendocrine tumor of pancreas with mets to liver dx'd 6 years ago.  Treated with Lutathera one year ago and restarted in 2022  G-J tube placed for nutrition in January due to chronic dysphagia and severe esophagitis on EGD.  Post recent  admission due to G tube clogging.  D/mansi home on .    Returned to ED due to vomiting when night time enteral feeding via G tube started.  CT with IVC done due to concern for GIB.  Pt reports chronic self catheterization due to urinary retention.   ZO improved when catheterization started.  Follows mainly with EMELY Cortez.   Pt has been self cath'ing 3-4 x /day.   Indwelling Coulter placed by EMELY due to resistance with blood clots with resistance reported by pt.  For palliative mass resection tomorrow.    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  Creat has been variable during previous admissions--ranging 1.1-2.2.    PMHX and PSHX.  --Neuroendocrine tumor of pancreas with mets to liver --dx 6 years ago.  --GJ tube for nutrition in 2023 due to chronic dysphagia.  --Hx of HTN  --Hx of DM  --Hx of light chain disease --unclear hx and no hx of renal biopsy.    MEDICATIONS  (STANDING):  chlorhexidine 4% Liquid 1 Application(s) Topical <User Schedule>  dextrose 10% + sodium chloride 0.45%. 1000 milliLiter(s) (30 mL/Hr) IV Continuous <Continuous>  dextrose 5%. 1000 milliLiter(s) (50 mL/Hr) IV Continuous <Continuous>  dextrose 50% Injectable 12.5 Gram(s) IV Push once  dextrose 50% Injectable 25 Gram(s) IV Push once  dextrose 50% Injectable 25 Gram(s) IV Push once  glucagon  Injectable 1 milliGRAM(s) IntraMuscular once  insulin lispro (ADMELOG) corrective regimen sliding scale   SubCutaneous every 6 hours  meropenem  IVPB 500 milliGRAM(s) IV Intermittent every 24 hours  metoclopramide Injectable 5 milliGRAM(s) IV Push every 6 hours  pantoprazole  Injectable 40 milliGRAM(s) IV Push every 12 hours  Parenteral Nutrition - Adult 1 Each (67 mL/Hr) TPN Continuous <Continuous>  phenylephrine    Infusion 0.05 MICROgram(s)/kG/Min (0.67 mL/Hr) IV Continuous <Continuous>  rifAXIMin 550 milliGRAM(s) Oral two times a day    MEDICATIONS  (PRN):  acetaminophen     Tablet .. 650 milliGRAM(s) Oral every 6 hours PRN Temp greater or equal to 38.5C (101.3F)  dextrose Oral Gel 15 Gram(s) Oral once PRN Blood Glucose LESS THAN 70 milliGRAM(s)/deciliter  ondansetron Injectable 4 milliGRAM(s) IV Push every 6 hours PRN Nausea and/or Vomiting  sodium chloride 0.9% lock flush 10 milliLiter(s) IV Push every 1 hour PRN Pre/post blood products, medications, blood draw, and to maintain line patency    Vital Signs Last 24 Hrs  T(C): 37.1 (18 May 2023 08:00), Max: 37.1 (17 May 2023 12:00)  T(F): 98.8 (18 May 2023 08:00), Max: 98.8 (17 May 2023 12:00)  HR: 109 (18 May 2023 09:00) (75 - 117)  BP: 101/65 (18 May 2023 09:00) (73/51 - 140/125)  BP(mean): 73 (18 May 2023 09:00) (55 - 128)  RR: 16 (18 May 2023 09:00) (15 - 33)  SpO2: 94% (18 May 2023 09:00) (88% - 100%)    Parameters below as of 18 May 2023 08:00  Patient On (Oxygen Delivery Method): room air      Daily     Daily Weight in k.6 (18 May 2023 06:00)    05-17 @ 07:01  -  -18 @ 07:00  --------------------------------------------------------  IN: 2417 mL / OUT: 1120 mL / NET: 1297 mL    PHYSICAL EXAM:  GENERAL: Awake with minimal response  CHEST/LUNG: poor insp effort  HEART: S1S2 RRR  ABDOMEN: soft  EXTREMITIES: 2-3+ edema in LE.  SKIN:     LABS:                        7.8    8.82  )-----------( 30       ( 18 May 2023 05:26 )             23.6         142  |  108  |  32<H>  ----------------------------<  147<H>  3.9   |  18<L>  |  2.78<H>    Ca    9.1      18 May 2023 05:26  Phos  4.0       Mg     2.0         TPro  5.0<L>  /  Alb  2.3<L>  /  TBili  5.8<H>  /  DBili  x   /  AST  446<H>  /  ALT  113<H>  /  AlkPhos  121<H>      PT/INR - ( 18 May 2023 05:26 )   PT: 26.8 sec;   INR: 2.29 ratio         PTT - ( 18 May 2023 05:26 )  PTT:38.5 sec    Magnesium, Serum: 2.0 mg/dL ( @ 05:26)  Phosphorus Level, Serum: 4.0 mg/dL ( @ 05:26)          RADIOLOGY & ADDITIONAL TESTS:

## 2023-05-18 NOTE — PROGRESS NOTE ADULT - SUBJECTIVE AND OBJECTIVE BOX
Events Overnight: on 2.5 of phenylephrine profoundly weak, platelet 30, INR 2.2 on TPN    HPI:     69 yo male with PMHx metastatic neuroendocrine tumor of the pancreas, recently admitted with nonfunctioning GJ tube (patient is chronically obstructed).    patient went to OR on 5/4 had removal of GJ tube,   5/1 pt feeling slightly nauseous, denies abd pain.   Open distal pancreatectomy, splenectomy, partial colectomy, partial L adrenalectomy, temporary abdominal closure.   developed ZO, septic shock  now on dialysis  was dialyzed yesterday, remains on pressors     PMH:       as above    ROS cant obtain due to profound weakness     MEDICATIONS  (STANDING):  albumin human 25% IVPB 50 milliLiter(s) IV Intermittent every 30 minutes  chlorhexidine 4% Liquid 1 Application(s) Topical <User Schedule>  dextrose 5%. 1000 milliLiter(s) (50 mL/Hr) IV Continuous <Continuous>  dextrose 50% Injectable 12.5 Gram(s) IV Push once  dextrose 50% Injectable 25 Gram(s) IV Push once  dextrose 50% Injectable 25 Gram(s) IV Push once  glucagon  Injectable 1 milliGRAM(s) IntraMuscular once  insulin lispro (ADMELOG) corrective regimen sliding scale   SubCutaneous every 6 hours  meropenem  IVPB 500 milliGRAM(s) IV Intermittent every 24 hours  metoclopramide Injectable 5 milliGRAM(s) IV Push every 6 hours  pantoprazole  Injectable 40 milliGRAM(s) IV Push every 12 hours  Parenteral Nutrition - Adult 1 Each (67 mL/Hr) TPN Continuous <Continuous>  Parenteral Nutrition - Adult 1 Each (67 mL/Hr) TPN Continuous <Continuous>  phenylephrine    Infusion 0.05 MICROgram(s)/kG/Min (0.67 mL/Hr) IV Continuous <Continuous>  rifAXIMin 550 milliGRAM(s) Oral two times a day    MEDICATIONS  (PRN):  acetaminophen     Tablet .. 650 milliGRAM(s) Oral every 6 hours PRN Temp greater or equal to 38.5C (101.3F)  dextrose Oral Gel 15 Gram(s) Oral once PRN Blood Glucose LESS THAN 70 milliGRAM(s)/deciliter  ondansetron Injectable 4 milliGRAM(s) IV Push every 6 hours PRN Nausea and/or Vomiting  sodium chloride 0.9% lock flush 10 milliLiter(s) IV Push every 1 hour PRN Pre/post blood products, medications, blood draw, and to maintain line patency    ICU Vital Signs Last 24 Hrs  T(C): 37.1 (18 May 2023 08:00), Max: 37.1 (17 May 2023 12:00)  T(F): 98.8 (18 May 2023 08:00), Max: 98.8 (17 May 2023 12:00)  HR: 111 (18 May 2023 11:39) (75 - 114)  BP: 105/71 (18 May 2023 11:39) (73/51 - 140/125)  BP(mean): 79 (18 May 2023 11:39) (55 - 128)  ABP: --  ABP(mean): --  RR: 19 (18 May 2023 11:39) (15 - 31)  SpO2: 93% (18 May 2023 11:39) (90% - 100%)    O2 Parameters below as of 18 May 2023 08:00  Patient On (Oxygen Delivery Method): room air    Physical Exam    general weak, anasarci, profoundly weak  Neuro more lethargic this am  neck supple,  right IJ , left subclavian catheter  Lungs dec bs at bases, on room air  cv rrr  abdomen drainage from wound, (less), ileostomy is viable  extremiteis edematous  heent nc/at   voiding    I&O's Summary    17 May 2023 07:01  -  18 May 2023 07:00  --------------------------------------------------------  IN: 2417 mL / OUT: 1120 mL / NET: 1297 mL                        7.8    8.82  )-----------( 30       ( 18 May 2023 05:26 )             23.6       05-18    142  |  108  |  32<H>  ----------------------------<  147<H>  3.9   |  18<L>  |  2.78<H>    Ca    9.1      18 May 2023 05:26  Phos  4.0     05-18  Mg     2.0     05-18    TPro  5.0<L>  /  Alb  2.3<L>  /  TBili  5.8<H>  /  DBili  x   /  AST  446<H>  /  ALT  113<H>  /  AlkPhos  121<H>  05-18    DVT Prophylaxis:    held for thrombocytopenia                                                             Advanced Directives: Full Code

## 2023-05-18 NOTE — BRIEF OPERATIVE NOTE - OPERATION/FINDINGS
- Sepsis secondary to perforation leading to a cavitary abscess into the peritoneum.  - Drain placed with endoscopic guidance, going from the skin (sutured) through the cavitary abscess and into the stomach.  - Placement of NJ tube

## 2023-05-18 NOTE — PROGRESS NOTE ADULT - SUBJECTIVE AND OBJECTIVE BOX
SURGERY DAILY PROGRESS NOTE:     Subjective:  Patient seen and examined at bedside during am rounds. Pt had NGT removed. Pt also had HD yesterday and pending GI eval for post pyloric feeding tube. AVSS. Denies any fevers, chills, n/v/d, chest pain or shortness of breath    Objective:    MEDICATIONS  (STANDING):  chlorhexidine 4% Liquid 1 Application(s) Topical <User Schedule>  dextrose 10% + sodium chloride 0.45%. 1000 milliLiter(s) (30 mL/Hr) IV Continuous <Continuous>  dextrose 5%. 1000 milliLiter(s) (50 mL/Hr) IV Continuous <Continuous>  dextrose 50% Injectable 25 Gram(s) IV Push once  dextrose 50% Injectable 25 Gram(s) IV Push once  dextrose 50% Injectable 12.5 Gram(s) IV Push once  glucagon  Injectable 1 milliGRAM(s) IntraMuscular once  insulin lispro (ADMELOG) corrective regimen sliding scale   SubCutaneous every 6 hours  meropenem  IVPB 500 milliGRAM(s) IV Intermittent every 24 hours  metoclopramide Injectable 5 milliGRAM(s) IV Push every 6 hours  pantoprazole  Injectable 40 milliGRAM(s) IV Push every 12 hours  Parenteral Nutrition - Adult 1 Each (67 mL/Hr) TPN Continuous <Continuous>  phenylephrine    Infusion 0.05 MICROgram(s)/kG/Min (0.67 mL/Hr) IV Continuous <Continuous>  rifAXIMin 550 milliGRAM(s) Oral two times a day    MEDICATIONS  (PRN):  acetaminophen     Tablet .. 650 milliGRAM(s) Oral every 6 hours PRN Temp greater or equal to 38.5C (101.3F)  dextrose Oral Gel 15 Gram(s) Oral once PRN Blood Glucose LESS THAN 70 milliGRAM(s)/deciliter  ondansetron Injectable 4 milliGRAM(s) IV Push every 6 hours PRN Nausea and/or Vomiting  sodium chloride 0.9% lock flush 10 milliLiter(s) IV Push every 1 hour PRN Pre/post blood products, medications, blood draw, and to maintain line patency      Vital Signs Last 24 Hrs  T(C): 37.1 (18 May 2023 08:00), Max: 37.1 (17 May 2023 12:00)  T(F): 98.8 (18 May 2023 08:00), Max: 98.8 (17 May 2023 12:00)  HR: 111 (18 May 2023 08:45) (75 - 117)  BP: 112/64 (18 May 2023 08:45) (73/51 - 140/125)  BP(mean): 74 (18 May 2023 08:45) (55 - 128)  RR: 16 (18 May 2023 08:45) (15 - 33)  SpO2: 93% (18 May 2023 08:45) (88% - 100%)    Parameters below as of 18 May 2023 08:00  Patient On (Oxygen Delivery Method): room air          PHYSICAL EXAM   Gen: well-appearing, in no acute distress  CV: pulse regularly present   Resp: airway patent, non-labored breathing  Abdomen Ostomy pink and having small amount of output. Midline wound open superiorly and inferiorly with evidence of enteric contents from the wound, colostomy bag covering the wounds wet to dry gauze packing. No evidence of wound infection  Extremities: No swelling or tenderness      I&O's Detail    17 May 2023 07:01  -  18 May 2023 07:00  --------------------------------------------------------  IN:    dextrose 10% + sodium chloride 0.45%: 508 mL    IV PiggyBack: 50 mL    Other (mL): 480 mL    Phenylephrine: 846 mL    TPN (Total Parenteral Nutrition): 533 mL  Total IN: 2417 mL    OUT:    Bulb (mL): 40 mL    Colostomy (mL): 20 mL    Drain (mL): 580 mL    Other (mL): 480 mL  Total OUT: 1120 mL    Total NET: 1297 mL          Daily     Daily Weight in k.6 (18 May 2023 06:00)    LABS:                        7.8    8.82  )-----------( 30       ( 18 May 2023 05:26 )             23.6     05-18    142  |  108  |  32<H>  ----------------------------<  147<H>  3.9   |  18<L>  |  2.78<H>    Ca    9.1      18 May 2023 05:26  Phos  4.0     05-18  Mg     2.0         TPro  5.0<L>  /  Alb  2.3<L>  /  TBili  5.8<H>  /  DBili  x   /  AST  446<H>  /  ALT  113<H>  /  AlkPhos  121<H>      PT/INR - ( 18 May 2023 05:26 )   PT: 26.8 sec;   INR: 2.29 ratio         PTT - ( 18 May 2023 05:26 )  PTT:38.5 sec

## 2023-05-18 NOTE — CHART NOTE - NSCHARTNOTEFT_GEN_A_CORE
Clinical Nutrition TF BRIEF NOTE    * 68 year old M PMHx metastatic neuroendocrine tumor of the pancreas, recently admitted with nonfunctioning GJ tube (patient is chronically obstructed). Patient has had severe heartburn, and has had several episodes of vomiting small amount of dark blood. Patient was discharged on Thursday. Hgb unchanged. CT angiography has no active bleeding, but demonstrates extensive tumor in distal pancreas. Pt originally in SICU, receiving TF via GJ tube but having frequent BM's. On  - pt went to OR  for a Open distal pancreatectomy, splenectomy, partial colectomy, partial L adrenalectomy, temporary abdominal closure. Remained intubated, on paralytics, pressors, anuric and transferred to ICU.  s/p abd closure and colostomy.  **TPN initiated on  2/2 suspected long NPO status s/p multiple GI surgeries, possible new placement of ?GJ tube for long-term   *5/10: TPN ran until ~6 PM yesterday and then was stopped 2/2 hyperglycemia. POCT (180) this AM.  TF initiated via NGT yesterday, Vital 1.5 2/2 multiple GI surgeries and renal lytes WNL (K, Phos). Running @ 40 cc now - as per RN, 350 mL residuals noted this AM. D/w IDT, will re-start PN - no longer has central line so will resume w/ PPN.  Hold TF's for now, Dr. Mcclendon to f/u w/ surgery for long-term nutrition plan and possibly re-start TF pending residuals and surgery recs.  No HD today - on IVF for now, will d/c when PPN to start as per Dr. Mcclendon. Still w/ no UO.  Pt more awake today, ostomy functioning w/ increasing output.  See recs below for PPN.  *: PPN running until bag finishes. Reglan added and TF restarted this AM with minimal residuals. Tolerating at 40 cc/hr, will increase to goal rate slowly.  Will hold off on renewing PPN for now.  Will receive HD today.   POCT elevated, with continued hyperglycemia, on admelog. Will add lantus, discussed during IDR.  RD to follow TF tolerance.   *: pt vomited overnight, TF currently on hold. As per RN note, secretions appear to be TF - ? aspiration. Has been on reglan.  D/w MD Mcclendon, residuals remain low. Will sit up and re-start TF.   RD STRONGLY rec'd to confirm goals of care regarding LONG-TERM nutrition support as pt has been meeting <75% ENN since admit (x 14 days). TF has been shut off for intolerance/ HD/ procedures.   Received HD yesterday, still no UO.   Pt remains full code.   *: pt has been receiving TF but not tolerating well - even with sitting up and keeping L side up. Having drainage from abd wounds that appears to be tube feed. Pt midline abdomen wound producing high volume serous fluid/ascites. TF held yesterday, pt will need J tube placed. Pt likely aspirating on TF.  D/w Dr. Mcclendon this AM, will be NPO and will initiate TPN again via central line. See recs below.  Is HD dependent.    *current status: plan for pt to receive NJ tube today for TF w/ Dr. Doyle, would c/w TPN until TF meeting >60% ENN    *pertinent meds: dextrose 10% + NaCl 0.45%, ADMELOG, metoclopramide, pantoprazole, phenylephrine, abx, IV albumin, zofran, phytonadione    *labs reviewed: Na WNL.   K and Phos WNL.   Mg WNL.  T Bili elevated, will remove trace elements from PN bag and will add 60 mcg selenium, 10 mcg chromium, and 5 mg zinc in PN bag.  Last triglyceride level on  378 - will hold off on adding lipids until new level obtained.   POCT WNL.   Dextrose goal (with lipids) = 320g; titrate dextrose up 50-75g/day until goal rate of  reached.      05-18    142  |  108  |  32<H>  ----------------------------<  147<H>  3.9   |  18<L>  |  2.78<H>    Ca    9.1      18 May 2023 05:26  Phos  4.0     05-18  Mg     2.0     05-18    TPro  5.0<L>  /  Alb  2.3<L>  /  TBili  5.8<H>  /  DBili  x   /  AST  446<H>  /  ALT  113<H>  /  AlkPhos  121<H>      BMI: BMI (kg/m2): 24.8 (23 @ 04:00)  HbA1c: A1C with Estimated Average Glucose Result: 7.3 % (23 @ 06:35)    BP: 102/80 (23 @ 07:00) (79/44 - 137/66)  Lipid Panel: Date/Time: 23 @ 04:56  Triglycerides, Serum: 378    POCT Blood Glucose.: 177 mg/dL (18 May 2023 06:13)  POCT Blood Glucose.: 122 mg/dL (17 May 2023 23:52)  POCT Blood Glucose.: 98 mg/dL (17 May 2023 17:39)  POCT Blood Glucose.: 150 mg/dL (17 May 2023 11:43)    *I&O's Detail    17 May 2023 07:01  -  18 May 2023 07:00  --------------------------------------------------------  IN:    dextrose 10% + sodium chloride 0.45%: 508 mL    IV PiggyBack: 50 mL    Other (mL): 480 mL    Phenylephrine: 846 mL    TPN (Total Parenteral Nutrition): 533 mL  Total IN: 2417 mL    OUT:    Bulb (mL): 40 mL    Colostomy (mL): 20 mL    Drain (mL): 580 mL    Other (mL): 480 mL  Total OUT: 1120 mL    Total NET: 1297 mL  *fluid balance: positive; minimal ostomy output noted. Full TPN volume not doc'd. Will monitor/ adjust total TPN volume prn  *BM documented: 5/17 x 2 - liquid; not on bowel regimen.  *edema: 2+ R/L foot, 3+ R/L ankle **slightly improving    *malnutrition: Pt continues to meet criteria for severe protein-calorie malnutrition in context of chronic disease r/t decreased ability to meet increased nutrient needs 2/2 mets cancer AEB severe muscle/ fat wasting, TF meeting <75% ENN x >/=1 month, 19% wt loss x 5 mos    *ESTIMATED NUTR NEEDS:  based on 63.9 Kg (wt from  - bed scale taken by RD; "dry wt")  Calories: 0927-2606 Kcal (30-35 Kcal/Kg)  Protein:  g protein (1.5-2 g/Kg)  Fluids: 9620-8180 mL (25-30 mL/Kg)    Weight History:  Daily Weight in k.6 (18 May 2023 06:00)  Daily Weight in k.3 (17 May 2023 06:00)  **wt gain likely 2/2 worsening edema/ anasarca   Daily Weight in k.8 (10 May 2023 06:00)  Height (cm): 170.2 (23 @ 08:11), 170.2 (23 @ 11:22)  Weight (kg): 71.9 (23 @ 04:00), 62.9 (23 @ 22:23)  BMI (kg/m2): 24.8 (23 @ 04:00), 21.7 (23 @ 08:11), 21.7 (23 @ 22:23)  BSA (m2): 1.83 (23 @ 04:00), 1.73 (23 @ 08:11), 1.73 (23 @ 22:23)    TPN Recommendations: via central line   Total Volume: 1600 mL x 24 hours  128 g  Amino Acids  175 g Dextrose (Titrate dextrose up 50-75g/day until goal rate of 320g reached)  0 g Lipids 20% (pending triglyceride level)  123 mEq Sodium Chloride  0 mEq Sodium Acetate  0 mmol Sodium Phosphate  20 mEq Potassium Chloride  0 mEq Potassium Acetate  10 mmol Potassium Phosphate  0 mEq Calcium Gluconate (CAPS out of PN solution)  8 mEq Magnesium Sulfate  200 mg Thiamine  0 ml Trace Elements  10 ml MVI  5 mg Zinc  60 mg Selenium  10 mcg Chromium    Total Calories     1107     (Meets   55%  of  Estimated Energy needs  and   100%  Protein needs)    RECOMMENDATIONS:  1) When new NJ tube placed, initiate Vital 1.5 @ 20 cc/hr increase 10 cc q4 hours until goal rate of 65 cc/hr is met (total volume = 1300 mL) with 3 packets of Prosource TF. Will provide ~ 2070 kcal, 121 g protein, and 993 mL free water.  2) Daily weights  3) Strict I & O's  4) Daily lyte checks including magnesium and phos  5) Weekly triglycerides/LFT checks  6) POCT q6hrs; maintain 140-180mg/dL  7) Confirm goals of care regarding LONG-TERM nutrition support - will need new feeding tube ? GJ vs J tube ?  8) D/c IVF while on TPN to avoid fluid overload 2/2 HD and no urine production - with anasarca   9) D/c TPN when TF meeting >60% ENN; half PN rate the last 1-2 hours of running to avoid hypoglycemia     *will continue to monitor and adjust treatment plan prn*  Fabby Waller, MS, RDN, CNSC, [phone]  Certified Nutrition

## 2023-05-18 NOTE — PROGRESS NOTE ADULT - ASSESSMENT
69 yo male with PMHx metastatic neuroendocrine tumor of the pancreas, recently admitted with nonfunctioning GJ tube  due to external mechanical  obstruction   now Post Op  after an open distal pancreatectomy, splenectomy, partial colectomy, partial L adrenalectomy,colostomy  With Post Op distributive Shock/bleeding developed ZO now requiring dialysis  onow back on pressors septic shock gram negative bacterem ia Enterobacter, Cloacae sens meropenem,   CT 5/14 no distinct collection  dialysis dependent plan T/h/S  leakage from abdomen ? from G-J Site  PLAN:  ICU    PULM: room air  Cardio:  Wean pressors, as able still on 2.5 of phenylephrine, may need to add Levophed,    septic shock  Renal: HD as per renal M,W,F  GI:   Will have a N-J placed placed in OR today, TPN  renewed, check f/u ammonia level  Endo RISS  Heme - thrombocytopenia from sepsis, d/c heparin subq till recovers, ffp, paltelets for OR  Neurometabolic encephalopthy    ID blood cullture postive for enterobacter cloacae  Meropenem   patient is full code

## 2023-05-19 NOTE — PROGRESS NOTE ADULT - SUBJECTIVE AND OBJECTIVE BOX
CC: GJ tube malfunction     BRIEF HOSPITAL COURSE: 68M with pmhx metastatic neuroendocrine pancreatic tumor, HTN. BPH, CKD, DM originally admitted for malfunctioning GJ tube and UGIB. Found to have large tumor of distal pancreas. He is now s/p open distal pancreatectomy/splenectomy, partial colectomy, partial L adrenalectomy. Hospital course has been complicated by hemorrhagic/septic shock, respiratory failure postop requiring intubation, ZO on CKD requiring HD, hepatic encephalopathy, enterococcal bacteremia.    Events last 24 hours: Earlier this evening, pt noted to be mildly tachypneic and with increased WOB. Mildly hypoxemic requiring 4L NC and was satting 91%. CXR and labs performed, repeat cxr grossly unchanged and labs overall benign c/w prio. Shortly after, called to bedside emergently for desat to 66% on NC with agonal breathing pattern. HR decreased from 110s to 70s. He was ambu-bagged and required the addition of levophed to jarad in order to maintain BP. Pt appeared to be mari-arrest. Surgical team called, they communicated with family regarding GOC, family wished for pt to be (re)intubated, and pt was emergently intubated with improvement in sats and hemodynamics. Thick, dark upper airway secretions noted during intubation.     ROS:   Due to altered mental status/intubation, subjective information was not able to be obtained from the patient. History was obtained, to the extent possible, from review of the chart and collateral sources of information.    PAST MEDICAL & SURGICAL HISTORY:  Hypertension  BPH (benign prostatic hyperplasia)  Renal insufficiency  Light chain nephropathy  DM (diabetes mellitus)  HLD (hyperlipidemia)  No significant past surgical history    Medications:  meropenem  IVPB 500 milliGRAM(s) IV Intermittent every 24 hours  rifAXIMin 550 milliGRAM(s) Oral two times a day  norepinephrine Infusion 0.05 MICROgram(s)/kG/Min IV Continuous <Continuous>  phenylephrine    Infusion 0.05 MICROgram(s)/kG/Min IV Continuous <Continuous>  acetaminophen     Tablet .. 650 milliGRAM(s) Oral every 6 hours PRN  dexMEDEtomidine Infusion 0.5 MICROgram(s)/kG/Hr IV Continuous <Continuous>  metoclopramide Injectable 5 milliGRAM(s) IV Push every 6 hours  ondansetron Injectable 4 milliGRAM(s) IV Push every 6 hours PRN  pantoprazole  Injectable 40 milliGRAM(s) IV Push every 12 hours  dextrose 50% Injectable 12.5 Gram(s) IV Push once  dextrose 50% Injectable 25 Gram(s) IV Push once  dextrose 50% Injectable 25 Gram(s) IV Push once  dextrose Oral Gel 15 Gram(s) Oral once PRN  glucagon  Injectable 1 milliGRAM(s) IntraMuscular once  insulin lispro (ADMELOG) corrective regimen sliding scale   SubCutaneous every 6 hours    albumin human 25% IVPB 50 milliLiter(s) IV Intermittent every 30 minutes  dextrose 5%. 1000 milliLiter(s) IV Continuous <Continuous>  Parenteral Nutrition - Adult 1 Each TPN Continuous <Continuous>  Parenteral Nutrition - Adult 1 Each TPN Continuous <Continuous>  sodium chloride 0.9% lock flush 10 milliLiter(s) IV Push every 1 hour PRN      chlorhexidine 0.12% Liquid 15 milliLiter(s) Oral Mucosa every 12 hours  chlorhexidine 4% Liquid 1 Application(s) Topical <User Schedule>    Mode: AC/ CMV (Assist Control/ Continuous Mandatory Ventilation)  RR (machine): 18  TV (machine): 400  FiO2: 100  PEEP: 5  ITime: 1  PIP: 17    ICU Vital Signs Last 24 Hrs  T(C): 36.1 (18 May 2023 20:00), Max: 37.3 (18 May 2023 18:45)  T(F): 97 (18 May 2023 20:00), Max: 99.1 (18 May 2023 18:45)  HR: 123 (18 May 2023 23:25) (100 - 123)  BP: 93/60 (18 May 2023 23:00) (91/59 - 117/66)  BP(mean): 67 (18 May 2023 23:00) (65 - 84)  ABP: --  ABP(mean): --  RR: 24 (18 May 2023 23:00) (15 - 30)  SpO2: 100% (18 May 2023 23:25) (88% - 100%)    O2 Parameters below as of 18 May 2023 23:00  Patient On (Oxygen Delivery Method): ventilator    Physical Examination: (pre intubation)    General: profoundly lethargic   HEENT: Pupils equal, reactive to light.  Symmetric. +NJ tube  PULM: Clear to auscultation bilaterally, tachypneic   CVS: Regular rate, fast rhythm  ABD: Soft, nondistended, nontender, ostomy pink with brown feces in bag, multiple other drains with serosanginous drainage  EXT: No edema, nontender, scds in place   SKIN: Warm and well perfused    I&O's Detail    17 May 2023 07:01  -  18 May 2023 07:00  --------------------------------------------------------  IN:    dextrose 10% + sodium chloride 0.45%: 508 mL    IV PiggyBack: 50 mL    Other (mL): 480 mL    Phenylephrine: 846 mL    TPN (Total Parenteral Nutrition): 533 mL  Total IN: 2417 mL    OUT:    Bulb (mL): 40 mL    Colostomy (mL): 20 mL    Drain (mL): 580 mL    Other (mL): 480 mL  Total OUT: 1120 mL    Total NET: 1297 mL      18 May 2023 07:01  -  19 May 2023 00:52  --------------------------------------------------------  IN:    IV PiggyBack: 50 mL    Phenylephrine: 441 mL    Plasma: 622 mL    Platelets - Single Donor: 223 mL    TPN (Total Parenteral Nutrition): 864 mL  Total IN: 2200 mL    OUT:    Bulb (mL): 30 mL    Colostomy (mL): 25 mL    Drain (mL): 125 mL  Total OUT: 180 mL    Total NET: 2020 mL          LABS:                        8.5    5.93  )-----------( 51       ( 18 May 2023 21:52 )             25.7     05-18    145  |  108  |  41<H>  ----------------------------<  171<H>  3.6   |  21<L>  |  3.18<H>    Ca    9.2      18 May 2023 21:52  Phos  3.4     05-18  Mg     2.2     05-18    TPro  5.4<L>  /  Alb  2.3<L>  /  TBili  6.4<H>  /  DBili  x   /  AST  454<H>  /  ALT  113<H>  /  AlkPhos  135<H>  05-18          CAPILLARY BLOOD GLUCOSE      POCT Blood Glucose.: 198 mg/dL (18 May 2023 23:47)    PT/INR - ( 18 May 2023 15:48 )   PT: 17.5 sec;   INR: 1.50 ratio         PTT - ( 18 May 2023 15:48 )  PTT:30.3 sec    CULTURES:  Culture Results:   Growth in aerobic and anaerobic bottles: Enterobacter cloacae complex  ***Blood Panel PCR results on this specimen are available  approximately 3 hours after the Gram stain result.***  Gram stain, PCR, and/or culture results may not always  correspond due to difference in methodologies.  ************************************************************  This PCR assay was performed by multiplex PCR. This  Assay tests for 66 bacterial and resistance gene targets.  Please refer to the [facility] test directory  at [email or web address] for details. (05-12 @ 09:01)  Culture Results:   Growth in aerobic and anaerobic bottles: Enterobacter cloacae complex  See previous culture [id number] (05-12 @ 08:59)        RADIOLOGY: < from: Xray Chest 1 View-PORTABLE IMMEDIATE (Xray Chest 1 View-PORTABLE IMMEDIATE .) (05.14.23 @ 16:34) >    IMPRESSION:  Left subclavian line with tip in the SVC. No pneumothorax.   Other line and tube as above.    Low lung volumes.    Slight worsening of bilateral lung opacities, nonspecific in appearance   but possibly due to pulmonary vascular congestion or multifocal infection.    New small loculated right pleural effusion.    --- End of Report ---    < end of copied text >  < from: Xray Chest 1 View AP/PA. (05.17.23 @ 11:57) >    IMPRESSION:  Left upper extremity PICC line and right IJ catheter as   above. No pneumothorax.    Slight increase in patchy and linear right mid and lower lung opacities   and increased patchy left lower lung opacities, findings which may be due   to atelectasis and/or pneumonia.    Small right pleural effusion, unchanged.    --- End of Report ---      < end of copied text >        INVASIVE LINES: L subclavian TLC, R IJ shiley   INDWELLING MAHER: Y   VTE PROPHYLAXIS: scds  CODE STATUS: full       CRITICAL CARE TIME SPENT: 45 minutes spent performing frequent bedside reassessments and augmenting plan of care to address problems of acute critical illness that pose high probability of life threatening deterioration and/or end organ damage/dysfunction and discussing goals of care, non-inclusive of time spent on procedures performed.

## 2023-05-19 NOTE — PROVIDER CONTACT NOTE (CHANGE IN STATUS NOTIFICATION) - ACTION/TREATMENT ORDERED:
Pt was intubated and went well. Continue to be on pressors to maintain MAP >65. Precedex for sedation/tolerated vent

## 2023-05-19 NOTE — PROGRESS NOTE ADULT - ASSESSMENT
69 yo male with PMHx metastatic neuroendocrine tumor of the pancreas, recently admitted with nonfunctioning GJ tube (patient is chronically obstructed). Patient was admitted with severe heartburn, and has had several episodes of vomiting small amount of dark blood. Patient was discharged on Thursday. Hgb unchanged. CT angiography has no active bleeding, but demonstrates extensive tumor in distal pancreas.  Patient went to the OR 5/4 for a Open distal pancreatectomy, splenectomy, partial colectomy, partial L adrenalectomy, temporary abdominal closure.  Patient had a 2.2 L EBL.  Intra Op had 6U PRBC, 2FFP, 1 Platelets, 4L crystalloid  Post Op 1U PRBC ans 1 U FFP.  Post OP vented, with poor UO, on levo and Vaso.  5/5 Pt went back to OR and had closure and colostomy. Hospital course c/b  fever lethargy, encephalopathic, on pressors, dec hgb was dialyzed 5/11 560 cc from bulb, had ? episode emesis overnight, imaging shows some congestion, concern for asp pna raised - started on IV vancomycin/zosyn.    1. Acute respiratory failure. Septic shock with Enterobacter. Abd ascites/Post op pneumoperitoneum. Aspiration pneumonitis/pneumonia. Metastatic neuroendocrine pancreatic tumor s/p surgery. ZO on HD  - imaging reviewed from 5/17 has b/l atelectasis, L aspiration pna  - s/p drain into cavitary abscess and NJ tube placement 5/18  - intubated/on pressors/febrile  - blood cx growing MDR enterobacter source likely gut translocation   - CT abd/pelvis post surgical changes pneumoperitoneum/enteritis; surgery f/u noted  - s/p IV zosyn [id number] #4 --> 5/12  - on merrem [id number] #7  - continue with antibiotic coverage   - tolerating abx well so far; no side effects noted  - reason for abx use and side effects reviewed with patient  - aspiration precautions  - contact precautions   - fu cbc    2. other issues - care per medicine

## 2023-05-19 NOTE — PROGRESS NOTE ADULT - SUBJECTIVE AND OBJECTIVE BOX
SURGERY DAILY PROGRESS NOTE:     Subjective:  Patient seen and examined at bedside during am rounds s/p GI procedure, Drain placed with endoscopic guidance, going from the skin (sutured) through the cavitary abscess and into the stomach and Placement of NJ tube . Pt is intubated and on phenylephrine. NO other acute events     Objective:    MEDICATIONS  (STANDING):  albumin human 25% IVPB 50 milliLiter(s) IV Intermittent every 30 minutes  chlorhexidine 0.12% Liquid 15 milliLiter(s) Oral Mucosa every 12 hours  chlorhexidine 4% Liquid 1 Application(s) Topical <User Schedule>  dexMEDEtomidine Infusion 0.5 MICROgram(s)/kG/Hr (8.99 mL/Hr) IV Continuous <Continuous>  dextrose 5%. 1000 milliLiter(s) (50 mL/Hr) IV Continuous <Continuous>  dextrose 50% Injectable 12.5 Gram(s) IV Push once  dextrose 50% Injectable 25 Gram(s) IV Push once  dextrose 50% Injectable 25 Gram(s) IV Push once  glucagon  Injectable 1 milliGRAM(s) IntraMuscular once  insulin lispro (ADMELOG) corrective regimen sliding scale   SubCutaneous every 6 hours  meropenem  IVPB 500 milliGRAM(s) IV Intermittent every 24 hours  metoclopramide Injectable 5 milliGRAM(s) IV Push every 6 hours  norepinephrine Infusion 0.05 MICROgram(s)/kG/Min (6.74 mL/Hr) IV Continuous <Continuous>  pantoprazole  Injectable 40 milliGRAM(s) IV Push every 12 hours  Parenteral Nutrition - Adult 1 Each (67 mL/Hr) TPN Continuous <Continuous>  phenylephrine    Infusion 0.05 MICROgram(s)/kG/Min (0.67 mL/Hr) IV Continuous <Continuous>  rifAXIMin 550 milliGRAM(s) Oral two times a day    MEDICATIONS  (PRN):  acetaminophen     Tablet .. 650 milliGRAM(s) Oral every 6 hours PRN Temp greater or equal to 38.5C (101.3F)  dextrose Oral Gel 15 Gram(s) Oral once PRN Blood Glucose LESS THAN 70 milliGRAM(s)/deciliter  fentaNYL    Injectable 25 MICROGram(s) IV Push every 1 hour PRN vent dyssynchrony  ondansetron Injectable 4 milliGRAM(s) IV Push every 6 hours PRN Nausea and/or Vomiting  sodium chloride 0.9% lock flush 10 milliLiter(s) IV Push every 1 hour PRN Pre/post blood products, medications, blood draw, and to maintain line patency      Vital Signs Last 24 Hrs  T(C): 37.7 (19 May 2023 08:00), Max: 37.7 (19 May 2023 08:00)  T(F): 99.9 (19 May 2023 08:00), Max: 99.9 (19 May 2023 08:00)  HR: 102 (19 May 2023 08:00) (61 - 126)  BP: 105/72 (19 May 2023 08:00) (87/54 - 130/73)  BP(mean): 79 (19 May 2023 08:00) (61 - 85)  RR: 23 (19 May 2023 08:00) (15 - 31)  SpO2: 100% (19 May 2023 08:00) (65% - 100%)    Parameters below as of 19 May 2023 08:00      O2 Concentration (%): 40      PHYSICAL EXAM   Gen: well-appearing, in no acute distress  CV: pulse regularly present   Resp: airway patent, non-labored breathing  Abdomen Ostomy pink and having small amount of output. Midline wound open superiorly and inferiorly with minimal drainage, colostomy bag covering the wounds wet to dry gauze packing. left side colostomy functional. New drain on thru mildine wound. No evidence of wound infection  Extremities: No swelling or tenderness      I&O's Detail    18 May 2023 07:01  -  19 May 2023 07:00  --------------------------------------------------------  IN:    Dexmedetomidine: 94 mL    IV PiggyBack: 50 mL    Norepinephrine: 55 mL    Phenylephrine: 949 mL    Plasma: 622 mL    Platelets - Single Donor: 223 mL    TPN (Total Parenteral Nutrition): 1576 mL  Total IN: 3569 mL    OUT:    Bulb (mL): 50 mL    Colostomy (mL): 75 mL    Drain (mL): 575 mL    Gastrostomy Tube (mL): 120 mL  Total OUT: 820 mL    Total NET: 2749 mL          Daily     Daily Weight in k.9 (19 May 2023 06:00)    LABS:                        8.1    7.80  )-----------( 47       ( 19 May 2023 05:48 )             24.7     05    142  |  107  |  52<H>  ----------------------------<  234<H>  4.2   |  21<L>  |  3.32<H>    Ca    9.0      19 May 2023 05:48  Phos  4.6       Mg     2.2         TPro  5.5<L>  /  Alb  2.3<L>  /  TBili  6.6<H>  /  DBili  x   /  AST  421<H>  /  ALT  106<H>  /  AlkPhos  132<H>  05    PT/INR - ( 19 May 2023 05:48 )   PT: 15.5 sec;   INR: 1.33 ratio         PTT - ( 18 May 2023 15:48 )  PTT:30.3 sec      RADIOLOGY & ADDITIONAL STUDIES:    ASSESSMENT/PLAN:

## 2023-05-19 NOTE — PROGRESS NOTE ADULT - ASSESSMENT
67 yo man with Hx of Neuroendocrine tumor/Pancreatic mass and liver mets.  Now for palliative resection of pancreatic mass.    --ZO with variable creat level and unclear CKD.    Likely dependent on volume status.    Limited intake even with Enteral feeding.      Start gentle hydration.  -- : continue Hale drainage.  --Hx of light chain nephropathy  --No contraindication for surgery from renal standpoint.    5/4  s/p Palliative surgery for neuroendocrine mass  will watch for ZO, pt with low UO immediate post op  s/p multiple PRBC and FFP transfusion and volume resuscitation    5/5  patient anuric, K 5.2  On bicarb drip  to go back to OR today  case d/w Dr Jean and Dr Siddiqi  Will HD x 2 hrs to correct potassium  >10 L infused in blood products and fluids  More fluids not indicated, UO 50 ml  no fluid removal on HD  Hep profile ordered    5/6  Ostomy created  will monitor i/o  labs stable   no need for HD today  d/w intensivist  will monitor daily for hd requirement    5/7  seen earlier on HD   tolerating well  TPN  d/w Dr Jean  d/w pts fam at bedside    5/8 MK   - ZO with anuria, remains HD dependent with hx of light chain nephropathy    tolerating hd     dc femoral shiley today after hd   - enceph monitor response to hd and xifaxan and lactulose    5/9 MK   - ZO with anuria, remains HD dependent with hx of light chain nephropathy    no indication for hd    off pressor     monitor electrolytes on TPN     NG in place for TF initiation   - enceph, improving monitor response to hd and xifaxan and lactulose    ostomy fx with liquid stool  dw dr marley     5/10 SY  --ZO : remains oligoanuric.     Follow with repeat bladder scans--no retention.    Will hold HD today and assess daily.    Trial of gentle hydration due to increased fluid loss.  --Metabolic acidosis : trial of HCO3 in addition to TPN   --Monitor encephalopathy.    5/11 SY  --ZO : remains oligoanuric.    Renal parameters further increased with no improvement in acidosis with HCO3 infusion.    D/w pt's wife over phone.    Explained his poor prognosis from Oncology aspect due to large tumor burden in liver and that surgery was palliative in nature.    Explained added burden of continued dialysis adding to pt's suffering at this point.    Pt's wife requested not to proceed with HD catheter placement and dialysis until she can d/w Dr Siddiqi and rest of family.    Will discuss further later today.    5/12  HD yesterday, planned for HD Sat  case d/w family, and ID, pts nurse  Dr Velez covering this weekend    5/13 SY  --ZO : remains oligoanuric.   HD today : HD order and tx reviewed.     --Positive fluid balance due to hypotension.  RR at 30 today.   Check follow up CXR.  Will plan for 1-2 kg UF with HD if tolerated.  --Continue enteral feeding.  --D/w Dr Mcclendon : positive blood culture/ GNR-- pre current HD catheter.   Plan HD today and on Monday,, then remove catheter for bacteremia clearance.    5/14 SY  --ZO : reamins dialysis dependent for now.    Plan HD in am and remove temporary HD catheter due to bacteremia to allow clearance.  --CT with patch lower lung inf : concern for PNA : continue abtx.  --Continue enteral feeding.  --Enterobacter bacteremia : continue abtx.    5/15 MK   - ZO for now HD dependent    tolerating hd and will attempt uf with hd   - enterobacter sepsis on meropenem renally dosed, aspiration pna     for dc of [place] post hd today   - anemia transfusion ongoing   - FEN On vitality with ostomy output noted     5/16 MK   - ZO Hd dependent    for hd in am with replacement of catheter to occur   - enterobacter sepsis on meropenem, renally dosed     asp pna   - anemia h/h stable   - FEN: on d10 ng tube pulled out    5/17 SY  --ZO : continue TIW HD--will maintain on MWF schedule this week.  --Metabolic acidosis : mainly due to GI loss,  will need to correct with HD.  --Enterobacter bacteremia : continue meropenem  --Post TPN.  NGT pulled out.  For J tube placement.    5/18 SY  --ZO : Continue HD support .    Next tx in am.  Will attempt to start UF with HD as tolerated   --Metabolic acidosis : due to GI loss : increase HCO3 in dialysate  --Enterobacter bacteremia : continue Meropenem  --Continue TPN for now.    5/19  Operative findings:  "Sepsis secondary to perforation leading to a cavitary abscess into the peritoneum.   - Drain placed with endoscopic guidance, going from the skin (sutured) through the cavitary abscess and into the stomach.   - Placement of NJ tube"  For HD today   orders outlined  will follow for tolerance   Dr Daniels covering the weekend

## 2023-05-19 NOTE — PROVIDER CONTACT NOTE (CHANGE IN STATUS NOTIFICATION) - ASSESSMENT
Pt became to desat to high 80s and increase WOB, Pt nod head yes to shortness of breath. Venti mask applied and Sp02 mid 90s. When to check on patient and pt's HR 70s to 60s which he was 110s earlier. Sp02 83% with NRB applied. Sp02 desating to 66% with agnoal breathing and ambu bag initiation with decrease of neuro status. Emely CALDERON notify and at bedside. Dr. Burt from surgical team called to notify of pt's status and going to intubated. Levophed drip with Calixto drip to maintain pt's bp for intubation. Dr. Burt called pt's spouse to updated on pt's status and to confirm intubation. The spouse wish for pt to be intubated.

## 2023-05-19 NOTE — PROGRESS NOTE ADULT - SUBJECTIVE AND OBJECTIVE BOX
Patient is a 68y old  Male who presents with a chief complaint of GIB    Followup: septic shock  s/p EGD last night with found cavitary abscess from perforation stomach wall collecting with fistula to skin. Drain placed from stomach, through hole and abscess and skin and sutured externally. NJ tube placed via EGD as well. Patient with tachypnea overnight, decompensation, intubated. Currently intubated and sedated, be dialyzed.     MEDICATIONS  (STANDING):  albumin human 25% IVPB 50 milliLiter(s) IV Intermittent every 30 minutes  chlorhexidine 0.12% Liquid 15 milliLiter(s) Oral Mucosa every 12 hours  chlorhexidine 4% Liquid 1 Application(s) Topical <User Schedule>  dexMEDEtomidine Infusion 0.5 MICROgram(s)/kG/Hr (8.99 mL/Hr) IV Continuous <Continuous>  dextrose 5%. 1000 milliLiter(s) (50 mL/Hr) IV Continuous <Continuous>  dextrose 50% Injectable 12.5 Gram(s) IV Push once  dextrose 50% Injectable 25 Gram(s) IV Push once  dextrose 50% Injectable 25 Gram(s) IV Push once  glucagon  Injectable 1 milliGRAM(s) IntraMuscular once  insulin lispro (ADMELOG) corrective regimen sliding scale   SubCutaneous every 6 hours  meropenem  IVPB 500 milliGRAM(s) IV Intermittent every 24 hours  metoclopramide Injectable 5 milliGRAM(s) IV Push every 6 hours  norepinephrine Infusion 0.05 MICROgram(s)/kG/Min (6.74 mL/Hr) IV Continuous <Continuous>  pantoprazole  Injectable 40 milliGRAM(s) IV Push every 12 hours  Parenteral Nutrition - Adult 1 Each (67 mL/Hr) TPN Continuous <Continuous>  phenylephrine    Infusion 0.05 MICROgram(s)/kG/Min (0.67 mL/Hr) IV Continuous <Continuous>  rifAXIMin 550 milliGRAM(s) Oral two times a day    MEDICATIONS  (PRN):  acetaminophen     Tablet .. 650 milliGRAM(s) Oral every 6 hours PRN Temp greater or equal to 38.5C (101.3F)  dextrose Oral Gel 15 Gram(s) Oral once PRN Blood Glucose LESS THAN 70 milliGRAM(s)/deciliter  fentaNYL    Injectable 25 MICROGram(s) IV Push every 1 hour PRN vent dyssynchrony  ondansetron Injectable 4 milliGRAM(s) IV Push every 6 hours PRN Nausea and/or Vomiting  sodium chloride 0.9% lock flush 10 milliLiter(s) IV Push every 1 hour PRN Pre/post blood products, medications, blood draw, and to maintain line patency      Vital Signs Last 24 Hrs  T(C): 37.7 (19 May 2023 08:00), Max: 37.7 (19 May 2023 08:00)  T(F): 99.9 (19 May 2023 08:00), Max: 99.9 (19 May 2023 08:00)  HR: 100 (19 May 2023 10:00) (61 - 126)  BP: 97/62 (19 May 2023 10:00) (87/54 - 130/73)  BP(mean): 70 (19 May 2023 10:00) (61 - 85)  RR: 20 (19 May 2023 10:00) (15 - 31)  SpO2: 100% (19 May 2023 10:00) (65% - 100%)    Parameters below as of 19 May 2023 10:00      O2 Concentration (%): 40    PHYSICAL EXAM:    Constitutional: Critical. In ICU. Intubated/sedated  HEENT: ET tube. NJ tube.  Respiratory: vented/ no wheezing anteriorly  Cardiovascular: S1 and S2, regular rate and rhythm, no murmurs rubs or gallops  Gastrointestinal: ostomy draining, midline dressing with serosang saturation, perc drain with small volume serosang drng  Extremities: No peripheral edema, no cyanosis or clubbing  Vascular: 2+ peripheral pulses, no venous stasis  Neurological: sedated/vented; moving arms during exam  Skin: No rashes, not jaundiced    LABS:                        8.1    7.80  )-----------( 47       ( 19 May 2023 05:48 )             24.7     05-19    142  |  107  |  52<H>  ----------------------------<  234<H>  4.2   |  21<L>  |  3.32<H>    Ca    9.0      19 May 2023 05:48  Phos  4.6     05-19  Mg     2.2     05-19    TPro  5.5<L>  /  Alb  2.3<L>  /  TBili  6.6<H>  /  DBili  x   /  AST  421<H>  /  ALT  106<H>  /  AlkPhos  132<H>  05-19    PT/INR - ( 19 May 2023 05:48 )   PT: 15.5 sec;   INR: 1.33 ratio         PTT - ( 18 May 2023 15:48 )  PTT:30.3 sec  LIVER FUNCTIONS - ( 19 May 2023 05:48 )  Alb: 2.3 g/dL / Pro: 5.5 gm/dL / ALK PHOS: 132 U/L / ALT: 106 U/L / AST: 421 U/L / GGT: x             RADIOLOGY & ADDITIONAL STUDIES:

## 2023-05-19 NOTE — PROGRESS NOTE ADULT - SUBJECTIVE AND OBJECTIVE BOX
Date of service: 05-19-23 @ 11:29    pt seen and examined   s/p endoscopic drain placement from skin to cavitary abscess and into the stomach and placement of NJ tube  post op intubated, on pressor support  febrile to 101  weak appearing      ROS: unable to obtain d/t medical condition      MEDICATIONS  (STANDING):  acetaminophen   IVPB .. 1000 milliGRAM(s) IV Intermittent once  albumin human 25% IVPB 50 milliLiter(s) IV Intermittent every 30 minutes  chlorhexidine 0.12% Liquid 15 milliLiter(s) Oral Mucosa every 12 hours  chlorhexidine 4% Liquid 1 Application(s) Topical <User Schedule>  dexMEDEtomidine Infusion 0.5 MICROgram(s)/kG/Hr (8.99 mL/Hr) IV Continuous <Continuous>  dextrose 5%. 1000 milliLiter(s) (50 mL/Hr) IV Continuous <Continuous>  dextrose 50% Injectable 12.5 Gram(s) IV Push once  dextrose 50% Injectable 25 Gram(s) IV Push once  dextrose 50% Injectable 25 Gram(s) IV Push once  glucagon  Injectable 1 milliGRAM(s) IntraMuscular once  insulin lispro (ADMELOG) corrective regimen sliding scale   SubCutaneous every 6 hours  meropenem  IVPB 500 milliGRAM(s) IV Intermittent every 24 hours  metoclopramide Injectable 5 milliGRAM(s) IV Push every 6 hours  norepinephrine Infusion 0.05 MICROgram(s)/kG/Min (6.74 mL/Hr) IV Continuous <Continuous>  pantoprazole  Injectable 40 milliGRAM(s) IV Push every 12 hours  Parenteral Nutrition - Adult 1 Each (67 mL/Hr) TPN Continuous <Continuous>  phenylephrine    Infusion 0.05 MICROgram(s)/kG/Min (0.67 mL/Hr) IV Continuous <Continuous>  rifAXIMin 550 milliGRAM(s) Oral two times a day    Vital Signs Last 24 Hrs  T(C): 38.6 (19 May 2023 11:00), Max: 38.6 (19 May 2023 11:00)  T(F): 101.4 (19 May 2023 11:00), Max: 101.4 (19 May 2023 11:00)  HR: 111 (19 May 2023 11:00) (61 - 126)  BP: 108/75 (19 May 2023 11:00) (87/54 - 130/73)  BP(mean): 81 (19 May 2023 11:00) (61 - 85)  RR: 18 (19 May 2023 11:00) (15 - 31)  SpO2: 100% (19 May 2023 11:00) (65% - 100%)    Parameters below as of 19 May 2023 10:00      O2 Concentration (%): 40      PE:  Constitutional: frail looking  HEENT: NC/AT, EOMI, PERRLA, conjunctivae clear; ears and nose atraumatic; pharynx benign  Neck: supple; thyroid not palpable  Back: no tenderness  Respiratory: decreased breath sounds, rales   Cardiovascular: S1S2 regular, no murmurs  Abdomen: soft, not tender, not distended, positive BS; + ostomy + peg tube midline abd wound with drainage  Genitourinary: no suprapubic tenderness  Lymphatic: no LN palpable  Musculoskeletal: no muscle tenderness, no joint swelling or tenderness  Extremities: no pedal edema  Neurological/ Psychiatric: no focal deficits   Skin: no rashes; no palpable lesions    Labs: all available labs reviewed                                              8.1    7.80  )-----------( 47       ( 19 May 2023 05:48 )             24.7     05-19    142  |  107  |  52<H>  ----------------------------<  234<H>  4.2   |  21<L>  |  3.32<H>    Ca    9.0      19 May 2023 05:48  Phos  4.6     05-19  Mg     2.2     05-19    TPro  5.5<L>  /  Alb  2.3<L>  /  TBili  6.6<H>  /  DBili  x   /  AST  421<H>  /  ALT  106<H>  /  AlkPhos  132<H>  05-19        Culture - Blood (05.12.23 @ 09:01)   - Enterobacter cloacae complex: Detec  Gram Stain: Culture - Blood (05.12.23 @ 08:59)   Gram Stain:   Growth in aerobic bottle: Gram Negative Rods   Growth in anaerobic bottle: Gram Negative Rods  Specimen Source: .Blood None  Culture Results:   Growth in aerobic bottle: Gram Negative Rods   Growth in anaerobic bottle: Gram Negative Rods     Radiology: all available radiological tests reviewed    < from: Xray Chest 1 View- PORTABLE-Urgent (Xray Chest 1 View- PORTABLE-Urgent .) (05.11.23 @ 13:48) >    ACC: [id number] EXAM:  XR CHEST PORTABLE URGENT 1V   ORDERED BY: PRIYA HALL     PROCEDURE DATE:  05/11/2023          INTERPRETATION:  INDICATION: Dialysis catheter insertion    Portable chest 1:28 PM    COMPARISON: 5/6/2023    FINDINGS:  Heart/Vascular: The heart size, mediastinum, hilum and aorta are stable.  Pulmonary: Midline trachea. There is mild pulmonary venous congestion and   low lung volumes.    Bones: There is no fracture.  Lines and catheter: Tip of the right IJ dialysis catheteris in the right   atrium. Tip and side-port of NG tube are in the body of the stomach.    Impression:    There is mild pulmonary venous congestion and low lung volumes.    Tip of the right IJ dialysis catheter is in the right atrium. There is no   pneumothorax.          < end of copied text >  < from: US Kidney and Bladder (05.08.23 @ 15:51) >    ACC: [id number] EXAM:  US KIDNEYS AND BLADDER   ORDERED BY: GWENDOLYN SINGH     PROCEDURE DATE:  05/08/2023          INTERPRETATION:  CLINICAL INFORMATION: History of gastric cancer.   Evaluate for renal injury.    COMPARISON: CT dated 04/29/2023    TECHNIQUE: Sonography of the kidneys and bladder.    FINDINGS:  Right kidney: 8.7 cm. No renal mass, hydronephrosis or calculi.    Left kidney: 8.5 cm. No renal mass, hydronephrosis or calculi.    Urinary bladder: Urinary bladder is contracted limiting its evaluation.    Incidentally noted are diffuse hepatic metastases as noted on the prior   CT.    IMPRESSION:  No hydronephrosis or renal mass is identified to suggest renal injury.   Please note that contrast enhanced CT is more sensitive for detection of   renal injury.    Hepatic metastasis.        < end of copied text >    Advanced directives addressed: full resuscitation

## 2023-05-19 NOTE — CHART NOTE - NSCHARTNOTEFT_GEN_A_CORE
Clinical Nutrition TF BRIEF NOTE    * 68 year old M PMHx metastatic neuroendocrine tumor of the pancreas, recently admitted with nonfunctioning GJ tube (patient is chronically obstructed). Patient has had severe heartburn, and has had several episodes of vomiting small amount of dark blood. Patient was discharged on Thursday. Hgb unchanged. CT angiography has no active bleeding, but demonstrates extensive tumor in distal pancreas. Pt originally in SICU, receiving TF via GJ tube but having frequent BM's. On  - pt went to OR  for a Open distal pancreatectomy, splenectomy, partial colectomy, partial L adrenalectomy, temporary abdominal closure. Remained intubated, on paralytics, pressors, anuric and transferred to ICU.  s/p abd closure and colostomy.  **TPN initiated on  2/2 suspected long NPO status s/p multiple GI surgeries, possible new placement of ?GJ tube for long-term   *5/10: TPN ran until ~6 PM yesterday and then was stopped 2/2 hyperglycemia. POCT (180) this AM.  TF initiated via NGT yesterday, Vital 1.5 2/2 multiple GI surgeries and renal lytes WNL (K, Phos). Running @ 40 cc now - as per RN, 350 mL residuals noted this AM. D/w IDT, will re-start PN - no longer has central line so will resume w/ PPN.  Hold TF's for now, Dr. Mcclendon to f/u w/ surgery for long-term nutrition plan and possibly re-start TF pending residuals and surgery recs.  No HD today - on IVF for now, will d/c when PPN to start as per Dr. Mcclendon. Still w/ no UO.  Pt more awake today, ostomy functioning w/ increasing output.  See recs below for PPN.  *: PPN running until bag finishes. Reglan added and TF restarted this AM with minimal residuals. Tolerating at 40 cc/hr, will increase to goal rate slowly.  Will hold off on renewing PPN for now.  Will receive HD today.   POCT elevated, with continued hyperglycemia, on admelog. Will add lantus, discussed during IDR.  RD to follow TF tolerance.   *: pt vomited overnight, TF currently on hold. As per RN note, secretions appear to be TF - ? aspiration. Has been on reglan.  D/w MD Mcclendon, residuals remain low. Will sit up and re-start TF.   RD STRONGLY rec'd to confirm goals of care regarding LONG-TERM nutrition support as pt has been meeting <75% ENN since admit (x 14 days). TF has been shut off for intolerance/ HD/ procedures.   Received HD yesterday, still no UO.   Pt remains full code.   *: pt has been receiving TF but not tolerating well - even with sitting up and keeping L side up. Having drainage from abd wounds that appears to be tube feed. Pt midline abdomen wound producing high volume serous fluid/ascites. TF held yesterday, pt will need J tube placed. Pt likely aspirating on TF.  D/w Dr. Mcclendon this AM, will be NPO and will initiate TPN again via central line. See recs below.  Is HD dependent.    *current status: s/p NJ tube placement yesterday (), also had Drain placed with endoscopic guidance, going from the skin (sutured) through the cavitary abscess and into the stomach d/t sepsis 2/2 perforation leading to a cavitary abscess into peritoneum.  Pt was then intubated overnight, on precedex.  HD MWF.  TPN running, will d/c after bag finishes and start TF.  NO MEDS VIA NJ - TUBE FEED ONLY.  Will start w/ Vital 1.5 2/2 peptide based - renal lytes OK for now but will monitor closely and can switch to Nepro if become abnormal.  See recs below.     *pertinent meds: IV albumin, dexmedetomidine, admelog, abs, metoclopramide, norepinephrine, pantoprazole, phenylephrine, zofran, fentanyl     *labs reviewed:     142  |  107  |  52<H>  ----------------------------<  234<H>  4.2   |  21<L>  |  3.32<H>    Ca    9.0      19 May 2023 05:48  Phos  4.6       Mg     2.2         TPro  5.5<L>  /  Alb  2.3<L>  /  TBili  6.6<H>  /  DBili  x   /  AST  421<H>  /  ALT  106<H>  /  AlkPhos  132<H>      BMI: BMI (kg/m2): 24.8 (23 @ 04:00)  HbA1c: A1C with Estimated Average Glucose Result: 7.3 % (23 @ 06:35)    BP: 97/64 (23 @ 09:00) (73/51 - 140/125)  Lipid Panel: Date/Time: 23 @ 05:26  Triglycerides, Serum: 136    POCT Blood Glucose.: 223 mg/dL (19 May 2023 06:11)  POCT Blood Glucose.: 198 mg/dL (18 May 2023 23:47)  POCT Blood Glucose.: 190 mg/dL (18 May 2023 19:37)  POCT Blood Glucose.: 165 mg/dL (18 May 2023 11:54)    *I&O's Detail    18 May 2023 07:01  -  19 May 2023 07:00  --------------------------------------------------------  IN:    Dexmedetomidine: 94 mL    IV PiggyBack: 50 mL    Norepinephrine: 55 mL    Phenylephrine: 949 mL    Plasma: 622 mL    Platelets - Single Donor: 223 mL    TPN (Total Parenteral Nutrition): 1576 mL  Total IN: 3569 mL    OUT:    Bulb (mL): 50 mL    Colostomy (mL): 75 mL    Drain (mL): 575 mL    Gastrostomy Tube (mL): 120 mL  Total OUT: 820 mL    Total NET: 2749 mL    *BM documented: 5/17 x 2 - liquid; not on bowel regimen.  *edema: 3+ R/L ankle, foot, scrotum    *malnutrition: Pt continues to meet criteria for severe protein-calorie malnutrition in context of chronic disease r/t decreased ability to meet increased nutrient needs 2/2 mets cancer AEB severe muscle/ fat wasting, TF meeting <75% ENN x >/=1 month, 19% wt loss x 5 mos    *ESTIMATED NUTR NEEDS:  based on 63.9 Kg (wt from  - bed scale taken by RD; "dry wt")  Calories: 3315-0174 Kcal (30-35 Kcal/Kg)  Protein:  g protein (1.5-2 g/Kg)  Fluids: 0775-1960 mL (25-30 mL/Kg)    Weight History:  Daily Weight in k.9 (19 May 2023 06:00)  Daily Weight in k.6 (18 May 2023 06:00)  Daily Weight in k.3 (17 May 2023 06:00)  **wt gain likely 2/2 worsening edema/ anasarca   Daily Weight in k.8 (10 May 2023 06:00)  Height (cm): 170.2 (23 @ 08:11), 170.2 (23 @ 11:22)  Weight (kg): 71.9 (23 @ 04:00), 62.9 (23 @ 22:23)  BMI (kg/m2): 24.8 (23 @ 04:00), 21.7 (23 @ 08:11), 21.7 (23 @ 22:23)  BSA (m2): 1.83 (23 @ 04:00), 1.73 (23 @ 08:11), 1.73 (23 @ 22:23)    RECOMMENDATIONS:  1) When new NJ tube placed, initiate Vital 1.5 @ 20 cc/hr increase 10 cc q6 hours until goal rate of 70 cc/hr is met (total volume = 1400 mL) with 3 packets of Prosource TF. Will provide ~ 2220 kcal, 128 g protein, and 1064 mL free water. Free water flush as per MD to maintain hydration 2/2 severe fluid retention and HD dependent   2) Daily weights  3) Strict I & O's  4) Daily lyte checks including magnesium and phos  5) Weekly triglycerides/LFT checks  6) POCT q6hrs; maintain 140-180mg/dL  7) Confirm goals of care regarding LONG-TERM nutrition support - will need permanent feeding tube for d/c ? GJ vs J tube ?  8) D/c TPN after bag finishes today - Half PN rate the last 1-2 hours of running to avoid hypoglycemia     *will continue to monitor and adjust treatment plan prn*  Fabby Waller MS, RDN, [facility], [phone]  Certified Nutrition

## 2023-05-19 NOTE — PROGRESS NOTE ADULT - ASSESSMENT
68 year old male with advanced pancreatic neuroendocrine tumor s/p open distal pancreatectomy/ splenectomy/colectomy 5/4 with return to OR 5/5 for I&D, ileostomy with persistent wound drainage since initial procedure    Imp: septic shock, +BC, on pressors and vented with ARF on dialysis  s/p EGD 5/18 identified cavitary abscess from perforation in stomach wall collecting with fistula to skin.  Drain placed stomach-->collection--->skin. And NJ tube placed    Rec:  ::Continue monitor drainage, management by surgical team  ::Re-imaging per surgical team discretion  ::NJ tube ok for feeds

## 2023-05-19 NOTE — PROGRESS NOTE ADULT - ASSESSMENT
68M now wtih:     acute hypoxic respiratory failure  shock , septic/distributive   enterobacter bacteremia   ZO on CKD requiring iHD   heptic encephalopathy   metastatic neuroendocrine tumor of pancreas- sp distal pancreatectomy, splenectomy, partial colectomy, partial L adrenalectomy, colostomy  anemia   thrombocytopenia     Neuro: started precedex for a/w vent synchrony, rifaxamin    CV: on neosynephrine, required addition of levo mari-intubation, titrating down as tolerated for goal map >65. unable to use NJ tube; mido on hold, cortisol checked on 5/7 and appropriately elevated; no nanda for stress steroids currently   Pulm: respiratory decompensation probably multifactorial: upper airway secretions, poor mental status, overall weakness. post intubation ABG 7.28/43/351/20. CXR also reviewed, ett adjusted. ventilator setting adjusted accordingly. titrating down peep/fio2 for goal spo2 >92%  GI: on TPN. NJ tube placed earlier today. will remain NPO per sugical team. BID protonix, standing reglan   Renal: due for iHD in AM, nephrology following   ID: bcx 5/12 x2 with MDR enterobacter clocae. source is most likely abdomen. on meropenem. no clear end date yet. ID following   Endo: goal bg 110-180, fs 6h while on parenteral nutrition   Heme: DVT ppx with scds. hgb stable this evening     Dispo: remain in ICU. Family updated by surgical resident who agreed to proceed with intubation

## 2023-05-19 NOTE — PROGRESS NOTE ADULT - SUBJECTIVE AND OBJECTIVE BOX
Events Overnight; Patient was intubated, cxr bibasilar atelectasis, rll infiltrate    HPI:     67 yo male with PMHx metastatic neuroendocrine tumor of the pancreas, recently admitted with nonfunctioning GJ tube (patient is chronically obstructed).    patient went to OR on 5/4 had removal of GJ tube,   5/1 pt feeling slightly nauseous, denies abd pain.   Open distal pancreatectomy, splenectomy, partial colectomy, partial L adrenalectomy, temporary abdominal closure.   developed ZO, septic shock  now on dialysis  had placement of endoscopic j tube,  repair of gastic perforation with tube placed and PEG     PMH:       as above    ROS cant obtain     MEDICATIONS  (STANDING):  albumin human 25% IVPB 50 milliLiter(s) IV Intermittent every 30 minutes  chlorhexidine 0.12% Liquid 15 milliLiter(s) Oral Mucosa every 12 hours  chlorhexidine 4% Liquid 1 Application(s) Topical <User Schedule>  dexMEDEtomidine Infusion 0.5 MICROgram(s)/kG/Hr (8.99 mL/Hr) IV Continuous <Continuous>  dextrose 5%. 1000 milliLiter(s) (50 mL/Hr) IV Continuous <Continuous>  dextrose 50% Injectable 12.5 Gram(s) IV Push once  dextrose 50% Injectable 25 Gram(s) IV Push once  dextrose 50% Injectable 25 Gram(s) IV Push once  glucagon  Injectable 1 milliGRAM(s) IntraMuscular once  insulin lispro (ADMELOG) corrective regimen sliding scale   SubCutaneous every 6 hours  meropenem  IVPB 500 milliGRAM(s) IV Intermittent every 24 hours  metoclopramide Injectable 5 milliGRAM(s) IV Push every 6 hours  norepinephrine Infusion 0.05 MICROgram(s)/kG/Min (6.74 mL/Hr) IV Continuous <Continuous>  pantoprazole  Injectable 40 milliGRAM(s) IV Push every 12 hours  Parenteral Nutrition - Adult 1 Each (67 mL/Hr) TPN Continuous <Continuous>  phenylephrine    Infusion 0.05 MICROgram(s)/kG/Min (0.67 mL/Hr) IV Continuous <Continuous>  rifAXIMin 550 milliGRAM(s) Oral two times a day    MEDICATIONS  (PRN):  acetaminophen     Tablet .. 650 milliGRAM(s) Oral every 6 hours PRN Temp greater or equal to 38.5C (101.3F)  dextrose Oral Gel 15 Gram(s) Oral once PRN Blood Glucose LESS THAN 70 milliGRAM(s)/deciliter  fentaNYL    Injectable 25 MICROGram(s) IV Push every 1 hour PRN vent dyssynchrony  ondansetron Injectable 4 milliGRAM(s) IV Push every 6 hours PRN Nausea and/or Vomiting  sodium chloride 0.9% lock flush 10 milliLiter(s) IV Push every 1 hour PRN Pre/post blood products, medications, blood draw, and to maintain line patency      ICU Vital Signs Last 24 Hrs  T(C): 37.7 (19 May 2023 08:00), Max: 37.7 (19 May 2023 08:00)  T(F): 99.9 (19 May 2023 08:00), Max: 99.9 (19 May 2023 08:00)  HR: 95 (19 May 2023 09:00) (61 - 126)  BP: 97/64 (19 May 2023 09:00) (87/54 - 130/73)  BP(mean): 71 (19 May 2023 09:00) (61 - 85)  ABP: --  ABP(mean): --  RR: 19 (19 May 2023 09:00) (15 - 31)  SpO2: 100% (19 May 2023 09:00) (65% - 100%)    O2 Parameters below as of 19 May 2023 09:00      O2 Concentration (%): 40    Mode: AC/ CMV (Assist Control/ Continuous Mandatory Ventilation)  RR (machine): 18  TV (machine): 400  FiO2: 40  PEEP: 6  ITime: 1  PIP: 17    Physical Exam    general weak, anasarcic  profoundly weak  Neuro lethargic on precedex  HEENT nj tube in place, bridled  neck supple,  right IJ for dialyis  , left subclavian catheter  Lungs dec bs at bases intubated  cv rrr  abdomen drainage from wound, (less), ileostomy is viable, G tube in place  extremiteis edematous   voiding      I&O's Summary    18 May 2023 07:01  -  19 May 2023 07:00  --------------------------------------------------------  IN: 3569 mL / OUT: 820 mL / NET: 2749 mL                        8.1    7.80  )-----------( 47       ( 19 May 2023 05:48 )             24.7     05-19    142  |  107  |  52<H>  ----------------------------<  234<H>  4.2   |  21<L>  |  3.32<H>    Ca    9.0      19 May 2023 05:48  Phos  4.6     05-19  Mg     2.2     05-19    TPro  5.5<L>  /  Alb  2.3<L>  /  TBili  6.6<H>  /  DBili  x   /  AST  421<H>  /  ALT  106<H>  /  AlkPhos  132<H>  05-19    ABG - ( 19 May 2023 06:21 )  pH, Arterial: 7.29  pH, Blood: x     /  pCO2: 37    /  pO2: 161   / HCO3: 18    / Base Excess: -8.1  /  SaO2: 99        DVT Prophylaxis:  held for thrombocytopenia and inc INR                                                               Advanced Directives: Full Code

## 2023-05-19 NOTE — PROGRESS NOTE ADULT - ASSESSMENT
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  S/P distal pancreatectomy, splenectomy, partial colectomy  S/P abdominal washout and colostomy creation  Now with aspiration pneumonia  Midline wound with drainage  on phenylephrine   On midodrine  s/p TPN  s/p 2 FFP, 1 plate  s/p GI procedure of placement of NJ tube 5/18      Plan:  NPO  Monitor ostomy output  Monitor midline wound drainage via new drain  F/u Nephro recommend appreciated  No plans at this time for surgical intervention  Optimize nutrition/TPN  Cont great Critical care management as per ICU team  GI reccs appreciated  Surgery will follow  GOC discussion w/family    Plan discussed with Dr. Siddiqi

## 2023-05-19 NOTE — PROGRESS NOTE ADULT - SUBJECTIVE AND OBJECTIVE BOX
NEPHROLOGY INTERVAL HPI/OVERNIGHT EVENTS:    Date of Service: 05-18-23 @ 10:38  5/19- intubated, s/p placement of feeding tube was found to have gastric perforation  5/18--Awake.  No acute distress. Appears very frail and shallow breathing   For OR for placement of feeding tube.  Restarted on TPN.  5/17--Awake.  No acute distress.  for OR tomorrow for J tube placement.  5/16 HD catheter removed yesterday, more confused removed NG tube   5/15  750 ostomy output with low bs on d10 at 100, unclear if absorbing, on solo pressors   5/14--No acute distress.  No urine output.   Drains 1700cc.  colostomy 800cc  5/13--Awake but lethargic.  Unclear if in pain.  RR 30.  On NG feeding.  No urine output  5/12- seen w family and ID at bedside, plans for HD discussed w family  5/11--Lethargic.  No acute distress.   Colostomy output 1500cc.  No UO.  5/10--No acute distress.  calm, off sedation and off pressor.  Colostomy output 250cc and Drain 850cc.  Oligoanuric.  5/9 more alert and awake, ostomy fx pressors off since this am unable to complete hd yesterday and only received 1/2 due to malfunctioning cathtere    pulled after HD   5/8 seen at hd, events noted, anuric, TPN infusing with lipids, on solo dec dose of pressors, encepha with starting of lactulose and xifaxin   5/7- no new events, anuric, CO2 trending down  5/6 s/p OR yesterday, ostomy creation  5/5- case d/w surgery and intensivist , anuric k 5.2 will dialyze preop, no fluid removal  5/4- pt seen in PACU, PRBC and FFP, and LR resuscitation noted, coulter in place ~ 100 cc urine in coulter bag  case d/w pts nurse   Chart quote, operative note ""Open distal pancreatectomy, splenectomy, partial colectomy, partial L adrenalectomy, temporary abdominal closure; Large varices, 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 Liver laden with diffuse metastases and very friable, RP oozy. Patient on pressors at the end of the case, and hence, decided to leave colon in discontinuity with temporary closure.""    HPI:  69 yo man with Neuroendocrine tumor of pancreas with mets to liver dx'd 6 years ago.  Treated with Lutathera one year ago and restarted in 12/2022  G-J tube placed for nutrition in January due to chronic dysphagia and severe esophagitis on EGD.  Post recent  admission due to G tube clogging.  D/mansi home on 4/27.    Returned to ED due to vomiting when night time enteral feeding via G tube started.  CT with IVC done due to concern for GIB.  Pt reports chronic self catheterization due to urinary retention.   ZO improved when catheterization started.  Follows mainly with EMELY Cortez.   Pt has been self cath'ing 3-4 x /day.   Indwelling Coulter placed by EMELY due to resistance with blood clots with resistance reported by pt.  For palliative mass resection tomorrow.    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  Creat has been variable during previous admissions--ranging 1.1-2.2.    PMHX and PSHX.  --Neuroendocrine tumor of pancreas with mets to liver --dx 6 years ago.  --GJ tube for nutrition in 1/2023 due to chronic dysphagia.  --Hx of HTN  --Hx of DM  --Hx of light chain disease --unclear hx and no hx of renal biopsy.    MEDICATIONS  (STANDING):  chlorhexidine 4% Liquid 1 Application(s) Topical <User Schedule>  dextrose 10% + sodium chloride 0.45%. 1000 milliLiter(s) (30 mL/Hr) IV Continuous <Continuous>  dextrose 5%. 1000 milliLiter(s) (50 mL/Hr) IV Continuous <Continuous>  dextrose 50% Injectable 12.5 Gram(s) IV Push once  dextrose 50% Injectable 25 Gram(s) IV Push once  dextrose 50% Injectable 25 Gram(s) IV Push once  glucagon  Injectable 1 milliGRAM(s) IntraMuscular once  insulin lispro (ADMELOG) corrective regimen sliding scale   SubCutaneous every 6 hours  meropenem  IVPB 500 milliGRAM(s) IV Intermittent every 24 hours  metoclopramide Injectable 5 milliGRAM(s) IV Push every 6 hours  pantoprazole  Injectable 40 milliGRAM(s) IV Push every 12 hours  Parenteral Nutrition - Adult 1 Each (67 mL/Hr) TPN Continuous <Continuous>  phenylephrine    Infusion 0.05 MICROgram(s)/kG/Min (0.67 mL/Hr) IV Continuous <Continuous>  rifAXIMin 550 milliGRAM(s) Oral two times a day    MEDICATIONS  (PRN):  acetaminophen     Tablet .. 650 milliGRAM(s) Oral every 6 hours PRN Temp greater or equal to 38.5C (101.3F)  dextrose Oral Gel 15 Gram(s) Oral once PRN Blood Glucose LESS THAN 70 milliGRAM(s)/deciliter  ondansetron Injectable 4 milliGRAM(s) IV Push every 6 hours PRN Nausea and/or Vomiting  sodium chloride 0.9% lock flush 10 milliLiter(s) IV Push every 1 hour PRN Pre/post blood products, medications, blood draw, and to maintain line patency    ICU Vital Signs Last 24 Hrs  T(C): 37.7 (19 May 2023 08:00), Max: 37.7 (19 May 2023 08:00)  T(F): 99.9 (19 May 2023 08:00), Max: 99.9 (19 May 2023 08:00)  HR: 95 (19 May 2023 09:00) (61 - 126)  BP: 97/64 (19 May 2023 09:00) (87/54 - 130/73)  BP(mean): 71 (19 May 2023 09:00) (61 - 85)  ABP: --  ABP(mean): --  RR: 19 (19 May 2023 09:00) (15 - 31)  SpO2: 100% (19 May 2023 09:00) (65% - 100%)        PHYSICAL EXAM:  GENERAL: Awake with minimal response, shallow tachypnea on vent  CHEST/LUNG: poor insp effort  HEART: S1S2 RRR  ABDOMEN: soft  EXTREMITIES: 2-3+ edema in LE.  SKIN:     LABS:                          8.1    7.80  )-----------( 47       ( 19 May 2023 05:48 )             24.7                           7.8    8.82  )-----------( 30       ( 18 May 2023 05:26 )             23.6     05-19    142  |  107  |  52<H>  ----------------------------<  234<H>  4.2   |  21<L>  |  3.32<H>    Ca    9.0      19 May 2023 05:48  Phos  4.6     05-19  Mg     2.2     05-19    TPro  5.5<L>  /  Alb  2.3<L>  /  TBili  6.6<H>  /  DBili  x   /  AST  421<H>  /  ALT  106<H>  /  AlkPhos  132<H>  05-19 05-18    142  |  108  |  32<H>  ----------------------------<  147<H>  3.9   |  18<L>  |  2.78<H>    Ca    9.1      18 May 2023 05:26  Phos  4.0     05-18  Mg     2.0     05-18    TPro  5.0<L>  /  Alb  2.3<L>  /  TBili  5.8<H>  /  DBili  x   /  AST  446<H>  /  ALT  113<H>  /  AlkPhos  121<H>  05-18    PT/INR - ( 18 May 2023 05:26 )   PT: 26.8 sec;   INR: 2.29 ratio         PTT - ( 18 May 2023 05:26 )  PTT:38.5 sec    Magnesium, Serum: 2.0 mg/dL (05-18 @ 05:26)  Phosphorus Level, Serum: 4.0 mg/dL (05-18 @ 05:26)          RADIOLOGY & ADDITIONAL TESTS:   NEPHROLOGY INTERVAL HPI/OVERNIGHT EVENTS:    Date of Service: 05-18-23 @ 10:38  5/19- intubated, s/p placement of feeding tube was found to have gastric perforation  5/18--Awake.  No acute distress. Appears very frail and shallow breathing   For OR for placement of feeding tube.  Restarted on TPN.  5/17--Awake.  No acute distress.  for OR tomorrow for J tube placement.  5/16 HD catheter removed yesterday, more confused removed NG tube   5/15  750 ostomy output with low bs on d10 at 100, unclear if absorbing, on solo pressors   5/14--No acute distress.  No urine output.   Drains 1700cc.  colostomy 800cc  5/13--Awake but lethargic.  Unclear if in pain.  RR 30.  On NG feeding.  No urine output  5/12- seen w family and ID at bedside, plans for HD discussed w family  5/11--Lethargic.  No acute distress.   Colostomy output 1500cc.  No UO.  5/10--No acute distress.  calm, off sedation and off pressor.  Colostomy output 250cc and Drain 850cc.  Oligoanuric.  5/9 more alert and awake, ostomy fx pressors off since this am unable to complete hd yesterday and only received 1/2 due to malfunctioning cathtere    pulled after HD   5/8 seen at hd, events noted, anuric, TPN infusing with lipids, on solo dec dose of pressors, encepha with starting of lactulose and xifaxin   5/7- no new events, anuric, CO2 trending down  5/6 s/p OR yesterday, ostomy creation  5/5- case d/w surgery and intensivist , anuric k 5.2 will dialyze preop, no fluid removal  5/4- pt seen in PACU, PRBC and FFP, and LR resuscitation noted, coulter in place ~ 100 cc urine in coulter bag  case d/w pts nurse   Chart quote, operative note ""Open distal pancreatectomy, splenectomy, partial colectomy, partial L adrenalectomy, temporary abdominal closure; Large varices, 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 Liver laden with diffuse metastases and very friable, RP oozy. 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  Treated with Lutathera one year ago and restarted in 12/2022  G-J tube placed for nutrition in January due to chronic dysphagia and severe esophagitis on EGD.  Post recent  admission due to G tube clogging.  D/mansi home on 4/27.    Returned to ED due to vomiting when night time enteral feeding via G tube started.  CT with IVC done due to concern for GIB.  Pt reports chronic self catheterization due to urinary retention.   ZO improved when catheterization started.  Follows mainly with EMELY Cortez.   Pt has been self cath'ing 3-4 x /day.   Indwelling Coulter placed by EMELY due to resistance with blood clots with resistance reported by pt.  For palliative mass resection tomorrow.    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  Creat has been variable during previous admissions--ranging 1.1-2.2.    PMHX and PSHX.  --Neuroendocrine tumor of pancreas with mets to liver --dx 6 years ago.  --GJ tube for nutrition in 1/2023 due to chronic dysphagia.  --Hx of HTN  --Hx of DM  --Hx of light chain disease --unclear hx and no hx of renal biopsy.    MEDICATIONS  (STANDING):  chlorhexidine 4% Liquid 1 Application(s) Topical <User Schedule>  dextrose 10% + sodium chloride 0.45%. 1000 milliLiter(s) (30 mL/Hr) IV Continuous <Continuous>  dextrose 5%. 1000 milliLiter(s) (50 mL/Hr) IV Continuous <Continuous>  dextrose 50% Injectable 12.5 Gram(s) IV Push once  dextrose 50% Injectable 25 Gram(s) IV Push once  dextrose 50% Injectable 25 Gram(s) IV Push once  glucagon  Injectable 1 milliGRAM(s) IntraMuscular once  insulin lispro (ADMELOG) corrective regimen sliding scale   SubCutaneous every 6 hours  meropenem  IVPB 500 milliGRAM(s) IV Intermittent every 24 hours  metoclopramide Injectable 5 milliGRAM(s) IV Push every 6 hours  pantoprazole  Injectable 40 milliGRAM(s) IV Push every 12 hours  Parenteral Nutrition - Adult 1 Each (67 mL/Hr) TPN Continuous <Continuous>  phenylephrine    Infusion 0.05 MICROgram(s)/kG/Min (0.67 mL/Hr) IV Continuous <Continuous>  rifAXIMin 550 milliGRAM(s) Oral two times a day    MEDICATIONS  (PRN):  acetaminophen     Tablet .. 650 milliGRAM(s) Oral every 6 hours PRN Temp greater or equal to 38.5C (101.3F)  dextrose Oral Gel 15 Gram(s) Oral once PRN Blood Glucose LESS THAN 70 milliGRAM(s)/deciliter  ondansetron Injectable 4 milliGRAM(s) IV Push every 6 hours PRN Nausea and/or Vomiting  sodium chloride 0.9% lock flush 10 milliLiter(s) IV Push every 1 hour PRN Pre/post blood products, medications, blood draw, and to maintain line patency    ICU Vital Signs Last 24 Hrs  T(C): 37.7 (19 May 2023 08:00), Max: 37.7 (19 May 2023 08:00)  T(F): 99.9 (19 May 2023 08:00), Max: 99.9 (19 May 2023 08:00)  HR: 95 (19 May 2023 09:00) (61 - 126)  BP: 97/64 (19 May 2023 09:00) (87/54 - 130/73)  BP(mean): 71 (19 May 2023 09:00) (61 - 85)  ABP: --  ABP(mean): --  RR: 19 (19 May 2023 09:00) (15 - 31)  SpO2: 100% (19 May 2023 09:00) (65% - 100%)        PHYSICAL EXAM:  GENERAL: Awake with minimal response, shallow tachypnea on vent  CHEST/LUNG: poor insp effort  HEART: S1S2 RRR  ABDOMEN: soft  EXTREMITIES: 2-3+ edema in LE.  LLE >> edema and raised rash compared to RLE  SKIN:     LABS:                          8.1    7.80  )-----------( 47       ( 19 May 2023 05:48 )             24.7                           7.8    8.82  )-----------( 30       ( 18 May 2023 05:26 )             23.6     05-19    142  |  107  |  52<H>  ----------------------------<  234<H>  4.2   |  21<L>  |  3.32<H>    Ca    9.0      19 May 2023 05:48  Phos  4.6     05-19  Mg     2.2     05-19    TPro  5.5<L>  /  Alb  2.3<L>  /  TBili  6.6<H>  /  DBili  x   /  AST  421<H>  /  ALT  106<H>  /  AlkPhos  132<H>  05-19 05-18    142  |  108  |  32<H>  ----------------------------<  147<H>  3.9   |  18<L>  |  2.78<H>    Ca    9.1      18 May 2023 05:26  Phos  4.0     05-18  Mg     2.0     05-18    TPro  5.0<L>  /  Alb  2.3<L>  /  TBili  5.8<H>  /  DBili  x   /  AST  446<H>  /  ALT  113<H>  /  AlkPhos  121<H>  05-18    PT/INR - ( 18 May 2023 05:26 )   PT: 26.8 sec;   INR: 2.29 ratio         PTT - ( 18 May 2023 05:26 )  PTT:38.5 sec    Magnesium, Serum: 2.0 mg/dL (05-18 @ 05:26)  Phosphorus Level, Serum: 4.0 mg/dL (05-18 @ 05:26)          RADIOLOGY & ADDITIONAL TESTS:

## 2023-05-19 NOTE — PROGRESS NOTE ADULT - ASSESSMENT
67 yo male with PMHx metastatic neuroendocrine tumor of the pancreas, recently admitted with nonfunctioning GJ tube  due to external mechanical  obstruction   now Post Op  after an open distal pancreatectomy, splenectomy, partial colectomy, partial L adrenalectomy,colostomy  With Post Op septic  Shock/bleeding developed ZO now requiring dialysis  onow back on pressors septic shock gram negative bacterem ia Enterobacter, Cloacae sens meropenem,   dialysis dependent plan T/h/S  leakage from abdomen ? from G-J Site  now with Nasojejunal tube to restart feeds  G tube can only be used for meds if needed but not to be manipulated    PLAN:  ICU    PULM: intubated, will sent sputum culture  Cardio:  Wean pressors, as able still on 2.5 of phenylephrine,      septic shock hopefully with repair of perforatin will now improve  Renal: HD as per renal M,W,F  GI:  d/c tpn, nj feeds, follow ammonia level  Endo RISS  Heme - thrombocytopenia from sepsis, d/c heparin subq till recovers,   Neurometabolic encephalopthy    ID blood cullture postive for enterobacter cloacae  Meropenem   patient is full code   67 yo male with PMHx metastatic neuroendocrine tumor of the pancreas, recently admitted with nonfunctioning GJ tube  due to external mechanical  obstruction   now Post Op  after an open distal pancreatectomy, splenectomy, partial colectomy, partial L adrenalectomy,colostomy  With Post Op septic  Shock/bleeding developed ZO now requiring dialysis  onow back on pressors septic shock gram negative bacterem ia Enterobacter, Cloacae sens meropenem,   dialysis dependent plan T/h/S  leakage from abdomen ? from G-J Site  now with Nasojejunal tube to restart feeds  G tube can only be used for meds if needed but not to be manipulated    PLAN:  ICU    PULM: intubated, will sent sputum culture  Cardio:  Wean pressors, as able still on 3.6 of phenylephrine,      septic shock hopefully with repair of perforation will now improve  Renal: HD as per renal M,W,F  GI:  d/c tpn, nj feeds, follow ammonia level  Endo RISS  Heme - thrombocytopenia from sepsis, d/c heparin subq till recovers,   Neurometabolic encephalopthy    ID blood cullture postive for enterobacter cloacae  Meropenem   patient is full code

## 2023-05-19 NOTE — PROVIDER CONTACT NOTE (CHANGE IN STATUS NOTIFICATION) - BACKGROUND
Pt recently came back from OR for drain placements. Beginning of shift pt appears to be tachypneic with increased WOB on 4L NC with Sp02 low 90s. Resp status appears worst than previous night (was also on room air) when RN was here. Emely CALDERON aware and new orders given.

## 2023-05-20 NOTE — PROGRESS NOTE ADULT - ASSESSMENT
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  S/P distal pancreatectomy, splenectomy, partial colectomy  S/P abdominal washout and colostomy creation  Now with aspiration pneumonia  Midline wound with drainage  on phenylephrine   On midodrine    Plan:  Tube feeds @40cc  Monitor ostomy output  Monitor midline wound drainage via new drain  F/u Nephro for HD  wean off sedation as tolerated  No plans at this time for surgical intervention  Optimize nutrition/TPN  Cont great Critical care management as per ICU team  GI reccs appreciated  Surgery will follow  GOC discussion w/family    Plan discussed with Dr. Siddiqi   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  S/P distal pancreatectomy, splenectomy, partial colectomy  S/P abdominal washout and colostomy creation  Partial Midline wound dehiscence   on phenylephrine   On midodrine    Plan:  Tube feeds @40cc  Will wean off TPN  Monitor ostomy output  BID and PRN Dressing changes   Gastrotomy tube to intermittent suction  F/u Nephro for HD  wean off sedation as tolerated today  wean off vent  No plans at this time for surgical intervention  Optimize nutrition/TPN  Cont great Critical care management as per ICU team  GI reccs appreciated  Surgery will follow  Ongoing GOC discussion daily w/family    Plan discussed with Dr. Siddiqi

## 2023-05-20 NOTE — PROGRESS NOTE ADULT - SUBJECTIVE AND OBJECTIVE BOX
SURGERY DAILY PROGRESS NOTE:     Subjective:  Patient seen and examined at bedside during am rounds. Pt is intubated, sedated, and on phenylephrine. Midline incision still with high output ascites. No other acute events.     Objective:    Vital Signs Last 24 Hrs  T(C): 39 (20 May 2023 08:00), Max: 39 (20 May 2023 08:00)  T(F): 102.2 (20 May 2023 08:00), Max: 102.2 (20 May 2023 08:00)  HR: 113 (20 May 2023 08:00) (72 - 116)  BP: 95/52 (20 May 2023 08:00) (71/43 - 115/65)  BP(mean): 62 (20 May 2023 08:00) (49 - 81)  RR: 28 (20 May 2023 08:00) (16 - 33)  SpO2: 98% (20 May 2023 08:00) (97% - 100%)    Parameters below as of 20 May 2023 08:00  Patient On (Oxygen Delivery Method): ventilator  O2 Flow (L/min): 30                          7.6    9.20  )-----------( 34       ( 20 May 2023 04:36 )             23.1     05-20    143  |  108  |  46<H>  ----------------------------<  182<H>  3.9   |  25  |  2.66<H>    Ca    8.6      20 May 2023 04:36  Phos  3.5     05-20  Mg     2.0     05-20    TPro  5.4<L>  /  Alb  2.6<L>  /  TBili  7.0<H>  /  DBili  x   /  AST  421<H>  /  ALT  91<H>  /  AlkPhos  127<H>  05-20    I&O's Summary    19 May 2023 07:01  -  20 May 2023 07:00  --------------------------------------------------------  IN: 4177 mL / OUT: 1905 mL / NET: 2272 mL        PHYSICAL EXAM   Gen: well-appearing, in no acute distress  CV: pulse regularly present   Resp: airway patent, non-labored breathing  Abdomen Ostomy pink but no output. Midline wound open superiorly and inferiorly with ascites drainage, colostomy bag covering the wounds dry gauze packing.  New drain on thru midline wound. No evidence of wound infection. lower aspect of incision dehisced with bowel visualized  Extremities: No swelling or tenderness     2020

## 2023-05-20 NOTE — PROGRESS NOTE ADULT - ASSESSMENT
67 yo man with Hx of Neuroendocrine tumor/Pancreatic mass and liver mets.      - ZO for now HD dependent   - last HD Friday , unable to tolerate fluid removal    - lytes and acid based stable - no acute indication for HD today    - daily labs     Shock    on increased pressors w fevers - now on antifungal  "Sepsis secondary to perforation leading to a cavitary abscess into the peritoneum.   - Drain placed with endoscopic guidance, going from the skin (sutured) through the cavitary abscess and into the stomach.       d/w ICU - Dr Murray     - in light of increased HD instability will hold HD today  - reasses in AM     * pt seen earlier,note     Dr Harrington et al to resume care Monday

## 2023-05-20 NOTE — PROGRESS NOTE ADULT - ASSESSMENT
67 y/o male with PMH neuroendocrine tumor of pancreas, GJ tube, HTN, HLD, DM2    Initially admitted with UGI bleed - no intervention   Found to have a large distal pancreatic mass     Underwent open distal pancreatectomy, splenectomy, partial colectomy, partial L adrenalectomy, temporary abdominal closure on 5/4  Unstable during procedure    Underwent abdominal washout and colostomy on 5/5  No active bleeding or abscess found  67 y/o male with PMH neuroendocrine tumor of pancreas, GJ tube, HTN, HLD, DM2    Initially admitted with UGI bleed - no intervention   Found to have a large distal pancreatic mass     Underwent open distal pancreatectomy, splenectomy, partial colectomy, partial L adrenalectomy, temporary abdominal closure on 5/4  Unstable during procedure    Underwent abdominal washout and colostomy on 5/5  No active bleeding or abscess found     Underwent gastric drain and NJ tube placement by GI on 5/18       Course complicated by:     Severe sepsis with septic shock (not POA)   ZO from [facility] now on dialysis   Acute resp failure   Acute metabolic encephalopathy     Intubated 5/4-6  Reintubated 5/18     Hemodialysis initiated 5/5    L subclavian CVC 5/14  RIJ HD cath 5/17       Plan:     Continue vent support, not a candidate for SBT   Precedex for synchrony   HD support with Calixto, Levo  Febrile today, WBC slightly increased, will do CT with po contrast as d/w surgery   Continue meropenem   In view of fever and worsening HD instability will give a dose of caspofungin and check fungitell   LFTs remain high, TB increasing - monitor   TF via NJ tube   Monitor drain output  Ostomy care   Dialysis per renal, no urgent need today   Holding chemical DVT ppx due to thrombocytopenia which is likely due to sepsis    Patient critically ill with poor prognosis 69 y/o male with PMH neuroendocrine tumor of pancreas, GJ tube, HTN, HLD, DM2    Initially admitted with UGI bleed - no intervention   Found to have a large distal pancreatic mass     Underwent open distal pancreatectomy, splenectomy, partial colectomy, partial L adrenalectomy, temporary abdominal closure on 5/4  Unstable during procedure    Underwent abdominal washout and colostomy on 5/5  No active bleeding or abscess found     Underwent gastric drain and NJ tube placement by GI on 5/18       Course complicated by:     Severe sepsis with septic shock (not POA) due to abdominal source/abscess   ZO from [facility] now on dialysis   Acute resp failure   Acute metabolic encephalopathy     Intubated 5/4-6  Reintubated 5/18     Hemodialysis initiated 5/5    L subclavian CVC 5/14  RIJ HD cath 5/17       Plan:     Continue vent support, not a candidate for SBT   Precedex for synchrony   HD support with Calixto, Levo  Febrile today, WBC slightly increased, will do CT with po contrast as d/w surgery   Continue meropenem   In view of fever and worsening HD instability will give a dose of caspofungin and check fungitell   LFTs remain high, TB increasing - monitor   TF via NJ tube   Monitor drain output  Ostomy care   Dialysis per renal, no urgent need today   Holding chemical DVT ppx due to thrombocytopenia which is likely due to sepsis    Patient critically ill with poor prognosis

## 2023-05-20 NOTE — PROVIDER CONTACT NOTE (CRITICAL VALUE NOTIFICATION) - PERSON GIVING RESULT:
Chana/Lab
Mika
daya/ Rylan
Marianela Zuleta/Lab
daya Perales
SARAH Savage (daya)
lab-Sunday Perales
Jonathan/Laboratory
Lab, Jon

## 2023-05-20 NOTE — PROGRESS NOTE ADULT - SUBJECTIVE AND OBJECTIVE BOX
HPI:    69 y/o male with MHx significant for neuroendocrine tumor diagnosed 6 years ago with chronic gastric outlet obstruction and mets to liver, s/p GJ tube, HTN, HLD, T2DM, BPH and light chain nephropathy presented to the ED with hematemesis since last night.    Recent admission-from 04/21 - 04/27 for J-tube malfunction; weakness; dehydration; hyponatremia and ZO    04/29/2023 - Patient seen in ED, Mana spouse at bedside. Patients reports nausea and hematemesis since last night. Coffee brown color emesis of about 10 ml note dn the bag. Also with nausea and discomfort at the GJ site. Last feed was last night. Report loose bowel movement every 2-3 days, last BM was yesterday, denies hematochezia.     05/08/2023 - Seen at bedside in ICU, accompanied by spouse. With NGT, and upper extremities in restraints due to patient attempting to pull the NGT out    05/12/2023: Seen at bedside with nursing assistant, ill appearing, lethargic, awake     05/17/ 2023- Seen at bedside, alert, awake, in no acute distress. Undergoing HD at [facility]. Spouse (Mana) at bedside, appreciating the care he receives.     05/20/2023: Seen at bedside with RN, intubated and sedated        PAST MEDICAL & SURGICAL HISTORY:    Hypertension  BPH (benign prostatic hyperplasia)  Renal insufficiency  Light chain nephropathy  DM (diabetes mellitus  HLD (hyperlipidemia)  GJ tube placement      FAMILY HISTORY:    FH: breast cancer (Mother)  FH: aneurysm (Father)      MEDICATIONS  (STANDING):    chlorhexidine 0.12% Liquid 15 milliLiter(s) Oral Mucosa every 12 hours  chlorhexidine 4% Liquid 1 Application(s) Topical <User Schedule>  dexMEDEtomidine Infusion 0.5 MICROgram(s)/kG/Hr (8.99 mL/Hr) IV Continuous <Continuous>  dextrose 5%. 1000 milliLiter(s) (50 mL/Hr) IV Continuous <Continuous>  dextrose 50% Injectable 12.5 Gram(s) IV Push once  dextrose 50% Injectable 25 Gram(s) IV Push once  dextrose 50% Injectable 25 Gram(s) IV Push once  glucagon  Injectable 1 milliGRAM(s) IntraMuscular once  insulin lispro (ADMELOG) corrective regimen sliding scale   SubCutaneous every 6 hours  meropenem  IVPB 500 milliGRAM(s) IV Intermittent every 24 hours  metoclopramide Injectable 5 milliGRAM(s) IV Push every 6 hours  norepinephrine Infusion 0.05 MICROgram(s)/kG/Min (6.74 mL/Hr) IV Continuous <Continuous>  pantoprazole  Injectable 40 milliGRAM(s) IV Push every 12 hours  phenylephrine    Infusion 0.05 MICROgram(s)/kG/Min (0.67 mL/Hr) IV Continuous <Continuous>  rifAXIMin 550 milliGRAM(s) Oral two times a day      MEDICATIONS  (PRN):    acetaminophen     Tablet .. 650 milliGRAM(s) Oral every 6 hours PRN Temp greater or equal to 38.5C (101.3F)  dextrose Oral Gel 15 Gram(s) Oral once PRN Blood Glucose LESS THAN 70 milliGRAM(s)/deciliter  fentaNYL    Injectable 25 MICROGram(s) IV Push every 1 hour PRN vent dyssynchrony  ondansetron Injectable 4 milliGRAM(s) IV Push every 6 hours PRN Nausea and/or Vomiting  sodium chloride 0.9% lock flush 10 milliLiter(s) IV Push every 1 hour PRN Pre/post blood products, medications, blood draw, and to maintain line patency      ALLERGIES:    losartan (Angioedema)    REVIEW OF SYSTEMS:    Unable to obtain at this time      VITALS:    ICU Vital Signs Last 24 Hrs  T(C): 39 (20 May 2023 08:00), Max: 39 (20 May 2023 08:00)  T(F): 102.2 (20 May 2023 08:00), Max: 102.2 (20 May 2023 08:00)  HR: 113 (20 May 2023 08:00) (72 - 116)  BP: 95/52 (20 May 2023 08:00) (71/43 - 115/65)  BP(mean): 62 (20 May 2023 08:00) (49 - 81)  ABP: --  ABP(mean): --  RR: 28 (20 May 2023 08:00) (16 - 33)  SpO2: 98% (20 May 2023 08:00) (97% - 100%)    O2 Parameters below as of 20 May 2023 08:00  Patient On (Oxygen Delivery Method): ventilator  O2 Flow (L/min): 30      Mode: AC/ CMV (Assist Control/ Continuous Mandatory Ventilation)  RR (machine): 18  TV (machine): 400  FiO2: 30  PEEP: 6  ITime: 1  MAP: 9  PIP: 18        PHYSICAL:    Constitutional: ill appearing, sedated  Eyes: did not assess  ENT: tracheal intubation  Neck: supple without JVD  Pulmonary: intubated on ventilator   Cardiac: tachycardic rate, regular rhythm, normal S1S2  Vascular: b/l lower extremities with +2 edema  Abdomen: normoactive bowel sounds, soft and nontender; large abdominal surgical incision with dressing  Lymphatic: no peripheral adenopathy appreciated  Musculoskeletal: no obvious deformities   Skin: pale appearance  Neurology: sedated, unable to assess  Psychiatric: sedated, unable to assess      LABS:                                               7.6    9.20  )-----------( 34       ( 20 May 2023 04:36 )             23.1     05-20    143  |  108  |  46<H>  ----------------------------<  182<H>  3.9   |  25  |  2.66<H>    Ca    8.6      20 May 2023 04:36  Phos  3.5     05-20  Mg     2.0     05-20    TPro  5.4<L>  /  Alb  2.6<L>  /  TBili  7.0<H>  /  DBili  x   /  AST  421<H>  /  ALT  91<H>  /  AlkPhos  127<H>  05-20          RADIOLOGY & ADDITIONAL STUDIES:    EXAM:  CT ABDOMEN AND PELVIS IC     PROCEDURE DATE:  04/29/2023        INTERPRETATION:  CLINICAL INFORMATION: Upper GI bleed. History of gastric   cancer, GJ tube.    COMPARISON: 04/17/2023.    CONTRAST/COMPLICATIONS:  IV Contrast: Omnipaque 350  90 cc administered   10 cc discarded  Oral Contrast: NONE  Complications: None reported at time of study completion    PROCEDURE:  CT of the Abdomen and Pelvis was performed.  Precontrast, Arterial and Delayed phases were performed.  Sagittal and coronal reformats were performed.    FINDINGS:  LOWER CHEST: No lung nodules. Ill-defined increased haziness in the   paraesophageal region. Coronary artery calcification.    LIVER: Numerous hepatic metastases. A previously referenced confluent   lesion involving the segment 8 measures 9 x 8.7 cm, previously 7.9 x 8.3   cm (4:19).  BILE DUCTS: Normal caliber.  GALLBLADDER: Within normal limits.  SPLEEN: Within normal limits.  PANCREAS: Heterogeneously enhancing mass involving the distal body and   the tail of the pancreas measuring approximately 9.8 x 7 cm, previously   8.3 x 7.9 cm. Noncontrast imaging demonstrates hyperdense foci within the   lower portion of this mass that may be related to hemorrhagic content.   The mass cannot be completely  from the spleen and the stomach.  ADRENALS: Within normal limits.  KIDNEYS/URETERS: 2 mm non obstructing calculus lower pole of the left   kidney. No hydronephrosis.    BLADDER: Within normal limits.  REPRODUCTIVE ORGANS: Prostate is enlarged.    BOWEL: No bowel obstruction. Re demonstration of a gastrojejunostomy in   stable position. No endoluminal extravasation or pooling of the contrast   to suggest an active GI bleed.  PERITONEUM: No ascites.  VESSELS: Atherosclerotic changes. Probable occlusion of the splenic vein   with evidence of associated collateral circulation. Patent portal veins.  RETROPERITONEUM/LYMPH NODES: No lymphadenopathy.  ABDOMINAL WALL: Gastrostomy tube.  BONES: Degenerative changes.    IMPRESSION:  No evidence of an active GI bleed.  Large pancreatic mass and extensive hepatic metastases.  Gastrojejunostomy          US Kidney and Bladder (05.08.23 @ 15:51)     INTERPRETATION:  CLINICAL INFORMATION: History of gastric cancer.   Evaluate for renal injury.    COMPARISON: CT dated 04/29/2023    TECHNIQUE: Sonography of the kidneys and bladder.    FINDINGS:  Right kidney: 8.7 cm. No renal mass, hydronephrosis or calculi.    Left kidney: 8.5 cm. No renal mass, hydronephrosis or calculi.    Urinary bladder: Urinary bladder is contracted limiting its evaluation.    Incidentally noted are diffuse hepatic metastases as noted on the prior   CT.    IMPRESSION:  No hydronephrosis or renal mass is identified to suggest renal injury.   Please note that contrast enhanced CT is more sensitive for detection of   renal injury.    Hepatic metastasis.

## 2023-05-20 NOTE — PROGRESS NOTE ADULT - SUBJECTIVE AND OBJECTIVE BOX
Patient is a 68y old  Male who presents with a chief complaint of GIB (20 May 2023 08:57)    24 hour events:     Allergies    losartan (Angioedema)    Intolerances      REVIEW OF SYSTEMS: SEE BELOW       ICU Vital Signs Last 24 Hrs  T(C): 37.3 (20 May 2023 13:00), Max: 39 (20 May 2023 08:00)  T(F): 99.1 (20 May 2023 13:00), Max: 102.2 (20 May 2023 08:00)  HR: 76 (20 May 2023 14:00) (76 - 116)  BP: 93/64 (20 May 2023 14:00) (85/48 - 115/65)  BP(mean): 71 (20 May 2023 14:00) (56 - 76)  ABP: --  ABP(mean): --  RR: 17 (20 May 2023 14:00) (16 - 33)  SpO2: 100% (20 May 2023 14:00) (97% - 100%)    O2 Parameters below as of 20 May 2023 14:00    O2 Flow (L/min): 30          CAPILLARY BLOOD GLUCOSE      POCT Blood Glucose.: 196 mg/dL (20 May 2023 11:15)  POCT Blood Glucose.: 194 mg/dL (20 May 2023 06:31)  POCT Blood Glucose.: 202 mg/dL (19 May 2023 23:18)  POCT Blood Glucose.: 202 mg/dL (19 May 2023 18:51)      I&O's Summary    19 May 2023 07:01  -  20 May 2023 07:00  --------------------------------------------------------  IN: 4177 mL / OUT: 1905 mL / NET: 2272 mL        Mode: AC/ CMV (Assist Control/ Continuous Mandatory Ventilation)  RR (machine): 18  TV (machine): 400  FiO2: 30  PEEP: 6  ITime: 1  MAP: 9  PIP: 17      MEDICATIONS  (STANDING):  chlorhexidine 0.12% Liquid 15 milliLiter(s) Oral Mucosa every 12 hours  chlorhexidine 4% Liquid 1 Application(s) Topical <User Schedule>  dexMEDEtomidine Infusion 0.5 MICROgram(s)/kG/Hr (8.99 mL/Hr) IV Continuous <Continuous>  dextrose 5%. 1000 milliLiter(s) (50 mL/Hr) IV Continuous <Continuous>  dextrose 5%. 1000 milliLiter(s) (100 mL/Hr) IV Continuous <Continuous>  dextrose 50% Injectable 25 Gram(s) IV Push once  dextrose 50% Injectable 25 Gram(s) IV Push once  dextrose 50% Injectable 12.5 Gram(s) IV Push once  glucagon  Injectable 1 milliGRAM(s) IntraMuscular once  insulin lispro (ADMELOG) corrective regimen sliding scale   SubCutaneous every 6 hours  meropenem  IVPB 500 milliGRAM(s) IV Intermittent every 24 hours  metoclopramide Injectable 5 milliGRAM(s) IV Push every 6 hours  norepinephrine Infusion 0.05 MICROgram(s)/kG/Min (6.74 mL/Hr) IV Continuous <Continuous>  pantoprazole  Injectable 40 milliGRAM(s) IV Push every 12 hours  phenylephrine    Infusion 0.05 MICROgram(s)/kG/Min (0.67 mL/Hr) IV Continuous <Continuous>  rifAXIMin 550 milliGRAM(s) Oral two times a day      MEDICATIONS  (PRN):  acetaminophen     Tablet .. 650 milliGRAM(s) Oral every 6 hours PRN Temp greater or equal to 38.5C (101.3F)  dextrose Oral Gel 15 Gram(s) Oral once PRN Blood Glucose LESS THAN 70 milliGRAM(s)/deciliter  fentaNYL    Injectable 25 MICROGram(s) IV Push every 1 hour PRN vent dyssynchrony  ondansetron Injectable 4 milliGRAM(s) IV Push every 6 hours PRN Nausea and/or Vomiting  sodium chloride 0.9% lock flush 10 milliLiter(s) IV Push every 1 hour PRN Pre/post blood products, medications, blood draw, and to maintain line patency      PHYSICAL EXAM: SEE BELOW                          7.6    9.20  )-----------( 34       ( 20 May 2023 04:36 )             23.1       05-20    143  |  108  |  46<H>  ----------------------------<  182<H>  3.9   |  25  |  2.66<H>    Ca    8.6      20 May 2023 04:36  Phos  3.5     05-20  Mg     2.0     05-20    TPro  5.4<L>  /  Alb  2.6<L>  /  TBili  7.0<H>  /  DBili  x   /  AST  421<H>  /  ALT  91<H>  /  AlkPhos  127<H>  05-20          PT/INR - ( 20 May 2023 04:36 )   PT: 17.2 sec;   INR: 1.48 ratio         PTT - ( 18 May 2023 15:48 )  PTT:30.3 sec

## 2023-05-20 NOTE — PROGRESS NOTE ADULT - ASSESSMENT
67 y/o M with a MHX of neuroendocrine tumor, likely of GI/pancreatic origin, c/w gastric outlet obstruction, s/p GJ tube, p/w hematemesis    # Neuroendocrine Tumor with Liver Metastases & Pancreatic Mass    - Primary Onc Dr Ac Llamas    - Initially diagnosed in 2015, p/w abdominal pain and nausea scans revealed a large 20 cm abdominal mass   - Started on Sandostatin, and completed Lutathera about 1 year ago ---> scans end of 2022 noted POD; started back on Lutathera 12/07/2022  - Admitted to  01/2023 and 4/21-4/27 with concern for gastric outlet obstruction  - EGD 01/10/23 with severe esophagitis, no obvious obstruction seen with no stenting done  - S/p GJ tube placement   - Seen by Dr Siddiqi 04/12/23 with ongoing GOC / planning for a palliative procedure for a high gastric transection and Marcela-en-Y reconstruction; currently dependent on using J tube for feeds & G port to decompress; unable to tolerate PO intake   - CT AP 04/17/23 revealed diffuse bilobar liver metastasis which have progressed since 01/06/2023; large pancreatic mass, not significantly changed.  - SurgOnc following ---> s/p distal pancreatectomy, splenectomy, partial colectomy and colostomy creation 05/04 & 05/05  - All cancer directed therapy being held at this time as patient remains in critical condition requiring a lot of supportive care  - Continue supportive care and management per ICU and SurgOnc teams      # Normocytic Anemia in setting of Malignancy, Intraoperative Blood Loss and Previous Hematemesis     - Hgb down to 7.6 g/dL  - Patient had a 2.2L EBL; received 6U PRBC, 2FFP, 1 Platelets, 4L crystalloid intraoperatively and then 1U PRBC and 1U FFP post op, additional 1U PRBC 05/12  - FOBT negative on 04/24  - Iron studies completed; low sat  - B12 and folate normal  - Likely 2/2 advanced malignancy, dehydration malnutrition, decreased feedings, GIB and hypovolemic / distributive shock  - Continue intensive care measures  - Transfuse blood as necessary; keep Hgb >7.5 g/dL      # Thrombocytopenia     - Plts remain low but stable, 34K  - Inflammation / infection vs drug-induced thrombocytopenia (non-immune) most common causes of acute decline in platelets; other causes include immune-mediated platelet destruction / ITP, bone marrow suppression, or platelet consumption / destruction, hemodilution due to massive transfusion (recent hypovolemic shock 2/2 blood loss during surgery 05/04)  - Blood culture positive for enterobacter cloacae ---> started Meropenem  - Labs inconsistent with DIC (PT, PTT, INR normal, Fibrinogen normal)  - Heparin PF4 ab negative  - Hepatitis Panel and Rapid HIV negative  - No evidence of splenomegaly   - Etiology multifactorial in setting of acutely ill patient with underlying comorbidities including malignancy also following intraoperative blood loss  - Continue to trend serial CBCs using non EDTA anticoagulant platelet count, blue top  - Transfuse Plts as necessary; <10K or <20K with bleeding       # Hematemesis    - Likely from the malignancy and the ulcerated esophagus  - S/p EGD 01/10/23 reflux esophagitis with severely ulcerated and bleeding esophagus  - CT imaging 04/17 with diffuse bilobar liver metastasis which have progressed since 01/6/2023. Large pancreatic mass, not significantly changed.  - CT imaging 04/29 with no evidence of an active GI bleed. Large pancreatic mass and extensive hepatic metastases.      # ZO with Anuria    - Nephro following  - ZO, receiving HD      # Hepatic Encephalopathy    - Large tumor burden in liver  - S/p palliative surgery 05/04 & 05/05 with SurgOnc  - On Rifaximin 550 mg BID      # Failure to Thrive with Decreased Intake    - GI following  - S/p EGD 5/18 ---> cavitary abscess from perforation in stomach wall collecting with fistula to skin; drain and nasojejunal tube placed ---> okay for feeds  - Was receiving parenteral nutrition ---> switch to NJ feeds

## 2023-05-20 NOTE — PROGRESS NOTE ADULT - GASTROINTESTINAL COMMENTS
L sided colostomy, other 2 open wounds with ostomy bag, L sided TAYLA with purulent drainage, NJ tube in place

## 2023-05-20 NOTE — PROGRESS NOTE ADULT - SUBJECTIVE AND OBJECTIVE BOX
COVERING FOR DR EDWARDS et al /20 - remains intub, fever + , increased pressor requirements    5/19- intubated, s/p placement of feeding tube was found to have gastric perforation        MEDICATIONS  (STANDING):  chlorhexidine 0.12% Liquid 15 milliLiter(s) Oral Mucosa every 12 hours  chlorhexidine 4% Liquid 1 Application(s) Topical <User Schedule>  dexMEDEtomidine Infusion 0.5 MICROgram(s)/kG/Hr (8.99 mL/Hr) IV Continuous <Continuous>  dextrose 5%. 1000 milliLiter(s) (50 mL/Hr) IV Continuous <Continuous>  dextrose 5%. 1000 milliLiter(s) (100 mL/Hr) IV Continuous <Continuous>  dextrose 50% Injectable 25 Gram(s) IV Push once  dextrose 50% Injectable 25 Gram(s) IV Push once  dextrose 50% Injectable 12.5 Gram(s) IV Push once  glucagon  Injectable 1 milliGRAM(s) IntraMuscular once  insulin lispro (ADMELOG) corrective regimen sliding scale   SubCutaneous every 6 hours  metoclopramide Injectable 5 milliGRAM(s) IV Push every 6 hours  norepinephrine Infusion 0.05 MICROgram(s)/kG/Min (6.74 mL/Hr) IV Continuous <Continuous>  pantoprazole  Injectable 40 milliGRAM(s) IV Push every 12 hours  phenylephrine    Infusion 0.05 MICROgram(s)/kG/Min (0.67 mL/Hr) IV Continuous <Continuous>  rifAXIMin 550 milliGRAM(s) Oral two times a day    Vital Signs Last 24 Hrs  T(C): 36.7 (20 May 2023 21:30), Max: 39 (20 May 2023 08:00)  T(F): 98 (20 May 2023 21:30), Max: 102.2 (20 May 2023 08:00)  HR: 78 (20 May 2023 22:00) (69 - 116)  BP: 106/64 (20 May 2023 22:00) (85/48 - 109/71)  BP(mean): 74 (20 May 2023 22:00) (56 - 80)  RR: 15 (20 May 2023 22:00) (15 - 33)  SpO2: 100% (20 May 2023 22:00) (97% - 100%)    Parameters below as of 20 May 2023 22:00  Patient On (Oxygen Delivery Method): ventilator    O2 Concentration (%): 30  I&O's Detail    19 May 2023 07:01  -  20 May 2023 07:00  --------------------------------------------------------  IN:    Dexmedetomidine: 253 mL    Norepinephrine: 647 mL    Phenylephrine: 1145 mL    TPN (Total Parenteral Nutrition): 1813 mL    Vital1.5: 319 mL  Total IN: 4177 mL    OUT:    Bulb (mL): 30 mL    Colostomy (mL): 10 mL    Drain (mL): 665 mL    Gastrostomy Tube (mL): 1200 mL    Other (mL): 0 mL  Total OUT: 1905 mL    Total NET: 2272 mL      20 May 2023 07:01  -  20 May 2023 22:54  --------------------------------------------------------  IN:    Dexmedetomidine: 139 mL    Free Water: 50 mL    IV PiggyBack: 50 mL    Norepinephrine: 26 mL    Phenylephrine: 559 mL    Platelets - Single Donor: 225 mL    Vital1.5: 443 mL  Total IN: 1492 mL    OUT:    Bulb (mL): 40 mL    Colostomy (mL): 20 mL    Drain (mL): 510 mL    Gastrostomy Tube (mL): 500 mL  Total OUT: 1070 mL    Total NET: 422 mL          PHYSICAL EXAM:  GENERAL: intub on vent t  CHEST/LUNG: poor insp effort  HEART: S1S2  ABDOMEN: soft  EXTREMITIES: 2-3+ edema in LE.      LABS:              143    |  108    |  46     ----------------------------<  182       20 May 2023 04:36  3.9     |  25     |  2.66     142    |  107    |  52     ----------------------------<  234       19 May 2023 05:48  4.2     |  21     |  3.32     145    |  108    |  41     ----------------------------<  171       18 May 2023 21:52  3.6     |  21     |  3.18     Ca    8.6        20 May 2023 04:36  Ca    9.0        19 May 2023 05:48    Phos  3.5       20 May 2023 04:36  Phos  4.6       19 May 2023 05:48    Mg     2.0       20 May 2023 04:36  Mg     2.2       19 May 2023 05:48    TPro  5.4    /  Alb  2.6    /  TBili  7.0    /        20 May 2023 04:36  DBili  x      /  AST  421    /  ALT  91     /  AlkPhos  127      TPro  5.5    /  Alb  2.3    /  TBili  6.6    /        19 May 2023 05:48  DBili  x      /  AST  421    /  ALT  106    /  AlkPhos  132                               7.9    8.80  )-----------( 9        ( 20 May 2023 18:20 )             24.2                         7.6    9.20  )-----------( 34       ( 20 May 2023 04:36 )             23.1         RADIOLOGY & ADDITIONAL TESTS:

## 2023-05-21 NOTE — PROGRESS NOTE ADULT - SUBJECTIVE AND OBJECTIVE BOX
COVERING FOR DR Godfrey        remains intub, fever + , increased pressor requirements    69 y/o male with PMH neuroendocrine tumor of pancreas, GJ tube, HTN, HLD, DM2  Initially admitted with UGI bleed - no intervention   Found to have a large distal pancreatic mass   Underwent open distal pancreatectomy, splenectomy, partial colectomy, partial L adrenalectomy, temporary abdominal closure on 5/4  Unstable during procedure  Underwent abdominal washout and colostomy on 5/5  No active bleeding or abscess found   Underwent gastric drain and NJ tube placement by GI on 5/18   Course complicated by:   Severe sepsis with septic shock (not POA) due to abdominal source/abscess   Enterobacter bacteremia   ZO from [facility] now on dialysis   Acute resp failure   Acute metabolic encephalopathy   Intubated 5/4-6  Reintubated 5/18   Hemodialysis initiated 5/5      MEDICATIONS  (STANDING):  caspofungin IVPB 50 milliGRAM(s) IV Intermittent every 24 hours  chlorhexidine 0.12% Liquid 15 milliLiter(s) Oral Mucosa every 12 hours  chlorhexidine 4% Liquid 1 Application(s) Topical <User Schedule>  dexMEDEtomidine Infusion 0.5 MICROgram(s)/kG/Hr (8.99 mL/Hr) IV Continuous <Continuous>  dextrose 5%. 1000 milliLiter(s) (50 mL/Hr) IV Continuous <Continuous>  dextrose 5%. 1000 milliLiter(s) (100 mL/Hr) IV Continuous <Continuous>  dextrose 50% Injectable 25 Gram(s) IV Push once  dextrose 50% Injectable 12.5 Gram(s) IV Push once  dextrose 50% Injectable 25 Gram(s) IV Push once  glucagon  Injectable 1 milliGRAM(s) IntraMuscular once  insulin lispro (ADMELOG) corrective regimen sliding scale   SubCutaneous every 6 hours  meropenem  IVPB      metoclopramide Injectable 5 milliGRAM(s) IV Push every 6 hours  norepinephrine Infusion 0.05 MICROgram(s)/kG/Min (6.74 mL/Hr) IV Continuous <Continuous>  nystatin Cream 1 Application(s) Topical two times a day  pantoprazole  Injectable 40 milliGRAM(s) IV Push every 12 hours  phenylephrine    Infusion 0.05 MICROgram(s)/kG/Min (0.67 mL/Hr) IV Continuous <Continuous>  rifAXIMin 550 milliGRAM(s) Oral two times a day      Vital Signs Last 24 Hrs  T(C): 38 (21 May 2023 16:00), Max: 38 (21 May 2023 16:00)  T(F): 100.4 (21 May 2023 16:00), Max: 100.4 (21 May 2023 16:00)  HR: 104 (21 May 2023 18:00) (69 - 114)  BP: 97/57 (21 May 2023 18:00) (90/60 - 115/72)  BP(mean): 66 (21 May 2023 18:00) (64 - 83)  RR: 18 (21 May 2023 18:00) (13 - 28)  SpO2: 98% (21 May 2023 18:00) (98% - 100%)    Parameters below as of 21 May 2023 18:00  Patient On (Oxygen Delivery Method): ventilator    O2 Concentration (%): 30    I&O's Detail    20 May 2023 07:01  -  21 May 2023 07:00  --------------------------------------------------------  IN:    Dexmedetomidine: 314.4 mL    Free Water: 50 mL    IV PiggyBack: 50 mL    Norepinephrine: 52.1 mL    Phenylephrine: 1070.2 mL    Platelets - Single Donor: 225 mL    Vital1.5: 1034 mL  Total IN: 2795.7 mL    OUT:    Bulb (mL): 60 mL    Colostomy (mL): 120 mL    Drain (mL): 610 mL    Gastrostomy Tube (mL): 900 mL  Total OUT: 1690 mL    Total NET: 1105.7 mL      PHYSICAL EXAM:  GENERAL: intub on vent t  CHEST/LUNG: poor insp effort  HEART: S1S2  ABDOMEN: soft  EXTREMITIES: 3+ edema in LE.      LABS:                          8.6    9.28  )-----------( 51       ( 21 May 2023 06:12 )             26.8                         7.5    8.91  )-----------( 73       ( 21 May 2023 01:04 )             23.0         143    |  110    |  58     ----------------------------<  241       21 May 2023 06:12  4.5     |  21     |  3.33     143    |  108    |  46     ----------------------------<  182       20 May 2023 04:36  3.9     |  25     |  2.66     142    |  107    |  52     ----------------------------<  234       19 May 2023 05:48  4.2     |  21     |  3.32     Ca    9.0        21 May 2023 06:12  Ca    8.6        20 May 2023 04:36    Phos  4.1       21 May 2023 06:12  Phos  3.5       20 May 2023 04:36    Mg     2.3       21 May 2023 06:12  Mg     2.0       20 May 2023 04:36    TPro  5.7    /  Alb  2.4    /  TBili  6.2    /        21 May 2023 06:12  DBili  x      /  AST  319    /  ALT  84     /  AlkPhos  141      TPro  5.4    /  Alb  2.6    /  TBili  7.0    /        20 May 2023 04:36  DBili  x      /  AST  421    /  ALT  91     /  AlkPhos  127              RADIOLOGY & ADDITIONAL TESTS:

## 2023-05-21 NOTE — PROGRESS NOTE ADULT - ASSESSMENT
67 yo man with Hx of Neuroendocrine tumor/Pancreatic mass and liver mets.    ZO for now HD dependent   - last HD Friday , unable to tolerate fluid removal    - lytes and acid based stable - no acute indication for HD today    - daily labs to aseess function    - plan for next HD Monday w fluid removal as tolerated    Shock      pressors w fevers  Sepsis secondary to perforation leading to a cavitary abscess into the peritoneum.   - Drain placed with endoscopic guidance, going from the skin (sutured) through the cavitary abscess and into the stomach.     * pt seen this AM   , note now    d.w Dr Murray plan for HD tomorrow     Dr Harrington et al to resume care Monday    Prognosis poor

## 2023-05-21 NOTE — PROVIDER CONTACT NOTE (EICU) - BACKGROUND
69 yo male with PMHx metastatic neuroendocrine tumor of the pancreas, recently admitted with nonfunctioning GJ tube,   5/4: Patient went to the OR today for a Open distal pancreatectomy, splenectomy, partial colectomy, partial L adrenalectomy, temporary abdominal closure.  Patient had a 2.2 L EBL.  Intra Op had 6U PRBC, 2FFP, 1 Platelets, 4L crystalloid  Post Op 1U PRBC ans 1 U FFP.  Post OP vented, with poor UO, on levo and Vaso.
noted vent settings not matching.  corrected vent orders by placing RR 16 from 22.

## 2023-05-21 NOTE — PROVIDER CONTACT NOTE (EICU) - SITUATION
Provider Location: [facility].   Patient Location: [facility]
Ealerted by bedside RN to address hypokalemia and hypophosphatemia.

## 2023-05-21 NOTE — PROGRESS NOTE ADULT - ASSESSMENT
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  S/P distal pancreatectomy, splenectomy, partial colectomy  S/P abdominal washout and colostomy creation  Partial Midline wound dehiscence   on phenylephrine and leveophed  On midodrine    Plan:  Tube feeds @70cc  Monitor ostomy output  BID and PRN Dressing changes   Gastrotomy tube to intermittent suction  F/u Nephro for HD  wean off sedation as tolerated today  wean off vent  No plans at this time for surgical intervention  Optimize nutrition/TPN  Cont great Critical care management as per ICU team  GI reccs appreciated  Surgery will follow  Ongoing GOC discussion daily w/family    Plan discussed with Dr. Siddiqi

## 2023-05-21 NOTE — PROGRESS NOTE ADULT - SUBJECTIVE AND OBJECTIVE BOX
SURGERY DAILY PROGRESS NOTE:     Subjective:  Patient seen and examined at bedside during am rounds. Pt is intubated, sedated, on phenylephrine and levophed. Midline incision still with high output ascites. Ostomy now functional. No other acute events.     Objective:    Vital Signs Last 24 Hrs  T(C): 37.8 (21 May 2023 08:00), Max: 37.9 (21 May 2023 04:00)  T(F): 100 (21 May 2023 08:00), Max: 100.3 (21 May 2023 04:00)  HR: 106 (21 May 2023 10:00) (69 - 106)  BP: 115/72 (21 May 2023 10:00) (87/51 - 115/72)  BP(mean): 81 (21 May 2023 10:00) (59 - 83)  RR: 26 (21 May 2023 10:00) (13 - 26)  SpO2: 100% (21 May 2023 10:00) (99% - 100%)    Parameters below as of 21 May 2023 10:00  Patient On (Oxygen Delivery Method): ventilator    O2 Concentration (%): 30                        8.6    9.28  )-----------( 51       ( 21 May 2023 06:12 )             26.8     05-21    143  |  110<H>  |  58<H>  ----------------------------<  241<H>  4.5   |  21<L>  |  3.33<H>    Ca    9.0      21 May 2023 06:12  Phos  4.1     05-21  Mg     2.3     05-21    TPro  5.7<L>  /  Alb  2.4<L>  /  TBili  6.2<H>  /  DBili  x   /  AST  319<H>  /  ALT  84<H>  /  AlkPhos  141<H>  05-21    I&O's Summary    20 May 2023 07:01  -  21 May 2023 07:00  --------------------------------------------------------  IN: 2795.7 mL / OUT: 1690 mL / NET: 1105.7 mL          PHYSICAL EXAM   Gen: well-appearing, in no acute distress  CV: pulse regularly present   Resp: airway patent, non-labored breathing  Abdomen Ostomy pink and now has high stool output. Midline wound open superiorly and inferiorly with ascites drainage, colostomy bag covering the wounds dry gauze packing.  New drain on thru midline wound. No evidence of wound infection. lower aspect of incision dehisced with bowel visualized  Extremities: No swelling or tenderness

## 2023-05-21 NOTE — PROGRESS NOTE ADULT - SUBJECTIVE AND OBJECTIVE BOX
68M  metastatic neuroendocrine tumor of pancreas- sp distal pancreatectomy, splenectomy, partial colectomy, partial L adrenalectomy, colostomy.   CCT 32       Sedate on vent     Neuro: started precedex  vent synchrony, rifaxamin  for HE  CV:   Shock , septic/distributive  on Phenylephrine and low dose nor-epi titrating to MAP 70  add will d/w Dr Murray adding Proamatine   Pulm: Type 1 acute hypoxic respiratory failure  intubated 5/18.  No plans for extubation>  weak    GI: Off parenteral nutrition TF tolerated so far on reglan   Renal: IHD  nephrology following   ID: Enterobacter bacteremia  on Meropenem  Underwent gastric drain and NJ tube placement by GI on 5/18      PX is poor significant malnutrition.         68M  metastatic neuroendocrine tumor of pancreas- sp distal pancreatectomy, splenectomy, partial colectomy, partial L adrenalectomy, colostomy.   CCT 32       Sedate on vent     Neuro: started precedex  vent synchrony, rifaxamin  for HE  CV:   Shock , septic/distributive  on Phenylephrine and low dose nor-epi titrating to MAP 70  add will d/w Dr Murray adding Proamatine   Pulm: Type 1 acute hypoxic respiratory failure  intubated 5/18.  No plans for extubation>  weak    GI: Off parenteral nutrition TF tolerated so far on reglan   Renal: IHD  nephrology following   Heme s/p plt tx 5/20   thrombocytopenia multifactorial sepsis ?? abx related too.  will follow    ID: Enterobacter bacteremia  on Meropenem  Underwent gastric drain and NJ tube placement by GI on 5/18      PX is poor significant malnutrition.

## 2023-05-21 NOTE — PROGRESS NOTE ADULT - GASTROINTESTINAL COMMENTS
L sided colostomy with green stool, L sided TAYLA with purulent serosanguinous output, gastric drain with thick greenish yellow colored drainage

## 2023-05-21 NOTE — PROGRESS NOTE ADULT - SUBJECTIVE AND OBJECTIVE BOX
Patient is a 68y old  Male who presents with a chief complaint of GIB (21 May 2023 10:25)    24 hour events:     Allergies    losartan (Angioedema)    Intolerances      REVIEW OF SYSTEMS: SEE BELOW       ICU Vital Signs Last 24 Hrs  T(C): 37.8 (21 May 2023 08:00), Max: 37.9 (21 May 2023 04:00)  T(F): 100 (21 May 2023 08:00), Max: 100.3 (21 May 2023 04:00)  HR: 106 (21 May 2023 10:00) (69 - 106)  BP: 115/72 (21 May 2023 10:00) (87/51 - 115/72)  BP(mean): 81 (21 May 2023 10:00) (59 - 83)  ABP: --  ABP(mean): --  RR: 26 (21 May 2023 10:00) (13 - 26)  SpO2: 100% (21 May 2023 10:00) (99% - 100%)    O2 Parameters below as of 21 May 2023 10:00  Patient On (Oxygen Delivery Method): ventilator    O2 Concentration (%): 30        CAPILLARY BLOOD GLUCOSE      POCT Blood Glucose.: 285 mg/dL (21 May 2023 06:02)  POCT Blood Glucose.: 225 mg/dL (20 May 2023 23:47)  POCT Blood Glucose.: 232 mg/dL (20 May 2023 19:10)  POCT Blood Glucose.: 232 mg/dL (20 May 2023 18:00)      I&O's Summary    20 May 2023 07:01  -  21 May 2023 07:00  --------------------------------------------------------  IN: 2795.7 mL / OUT: 1690 mL / NET: 1105.7 mL        Mode: AC/ CMV (Assist Control/ Continuous Mandatory Ventilation)  RR (machine): 18  TV (machine): 400  FiO2: 30  PEEP: 6  ITime: 0.8  PIP: 18      MEDICATIONS  (STANDING):  caspofungin IVPB 50 milliGRAM(s) IV Intermittent every 24 hours  chlorhexidine 0.12% Liquid 15 milliLiter(s) Oral Mucosa every 12 hours  chlorhexidine 4% Liquid 1 Application(s) Topical <User Schedule>  dexMEDEtomidine Infusion 0.5 MICROgram(s)/kG/Hr (8.99 mL/Hr) IV Continuous <Continuous>  dextrose 5%. 1000 milliLiter(s) (100 mL/Hr) IV Continuous <Continuous>  dextrose 5%. 1000 milliLiter(s) (50 mL/Hr) IV Continuous <Continuous>  dextrose 50% Injectable 25 Gram(s) IV Push once  dextrose 50% Injectable 25 Gram(s) IV Push once  dextrose 50% Injectable 12.5 Gram(s) IV Push once  glucagon  Injectable 1 milliGRAM(s) IntraMuscular once  insulin lispro (ADMELOG) corrective regimen sliding scale   SubCutaneous every 6 hours  metoclopramide Injectable 5 milliGRAM(s) IV Push every 6 hours  norepinephrine Infusion 0.05 MICROgram(s)/kG/Min (6.74 mL/Hr) IV Continuous <Continuous>  pantoprazole  Injectable 40 milliGRAM(s) IV Push every 12 hours  phenylephrine    Infusion 0.05 MICROgram(s)/kG/Min (0.67 mL/Hr) IV Continuous <Continuous>  rifAXIMin 550 milliGRAM(s) Oral two times a day      MEDICATIONS  (PRN):  acetaminophen     Tablet .. 650 milliGRAM(s) Oral every 6 hours PRN Temp greater or equal to 38.5C (101.3F)  dextrose Oral Gel 15 Gram(s) Oral once PRN Blood Glucose LESS THAN 70 milliGRAM(s)/deciliter  fentaNYL    Injectable 25 MICROGram(s) IV Push every 1 hour PRN vent dyssynchrony  ondansetron Injectable 4 milliGRAM(s) IV Push every 6 hours PRN Nausea and/or Vomiting  sodium chloride 0.9% lock flush 10 milliLiter(s) IV Push every 1 hour PRN Pre/post blood products, medications, blood draw, and to maintain line patency      PHYSICAL EXAM: SEE BELOW                          8.6    9.28  )-----------( 51       ( 21 May 2023 06:12 )             26.8       05-21    143  |  110<H>  |  58<H>  ----------------------------<  241<H>  4.5   |  21<L>  |  3.33<H>    Ca    9.0      21 May 2023 06:12  Phos  4.1     05-21  Mg     2.3     05-21    TPro  5.7<L>  /  Alb  2.4<L>  /  TBili  6.2<H>  /  DBili  x   /  AST  319<H>  /  ALT  84<H>  /  AlkPhos  141<H>  05-21          PT/INR - ( 20 May 2023 04:36 )   PT: 17.2 sec;   INR: 1.48 ratio

## 2023-05-21 NOTE — PROGRESS NOTE ADULT - ASSESSMENT
69 y/o male with PMH neuroendocrine tumor of pancreas, GJ tube, HTN, HLD, DM2    Initially admitted with UGI bleed - no intervention   Found to have a large distal pancreatic mass     Underwent open distal pancreatectomy, splenectomy, partial colectomy, partial L adrenalectomy, temporary abdominal closure on 5/4  Unstable during procedure    Underwent abdominal washout and colostomy on 5/5  No active bleeding or abscess found     Underwent gastric drain and NJ tube placement by GI on 5/18       Course complicated by:     Severe sepsis with septic shock (not POA) due to abdominal source/abscess   Enterobacter bacteremia   ZO from [facility] now on dialysis   Acute resp failure   Acute metabolic encephalopathy     Intubated 5/4-6  Reintubated 5/18     Hemodialysis initiated 5/5    L subclavian CVC 5/14  RIJ HD cath 5/17       Plan:     Continue vent support, not a candidate for SBT   Precedex for synchrony   HD support with Calixto, Levo, Levo requirement stable   Repeat CT with persistent gastric leak, started on anti-fungal 5/20, fungitell pending   Continue meropenem   Fever curve improving, WBC stable   LFTs slightly improved   TF via NJ tube   Ostomy care   Dialysis per renal  Thrombocytopenia due to sepsis, received 1 Plt transfusion 5/20   Continue holding chemical DVT ppx    Patient critically ill with poor prognosis    [facility] [place] updated at bedside

## 2023-05-22 NOTE — PROGRESS NOTE ADULT - SUBJECTIVE AND OBJECTIVE BOX
Date of service: 05-19-23 @ 11:29    pt seen and examined   s/p endoscopic drain placement from skin to cavitary abscess and into the stomach and placement of NJ tube  post op intubated, on pressor support  febrile to 101  weak appearing      ROS: unable to obtain d/t medical condition      PE:  Constitutional: frail looking  HEENT: NC/AT, EOMI, PERRLA, conjunctivae clear; ears and nose atraumatic; pharynx benign  Neck: supple; thyroid not palpable  Back: no tenderness  Respiratory: decreased breath sounds, rales   Cardiovascular: S1S2 regular, no murmurs  Abdomen: soft, not tender, not distended, positive BS; + ostomy + peg tube midline abd wound with drainage  Genitourinary: no suprapubic tenderness  Lymphatic: no LN palpable  Musculoskeletal: no muscle tenderness, no joint swelling or tenderness  Extremities: no pedal edema  Neurological/ Psychiatric: no focal deficits   Skin: no rashes; no palpable lesions    Labs: all available labs reviewed                                              8.1    7.80  )-----------( 47       ( 19 May 2023 05:48 )             24.7     05-19    142  |  107  |  52<H>  ----------------------------<  234<H>  4.2   |  21<L>  |  3.32<H>    Ca    9.0      19 May 2023 05:48  Phos  4.6     05-19  Mg     2.2     05-19    TPro  5.5<L>  /  Alb  2.3<L>  /  TBili  6.6<H>  /  DBili  x   /  AST  421<H>  /  ALT  106<H>  /  AlkPhos  132<H>  05-19        Culture - Blood (05.12.23 @ 09:01)   - Enterobacter cloacae complex: Detec  Gram Stain: Culture - Blood (05.12.23 @ 08:59)   Gram Stain:   Growth in aerobic bottle: Gram Negative Rods   Growth in anaerobic bottle: Gram Negative Rods  Specimen Source: .Blood None  Culture Results:   Growth in aerobic bottle: Gram Negative Rods   Growth in anaerobic bottle: Gram Negative Rods     Radiology: all available radiological tests reviewed    < from: Xray Chest 1 View- PORTABLE-Urgent (Xray Chest 1 View- PORTABLE-Urgent .) (05.11.23 @ 13:48) >    ACC: [id number] EXAM:  XR CHEST PORTABLE URGENT 1V   ORDERED BY: PRIYA HALL     PROCEDURE DATE:  05/11/2023          INTERPRETATION:  INDICATION: Dialysis catheter insertion    Portable chest 1:28 PM    COMPARISON: 5/6/2023    FINDINGS:  Heart/Vascular: The heart size, mediastinum, hilum and aorta are stable.  Pulmonary: Midline trachea. There is mild pulmonary venous congestion and   low lung volumes.    Bones: There is no fracture.  Lines and catheter: Tip of the right IJ dialysis catheteris in the right   atrium. Tip and side-port of NG tube are in the body of the stomach.    Impression:    There is mild pulmonary venous congestion and low lung volumes.    Tip of the right IJ dialysis catheter is in the right atrium. There is no   pneumothorax.          < end of copied text >  < from: US Kidney and Bladder (05.08.23 @ 15:51) >    ACC: [id number] EXAM:  US KIDNEYS AND BLADDER   ORDERED BY: GWENDOLYN SINGH     PROCEDURE DATE:  05/08/2023          INTERPRETATION:  CLINICAL INFORMATION: History of gastric cancer.   Evaluate for renal injury.    COMPARISON: CT dated 04/29/2023    TECHNIQUE: Sonography of the kidneys and bladder.    FINDINGS:  Right kidney: 8.7 cm. No renal mass, hydronephrosis or calculi.    Left kidney: 8.5 cm. No renal mass, hydronephrosis or calculi.    Urinary bladder: Urinary bladder is contracted limiting its evaluation.    Incidentally noted are diffuse hepatic metastases as noted on the prior   CT.    IMPRESSION:  No hydronephrosis or renal mass is identified to suggest renal injury.   Please note that contrast enhanced CT is more sensitive for detection of   renal injury.    Hepatic metastasis.        < end of copied text >    Advanced directives addressed: full resuscitation Date of service: 05-22-23 @ 13:39    pt seen and examined   s/p endoscopic drain placement from skin to cavitary abscess and into the stomach and placement of NJ tube  on ventilatory support reintubated on 5/18  on pressor support  having low grade fevers   weak appearing    ROS: unable to obtain d/t medical condition    MEDICATIONS  (STANDING):  caspofungin IVPB 50 milliGRAM(s) IV Intermittent every 24 hours  chlorhexidine 0.12% Liquid 15 milliLiter(s) Oral Mucosa every 12 hours  chlorhexidine 4% Liquid 1 Application(s) Topical <User Schedule>  dexMEDEtomidine Infusion 0.5 MICROgram(s)/kG/Hr (8.99 mL/Hr) IV Continuous <Continuous>  dextrose 5%. 1000 milliLiter(s) (100 mL/Hr) IV Continuous <Continuous>  dextrose 5%. 1000 milliLiter(s) (50 mL/Hr) IV Continuous <Continuous>  dextrose 50% Injectable 12.5 Gram(s) IV Push once  dextrose 50% Injectable 25 Gram(s) IV Push once  dextrose 50% Injectable 25 Gram(s) IV Push once  glucagon  Injectable 1 milliGRAM(s) IntraMuscular once  insulin lispro (ADMELOG) corrective regimen sliding scale   SubCutaneous every 6 hours  meropenem  IVPB      meropenem  IVPB 500 milliGRAM(s) IV Intermittent every 12 hours  metoclopramide Injectable 5 milliGRAM(s) IV Push every 6 hours  norepinephrine Infusion 0.05 MICROgram(s)/kG/Min (6.74 mL/Hr) IV Continuous <Continuous>  nystatin Cream 1 Application(s) Topical two times a day  pantoprazole  Injectable 40 milliGRAM(s) IV Push every 12 hours  phenylephrine    Infusion 0.05 MICROgram(s)/kG/Min (0.67 mL/Hr) IV Continuous <Continuous>  rifAXIMin 550 milliGRAM(s) Oral two times a day    Vital Signs Last 24 Hrs  T(C): 36.9 (22 May 2023 10:26), Max: 38 (21 May 2023 16:00)  T(F): 98.4 (22 May 2023 10:26), Max: 100.4 (21 May 2023 16:00)  HR: 104 (22 May 2023 13:37) (92 - 117)  BP: 90/51 (22 May 2023 13:37) (90/51 - 113/68)  BP(mean): 61 (22 May 2023 13:37) (58 - 78)  RR: 18 (22 May 2023 13:37) (15 - 30)  SpO2: 100% (22 May 2023 13:37) (94% - 100%)    Parameters below as of 22 May 2023 13:25  Patient On (Oxygen Delivery Method): ventilator    O2 Concentration (%): 30      PE:  Constitutional: frail looking  HEENT: NC/AT, EOMI, PERRLA, conjunctivae clear; ears and nose atraumatic; pharynx benign  Neck: supple; thyroid not palpable  Back: no tenderness  Respiratory: decreased breath sounds, rales   Cardiovascular: S1S2 regular, no murmurs  Abdomen: soft, not tender, not distended, positive BS; + ostomy + peg tube midline abd wound with drainage  Genitourinary: no suprapubic tenderness  Lymphatic: no LN palpable  Musculoskeletal: no muscle tenderness, no joint swelling or tenderness  Extremities: no pedal edema  Neurological/ Psychiatric: no focal deficits   Skin: no rashes; no palpable lesions    Labs: all available labs reviewed                                              7.7    8.64  )-----------( 43       ( 22 May 2023 05:22 )             23.2     05-22    145  |  111<H>  |  66<H>  ----------------------------<  238<H>  4.6   |  23  |  3.73<H>    Ca    9.0      22 May 2023 05:22  Phos  5.1     05-22  Mg     2.3     05-22    TPro  5.3<L>  /  Alb  2.0<L>  /  TBili  5.2<H>  /  DBili  x   /  AST  246<H>  /  ALT  74  /  AlkPhos  182<H>  05-22           Culture - Blood (05.12.23 @ 09:01)   - Enterobacter cloacae complex: Detec  Gram Stain: Culture - Blood (05.12.23 @ 08:59)   Gram Stain:   Growth in aerobic bottle: Gram Negative Rods   Growth in anaerobic bottle: Gram Negative Rods  Specimen Source: .Blood None  Culture Results:   Growth in aerobic bottle: Gram Negative Rods   Growth in anaerobic bottle: Gram Negative Rods     Radiology: all available radiological tests reviewed    < from: Xray Chest 1 View- PORTABLE-Urgent (Xray Chest 1 View- PORTABLE-Urgent .) (05.11.23 @ 13:48) >    ACC: [id number] EXAM:  XR CHEST PORTABLE URGENT 1V   ORDERED BY: PRIYA HALL     PROCEDURE DATE:  05/11/2023          INTERPRETATION:  INDICATION: Dialysis catheter insertion    Portable chest 1:28 PM    COMPARISON: 5/6/2023    FINDINGS:  Heart/Vascular: The heart size, mediastinum, hilum and aorta are stable.  Pulmonary: Midline trachea. There is mild pulmonary venous congestion and   low lung volumes.    Bones: There is no fracture.  Lines and catheter: Tip of the right IJ dialysis catheteris in the right   atrium. Tip and side-port of NG tube are in the body of the stomach.    Impression:    There is mild pulmonary venous congestion and low lung volumes.    Tip of the right IJ dialysis catheter is in the right atrium. There is no   pneumothorax.          < end of copied text >  < from: US Kidney and Bladder (05.08.23 @ 15:51) >    ACC: [id number] EXAM:  US KIDNEYS AND BLADDER   ORDERED BY: GWENDOLYN SINGH     PROCEDURE DATE:  05/08/2023          INTERPRETATION:  CLINICAL INFORMATION: History of gastric cancer.   Evaluate for renal injury.    COMPARISON: CT dated 04/29/2023    TECHNIQUE: Sonography of the kidneys and bladder.    FINDINGS:  Right kidney: 8.7 cm. No renal mass, hydronephrosis or calculi.    Left kidney: 8.5 cm. No renal mass, hydronephrosis or calculi.    Urinary bladder: Urinary bladder is contracted limiting its evaluation.    Incidentally noted are diffuse hepatic metastases as noted on the prior   CT.    IMPRESSION:  No hydronephrosis or renal mass is identified to suggest renal injury.   Please note that contrast enhanced CT is more sensitive for detection of   renal injury.    Hepatic metastasis.        < end of copied text >    Advanced directives addressed: full resuscitation

## 2023-05-22 NOTE — PROGRESS NOTE ADULT - SUBJECTIVE AND OBJECTIVE BOX
SURGERY DAILY PROGRESS NOTE:     Subjective:  Patient seen and examined at bedside during am rounds, intubated and sedated on Precedex and phenylephrine/levophed. AVSS. Denies any fevers, chills, n/v/d, chest pain or shortness of breath    Objective:    MEDICATIONS  (STANDING):  caspofungin IVPB 50 milliGRAM(s) IV Intermittent every 24 hours  chlorhexidine 0.12% Liquid 15 milliLiter(s) Oral Mucosa every 12 hours  chlorhexidine 4% Liquid 1 Application(s) Topical <User Schedule>  dexMEDEtomidine Infusion 0.5 MICROgram(s)/kG/Hr (8.99 mL/Hr) IV Continuous <Continuous>  dextrose 5%. 1000 milliLiter(s) (50 mL/Hr) IV Continuous <Continuous>  dextrose 5%. 1000 milliLiter(s) (100 mL/Hr) IV Continuous <Continuous>  dextrose 50% Injectable 25 Gram(s) IV Push once  dextrose 50% Injectable 12.5 Gram(s) IV Push once  dextrose 50% Injectable 25 Gram(s) IV Push once  glucagon  Injectable 1 milliGRAM(s) IntraMuscular once  insulin glargine Injectable (LANTUS) 20 Unit(s) SubCutaneous once  insulin lispro (ADMELOG) corrective regimen sliding scale   SubCutaneous every 6 hours  meropenem  IVPB 500 milliGRAM(s) IV Intermittent every 12 hours  meropenem  IVPB      metoclopramide Injectable 5 milliGRAM(s) IV Push every 6 hours  norepinephrine Infusion 0.05 MICROgram(s)/kG/Min (6.74 mL/Hr) IV Continuous <Continuous>  nystatin Cream 1 Application(s) Topical two times a day  pantoprazole  Injectable 40 milliGRAM(s) IV Push every 12 hours  phenylephrine    Infusion 0.05 MICROgram(s)/kG/Min (0.67 mL/Hr) IV Continuous <Continuous>  rifAXIMin 550 milliGRAM(s) Oral two times a day    MEDICATIONS  (PRN):  acetaminophen     Tablet .. 650 milliGRAM(s) Oral every 6 hours PRN Temp greater or equal to 38.5C (101.3F)  dextrose Oral Gel 15 Gram(s) Oral once PRN Blood Glucose LESS THAN 70 milliGRAM(s)/deciliter  fentaNYL    Injectable 25 MICROGram(s) IV Push every 1 hour PRN vent dyssynchrony  ondansetron Injectable 4 milliGRAM(s) IV Push every 6 hours PRN Nausea and/or Vomiting  sodium chloride 0.9% lock flush 10 milliLiter(s) IV Push every 1 hour PRN Pre/post blood products, medications, blood draw, and to maintain line patency      Vital Signs Last 24 Hrs  T(C): 38 (22 May 2023 04:00), Max: 38 (21 May 2023 16:00)  T(F): 100.4 (22 May 2023 04:00), Max: 100.4 (21 May 2023 16:00)  HR: 95 (22 May 2023 09:00) (91 - 117)  BP: 108/61 (22 May 2023 09:00) (94/60 - 115/72)  BP(mean): 70 (22 May 2023 09:00) (64 - 81)  RR: 16 (22 May 2023 09:00) (14 - 30)  SpO2: 100% (22 May 2023 09:00) (98% - 100%)    Parameters below as of 22 May 2023 04:00  Patient On (Oxygen Delivery Method): ventilator    O2 Concentration (%): 30      PHYSICAL EXAM   Gen: intubated and sedated   CV: pulse regularly present   Resp: airway patent on MV  Abdomen: soft, Ostomy pink with stool in bag. Midline wound open superiorly and inferiorly with ascites drainage, colostomy bag covering the wounds dry gauze packing.  New drain on thru midline wound. No evidence of wound infection. lower aspect of incision dehisced with bowel visualized  Extremities: No swelling or tenderness    I&O's Detail    21 May 2023 07:01  -  22 May 2023 07:00  --------------------------------------------------------  IN:    Dexmedetomidine: 266.7 mL    Free Water: 50 mL    IV PiggyBack: 400 mL    Norepinephrine: 72.9 mL    Phenylephrine: 1102.2 mL    Vital1.5: 1370 mL  Total IN: 3261.7 mL    OUT:    Bulb (mL): 70 mL    Colostomy (mL): 350 mL    Drain (mL): 450 mL    Gastrostomy Tube (mL): 700 mL    Nasogastric/Oral tube (mL): 300 mL  Total OUT: 1870 mL    Total NET: 1391.7 mL          Daily     Daily Weight in k.5 (22 May 2023 06:00)    LABS:                        7.7    8.64  )-----------( 43       ( 22 May 2023 05:22 )             23.2         145  |  111<H>  |  66<H>  ----------------------------<  238<H>  4.6   |  23  |  3.73<H>    Ca    9.0      22 May 2023 05:22  Phos  5.1       Mg     2.3         TPro  5.3<L>  /  Alb  2.0<L>  /  TBili  5.2<H>  /  DBili  x   /  AST  246<H>  /  ALT  74  /  AlkPhos  182<H>            RADIOLOGY & ADDITIONAL STUDIES:    ASSESSMENT/PLAN:

## 2023-05-22 NOTE — PROGRESS NOTE ADULT - ASSESSMENT
67 yo male with PMHx metastatic neuroendocrine tumor of the pancreas, recently admitted with nonfunctioning GJ tube (patient is chronically obstructed). Patient was admitted with severe heartburn, and has had several episodes of vomiting small amount of dark blood. Patient was discharged on Thursday. Hgb unchanged. CT angiography has no active bleeding, but demonstrates extensive tumor in distal pancreas.  Patient went to the OR 5/4 for a Open distal pancreatectomy, splenectomy, partial colectomy, partial L adrenalectomy, temporary abdominal closure.  Patient had a 2.2 L EBL.  Intra Op had 6U PRBC, 2FFP, 1 Platelets, 4L crystalloid  Post Op 1U PRBC ans 1 U FFP.  Post OP vented, with poor UO, on levo and Vaso.  5/5 Pt went back to OR and had closure and colostomy. Hospital course c/b  fever lethargy, encephalopathic, on pressors, dec hgb was dialyzed 5/11 560 cc from bulb, had ? episode emesis overnight, imaging shows some congestion, concern for asp pna raised - started on IV vancomycin/zosyn.    1. Acute respiratory failure. Septic shock with Enterobacter. Abd ascites/Post op pneumoperitoneum. Aspiration pneumonitis/pneumonia. Metastatic neuroendocrine pancreatic tumor s/p surgery. ZO on HD  - imaging reviewed from 5/17 has b/l atelectasis, L aspiration pna  - s/p drain into cavitary abscess and NJ tube placement 5/18  - intubated/on pressors/febrile  - blood cx growing MDR enterobacter source likely gut translocation   - CT abd/pelvis post surgical changes pneumoperitoneum/enteritis; surgery f/u noted  - s/p IV zosyn [id number] #4 --> 5/12  - on merrem [id number] #7  - continue with antibiotic coverage   - tolerating abx well so far; no side effects noted  - reason for abx use and side effects reviewed with patient  - aspiration precautions  - contact precautions   - fu cbc    2. other issues - care per medicine  67 yo male with PMHx metastatic neuroendocrine tumor of the pancreas, recently admitted with nonfunctioning GJ tube (patient is chronically obstructed). Patient was admitted with severe heartburn, and has had several episodes of vomiting small amount of dark blood. Patient was discharged on Thursday. Hgb unchanged. CT angiography has no active bleeding, but demonstrates extensive tumor in distal pancreas.  Patient went to the OR 5/4 for a Open distal pancreatectomy, splenectomy, partial colectomy, partial L adrenalectomy, temporary abdominal closure.  Patient had a 2.2 L EBL.  Intra Op had 6U PRBC, 2FFP, 1 Platelets, 4L crystalloid  Post Op 1U PRBC ans 1 U FFP.  Post OP vented, with poor UO, on levo and Vaso.  5/5 Pt went back to OR and had closure and colostomy. Hospital course c/b  fever lethargy, encephalopathic, on pressors, dec hgb was dialyzed 5/11 560 cc from bulb, had ? episode emesis overnight, imaging shows some congestion, concern for asp pna raised - started on IV vancomycin/zosyn.    1. Acute respiratory failure. Septic shock with Enterobacter. Abd ascites/Post op pneumoperitoneum. Aspiration pneumonitis/pneumonia. Metastatic neuroendocrine pancreatic tumor s/p surgery. ZO on HD  - imaging reviewed from 5/17 has b/l atelectasis, L aspiration pna  - s/p drain into cavitary abscess and NJ tube placement 5/18  - intubated/on pressors/febrile  - blood cx growing MDR enterobacter source likely gut translocation   - CT abd/pelvis post surgical changes pneumoperitoneum/enteritis; surgery f/u noted  - s/p IV zosyn [id number] #4 --> 5/12  - on merrem [id number] #10  - on caspofungin 50mg q24h #3  - continue with antibiotic/antifungal coverage   - tolerating abx well so far; no side effects noted  - reason for abx use and side effects reviewed with patient  - aspiration precautions  - contact precautions   - fu cbc    2. other issues - care per medicine

## 2023-05-22 NOTE — PROGRESS NOTE ADULT - ASSESSMENT
69 y/o male with PMH neuroendocrine tumor of pancreas, GJ tube, HTN, HLD, DM2    Initially admitted with UGI bleed - no intervention   Found to have a large distal pancreatic mass     Underwent open distal pancreatectomy, splenectomy, partial colectomy, partial L adrenalectomy, temporary abdominal closure on 5/4  Unstable during procedure    Underwent abdominal washout and colostomy on 5/5  No active bleeding or abscess found     Underwent gastric drain and NJ tube placement by GI on 5/18       Course complicated by:     Severe sepsis with septic shock (not POA) due to abdominal source/abscess   Enterobacter bacteremia   ZO from [facility] now on dialysis   Acute resp failure   Acute metabolic encephalopathy     Intubated 5/4-6  Reintubated 5/18     Hemodialysis initiated 5/5    L subclavian CVC 5/14  RIJ HD cath 5/17       Plan:     Taper off Precedex    HD support with Calixto, Levo, Levo requirement stable   Vent support, will try SBT after dialysis, he is not ready for extubation given HD instability and agonal breathing pattern   Continue meropenem and caspofungin, persistent low grade fever, WBC is stable, d/w ID   Surgery f/u   Dialysis today, try UF if tolerates   TF via NJT  LFTs slightly improved   Ostomy care   Thrombocytopenia stable   Hyperglycemia - increase Lantus   Continue holding chemical DVT ppx    Patient critically ill with poor prognosis    Wife Mana updated at bedside

## 2023-05-22 NOTE — PROGRESS NOTE ADULT - SUBJECTIVE AND OBJECTIVE BOX
Patient is a 68y old  Male who presents with a chief complaint of GIB (22 May 2023 09:51)    24 hour events:     Allergies    losartan (Angioedema)    Intolerances      REVIEW OF SYSTEMS: SEE BELOW       ICU Vital Signs Last 24 Hrs  T(C): 36.9 (22 May 2023 10:26), Max: 38 (21 May 2023 16:00)  T(F): 98.4 (22 May 2023 10:26), Max: 100.4 (21 May 2023 16:00)  HR: 99 (22 May 2023 12:35) (91 - 117)  BP: 102/57 (22 May 2023 12:35) (94/49 - 113/68)  BP(mean): 68 (22 May 2023 12:35) (58 - 78)  ABP: --  ABP(mean): --  RR: 18 (22 May 2023 12:35) (14 - 30)  SpO2: 100% (22 May 2023 12:35) (94% - 100%)    O2 Parameters below as of 22 May 2023 12:35  Patient On (Oxygen Delivery Method): ventilator    O2 Concentration (%): 30        CAPILLARY BLOOD GLUCOSE      POCT Blood Glucose.: 262 mg/dL (22 May 2023 12:15)  POCT Blood Glucose.: 231 mg/dL (22 May 2023 05:21)  POCT Blood Glucose.: 238 mg/dL (21 May 2023 23:05)  POCT Blood Glucose.: 289 mg/dL (21 May 2023 17:34)  POCT Blood Glucose.: 293 mg/dL (21 May 2023 12:52)      I&O's Summary    21 May 2023 07:01  -  22 May 2023 07:00  --------------------------------------------------------  IN: 3261.7 mL / OUT: 1870 mL / NET: 1391.7 mL        Mode: AC/ CMV (Assist Control/ Continuous Mandatory Ventilation)  RR (machine): 16  TV (machine): 400  FiO2: 30  PEEP: 6  ITime: 0.8  MAP: 7  PIP: 18      MEDICATIONS  (STANDING):  caspofungin IVPB 50 milliGRAM(s) IV Intermittent every 24 hours  chlorhexidine 0.12% Liquid 15 milliLiter(s) Oral Mucosa every 12 hours  chlorhexidine 4% Liquid 1 Application(s) Topical <User Schedule>  dexMEDEtomidine Infusion 0.5 MICROgram(s)/kG/Hr (8.99 mL/Hr) IV Continuous <Continuous>  dextrose 5%. 1000 milliLiter(s) (50 mL/Hr) IV Continuous <Continuous>  dextrose 5%. 1000 milliLiter(s) (100 mL/Hr) IV Continuous <Continuous>  dextrose 50% Injectable 25 Gram(s) IV Push once  dextrose 50% Injectable 25 Gram(s) IV Push once  dextrose 50% Injectable 12.5 Gram(s) IV Push once  glucagon  Injectable 1 milliGRAM(s) IntraMuscular once  insulin lispro (ADMELOG) corrective regimen sliding scale   SubCutaneous every 6 hours  meropenem  IVPB 500 milliGRAM(s) IV Intermittent every 12 hours  meropenem  IVPB      metoclopramide Injectable 5 milliGRAM(s) IV Push every 6 hours  norepinephrine Infusion 0.05 MICROgram(s)/kG/Min (6.74 mL/Hr) IV Continuous <Continuous>  nystatin Cream 1 Application(s) Topical two times a day  pantoprazole  Injectable 40 milliGRAM(s) IV Push every 12 hours  phenylephrine    Infusion 0.05 MICROgram(s)/kG/Min (0.67 mL/Hr) IV Continuous <Continuous>  rifAXIMin 550 milliGRAM(s) Oral two times a day      MEDICATIONS  (PRN):  acetaminophen     Tablet .. 650 milliGRAM(s) Oral every 6 hours PRN Temp greater or equal to 38.5C (101.3F)  albumin human 25% IVPB 50 milliLiter(s) IV Intermittent every 1 hour PRN low b/p  dextrose Oral Gel 15 Gram(s) Oral once PRN Blood Glucose LESS THAN 70 milliGRAM(s)/deciliter  fentaNYL    Injectable 25 MICROGram(s) IV Push every 1 hour PRN vent dyssynchrony  ondansetron Injectable 4 milliGRAM(s) IV Push every 6 hours PRN Nausea and/or Vomiting  sodium chloride 0.9% lock flush 10 milliLiter(s) IV Push every 1 hour PRN Pre/post blood products, medications, blood draw, and to maintain line patency      PHYSICAL EXAM: SEE BELOW                          7.7    8.64  )-----------( 43       ( 22 May 2023 05:22 )             23.2       05-22    145  |  111<H>  |  66<H>  ----------------------------<  238<H>  4.6   |  23  |  3.73<H>    Ca    9.0      22 May 2023 05:22  Phos  5.1     05-22  Mg     2.3     05-22    TPro  5.3<L>  /  Alb  2.0<L>  /  TBili  5.2<H>  /  DBili  x   /  AST  246<H>  /  ALT  74  /  AlkPhos  182<H>  05-22

## 2023-05-22 NOTE — PROGRESS NOTE ADULT - ASSESSMENT
69 yo man with Hx of Neuroendocrine tumor/Pancreatic mass and liver mets.  Now for palliative resection of pancreatic mass.    --ZO with variable creat level and unclear CKD.    Likely dependent on volume status.    Limited intake even with Enteral feeding.      Start gentle hydration.  -- : continue Hale drainage.  --Hx of light chain nephropathy  --No contraindication for surgery from renal standpoint.    5/4  s/p Palliative surgery for neuroendocrine mass  will watch for ZO, pt with low UO immediate post op  s/p multiple PRBC and FFP transfusion and volume resuscitation    5/5  patient anuric, K 5.2  On bicarb drip  to go back to OR today  case d/w Dr Jean and Dr Siddiqi  Will HD x 2 hrs to correct potassium  >10 L infused in blood products and fluids  More fluids not indicated, UO 50 ml  no fluid removal on HD  Hep profile ordered    5/6  Ostomy created  will monitor i/o  labs stable   no need for HD today  d/w intensivist  will monitor daily for hd requirement    5/7  seen earlier on HD   tolerating well  TPN  d/w Dr Jean  d/w pts fam at bedside    5/8 MK   - ZO with anuria, remains HD dependent with hx of light chain nephropathy    tolerating hd     dc femoral shiley today after hd   - enceph monitor response to hd and xifaxan and lactulose    5/9 MK   - ZO with anuria, remains HD dependent with hx of light chain nephropathy    no indication for hd    off pressor     monitor electrolytes on TPN     NG in place for TF initiation   - enceph, improving monitor response to hd and xifaxan and lactulose    ostomy fx with liquid stool  dw dr marley     5/10 SY  --ZO : remains oligoanuric.     Follow with repeat bladder scans--no retention.    Will hold HD today and assess daily.    Trial of gentle hydration due to increased fluid loss.  --Metabolic acidosis : trial of HCO3 in addition to TPN   --Monitor encephalopathy.    5/11 SY  --ZO : remains oligoanuric.    Renal parameters further increased with no improvement in acidosis with HCO3 infusion.    D/w pt's wife over phone.    Explained his poor prognosis from Oncology aspect due to large tumor burden in liver and that surgery was palliative in nature.    Explained added burden of continued dialysis adding to pt's suffering at this point.    Pt's wife requested not to proceed with HD catheter placement and dialysis until she can d/w Dr Siddiqi and rest of family.    Will discuss further later today.    5/12  HD yesterday, planned for HD Sat  case d/w family, and ID, pts nurse  Dr Velez covering this weekend    5/13 SY  --ZO : remains oligoanuric.   HD today : HD order and tx reviewed.     --Positive fluid balance due to hypotension.  RR at 30 today.   Check follow up CXR.  Will plan for 1-2 kg UF with HD if tolerated.  --Continue enteral feeding.  --D/w Dr Mcclendon : positive blood culture/ GNR-- pre current HD catheter.   Plan HD today and on Monday,, then remove catheter for bacteremia clearance.    5/14 SY  --ZO : reamins dialysis dependent for now.    Plan HD in am and remove temporary HD catheter due to bacteremia to allow clearance.  --CT with patch lower lung inf : concern for PNA : continue abtx.  --Continue enteral feeding.  --Enterobacter bacteremia : continue abtx.    5/15 MK   - ZO for now HD dependent    tolerating hd and will attempt uf with hd   - enterobacter sepsis on meropenem renally dosed, aspiration pna     for dc of [place] post hd today   - anemia transfusion ongoing   - FEN On vitality with ostomy output noted     5/16 MK   - ZO Hd dependent    for hd in am with replacement of catheter to occur   - enterobacter sepsis on meropenem, renally dosed     asp pna   - anemia h/h stable   - FEN: on d10 ng tube pulled out    5/17 SY  --ZO : continue TIW HD--will maintain on MWF schedule this week.  --Metabolic acidosis : mainly due to GI loss,  will need to correct with HD.  --Enterobacter bacteremia : continue meropenem  --Post TPN.  NGT pulled out.  For J tube placement.    5/18 SY  --ZO : Continue HD support .    Next tx in am.  Will attempt to start UF with HD as tolerated   --Metabolic acidosis : due to GI loss : increase HCO3 in dialysate  --Enterobacter bacteremia : continue Meropenem  --Continue TPN for now.    5/19  Operative findings:  "Sepsis secondary to perforation leading to a cavitary abscess into the peritoneum.   - Drain placed with endoscopic guidance, going from the skin (sutured) through the cavitary abscess and into the stomach.   - Placement of NJ tube"  For HD today   orders outlined  will follow for tolerance   Dr Daniels covering the weekend                 67 yo man with Hx of Neuroendocrine tumor/Pancreatic mass and liver mets.  Now for palliative resection of pancreatic mass.    --ZO with variable creat level and unclear CKD.    Likely dependent on volume status.    Limited intake even with Enteral feeding.      Start gentle hydration.  -- : continue Hale drainage.  --Hx of light chain nephropathy  --No contraindication for surgery from renal standpoint.    5/4  s/p Palliative surgery for neuroendocrine mass  will watch for ZO, pt with low UO immediate post op  s/p multiple PRBC and FFP transfusion and volume resuscitation    5/5  patient anuric, K 5.2  On bicarb drip  to go back to OR today  case d/w Dr Jean and Dr Siddiqi  Will HD x 2 hrs to correct potassium  >10 L infused in blood products and fluids  More fluids not indicated, UO 50 ml  no fluid removal on HD  Hep profile ordered    5/6  Ostomy created  will monitor i/o  labs stable   no need for HD today  d/w intensivist  will monitor daily for hd requirement    5/7  seen earlier on HD   tolerating well  TPN  d/w Dr Jean  d/w pts fam at bedside    5/8 MK   - ZO with anuria, remains HD dependent with hx of light chain nephropathy    tolerating hd     dc femoral shiley today after hd   - enceph monitor response to hd and xifaxan and lactulose    5/9 MK   - ZO with anuria, remains HD dependent with hx of light chain nephropathy    no indication for hd    off pressor     monitor electrolytes on TPN     NG in place for TF initiation   - enceph, improving monitor response to hd and xifaxan and lactulose    ostomy fx with liquid stool  dw dr marley     5/10 SY  --ZO : remains oligoanuric.     Follow with repeat bladder scans--no retention.    Will hold HD today and assess daily.    Trial of gentle hydration due to increased fluid loss.  --Metabolic acidosis : trial of HCO3 in addition to TPN   --Monitor encephalopathy.    5/11 SY  --ZO : remains oligoanuric.    Renal parameters further increased with no improvement in acidosis with HCO3 infusion.    D/w pt's wife over phone.    Explained his poor prognosis from Oncology aspect due to large tumor burden in liver and that surgery was palliative in nature.    Explained added burden of continued dialysis adding to pt's suffering at this point.    Pt's wife requested not to proceed with HD catheter placement and dialysis until she can d/w Dr Siddiqi and rest of family.    Will discuss further later today.    5/12  HD yesterday, planned for HD Sat  case d/w family, and ID, pts nurse  Dr Velez covering this weekend    5/13 SY  --ZO : remains oligoanuric.   HD today : HD order and tx reviewed.     --Positive fluid balance due to hypotension.  RR at 30 today.   Check follow up CXR.  Will plan for 1-2 kg UF with HD if tolerated.  --Continue enteral feeding.  --D/w Dr Mcclendon : positive blood culture/ GNR-- pre current HD catheter.   Plan HD today and on Monday,, then remove catheter for bacteremia clearance.    5/14 SY  --ZO : reamins dialysis dependent for now.    Plan HD in am and remove temporary HD catheter due to bacteremia to allow clearance.  --CT with patch lower lung inf : concern for PNA : continue abtx.  --Continue enteral feeding.  --Enterobacter bacteremia : continue abtx.    5/15 MK   - ZO for now HD dependent    tolerating hd and will attempt uf with hd   - enterobacter sepsis on meropenem renally dosed, aspiration pna     for dc of [place] post hd today   - anemia transfusion ongoing   - FEN On vitality with ostomy output noted     5/16 MK   - ZO Hd dependent    for hd in am with replacement of catheter to occur   - enterobacter sepsis on meropenem, renally dosed     asp pna   - anemia h/h stable   - FEN: on d10 ng tube pulled out    5/17 SY  --ZO : continue TIW HD--will maintain on MWF schedule this week.  --Metabolic acidosis : mainly due to GI loss,  will need to correct with HD.  --Enterobacter bacteremia : continue meropenem  --Post TPN.  NGT pulled out.  For J tube placement.    5/18 SY  --ZO : Continue HD support .    Next tx in am.  Will attempt to start UF with HD as tolerated   --Metabolic acidosis : due to GI loss : increase HCO3 in dialysate  --Enterobacter bacteremia : continue Meropenem  --Continue TPN for now.    5/19  Operative findings:  "Sepsis secondary to perforation leading to a cavitary abscess into the peritoneum.   - Drain placed with endoscopic guidance, going from the skin (sutured) through the cavitary abscess and into the stomach.   - Placement of NJ tube"  For HD today   orders outlined  will follow for tolerance   Dr Daniels covering the weekend    5/;22 MK   - ZO/ATN with anasaraca     will attempt uf with HD today and monitor response, spa with hd, titration of pressors as needed    if tolerates HD, plan for HD in am   - sepsis as outlined above, now on antifungal   dw dr lopes and HD rn

## 2023-05-22 NOTE — PROGRESS NOTE ADULT - REASON FOR ADMISSION
GIB Topical Sulfur Applications Pregnancy And Lactation Text: This medication is Pregnancy Category C and has an unknown safety profile during pregnancy. It is unknown if this topical medication is excreted in breast milk.

## 2023-05-22 NOTE — PROGRESS NOTE ADULT - SUBJECTIVE AND OBJECTIVE BOX
NEPHROLOGY INTERVAL HPI/OVERNIGHT EVENTS:  23 @ 09:51    - intubated, s/p placement of feeding tube was found to have gastric perforation  --Awake.  No acute distress. Appears very frail and shallow breathing   For OR for placement of feeding tube.  Restarted on TPN.  --Awake.  No acute distress.  for OR tomorrow for J tube placement.   HD catheter removed yesterday, more confused removed NG tube   5/15  750 ostomy output with low bs on d10 at 100, unclear if absorbing, on solo pressors   --No acute distress.  No urine output.   Drains 1700cc.  colostomy 800cc  --Awake but lethargic.  Unclear if in pain.  RR 30.  On NG feeding.  No urine output  - seen w family and ID at bedside, plans for HD discussed w family  --Lethargic.  No acute distress.   Colostomy output 1500cc.  No UO.  5/10--No acute distress.  calm, off sedation and off pressor.  Colostomy output 250cc and Drain 850cc.  Oligoanuric.   more alert and awake, ostomy fx pressors off since this am unable to complete hd yesterday and only received 1/2 due to malfunctioning cathtere    pulled after HD    seen at hd, events noted, anuric, TPN infusing with lipids, on solo dec dose of pressors, encepha with starting of lactulose and xifaxin   - no new events, anuric, CO2 trending down   s/p OR yesterday, ostomy creation  - case d/w surgery and intensivist , anuric k 5.2 will dialyze preop, no fluid removal  - pt seen in PACU, PRBC and FFP, and LR resuscitation noted, coulter in place ~ 100 cc urine in coulter bag  case d/w pts nurse   Chart quote, operative note ""Open distal pancreatectomy, splenectomy, partial colectomy, partial L adrenalectomy, temporary abdominal closure; Large varices, 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 Liver laden with diffuse metastases and very friable, RP oozy. 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  Treated with Lutathera one year ago and restarted in 2022  G-J tube placed for nutrition in January due to chronic dysphagia and severe esophagitis on EGD.  Post recent  admission due to G tube clogging.  D/mansi home on .    Returned to ED due to vomiting when night time enteral feeding via G tube started.  CT with IVC done due to concern for GIB.  Pt reports chronic self catheterization due to urinary retention.   ZO improved when catheterization started.  Follows mainly with EMELY Cortez.   Pt has been self cath'ing 3-4 x /day.   Indwelling Coulter placed by EMELY due to resistance with blood clots with resistance reported by pt.  For palliative mass resection tomorrow.    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  Creat has been variable during previous admissions--ranging 1.1-2.2.    PMHX and PSHX.  --Neuroendocrine tumor of pancreas with mets to liver --dx 6 years ago.  --GJ tube for nutrition in 2023 due to chronic dysphagia.  --Hx of HTN  --Hx of DM  --Hx of light chain disease --unclear hx and no hx of renal biopsy.    MEDICATIONS  (STANDING):  caspofungin IVPB 50 milliGRAM(s) IV Intermittent every 24 hours  chlorhexidine 0.12% Liquid 15 milliLiter(s) Oral Mucosa every 12 hours  chlorhexidine 4% Liquid 1 Application(s) Topical <User Schedule>  dexMEDEtomidine Infusion 0.5 MICROgram(s)/kG/Hr (8.99 mL/Hr) IV Continuous <Continuous>  dextrose 5%. 1000 milliLiter(s) (100 mL/Hr) IV Continuous <Continuous>  dextrose 5%. 1000 milliLiter(s) (50 mL/Hr) IV Continuous <Continuous>  dextrose 50% Injectable 25 Gram(s) IV Push once  dextrose 50% Injectable 12.5 Gram(s) IV Push once  dextrose 50% Injectable 25 Gram(s) IV Push once  glucagon  Injectable 1 milliGRAM(s) IntraMuscular once  insulin glargine Injectable (LANTUS) 20 Unit(s) SubCutaneous once  insulin lispro (ADMELOG) corrective regimen sliding scale   SubCutaneous every 6 hours  meropenem  IVPB 500 milliGRAM(s) IV Intermittent every 12 hours  meropenem  IVPB      metoclopramide Injectable 5 milliGRAM(s) IV Push every 6 hours  norepinephrine Infusion 0.05 MICROgram(s)/kG/Min (6.74 mL/Hr) IV Continuous <Continuous>  nystatin Cream 1 Application(s) Topical two times a day  pantoprazole  Injectable 40 milliGRAM(s) IV Push every 12 hours  phenylephrine    Infusion 0.05 MICROgram(s)/kG/Min (0.67 mL/Hr) IV Continuous <Continuous>  rifAXIMin 550 milliGRAM(s) Oral two times a day    MEDICATIONS  (PRN):  acetaminophen     Tablet .. 650 milliGRAM(s) Oral every 6 hours PRN Temp greater or equal to 38.5C (101.3F)  dextrose Oral Gel 15 Gram(s) Oral once PRN Blood Glucose LESS THAN 70 milliGRAM(s)/deciliter  fentaNYL    Injectable 25 MICROGram(s) IV Push every 1 hour PRN vent dyssynchrony  ondansetron Injectable 4 milliGRAM(s) IV Push every 6 hours PRN Nausea and/or Vomiting  sodium chloride 0.9% lock flush 10 milliLiter(s) IV Push every 1 hour PRN Pre/post blood products, medications, blood draw, and to maintain line patency      Allergies    losartan (Angioedema)    Intolerances        I&O's Detail    21 May 2023 07:01  -  22 May 2023 07:00  --------------------------------------------------------  IN:    Dexmedetomidine: 266.7 mL    Free Water: 50 mL    IV PiggyBack: 400 mL    Norepinephrine: 72.9 mL    Phenylephrine: 1102.2 mL    Vital1.5: 1370 mL  Total IN: 3261.7 mL    OUT:    Bulb (mL): 70 mL    Colostomy (mL): 350 mL    Drain (mL): 450 mL    Gastrostomy Tube (mL): 700 mL    Nasogastric/Oral tube (mL): 300 mL  Total OUT: 1870 mL    Total NET: 1391.7 mL          .    Patient was seen and evaluated on dialysis.   Patient is tolerating the procedure well.   Continue dialysis:   Dialyzer:          QB:        QD:   Goal UF ___ over ___ Hours       Vital Signs Last 24 Hrs  T(C): 38 (22 May 2023 04:00), Max: 38 (21 May 2023 16:00)  T(F): 100.4 (22 May 2023 04:00), Max: 100.4 (21 May 2023 16:00)  HR: 95 (22 May 2023 09:00) (91 - 117)  BP: 108/61 (22 May 2023 09:00) (94/60 - 115/72)  BP(mean): 70 (22 May 2023 09:00) (64 - 81)  RR: 16 (22 May 2023 09:00) (14 - 30)  SpO2: 100% (22 May 2023 09:00) (98% - 100%)    Parameters below as of 22 May 2023 04:00  Patient On (Oxygen Delivery Method): ventilator    O2 Concentration (%): 30  Daily     Daily Weight in k.5 (22 May 2023 06:00)    PHYSICAL EXAM:  General: alert. awake Ox3  HEENT: MMM  CV: s1s2 rrr  LUNGS: B/L CTA  EXT: no edema    LABS:                        7.7    8.64  )-----------( 43       ( 22 May 2023 05:22 )             23.2         145  |  111<H>  |  66<H>  ----------------------------<  238<H>  4.6   |  23  |  3.73<H>    Ca    9.0      22 May 2023 05:22  Phos  5.1       Mg     2.3         TPro  5.3<L>  /  Alb  2.0<L>  /  TBili  5.2<H>  /  DBili  x   /  AST  246<H>  /  ALT  74  /  AlkPhos  182<H>          Magnesium, Serum: 2.3 mg/dL ( @ 05:22)  Phosphorus Level, Serum: 5.1 mg/dL ( @ 05:22)       NEPHROLOGY INTERVAL HPI/OVERNIGHT EVENTS:  - @ 09:51     events noted, remains on dual pressors at steady dose with vitality TF infusing   - intubated, s/p placement of feeding tube was found to have gastric perforation  --Awake.  No acute distress. Appears very frail and shallow breathing   For OR for placement of feeding tube.  Restarted on TPN.  --Awake.  No acute distress.  for OR tomorrow for J tube placement.   HD catheter removed yesterday, more confused removed NG tube   5/15  750 ostomy output with low bs on d10 at 100, unclear if absorbing, on solo pressors   --No acute distress.  No urine output.   Drains 1700cc.  colostomy 800cc  --Awake but lethargic.  Unclear if in pain.  RR 30.  On NG feeding.  No urine output  - seen w family and ID at bedside, plans for HD discussed w family  --Lethargic.  No acute distress.   Colostomy output 1500cc.  No UO.  5/10--No acute distress.  calm, off sedation and off pressor.  Colostomy output 250cc and Drain 850cc.  Oligoanuric.   more alert and awake, ostomy fx pressors off since this am unable to complete hd yesterday and only received 1/2 due to malfunctioning cathtere    pulled after HD    seen at hd, events noted, anuric, TPN infusing with lipids, on solo dec dose of pressors, encepha with starting of lactulose and xifaxin   - no new events, anuric, CO2 trending down   s/p OR yesterday, ostomy creation  - case d/w surgery and intensivist , anuric k 5.2 will dialyze preop, no fluid removal  - pt seen in PACU, PRBC and FFP, and LR resuscitation noted, coulter in place ~ 100 cc urine in coulter bag  case d/w pts nurse   Chart quote, operative note ""Open distal pancreatectomy, splenectomy, partial colectomy, partial L adrenalectomy, temporary abdominal closure; Large varices, 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 Liver laden with diffuse metastases and very friable, RP oozy. Patient on pressors at the end of the case, and hence, decided to leave colon in discontinuity with temporary closure.""    HPI:  69 yo man with Neuroendocrine tumor of pancreas with mets to liver dx'd 6 years ago.  Treated with Lutathera one year ago and restarted in 2022  G-J tube placed for nutrition in January due to chronic dysphagia and severe esophagitis on EGD.  Post recent  admission due to G tube clogging.  D/mansi home on .    Returned to ED due to vomiting when night time enteral feeding via G tube started.  CT with IVC done due to concern for GIB.  Pt reports chronic self catheterization due to urinary retention.   ZO improved when catheterization started.  Follows mainly with EMELY Cortez.   Pt has been self cath'ing 3-4 x /day.   Indwelling Coulter placed by EMELY due to resistance with blood clots with resistance reported by pt.  For palliative mass resection tomorrow.    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  Creat has been variable during previous admissions--ranging 1.1-2.2.    PMHX and PSHX.  --Neuroendocrine tumor of pancreas with mets to liver --dx 6 years ago.  --GJ tube for nutrition in 2023 due to chronic dysphagia.  --Hx of HTN  --Hx of DM  --Hx of light chain disease --unclear hx and no hx of renal biopsy.    MEDICATIONS  (STANDING):  caspofungin IVPB 50 milliGRAM(s) IV Intermittent every 24 hours  chlorhexidine 0.12% Liquid 15 milliLiter(s) Oral Mucosa every 12 hours  chlorhexidine 4% Liquid 1 Application(s) Topical <User Schedule>  dexMEDEtomidine Infusion 0.5 MICROgram(s)/kG/Hr (8.99 mL/Hr) IV Continuous <Continuous>  dextrose 5%. 1000 milliLiter(s) (100 mL/Hr) IV Continuous <Continuous>  dextrose 5%. 1000 milliLiter(s) (50 mL/Hr) IV Continuous <Continuous>  dextrose 50% Injectable 25 Gram(s) IV Push once  dextrose 50% Injectable 12.5 Gram(s) IV Push once  dextrose 50% Injectable 25 Gram(s) IV Push once  glucagon  Injectable 1 milliGRAM(s) IntraMuscular once  insulin glargine Injectable (LANTUS) 20 Unit(s) SubCutaneous once  insulin lispro (ADMELOG) corrective regimen sliding scale   SubCutaneous every 6 hours  meropenem  IVPB 500 milliGRAM(s) IV Intermittent every 12 hours  meropenem  IVPB      metoclopramide Injectable 5 milliGRAM(s) IV Push every 6 hours  norepinephrine Infusion 0.05 MICROgram(s)/kG/Min (6.74 mL/Hr) IV Continuous <Continuous>  nystatin Cream 1 Application(s) Topical two times a day  pantoprazole  Injectable 40 milliGRAM(s) IV Push every 12 hours  phenylephrine    Infusion 0.05 MICROgram(s)/kG/Min (0.67 mL/Hr) IV Continuous <Continuous>  rifAXIMin 550 milliGRAM(s) Oral two times a day    MEDICATIONS  (PRN):  acetaminophen     Tablet .. 650 milliGRAM(s) Oral every 6 hours PRN Temp greater or equal to 38.5C (101.3F)  dextrose Oral Gel 15 Gram(s) Oral once PRN Blood Glucose LESS THAN 70 milliGRAM(s)/deciliter  fentaNYL    Injectable 25 MICROGram(s) IV Push every 1 hour PRN vent dyssynchrony  ondansetron Injectable 4 milliGRAM(s) IV Push every 6 hours PRN Nausea and/or Vomiting  sodium chloride 0.9% lock flush 10 milliLiter(s) IV Push every 1 hour PRN Pre/post blood products, medications, blood draw, and to maintain line patency      Allergies    losartan (Angioedema)    Intolerances        I&O's Detail    21 May 2023 07:01  -  22 May 2023 07:00  --------------------------------------------------------  IN:    Dexmedetomidine: 266.7 mL    Free Water: 50 mL    IV PiggyBack: 400 mL    Norepinephrine: 72.9 mL    Phenylephrine: 1102.2 mL    Vital1.5: 1370 mL  Total IN: 3261.7 mL    OUT:    Bulb (mL): 70 mL    Colostomy (mL): 350 mL    Drain (mL): 450 mL    Gastrostomy Tube (mL): 700 mL    Nasogastric/Oral tube (mL): 300 mL  Total OUT: 1870 mL    Total NET: 1391.7 mL          .    Patient was seen and evaluated on dialysis.   Patient is tolerating the procedure well.   Continue dialysis:   Dialyzer:      f180 k protocol with uf goal of 3.5 kg today     Vital Signs Last 24 Hrs  T(C): 38 (22 May 2023 04:00), Max: 38 (21 May 2023 16:00)  T(F): 100.4 (22 May 2023 04:00), Max: 100.4 (21 May 2023 16:00)  HR: 95 (22 May 2023 09:00) (91 - 117)  BP: 108/61 (22 May 2023 09:00) (94/60 - 115/72)  BP(mean): 70 (22 May 2023 09:00) (64 - 81)  RR: 16 (22 May 2023 09:00) (14 - 30)  SpO2: 100% (22 May 2023 09:00) (98% - 100%)    Parameters below as of 22 May 2023 04:00  Patient On (Oxygen Delivery Method): ventilator    O2 Concentration (%): 30  Daily     Daily Weight in k.5 (22 May 2023 06:00)    PHYSICAL EXAM:  General: sedated  HEENT: orally intubated  CV: s1s2 rrr  LUNGS: B/L CTA  EXT: 4+ edema     LABS:                        7.7    8.64  )-----------( 43       ( 22 May 2023 05:22 )             23.2         145  |  111<H>  |  66<H>  ----------------------------<  238<H>  4.6   |  23  |  3.73<H>    Ca    9.0      22 May 2023 05:22  Phos  5.1       Mg     2.3         TPro  5.3<L>  /  Alb  2.0<L>  /  TBili  5.2<H>  /  DBili  x   /  AST  246<H>  /  ALT  74  /  AlkPhos  182<H>          Magnesium, Serum: 2.3 mg/dL ( @ 05:22)  Phosphorus Level, Serum: 5.1 mg/dL ( @ 05:22)

## 2023-05-22 NOTE — PROGRESS NOTE ADULT - ASSESSMENT
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  S/P distal pancreatectomy, splenectomy, partial colectomy  S/P abdominal washout and colostomy creation  Partial Midline wound dehiscence   on phenylephrine and leveophed  ostomy functional    Plan:  Tube feeds @70cc  Monitor ostomy output  BID and PRN Dressing changes   Gastrotomy tube to intermittent suction  F/u Nephro for HD  wean off sedation as tolerated   wean off vent  No plans at this time for surgical intervention  Optimize nutrition/TPN  Cont great Critical care management as per ICU team  GI reccs appreciated  Surgery will follow  Ongoing GOC discussion daily w/family    Plan discussed with Dr. Black

## 2023-05-23 NOTE — PROGRESS NOTE ADULT - ASSESSMENT
69 y/o male with PMH neuroendocrine tumor of pancreas, GJ tube, HTN, HLD, DM2    Initially admitted with UGI bleed - no intervention   Found to have a large distal pancreatic mass     Underwent open distal pancreatectomy, splenectomy, partial colectomy, partial L adrenalectomy, temporary abdominal closure on 5/4  Unstable during procedure    Underwent abdominal washout and colostomy on 5/5  No active bleeding or abscess found     Underwent gastric drain and NJ tube placement by GI on 5/18       Course complicated by:     Severe sepsis with septic shock (not POA) due to abdominal source/abscess   Enterobacter bacteremia   ZO from ATN now on dialysis   Acute resp failure   Acute metabolic encephalopathy     Intubated 5/4-6  Reintubated 5/18     Hemodialysis initiated 5/5    L subclavian CVC 5/14  RIJ HD cath 5/17       Plan:     Taper off Precedex    HD support with Calixto, Levo, Levo requirement stable   Vent support, will try SBT after dialysis, he is not ready for extubation given HD instability and agonal breathing pattern   Continue meropenem and caspofungin, persistent low grade fever, WBC is stable, d/w ID   Surgery f/u   Dialysis today, try UF if tolerates   TF via NJT  LFTs slightly improved   Ostomy care   Thrombocytopenia stable   Hyperglycemia - increase Lantus   Continue holding chemical DVT ppx    Patient critically ill with poor prognosis    Wife Mana updated at bedside  69 y/o male with PMH neuroendocrine tumor of pancreas, GJ tube, HTN, HLD, DM2    Initially admitted with UGI bleed - no intervention   Found to have a large distal pancreatic mass     Underwent open distal pancreatectomy, splenectomy, partial colectomy, partial L adrenalectomy, temporary abdominal closure on 5/4  Unstable during procedure    Underwent abdominal washout and colostomy on 5/5  No active bleeding or abscess found     Underwent gastric drain and NJ tube placement by GI on 5/18       Course complicated by:     Severe sepsis with septic shock (not POA) due to abdominal source/abscess   Enterobacter bacteremia   ZO from ATN now on dialysis   Acute resp failure   Acute metabolic encephalopathy     Intubated 5/4-6  Reintubated 5/18     Hemodialysis initiated 5/5    L subclavian CVC 5/14  RIJ HD cath 5/17       Plan:     Off Precedex   Remains on Calixto, Levo, Levo requirement increased with dialysis   Tolerating SBT/PSV, undergoing dialysis now with target UF 3.5L, will reassess later for extubation   Continue meropenem and caspofungin, WBC is stable, d/w ID   TF via NJT  Monitor LFTs  Ostomy care   Thrombocytopenia stable, no active bleeding   Hyperglycemia improved, continue Lantus   Continue holding chemical DVT ppx    Patient critically ill with poor prognosis    Family updated at bedside

## 2023-05-23 NOTE — PROGRESS NOTE ADULT - GASTROINTESTINAL COMMENTS
Naveed is a 12 year old male who presents to walk in care for a week of worsening sinus symptoms.  Complaining of sinus pressure, cough and purulent drainage.  Not having any wheeze, hemoptysis, orthopnea or chest pain. Denies weight loss, recent travel.  Not responding to OTC medications.      Other ROS:  Fevers:No  Tobacco Use:No    No past medical history on file.  Allergies:  ALLERGIES: no known allergies.    OBJECTIVE:  Visit Vitals  /70   Pulse 71   Temp 98.3 °F (36.8 °C) (Tympanic)   Resp 16   Wt 64 kg (141 lb)   SpO2 99%     Nontoxic in appearance, normal mental status.  Cranial nerves intact    Ears:  No canal or TM erythema  Conjunctiva- clear  Positive paranasal tenderness  Nasal Mucosa- pale, boggy, purulent rhinorhea noted    Oropharynx- moist mucus membranes, erythematous- some drainage noted  Airways patents  Neck supple, some anterior cervical lymphadenopathy noted.    No meningeal signs    Lungs- some cough, but essentially clear- no wheexing noted  CVS- S1 S2 present, no rubs murmur or gallop noted    Assesement/Plan:    Sinusitis    Discussed this with the patient.  Recommend symptomatic measures, including OTC decongestants and/or antihistamines.    Current Outpatient Medications   Medication Sig   • methylpheniDATE (RITALIN) 10 MG tablet        Side effects of all medications were discussed.  Patient is instructed to follow up in 2-3 days or as needed.  Patient is to go to the emergency room for any significant change or worsening.  Patient was discharged in a stable condition and was in agreement with the plan.  No further questions.    Visit Vitals  /70   Pulse 71   Temp 98.3 °F (36.8 °C) (Tympanic)   Resp 16   Wt 64 kg (141 lb)   SpO2 99%      multiple ostomies and drains

## 2023-05-23 NOTE — PROGRESS NOTE ADULT - ASSESSMENT
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  S/P distal pancreatectomy, splenectomy, partial colectomy  S/P abdominal washout and colostomy creation  Partial Midline wound dehiscence   on and off pressors  more awake with sedation held  ostomy functional    Plan:  Tube feeds @70cc  Monitor ostomy output  BID and PRN Dressing changes   Gastrotomy tube to intermittent suction  F/u Nephro for HD  wean off sedation as tolerated   wean off vent  No plans at this time for surgical intervention  Optimize nutrition/TPN  Cont great Critical care management as per ICU team  GI reccs appreciated  Surgery will follow  Ongoing GOC discussion daily w/family    Plan discussed with Dr. Black

## 2023-05-23 NOTE — PROGRESS NOTE ADULT - ASSESSMENT
69 yo man with Hx of Neuroendocrine tumor/Pancreatic mass and liver mets.  Now for palliative resection of pancreatic mass.    --ZO with variable creat level and unclear CKD.    Likely dependent on volume status.    Limited intake even with Enteral feeding.      Start gentle hydration.  -- : continue Hale drainage.  --Hx of light chain nephropathy  --No contraindication for surgery from renal standpoint.    5/4  s/p Palliative surgery for neuroendocrine mass  will watch for ZO, pt with low UO immediate post op  s/p multiple PRBC and FFP transfusion and volume resuscitation    5/5  patient anuric, K 5.2  On bicarb drip  to go back to OR today  case d/w Dr Jean and Dr Siddiqi  Will HD x 2 hrs to correct potassium  >10 L infused in blood products and fluids  More fluids not indicated, UO 50 ml  no fluid removal on HD  Hep profile ordered    5/6  Ostomy created  will monitor i/o  labs stable   no need for HD today  d/w intensivist  will monitor daily for hd requirement    5/7  seen earlier on HD   tolerating well  TPN  d/w Dr Jean  d/w pts fam at bedside    5/8 MK   - ZO with anuria, remains HD dependent with hx of light chain nephropathy    tolerating hd     dc femoral shiley today after hd   - enceph monitor response to hd and xifaxan and lactulose    5/9 MK   - ZO with anuria, remains HD dependent with hx of light chain nephropathy    no indication for hd    off pressor     monitor electrolytes on TPN     NG in place for TF initiation   - enceph, improving monitor response to hd and xifaxan and lactulose    ostomy fx with liquid stool  dw dr marley     5/10 SY  --ZO : remains oligoanuric.     Follow with repeat bladder scans--no retention.    Will hold HD today and assess daily.    Trial of gentle hydration due to increased fluid loss.  --Metabolic acidosis : trial of HCO3 in addition to TPN   --Monitor encephalopathy.    5/11 SY  --ZO : remains oligoanuric.    Renal parameters further increased with no improvement in acidosis with HCO3 infusion.    D/w pt's wife over phone.    Explained his poor prognosis from Oncology aspect due to large tumor burden in liver and that surgery was palliative in nature.    Explained added burden of continued dialysis adding to pt's suffering at this point.    Pt's wife requested not to proceed with HD catheter placement and dialysis until she can d/w Dr Siddiqi and rest of family.    Will discuss further later today.    5/12  HD yesterday, planned for HD Sat  case d/w family, and ID, pts nurse  Dr Velez covering this weekend    5/13 SY  --ZO : remains oligoanuric.   HD today : HD order and tx reviewed.     --Positive fluid balance due to hypotension.  RR at 30 today.   Check follow up CXR.  Will plan for 1-2 kg UF with HD if tolerated.  --Continue enteral feeding.  --D/w Dr Mcclendon : positive blood culture/ GNR-- pre current HD catheter.   Plan HD today and on Monday,, then remove catheter for bacteremia clearance.    5/14 SY  --ZO : reamins dialysis dependent for now.    Plan HD in am and remove temporary HD catheter due to bacteremia to allow clearance.  --CT with patch lower lung inf : concern for PNA : continue abtx.  --Continue enteral feeding.  --Enterobacter bacteremia : continue abtx.    5/15 MK   - ZO for now HD dependent    tolerating hd and will attempt uf with hd   - enterobacter sepsis on meropenem renally dosed, aspiration pna     for dc of [place] post hd today   - anemia transfusion ongoing   - FEN On vitality with ostomy output noted     5/16 MK   - ZO Hd dependent    for hd in am with replacement of catheter to occur   - enterobacter sepsis on meropenem, renally dosed     asp pna   - anemia h/h stable   - FEN: on d10 ng tube pulled out    5/17 SY  --ZO : continue TIW HD--will maintain on MWF schedule this week.  --Metabolic acidosis : mainly due to GI loss,  will need to correct with HD.  --Enterobacter bacteremia : continue meropenem  --Post TPN.  NGT pulled out.  For J tube placement.    5/18 SY  --ZO : Continue HD support .    Next tx in am.  Will attempt to start UF with HD as tolerated   --Metabolic acidosis : due to GI loss : increase HCO3 in dialysate  --Enterobacter bacteremia : continue Meropenem  --Continue TPN for now.    5/19  Operative findings:  "Sepsis secondary to perforation leading to a cavitary abscess into the peritoneum.   - Drain placed with endoscopic guidance, going from the skin (sutured) through the cavitary abscess and into the stomach.   - Placement of NJ tube"  For HD today   orders outlined  will follow for tolerance   Dr Daniels covering the weekend    5/;22 MK   - ZO/ATN with anasaraca     will attempt uf with HD today and monitor response, spa with hd, titration of pressors as needed    if tolerates HD, plan for HD in am   - sepsis as outlined above, now on antifungal   dw dr lopes and HD rn                69 yo man with Hx of Neuroendocrine tumor/Pancreatic mass and liver mets.  Now for palliative resection of pancreatic mass.    --ZO with variable creat level and unclear CKD.    Likely dependent on volume status.    Limited intake even with Enteral feeding.      Start gentle hydration.  -- : continue Hale drainage.  --Hx of light chain nephropathy  --No contraindication for surgery from renal standpoint.    5/4  s/p Palliative surgery for neuroendocrine mass  will watch for ZO, pt with low UO immediate post op  s/p multiple PRBC and FFP transfusion and volume resuscitation    5/5  patient anuric, K 5.2  On bicarb drip  to go back to OR today  case d/w Dr Jean and Dr Siddiqi  Will HD x 2 hrs to correct potassium  >10 L infused in blood products and fluids  More fluids not indicated, UO 50 ml  no fluid removal on HD  Hep profile ordered    5/6  Ostomy created  will monitor i/o  labs stable   no need for HD today  d/w intensivist  will monitor daily for hd requirement    5/7  seen earlier on HD   tolerating well  TPN  d/w Dr Jean  d/w pts fam at bedside    5/8 MK   - ZO with anuria, remains HD dependent with hx of light chain nephropathy    tolerating hd     dc femoral shiley today after hd   - enceph monitor response to hd and xifaxan and lactulose    5/9 MK   - ZO with anuria, remains HD dependent with hx of light chain nephropathy    no indication for hd    off pressor     monitor electrolytes on TPN     NG in place for TF initiation   - enceph, improving monitor response to hd and xifaxan and lactulose    ostomy fx with liquid stool  dw dr marley     5/10 SY  --ZO : remains oligoanuric.     Follow with repeat bladder scans--no retention.    Will hold HD today and assess daily.    Trial of gentle hydration due to increased fluid loss.  --Metabolic acidosis : trial of HCO3 in addition to TPN   --Monitor encephalopathy.    5/11 SY  --ZO : remains oligoanuric.    Renal parameters further increased with no improvement in acidosis with HCO3 infusion.    D/w pt's wife over phone.    Explained his poor prognosis from Oncology aspect due to large tumor burden in liver and that surgery was palliative in nature.    Explained added burden of continued dialysis adding to pt's suffering at this point.    Pt's wife requested not to proceed with HD catheter placement and dialysis until she can d/w Dr Siddiqi and rest of family.    Will discuss further later today.    5/12  HD yesterday, planned for HD Sat  case d/w family, and ID, pts nurse  Dr Velez covering this weekend    5/13 SY  --ZO : remains oligoanuric.   HD today : HD order and tx reviewed.     --Positive fluid balance due to hypotension.  RR at 30 today.   Check follow up CXR.  Will plan for 1-2 kg UF with HD if tolerated.  --Continue enteral feeding.  --D/w Dr Mcclendon : positive blood culture/ GNR-- pre current HD catheter.   Plan HD today and on Monday,, then remove catheter for bacteremia clearance.    5/14 SY  --ZO : reamins dialysis dependent for now.    Plan HD in am and remove temporary HD catheter due to bacteremia to allow clearance.  --CT with patch lower lung inf : concern for PNA : continue abtx.  --Continue enteral feeding.  --Enterobacter bacteremia : continue abtx.    5/15 MK   - ZO for now HD dependent    tolerating hd and will attempt uf with hd   - enterobacter sepsis on meropenem renally dosed, aspiration pna     for dc of [place] post hd today   - anemia transfusion ongoing   - FEN On vitality with ostomy output noted     5/16 MK   - ZO Hd dependent    for hd in am with replacement of catheter to occur   - enterobacter sepsis on meropenem, renally dosed     asp pna   - anemia h/h stable   - FEN: on d10 ng tube pulled out    5/17 SY  --ZO : continue TIW HD--will maintain on MWF schedule this week.  --Metabolic acidosis : mainly due to GI loss,  will need to correct with HD.  --Enterobacter bacteremia : continue meropenem  --Post TPN.  NGT pulled out.  For J tube placement.    5/18 SY  --ZO : Continue HD support .    Next tx in am.  Will attempt to start UF with HD as tolerated   --Metabolic acidosis : due to GI loss : increase HCO3 in dialysate  --Enterobacter bacteremia : continue Meropenem  --Continue TPN for now.    5/19  Operative findings:  "Sepsis secondary to perforation leading to a cavitary abscess into the peritoneum.   - Drain placed with endoscopic guidance, going from the skin (sutured) through the cavitary abscess and into the stomach.   - Placement of NJ tube"  For HD today   orders outlined  will follow for tolerance   Dr Daniles covering the weekend    5/;22 MK   - ZO/ATN with anasaraca     will attempt uf with HD today and monitor response, spa with hd, titration of pressors as needed    if tolerates HD, plan for HD in am   - sepsis as outlined above, now on antifungal   dw dr lopes and HD rn     5/23 MK   - ZO/ATN with anasarca    inc uf with hd today and monitor response     spa and pressors with hd to support bp  - FEN on vitaliaty tf, no fw flushes  - sepsis abx and antifungal as per ID   dw icu and hd rn , dr lopes

## 2023-05-23 NOTE — PROGRESS NOTE ADULT - SUBJECTIVE AND OBJECTIVE BOX
SURGERY DAILY PROGRESS NOTE:     Subjective:  Patient seen and examined at bedside during am rounds, intubated but more awake this morning. AVSS. Denies any fevers, chills, n/v/d, chest pain or shortness of breath      Vital Signs Last 24 Hrs  T(C): 37 (23 May 2023 08:00), Max: 37.1 (22 May 2023 20:00)  T(F): 98.6 (23 May 2023 08:00), Max: 98.8 (23 May 2023 00:00)  HR: 107 (23 May 2023 09:00) (92 - 117)  BP: 95/54 (23 May 2023 09:00) (79/50 - 112/74)  BP(mean): 63 (23 May 2023 09:00) (56 - 82)  RR: 22 (23 May 2023 09:00) (15 - 35)  SpO2: 100% (23 May 2023 09:00) (92% - 100%)    Parameters below as of 23 May 2023 09:00      O2 Concentration (%): 30                        8.3    7.52  )-----------( 34       ( 23 May 2023 05:57 )             25.1     05-23    144  |  109<H>  |  56<H>  ----------------------------<  189<H>  4.5   |  23  |  3.21<H>    Ca    9.1      23 May 2023 05:57  Phos  5.1     05-23  Mg     2.3     05-23    TPro  6.1  /  Alb  2.6<L>  /  TBili  5.3<H>  /  DBili  x   /  AST  228<H>  /  ALT  70  /  AlkPhos  228<H>  05-23    I&O's Summary    22 May 2023 07:01  -  23 May 2023 07:00  --------------------------------------------------------  IN: 3179.2 mL / OUT: 5010 mL / NET: -1830.8 mL        PHYSICAL EXAM   Gen: intubated and sedated   CV: pulse regularly present   Resp: airway patent on MV  Abdomen: soft, Ostomy pink with stool in bag. Midline wound open superiorly and inferiorly with ascites drainage, colostomy bag covering the wounds dry gauze packing.  New drain on thru midline wound. No evidence of wound infection. lower aspect of incision dehisced with bowel visualized  Extremities: No swelling or tenderness

## 2023-05-23 NOTE — PROGRESS NOTE ADULT - SUBJECTIVE AND OBJECTIVE BOX
Date of service: 05-23-23 @ 12:07    pt seen and examined   s/p endoscopic drain placement from skin to cavitary abscess and into the stomach and placement of NJ tube  on ventilatory support reintubated on 5/18  on pressor support  afebrile  on tpn    ROS: unable to obtain d/t medical condition    MEDICATIONS  (STANDING):  caspofungin IVPB 50 milliGRAM(s) IV Intermittent every 24 hours  chlorhexidine 0.12% Liquid 15 milliLiter(s) Oral Mucosa every 12 hours  chlorhexidine 4% Liquid 1 Application(s) Topical <User Schedule>  dextrose 5%. 1000 milliLiter(s) (100 mL/Hr) IV Continuous <Continuous>  dextrose 5%. 1000 milliLiter(s) (50 mL/Hr) IV Continuous <Continuous>  dextrose 50% Injectable 25 Gram(s) IV Push once  dextrose 50% Injectable 12.5 Gram(s) IV Push once  dextrose 50% Injectable 25 Gram(s) IV Push once  glucagon  Injectable 1 milliGRAM(s) IntraMuscular once  insulin lispro (ADMELOG) corrective regimen sliding scale   SubCutaneous every 6 hours  meropenem  IVPB 500 milliGRAM(s) IV Intermittent every 12 hours  meropenem  IVPB      metoclopramide Injectable 5 milliGRAM(s) IV Push every 6 hours  norepinephrine Infusion 0.05 MICROgram(s)/kG/Min (6.74 mL/Hr) IV Continuous <Continuous>  nystatin Cream 1 Application(s) Topical two times a day  pantoprazole  Injectable 40 milliGRAM(s) IV Push every 12 hours  phenylephrine    Infusion 0.05 MICROgram(s)/kG/Min (0.67 mL/Hr) IV Continuous <Continuous>  rifAXIMin 550 milliGRAM(s) Oral two times a day    Vital Signs Last 24 Hrs  T(C): 36.9 (23 May 2023 10:34), Max: 37.1 (22 May 2023 20:00)  T(F): 98.5 (23 May 2023 10:34), Max: 98.8 (23 May 2023 00:00)  HR: 104 (23 May 2023 11:43) (95 - 117)  BP: 98/60 (23 May 2023 11:43) (79/50 - 112/74)  BP(mean): 68 (23 May 2023 11:43) (56 - 82)  RR: 17 (23 May 2023 11:43) (16 - 35)  SpO2: 99% (23 May 2023 11:43) (92% - 100%)    Parameters below as of 23 May 2023 11:43  O2 Concentration (%): 25    PE:  Constitutional: frail looking  HEENT: NC/AT, EOMI, PERRLA, conjunctivae clear; ears and nose atraumatic; pharynx benign  Neck: supple; thyroid not palpable  Back: no tenderness  Respiratory: decreased breath sounds, rales   Cardiovascular: S1S2 regular, no murmurs  Abdomen: soft, not tender, not distended, positive BS; + ostomy + peg tube midline abd wound with drainage  Genitourinary: no suprapubic tenderness  Lymphatic: no LN palpable  Musculoskeletal: no muscle tenderness, no joint swelling or tenderness  Extremities: no pedal edema  Neurological/ Psychiatric: no focal deficits   Skin: no rashes; no palpable lesions    Labs: all available labs reviewed                                              8.3    7.52  )-----------( 34       ( 23 May 2023 05:57 )             25.1     05-23    144  |  109<H>  |  56<H>  ----------------------------<  189<H>  4.5   |  23  |  3.21<H>    Ca    9.1      23 May 2023 05:57  Phos  5.1     05-23  Mg     2.3     05-23    TPro  6.1  /  Alb  2.6<L>  /  TBili  5.3<H>  /  DBili  x   /  AST  228<H>  /  ALT  70  /  AlkPhos  228<H>  05-23          Culture - Blood (05.12.23 @ 09:01)   - Enterobacter cloacae complex: Detec  Gram Stain: Culture - Blood (05.12.23 @ 08:59)   Gram Stain:   Growth in aerobic bottle: Gram Negative Rods   Growth in anaerobic bottle: Gram Negative Rods  Specimen Source: .Blood None  Culture Results:   Growth in aerobic bottle: Gram Negative Rods   Growth in anaerobic bottle: Gram Negative Rods     Radiology: all available radiological tests reviewed    < from: Xray Chest 1 View- PORTABLE-Urgent (Xray Chest 1 View- PORTABLE-Urgent .) (05.11.23 @ 13:48) >    ACC: [id number] EXAM:  XR CHEST PORTABLE URGENT 1V   ORDERED BY: PRIYA HALL     PROCEDURE DATE:  05/11/2023          INTERPRETATION:  INDICATION: Dialysis catheter insertion    Portable chest 1:28 PM    COMPARISON: 5/6/2023    FINDINGS:  Heart/Vascular: The heart size, mediastinum, hilum and aorta are stable.  Pulmonary: Midline trachea. There is mild pulmonary venous congestion and   low lung volumes.    Bones: There is no fracture.  Lines and catheter: Tip of the right IJ dialysis catheteris in the right   atrium. Tip and side-port of NG tube are in the body of the stomach.    Impression:    There is mild pulmonary venous congestion and low lung volumes.    Tip of the right IJ dialysis catheter is in the right atrium. There is no   pneumothorax.          < end of copied text >  < from: US Kidney and Bladder (05.08.23 @ 15:51) >    ACC: [id number] EXAM:  US KIDNEYS AND BLADDER   ORDERED BY: GWENDOLYN SINGH     PROCEDURE DATE:  05/08/2023          INTERPRETATION:  CLINICAL INFORMATION: History of gastric cancer.   Evaluate for renal injury.    COMPARISON: CT dated 04/29/2023    TECHNIQUE: Sonography of the kidneys and bladder.    FINDINGS:  Right kidney: 8.7 cm. No renal mass, hydronephrosis or calculi.    Left kidney: 8.5 cm. No renal mass, hydronephrosis or calculi.    Urinary bladder: Urinary bladder is contracted limiting its evaluation.    Incidentally noted are diffuse hepatic metastases as noted on the prior   CT.    IMPRESSION:  No hydronephrosis or renal mass is identified to suggest renal injury.   Please note that contrast enhanced CT is more sensitive for detection of   renal injury.    Hepatic metastasis.        < end of copied text >    Advanced directives addressed: full resuscitation

## 2023-05-23 NOTE — PROGRESS NOTE ADULT - SUBJECTIVE AND OBJECTIVE BOX
NEPHROLOGY INTERVAL HPI/OVERNIGHT EVENTS:  05-23-23 @ 09:54      5/22 events noted, remains on dual pressors at steady dose with vitality TF infusing   5/19- intubated, s/p placement of feeding tube was found to have gastric perforation  5/18--Awake.  No acute distress. Appears very frail and shallow breathing   For OR for placement of feeding tube.  Restarted on TPN.  5/17--Awake.  No acute distress.  for OR tomorrow for J tube placement.  5/16 HD catheter removed yesterday, more confused removed NG tube   5/15  750 ostomy output with low bs on d10 at 100, unclear if absorbing, on solo pressors   5/14--No acute distress.  No urine output.   Drains 1700cc.  colostomy 800cc  5/13--Awake but lethargic.  Unclear if in pain.  RR 30.  On NG feeding.  No urine output  5/12- seen w family and ID at bedside, plans for HD discussed w family  5/11--Lethargic.  No acute distress.   Colostomy output 1500cc.  No UO.  5/10--No acute distress.  calm, off sedation and off pressor.  Colostomy output 250cc and Drain 850cc.  Oligoanuric.  5/9 more alert and awake, ostomy fx pressors off since this am unable to complete hd yesterday and only received 1/2 due to malfunctioning cathtere    pulled after HD   5/8 seen at hd, events noted, anuric, TPN infusing with lipids, on solo dec dose of pressors, encepha with starting of lactulose and xifaxin   5/7- no new events, anuric, CO2 trending down  5/6 s/p OR yesterday, ostomy creation  5/5- case d/w surgery and intensivist , anuric k 5.2 will dialyze preop, no fluid removal  5/4- pt seen in PACU, PRBC and FFP, and LR resuscitation noted, coulter in place ~ 100 cc urine in coulter bag  case d/w pts nurse   Chart quote, operative note ""Open distal pancreatectomy, splenectomy, partial colectomy, partial L adrenalectomy, temporary abdominal closure; Large varices, 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 Liver laden with diffuse metastases and very friable, RP oozy. 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  Treated with Lutathera one year ago and restarted in 12/2022  G-J tube placed for nutrition in January due to chronic dysphagia and severe esophagitis on EGD.  Post recent  admission due to G tube clogging.  D/mansi home on 4/27.    Returned to ED due to vomiting when night time enteral feeding via G tube started.  CT with IVC done due to concern for GIB.  Pt reports chronic self catheterization due to urinary retention.   ZO improved when catheterization started.  Follows mainly with EMELY Cortez.   Pt has been self cath'ing 3-4 x /day.   Indwelling Coulter placed by EMELY due to resistance with blood clots with resistance reported by pt.  For palliative mass resection tomorrow.    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  Creat has been variable during previous admissions--ranging 1.1-2.2.    PMHX and PSHX.  --Neuroendocrine tumor of pancreas with mets to liver --dx 6 years ago.  --GJ tube for nutrition in 1/2023 due to chronic dysphagia.  --Hx of HTN  --Hx of DM  --Hx of light chain disease --unclear hx and no hx of renal biopsy.    MEDICATIONS  (STANDING):  caspofungin IVPB 50 milliGRAM(s) IV Intermittent every 24 hours  chlorhexidine 0.12% Liquid 15 milliLiter(s) Oral Mucosa every 12 hours  chlorhexidine 4% Liquid 1 Application(s) Topical <User Schedule>  dextrose 5%. 1000 milliLiter(s) (100 mL/Hr) IV Continuous <Continuous>  dextrose 5%. 1000 milliLiter(s) (50 mL/Hr) IV Continuous <Continuous>  dextrose 50% Injectable 25 Gram(s) IV Push once  dextrose 50% Injectable 12.5 Gram(s) IV Push once  dextrose 50% Injectable 25 Gram(s) IV Push once  glucagon  Injectable 1 milliGRAM(s) IntraMuscular once  insulin lispro (ADMELOG) corrective regimen sliding scale   SubCutaneous every 6 hours  meropenem  IVPB 500 milliGRAM(s) IV Intermittent every 12 hours  meropenem  IVPB      metoclopramide Injectable 5 milliGRAM(s) IV Push every 6 hours  norepinephrine Infusion 0.05 MICROgram(s)/kG/Min (6.74 mL/Hr) IV Continuous <Continuous>  nystatin Cream 1 Application(s) Topical two times a day  pantoprazole  Injectable 40 milliGRAM(s) IV Push every 12 hours  phenylephrine    Infusion 0.05 MICROgram(s)/kG/Min (0.67 mL/Hr) IV Continuous <Continuous>  rifAXIMin 550 milliGRAM(s) Oral two times a day    MEDICATIONS  (PRN):  acetaminophen     Tablet .. 650 milliGRAM(s) Oral every 6 hours PRN Temp greater or equal to 38.5C (101.3F)  albumin human 25% IVPB 50 milliLiter(s) IV Intermittent every 1 hour PRN low b/p  dextrose Oral Gel 15 Gram(s) Oral once PRN Blood Glucose LESS THAN 70 milliGRAM(s)/deciliter  fentaNYL    Injectable 25 MICROGram(s) IV Push every 1 hour PRN vent dyssynchrony  ondansetron Injectable 4 milliGRAM(s) IV Push every 6 hours PRN Nausea and/or Vomiting  sodium chloride 0.9% lock flush 10 milliLiter(s) IV Push every 1 hour PRN Pre/post blood products, medications, blood draw, and to maintain line patency      Allergies    losartan (Angioedema)    Intolerances        I&O's Detail    22 May 2023 07:01  -  23 May 2023 07:00  --------------------------------------------------------  IN:    Dexmedetomidine: 32.8 mL    Free Water: 50 mL    IV PiggyBack: 312.5 mL    Norepinephrine: 103.2 mL    Phenylephrine: 1111.7 mL    Vital1.5: 1569 mL  Total IN: 3179.2 mL    OUT:    Bulb (mL): 60 mL    Colostomy (mL): 450 mL    Drain (mL): 650 mL    Gastrostomy Tube (mL): 850 mL    Other (mL): 3000 mL  Total OUT: 5010 mL    Total NET: -1830.8 mL          .    Patient was seen and evaluated on dialysis.   Patient is tolerating the procedure well.   Continue dialysis:   Dialyzer:          QB:        QD:   Goal UF ___ over ___ Hours       Vital Signs Last 24 Hrs  T(C): 37 (23 May 2023 08:00), Max: 37.1 (22 May 2023 20:00)  T(F): 98.6 (23 May 2023 08:00), Max: 98.8 (23 May 2023 00:00)  HR: 107 (23 May 2023 09:00) (92 - 117)  BP: 95/54 (23 May 2023 09:00) (79/50 - 112/74)  BP(mean): 63 (23 May 2023 09:00) (56 - 82)  RR: 22 (23 May 2023 09:00) (15 - 35)  SpO2: 100% (23 May 2023 09:00) (92% - 100%)    Parameters below as of 23 May 2023 09:00      O2 Concentration (%): 30  Daily     Daily     PHYSICAL EXAM:  General: alert. awake Ox3  HEENT: MMM  CV: s1s2 rrr  LUNGS: B/L CTA  EXT: no edema    LABS:                        8.3    7.52  )-----------( 34       ( 23 May 2023 05:57 )             25.1     05-23    144  |  109<H>  |  56<H>  ----------------------------<  189<H>  4.5   |  23  |  3.21<H>    Ca    9.1      23 May 2023 05:57  Phos  5.1     05-23  Mg     2.3     05-23    TPro  6.1  /  Alb  2.6<L>  /  TBili  5.3<H>  /  DBili  x   /  AST  228<H>  /  ALT  70  /  AlkPhos  228<H>  05-23        Magnesium, Serum: 2.3 mg/dL (05-23 @ 05:57)  Phosphorus Level, Serum: 5.1 mg/dL (05-23 @ 05:57)       NEPHROLOGY INTERVAL HPI/OVERNIGHT EVENTS:  05-23-23 @ 09:54    5/23 remains on pressors tolerated 3L off yesterday   5/22 events noted, remains on dual pressors at steady dose with vitality TF infusing   5/19- intubated, s/p placement of feeding tube was found to have gastric perforation  5/18--Awake.  No acute distress. Appears very frail and shallow breathing   For OR for placement of feeding tube.  Restarted on TPN.  5/17--Awake.  No acute distress.  for OR tomorrow for J tube placement.  5/16 HD catheter removed yesterday, more confused removed NG tube   5/15  750 ostomy output with low bs on d10 at 100, unclear if absorbing, on solo pressors   5/14--No acute distress.  No urine output.   Drains 1700cc.  colostomy 800cc  5/13--Awake but lethargic.  Unclear if in pain.  RR 30.  On NG feeding.  No urine output  5/12- seen w family and ID at bedside, plans for HD discussed w family  5/11--Lethargic.  No acute distress.   Colostomy output 1500cc.  No UO.  5/10--No acute distress.  calm, off sedation and off pressor.  Colostomy output 250cc and Drain 850cc.  Oligoanuric.  5/9 more alert and awake, ostomy fx pressors off since this am unable to complete hd yesterday and only received 1/2 due to malfunctioning cathtere    pulled after HD   5/8 seen at hd, events noted, anuric, TPN infusing with lipids, on solo dec dose of pressors, encepha with starting of lactulose and xifaxin   5/7- no new events, anuric, CO2 trending down  5/6 s/p OR yesterday, ostomy creation  5/5- case d/w surgery and intensivist , anuric k 5.2 will dialyze preop, no fluid removal  5/4- pt seen in PACU, PRBC and FFP, and LR resuscitation noted, coulter in place ~ 100 cc urine in coulter bag  case d/w pts nurse   Chart quote, operative note ""Open distal pancreatectomy, splenectomy, partial colectomy, partial L adrenalectomy, temporary abdominal closure; Large varices, 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 Liver laden with diffuse metastases and very friable, RP oozy. 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  Treated with Lutathera one year ago and restarted in 12/2022  G-J tube placed for nutrition in January due to chronic dysphagia and severe esophagitis on EGD.  Post recent  admission due to G tube clogging.  D/mansi home on 4/27.    Returned to ED due to vomiting when night time enteral feeding via G tube started.  CT with IVC done due to concern for GIB.  Pt reports chronic self catheterization due to urinary retention.   ZO improved when catheterization started.  Follows mainly with EMELY Cortez.   Pt has been self cath'ing 3-4 x /day.   Indwelling Coulter placed by EMELY due to resistance with blood clots with resistance reported by pt.  For palliative mass resection tomorrow.    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  Creat has been variable during previous admissions--ranging 1.1-2.2.    PMHX and PSHX.  --Neuroendocrine tumor of pancreas with mets to liver --dx 6 years ago.  --GJ tube for nutrition in 1/2023 due to chronic dysphagia.  --Hx of HTN  --Hx of DM  --Hx of light chain disease --unclear hx and no hx of renal biopsy.    MEDICATIONS  (STANDING):  caspofungin IVPB 50 milliGRAM(s) IV Intermittent every 24 hours  chlorhexidine 0.12% Liquid 15 milliLiter(s) Oral Mucosa every 12 hours  chlorhexidine 4% Liquid 1 Application(s) Topical <User Schedule>  dextrose 5%. 1000 milliLiter(s) (100 mL/Hr) IV Continuous <Continuous>  dextrose 5%. 1000 milliLiter(s) (50 mL/Hr) IV Continuous <Continuous>  dextrose 50% Injectable 25 Gram(s) IV Push once  dextrose 50% Injectable 12.5 Gram(s) IV Push once  dextrose 50% Injectable 25 Gram(s) IV Push once  glucagon  Injectable 1 milliGRAM(s) IntraMuscular once  insulin lispro (ADMELOG) corrective regimen sliding scale   SubCutaneous every 6 hours  meropenem  IVPB 500 milliGRAM(s) IV Intermittent every 12 hours  meropenem  IVPB      metoclopramide Injectable 5 milliGRAM(s) IV Push every 6 hours  norepinephrine Infusion 0.05 MICROgram(s)/kG/Min (6.74 mL/Hr) IV Continuous <Continuous>  nystatin Cream 1 Application(s) Topical two times a day  pantoprazole  Injectable 40 milliGRAM(s) IV Push every 12 hours  phenylephrine    Infusion 0.05 MICROgram(s)/kG/Min (0.67 mL/Hr) IV Continuous <Continuous>  rifAXIMin 550 milliGRAM(s) Oral two times a day    MEDICATIONS  (PRN):  acetaminophen     Tablet .. 650 milliGRAM(s) Oral every 6 hours PRN Temp greater or equal to 38.5C (101.3F)  albumin human 25% IVPB 50 milliLiter(s) IV Intermittent every 1 hour PRN low b/p  dextrose Oral Gel 15 Gram(s) Oral once PRN Blood Glucose LESS THAN 70 milliGRAM(s)/deciliter  fentaNYL    Injectable 25 MICROGram(s) IV Push every 1 hour PRN vent dyssynchrony  ondansetron Injectable 4 milliGRAM(s) IV Push every 6 hours PRN Nausea and/or Vomiting  sodium chloride 0.9% lock flush 10 milliLiter(s) IV Push every 1 hour PRN Pre/post blood products, medications, blood draw, and to maintain line patency      Allergies    losartan (Angioedema)    Intolerances        I&O's Detail    22 May 2023 07:01  -  23 May 2023 07:00  --------------------------------------------------------  IN:    Dexmedetomidine: 32.8 mL    Free Water: 50 mL    IV PiggyBack: 312.5 mL    Norepinephrine: 103.2 mL    Phenylephrine: 1111.7 mL    Vital1.5: 1569 mL  Total IN: 3179.2 mL    OUT:    Bulb (mL): 60 mL    Colostomy (mL): 450 mL    Drain (mL): 650 mL    Gastrostomy Tube (mL): 850 mL    Other (mL): 3000 mL  Total OUT: 5010 mL    Total NET: -1830.8 mL          .    Patient was seen and evaluated on dialysis.   Patient is tolerating the procedure well.   Continue dialysis:   Dialyzer:      f180 k protocol uf goal of 3kg   puf with hd   bfr 400   shiley     Vital Signs Last 24 Hrs  T(C): 37 (23 May 2023 08:00), Max: 37.1 (22 May 2023 20:00)  T(F): 98.6 (23 May 2023 08:00), Max: 98.8 (23 May 2023 00:00)  HR: 107 (23 May 2023 09:00) (92 - 117)  BP: 95/54 (23 May 2023 09:00) (79/50 - 112/74)  BP(mean): 63 (23 May 2023 09:00) (56 - 82)  RR: 22 (23 May 2023 09:00) (15 - 35)  SpO2: 100% (23 May 2023 09:00) (92% - 100%)    Parameters below as of 23 May 2023 09:00      O2 Concentration (%): 30  Daily     Daily     PHYSICAL EXAM:  General: sedated  HEENT: orally intubated   CV: s1s2 rrr  LUNGS: B/L CTA  EXT: 4+ edema    LABS:                        8.3    7.52  )-----------( 34       ( 23 May 2023 05:57 )             25.1     05-23    144  |  109<H>  |  56<H>  ----------------------------<  189<H>  4.5   |  23  |  3.21<H>    Ca    9.1      23 May 2023 05:57  Phos  5.1     05-23  Mg     2.3     05-23    TPro  6.1  /  Alb  2.6<L>  /  TBili  5.3<H>  /  DBili  x   /  AST  228<H>  /  ALT  70  /  AlkPhos  228<H>  05-23        Magnesium, Serum: 2.3 mg/dL (05-23 @ 05:57)  Phosphorus Level, Serum: 5.1 mg/dL (05-23 @ 05:57)

## 2023-05-23 NOTE — PROGRESS NOTE ADULT - ASSESSMENT
67 yo male with PMHx metastatic neuroendocrine tumor of the pancreas, recently admitted with nonfunctioning GJ tube (patient is chronically obstructed). Patient was admitted with severe heartburn, and has had several episodes of vomiting small amount of dark blood. Patient was discharged on Thursday. Hgb unchanged. CT angiography has no active bleeding, but demonstrates extensive tumor in distal pancreas.  Patient went to the OR 5/4 for a Open distal pancreatectomy, splenectomy, partial colectomy, partial L adrenalectomy, temporary abdominal closure.  Patient had a 2.2 L EBL.  Intra Op had 6U PRBC, 2FFP, 1 Platelets, 4L crystalloid  Post Op 1U PRBC ans 1 U FFP.  Post OP vented, with poor UO, on levo and Vaso.  5/5 Pt went back to OR and had closure and colostomy. Hospital course c/b  fever lethargy, encephalopathic, on pressors, dec hgb was dialyzed 5/11 560 cc from bulb, had ? episode emesis overnight, imaging shows some congestion, concern for asp pna raised - started on IV vancomycin/zosyn.    1. Acute respiratory failure. Septic shock with Enterobacter. Abd ascites/Post op pneumoperitoneum. Aspiration pneumonitis/pneumonia. Metastatic neuroendocrine pancreatic tumor s/p surgery. ZO on HD  - imaging reviewed from 5/17 has b/l atelectasis, L aspiration pna  - s/p drain into cavitary abscess and NJ tube placement 5/18  - intubated/on pressors/febrile  - blood cx growing MDR enterobacter source likely gut translocation   - CT abd/pelvis post surgical changes pneumoperitoneum/enteritis; surgery f/u noted  - s/p IV zosyn [id number] #4 --> 5/12  - on merrem [id number] #11  - on caspofungin 50mg q24h #4  - continue with antibiotic/antifungal coverage   - tolerating abx well so far; no side effects noted  - reason for abx use and side effects reviewed with patient  - aspiration precautions  - contact precautions   - fu cbc    2. other issues - care per medicine

## 2023-05-23 NOTE — PROGRESS NOTE ADULT - SUBJECTIVE AND OBJECTIVE BOX
Patient is a 68y old  Male who presents with a chief complaint of GIB (23 May 2023 12:06)    24 hour events:     Allergies    losartan (Angioedema)    Intolerances      REVIEW OF SYSTEMS: SEE BELOW       ICU Vital Signs Last 24 Hrs  T(C): 36.6 (23 May 2023 12:00), Max: 37.1 (22 May 2023 20:00)  T(F): 97.8 (23 May 2023 12:00), Max: 98.8 (23 May 2023 00:00)  HR: 100 (23 May 2023 13:24) (98 - 117)  BP: 97/61 (23 May 2023 13:24) (79/50 - 112/74)  BP(mean): 66 (23 May 2023 13:24) (57 - 82)  ABP: --  ABP(mean): --  RR: 20 (23 May 2023 13:24) (16 - 35)  SpO2: 100% (23 May 2023 13:24) (91% - 100%)    O2 Parameters below as of 23 May 2023 13:24      O2 Concentration (%): 25        CAPILLARY BLOOD GLUCOSE      POCT Blood Glucose.: 240 mg/dL (23 May 2023 11:19)  POCT Blood Glucose.: 173 mg/dL (23 May 2023 06:04)  POCT Blood Glucose.: 204 mg/dL (23 May 2023 00:17)  POCT Blood Glucose.: 231 mg/dL (22 May 2023 19:12)      I&O's Summary    22 May 2023 07:01  -  23 May 2023 07:00  --------------------------------------------------------  IN: 3179.2 mL / OUT: 5010 mL / NET: -1830.8 mL        Mode: PS (Pressure Support)/ Spontaneous  FiO2: 30  PEEP: 6  PS: 10  PIP: 18      MEDICATIONS  (STANDING):  caspofungin IVPB 50 milliGRAM(s) IV Intermittent every 24 hours  chlorhexidine 0.12% Liquid 15 milliLiter(s) Oral Mucosa every 12 hours  chlorhexidine 4% Liquid 1 Application(s) Topical <User Schedule>  dextrose 5%. 1000 milliLiter(s) (50 mL/Hr) IV Continuous <Continuous>  dextrose 5%. 1000 milliLiter(s) (100 mL/Hr) IV Continuous <Continuous>  dextrose 50% Injectable 25 Gram(s) IV Push once  dextrose 50% Injectable 12.5 Gram(s) IV Push once  dextrose 50% Injectable 25 Gram(s) IV Push once  glucagon  Injectable 1 milliGRAM(s) IntraMuscular once  insulin lispro (ADMELOG) corrective regimen sliding scale   SubCutaneous every 6 hours  meropenem  IVPB 500 milliGRAM(s) IV Intermittent every 12 hours  meropenem  IVPB      metoclopramide Injectable 5 milliGRAM(s) IV Push every 6 hours  norepinephrine Infusion 0.05 MICROgram(s)/kG/Min (6.74 mL/Hr) IV Continuous <Continuous>  nystatin Cream 1 Application(s) Topical two times a day  pantoprazole  Injectable 40 milliGRAM(s) IV Push every 12 hours  phenylephrine    Infusion 0.05 MICROgram(s)/kG/Min (0.67 mL/Hr) IV Continuous <Continuous>  rifAXIMin 550 milliGRAM(s) Oral two times a day      MEDICATIONS  (PRN):  acetaminophen     Tablet .. 650 milliGRAM(s) Oral every 6 hours PRN Temp greater or equal to 38.5C (101.3F)  albumin human 25% IVPB 50 milliLiter(s) IV Intermittent every 1 hour PRN hypotension  dextrose Oral Gel 15 Gram(s) Oral once PRN Blood Glucose LESS THAN 70 milliGRAM(s)/deciliter  fentaNYL    Injectable 25 MICROGram(s) IV Push every 1 hour PRN vent dyssynchrony  ondansetron Injectable 4 milliGRAM(s) IV Push every 6 hours PRN Nausea and/or Vomiting  sodium chloride 0.9% lock flush 10 milliLiter(s) IV Push every 1 hour PRN Pre/post blood products, medications, blood draw, and to maintain line patency      PHYSICAL EXAM: SEE BELOW                          8.3    7.52  )-----------( 34       ( 23 May 2023 05:57 )             25.1       05-23    144  |  109<H>  |  56<H>  ----------------------------<  189<H>  4.5   |  23  |  3.21<H>    Ca    9.1      23 May 2023 05:57  Phos  5.1     05-23  Mg     2.3     05-23    TPro  6.1  /  Alb  2.6<L>  /  TBili  5.3<H>  /  DBili  x   /  AST  228<H>  /  ALT  70  /  AlkPhos  228<H>  05-23

## 2023-05-24 NOTE — PROGRESS NOTE ADULT - SUBJECTIVE AND OBJECTIVE BOX
NEPHROLOGY INTERVAL HPI/OVERNIGHT EVENTS:    Date of Service: 23 @ 09:08    --Remains on vent.  awaiting decision for OR today.  NPO today.   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  Treated with Lutathera one year ago and restarted in 2022  G-J tube placed for nutrition in January due to chronic dysphagia and severe esophagitis on EGD.  Post recent  admission due to G tube clogging.  D/mansi home on .    Returned to ED due to vomiting when night time enteral feeding via G tube started.  CT with IVC done due to concern for GIB.  Pt reports chronic self catheterization due to urinary retention.   ZO improved when catheterization started.  Follows mainly with EMELY Cortez.   Pt has been self cath'ing 3-4 x /day.   Indwelling Hale placed by EMELY due to resistance with blood clots with resistance reported by pt.  For palliative mass resection tomorrow.    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  Creat has been variable during previous admissions--ranging 1.1-2.2.    PMHX and PSHX.  --Neuroendocrine tumor of pancreas with mets to liver --dx 6 years ago.  --GJ tube for nutrition in 2023 due to chronic dysphagia.  --Hx of HTN  --Hx of DM  --Hx of light chain disease --unclear hx and no hx of renal biopsy.    MEDICATIONS  (STANDING):  caspofungin IVPB 50 milliGRAM(s) IV Intermittent every 24 hours  chlorhexidine 0.12% Liquid 15 milliLiter(s) Oral Mucosa every 12 hours  chlorhexidine 4% Liquid 1 Application(s) Topical <User Schedule>  dextrose 5%. 1000 milliLiter(s) (50 mL/Hr) IV Continuous <Continuous>  dextrose 5%. 1000 milliLiter(s) (100 mL/Hr) IV Continuous <Continuous>  dextrose 50% Injectable 25 Gram(s) IV Push once  dextrose 50% Injectable 12.5 Gram(s) IV Push once  dextrose 50% Injectable 25 Gram(s) IV Push once  glucagon  Injectable 1 milliGRAM(s) IntraMuscular once  insulin lispro (ADMELOG) corrective regimen sliding scale   SubCutaneous every 6 hours  meropenem  IVPB 500 milliGRAM(s) IV Intermittent every 12 hours  meropenem  IVPB      metoclopramide Injectable 5 milliGRAM(s) IV Push every 6 hours  norepinephrine Infusion 0.05 MICROgram(s)/kG/Min (6.74 mL/Hr) IV Continuous <Continuous>  nystatin Cream 1 Application(s) Topical two times a day  pantoprazole  Injectable 40 milliGRAM(s) IV Push every 12 hours  phenylephrine    Infusion 0.05 MICROgram(s)/kG/Min (0.67 mL/Hr) IV Continuous <Continuous>  rifAXIMin 550 milliGRAM(s) Oral two times a day    MEDICATIONS  (PRN):  acetaminophen     Tablet .. 650 milliGRAM(s) Oral every 6 hours PRN Temp greater or equal to 38.5C (101.3F)  albumin human 25% IVPB 50 milliLiter(s) IV Intermittent every 1 hour PRN hypotension  dextrose Oral Gel 15 Gram(s) Oral once PRN Blood Glucose LESS THAN 70 milliGRAM(s)/deciliter  fentaNYL    Injectable 25 MICROGram(s) IV Push every 1 hour PRN vent dyssynchrony  ondansetron Injectable 4 milliGRAM(s) IV Push every 6 hours PRN Nausea and/or Vomiting  sodium chloride 0.9% lock flush 10 milliLiter(s) IV Push every 1 hour PRN Pre/post blood products, medications, blood draw, and to maintain line patency    Vital Signs Last 24 Hrs  T(C): 36.8 (24 May 2023 08:00), Max: 37.4 (24 May 2023 00:00)  T(F): 98.3 (24 May 2023 08:00), Max: 99.4 (24 May 2023 00:00)  HR: 102 (24 May 2023 08:00) (97 - 115)  BP: 99/61 (24 May 2023 08:00) (79/50 - 115/63)  BP(mean): 69 (24 May 2023 08:00) (57 - 78)  RR: 21 (24 May 2023 08:00) (17 - 24)  SpO2: 95% (24 May 2023 08:00) (91% - 100%)    Parameters below as of 24 May 2023 08:00  Patient On (Oxygen Delivery Method): ventilator    O2 Concentration (%): 21  Daily     Daily Weight in k.6 (24 May 2023 04:00)    05-23 @ 07:01  -   @ 07:00  --------------------------------------------------------  IN: 2430 mL / OUT: 1420 mL / NET: 1010 mL    PHYSICAL EXAM:  GENERAL: On vent  CHEST/LUNG: fair air entry  HEART: S1S2 RRR  ABDOMEN: soft  EXTREMITIES: 4+ edema --slightly improved.  SKIN:     LABS:                        7.3    7.02  )-----------( 39       ( 24 May 2023 05:31 )             22.1         145  |  108  |  46<H>  ----------------------------<  132<H>  4.3   |  25  |  2.92<H>    Ca    9.4      24 May 2023 05:31  Phos  5.2       Mg     2.2         TPro  6.2  /  Alb  2.9<L>  /  TBili  5.8<H>  /  DBili  x   /  AST  222<H>  /  ALT  67  /  AlkPhos  283<H>          Magnesium, Serum: 2.2 mg/dL ( @ 05:31)  Phosphorus Level, Serum: 5.2 mg/dL ( @ 05:31)          RADIOLOGY & ADDITIONAL TESTS:

## 2023-05-24 NOTE — PROCEDURE NOTE - NSPOSTCAREGUIDE_GEN_A_CORE
Verbal/written post procedure instructions were given to patient/caregiver
Care for catheter as per unit/ICU protocols

## 2023-05-24 NOTE — PROGRESS NOTE ADULT - GASTROINTESTINAL COMMENTS
multiple drains and ostomies, R lower drain with ascitic drainage, L sided colostomy with fecal drainage

## 2023-05-24 NOTE — PROCEDURE NOTE - NSPROCDETAILS_GEN_ALL_CORE
guidewire recovered/lumen(s) aspirated and flushed/sterile dressing applied/sterile technique, catheter placed/ultrasound guidance with use of sterile gel and probe cove
sterile technique, indwelling urinary device inserted
location identified, draped/prepped, sterile technique used, needle inserted/introduced/positive blood return obtained via catheter/connected to a pressurized flush line/hemostasis with direct pressure, dressing applied/Seldinger technique/all materials/supplies accounted for at end of procedure
patient pre-oxygenated, tube inserted, placement confirmed
guidewire recovered/lumen(s) aspirated and flushed/sterile dressing applied/sterile technique, catheter placed
guidewire recovered/lumen(s) aspirated and flushed/sterile dressing applied/sterile technique, catheter placed/ultrasound guidance with use of sterile gel and probe cove

## 2023-05-24 NOTE — PROCEDURE NOTE - NSCVLACTUALSITE_VASC_A_CORE
left/subclavian vein
right/internal jugular
right/femoral vein
left/internal jugular
right/internal jugular

## 2023-05-24 NOTE — PROCEDURE NOTE - PROCEDURE DATE TIME, MLM
02-May-2023 17:09
18-May-2023 23:00
24-May-2023 09:52
17-May-2023 11:15
06-May-2023 14:04
05-May-2023 10:17
14-May-2023
11-May-2023 12:53

## 2023-05-24 NOTE — PROGRESS NOTE ADULT - ASSESSMENT
A:    68M  HD # 7 POD # 0    Pt current conditions:    1. MSOF s/p Open distal pancreatectomy, splenectomy, partial colectomy, partial L adrenalectomy, temporary abdominal closure for a neuroendocrine tumor  2. Hypovolemic/hemorrhagic shock  3. ABLA  4. Acute hypoxic resp failure  5. HAGMA  6. Lactic acidosis  7. ZO  7. Hypovolemia    This patient requires critical care for support of one or more vital organ systems with a high probability of imminent or life threatening deterioration in his/her condition    P:    Prolonged hoospital course post op  Remains critically ill  Intubated 5/4-5/6, reintubated 5/18, remains so  Remains in MSOF, triple pressor shock  On hemodialysis    Increase albumin dosing as appears to be hypovolemic  Trend Hgb, coags  Vent mgmt; being actively titrated to optimize ventilation and oxygenation while minimizing airway pressures; f/u ABG, CXR  Analgosedation  Norepi, jarad, vasopressor drips; actively titrating to maintain MAP > 65; suspect combination hypovolemic + distributive shock  RRT, being dialyzed; RRT session today  Maintain all invasive lines/catheters at this time; new CVC placed today, d/c SC CVC  Broad spectrum abx  Hold chemical VTE ppx now given bleeding    Dispo: Continue critical care, Trend endpoints. Continue active support of CV, pulmonary, renal systems and continued, ongoing MSOF.    TOTAL CRITICAL CARE TIME: 108 minutes   (EXCLUSIVE of any non bundled procedures)    Note: This time spent INCLUDES time spent directly as this patient's bedside with evaluation, review of chart including review of laboratory and imaging studies, interpretation of vital signs and cardiac output measurements, any necessary ventilator management, and time spent discussing plan of care with patient and family, including goals of care discussion.

## 2023-05-24 NOTE — PROGRESS NOTE ADULT - SUBJECTIVE AND OBJECTIVE BOX
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  Treated with Lutathera one year ago and restarted in 12/2022  G-J tube placed for nutrition in January due to chronic dysphagia and severe esophagitis on EGD.  Post recent  admission due to G tube clogging.  D/mansi home on 4/27.    Returned to ED due to vomiting when night time enteral feeding via G tube started.  CT with IVC done due to concern for GIB.  Pt reports chronic self catheterization due to urinary retention.   ZO improved when catheterization started.  Follows mainly with EMELY Cortez.   Pt has been self cath'ing 3-4 x /day.   Indwelling Coulter placed by  due to resistance with blood clots with resistance reported by pt.  For palliative mass resection 5/4    s/p OR 5/4---> Open distal pancreatectomy, splenectomy, partial colectomy, partial L adrenalectomy, temporary abdominal closure; Large varices, 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 Liver laden with diffuse metastases and very friable, RP oozy. Patient on pressors at the end of the case, and hence, decided to leave colon in discontinuity with temporary closure.    5/4 PM: Open abdomen with vac, bloody output Hypotensive on pressures, on deep sedation and NMB. Anuric, acidotic. MSOF.  --  5/24: Prolonged illness. Now back on ventilator,     ROS: Unable to obtain 2/2 Pt status      Allergies    losartan (Angioedema)    Intolerances        MEDICATIONS  (STANDING):  albumin human 25% IVPB 50 milliLiter(s) IV Intermittent every 6 hours  caspofungin IVPB 50 milliGRAM(s) IV Intermittent every 24 hours  chlorhexidine 0.12% Liquid 15 milliLiter(s) Oral Mucosa every 12 hours  chlorhexidine 4% Liquid 1 Application(s) Topical <User Schedule>  dextrose 5%. 1000 milliLiter(s) (100 mL/Hr) IV Continuous <Continuous>  dextrose 5%. 1000 milliLiter(s) (50 mL/Hr) IV Continuous <Continuous>  dextrose 50% Injectable 25 Gram(s) IV Push once  dextrose 50% Injectable 12.5 Gram(s) IV Push once  dextrose 50% Injectable 25 Gram(s) IV Push once  glucagon  Injectable 1 milliGRAM(s) IntraMuscular once  insulin lispro (ADMELOG) corrective regimen sliding scale   SubCutaneous every 6 hours  meropenem  IVPB 500 milliGRAM(s) IV Intermittent every 12 hours  meropenem  IVPB      metoclopramide Injectable 5 milliGRAM(s) IV Push every 6 hours  norepinephrine Infusion 0.05 MICROgram(s)/kG/Min (6.74 mL/Hr) IV Continuous <Continuous>  nystatin Cream 1 Application(s) Topical two times a day  pantoprazole  Injectable 40 milliGRAM(s) IV Push every 12 hours  phenylephrine    Infusion 0.05 MICROgram(s)/kG/Min (0.67 mL/Hr) IV Continuous <Continuous>  rifAXIMin 550 milliGRAM(s) Oral two times a day    MEDICATIONS  (PRN):  acetaminophen     Tablet .. 650 milliGRAM(s) Oral every 6 hours PRN Temp greater or equal to 38.5C (101.3F)  albumin human 25% IVPB 50 milliLiter(s) IV Intermittent every 1 hour PRN SBP less than 110  dextrose Oral Gel 15 Gram(s) Oral once PRN Blood Glucose LESS THAN 70 milliGRAM(s)/deciliter  fentaNYL    Injectable 25 MICROGram(s) IV Push every 1 hour PRN vent dyssynchrony  ondansetron Injectable 4 milliGRAM(s) IV Push every 6 hours PRN Nausea and/or Vomiting  petrolatum Ophthalmic Ointment 1 Application(s) Both EYES every 6 hours PRN dry eyes  sodium chloride 0.9% lock flush 10 milliLiter(s) IV Push every 1 hour PRN Pre/post blood products, medications, blood draw, and to maintain line patency  sodium chloride 0.9% lock flush 10 milliLiter(s) IV Push every 1 hour PRN Pre/post blood products, medications, blood draw, and to maintain line patency      Drug Dosing Weight  Height (cm): 170.2 (29 Apr 2023 08:11)  Weight (kg): 71.9 (06 May 2023 04:00)  BMI (kg/m2): 24.8 (06 May 2023 04:00)  BSA (m2): 1.83 (06 May 2023 04:00)    PAST MEDICAL & SURGICAL HISTORY:    Hypertension  BPH (benign prostatic hyperplasia)  Renal insufficiency  Light chain nephropathy  DM (diabetes mellitus)  HLD (hyperlipidemia)    No significant past surgical history    FAMILY HISTORY:    FH: breast cancer (Mother)    FH: aneurysm (Father)    ROS: See HPI; otherwise, all systems reviewed and negative.    O:    ICU Vital Signs Last 24 Hrs  T(C): 37.3 (24 May 2023 16:00), Max: 37.4 (24 May 2023 00:00)  T(F): 99.1 (24 May 2023 16:00), Max: 99.4 (24 May 2023 00:00)  HR: 117 (24 May 2023 17:14) (88 - 117)  BP: 103/59 (24 May 2023 17:00) (84/53 - 120/72)  BP(mean): 69 (24 May 2023 17:00) (58 - 87)  ABP: --  ABP(mean): --  RR: 25 (24 May 2023 17:00) (20 - 27)  SpO2: 95% (24 May 2023 17:14) (93% - 100%)    O2 Parameters below as of 24 May 2023 17:00    O2 Concentration (%): 21    I&O's Detail    23 May 2023 07:01  -  24 May 2023 07:00  --------------------------------------------------------  IN:    IV PiggyBack: 200 mL    Norepinephrine: 50 mL    Other (mL): 600 mL    Phenylephrine: 1262 mL    Vital1.5: 918 mL  Total IN: 3030 mL    OUT:    Bulb (mL): 45 mL    Colostomy (mL): 300 mL    Drain (mL): 350 mL    Gastrostomy Tube (mL): 725 mL    Other (mL): 3900 mL  Total OUT: 5320 mL    Total NET: -2290 mL      24 May 2023 07:01  -  24 May 2023 17:25  --------------------------------------------------------  IN:    Other (mL): 650 mL  Total IN: 650 mL    OUT:    Other (mL): 3900 mL  Total OUT: 3900 mL    Total NET: -3250 mL    Mode: CPAP with PS  FiO2: 21  PEEP: 6  PS: 10  MAP: 7  PIP: 18    PE:    Adult M lying in bed  + ETT + RIJ dialysis catheter, LIJ CVC  S1S2+  Coarse BS B/L  Abd---> open midline, wound car in place, bloody output; otherwise soft  No LE edema/swelling noted  Intubated, sedated  Skin pink  + coulter, ETT, CVC, dangelo in place, wound vac    LABS:    CBC Full  -  ( 24 May 2023 05:31 )  WBC Count : 7.02 K/uL  RBC Count : 2.47 M/uL  Hemoglobin : 7.3 g/dL  Hematocrit : 22.1 %  Platelet Count - Automated : 39 K/uL  Mean Cell Volume : 89.5 fl  Mean Cell Hemoglobin : 29.6 pg  Mean Cell Hemoglobin Concentration : 33.0 gm/dL  Auto Neutrophil # : x  Auto Lymphocyte # : x  Auto Monocyte # : x  Auto Eosinophil # : x  Auto Basophil # : x  Auto Neutrophil % : x  Auto Lymphocyte % : x  Auto Monocyte % : x  Auto Eosinophil % : x  Auto Basophil % : x    05-24    145  |  108  |  46<H>  ----------------------------<  132<H>  4.3   |  25  |  2.92<H>    Ca    9.4      24 May 2023 05:31  Phos  5.2     05-24  Mg     2.2     05-24    TPro  6.2  /  Alb  2.9<L>  /  TBili  5.8<H>  /  DBili  x   /  AST  222<H>  /  ALT  67  /  AlkPhos  283<H>  05-24        CAPILLARY BLOOD GLUCOSE      POCT Blood Glucose.: 161 mg/dL (24 May 2023 12:33)  POCT Blood Glucose.: 140 mg/dL (24 May 2023 05:14)  POCT Blood Glucose.: 243 mg/dL (23 May 2023 23:33)  POCT Blood Glucose.: 202 mg/dL (23 May 2023 18:41)        LIVER FUNCTIONS - ( 24 May 2023 05:31 )  Alb: 2.9 g/dL / Pro: 6.2 gm/dL / ALK PHOS: 283 U/L / ALT: 67 U/L / AST: 222 U/L / GGT: x

## 2023-05-24 NOTE — PROGRESS NOTE ADULT - SUBJECTIVE AND OBJECTIVE BOX
SURGERY DAILY PROGRESS NOTE:     Subjective:  Patient seen and examined at bedside during am rounds, intubated and on phenylephrine and levophed. No acute events overnight. Pt is off sedation and tolerating TF    Objective:    MEDICATIONS  (STANDING):  caspofungin IVPB 50 milliGRAM(s) IV Intermittent every 24 hours  chlorhexidine 0.12% Liquid 15 milliLiter(s) Oral Mucosa every 12 hours  chlorhexidine 4% Liquid 1 Application(s) Topical <User Schedule>  dextrose 5%. 1000 milliLiter(s) (50 mL/Hr) IV Continuous <Continuous>  dextrose 5%. 1000 milliLiter(s) (100 mL/Hr) IV Continuous <Continuous>  dextrose 50% Injectable 25 Gram(s) IV Push once  dextrose 50% Injectable 12.5 Gram(s) IV Push once  dextrose 50% Injectable 25 Gram(s) IV Push once  glucagon  Injectable 1 milliGRAM(s) IntraMuscular once  insulin lispro (ADMELOG) corrective regimen sliding scale   SubCutaneous every 6 hours  meropenem  IVPB 500 milliGRAM(s) IV Intermittent every 12 hours  meropenem  IVPB      metoclopramide Injectable 5 milliGRAM(s) IV Push every 6 hours  norepinephrine Infusion 0.05 MICROgram(s)/kG/Min (6.74 mL/Hr) IV Continuous <Continuous>  nystatin Cream 1 Application(s) Topical two times a day  pantoprazole  Injectable 40 milliGRAM(s) IV Push every 12 hours  phenylephrine    Infusion 0.05 MICROgram(s)/kG/Min (0.67 mL/Hr) IV Continuous <Continuous>  rifAXIMin 550 milliGRAM(s) Oral two times a day    MEDICATIONS  (PRN):  acetaminophen     Tablet .. 650 milliGRAM(s) Oral every 6 hours PRN Temp greater or equal to 38.5C (101.3F)  albumin human 25% IVPB 50 milliLiter(s) IV Intermittent every 1 hour PRN hypotension  dextrose Oral Gel 15 Gram(s) Oral once PRN Blood Glucose LESS THAN 70 milliGRAM(s)/deciliter  fentaNYL    Injectable 25 MICROGram(s) IV Push every 1 hour PRN vent dyssynchrony  ondansetron Injectable 4 milliGRAM(s) IV Push every 6 hours PRN Nausea and/or Vomiting  sodium chloride 0.9% lock flush 10 milliLiter(s) IV Push every 1 hour PRN Pre/post blood products, medications, blood draw, and to maintain line patency      Vital Signs Last 24 Hrs  T(C): 36.8 (24 May 2023 08:00), Max: 37.4 (24 May 2023 00:00)  T(F): 98.3 (24 May 2023 08:00), Max: 99.4 (24 May 2023 00:00)  HR: 105 (24 May 2023 09:00) (97 - 115)  BP: 100/61 (24 May 2023 09:00) (79/50 - 115/63)  BP(mean): 68 (24 May 2023 09:00) (57 - 78)  RR: 21 (24 May 2023 09:00) (17 - 24)  SpO2: 95% (24 May 2023 09:00) (91% - 100%)    Parameters below as of 24 May 2023 09:00      O2 Concentration (%): 21      PHYSICAL EXAM   Gen: intubated and sedated   CV: pulse regularly present   Resp: airway patent on MV  Abdomen: soft, Midline wound open superiorly and inferiorly with ascites drainage, colostomy bag covering the wounds dry gauze packing.  New drain on thru midline wound with minimal fluids. No evidence of wound infection. lower aspect of incision dehisced with bowel visualized, ostomy functional with stool  Extremities: No swelling or tenderness      I&O's Detail    23 May 2023 07:01  -  24 May 2023 07:00  --------------------------------------------------------  IN:    IV PiggyBack: 200 mL    Norepinephrine: 50 mL    Phenylephrine: 1262 mL    Vital1.5: 918 mL  Total IN: 2430 mL    OUT:    Bulb (mL): 45 mL    Colostomy (mL): 300 mL    Drain (mL): 350 mL    Gastrostomy Tube (mL): 725 mL  Total OUT: 1420 mL    Total NET: 1010 mL          Daily     Daily Weight in k.6 (24 May 2023 04:00)    LABS:                        7.3    7.02  )-----------( 39       ( 24 May 2023 05:31 )             22.1     05-24    145  |  108  |  46<H>  ----------------------------<  132<H>  4.3   |  25  |  2.92<H>    Ca    9.4      24 May 2023 05:31  Phos  5.2       Mg     2.2         TPro  6.2  /  Alb  2.9<L>  /  TBili  5.8<H>  /  DBili  x   /  AST  222<H>  /  ALT  67  /  AlkPhos  283<H>            RADIOLOGY & ADDITIONAL STUDIES:    ASSESSMENT/PLAN:

## 2023-05-24 NOTE — PROCEDURAL SAFETY CHECKLIST WITH OR WITHOUT SEDATION - NSPROCEDPERFORMDFREE_GEN_ALL_CORE
Dialysis Catheter Placement
L IJ Triple Lumen Central Line Insertion
right IJ non tunneled dialysis catheter placement
right internal jugular non tunneled dialysis catheter placement
central line insertion

## 2023-05-24 NOTE — PROGRESS NOTE ADULT - ASSESSMENT
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  S/P distal pancreatectomy, splenectomy, partial colectomy  S/P abdominal washout and colostomy creation  Partial Midline wound dehiscence   on and off pressors  more awake with sedation held  ostomy functional    Plan:  Tube feeds @70cc  Monitor ostomy output  BID and PRN Dressing changes   Gastrotomy tube to intermittent suction  F/u Nephro for HD  wean off vent  ween off pressors  No plans at this time for surgical intervention  Optimize nutrition/TPN  Cont great Critical care management as per ICU team  GI reccs appreciated  Surgery will follow  Ongoing GOC discussion daily w/family    Plan discussed with Dr. Black

## 2023-05-24 NOTE — PROCEDURAL SAFETY CHECKLIST WITH OR WITHOUT SEDATION - NSPREPROCFT_GEN_ALL_CORE
Dialysis Catheter Placement
right Internal jugular non tunneled dialysis catheter placement
central line placement
L IJ Triple Lumen Central Line Insertion
right IJ non tunneled dialysis catheter placement

## 2023-05-24 NOTE — PROCEDURE NOTE - ADDITIONAL PROCEDURE DETAILS
Dx/indications: ZO, uremia, need for urgent RRT  Procedure:    Dynamic US guidance throughout  Image obtained
indications: acute kidney injury, distributive shock
Indication: acute hypoxic respiratory failure    Procedure performed independently of CC time
Dx/indication: Septic shock  Procedure:    Dynamic US used throughout  No complications  Image obtained

## 2023-05-24 NOTE — PROCEDURE NOTE - NSPROCNAME_GEN_A_CORE
Tracheal Intubation
Arterial Puncture/Cannulation
Central Line Insertion
Urinary Device Placement
Central Line Insertion

## 2023-05-24 NOTE — PROGRESS NOTE ADULT - SUBJECTIVE AND OBJECTIVE BOX
Patient is a 68y old  Male who presents with a chief complaint of GIB (22 May 2023 09:51)    24 hour events: None     Allergies    losartan (Angioedema)    Intolerances      REVIEW OF SYSTEMS: SEE BELOW       ICU Vital Signs Last 24 Hrs  T(C): 36.8 (24 May 2023 08:00), Max: 37.4 (24 May 2023 00:00)  T(F): 98.3 (24 May 2023 08:00), Max: 99.4 (24 May 2023 00:00)  HR: 105 (24 May 2023 09:00) (97 - 115)  BP: 100/61 (24 May 2023 09:00) (79/50 - 115/63)  BP(mean): 68 (24 May 2023 09:00) (57 - 78)  ABP: --  ABP(mean): --  RR: 21 (24 May 2023 09:00) (17 - 24)  SpO2: 95% (24 May 2023 09:00) (91% - 100%)    O2 Parameters below as of 24 May 2023 09:00      O2 Concentration (%): 21          CAPILLARY BLOOD GLUCOSE      CAPILLARY BLOOD GLUCOSE      POCT Blood Glucose.: 140 mg/dL (24 May 2023 05:14)  POCT Blood Glucose.: 243 mg/dL (23 May 2023 23:33)  POCT Blood Glucose.: 202 mg/dL (23 May 2023 18:41)  POCT Blood Glucose.: 118 mg/dL (23 May 2023 14:53)  POCT Blood Glucose.: 240 mg/dL (23 May 2023 11:19)        I&O's Summary    21 May 2023 07:01  -  22 May 2023 07:00  --------------------------------------------------------  IN: 3261.7 mL / OUT: 1870 mL / NET: 1391.7 mL        Mode: AC/ CMV (Assist Control/ Continuous Mandatory Ventilation)  RR (machine): 16  TV (machine): 400  FiO2: 30  PEEP: 6  ITime: 0.8  MAP: 7  PIP: 18      MEDICATIONS  (STANDING):  caspofungin IVPB 50 milliGRAM(s) IV Intermittent every 24 hours  chlorhexidine 0.12% Liquid 15 milliLiter(s) Oral Mucosa every 12 hours  chlorhexidine 4% Liquid 1 Application(s) Topical <User Schedule>  dextrose 5%. 1000 milliLiter(s) (50 mL/Hr) IV Continuous <Continuous>  dextrose 5%. 1000 milliLiter(s) (100 mL/Hr) IV Continuous <Continuous>  dextrose 50% Injectable 25 Gram(s) IV Push once  dextrose 50% Injectable 12.5 Gram(s) IV Push once  dextrose 50% Injectable 25 Gram(s) IV Push once  glucagon  Injectable 1 milliGRAM(s) IntraMuscular once  insulin lispro (ADMELOG) corrective regimen sliding scale   SubCutaneous every 6 hours  meropenem  IVPB 500 milliGRAM(s) IV Intermittent every 12 hours  meropenem  IVPB      metoclopramide Injectable 5 milliGRAM(s) IV Push every 6 hours  norepinephrine Infusion 0.05 MICROgram(s)/kG/Min (6.74 mL/Hr) IV Continuous <Continuous>  nystatin Cream 1 Application(s) Topical two times a day  pantoprazole  Injectable 40 milliGRAM(s) IV Push every 12 hours  phenylephrine    Infusion 0.05 MICROgram(s)/kG/Min (0.67 mL/Hr) IV Continuous <Continuous>  rifAXIMin 550 milliGRAM(s) Oral two times a day    MEDICATIONS  (PRN):  acetaminophen     Tablet .. 650 milliGRAM(s) Oral every 6 hours PRN Temp greater or equal to 38.5C (101.3F)  albumin human 25% IVPB 50 milliLiter(s) IV Intermittent every 1 hour PRN hypotension  dextrose Oral Gel 15 Gram(s) Oral once PRN Blood Glucose LESS THAN 70 milliGRAM(s)/deciliter  fentaNYL    Injectable 25 MICROGram(s) IV Push every 1 hour PRN vent dyssynchrony  ondansetron Injectable 4 milliGRAM(s) IV Push every 6 hours PRN Nausea and/or Vomiting  sodium chloride 0.9% lock flush 10 milliLiter(s) IV Push every 1 hour PRN Pre/post blood products, medications, blood draw, and to maintain line patency        PHYSICAL EXAM: SEE BELOW                                     7.3    7.02  )-----------( 39       ( 24 May 2023 05:31 )             22.1     05-24    145  |  108  |  46<H>  ----------------------------<  132<H>  4.3   |  25  |  2.92<H>    Ca    9.4      24 May 2023 05:31  Phos  5.2     05-24  Mg     2.2     05-24    TPro  6.2  /  Alb  2.9<L>  /  TBili  5.8<H>  /  DBili  x   /  AST  222<H>  /  ALT  67  /  AlkPhos  283<H>  05-24

## 2023-05-24 NOTE — PROGRESS NOTE ADULT - ASSESSMENT
67 y/o male with PMH neuroendocrine tumor of pancreas, GJ tube, HTN, HLD, DM2    Initially admitted with UGI bleed - no intervention   Found to have a large distal pancreatic mass     Underwent open distal pancreatectomy, splenectomy, partial colectomy, partial L adrenalectomy, temporary abdominal closure on 5/4  Unstable during procedure    Underwent abdominal washout and colostomy on 5/5  No active bleeding or abscess found     Underwent gastric drain and NJ tube placement by GI on 5/18       Course complicated by:     Severe sepsis with septic shock (not POA) due to abdominal source/abscess   Enterobacter bacteremia   ZO from ATN now on dialysis   Acute resp failure   Acute metabolic encephalopathy     Intubated 5/4-6  Reintubated 5/18     Hemodialysis initiated 5/5    L subclavian CVC 5/14-24  LIJ CVC 5/24  RIJ HD cath 5/17       Plan:     Off sedation, less responsive today   Remains on Calixto, Levo, Levo  Tolerates SBT, will reassess later re: extubation, may not extubate if mental status remains poor  HD with UF today   Continue meropenem and caspofungin, WBC is stable, d/w ID   TF via NJT  Ostomy care   Thrombocytopenia stable, no active bleeding   Glu WNL today, hold Lantus   Maintain RIJ HD cath   Placed new LIJ CVC, removed L SC CVC  Not ready for chemical DVT ppx due to persistent thrombocytopenia     Plan d/w surgery     Patient critically ill with poor prognosis    Family updated at bedside

## 2023-05-24 NOTE — PROGRESS NOTE ADULT - ASSESSMENT
67 yo man with Hx of Neuroendocrine tumor/Pancreatic mass and liver mets.  Now for palliative resection of pancreatic mass.    --ZO with variable creat level and unclear CKD.    Likely dependent on volume status.    Limited intake even with Enteral feeding.      Start gentle hydration.  -- : continue Hale drainage.  --Hx of light chain nephropathy  --No contraindication for surgery from renal standpoint.    5/4  s/p Palliative surgery for neuroendocrine mass  will watch for ZO, pt with low UO immediate post op  s/p multiple PRBC and FFP transfusion and volume resuscitation    5/5  patient anuric, K 5.2  On bicarb drip  to go back to OR today  case d/w Dr Jean and Dr Siddiqi  Will HD x 2 hrs to correct potassium  >10 L infused in blood products and fluids  More fluids not indicated, UO 50 ml  no fluid removal on HD  Hep profile ordered    5/6  Ostomy created  will monitor i/o  labs stable   no need for HD today  d/w intensivist  will monitor daily for hd requirement    5/7  seen earlier on HD   tolerating well  TPN  d/w Dr Jean  d/w pts fam at bedside    5/8 MK   - ZO with anuria, remains HD dependent with hx of light chain nephropathy    tolerating hd     dc femoral shiley today after hd   - enceph monitor response to hd and xifaxan and lactulose    5/9 MK   - ZO with anuria, remains HD dependent with hx of light chain nephropathy    no indication for hd    off pressor     monitor electrolytes on TPN     NG in place for TF initiation   - enceph, improving monitor response to hd and xifaxan and lactulose    ostomy fx with liquid stool  dw dr marley     5/10 SY  --ZO : remains oligoanuric.     Follow with repeat bladder scans--no retention.    Will hold HD today and assess daily.    Trial of gentle hydration due to increased fluid loss.  --Metabolic acidosis : trial of HCO3 in addition to TPN   --Monitor encephalopathy.    5/11 SY  --ZO : remains oligoanuric.    Renal parameters further increased with no improvement in acidosis with HCO3 infusion.    D/w pt's wife over phone.    Explained his poor prognosis from Oncology aspect due to large tumor burden in liver and that surgery was palliative in nature.    Explained added burden of continued dialysis adding to pt's suffering at this point.    Pt's wife requested not to proceed with HD catheter placement and dialysis until she can d/w Dr Siddiqi and rest of family.    Will discuss further later today.    5/12  HD yesterday, planned for HD Sat  case d/w family, and ID, pts nurse  Dr Velez covering this weekend    5/13 SY  --ZO : remains oligoanuric.   HD today : HD order and tx reviewed.     --Positive fluid balance due to hypotension.  RR at 30 today.   Check follow up CXR.  Will plan for 1-2 kg UF with HD if tolerated.  --Continue enteral feeding.  --D/w Dr Mcclendon : positive blood culture/ GNR-- pre current HD catheter.   Plan HD today and on Monday,, then remove catheter for bacteremia clearance.    5/14 SY  --ZO : reamins dialysis dependent for now.    Plan HD in am and remove temporary HD catheter due to bacteremia to allow clearance.  --CT with patch lower lung inf : concern for PNA : continue abtx.  --Continue enteral feeding.  --Enterobacter bacteremia : continue abtx.    5/15 MK   - ZO for now HD dependent    tolerating hd and will attempt uf with hd   - enterobacter sepsis on meropenem renally dosed, aspiration pna     for dc of [place] post hd today   - anemia transfusion ongoing   - FEN On vitality with ostomy output noted     5/16 MK   - ZO Hd dependent    for hd in am with replacement of catheter to occur   - enterobacter sepsis on meropenem, renally dosed     asp pna   - anemia h/h stable   - FEN: on d10 ng tube pulled out    5/17 SY  --ZO : continue TIW HD--will maintain on MWF schedule this week.  --Metabolic acidosis : mainly due to GI loss,  will need to correct with HD.  --Enterobacter bacteremia : continue meropenem  --Post TPN.  NGT pulled out.  For J tube placement.    5/18 SY  --ZO : Continue HD support .    Next tx in am.  Will attempt to start UF with HD as tolerated   --Metabolic acidosis : due to GI loss : increase HCO3 in dialysate  --Enterobacter bacteremia : continue Meropenem  --Continue TPN for now.    5/19  Operative findings:  "Sepsis secondary to perforation leading to a cavitary abscess into the peritoneum.   - Drain placed with endoscopic guidance, going from the skin (sutured) through the cavitary abscess and into the stomach.   - Placement of NJ tube"  For HD today   orders outlined  will follow for tolerance   Dr Daniels covering the weekend    5/;22 MK   - ZO/ATN with anasaraca     will attempt uf with HD today and monitor response, spa with hd, titration of pressors as needed    if tolerates HD, plan for HD in am   - sepsis as outlined above, now on antifungal   dw dr lopes and HD rn     5/23 MK   - ZO/ATN with anasarca    inc uf with hd today and monitor response     spa and pressors with hd to support bp  - FEN on vitaliaty tf, no fw flushes  - sepsis abx and antifungal as per ID   dw icu and hd rn , dr lopes     5/24 SY  --ZO/ATN  : continue dialysis support    creat low due to low muscle mass and with no signs of renal recovery.  --Anasarca with increased third spacing of fluid : HD on Mon/Tues.  Mainly attempt to increase UF.  --Electrolytes stable.  --Sepsis : continue Meropenem and Caspofungin.

## 2023-05-24 NOTE — PROGRESS NOTE ADULT - SUBJECTIVE AND OBJECTIVE BOX
Patient is a 68y old  Male who presents with a chief complaint of GIB (24 May 2023 09:58)    24 hour events:     Allergies    losartan (Angioedema)    Intolerances      REVIEW OF SYSTEMS: SEE BELOW       ICU Vital Signs Last 24 Hrs  T(C): 37 (24 May 2023 10:32), Max: 37.4 (24 May 2023 00:00)  T(F): 98.6 (24 May 2023 10:32), Max: 99.4 (24 May 2023 00:00)  HR: 94 (24 May 2023 12:50) (94 - 115)  BP: 87/60 (24 May 2023 12:50) (83/53 - 115/63)  BP(mean): 66 (24 May 2023 12:50) (59 - 78)  ABP: --  ABP(mean): --  RR: 21 (24 May 2023 12:50) (19 - 27)  SpO2: 100% (24 May 2023 12:50) (93% - 100%)    O2 Parameters below as of 24 May 2023 12:50  Patient On (Oxygen Delivery Method): ventilator    O2 Concentration (%): 21        CAPILLARY BLOOD GLUCOSE      POCT Blood Glucose.: 161 mg/dL (24 May 2023 12:33)  POCT Blood Glucose.: 140 mg/dL (24 May 2023 05:14)  POCT Blood Glucose.: 243 mg/dL (23 May 2023 23:33)  POCT Blood Glucose.: 202 mg/dL (23 May 2023 18:41)  POCT Blood Glucose.: 118 mg/dL (23 May 2023 14:53)      I&O's Summary    23 May 2023 07:01  -  24 May 2023 07:00  --------------------------------------------------------  IN: 2430 mL / OUT: 1420 mL / NET: 1010 mL        Mode: AC/ CMV (Assist Control/ Continuous Mandatory Ventilation)  RR (machine): 400  TV (machine): 16  FiO2: 21  PEEP: 6  ITime: 0.7  MAP: 9  PIP: 18      MEDICATIONS  (STANDING):  caspofungin IVPB 50 milliGRAM(s) IV Intermittent every 24 hours  chlorhexidine 0.12% Liquid 15 milliLiter(s) Oral Mucosa every 12 hours  chlorhexidine 4% Liquid 1 Application(s) Topical <User Schedule>  dextrose 5%. 1000 milliLiter(s) (100 mL/Hr) IV Continuous <Continuous>  dextrose 5%. 1000 milliLiter(s) (50 mL/Hr) IV Continuous <Continuous>  dextrose 50% Injectable 25 Gram(s) IV Push once  dextrose 50% Injectable 12.5 Gram(s) IV Push once  dextrose 50% Injectable 25 Gram(s) IV Push once  glucagon  Injectable 1 milliGRAM(s) IntraMuscular once  insulin lispro (ADMELOG) corrective regimen sliding scale   SubCutaneous every 6 hours  meropenem  IVPB 500 milliGRAM(s) IV Intermittent every 12 hours  meropenem  IVPB      metoclopramide Injectable 5 milliGRAM(s) IV Push every 6 hours  norepinephrine Infusion 0.05 MICROgram(s)/kG/Min (6.74 mL/Hr) IV Continuous <Continuous>  nystatin Cream 1 Application(s) Topical two times a day  pantoprazole  Injectable 40 milliGRAM(s) IV Push every 12 hours  phenylephrine    Infusion 0.05 MICROgram(s)/kG/Min (0.67 mL/Hr) IV Continuous <Continuous>  rifAXIMin 550 milliGRAM(s) Oral two times a day      MEDICATIONS  (PRN):  acetaminophen     Tablet .. 650 milliGRAM(s) Oral every 6 hours PRN Temp greater or equal to 38.5C (101.3F)  albumin human 25% IVPB 50 milliLiter(s) IV Intermittent every 1 hour PRN SBP less than 110  dextrose Oral Gel 15 Gram(s) Oral once PRN Blood Glucose LESS THAN 70 milliGRAM(s)/deciliter  fentaNYL    Injectable 25 MICROGram(s) IV Push every 1 hour PRN vent dyssynchrony  ondansetron Injectable 4 milliGRAM(s) IV Push every 6 hours PRN Nausea and/or Vomiting  sodium chloride 0.9% lock flush 10 milliLiter(s) IV Push every 1 hour PRN Pre/post blood products, medications, blood draw, and to maintain line patency  sodium chloride 0.9% lock flush 10 milliLiter(s) IV Push every 1 hour PRN Pre/post blood products, medications, blood draw, and to maintain line patency      PHYSICAL EXAM: SEE BELOW                          7.3    7.02  )-----------( 39       ( 24 May 2023 05:31 )             22.1       05-24    145  |  108  |  46<H>  ----------------------------<  132<H>  4.3   |  25  |  2.92<H>    Ca    9.4      24 May 2023 05:31  Phos  5.2     05-24  Mg     2.2     05-24    TPro  6.2  /  Alb  2.9<L>  /  TBili  5.8<H>  /  DBili  x   /  AST  222<H>  /  ALT  67  /  AlkPhos  283<H>  05-24

## 2023-05-24 NOTE — PROCEDURAL SAFETY CHECKLIST WITH OR WITHOUT SEDATION - NSPX2BRECORDED_GEN_ALL_CORE
right IJ nontunneled dialysis catheter placement
L IJ Triple Lumen Central Line Insertion
central line placement
Dialysis Catheter Insertion
right internal jugular non tunneled dialysis catheter placed

## 2023-05-24 NOTE — PROCEDURE NOTE - NSINDICATIONS_GEN_A_CORE
dialysis/CRRT
critical patient
critical illness
critical illness/dialysis/CRRT
critical illness
urinry obstruction or retention
dialysis/CRRT
airway protection/respiratory failure

## 2023-05-24 NOTE — PROCEDURE NOTE - NSINFORMCONSENT_GEN_A_CORE
Benefits, risks, and possible complications of procedure explained to patient/caregiver who verbalized understanding and gave verbal consent.
telephone consent from wife
Benefits, risks, and possible complications of procedure explained to patient/caregiver who verbalized understanding and gave verbal consent.
This was an emergent procedure.
This was an emergent procedure.
Benefits, risks, and possible complications of procedure explained to patient/caregiver who verbalized understanding and gave written consent.
Benefits, risks, and possible complications of procedure explained to patient/caregiver who verbalized understanding and gave written consent.
This was an emergent procedure.

## 2023-05-24 NOTE — PROCEDURE NOTE - NSCVLATTEMPTSITEVASC_A_CORE
right/femoral vein
left/internal jugular/subclavian vein
right/internal jugular
left/internal jugular
right/internal jugular

## 2023-05-24 NOTE — PROCEDURE NOTE - NSSITEPREP_SKIN_A_CORE
chlorhexidine/Adherence to aseptic technique: hand hygiene prior to donning barriers (gown, gloves), don cap and mask, sterile drape over patient
chlorhexidine
chlorhexidine/Adherence to aseptic technique: hand hygiene prior to donning barriers (gown, gloves), don cap and mask, sterile drape over patient
chlorhexidine
chlorhexidine/Adherence to aseptic technique: hand hygiene prior to donning barriers (gown, gloves), don cap and mask, sterile drape over patient
chlorhexidine
chlorhexidine

## 2023-05-24 NOTE — PROCEDURE NOTE - NSPOSTPRCRAD_GEN_A_CORE
central line located in the/central line located in the superior vena cava/depth of insertion/no pneumothorax/post-procedure radiography performed
central line located in the
central line located in the superior vena cava/no pneumothorax/post-procedure radiography performed
central line located in the/central line located in the superior vena cava/no pneumothorax/post-procedure radiography performed

## 2023-05-24 NOTE — PROGRESS NOTE ADULT - ASSESSMENT
69 y/o male with PMH neuroendocrine tumor of pancreas, GJ tube, HTN, HLD, DM2    Initially admitted with UGI bleed - no intervention   Found to have a large distal pancreatic mass     Underwent open distal pancreatectomy, splenectomy, partial colectomy, partial L adrenalectomy, temporary abdominal closure on 5/4  Unstable during procedure    Underwent abdominal washout and colostomy on 5/5  No active bleeding or abscess found     Underwent gastric drain and NJ tube placement by GI on 5/18       Course complicated by:     Severe sepsis with septic shock (not POA) due to abdominal source/abscess   Enterobacter bacteremia   ZO from ATN now on dialysis   Acute resp failure   Acute metabolic encephalopathy     Intubated 5/4-6  Reintubated 5/18     Hemodialysis initiated 5/5    L subclavian CVC 5/14  RIJ HD cath 5/17       Plan:     Taper off Precedex    HD support with Calixto, Levo, Levo requirement stable   Vent support, will try SBT after dialysis, he is not ready for extubation given HD instability and agonal breathing pattern   Continue meropenem and caspofungin,  WBC is stable, ID following   Surgery f/u   Dialysis today, try UF if tolerates   TF via NJT  change CVC today   Ostomy care   Thrombocytopenia stable   Hyperglycemia  better, goal of >180, on ISS   Continue holding chemical DVT ppx    Patient critically ill with poor prognosis

## 2023-05-25 NOTE — PROGRESS NOTE ADULT - SUBJECTIVE AND OBJECTIVE BOX
Date of service: 05-25-23 @ 13:32    pt seen and examined   s/p endoscopic drain placement from skin to cavitary abscess and into the stomach and placement of NJ tube  on ventilatory support reintubated on 5/18  on pressor support  low grade temp  poor ms    ROS: unable to obtain d/t medical condition    MEDICATIONS  (STANDING):  albumin human 25% IVPB 50 milliLiter(s) IV Intermittent every 6 hours  caspofungin IVPB 50 milliGRAM(s) IV Intermittent every 24 hours  chlorhexidine 0.12% Liquid 15 milliLiter(s) Oral Mucosa every 12 hours  chlorhexidine 4% Liquid 1 Application(s) Topical <User Schedule>  dextrose 5%. 1000 milliLiter(s) (50 mL/Hr) IV Continuous <Continuous>  dextrose 5%. 1000 milliLiter(s) (100 mL/Hr) IV Continuous <Continuous>  dextrose 50% Injectable 25 Gram(s) IV Push once  dextrose 50% Injectable 12.5 Gram(s) IV Push once  dextrose 50% Injectable 25 Gram(s) IV Push once  glucagon  Injectable 1 milliGRAM(s) IntraMuscular once  heparin   Injectable 5000 Unit(s) SubCutaneous every 12 hours  insulin lispro (ADMELOG) corrective regimen sliding scale   SubCutaneous every 6 hours  meropenem  IVPB 500 milliGRAM(s) IV Intermittent every 12 hours  meropenem  IVPB      metoclopramide Injectable 5 milliGRAM(s) IV Push every 6 hours  norepinephrine Infusion 0.05 MICROgram(s)/kG/Min (6.74 mL/Hr) IV Continuous <Continuous>  nystatin Cream 1 Application(s) Topical two times a day  pantoprazole  Injectable 40 milliGRAM(s) IV Push every 12 hours  phenylephrine    Infusion 0.05 MICROgram(s)/kG/Min (0.67 mL/Hr) IV Continuous <Continuous>  rifAXIMin 550 milliGRAM(s) Oral two times a day    Vital Signs Last 24 Hrs  T(C): 36.9 (25 May 2023 04:00), Max: 37.8 (25 May 2023 00:00)  T(F): 98.5 (25 May 2023 04:00), Max: 100.1 (25 May 2023 00:00)  HR: 112 (25 May 2023 12:00) (96 - 117)  BP: 111/60 (25 May 2023 12:00) (84/53 - 116/67)  BP(mean): 70 (25 May 2023 12:00) (58 - 87)  RR: 26 (25 May 2023 12:00) (16 - 28)  SpO2: 93% (25 May 2023 12:00) (91% - 100%)    Parameters below as of 25 May 2023 04:00  Patient On (Oxygen Delivery Method): ventilator    O2 Concentration (%): 21      PE:  Constitutional: frail looking  HEENT: NC/AT, EOMI, PERRLA, conjunctivae clear; ears and nose atraumatic; pharynx benign  Neck: supple; thyroid not palpable  Back: no tenderness  Respiratory: decreased breath sounds, rales   Cardiovascular: S1S2 regular, no murmurs  Abdomen: soft, not tender, not distended, positive BS; + ostomy + peg tube midline abd wound with drainage  Genitourinary: no suprapubic tenderness  Lymphatic: no LN palpable  Musculoskeletal: no muscle tenderness, no joint swelling or tenderness  Extremities: no pedal edema  Neurological/ Psychiatric: no focal deficits   Skin: no rashes; no palpable lesions    Labs: all available labs reviewed                                              7.1    7.56  )-----------( 52       ( 25 May 2023 05:40 )             21.5     05-25    143  |  107  |  48<H>  ----------------------------<  310<H>  4.2   |  24  |  2.89<H>    Ca    8.9      25 May 2023 05:40  Phos  5.5     05-25  Mg     2.1     05-25    TPro  6.3  /  Alb  3.3  /  TBili  5.7<H>  /  DBili  x   /  AST  170<H>  /  ALT  57  /  AlkPhos  240<H>  05-25      Culture - Blood (05.12.23 @ 09:01)   - Enterobacter cloacae complex: Detec  Gram Stain: Culture - Blood (05.12.23 @ 08:59)   Gram Stain:   Growth in aerobic bottle: Gram Negative Rods   Growth in anaerobic bottle: Gram Negative Rods  Specimen Source: .Blood None  Culture Results:   Growth in aerobic bottle: Gram Negative Rods   Growth in anaerobic bottle: Gram Negative Rods     Radiology: all available radiological tests reviewed    < from: Xray Chest 1 View- PORTABLE-Urgent (Xray Chest 1 View- PORTABLE-Urgent .) (05.11.23 @ 13:48) >    ACC: [id number] EXAM:  XR CHEST PORTABLE URGENT 1V   ORDERED BY: PRIYA HALL     PROCEDURE DATE:  05/11/2023          INTERPRETATION:  INDICATION: Dialysis catheter insertion    Portable chest 1:28 PM    COMPARISON: 5/6/2023    FINDINGS:  Heart/Vascular: The heart size, mediastinum, hilum and aorta are stable.  Pulmonary: Midline trachea. There is mild pulmonary venous congestion and   low lung volumes.    Bones: There is no fracture.  Lines and catheter: Tip of the right IJ dialysis catheteris in the right   atrium. Tip and side-port of NG tube are in the body of the stomach.    Impression:    There is mild pulmonary venous congestion and low lung volumes.    Tip of the right IJ dialysis catheter is in the right atrium. There is no   pneumothorax.          < end of copied text >  < from: US Kidney and Bladder (05.08.23 @ 15:51) >    ACC: [id number] EXAM:  US KIDNEYS AND BLADDER   ORDERED BY: GWENDOLYN SINGH     PROCEDURE DATE:  05/08/2023          INTERPRETATION:  CLINICAL INFORMATION: History of gastric cancer.   Evaluate for renal injury.    COMPARISON: CT dated 04/29/2023    TECHNIQUE: Sonography of the kidneys and bladder.    FINDINGS:  Right kidney: 8.7 cm. No renal mass, hydronephrosis or calculi.    Left kidney: 8.5 cm. No renal mass, hydronephrosis or calculi.    Urinary bladder: Urinary bladder is contracted limiting its evaluation.    Incidentally noted are diffuse hepatic metastases as noted on the prior   CT.    IMPRESSION:  No hydronephrosis or renal mass is identified to suggest renal injury.   Please note that contrast enhanced CT is more sensitive for detection of   renal injury.    Hepatic metastasis.        < end of copied text >    Advanced directives addressed: full resuscitation

## 2023-05-25 NOTE — PROGRESS NOTE ADULT - SUBJECTIVE AND OBJECTIVE BOX
NEPHROLOGY INTERVAL HPI/OVERNIGHT EVENTS:    Date of Service: 23 @ 08:26    --Remains on vent.  No OR yesterday.  restarted on enteral feeding via N-J tube.  4KG UF with HD yesterday.  --Remains on vent.  awaiting decision for OR today.  NPO today.   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  Treated with Lutathera one year ago and restarted in 2022  G-J tube placed for nutrition in January due to chronic dysphagia and severe esophagitis on EGD.  Post recent  admission due to G tube clogging.  D/mansi home on .    Returned to ED due to vomiting when night time enteral feeding via G tube started.  CT with IVC done due to concern for GIB.  Pt reports chronic self catheterization due to urinary retention.   ZO improved when catheterization started.  Follows mainly with EMELY Cortez.   Pt has been self cath'ing 3-4 x /day.   Indwelling Hale placed by  due to resistance with blood clots with resistance reported by pt.  For palliative mass resection tomorrow.    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  Creat has been variable during previous admissions--ranging 1.1-2.2.    PMHX and PSHX.  --Neuroendocrine tumor of pancreas with mets to liver --dx 6 years ago.  --GJ tube for nutrition in 2023 due to chronic dysphagia.  --Hx of HTN  --Hx of DM  --Hx of light chain disease --unclear hx and no hx of renal biopsy.        MEDICATIONS  (STANDING):  albumin human 25% IVPB 50 milliLiter(s) IV Intermittent every 6 hours  caspofungin IVPB 50 milliGRAM(s) IV Intermittent every 24 hours  chlorhexidine 0.12% Liquid 15 milliLiter(s) Oral Mucosa every 12 hours  chlorhexidine 4% Liquid 1 Application(s) Topical <User Schedule>  dextrose 5%. 1000 milliLiter(s) (50 mL/Hr) IV Continuous <Continuous>  dextrose 5%. 1000 milliLiter(s) (100 mL/Hr) IV Continuous <Continuous>  dextrose 50% Injectable 25 Gram(s) IV Push once  dextrose 50% Injectable 12.5 Gram(s) IV Push once  dextrose 50% Injectable 25 Gram(s) IV Push once  glucagon  Injectable 1 milliGRAM(s) IntraMuscular once  insulin glargine Injectable (LANTUS) 20 Unit(s) SubCutaneous once  insulin lispro (ADMELOG) corrective regimen sliding scale   SubCutaneous every 6 hours  meropenem  IVPB 500 milliGRAM(s) IV Intermittent every 12 hours  meropenem  IVPB      metoclopramide Injectable 5 milliGRAM(s) IV Push every 6 hours  norepinephrine Infusion 0.05 MICROgram(s)/kG/Min (6.74 mL/Hr) IV Continuous <Continuous>  nystatin Cream 1 Application(s) Topical two times a day  pantoprazole  Injectable 40 milliGRAM(s) IV Push every 12 hours  phenylephrine    Infusion 0.05 MICROgram(s)/kG/Min (0.67 mL/Hr) IV Continuous <Continuous>  rifAXIMin 550 milliGRAM(s) Oral two times a day    MEDICATIONS  (PRN):  acetaminophen     Tablet .. 650 milliGRAM(s) Oral every 6 hours PRN Temp greater or equal to 38.5C (101.3F)  albumin human 25% IVPB 50 milliLiter(s) IV Intermittent every 1 hour PRN SBP less than 110  artificial tears (preservative free) Ophthalmic Solution 1 Drop(s) Both EYES four times a day PRN Dry/Irritated Eyes  dextrose Oral Gel 15 Gram(s) Oral once PRN Blood Glucose LESS THAN 70 milliGRAM(s)/deciliter  fentaNYL    Injectable 25 MICROGram(s) IV Push every 1 hour PRN vent dyssynchrony  ondansetron Injectable 4 milliGRAM(s) IV Push every 6 hours PRN Nausea and/or Vomiting  sodium chloride 0.9% lock flush 10 milliLiter(s) IV Push every 1 hour PRN Pre/post blood products, medications, blood draw, and to maintain line patency  sodium chloride 0.9% lock flush 10 milliLiter(s) IV Push every 1 hour PRN Pre/post blood products, medications, blood draw, and to maintain line patency    Vital Signs Last 24 Hrs  T(C): 36.9 (25 May 2023 04:00), Max: 37.8 (25 May 2023 00:00)  T(F): 98.5 (25 May 2023 04:00), Max: 100.1 (25 May 2023 00:00)  HR: 106 (25 May 2023 07:00) (88 - 117)  BP: 107/58 (25 May 2023 07:) (84/53 - 120/72)  BP(mean): 68 (25 May 2023 07:) (58 - 87)  RR: 21 (25 May 2023 07:) (16 - 28)  SpO2: 93% (25 May 2023 07:) (91% - 100%)    Parameters below as of 25 May 2023 04:00  Patient On (Oxygen Delivery Method): ventilator    O2 Concentration (%): 21  Daily     Daily Weight in k.9 (25 May 2023 04:00)     @ 07:01  -   @ 07:00  --------------------------------------------------------  IN: 4017 mL / OUT: 5905 mL / NET: -1888 mL    PHYSICAL EXAM:  GENERAL: on vent  CHEST/LUNG: fair air entry  HEART: S1S2 RRR  ABDOMEN: soft  EXTREMITIES: 3-4+ edema in LE   SKIN:     LABS:                        7.1    7.56  )-----------( 52       ( 25 May 2023 05:40 )             21.5         143  |  107  |  48<H>  ----------------------------<  310<H>  4.2   |  24  |  2.89<H>    Ca    8.9      25 May 2023 05:40  Phos  5.5       Mg     2.1         TPro  6.3  /  Alb  3.3  /  TBili  5.7<H>  /  DBili  x   /  AST  170<H>  /  ALT  57  /  AlkPhos  240<H>          Magnesium, Serum: 2.1 mg/dL ( @ 05:40)  Phosphorus Level, Serum: 5.5 mg/dL ( @ 05:40)          RADIOLOGY & ADDITIONAL TESTS:

## 2023-05-25 NOTE — PROGRESS NOTE ADULT - ASSESSMENT
69 yo man with Hx of Neuroendocrine tumor/Pancreatic mass and liver mets.  Now for palliative resection of pancreatic mass.    --ZO with variable creat level and unclear CKD.    Likely dependent on volume status.    Limited intake even with Enteral feeding.      Start gentle hydration.  -- : continue Hale drainage.  --Hx of light chain nephropathy  --No contraindication for surgery from renal standpoint.    5/4  s/p Palliative surgery for neuroendocrine mass  will watch for ZO, pt with low UO immediate post op  s/p multiple PRBC and FFP transfusion and volume resuscitation    5/5  patient anuric, K 5.2  On bicarb drip  to go back to OR today  case d/w Dr Jean and Dr Siddiqi  Will HD x 2 hrs to correct potassium  >10 L infused in blood products and fluids  More fluids not indicated, UO 50 ml  no fluid removal on HD  Hep profile ordered    5/6  Ostomy created  will monitor i/o  labs stable   no need for HD today  d/w intensivist  will monitor daily for hd requirement    5/7  seen earlier on HD   tolerating well  TPN  d/w Dr Jean  d/w pts fam at bedside    5/8 MK   - ZO with anuria, remains HD dependent with hx of light chain nephropathy    tolerating hd     dc femoral shiley today after hd   - enceph monitor response to hd and xifaxan and lactulose    5/9 MK   - ZO with anuria, remains HD dependent with hx of light chain nephropathy    no indication for hd    off pressor     monitor electrolytes on TPN     NG in place for TF initiation   - enceph, improving monitor response to hd and xifaxan and lactulose    ostomy fx with liquid stool  dw dr marley     5/10 SY  --ZO : remains oligoanuric.     Follow with repeat bladder scans--no retention.    Will hold HD today and assess daily.    Trial of gentle hydration due to increased fluid loss.  --Metabolic acidosis : trial of HCO3 in addition to TPN   --Monitor encephalopathy.    5/11 SY  --ZO : remains oligoanuric.    Renal parameters further increased with no improvement in acidosis with HCO3 infusion.    D/w pt's wife over phone.    Explained his poor prognosis from Oncology aspect due to large tumor burden in liver and that surgery was palliative in nature.    Explained added burden of continued dialysis adding to pt's suffering at this point.    Pt's wife requested not to proceed with HD catheter placement and dialysis until she can d/w Dr Siddiqi and rest of family.    Will discuss further later today.    5/12  HD yesterday, planned for HD Sat  case d/w family, and ID, pts nurse  Dr Velez covering this weekend    5/13 SY  --ZO : remains oligoanuric.   HD today : HD order and tx reviewed.     --Positive fluid balance due to hypotension.  RR at 30 today.   Check follow up CXR.  Will plan for 1-2 kg UF with HD if tolerated.  --Continue enteral feeding.  --D/w Dr Mcclendon : positive blood culture/ GNR-- pre current HD catheter.   Plan HD today and on Monday,, then remove catheter for bacteremia clearance.    5/14 SY  --ZO : reamins dialysis dependent for now.    Plan HD in am and remove temporary HD catheter due to bacteremia to allow clearance.  --CT with patch lower lung inf : concern for PNA : continue abtx.  --Continue enteral feeding.  --Enterobacter bacteremia : continue abtx.    5/15 MK   - ZO for now HD dependent    tolerating hd and will attempt uf with hd   - enterobacter sepsis on meropenem renally dosed, aspiration pna     for dc of [place] post hd today   - anemia transfusion ongoing   - FEN On vitality with ostomy output noted     5/16 MK   - ZO Hd dependent    for hd in am with replacement of catheter to occur   - enterobacter sepsis on meropenem, renally dosed     asp pna   - anemia h/h stable   - FEN: on d10 ng tube pulled out    5/17 SY  --ZO : continue TIW HD--will maintain on MWF schedule this week.  --Metabolic acidosis : mainly due to GI loss,  will need to correct with HD.  --Enterobacter bacteremia : continue meropenem  --Post TPN.  NGT pulled out.  For J tube placement.    5/18 SY  --ZO : Continue HD support .    Next tx in am.  Will attempt to start UF with HD as tolerated   --Metabolic acidosis : due to GI loss : increase HCO3 in dialysate  --Enterobacter bacteremia : continue Meropenem  --Continue TPN for now.    5/19  Operative findings:  "Sepsis secondary to perforation leading to a cavitary abscess into the peritoneum.   - Drain placed with endoscopic guidance, going from the skin (sutured) through the cavitary abscess and into the stomach.   - Placement of NJ tube"  For HD today   orders outlined  will follow for tolerance   Dr Daniels covering the weekend    5/;22 MK   - ZO/ATN with anasaraca     will attempt uf with HD today and monitor response, spa with hd, titration of pressors as needed    if tolerates HD, plan for HD in am   - sepsis as outlined above, now on antifungal   dw dr lopes and HD rn     5/23 MK   - ZO/ATN with anasarca    inc uf with hd today and monitor response     spa and pressors with hd to support bp  - FEN on vitaliaty tf, no fw flushes  - sepsis abx and antifungal as per ID   dw icu and hd rn , dr lopes     5/24 SY  --ZO/ATN  : continue dialysis support    creat low due to low muscle mass and with no signs of renal recovery.  --Anasarca with increased third spacing of fluid : HD on Mon/Tues.  Mainly attempt to increase UF.  --Electrolytes stable.  --Sepsis : continue Meropenem and Caspofungin.    5/25 SY  --ZO/ATN : remains dialysis dependent.  Next tx in am    Plan PUF on Sat to manage LE edema/anasarca  --Electrolytes stable.  Continue enteral feeding.  Trend Phos level.  --Sepsis : continue Meropenem and Caspofungin.

## 2023-05-25 NOTE — PROGRESS NOTE ADULT - SUBJECTIVE AND OBJECTIVE BOX
Patient is a 68y old  Male who presents with a chief complaint of GIB (24 May 2023 09:58)    24 hour events:     Allergies    losartan (Angioedema)    Intolerances      REVIEW OF SYSTEMS: SEE BELOW       Vital Signs Last 24 Hrs  T(C): 36.9 (25 May 2023 04:00), Max: 37.8 (25 May 2023 00:00)  T(F): 98.5 (25 May 2023 04:00), Max: 100.1 (25 May 2023 00:00)  HR: 110 (25 May 2023 09:00) (88 - 117)  BP: 109/62 (25 May 2023 09:00) (84/53 - 120/72)  BP(mean): 71 (25 May 2023 09:00) (58 - 87)  RR: 23 (25 May 2023 09:00) (16 - 28)  SpO2: 93% (25 May 2023 09:00) (91% - 100%)    Parameters below as of 25 May 2023 04:00  Patient On (Oxygen Delivery Method): ventilator    O2 Concentration (%): 21      CAPILLARY BLOOD GLUCOSE      CAPILLARY BLOOD GLUCOSE      POCT Blood Glucose.: 327 mg/dL (25 May 2023 09:40)  POCT Blood Glucose.: 308 mg/dL (25 May 2023 05:39)  POCT Blood Glucose.: 228 mg/dL (24 May 2023 22:55)  POCT Blood Glucose.: 180 mg/dL (24 May 2023 17:26)  POCT Blood Glucose.: 161 mg/dL (24 May 2023 12:33)      I&O's Summary    24 May 2023 07:01  -  25 May 2023 07:00  --------------------------------------------------------  IN: 4017 mL / OUT: 5905 mL / NET: -1888 mL          Mode: AC/ CMV (Assist Control/ Continuous Mandatory Ventilation)  RR (machine): 400  TV (machine): 16  FiO2: 21  PEEP: 6  ITime: 0.7  MAP: 9  PIP: 17      MEDICATIONS  (STANDING):  caspofungin IVPB 50 milliGRAM(s) IV Intermittent every 24 hours  chlorhexidine 0.12% Liquid 15 milliLiter(s) Oral Mucosa every 12 hours  chlorhexidine 4% Liquid 1 Application(s) Topical <User Schedule>  dextrose 5%. 1000 milliLiter(s) (100 mL/Hr) IV Continuous <Continuous>  dextrose 5%. 1000 milliLiter(s) (50 mL/Hr) IV Continuous <Continuous>  dextrose 50% Injectable 25 Gram(s) IV Push once  dextrose 50% Injectable 12.5 Gram(s) IV Push once  dextrose 50% Injectable 25 Gram(s) IV Push once  glucagon  Injectable 1 milliGRAM(s) IntraMuscular once  insulin lispro (ADMELOG) corrective regimen sliding scale   SubCutaneous every 6 hours  meropenem  IVPB 500 milliGRAM(s) IV Intermittent every 12 hours  meropenem  IVPB      metoclopramide Injectable 5 milliGRAM(s) IV Push every 6 hours  norepinephrine Infusion 0.05 MICROgram(s)/kG/Min (6.74 mL/Hr) IV Continuous <Continuous>  nystatin Cream 1 Application(s) Topical two times a day  pantoprazole  Injectable 40 milliGRAM(s) IV Push every 12 hours  phenylephrine    Infusion 0.05 MICROgram(s)/kG/Min (0.67 mL/Hr) IV Continuous <Continuous>  rifAXIMin 550 milliGRAM(s) Oral two times a day      MEDICATIONS  (PRN):  acetaminophen     Tablet .. 650 milliGRAM(s) Oral every 6 hours PRN Temp greater or equal to 38.5C (101.3F)  albumin human 25% IVPB 50 milliLiter(s) IV Intermittent every 1 hour PRN SBP less than 110  dextrose Oral Gel 15 Gram(s) Oral once PRN Blood Glucose LESS THAN 70 milliGRAM(s)/deciliter  fentaNYL    Injectable 25 MICROGram(s) IV Push every 1 hour PRN vent dyssynchrony  ondansetron Injectable 4 milliGRAM(s) IV Push every 6 hours PRN Nausea and/or Vomiting  sodium chloride 0.9% lock flush 10 milliLiter(s) IV Push every 1 hour PRN Pre/post blood products, medications, blood draw, and to maintain line patency  sodium chloride 0.9% lock flush 10 milliLiter(s) IV Push every 1 hour PRN Pre/post blood products, medications, blood draw, and to maintain line patency      PHYSICAL EXAM: SEE BELOW                                        7.1    7.56  )-----------( 52       ( 25 May 2023 05:40 )             21.5     05-25    143  |  107  |  48<H>  ----------------------------<  310<H>  4.2   |  24  |  2.89<H>    Ca    8.9      25 May 2023 05:40  Phos  5.5     05-25  Mg     2.1     05-25    TPro  6.3  /  Alb  3.3  /  TBili  5.7<H>  /  DBili  x   /  AST  170<H>  /  ALT  57  /  AlkPhos  240<H>  05-25

## 2023-05-25 NOTE — PROGRESS NOTE ADULT - ASSESSMENT
69 yo male with PMHx metastatic neuroendocrine tumor of the pancreas, recently admitted with nonfunctioning GJ tube (patient is chronically obstructed). Patient was admitted with severe heartburn, and has had several episodes of vomiting small amount of dark blood. Patient was discharged on Thursday. Hgb unchanged. CT angiography has no active bleeding, but demonstrates extensive tumor in distal pancreas.  Patient went to the OR 5/4 for a Open distal pancreatectomy, splenectomy, partial colectomy, partial L adrenalectomy, temporary abdominal closure.  Patient had a 2.2 L EBL.  Intra Op had 6U PRBC, 2FFP, 1 Platelets, 4L crystalloid  Post Op 1U PRBC ans 1 U FFP.  Post OP vented, with poor UO, on levo and Vaso.  5/5 Pt went back to OR and had closure and colostomy. Hospital course c/b  fever lethargy, encephalopathic, on pressors, dec hgb was dialyzed 5/11 560 cc from bulb, had ? episode emesis overnight, imaging shows some congestion, concern for asp pna raised - started on IV vancomycin/zosyn.    1. Acute respiratory failure. Septic shock with Enterobacter. Abd ascites/Post op pneumoperitoneum. Aspiration pneumonitis/pneumonia. Metastatic neuroendocrine pancreatic tumor s/p surgery. ZO on HD  - imaging reviewed from 5/17 has b/l atelectasis, L aspiration pna  - s/p drain into cavitary abscess and NJ tube placement 5/18  - intubated/on pressors/intermittent temps  - blood cx growing MDR enterobacter source likely gut translocation   - CT abd/pelvis post surgical changes pneumoperitoneum/enteritis; surgery f/u noted  - s/p IV zosyn [id number] #4 --> 5/12  - on merrem [id number] #13  - on caspofungin 50mg q24h #6  - continue with antibiotic/antifungal coverage   - tolerating abx well so far; no side effects noted  - reason for abx use and side effects reviewed with patient  - aspiration precautions  - contact precautions   - fu cbc    2. other issues - care per medicine

## 2023-05-25 NOTE — PROGRESS NOTE ADULT - SUBJECTIVE AND OBJECTIVE BOX
SURGERY DAILY PROGRESS NOTE:     Subjective:  Patient seen and examined at bedside during am rounds, intubated and on phenylephrine and levophed. No acute events overnight. Pt is off sedation and tolerating TF with ostomy function.    Objective:    Vitals:  T(C): 36.9 ( @ 04:00), Max: 37.8 ( @ 00:00)  HR: 106 ( @ :00) (88 - 117)  BP: 107/58 ( @ 07:00) (84/53 - 120/72)  RR: 21 ( @ :00) (16 - 28)  SpO2: 93% ( @ :00) (91% - 100%)     @ 07:01  -   @ 07:00  --------------------------------------------------------  IN:    Free Water: 50 mL    IV PiggyBack: 593 mL    Norepinephrine: 129 mL    Other (mL): 650 mL    Phenylephrine: 1108 mL    Vital1.5: 1487 mL  Total IN: 4017 mL    OUT:    Bulb (mL): 40 mL    Colostomy (mL): 400 mL    Drain (mL): 590 mL    Gastrostomy Tube (mL): 975 mL    Other (mL): 3900 mL  Total OUT: 5905 mL    Total NET: -1888 mL            PHYSICAL EXAM   Gen: intubated and sedated   CV: pulse regularly present   Resp: airway patent on MV  Abdomen: soft, Midline wound open superiorly and inferiorly with ascites drainage, colostomy bag covering the wounds dry gauze packing.  New drain on thru midline wound with minimal fluids. No evidence of wound infection. lower aspect of incision dehisced with bowel visualized, ostomy functional with stool  Extremities: No swelling or tenderness       @ 05:40                    7.1  CBC: 7.56>)-------(<52                     21.5                 143 | 107 | 48    CMP:  ----------------------< 310               4.2 | 24 | 2.89                      Ca:8.9  Phos:5.5  M.1               5.7|      |170        LFTs:  ------|240|-----             -|      |-  05-24 @ 05:31                    7.3  CBC: 7.02>)-------(<39                     22.1                 145 | 108 | 46    CMP:  ----------------------< 132               4.3 | 25 | 2.92                      Ca:9.4  Phos:5.2  M.2               5.8|      |222        LFTs:  ------|283|-----             -|      |-      Current Inpatient Medications:  acetaminophen     Tablet .. 650 milliGRAM(s) Oral every 6 hours PRN  albumin human 25% IVPB 50 milliLiter(s) IV Intermittent every 1 hour PRN  albumin human 25% IVPB 50 milliLiter(s) IV Intermittent every 6 hours  artificial tears (preservative free) Ophthalmic Solution 1 Drop(s) Both EYES four times a day PRN  caspofungin IVPB 50 milliGRAM(s) IV Intermittent every 24 hours  chlorhexidine 0.12% Liquid 15 milliLiter(s) Oral Mucosa every 12 hours  chlorhexidine 4% Liquid 1 Application(s) Topical <User Schedule>  dextrose 5%. 1000 milliLiter(s) (50 mL/Hr) IV Continuous <Continuous>  dextrose 5%. 1000 milliLiter(s) (100 mL/Hr) IV Continuous <Continuous>  dextrose 50% Injectable 25 Gram(s) IV Push once  dextrose 50% Injectable 12.5 Gram(s) IV Push once  dextrose 50% Injectable 25 Gram(s) IV Push once  dextrose Oral Gel 15 Gram(s) Oral once PRN  fentaNYL    Injectable 25 MICROGram(s) IV Push every 1 hour PRN  glucagon  Injectable 1 milliGRAM(s) IntraMuscular once  insulin glargine Injectable (LANTUS) 20 Unit(s) SubCutaneous once  insulin lispro (ADMELOG) corrective regimen sliding scale   SubCutaneous every 6 hours  meropenem  IVPB 500 milliGRAM(s) IV Intermittent every 12 hours  meropenem  IVPB      metoclopramide Injectable 5 milliGRAM(s) IV Push every 6 hours  norepinephrine Infusion 0.05 MICROgram(s)/kG/Min (6.74 mL/Hr) IV Continuous <Continuous>  nystatin Cream 1 Application(s) Topical two times a day  ondansetron Injectable 4 milliGRAM(s) IV Push every 6 hours PRN  pantoprazole  Injectable 40 milliGRAM(s) IV Push every 12 hours  phenylephrine    Infusion 0.05 MICROgram(s)/kG/Min (0.67 mL/Hr) IV Continuous <Continuous>  rifAXIMin 550 milliGRAM(s) Oral two times a day  sodium chloride 0.9% lock flush 10 milliLiter(s) IV Push every 1 hour PRN  sodium chloride 0.9% lock flush 10 milliLiter(s) IV Push every 1 hour PRN

## 2023-05-25 NOTE — PROGRESS NOTE ADULT - SUBJECTIVE AND OBJECTIVE BOX
Patient is a 68y old  Male who presents with a chief complaint of GIB (25 May 2023 13:30)    24 hour events:     Allergies    losartan (Angioedema)    Intolerances      REVIEW OF SYSTEMS: SEE BELOW       ICU Vital Signs Last 24 Hrs  T(C): 36.9 (25 May 2023 04:00), Max: 37.8 (25 May 2023 00:00)  T(F): 98.5 (25 May 2023 04:00), Max: 100.1 (25 May 2023 00:00)  HR: 112 (25 May 2023 12:00) (101 - 117)  BP: 111/60 (25 May 2023 12:00) (84/54 - 116/67)  BP(mean): 70 (25 May 2023 12:00) (60 - 87)  ABP: --  ABP(mean): --  RR: 26 (25 May 2023 12:00) (16 - 28)  SpO2: 93% (25 May 2023 12:00) (91% - 100%)    O2 Parameters below as of 25 May 2023 04:00  Patient On (Oxygen Delivery Method): ventilator    O2 Concentration (%): 21        CAPILLARY BLOOD GLUCOSE      POCT Blood Glucose.: 307 mg/dL (25 May 2023 11:17)  POCT Blood Glucose.: 327 mg/dL (25 May 2023 09:40)  POCT Blood Glucose.: 308 mg/dL (25 May 2023 05:39)  POCT Blood Glucose.: 228 mg/dL (24 May 2023 22:55)  POCT Blood Glucose.: 180 mg/dL (24 May 2023 17:26)      I&O's Summary    24 May 2023 07:01  -  25 May 2023 07:00  --------------------------------------------------------  IN: 4017 mL / OUT: 5905 mL / NET: -1888 mL        Mode: AC/ CMV (Assist Control/ Continuous Mandatory Ventilation)  RR (machine): 400  TV (machine): 16  FiO2: 21  PEEP: 6  ITime: 0.7  MAP: 9  PIP: 18      MEDICATIONS  (STANDING):  albumin human 25% IVPB 50 milliLiter(s) IV Intermittent every 6 hours  caspofungin IVPB 50 milliGRAM(s) IV Intermittent every 24 hours  chlorhexidine 0.12% Liquid 15 milliLiter(s) Oral Mucosa every 12 hours  chlorhexidine 4% Liquid 1 Application(s) Topical <User Schedule>  dextrose 5%. 1000 milliLiter(s) (50 mL/Hr) IV Continuous <Continuous>  dextrose 5%. 1000 milliLiter(s) (100 mL/Hr) IV Continuous <Continuous>  dextrose 50% Injectable 25 Gram(s) IV Push once  dextrose 50% Injectable 12.5 Gram(s) IV Push once  dextrose 50% Injectable 25 Gram(s) IV Push once  glucagon  Injectable 1 milliGRAM(s) IntraMuscular once  heparin   Injectable 5000 Unit(s) SubCutaneous every 12 hours  insulin lispro (ADMELOG) corrective regimen sliding scale   SubCutaneous every 6 hours  meropenem  IVPB 500 milliGRAM(s) IV Intermittent every 12 hours  meropenem  IVPB      metoclopramide Injectable 5 milliGRAM(s) IV Push every 6 hours  norepinephrine Infusion 0.05 MICROgram(s)/kG/Min (6.74 mL/Hr) IV Continuous <Continuous>  nystatin Cream 1 Application(s) Topical two times a day  pantoprazole  Injectable 40 milliGRAM(s) IV Push every 12 hours  phenylephrine    Infusion 0.05 MICROgram(s)/kG/Min (0.67 mL/Hr) IV Continuous <Continuous>  rifAXIMin 550 milliGRAM(s) Oral two times a day      MEDICATIONS  (PRN):  acetaminophen     Tablet .. 650 milliGRAM(s) Oral every 6 hours PRN Temp greater or equal to 38.5C (101.3F)  albumin human 25% IVPB 50 milliLiter(s) IV Intermittent every 1 hour PRN SBP less than 110  artificial tears (preservative free) Ophthalmic Solution 1 Drop(s) Both EYES four times a day PRN Dry/Irritated Eyes  dextrose Oral Gel 15 Gram(s) Oral once PRN Blood Glucose LESS THAN 70 milliGRAM(s)/deciliter  fentaNYL    Injectable 25 MICROGram(s) IV Push every 1 hour PRN vent dyssynchrony  ondansetron Injectable 4 milliGRAM(s) IV Push every 6 hours PRN Nausea and/or Vomiting  sodium chloride 0.9% lock flush 10 milliLiter(s) IV Push every 1 hour PRN Pre/post blood products, medications, blood draw, and to maintain line patency  sodium chloride 0.9% lock flush 10 milliLiter(s) IV Push every 1 hour PRN Pre/post blood products, medications, blood draw, and to maintain line patency      PHYSICAL EXAM: SEE BELOW                          7.1    7.56  )-----------( 52       ( 25 May 2023 05:40 )             21.5       05-25    143  |  107  |  48<H>  ----------------------------<  310<H>  4.2   |  24  |  2.89<H>    Ca    8.9      25 May 2023 05:40  Phos  5.5     05-25  Mg     2.1     05-25    TPro  6.3  /  Alb  3.3  /  TBili  5.7<H>  /  DBili  x   /  AST  170<H>  /  ALT  57  /  AlkPhos  240<H>  05-25

## 2023-05-25 NOTE — PROGRESS NOTE ADULT - ASSESSMENT
69 y/o male with PMH neuroendocrine tumor of pancreas, GJ tube, HTN, HLD, DM2    Initially admitted with UGI bleed - no intervention   Found to have a large distal pancreatic mass     Underwent open distal pancreatectomy, splenectomy, partial colectomy, partial L adrenalectomy, temporary abdominal closure on 5/4  Unstable during procedure    Underwent abdominal washout and colostomy on 5/5  No active bleeding or abscess found     Underwent gastric drain and NJ tube placement by GI on 5/18       Course complicated by:     Severe sepsis with septic shock (not POA) due to abdominal source/abscess   Enterobacter bacteremia   ZO from [facility] now on dialysis   Acute resp failure   Acute metabolic encephalopathy     Intubated 5/4-6  Reintubated 5/18     Hemodialysis initiated 5/5    L subclavian CVC 5/14-24  LIJ CVC 5/24  RIJ HD cath 5/17       Plan:     Off sedation, less responsive today   Remains on Calixto, Levo, Levo  Tolerates SBT, will reassess later re: extubation, may not extubate if mental status remains poor  HD with UF tomorrow   Continue meropenem and caspofungin  TF via NJT  Ostomy care, d/w wound care team at bedside    PLT 52, restart chemical DVT PPx   Glu elevated, Lantus this AM   Maintain RIJ HD cath   Placed new LIJ CVC, removed L SC CVC      Patient critically ill with poor prognosis

## 2023-05-25 NOTE — PROVIDER CONTACT NOTE (EICU) - ACTION/TREATMENT ORDERED:
EICU vent rounds: vent setting updated in EMR to refect current setting seen via camera
eicu vent rounds, current vent setting updated in EMR to reflect current setting seen via camera.
KPHOS

## 2023-05-25 NOTE — PROGRESS NOTE ADULT - ASSESSMENT
69 y/o male with PMH neuroendocrine tumor of pancreas, GJ tube, HTN, HLD, DM2    Initially admitted with UGI bleed - no intervention   Found to have a large distal pancreatic mass     Underwent open distal pancreatectomy, splenectomy, partial colectomy, partial L adrenalectomy, temporary abdominal closure on 5/4  Unstable during procedure    Underwent abdominal washout and colostomy on 5/5  No active bleeding or abscess found     Underwent gastric drain and NJ tube placement by GI on 5/18       Course complicated by:     Severe sepsis with septic shock (not POA) due to abdominal source/abscess   Enterobacter bacteremia   ZO from ATN now on dialysis   Acute resp failure   Acute metabolic encephalopathy     Intubated 5/4-6  Reintubated 5/18     Hemodialysis initiated 5/5    LIJ CVC 5/24  RIJ HD cath 5/17       Plan:     Neuro monitoring, daily reorientation   Titrating Levo for goal MAP>60, also on Calixto 3.4   Tolerates SBT, will reassess later re: extubation, may not extubate if mental status remains poor  Continue meropenem and caspofungin, WBC is stable, d/w ID   Plt>50k, start sc heparin for DVT ppx   Glu elevated - give Lantus 20 units today, continue moderate ISS   Maintain RIJ HD cath and LIJ CVC  Worsening anemia without evidence of active bleeding, will transfuse 1 PRBC   Wound care   DVT ppx with sc heparin     Patient is critically ill with organ dysfunction requiring active titration of medication and ventilation settings involving frequent bedside visits without which there is risk for further deterioration and/or death.     Family updated at bedside

## 2023-05-26 NOTE — PROGRESS NOTE ADULT - SUBJECTIVE AND OBJECTIVE BOX
HPI:  69 yo male with PMHx metastatic neuroendocrine tumor of the pancreas, recently admitted with nonfunctioning GJ tube (patient is chronically obstructed).   Patient has had severe heartburn, and has had several episodes of vomiting small amount of dark blood. Patient was discharged on Thursday. Hgb unchanged. CT angiography has no active bleeding, but demonstrates extensive tumor in distal pancreas.     5/1 pt feeling slightly nauseous, denies abd pain.  5/2 pt c/o frequent BM overnight causing him to not sleep well. denies abd pain , nausea  5/3 pt reports feeling well, hematuria resolved. coulter in place. not having as many BMs anymore.   5/4: Patient went to the OR today for a Open distal pancreatectomy, splenectomy, partial colectomy, partial L adrenalectomy, temporary abdominal closure.  Patient had a 2.2 L EBL.  Intra Op had 6U PRBC, 2FFP, 1 Platelets, 4L crystalloid  Post Op 1U PRBC ans 1 U FFP.  Post OP vented, with poor UO, on levo and Vaso.    5/5: He remains vented, on paralytics, 2 pressors, essentially anuric.    5/6: Completed the closure and ostomy yesterday.  Vented and on 2 Pressors, No UO.  Off Sedation awake and moving all 4 extremities  5/7: Extubated, required NIV for Hypercarbia, decreased H & H, still on 2 Pressors    5/15: Still on Pressors and HD dependent  5/16: He remains on pressors, draining from wound     5/26: Patient vented with a Poor MS, on 2 pressors.         PAST MEDICAL & SURGICAL HISTORY:  Hypertension      BPH (benign prostatic hyperplasia)      Renal insufficiency      Light chain nephropathy      DM (diabetes mellitus)      HLD (hyperlipidemia)      No significant past surgical history          FAMILY HISTORY:  FH: breast cancer (Mother)    FH: aneurysm (Father)        Social Hx:    Allergies    losartan (Angioedema)    Intolerances            ICU Vital Signs Last 24 Hrs  T(C): 36.4 (26 May 2023 12:00), Max: 38.4 (25 May 2023 21:00)  T(F): 97.5 (26 May 2023 12:00), Max: 101.2 (25 May 2023 21:00)  HR: 93 (26 May 2023 12:33) (70 - 124)  BP: 92/57 (26 May 2023 12:33) (86/50 - 108/64)  BP(mean): 65 (26 May 2023 12:33) (54 - 75)  ABP: --  ABP(mean): --  RR: 19 (26 May 2023 12:33) (16 - 27)  SpO2: 96% (26 May 2023 12:33) (91% - 100%)    O2 Parameters below as of 26 May 2023 12:31  Patient On (Oxygen Delivery Method): ventilator    O2 Concentration (%): 21        Mode: AC/ CMV (Assist Control/ Continuous Mandatory Ventilation)  RR (machine): 16  TV (machine): 400  FiO2: 21  PEEP: 6  ITime: 1  MAP: 10  PIP: 18      I&O's Summary    25 May 2023 07:01  -  26 May 2023 07:00  --------------------------------------------------------  IN: 3877 mL / OUT: 2119 mL / NET: 1758 mL                              8.0    9.69  )-----------( 56       ( 26 May 2023 05:31 )             24.2       05-26    141  |  107  |  66<H>  ----------------------------<  202<H>  4.2   |  24  |  3.41<H>    Ca    8.7      26 May 2023 05:31  Phos  6.4     05-26  Mg     2.1     05-25    TPro  6.7  /  Alb  3.4  /  TBili  6.8<H>  /  DBili  x   /  AST  192<H>  /  ALT  54  /  AlkPhos  256<H>  05-26            ABG - ( 25 May 2023 17:34 )  pH, Arterial: 7.31  pH, Blood: x     /  pCO2: 45    /  pO2: 71    / HCO3: 23    / Base Excess: -3.7  /  SaO2: 96.0                    MEDICATIONS  (STANDING):  albumin human 25% IVPB 50 milliLiter(s) IV Intermittent every 6 hours  caspofungin IVPB 50 milliGRAM(s) IV Intermittent every 24 hours  chlorhexidine 0.12% Liquid 15 milliLiter(s) Oral Mucosa every 12 hours  chlorhexidine 4% Liquid 1 Application(s) Topical <User Schedule>  dextrose 5%. 1000 milliLiter(s) (100 mL/Hr) IV Continuous <Continuous>  dextrose 5%. 1000 milliLiter(s) (50 mL/Hr) IV Continuous <Continuous>  dextrose 50% Injectable 25 Gram(s) IV Push once  dextrose 50% Injectable 12.5 Gram(s) IV Push once  dextrose 50% Injectable 25 Gram(s) IV Push once  glucagon  Injectable 1 milliGRAM(s) IntraMuscular once  heparin   Injectable 5000 Unit(s) SubCutaneous every 12 hours  insulin lispro (ADMELOG) corrective regimen sliding scale   SubCutaneous every 6 hours  meropenem  IVPB 500 milliGRAM(s) IV Intermittent every 12 hours  meropenem  IVPB      metoclopramide Injectable 5 milliGRAM(s) IV Push every 6 hours  norepinephrine Infusion 0.05 MICROgram(s)/kG/Min (6.74 mL/Hr) IV Continuous <Continuous>  nystatin Cream 1 Application(s) Topical two times a day  pantoprazole  Injectable 40 milliGRAM(s) IV Push every 12 hours  phenylephrine    Infusion 0.05 MICROgram(s)/kG/Min (0.67 mL/Hr) IV Continuous <Continuous>  rifAXIMin 550 milliGRAM(s) Oral two times a day    MEDICATIONS  (PRN):  acetaminophen     Tablet .. 650 milliGRAM(s) Oral every 6 hours PRN Temp greater or equal to 38.5C (101.3F)  albumin human 25% IVPB 50 milliLiter(s) IV Intermittent every 1 hour PRN SBP less than  artificial tears (preservative free) Ophthalmic Solution 1 Drop(s) Both EYES four times a day PRN Dry/Irritated Eyes  dextrose Oral Gel 15 Gram(s) Oral once PRN Blood Glucose LESS THAN 70 milliGRAM(s)/deciliter  fentaNYL    Injectable 25 MICROGram(s) IV Push every 1 hour PRN vent dyssynchrony  ondansetron Injectable 4 milliGRAM(s) IV Push every 6 hours PRN Nausea and/or Vomiting  sodium chloride 0.9% lock flush 10 milliLiter(s) IV Push every 1 hour PRN Pre/post blood products, medications, blood draw, and to maintain line patency  sodium chloride 0.9% lock flush 10 milliLiter(s) IV Push every 1 hour PRN Pre/post blood products, medications, blood draw, and to maintain line patency      DVT Prophylaxis: SQH    Advanced Directives:  Discussed with:    Visit Information: 30 min    ** Time is exclusive of billed procedures and/or teaching and/or routine family updates.

## 2023-05-26 NOTE — PROGRESS NOTE ADULT - SUBJECTIVE AND OBJECTIVE BOX
NEPHROLOGY INTERVAL HPI/OVERNIGHT EVENTS:    Date of Service: 05-25-23 @ 08:26  5/26- Vented, family at bedside, case discussed  5/25--Remains on vent.  No OR yesterday.  restarted on enteral feeding via N-J tube.  4KG UF with HD yesterday.  5/24--Remains on vent.  awaiting decision for OR today.  NPO today.  5/23 remains on pressors tolerated 3L off yesterday   5/22 events noted, remains on dual pressors at steady dose with vitality TF infusing     HPI:  69 yo man with Neuroendocrine tumor of pancreas with mets to liver dx'd 6 years ago.  Treated with Lutathera one year ago and restarted in 12/2022  G-J tube placed for nutrition in January due to chronic dysphagia and severe esophagitis on EGD.  Post recent  admission due to G tube clogging.  D/mansi home on 4/27.    Returned to ED due to vomiting when night time enteral feeding via G tube started.  CT with IVC done due to concern for GIB.  Pt reports chronic self catheterization due to urinary retention.   ZO improved when catheterization started.  Follows mainly with EMELY Cortez.   Pt has been self cath'ing 3-4 x /day.   Indwelling Hale placed by  due to resistance with blood clots with resistance reported by pt.  For palliative mass resection tomorrow.    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  Creat has been variable during previous admissions--ranging 1.1-2.2.    PMHX and PSHX.  --Neuroendocrine tumor of pancreas with mets to liver --dx 6 years ago.  --GJ tube for nutrition in 1/2023 due to chronic dysphagia.  --Hx of HTN  --Hx of DM  --Hx of light chain disease --unclear hx and no hx of renal biopsy.        MEDICATIONS  (STANDING):  albumin human 25% IVPB 50 milliLiter(s) IV Intermittent every 6 hours  caspofungin IVPB 50 milliGRAM(s) IV Intermittent every 24 hours  chlorhexidine 0.12% Liquid 15 milliLiter(s) Oral Mucosa every 12 hours  chlorhexidine 4% Liquid 1 Application(s) Topical <User Schedule>  dextrose 5%. 1000 milliLiter(s) (50 mL/Hr) IV Continuous <Continuous>  dextrose 5%. 1000 milliLiter(s) (100 mL/Hr) IV Continuous <Continuous>  dextrose 50% Injectable 25 Gram(s) IV Push once  dextrose 50% Injectable 12.5 Gram(s) IV Push once  dextrose 50% Injectable 25 Gram(s) IV Push once  glucagon  Injectable 1 milliGRAM(s) IntraMuscular once  insulin glargine Injectable (LANTUS) 20 Unit(s) SubCutaneous once  insulin lispro (ADMELOG) corrective regimen sliding scale   SubCutaneous every 6 hours  meropenem  IVPB 500 milliGRAM(s) IV Intermittent every 12 hours  meropenem  IVPB      metoclopramide Injectable 5 milliGRAM(s) IV Push every 6 hours  norepinephrine Infusion 0.05 MICROgram(s)/kG/Min (6.74 mL/Hr) IV Continuous <Continuous>  nystatin Cream 1 Application(s) Topical two times a day  pantoprazole  Injectable 40 milliGRAM(s) IV Push every 12 hours  phenylephrine    Infusion 0.05 MICROgram(s)/kG/Min (0.67 mL/Hr) IV Continuous <Continuous>  rifAXIMin 550 milliGRAM(s) Oral two times a day    MEDICATIONS  (PRN):  acetaminophen     Tablet .. 650 milliGRAM(s) Oral every 6 hours PRN Temp greater or equal to 38.5C (101.3F)  albumin human 25% IVPB 50 milliLiter(s) IV Intermittent every 1 hour PRN SBP less than 110  artificial tears (preservative free) Ophthalmic Solution 1 Drop(s) Both EYES four times a day PRN Dry/Irritated Eyes  dextrose Oral Gel 15 Gram(s) Oral once PRN Blood Glucose LESS THAN 70 milliGRAM(s)/deciliter  fentaNYL    Injectable 25 MICROGram(s) IV Push every 1 hour PRN vent dyssynchrony  ondansetron Injectable 4 milliGRAM(s) IV Push every 6 hours PRN Nausea and/or Vomiting  sodium chloride 0.9% lock flush 10 milliLiter(s) IV Push every 1 hour PRN Pre/post blood products, medications, blood draw, and to maintain line patency  sodium chloride 0.9% lock flush 10 milliLiter(s) IV Push every 1 hour PRN Pre/post blood products, medications, blood draw, and to maintain line patency    Vital Signs Last 24 Hrs  T(C): 37.1 (26 May 2023 14:53), Max: 38.4 (25 May 2023 21:00)  T(F): 98.8 (26 May 2023 14:53), Max: 101.2 (25 May 2023 21:00)  HR: 99 (26 May 2023 15:17) (70 - 124)  BP: 107/57 (26 May 2023 15:17) (84/52 - 108/64)  BP(mean): 70 (26 May 2023 15:17) (54 - 75)  RR: 19 (26 May 2023 15:17) (15 - 27)  SpO2: 95% (26 May 2023 15:17) (91% - 100%)    Parameters below as of 26 May 2023 15:17  Patient On (Oxygen Delivery Method): ventilator    I&O's Detail    25 May 2023 07:01  -  26 May 2023 07:00  --------------------------------------------------------  IN:    Albumin 25%  -  50 mL: 250 mL    Free Water: 90 mL    IV PiggyBack: 300 mL    Norepinephrine: 120 mL    Phenylephrine: 1088 mL    PRBCs (Packed Red Blood Cells): 334 mL    Vital1.5: 1695 mL  Total IN: 3877 mL    OUT:    Bulb (mL): 19 mL    Colostomy (mL): 700 mL    Drain (mL): 300 mL    Gastrostomy Tube (mL): 1100 mL  Total OUT: 2119 mL    Total NET: 1758 mL      26 May 2023 07:01  -  26 May 2023 17:49  --------------------------------------------------------  IN:    Other (mL): 600 mL  Total IN: 600 mL    OUT:    Other (mL): 3500 mL  Total OUT: 3500 mL    Total NET: -2900 mL          O2 Concentration (%): 21  PHYSICAL EXAM:  GENERAL: on vent  CHEST/LUNG: fair air entry  HEART: S1S2 RRR  ABDOMEN: soft  EXTREMITIES: 3-4+ edema in LE   SKIN:     LABS:                          8.0    9.69  )-----------( 56       ( 26 May 2023 05:31 )             24.2                           7.1    7.56  )-----------( 52       ( 25 May 2023 05:40 )             21.5     05-26    141  |  107  |  66<H>  ----------------------------<  202<H>  4.2   |  24  |  3.41<H>    Ca    8.7      26 May 2023 05:31  Phos  6.4     05-26  Mg     2.1     05-25    TPro  6.7  /  Alb  3.4  /  TBili  6.8<H>  /  DBili  x   /  AST  192<H>  /  ALT  54  /  AlkPhos  256<H>  05-26 05-25    143  |  107  |  48<H>  ----------------------------<  310<H>  4.2   |  24  |  2.89<H>    Ca    8.9      25 May 2023 05:40  Phos  5.5     05-25  Mg     2.1     05-25    TPro  6.3  /  Alb  3.3  /  TBili  5.7<H>  /  DBili  x   /  AST  170<H>  /  ALT  57  /  AlkPhos  240<H>  05-25        Magnesium, Serum: 2.1 mg/dL (05-25 @ 05:40)  Phosphorus Level, Serum: 5.5 mg/dL (05-25 @ 05:40)          RADIOLOGY & ADDITIONAL TESTS:   Yes

## 2023-05-26 NOTE — PROGRESS NOTE ADULT - SUBJECTIVE AND OBJECTIVE BOX
Patient seen and examined at bedside during am rounds, intubated and on phenylephrine and levophed. No acute events overnight. Pt is off sedation and tolerating TF with ostomy function.    O/E:  T(C): 37 (05-26-23 @ 04:00), Max: 38.4 (05-25-23 @ 21:00)  HR: 104 (05-26-23 @ 08:00) (94 - 124)  BP: 107/61 (05-26-23 @ 08:00) (96/46 - 111/60)  RR: 20 (05-26-23 @ 08:00) (16 - 27)  SpO2: 97% (05-26-23 @ 08:00) (91% - 100%)  PHYSICAL EXAM   Gen: intubated and sedated   CV: pulse regularly present   Resp: airway patent on MV  Abdomen: soft, Midline wound open superiorly and inferiorly with ascites drainage, colostomy bag covering the wounds dry gauze packing.  New drain on thru midline wound with minimal fluids. No evidence of wound infection. lower aspect of incision dehisced with bowel visualized, ostomy functional with stool  Extremities: No swelling or tenderness    05-25-23 @ 07:01  -  05-26-23 @ 07:00  --------------------------------------------------------  IN: 3877 mL / OUT: 2119 mL / NET: 1758 mL                            8.0    9.69  )-----------( 56       ( 26 May 2023 05:31 )             24.2   05-26    141  |  107  |  66<H>  ----------------------------<  202<H>  4.2   |  24  |  3.41<H>    Ca    8.7      26 May 2023 05:31  Phos  6.4     05-26  Mg     2.1     05-25    TPro  6.7  /  Alb  3.4  /  TBili  6.8<H>  /  DBili  x   /  AST  192<H>  /  ALT  54  /  AlkPhos  256<H>  05-26    MEDICATIONS  (STANDING):  albumin human 25% IVPB 50 milliLiter(s) IV Intermittent every 6 hours  caspofungin IVPB 50 milliGRAM(s) IV Intermittent every 24 hours  chlorhexidine 0.12% Liquid 15 milliLiter(s) Oral Mucosa every 12 hours  chlorhexidine 4% Liquid 1 Application(s) Topical <User Schedule>  dextrose 5%. 1000 milliLiter(s) (50 mL/Hr) IV Continuous <Continuous>  dextrose 5%. 1000 milliLiter(s) (100 mL/Hr) IV Continuous <Continuous>  dextrose 50% Injectable 25 Gram(s) IV Push once  dextrose 50% Injectable 12.5 Gram(s) IV Push once  dextrose 50% Injectable 25 Gram(s) IV Push once  glucagon  Injectable 1 milliGRAM(s) IntraMuscular once  heparin   Injectable 5000 Unit(s) SubCutaneous every 12 hours  insulin lispro (ADMELOG) corrective regimen sliding scale   SubCutaneous every 6 hours  meropenem  IVPB 500 milliGRAM(s) IV Intermittent every 12 hours  meropenem  IVPB      metoclopramide Injectable 5 milliGRAM(s) IV Push every 6 hours  norepinephrine Infusion 0.05 MICROgram(s)/kG/Min (6.74 mL/Hr) IV Continuous <Continuous>  nystatin Cream 1 Application(s) Topical two times a day  pantoprazole  Injectable 40 milliGRAM(s) IV Push every 12 hours  phenylephrine    Infusion 0.05 MICROgram(s)/kG/Min (0.67 mL/Hr) IV Continuous <Continuous>  rifAXIMin 550 milliGRAM(s) Oral two times a day    MEDICATIONS  (PRN):  acetaminophen     Tablet .. 650 milliGRAM(s) Oral every 6 hours PRN Temp greater or equal to 38.5C (101.3F)  albumin human 25% IVPB 50 milliLiter(s) IV Intermittent every 1 hour PRN SBP less than 110  artificial tears (preservative free) Ophthalmic Solution 1 Drop(s) Both EYES four times a day PRN Dry/Irritated Eyes  dextrose Oral Gel 15 Gram(s) Oral once PRN Blood Glucose LESS THAN 70 milliGRAM(s)/deciliter  fentaNYL    Injectable 25 MICROGram(s) IV Push every 1 hour PRN vent dyssynchrony  ondansetron Injectable 4 milliGRAM(s) IV Push every 6 hours PRN Nausea and/or Vomiting  sodium chloride 0.9% lock flush 10 milliLiter(s) IV Push every 1 hour PRN Pre/post blood products, medications, blood draw, and to maintain line patency  sodium chloride 0.9% lock flush 10 milliLiter(s) IV Push every 1 hour PRN Pre/post blood products, medications, blood draw, and to maintain line patency

## 2023-05-26 NOTE — PROGRESS NOTE ADULT - ASSESSMENT
69 yo male with PMHx metastatic neuroendocrine tumor of the pancreas, recently admitted with nonfunctioning GJ tube  due to external mechanical  obstruction   now Post Op  after an open distal pancreatectomy, splenectomy, partial colectomy, partial L adrenalectomy,colostomy  With Post Op distributive Shock/bleeding developed ZO now requiring dialysis  onow back on pressors septic shock gram negative bacterem ia Enterobacter, Cloacae  CT 5/14 no distinct collection  dialysis dependent plan T/h/S    PLAN:  ICU    PULM: No weaning given poor MS  Cardio:  Wean pressors if possible  Renal: HD as per renal  GI:   Continue Feeds via N-J.  NH 3 noted  Endo RISS  Heme -SQH DVT Prophylaxis  Neurometabolic encephalopthy.  Will Get a HCT  ID: Continue caspo/bowen

## 2023-05-26 NOTE — PROGRESS NOTE ADULT - ASSESSMENT
69 y/o male with PMH neuroendocrine tumor of pancreas, GJ tube, HTN, HLD, DM2    Initially admitted with UGI bleed - no intervention   Found to have a large distal pancreatic mass     Underwent open distal pancreatectomy, splenectomy, partial colectomy, partial L adrenalectomy, temporary abdominal closure on 5/4  Unstable during procedure    Underwent abdominal washout and colostomy on 5/5  No active bleeding or abscess found     Underwent gastric drain and NJ tube placement by GI on 5/18       Course complicated by:     Severe sepsis with septic shock (not POA) due to abdominal source/abscess   Enterobacter bacteremia   ZO from ATN now on dialysis   Acute resp failure   Acute metabolic encephalopathy     Intubated 5/4-6  Reintubated 5/18     Hemodialysis initiated 5/5    L subclavian CVC 5/14-24  LIJ CVC 5/24  RIJ HD cath 5/17       Plan:     Off sedation, continues less responsive today   Remains on Calixto, Levo, Levo  Tolerates SBT, will hold off extubation for now as mental status remains poor   HD with UF today   Continue meropenem and caspofungin  TF via NJT  Ostomy care, d/w wound care team at bedside    PLT stable, on DVT PPx   Glu stable   Ammonia level stable   Maintain RIJ HD cath, LIJ CVC       Patient critically ill with poor prognosis

## 2023-05-26 NOTE — PROGRESS NOTE ADULT - ASSESSMENT
69 yo man with Hx of Neuroendocrine tumor/Pancreatic mass and liver mets.  Now for palliative resection of pancreatic mass.    --ZO with variable creat level and unclear CKD.    Likely dependent on volume status.    Limited intake even with Enteral feeding.      Start gentle hydration.  -- : continue Hale drainage.  --Hx of light chain nephropathy  --No contraindication for surgery from renal standpoint.    5/4  s/p Palliative surgery for neuroendocrine mass  will watch for ZO, pt with low UO immediate post op  s/p multiple PRBC and FFP transfusion and volume resuscitation    5/5  patient anuric, K 5.2  On bicarb drip  to go back to OR today  case d/w Dr Jean and Dr Siddiqi  Will HD x 2 hrs to correct potassium  >10 L infused in blood products and fluids  More fluids not indicated, UO 50 ml  no fluid removal on HD  Hep profile ordered    5/6  Ostomy created  will monitor i/o  labs stable   no need for HD today  d/w intensivist  will monitor daily for hd requirement    5/7  seen earlier on HD   tolerating well  TPN  d/w Dr Jean  d/w pts fam at bedside    5/8 MK   - ZO with anuria, remains HD dependent with hx of light chain nephropathy    tolerating hd     dc femoral shiley today after hd   - enceph monitor response to hd and xifaxan and lactulose    5/9 MK   - ZO with anuria, remains HD dependent with hx of light chain nephropathy    no indication for hd    off pressor     monitor electrolytes on TPN     NG in place for TF initiation   - enceph, improving monitor response to hd and xifaxan and lactulose    ostomy fx with liquid stool  dw dr marley     5/10 SY  --ZO : remains oligoanuric.     Follow with repeat bladder scans--no retention.    Will hold HD today and assess daily.    Trial of gentle hydration due to increased fluid loss.  --Metabolic acidosis : trial of HCO3 in addition to TPN   --Monitor encephalopathy.    5/11 SY  --ZO : remains oligoanuric.    Renal parameters further increased with no improvement in acidosis with HCO3 infusion.    D/w pt's wife over phone.    Explained his poor prognosis from Oncology aspect due to large tumor burden in liver and that surgery was palliative in nature.    Explained added burden of continued dialysis adding to pt's suffering at this point.    Pt's wife requested not to proceed with HD catheter placement and dialysis until she can d/w Dr Siddiqi and rest of family.    Will discuss further later today.    5/12  HD yesterday, planned for HD Sat  case d/w family, and ID, pts nurse  Dr Velez covering this weekend    5/13 SY  --ZO : remains oligoanuric.   HD today : HD order and tx reviewed.     --Positive fluid balance due to hypotension.  RR at 30 today.   Check follow up CXR.  Will plan for 1-2 kg UF with HD if tolerated.  --Continue enteral feeding.  --D/w Dr Mcclendon : positive blood culture/ GNR-- pre current HD catheter.   Plan HD today and on Monday,, then remove catheter for bacteremia clearance.    5/14 SY  --ZO : reamins dialysis dependent for now.    Plan HD in am and remove temporary HD catheter due to bacteremia to allow clearance.  --CT with patch lower lung inf : concern for PNA : continue abtx.  --Continue enteral feeding.  --Enterobacter bacteremia : continue abtx.    5/15 MK   - ZO for now HD dependent    tolerating hd and will attempt uf with hd   - enterobacter sepsis on meropenem renally dosed, aspiration pna     for dc of [place] post hd today   - anemia transfusion ongoing   - FEN On vitality with ostomy output noted     5/16 MK   - ZO Hd dependent    for hd in am with replacement of catheter to occur   - enterobacter sepsis on meropenem, renally dosed     asp pna   - anemia h/h stable   - FEN: on d10 ng tube pulled out    5/17 SY  --ZO : continue TIW HD--will maintain on MWF schedule this week.  --Metabolic acidosis : mainly due to GI loss,  will need to correct with HD.  --Enterobacter bacteremia : continue meropenem  --Post TPN.  NGT pulled out.  For J tube placement.    5/18 SY  --ZO : Continue HD support .    Next tx in am.  Will attempt to start UF with HD as tolerated   --Metabolic acidosis : due to GI loss : increase HCO3 in dialysate  --Enterobacter bacteremia : continue Meropenem  --Continue TPN for now.    5/19  Operative findings:  "Sepsis secondary to perforation leading to a cavitary abscess into the peritoneum.   - Drain placed with endoscopic guidance, going from the skin (sutured) through the cavitary abscess and into the stomach.   - Placement of NJ tube"  For HD today   orders outlined  will follow for tolerance   Dr Daniels covering the weekend    5/;22 MK   - ZO/ATN with anasaraca     will attempt uf with HD today and monitor response, spa with hd, titration of pressors as needed    if tolerates HD, plan for HD in am   - sepsis as outlined above, now on antifungal   dw dr lopes and HD rn     5/23 MK   - ZO/ATN with anasarca    inc uf with hd today and monitor response     spa and pressors with hd to support bp  - FEN on vitaliaty tf, no fw flushes  - sepsis abx and antifungal as per ID   dw icu and hd rn , dr lopes     5/24 SY  --ZO/ATN  : continue dialysis support    creat low due to low muscle mass and with no signs of renal recovery.  --Anasarca with increased third spacing of fluid : HD on Mon/Tues.  Mainly attempt to increase UF.  --Electrolytes stable.  --Sepsis : continue Meropenem and Caspofungin.    5/25 SY  --ZO/ATN : remains dialysis dependent.  Next tx in am    Plan PUF on Sat to manage LE edema/anasarca  --Electrolytes stable.  Continue enteral feeding.  Trend Phos level.  --Sepsis : continue Meropenem and Caspofungin.    5/26  Seen on HD   case d/w pts family  Cont ABX as per ID  HD sat mainly for UF, pt cont to have massive upper thigh edema, anasarca

## 2023-05-26 NOTE — PROGRESS NOTE ADULT - SUBJECTIVE AND OBJECTIVE BOX
Patient is a 68y old  Male who presents with a chief complaint of GIB (24 May 2023 09:58)    24 hour events: None     Allergies    losartan (Angioedema)    Intolerances      REVIEW OF SYSTEMS: SEE BELOW       ICU Vital Signs Last 24 Hrs  T(C): 37 (26 May 2023 04:00), Max: 38.4 (25 May 2023 21:00)  T(F): 98.6 (26 May 2023 04:00), Max: 101.2 (25 May 2023 21:00)  HR: 108 (26 May 2023 09:00) (94 - 124)  BP: 98/57 (26 May 2023 09:00) (96/46 - 111/60)  BP(mean): 66 (26 May 2023 09:00) (56 - 75)  ABP: --  ABP(mean): --  RR: 22 (26 May 2023 09:00) (16 - 27)  SpO2: 95% (26 May 2023 09:00) (91% - 100%)    O2 Parameters below as of 26 May 2023 08:00  Patient On (Oxygen Delivery Method): ventilator    O2 Concentration (%): 21          CAPILLARY BLOOD GLUCOSE      POCT Blood Glucose.: 218 mg/dL (26 May 2023 05:47)  POCT Blood Glucose.: 221 mg/dL (25 May 2023 23:36)  POCT Blood Glucose.: 239 mg/dL (25 May 2023 18:15)  POCT Blood Glucose.: 307 mg/dL (25 May 2023 11:17)      I&O's Summary    25 May 2023 07:01  -  26 May 2023 07:00  --------------------------------------------------------  IN: 3877 mL / OUT: 2119 mL / NET: 1758 mL              Mode: AC/ CMV (Assist Control/ Continuous Mandatory Ventilation)  RR (machine): 16  TV (machine): 400  FiO2: 21  PEEP: 6  ITime: 1  MAP: 10  PIP: 18        MEDICATIONS  (STANDING):  caspofungin IVPB 50 milliGRAM(s) IV Intermittent every 24 hours  chlorhexidine 0.12% Liquid 15 milliLiter(s) Oral Mucosa every 12 hours  chlorhexidine 4% Liquid 1 Application(s) Topical <User Schedule>  dextrose 5%. 1000 milliLiter(s) (100 mL/Hr) IV Continuous <Continuous>  dextrose 5%. 1000 milliLiter(s) (50 mL/Hr) IV Continuous <Continuous>  dextrose 50% Injectable 25 Gram(s) IV Push once  dextrose 50% Injectable 12.5 Gram(s) IV Push once  dextrose 50% Injectable 25 Gram(s) IV Push once  glucagon  Injectable 1 milliGRAM(s) IntraMuscular once  insulin lispro (ADMELOG) corrective regimen sliding scale   SubCutaneous every 6 hours  meropenem  IVPB 500 milliGRAM(s) IV Intermittent every 12 hours  meropenem  IVPB      metoclopramide Injectable 5 milliGRAM(s) IV Push every 6 hours  norepinephrine Infusion 0.05 MICROgram(s)/kG/Min (6.74 mL/Hr) IV Continuous <Continuous>  nystatin Cream 1 Application(s) Topical two times a day  pantoprazole  Injectable 40 milliGRAM(s) IV Push every 12 hours  phenylephrine    Infusion 0.05 MICROgram(s)/kG/Min (0.67 mL/Hr) IV Continuous <Continuous>  rifAXIMin 550 milliGRAM(s) Oral two times a day      MEDICATIONS  (PRN):  acetaminophen     Tablet .. 650 milliGRAM(s) Oral every 6 hours PRN Temp greater or equal to 38.5C (101.3F)  albumin human 25% IVPB 50 milliLiter(s) IV Intermittent every 1 hour PRN SBP less than 110  dextrose Oral Gel 15 Gram(s) Oral once PRN Blood Glucose LESS THAN 70 milliGRAM(s)/deciliter  fentaNYL    Injectable 25 MICROGram(s) IV Push every 1 hour PRN vent dyssynchrony  ondansetron Injectable 4 milliGRAM(s) IV Push every 6 hours PRN Nausea and/or Vomiting  sodium chloride 0.9% lock flush 10 milliLiter(s) IV Push every 1 hour PRN Pre/post blood products, medications, blood draw, and to maintain line patency  sodium chloride 0.9% lock flush 10 milliLiter(s) IV Push every 1 hour PRN Pre/post blood products, medications, blood draw, and to maintain line patency      PHYSICAL EXAM: SEE BELOW                                                    8.0    9.69  )-----------( 56       ( 26 May 2023 05:31 )             24.2     05-26    141  |  107  |  66<H>  ----------------------------<  202<H>  4.2   |  24  |  3.41<H>    Ca    8.7      26 May 2023 05:31  Phos  6.4     05-26  Mg     2.1     05-25    TPro  6.7  /  Alb  3.4  /  TBili  6.8<H>  /  DBili  x   /  AST  192<H>  /  ALT  54  /  AlkPhos  256<H>  05-26

## 2023-05-26 NOTE — PROGRESS NOTE ADULT - ASSESSMENT
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  S/P distal pancreatectomy, splenectomy, partial colectomy  S/P abdominal washout and colostomy creation  Partial Midline wound dehiscence   on and off pressors  more awake with sedation held  ostomy functional    Plan:  Tube feeds at goal  Monitor ostomy output  BID and PRN Dressing changes   F/u Nephro for HD  wean off vent  ween off pressors  No plans at this time for surgical intervention  Cont great Critical care management as per ICU team  Surgery will follow  Ongoing GOC discussion daily w/family    Plan discussed with Dr. Siddiqi

## 2023-05-27 NOTE — PROGRESS NOTE ADULT - SUBJECTIVE AND OBJECTIVE BOX
NEPHROLOGY INTERVAL HPI/OVERNIGHT EVENTS:    Date of Service: 05-25-23 @ 08:26  5/27- for UF today 2 hrs for 3 KG off  5/26- Vented, family at bedside, case discussed  5/25--Remains on vent.  No OR yesterday.  restarted on enteral feeding via N-J tube.  4KG UF with HD yesterday.  5/24--Remains on vent.  awaiting decision for OR today.  NPO today.  5/23 remains on pressors tolerated 3L off yesterday   5/22 events noted, remains on dual pressors at steady dose with vitality TF infusing     HPI:  67 yo man with Neuroendocrine tumor of pancreas with mets to liver dx'd 6 years ago.  Treated with Lutathera one year ago and restarted in 12/2022  G-J tube placed for nutrition in January due to chronic dysphagia and severe esophagitis on EGD.  Post recent  admission due to G tube clogging.  D/mansi home on 4/27.    Returned to ED due to vomiting when night time enteral feeding via G tube started.  CT with IVC done due to concern for GIB.  Pt reports chronic self catheterization due to urinary retention.   ZO improved when catheterization started.  Follows mainly with EMELY Cortez.   Pt has been self cath'ing 3-4 x /day.   Indwelling Hale placed by EMELY due to resistance with blood clots with resistance reported by pt.  For palliative mass resection tomorrow.    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  Creat has been variable during previous admissions--ranging 1.1-2.2.    PMHX and PSHX.  --Neuroendocrine tumor of pancreas with mets to liver --dx 6 years ago.  --GJ tube for nutrition in 1/2023 due to chronic dysphagia.  --Hx of HTN  --Hx of DM  --Hx of light chain disease --unclear hx and no hx of renal biopsy.        MEDICATIONS  (STANDING):  caspofungin IVPB 50 milliGRAM(s) IV Intermittent every 24 hours  chlorhexidine 0.12% Liquid 15 milliLiter(s) Oral Mucosa every 12 hours  chlorhexidine 4% Liquid 1 Application(s) Topical <User Schedule>  dextrose 5%. 1000 milliLiter(s) (50 mL/Hr) IV Continuous <Continuous>  dextrose 5%. 1000 milliLiter(s) (100 mL/Hr) IV Continuous <Continuous>  dextrose 50% Injectable 25 Gram(s) IV Push once  dextrose 50% Injectable 25 Gram(s) IV Push once  dextrose 50% Injectable 12.5 Gram(s) IV Push once  glucagon  Injectable 1 milliGRAM(s) IntraMuscular once  heparin   Injectable 5000 Unit(s) SubCutaneous every 12 hours  insulin lispro (ADMELOG) corrective regimen sliding scale   SubCutaneous every 6 hours  meropenem  IVPB 500 milliGRAM(s) IV Intermittent every 12 hours  meropenem  IVPB      metoclopramide Injectable 5 milliGRAM(s) IV Push every 6 hours  norepinephrine Infusion 0.05 MICROgram(s)/kG/Min (6.74 mL/Hr) IV Continuous <Continuous>  nystatin Cream 1 Application(s) Topical two times a day  pantoprazole  Injectable 40 milliGRAM(s) IV Push every 12 hours  phenylephrine    Infusion 0.05 MICROgram(s)/kG/Min (0.67 mL/Hr) IV Continuous <Continuous>  rifAXIMin 550 milliGRAM(s) Oral two times a day    MEDICATIONS  (PRN):  acetaminophen     Tablet .. 650 milliGRAM(s) Oral every 6 hours PRN Temp greater or equal to 38.5C (101.3F)  albumin human 25% IVPB 50 milliLiter(s) IV Intermittent every 1 hour PRN SBP less than  artificial tears (preservative free) Ophthalmic Solution 1 Drop(s) Both EYES four times a day PRN Dry/Irritated Eyes  dextrose Oral Gel 15 Gram(s) Oral once PRN Blood Glucose LESS THAN 70 milliGRAM(s)/deciliter  ondansetron Injectable 4 milliGRAM(s) IV Push every 6 hours PRN Nausea and/or Vomiting  sodium chloride 0.9% lock flush 10 milliLiter(s) IV Push every 1 hour PRN Pre/post blood products, medications, blood draw, and to maintain line patency  sodium chloride 0.9% lock flush 10 milliLiter(s) IV Push every 1 hour PRN Pre/post blood products, medications, blood draw, and to maintain line patency    Vital Signs Last 24 Hrs  T(C): 37.6 (27 May 2023 06:00), Max: 37.8 (27 May 2023 04:00)  T(F): 99.7 (27 May 2023 06:00), Max: 100.1 (27 May 2023 04:00)  HR: 97 (27 May 2023 07:00) (70 - 115)  BP: 98/59 (27 May 2023 07:00) (84/52 - 116/69)  BP(mean): 68 (27 May 2023 07:00) (54 - 81)  RR: 20 (27 May 2023 07:00) (15 - 25)  SpO2: 96% (27 May 2023 07:00) (95% - 100%)    Parameters below as of 27 May 2023 07:00  Patient On (Oxygen Delivery Method): ventilator      26 May 2023 07:01  -  27 May 2023 07:00  --------------------------------------------------------  IN:    Free Water: 50 mL    IV PiggyBack: 50 mL    Norepinephrine: 278 mL    Other (mL): 600 mL    Phenylephrine: 1067 mL    Vital1.5: 1630 mL  Total IN: 3675 mL    OUT:    Bulb (mL): 22 mL    Colostomy (mL): 380 mL    Drain (mL): 400 mL    Gastrostomy Tube (mL): 1100 mL    Intermittent Catheterization - Urethral (mL): 300 mL    Other (mL): 3500 mL  Total OUT: 5702 mL    Total NET: -2027 mL    O2 Concentration (%): 21  PHYSICAL EXAM:  GENERAL: on vent  CHEST/LUNG: fair air entry  HEART: S1S2 RRR  I&O's Detail  ABDOMEN: soft  EXTREMITIES: 3-4+ edema in LE   SKIN:     LABS:                          7.7    10.21 )-----------( 59       ( 27 May 2023 05:19 )             23.4                             8.0    9.69  )-----------( 56       ( 26 May 2023 05:31 )             24.2                           7.1    7.56  )-----------( 52       ( 25 May 2023 05:40 )             21.5     05-27    141  |  106  |  56<H>  ----------------------------<  177<H>  4.2   |  25  |  2.88<H>    Ca    8.8      27 May 2023 05:19  Phos  6.1     05-27  Mg     2.1     05-27    TPro  6.7  /  Alb  3.4  /  TBili  6.8<H>  /  DBili  x   /  AST  192<H>  /  ALT  54  /  AlkPhos  256<H>  05-26 05-26    141  |  107  |  66<H>  ----------------------------<  202<H>  4.2   |  24  |  3.41<H>    Ca    8.7      26 May 2023 05:31  Phos  6.4     05-26  Mg     2.1     05-25    TPro  6.7  /  Alb  3.4  /  TBili  6.8<H>  /  DBili  x   /  AST  192<H>  /  ALT  54  /  AlkPhos  256<H>  05-26 05-25    143  |  107  |  48<H>  ----------------------------<  310<H>  4.2   |  24  |  2.89<H>    Ca    8.9      25 May 2023 05:40  Phos  5.5     05-25  Mg     2.1     05-25    TPro  6.3  /  Alb  3.3  /  TBili  5.7<H>  /  DBili  x   /  AST  170<H>  /  ALT  57  /  AlkPhos  240<H>  05-25        Magnesium, Serum: 2.1 mg/dL (05-25 @ 05:40)  Phosphorus Level, Serum: 5.5 mg/dL (05-25 @ 05:40)          RADIOLOGY & ADDITIONAL TESTS:

## 2023-05-27 NOTE — PROGRESS NOTE ADULT - ASSESSMENT
67 yo man with Hx of Neuroendocrine tumor/Pancreatic mass and liver mets.  Now for palliative resection of pancreatic mass.    --ZO with variable creat level and unclear CKD.    Likely dependent on volume status.    Limited intake even with Enteral feeding.      Start gentle hydration.  -- : continue Hale drainage.  --Hx of light chain nephropathy  --No contraindication for surgery from renal standpoint.    5/4  s/p Palliative surgery for neuroendocrine mass  will watch for ZO, pt with low UO immediate post op  s/p multiple PRBC and FFP transfusion and volume resuscitation    5/5  patient anuric, K 5.2  On bicarb drip  to go back to OR today  case d/w Dr Jean and Dr Siddiqi  Will HD x 2 hrs to correct potassium  >10 L infused in blood products and fluids  More fluids not indicated, UO 50 ml  no fluid removal on HD  Hep profile ordered    5/6  Ostomy created  will monitor i/o  labs stable   no need for HD today  d/w intensivist  will monitor daily for hd requirement    5/7  seen earlier on HD   tolerating well  TPN  d/w Dr Jean  d/w pts fam at bedside    5/8 MK   - ZO with anuria, remains HD dependent with hx of light chain nephropathy    tolerating hd     dc femoral shiley today after hd   - enceph monitor response to hd and xifaxan and lactulose    5/9 MK   - ZO with anuria, remains HD dependent with hx of light chain nephropathy    no indication for hd    off pressor     monitor electrolytes on TPN     NG in place for TF initiation   - enceph, improving monitor response to hd and xifaxan and lactulose    ostomy fx with liquid stool  dw dr amrley     5/10 SY  --ZO : remains oligoanuric.     Follow with repeat bladder scans--no retention.    Will hold HD today and assess daily.    Trial of gentle hydration due to increased fluid loss.  --Metabolic acidosis : trial of HCO3 in addition to TPN   --Monitor encephalopathy.    5/11 SY  --ZO : remains oligoanuric.    Renal parameters further increased with no improvement in acidosis with HCO3 infusion.    D/w pt's wife over phone.    Explained his poor prognosis from Oncology aspect due to large tumor burden in liver and that surgery was palliative in nature.    Explained added burden of continued dialysis adding to pt's suffering at this point.    Pt's wife requested not to proceed with HD catheter placement and dialysis until she can d/w Dr Siddiqi and rest of family.    Will discuss further later today.    5/12  HD yesterday, planned for HD Sat  case d/w family, and ID, pts nurse  Dr Velez covering this weekend    5/13 SY  --ZO : remains oligoanuric.   HD today : HD order and tx reviewed.     --Positive fluid balance due to hypotension.  RR at 30 today.   Check follow up CXR.  Will plan for 1-2 kg UF with HD if tolerated.  --Continue enteral feeding.  --D/w Dr Mcclendon : positive blood culture/ GNR-- pre current HD catheter.   Plan HD today and on Monday,, then remove catheter for bacteremia clearance.    5/14 SY  --ZO : reamins dialysis dependent for now.    Plan HD in am and remove temporary HD catheter due to bacteremia to allow clearance.  --CT with patch lower lung inf : concern for PNA : continue abtx.  --Continue enteral feeding.  --Enterobacter bacteremia : continue abtx.    5/15 MK   - ZO for now HD dependent    tolerating hd and will attempt uf with hd   - enterobacter sepsis on meropenem renally dosed, aspiration pna     for dc of [place] post hd today   - anemia transfusion ongoing   - FEN On vitality with ostomy output noted     5/16 MK   - ZO Hd dependent    for hd in am with replacement of catheter to occur   - enterobacter sepsis on meropenem, renally dosed     asp pna   - anemia h/h stable   - FEN: on d10 ng tube pulled out    5/17 SY  --ZO : continue TIW HD--will maintain on MWF schedule this week.  --Metabolic acidosis : mainly due to GI loss,  will need to correct with HD.  --Enterobacter bacteremia : continue meropenem  --Post TPN.  NGT pulled out.  For J tube placement.    5/18 SY  --ZO : Continue HD support .    Next tx in am.  Will attempt to start UF with HD as tolerated   --Metabolic acidosis : due to GI loss : increase HCO3 in dialysate  --Enterobacter bacteremia : continue Meropenem  --Continue TPN for now.    5/19  Operative findings:  "Sepsis secondary to perforation leading to a cavitary abscess into the peritoneum.   - Drain placed with endoscopic guidance, going from the skin (sutured) through the cavitary abscess and into the stomach.   - Placement of NJ tube"  For HD today   orders outlined  will follow for tolerance   Dr Daniels covering the weekend    5/;22 MK   - ZO/ATN with anasaraca     will attempt uf with HD today and monitor response, spa with hd, titration of pressors as needed    if tolerates HD, plan for HD in am   - sepsis as outlined above, now on antifungal   dw dr lopes and HD rn     5/23 MK   - ZO/ATN with anasarca    inc uf with hd today and monitor response     spa and pressors with hd to support bp  - FEN on vitaliaty tf, no fw flushes  - sepsis abx and antifungal as per ID   dw icu and hd rn , dr lopes     5/24 SY  --ZO/ATN  : continue dialysis support    creat low due to low muscle mass and with no signs of renal recovery.  --Anasarca with increased third spacing of fluid : HD on Mon/Tues.  Mainly attempt to increase UF.  --Electrolytes stable.  --Sepsis : continue Meropenem and Caspofungin.    5/25 SY  --ZO/ATN : remains dialysis dependent.  Next tx in am    Plan PUF on Sat to manage LE edema/anasarca  --Electrolytes stable.  Continue enteral feeding.  Trend Phos level.  --Sepsis : continue Meropenem and Caspofungin.    5/26  Seen on HD   case d/w pts family  Cont ABX as per ID  HD sat mainly for UF, pt cont to have massive upper thigh edema, anasarca      5/27  UF for 3 KG  as tolerated  Transfusion order as needed by intensivist  ABX as per ID

## 2023-05-27 NOTE — PROGRESS NOTE ADULT - SUBJECTIVE AND OBJECTIVE BOX
HPI:  67 yo male with PMHx metastatic neuroendocrine tumor of the pancreas, recently admitted with nonfunctioning GJ tube (patient is chronically obstructed).   Patient has had severe heartburn, and has had several episodes of vomiting small amount of dark blood. Patient was discharged on Thursday. Hgb unchanged. CT angiography has no active bleeding, but demonstrates extensive tumor in distal pancreas.     5/1 pt feeling slightly nauseous, denies abd pain.  5/2 pt c/o frequent BM overnight causing him to not sleep well. denies abd pain , nausea  5/3 pt reports feeling well, hematuria resolved. coulter in place. not having as many BMs anymore.   5/4: Patient went to the OR today for a Open distal pancreatectomy, splenectomy, partial colectomy, partial L adrenalectomy, temporary abdominal closure.  Patient had a 2.2 L EBL.  Intra Op had 6U PRBC, 2FFP, 1 Platelets, 4L crystalloid  Post Op 1U PRBC ans 1 U FFP.  Post OP vented, with poor UO, on levo and Vaso.    5/5: He remains vented, on paralytics, 2 pressors, essentially anuric.    5/6: Completed the closure and ostomy yesterday.  Vented and on 2 Pressors, No UO.  Off Sedation awake and moving all 4 extremities  5/7: Extubated, required NIV for Hypercarbia, decreased H & H, still on 2 Pressors    5/15: Still on Pressors and HD dependent  5/16: He remains on pressors, draining from wound     5/26: Patient vented with a Poor MS, on 2 pressors.    5/27: Vented.  MS remains Poor.  on 2 Pressors.  HD today        PAST MEDICAL & SURGICAL HISTORY:  Hypertension      BPH (benign prostatic hyperplasia)      Renal insufficiency      Light chain nephropathy      DM (diabetes mellitus)      HLD (hyperlipidemia)      No significant past surgical history          FAMILY HISTORY:  FH: breast cancer (Mother)    FH: aneurysm (Father)        Social Hx:    Allergies    losartan (Angioedema)    Intolerances            ICU Vital Signs Last 24 Hrs  T(C): 37.8 (27 May 2023 10:27), Max: 37.8 (27 May 2023 04:00)  T(F): 100 (27 May 2023 10:27), Max: 100.1 (27 May 2023 04:00)  HR: 101 (27 May 2023 11:20) (70 - 115)  BP: 96/50 (27 May 2023 11:20) (84/52 - 116/69)  BP(mean): 61 (27 May 2023 11:20) (54 - 81)  ABP: --  ABP(mean): --  RR: 22 (27 May 2023 11:20) (15 - 26)  SpO2: 98% (27 May 2023 11:20) (95% - 100%)    O2 Parameters below as of 27 May 2023 11:20  Patient On (Oxygen Delivery Method): ventilator            Mode: AC/ CMV (Assist Control/ Continuous Mandatory Ventilation)  RR (machine): 16  TV (machine): 400  FiO2: 21  PEEP: 6  ITime: 1  PIP: 18      I&O's Summary    26 May 2023 07:01  -  27 May 2023 07:00  --------------------------------------------------------  IN: 3675 mL / OUT: 5702 mL / NET: -2027 mL                              7.7    10.21 )-----------( 59       ( 27 May 2023 05:19 )             23.4       05-27    141  |  106  |  56<H>  ----------------------------<  177<H>  4.2   |  25  |  2.88<H>    Ca    8.8      27 May 2023 05:19  Phos  6.1     05-27  Mg     2.1     05-27    TPro  6.7  /  Alb  3.4  /  TBili  6.8<H>  /  DBili  x   /  AST  192<H>  /  ALT  54  /  AlkPhos  256<H>  05-26            ABG - ( 25 May 2023 17:34 )  pH, Arterial: 7.31  pH, Blood: x     /  pCO2: 45    /  pO2: 71    / HCO3: 23    / Base Excess: -3.7  /  SaO2: 96.0                    MEDICATIONS  (STANDING):  caspofungin IVPB 50 milliGRAM(s) IV Intermittent every 24 hours  chlorhexidine 0.12% Liquid 15 milliLiter(s) Oral Mucosa every 12 hours  chlorhexidine 4% Liquid 1 Application(s) Topical <User Schedule>  dextrose 5%. 1000 milliLiter(s) (50 mL/Hr) IV Continuous <Continuous>  dextrose 5%. 1000 milliLiter(s) (100 mL/Hr) IV Continuous <Continuous>  dextrose 50% Injectable 12.5 Gram(s) IV Push once  dextrose 50% Injectable 25 Gram(s) IV Push once  dextrose 50% Injectable 25 Gram(s) IV Push once  glucagon  Injectable 1 milliGRAM(s) IntraMuscular once  heparin   Injectable 5000 Unit(s) SubCutaneous every 12 hours  insulin lispro (ADMELOG) corrective regimen sliding scale   SubCutaneous every 6 hours  meropenem  IVPB 500 milliGRAM(s) IV Intermittent every 12 hours  meropenem  IVPB      metoclopramide Injectable 5 milliGRAM(s) IV Push every 6 hours  norepinephrine Infusion 0.05 MICROgram(s)/kG/Min (6.74 mL/Hr) IV Continuous <Continuous>  nystatin Cream 1 Application(s) Topical two times a day  pantoprazole  Injectable 40 milliGRAM(s) IV Push every 12 hours  phenylephrine    Infusion 0.05 MICROgram(s)/kG/Min (0.67 mL/Hr) IV Continuous <Continuous>  rifAXIMin 550 milliGRAM(s) Oral two times a day    MEDICATIONS  (PRN):  acetaminophen     Tablet .. 650 milliGRAM(s) Oral every 6 hours PRN Temp greater or equal to 38.5C (101.3F)  albumin human 25% IVPB 50 milliLiter(s) IV Intermittent every 1 hour PRN SBP less than  artificial tears (preservative free) Ophthalmic Solution 1 Drop(s) Both EYES four times a day PRN Dry/Irritated Eyes  dextrose Oral Gel 15 Gram(s) Oral once PRN Blood Glucose LESS THAN 70 milliGRAM(s)/deciliter  ondansetron Injectable 4 milliGRAM(s) IV Push every 6 hours PRN Nausea and/or Vomiting  sodium chloride 0.9% lock flush 10 milliLiter(s) IV Push every 1 hour PRN Pre/post blood products, medications, blood draw, and to maintain line patency  sodium chloride 0.9% lock flush 10 milliLiter(s) IV Push every 1 hour PRN Pre/post blood products, medications, blood draw, and to maintain line patency      DVT Prophylaxis:    Advanced Directives:  Discussed with:    Visit Information: 30 min    ** Time is exclusive of billed procedures and/or teaching and/or routine family updates.

## 2023-05-27 NOTE — PROGRESS NOTE ADULT - ASSESSMENT
Impression  1. septic shock  2. enterobacter bacteremia  3. OZ, HD dependent currently  3. metabolic and hepatic encephalopathy  4. metastatic neuroendocrine tumor of pancreas- sp distal pancreatectomy, splenectomy, partial colectomy, partial L adrenalectomy, colostomy  5. severe protein malnutrition  6. anemia  7. thrombocytopenia  8. acute hypoxemic respiratory failure      Plan:  Neuro - avoiding neurosuppressants, still with poor neuro status overall              ammonia down and remains stable, cont rifaximin              TSH only mild elevation              CTH yesterday negative for acute pathology              plan for EEG and MRI when stable enough for transport                CV -  actively titrating pressors to keep MAP>65          add vasopressin as adjunct for sepsis          start midodrine via G tube in attempt to wean pressors          cortisol prior OK          Echo prior EF normal     Pulm -  full vent support with LTV 6-8cc/kg IDW, keeping plts<30             actively titrating FiO2 to keep sats>90%             utilization of PEEP for alveolar recruitment if desats             full vent bundle in place and reviewed            no significant ETT secretions being suctioned currently    GI -  PPI           feeding tube in [facility] for ongoing enteric feeds, at goal feeds, check Vit D and zinc levels         f/u surgery for wound care/drain management    Renal - remains HD dependent, sp HD yesterday for 3K removal, avoid nephrotoxins, renally adjust all meds, f/u renal for further HD needs, BMP in am    Heme -  Pharmacologic DVT PPx,  severe thrombocytopenia secondary to sepsis remains stable, SCDs, tx for Hbg<7 or signs of active bleeding, tx plts for <10k or <50k with active bleeding, epogen                with HD.    ID - afebrile, + enterobacter bacteremia., Mariza and Caspo for ongoing intraabdominal sepsis management,  abx adjustments/discontinuation based on discussion with ID in conjunction with clinical         features and culture data    Endo -  BS have remained >200, tolerating tube feeds consistently, start low dose lantus 5U daily, ISS coverage [id number], A1c 7.3     Impression  1. septic shock  2. enterobacter bacteremia  3. ZO, HD dependent currently  3. metabolic and hepatic encephalopathy  4. metastatic neuroendocrine tumor of pancreas- sp distal pancreatectomy, splenectomy, partial colectomy, partial L adrenalectomy, colostomy  5. severe protein malnutrition  6. anemia  7. thrombocytopenia  8. acute hypoxemic respiratory failure  9. diabetes mellitus/hyperglycemia      Plan:  Neuro - avoiding neurosuppressants, still with poor neuro status overall              ammonia down and remains stable, cont rifaximin              TSH only mild elevation              CTH yesterday negative for acute pathology              plan for EEG and MRI when stable enough for transport                CV -  actively titrating pressors to keep MAP>65          add vasopressin as adjunct for sepsis          start midodrine via G tube in attempt to wean pressors          cortisol prior OK          Echo prior EF normal     Pulm -  full vent support with LTV 6-8cc/kg IDW, keeping plts<30             actively titrating FiO2 to keep sats>90%             utilization of PEEP for alveolar recruitment if desats             full vent bundle in place and reviewed            no significant ETT secretions being suctioned currently    GI -  PPI           feeding tube in [facility] for ongoing enteric feeds, at goal feeds, check Vit D and zinc levels         f/u surgery for wound care/drain management    Renal - remains HD dependent, sp HD yesterday for 3K removal, avoid nephrotoxins, renally adjust all meds, f/u renal for further HD needs, BMP in am    Heme -  Pharmacologic DVT PPx,  severe thrombocytopenia secondary to sepsis remains stable, SCDs, tx for Hbg<7 or signs of active bleeding, tx plts for <10k or <50k with active bleeding, epogen                with HD.    ID - afebrile, + enterobacter bacteremia., Mariza and Caspo for ongoing intraabdominal sepsis management,  abx adjustments/discontinuation based on discussion with ID in conjunction with clinical         features and culture data    Endo -  BS have remained >200, tolerating tube feeds consistently, start low dose lantus 5U daily, ISS coverage [id number], A1c 7.3

## 2023-05-27 NOTE — PROGRESS NOTE ADULT - SUBJECTIVE AND OBJECTIVE BOX
SURGERY DAILY PROGRESS NOTE:     Subjective:  Patient seen and examined at bedside during am rounds this morning. Still intubated and off sedation. pt is still on 2 pressors. Nurse reported his midline wound staples are coming off and wound is opening further. Afebrile. Pt is on TF and tolerating.     Objective:    MEDICATIONS  (STANDING):  caspofungin IVPB 50 milliGRAM(s) IV Intermittent every 24 hours  chlorhexidine 0.12% Liquid 15 milliLiter(s) Oral Mucosa every 12 hours  chlorhexidine 4% Liquid 1 Application(s) Topical <User Schedule>  dextrose 5%. 1000 milliLiter(s) (50 mL/Hr) IV Continuous <Continuous>  dextrose 5%. 1000 milliLiter(s) (100 mL/Hr) IV Continuous <Continuous>  dextrose 50% Injectable 25 Gram(s) IV Push once  dextrose 50% Injectable 12.5 Gram(s) IV Push once  dextrose 50% Injectable 25 Gram(s) IV Push once  glucagon  Injectable 1 milliGRAM(s) IntraMuscular once  heparin   Injectable 5000 Unit(s) SubCutaneous every 12 hours  insulin lispro (ADMELOG) corrective regimen sliding scale   SubCutaneous every 6 hours  meropenem  IVPB 500 milliGRAM(s) IV Intermittent every 12 hours  meropenem  IVPB      metoclopramide Injectable 5 milliGRAM(s) IV Push every 6 hours  norepinephrine Infusion 0.05 MICROgram(s)/kG/Min (6.74 mL/Hr) IV Continuous <Continuous>  nystatin Cream 1 Application(s) Topical two times a day  pantoprazole  Injectable 40 milliGRAM(s) IV Push every 12 hours  phenylephrine    Infusion 0.05 MICROgram(s)/kG/Min (0.67 mL/Hr) IV Continuous <Continuous>  rifAXIMin 550 milliGRAM(s) Oral two times a day    MEDICATIONS  (PRN):  acetaminophen     Tablet .. 650 milliGRAM(s) Oral every 6 hours PRN Temp greater or equal to 38.5C (101.3F)  albumin human 25% IVPB 50 milliLiter(s) IV Intermittent every 1 hour PRN SBP less than  artificial tears (preservative free) Ophthalmic Solution 1 Drop(s) Both EYES four times a day PRN Dry/Irritated Eyes  dextrose Oral Gel 15 Gram(s) Oral once PRN Blood Glucose LESS THAN 70 milliGRAM(s)/deciliter  ondansetron Injectable 4 milliGRAM(s) IV Push every 6 hours PRN Nausea and/or Vomiting  sodium chloride 0.9% lock flush 10 milliLiter(s) IV Push every 1 hour PRN Pre/post blood products, medications, blood draw, and to maintain line patency  sodium chloride 0.9% lock flush 10 milliLiter(s) IV Push every 1 hour PRN Pre/post blood products, medications, blood draw, and to maintain line patency      Vital Signs Last 24 Hrs  T(C): 37.8 (27 May 2023 10:27), Max: 37.8 (27 May 2023 04:00)  T(F): 100 (27 May 2023 10:27), Max: 100.1 (27 May 2023 04:00)  HR: 98 (27 May 2023 11:00) (70 - 115)  BP: 84/61 (27 May 2023 11:00) (84/52 - 116/69)  BP(mean): 67 (27 May 2023 11:00) (54 - 81)  RR: 23 (27 May 2023 11:00) (15 - 26)  SpO2: 98% (27 May 2023 11:00) (95% - 100%)    Parameters below as of 27 May 2023 10:57  Patient On (Oxygen Delivery Method): ventilator          PHYSICAL EXAM   PHYSICAL EXAM   Gen: intubated and sedated   CV: pulse regularly present   Resp: airway patent on MV  Abdomen: soft, Midline wound open superiorly and inferiorly with ascites drainage, few staples removed, new wet to dry packing applied, colostomy bag covering the wounds dry gauze packing.  New drain on thru midline wound with minimal fluids. No evidence of wound infection. lower aspect of incision dehisced with bowel visualized, ostomy functional with stool  Extremities: No swelling or tenderness      I&O's Detail    26 May 2023 07:01  -  27 May 2023 07:00  --------------------------------------------------------  IN:    Free Water: 50 mL    IV PiggyBack: 50 mL    Norepinephrine: 278 mL    Other (mL): 600 mL    Phenylephrine: 1067 mL    Vital1.5: 1630 mL  Total IN: 3675 mL    OUT:    Bulb (mL): 22 mL    Colostomy (mL): 380 mL    Drain (mL): 400 mL    Gastrostomy Tube (mL): 1100 mL    Intermittent Catheterization - Urethral (mL): 300 mL    Other (mL): 3500 mL  Total OUT: 5702 mL    Total NET: -2027 mL          Daily     Daily     LABS:                        7.7    10.21 )-----------( 59       ( 27 May 2023 05:19 )             23.4     05-27    141  |  106  |  56<H>  ----------------------------<  177<H>  4.2   |  25  |  2.88<H>    Ca    8.8      27 May 2023 05:19  Phos  6.1     05-27  Mg     2.1     05-27    TPro  6.7  /  Alb  3.4  /  TBili  6.8<H>  /  DBili  x   /  AST  192<H>  /  ALT  54  /  AlkPhos  256<H>  05-26          RADIOLOGY & ADDITIONAL STUDIES:    ASSESSMENT/PLAN:

## 2023-05-27 NOTE — PROGRESS NOTE ADULT - ASSESSMENT
69 yo male with PMHx metastatic neuroendocrine tumor of the pancreas, recently admitted with nonfunctioning GJ tube  due to external mechanical  obstruction   now Post Op  after an open distal pancreatectomy, splenectomy, partial colectomy, partial L adrenalectomy,colostomy  With Post Op distributive Shock/bleeding developed ZO now requiring dialysis  onow back on pressors septic shock gram negative bacterem ia Enterobacter, Cloacae  CT 5/14 no distinct collection  dialysis dependent plan T/h/S    PLAN:  ICU    PULM: No weaning given poor MS  Cardio:  Wean pressors if possible  Renal: HD as per renal  GI:   Continue Feeds via [place]  NH 3 noted  Endo RISS  Heme -SQH DVT Prophylaxis  Neurometabolic encephalopthy.  Will Get a HCT today  ID: Continue caspo/bowen

## 2023-05-27 NOTE — PROGRESS NOTE ADULT - ASSESSMENT
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  S/P distal pancreatectomy, splenectomy, partial colectomy  S/P abdominal washout and colostomy creation  Partial Midline wound dehiscence   on and off pressors  more awake with sedation held  ostomy functional  wbc 10  h/h 7/ 23    Plan:  cont Tube feeds at goal  Monitor ostomy output  BID and PRN Dressing changes   F/u Nephro for HD  wean off vent  ween off pressors  No plans at this time for surgical intervention  Cont great Critical care management as per ICU team  Surgery will follow  Ongoing GOC discussion daily w/family    Plan discussed with surg/onc team

## 2023-05-27 NOTE — PROGRESS NOTE ADULT - SUBJECTIVE AND OBJECTIVE BOX
Patient is a 68y old  Male who presents with a chief complaint of GIB (27 May 2023 11:29)      BRIEF HOSPITAL COURSE: ***    Events last 24 hours: ***    PAST MEDICAL & SURGICAL HISTORY:  Hypertension      BPH (benign prostatic hyperplasia)      Renal insufficiency      Light chain nephropathy      DM (diabetes mellitus)      HLD (hyperlipidemia)      No significant past surgical history          Review of Systems:  CONSTITUTIONAL: No fever, chills, or fatigue  EYES: No eye pain, visual disturbances, or discharge  ENMT:  No difficulty hearing, tinnitus, vertigo; No sinus or throat pain  NECK: No pain or stiffness  RESPIRATORY: No cough, wheezing, chills or hemoptysis; No shortness of breath  CARDIOVASCULAR: No chest pain, palpitations, dizziness, or leg swelling  GASTROINTESTINAL: No abdominal or epigastric pain. No nausea, vomiting, or hematemesis; No diarrhea or constipation. No melena or hematochezia.  GENITOURINARY: No dysuria, frequency, hematuria, or incontinence  NEUROLOGICAL: No headaches, memory loss, loss of strength, numbness, or tremors  SKIN: No itching, burning, rashes, or lesions   MUSCULOSKELETAL: No joint pain or swelling; No muscle, back, or extremity pain  PSYCHIATRIC: No depression, anxiety, mood swings, or difficulty sleeping      Medications:  caspofungin IVPB 50 milliGRAM(s) IV Intermittent every 24 hours  meropenem  IVPB 500 milliGRAM(s) IV Intermittent every 12 hours  meropenem  IVPB      rifAXIMin 550 milliGRAM(s) Oral two times a day    norepinephrine Infusion 0.05 MICROgram(s)/kG/Min IV Continuous <Continuous>  phenylephrine    Infusion 0.05 MICROgram(s)/kG/Min IV Continuous <Continuous>      acetaminophen     Tablet .. 650 milliGRAM(s) Oral every 6 hours PRN  metoclopramide Injectable 5 milliGRAM(s) IV Push every 6 hours  ondansetron Injectable 4 milliGRAM(s) IV Push every 6 hours PRN      heparin   Injectable 5000 Unit(s) SubCutaneous every 12 hours    pantoprazole  Injectable 40 milliGRAM(s) IV Push every 12 hours      dextrose 50% Injectable 25 Gram(s) IV Push once  dextrose 50% Injectable 12.5 Gram(s) IV Push once  dextrose 50% Injectable 25 Gram(s) IV Push once  dextrose Oral Gel 15 Gram(s) Oral once PRN  glucagon  Injectable 1 milliGRAM(s) IntraMuscular once  insulin lispro (ADMELOG) corrective regimen sliding scale   SubCutaneous every 6 hours    albumin human 25% IVPB 50 milliLiter(s) IV Intermittent every 1 hour PRN  dextrose 5%. 1000 milliLiter(s) IV Continuous <Continuous>  dextrose 5%. 1000 milliLiter(s) IV Continuous <Continuous>  sodium chloride 0.9% lock flush 10 milliLiter(s) IV Push every 1 hour PRN  sodium chloride 0.9% lock flush 10 milliLiter(s) IV Push every 1 hour PRN      artificial tears (preservative free) Ophthalmic Solution 1 Drop(s) Both EYES four times a day PRN  chlorhexidine 0.12% Liquid 15 milliLiter(s) Oral Mucosa every 12 hours  chlorhexidine 4% Liquid 1 Application(s) Topical <User Schedule>  nystatin Cream 1 Application(s) Topical two times a day        Mode: AC/ CMV (Assist Control/ Continuous Mandatory Ventilation)  RR (machine): 16  TV (machine): 400  FiO2: 21  PEEP: 6  ITime: 1  PIP: 19      ICU Vital Signs Last 24 Hrs  T(C): 37.7 (27 May 2023 20:00), Max: 37.8 (27 May 2023 04:00)  T(F): 99.8 (27 May 2023 20:00), Max: 100.1 (27 May 2023 04:00)  HR: 101 (27 May 2023 21:30) (87 - 115)  BP: 103/57 (27 May 2023 21:30) (84/48 - 120/71)  BP(mean): 67 (27 May 2023 21:30) (56 - 80)  ABP: --  ABP(mean): --  RR: 22 (27 May 2023 21:30) (15 - 26)  SpO2: 100% (27 May 2023 21:30) (96% - 100%)    O2 Parameters below as of 27 May 2023 20:00  Patient On (Oxygen Delivery Method): ventilator    O2 Concentration (%): 21                  LABS:                        7.7    10.21 )-----------( 59       ( 27 May 2023 05:19 )             23.4     05-27    141  |  106  |  56<H>  ----------------------------<  177<H>  4.2   |  25  |  2.88<H>    Ca    8.8      27 May 2023 05:19  Phos  6.1     05-27  Mg     2.1     05-27    TPro  6.7  /  Alb  3.4  /  TBili  6.8<H>  /  DBili  x   /  AST  192<H>  /  ALT  54  /  AlkPhos  256<H>  05-26          CAPILLARY BLOOD GLUCOSE      POCT Blood Glucose.: 221 mg/dL (27 May 2023 18:47)        CULTURES:      Physical Examination:    General: No acute distress.  Alert, oriented, interactive, nonfocal    HEENT: Pupils equal, reactive to light.  Symmetric.    PULM: Clear to auscultation bilaterally, no significant sputum production    CVS: Regular rate and rhythm, no murmurs, rubs, or gallops    ABD: Soft, nondistended, nontender, normoactive bowel sounds, no masses    EXT: No edema, nontender    SKIN: Warm and well perfused, no rashes noted.    RADIOLOGY: ***    CRITICAL CARE TIME SPENT: ***   Patient is a 68y old  Male who presents with a chief complaint of GIB (27 May 2023 11:29)      BRIEF HOSPITAL COURSE:   68M with PMHx metastatic neuroendocrine tumor of pancreas, HTN, BPH, CKD, DM, HLD admitted with nonfunctioning GJ tube. On workup found to have extensive tumor in distal pancreas. Taken to OR for open distal pancreatectomy/splenectomy, partial colectomy, partial L adrenalectomy. Complicated postop by hemorrhagic shock, respiratory failure, ZO on CKD requiring HD, hepatic encephalopathy, enterbacter bacteremia, septic shock, hypoglycemia      Events last 24 hours: afebrile, remains on pressors, BS elevated, full vent support, tolerating tube feeds    PAST MEDICAL & SURGICAL HISTORY:  Hypertension      BPH (benign prostatic hyperplasia)      Renal insufficiency      Light chain nephropathy      DM (diabetes mellitus)      HLD (hyperlipidemia)      No significant past surgical history          Review of Systems:  unable to perform at this time, pt intubated no sedation.    Medications:  caspofungin IVPB 50 milliGRAM(s) IV Intermittent every 24 hours  meropenem  IVPB 500 milliGRAM(s) IV Intermittent every 12 hours  meropenem  IVPB      rifAXIMin 550 milliGRAM(s) Oral two times a day    norepinephrine Infusion 0.05 MICROgram(s)/kG/Min IV Continuous <Continuous>  phenylephrine    Infusion 0.05 MICROgram(s)/kG/Min IV Continuous <Continuous>      acetaminophen     Tablet .. 650 milliGRAM(s) Oral every 6 hours PRN  metoclopramide Injectable 5 milliGRAM(s) IV Push every 6 hours  ondansetron Injectable 4 milliGRAM(s) IV Push every 6 hours PRN      heparin   Injectable 5000 Unit(s) SubCutaneous every 12 hours    pantoprazole  Injectable 40 milliGRAM(s) IV Push every 12 hours      dextrose 50% Injectable 25 Gram(s) IV Push once  dextrose 50% Injectable 12.5 Gram(s) IV Push once  dextrose 50% Injectable 25 Gram(s) IV Push once  dextrose Oral Gel 15 Gram(s) Oral once PRN  glucagon  Injectable 1 milliGRAM(s) IntraMuscular once  insulin lispro (ADMELOG) corrective regimen sliding scale   SubCutaneous every 6 hours    albumin human 25% IVPB 50 milliLiter(s) IV Intermittent every 1 hour PRN  dextrose 5%. 1000 milliLiter(s) IV Continuous <Continuous>  dextrose 5%. 1000 milliLiter(s) IV Continuous <Continuous>  sodium chloride 0.9% lock flush 10 milliLiter(s) IV Push every 1 hour PRN  sodium chloride 0.9% lock flush 10 milliLiter(s) IV Push every 1 hour PRN      artificial tears (preservative free) Ophthalmic Solution 1 Drop(s) Both EYES four times a day PRN  chlorhexidine 0.12% Liquid 15 milliLiter(s) Oral Mucosa every 12 hours  chlorhexidine 4% Liquid 1 Application(s) Topical <User Schedule>  nystatin Cream 1 Application(s) Topical two times a day        Mode: AC/ CMV (Assist Control/ Continuous Mandatory Ventilation)  RR (machine): 16  TV (machine): 400  FiO2: 21  PEEP: 6  ITime: 1  PIP: 19      ICU Vital Signs Last 24 Hrs  T(C): 37.7 (27 May 2023 20:00), Max: 37.8 (27 May 2023 04:00)  T(F): 99.8 (27 May 2023 20:00), Max: 100.1 (27 May 2023 04:00)  HR: 101 (27 May 2023 21:30) (87 - 115)  BP: 103/57 (27 May 2023 21:30) (84/48 - 120/71)  BP(mean): 67 (27 May 2023 21:30) (56 - 80)  ABP: --  ABP(mean): --  RR: 22 (27 May 2023 21:30) (15 - 26)  SpO2: 100% (27 May 2023 21:30) (96% - 100%)    O2 Parameters below as of 27 May 2023 20:00  Patient On (Oxygen Delivery Method): ventilator    O2 Concentration (%): 21      I&O's Summary    26 May 2023 07:01  -  27 May 2023 07:00  --------------------------------------------------------  IN: 3675 mL / OUT: 5702 mL / NET: -2027 mL    27 May 2023 07:01  -  27 May 2023 23:19  --------------------------------------------------------  IN: 1414 mL / OUT: 830 mL / NET: 584 mL        LABS:                        7.7    10.21 )-----------( 59       ( 27 May 2023 05:19 )             23.4     05-27    141  |  106  |  56<H>  ----------------------------<  177<H>  4.2   |  25  |  2.88<H>    Ca    8.8      27 May 2023 05:19  Phos  6.1     05-27  Mg     2.1     05-27    TPro  6.7  /  Alb  3.4  /  TBili  6.8<H>  /  DBili  x   /  AST  192<H>  /  ALT  54  /  AlkPhos  256<H>  05-26          CAPILLARY BLOOD GLUCOSE      POCT Blood Glucose.: 221 mg/dL (27 May 2023 18:47)          Physical Examination:    General: intubated, no sedation, withdraws to painful stimuli    HEENT: Pupils equal, reactive to light.  Symmetric.    PULM: course BS bilaterally, no wheezing or rales    CVS: Regular rate and rhythm    ABD: drains and ostomy bags in place, serous/mild sanguinous drainage, ostomy pink and viable    EXT: + LE edema, SCDs    SKIN: Warm       RADIOLOGY:   ACC: [id number] EXAM:  CT BRAIN   ORDERED BY: MARLENE MO     PROCEDURE DATE:  05/27/2023          INTERPRETATION:  CLINICAL INFORMATION: Altered mental status.    COMPARISON: CT head of 10/9/2015.    PROCEDURE:  Noncontrast CT of the Head was performed from the skull base to the   vertex. Coronal and Sagittal reformats were obtained.    FINDINGS:    There is no acute intracranial hemorrhage, mass effect, midline shift,   extra-axial collection, or hydrocephalus. Small left cerebellar infarct,   age indeterminate. Mild patchy hypodensities within the periventricular   and subcortical white matter, although nonspecific, likely reflect   chronic microvascular disease.    The visualized paranasal sinuses and mastoid air cells are clear.   Intraorbital contents are unremarkable. The imaged left nasogastric tube.   Calvarium is intact.    IMPRESSION:    Note acute intracranial hemorrhage or significant mass effect. Age   indeterminant left cerebellar infarct. More sensitive evaluation with   brain MRI may be obtained, if no contraindications exist.    --- End of Report ---            PENELOPE CUENCA MD; Attending Radiologist  This document has been electronically signed. May 27 2023  2:26PM    ACC: [id number] EXAM:  XR CHEST PORTABLE URGENT 1V   ORDERED BY: YAMILE GALARZA     ACC: [id number] EXAM:  XR CHEST PORTABLE URGENT 1V   ORDERED BY: YAMILE GALARZA     PROCEDURE DATE:  05/18/2023          INTERPRETATION:  Portable chest radiograph    CLINICAL INFORMATION: Intubation    TECHNIQUE:  Portable  AP chest radiograph.    COMPARISON: 5/18/2023 chest x-ray .    FINDINGS:  CATHETERS AND TUBES: ET tube tip in proximal RIGHT mainstem bronchus.  NG tube tip coiled in body of stomach.  LEFT subphrenic catheter tip in SVC.  .      PULMONARY: Elevated RIGHT hemidiaphragm.  Mild bilateral lung lower zone diffuse airspace disease.  Apices clear..   No pneumothorax.    HEART/VASCULAR: The heart and mediastinum size and configuration are   within normal limits.    BONES: Visualized osseous thorax intact.    IMPRESSION:   ET tube tip in proximal RIGHT mainstem bronchus. Remaining   catheters in place..  Suspect subtle bibasilar airspace disease.    Follow-up chest    5/19/2023 at 12:53 AM:    INDICATION: Intubation. ET tube pulled back    FINDINGS: ET tube tip above tracheal bifurcation. Otherwise no interval   change.    --- End of Report ---            PENELOPE DEL VALLE MD; Attending Radiologist  This document has been electronically signed. May 19 2023  4:33PM    CRITICAL CARE TIME SPENT: 35 mins assessing presenting problems of acute illness that poses high probability of life threatening deterioration or end organ damage/dysfunction.  Medical decision making including Initiating plan of care, reviewing data, reviewing radiology, direct patient bedside evaluation and interpretation of vital signs, any necessary ventilator management , discussion with multidisciplinary team, all non inclusive of procedures

## 2023-05-28 NOTE — PROGRESS NOTE ADULT - ASSESSMENT
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  S/P distal pancreatectomy, splenectomy, partial colectomy  S/P abdominal washout and colostomy creation  Partial Midline wound dehiscence   on and off pressors  minimally awake with sedation held  ostomy functional    Plan:  cont Tube feeds at goal  Monitor ostomy output  BID and PRN Dressing changes   F/u Nephro for HD  wean off vent  ween off pressors  No plans at this time for surgical intervention  Cont great Critical care management as per ICU team  Surgery will follow  Ongoing GOC discussion daily w/family    Plan discussed with surg/onc team

## 2023-05-28 NOTE — PROGRESS NOTE ADULT - SUBJECTIVE AND OBJECTIVE BOX
SURGERY DAILY PROGRESS NOTE:     Subjective:  Patient seen and examined at bedside during am rounds this morning. Still intubated and off sedation. pt is still on 2 pressors. Nurse reported his midline wound staples are coming off and wound is opening further. Afebrile. Pt is on TF and tolerating.     Objective:      Vital Signs Last 24 Hrs  T(C): 36.9 (28 May 2023 08:00), Max: 37.8 (27 May 2023 10:27)  T(F): 98.5 (28 May 2023 08:00), Max: 100 (27 May 2023 10:27)  HR: 80 (28 May 2023 08:15) (80 - 111)  BP: 97/55 (28 May 2023 08:15) (84/48 - 120/71)  BP(mean): 64 (28 May 2023 08:15) (56 - 83)  RR: 21 (28 May 2023 08:15) (15 - 26)  SpO2: 100% (28 May 2023 08:15) (96% - 100%)    Parameters below as of 28 May 2023 08:00  Patient On (Oxygen Delivery Method): ventilator    O2 Concentration (%): 21                        7.3    12.26 )-----------( 69       ( 28 May 2023 05:39 )             22.3     05-28    142  |  106  |  76<H>  ----------------------------<  275<H>  4.7   |  23  |  3.46<H>    Ca    8.5      28 May 2023 05:39  Phos  7.6     05-28  Mg     2.3     05-28      I&O's Summary    27 May 2023 07:01  -  28 May 2023 07:00  --------------------------------------------------------  IN: 2774 mL / OUT: 1740 mL / NET: 1034 mL          PHYSICAL EXAM   Gen: intubated with no sedation  CV: pulse regularly present   Resp: airway patent on MV  Abdomen: soft, Midline wound open superiorly and inferiorly with ascites drainage, few staples removed, new wet to dry packing applied, colostomy bag covering the wounds dry gauze packing.  New drain on thru midline wound with minimal fluids. No evidence of wound infection. lower aspect of incision dehisced with bowel visualized, ostomy functional with stool  Extremities: No swelling or tenderness

## 2023-05-28 NOTE — PROGRESS NOTE ADULT - ASSESSMENT
69 yo man with Hx of Neuroendocrine tumor/Pancreatic mass and liver mets.  Now for palliative resection of pancreatic mass.    --ZO with variable creat level and unclear CKD.    Likely dependent on volume status.    Limited intake even with Enteral feeding.      Start gentle hydration.  -- : continue Hale drainage.  --Hx of light chain nephropathy  --No contraindication for surgery from renal standpoint.    5/4  s/p Palliative surgery for neuroendocrine mass  will watch for ZO, pt with low UO immediate post op  s/p multiple PRBC and FFP transfusion and volume resuscitation    5/5  patient anuric, K 5.2  On bicarb drip  to go back to OR today  case d/w Dr Jean and Dr Siddiqi  Will HD x 2 hrs to correct potassium  >10 L infused in blood products and fluids  More fluids not indicated, UO 50 ml  no fluid removal on HD  Hep profile ordered    5/6  Ostomy created  will monitor i/o  labs stable   no need for HD today  d/w intensivist  will monitor daily for hd requirement    5/7  seen earlier on HD   tolerating well  TPN  d/w Dr Jean  d/w pts fam at bedside    5/8 MK   - ZO with anuria, remains HD dependent with hx of light chain nephropathy    tolerating hd     dc femoral shiley today after hd   - enceph monitor response to hd and xifaxan and lactulose    5/9 MK   - ZO with anuria, remains HD dependent with hx of light chain nephropathy    no indication for hd    off pressor     monitor electrolytes on TPN     NG in place for TF initiation   - enceph, improving monitor response to hd and xifaxan and lactulose    ostomy fx with liquid stool  dw dr marley     5/10 SY  --ZO : remains oligoanuric.     Follow with repeat bladder scans--no retention.    Will hold HD today and assess daily.    Trial of gentle hydration due to increased fluid loss.  --Metabolic acidosis : trial of HCO3 in addition to TPN   --Monitor encephalopathy.    5/11 SY  --ZO : remains oligoanuric.    Renal parameters further increased with no improvement in acidosis with HCO3 infusion.    D/w pt's wife over phone.    Explained his poor prognosis from Oncology aspect due to large tumor burden in liver and that surgery was palliative in nature.    Explained added burden of continued dialysis adding to pt's suffering at this point.    Pt's wife requested not to proceed with HD catheter placement and dialysis until she can d/w Dr Siddiqi and rest of family.    Will discuss further later today.    5/12  HD yesterday, planned for HD Sat  case d/w family, and ID, pts nurse  Dr Velez covering this weekend    5/13 SY  --ZO : remains oligoanuric.   HD today : HD order and tx reviewed.     --Positive fluid balance due to hypotension.  RR at 30 today.   Check follow up CXR.  Will plan for 1-2 kg UF with HD if tolerated.  --Continue enteral feeding.  --D/w Dr Mcclendon : positive blood culture/ GNR-- pre current HD catheter.   Plan HD today and on Monday,, then remove catheter for bacteremia clearance.    5/14 SY  --ZO : reamins dialysis dependent for now.    Plan HD in am and remove temporary HD catheter due to bacteremia to allow clearance.  --CT with patch lower lung inf : concern for PNA : continue abtx.  --Continue enteral feeding.  --Enterobacter bacteremia : continue abtx.    5/15 MK   - ZO for now HD dependent    tolerating hd and will attempt uf with hd   - enterobacter sepsis on meropenem renally dosed, aspiration pna     for dc of [place] post hd today   - anemia transfusion ongoing   - FEN On vitality with ostomy output noted     5/16 MK   - ZO Hd dependent    for hd in am with replacement of catheter to occur   - enterobacter sepsis on meropenem, renally dosed     asp pna   - anemia h/h stable   - FEN: on d10 ng tube pulled out    5/17 SY  --ZO : continue TIW HD--will maintain on MWF schedule this week.  --Metabolic acidosis : mainly due to GI loss,  will need to correct with HD.  --Enterobacter bacteremia : continue meropenem  --Post TPN.  NGT pulled out.  For J tube placement.    5/18 SY  --ZO : Continue HD support .    Next tx in am.  Will attempt to start UF with HD as tolerated   --Metabolic acidosis : due to GI loss : increase HCO3 in dialysate  --Enterobacter bacteremia : continue Meropenem  --Continue TPN for now.    5/19  Operative findings:  "Sepsis secondary to perforation leading to a cavitary abscess into the peritoneum.   - Drain placed with endoscopic guidance, going from the skin (sutured) through the cavitary abscess and into the stomach.   - Placement of NJ tube"  For HD today   orders outlined  will follow for tolerance   Dr Daniels covering the weekend    5/;22 MK   - ZO/ATN with anasaraca     will attempt uf with HD today and monitor response, spa with hd, titration of pressors as needed    if tolerates HD, plan for HD in am   - sepsis as outlined above, now on antifungal   dw dr lopes and HD rn     5/23 MK   - ZO/ATN with anasarca    inc uf with hd today and monitor response     spa and pressors with hd to support bp  - FEN on vitaliaty tf, no fw flushes  - sepsis abx and antifungal as per ID   dw icu and hd rn , dr lopes     5/24 SY  --ZO/ATN  : continue dialysis support    creat low due to low muscle mass and with no signs of renal recovery.  --Anasarca with increased third spacing of fluid : HD on Mon/Tues.  Mainly attempt to increase UF.  --Electrolytes stable.  --Sepsis : continue Meropenem and Caspofungin.    5/25 SY  --ZO/ATN : remains dialysis dependent.  Next tx in am    Plan PUF on Sat to manage LE edema/anasarca  --Electrolytes stable.  Continue enteral feeding.  Trend Phos level.  --Sepsis : continue Meropenem and Caspofungin.    5/26  Seen on HD   case d/w pts family  Cont ABX as per ID  HD sat mainly for UF, pt cont to have massive upper thigh edema, anasarca      5/27  UF for 3 KG  as tolerated  Transfusion order as needed by intensivist  ABX as per ID    5/28  Anemic 7.3, will T/C  for HD in am  Labs stable today

## 2023-05-28 NOTE — PROGRESS NOTE ADULT - ASSESSMENT
67 y/o male with PMH neuroendocrine tumor of pancreas, GJ tube, HTN, HLD, DM2    Initially admitted with UGI bleed - no intervention   Found to have a large distal pancreatic mass     Underwent open distal pancreatectomy, splenectomy, partial colectomy, partial L adrenalectomy, temporary abdominal closure on 5/4  Unstable during procedure    Underwent abdominal washout and colostomy on 5/5  No active bleeding or abscess found     Underwent gastric drain and NJ tube placement by GI on 5/18       Course complicated by:     Severe sepsis with septic shock (not POA) due to abdominal source/abscess   Enterobacter bacteremia   ZO from [facility] now on dialysis   Acute resp failure   Acute metabolic encephalopathy     Intubated 5/4-6  Reintubated 5/18     Hemodialysis initiated 5/5    [facility] CVC 5/24  [facility] HD cath 5/17       Plan:     Neuro - remains profoundly encephalopathic without localizing signs, not on sedation, on continuous EEG now, CT head was neg for acute pathology     CV - persistent shock state, now on vaso, jarad, midodrine, off levo, titrate pressor for goal MAP>60     Resp - continue AC vol, daily SBT, not ready for extubation given poor mental status, wean Fio2 to maintain sats>92, ABG from 5/25 showed adequate ventilation     GI - TF via NJT, ostomy care, monitor drain output, midline wound has dehisced and surgery is aware of it - will discuss further plan with them    Renal - holding dialysis today     ID - continue meropenem, caspofungin, WBC increased - trend    Heme - anemia of chronic disease, no active bleeding, transfuse PRBC for Hb<7, platelets slowly increasing    Endo - hyperglycemic - increase lantus, continue moderate ISS     Maintain HD cath and central line     DVT ppx with sc heparin       Prognosis is extremely poor given persistent shock state, poor mental status, non-healing wound etc., further goals of care discussion should be held with family including discussion for transition to comfort measures - will d/w Dr Siddiqi       Patient is critically ill with organ dysfunction requiring active titration of medication and ventilation settings 69 y/o male with PMH neuroendocrine tumor of pancreas, GJ tube, HTN, HLD, DM2    Initially admitted with UGI bleed - no intervention   Found to have a large distal pancreatic mass     Underwent open distal pancreatectomy, splenectomy, partial colectomy, partial L adrenalectomy, temporary abdominal closure on 5/4  Unstable during procedure    Underwent abdominal washout and colostomy on 5/5  No active bleeding or abscess found     Underwent gastric drain and NJ tube placement by GI on 5/18       Course complicated by:     Severe sepsis with septic shock (not POA) due to abdominal source/abscess   Enterobacter bacteremia   ZO from [facility] now on dialysis   Acute resp failure   Acute metabolic encephalopathy     Intubated 5/4-6  Reintubated 5/18     Hemodialysis initiated 5/5    [facility] CVC 5/24  [facility] HD cath 5/17       Plan:     Neuro - remains profoundly encephalopathic without localizing signs, not on sedation, on continuous EEG now, CT head was neg for acute pathology     CV - persistent shock state, now on vaso, jarad, midodrine, off levo, titrate pressor for goal MAP>60     Resp - continue AC vol, daily SBT, not ready for extubation given poor mental status, wean Fio2 to maintain sats>92, ABG from 5/25 showed adequate ventilation     GI - TF via NJT, ostomy care, monitor drain output, midline wound has dehisced and surgery is aware of it - will discuss further plan with them    Renal - holding dialysis today     ID - continue meropenem, caspofungin, WBC increased - trend    Heme - anemia of chronic disease, no active bleeding, transfuse PRBC for Hb<7, platelets slowly increasing    Endo - hyperglycemic - increase lantus, continue moderate ISS     Maintain HD cath and central line     DVT ppx with sc heparin       Prognosis is extremely poor given persistent shock state, poor mental status, non-healing wound etc., further goals of care discussion should be held with family including discussion for transition to comfort measures - will d/w Dr Siddiqi       Patient is critically ill with organ dysfunction requiring active titration of medication and ventilation settings      Wife and daughter updated at bedside

## 2023-05-28 NOTE — PROGRESS NOTE ADULT - SKIN
warm and dry
jaundiced
warm and dry
jaundiced
warm and dry
jaundiced

## 2023-05-28 NOTE — PROGRESS NOTE ADULT - SUBJECTIVE AND OBJECTIVE BOX
NEPHROLOGY INTERVAL HPI/OVERNIGHT EVENTS:    Date of Service: 05-25-23 @ 08:26  5/28- no new changes, s/p HD yesterday, tolerated fluid removal will HD/ UF in am  5/27- for UF today 2 hrs for 3 KG off  5/26- Vented, family at bedside, case discussed  5/25--Remains on vent.  No OR yesterday.  restarted on enteral feeding via N-J tube.  4KG UF with HD yesterday.  5/24--Remains on vent.  awaiting decision for OR today.  NPO today.  5/23 remains on pressors tolerated 3L off yesterday   5/22 events noted, remains on dual pressors at steady dose with vitality TF infusing     HPI:  67 yo man with Neuroendocrine tumor of pancreas with mets to liver dx'd 6 years ago.  Treated with Lutathera one year ago and restarted in 12/2022  G-J tube placed for nutrition in January due to chronic dysphagia and severe esophagitis on EGD.  Post recent  admission due to G tube clogging.  D/mansi home on 4/27.    Returned to ED due to vomiting when night time enteral feeding via G tube started.  CT with IVC done due to concern for GIB.  Pt reports chronic self catheterization due to urinary retention.   ZO improved when catheterization started.  Follows mainly with EMELY Cortez.   Pt has been self cath'ing 3-4 x /day.   Indwelling Hale placed by EMELY due to resistance with blood clots with resistance reported by pt.  For palliative mass resection tomorrow.    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  Creat has been variable during previous admissions--ranging 1.1-2.2.    PMHX and PSHX.  --Neuroendocrine tumor of pancreas with mets to liver --dx 6 years ago.  --GJ tube for nutrition in 1/2023 due to chronic dysphagia.  --Hx of HTN  --Hx of DM  --Hx of light chain disease --unclear hx and no hx of renal biopsy.        MEDICATIONS  (STANDING):  caspofungin IVPB 50 milliGRAM(s) IV Intermittent every 24 hours  chlorhexidine 0.12% Liquid 15 milliLiter(s) Oral Mucosa every 12 hours  chlorhexidine 4% Liquid 1 Application(s) Topical <User Schedule>  dextrose 5%. 1000 milliLiter(s) (50 mL/Hr) IV Continuous <Continuous>  dextrose 5%. 1000 milliLiter(s) (100 mL/Hr) IV Continuous <Continuous>  dextrose 50% Injectable 25 Gram(s) IV Push once  dextrose 50% Injectable 25 Gram(s) IV Push once  dextrose 50% Injectable 12.5 Gram(s) IV Push once  glucagon  Injectable 1 milliGRAM(s) IntraMuscular once  heparin   Injectable 5000 Unit(s) SubCutaneous every 12 hours  insulin lispro (ADMELOG) corrective regimen sliding scale   SubCutaneous every 6 hours  meropenem  IVPB 500 milliGRAM(s) IV Intermittent every 12 hours  meropenem  IVPB      metoclopramide Injectable 5 milliGRAM(s) IV Push every 6 hours  norepinephrine Infusion 0.05 MICROgram(s)/kG/Min (6.74 mL/Hr) IV Continuous <Continuous>  nystatin Cream 1 Application(s) Topical two times a day  pantoprazole  Injectable 40 milliGRAM(s) IV Push every 12 hours  phenylephrine    Infusion 0.05 MICROgram(s)/kG/Min (0.67 mL/Hr) IV Continuous <Continuous>  rifAXIMin 550 milliGRAM(s) Oral two times a day    MEDICATIONS  (PRN):  acetaminophen     Tablet .. 650 milliGRAM(s) Oral every 6 hours PRN Temp greater or equal to 38.5C (101.3F)  albumin human 25% IVPB 50 milliLiter(s) IV Intermittent every 1 hour PRN SBP less than  artificial tears (preservative free) Ophthalmic Solution 1 Drop(s) Both EYES four times a day PRN Dry/Irritated Eyes  dextrose Oral Gel 15 Gram(s) Oral once PRN Blood Glucose LESS THAN 70 milliGRAM(s)/deciliter  ondansetron Injectable 4 milliGRAM(s) IV Push every 6 hours PRN Nausea and/or Vomiting  sodium chloride 0.9% lock flush 10 milliLiter(s) IV Push every 1 hour PRN Pre/post blood products, medications, blood draw, and to maintain line patency  sodium chloride 0.9% lock flush 10 milliLiter(s) IV Push every 1 hour PRN Pre/post blood products, medications, blood draw, and to maintain line patency    Vital Signs Last 24 Hrs  T(C): 37.6 (27 May 2023 06:00), Max: 37.8 (27 May 2023 04:00)  T(F): 99.7 (27 May 2023 06:00), Max: 100.1 (27 May 2023 04:00)  HR: 97 (27 May 2023 07:00) (70 - 115)  BP: 98/59 (27 May 2023 07:00) (84/52 - 116/69)  BP(mean): 68 (27 May 2023 07:00) (54 - 81)  RR: 20 (27 May 2023 07:00) (15 - 25)  SpO2: 96% (27 May 2023 07:00) (95% - 100%)    Parameters below as of 27 May 2023 07:00  Patient On (Oxygen Delivery Method): ventilator      26 May 2023 07:01  -  27 May 2023 07:00  --------------------------------------------------------  IN:    Free Water: 50 mL    IV PiggyBack: 50 mL    Norepinephrine: 278 mL    Other (mL): 600 mL    Phenylephrine: 1067 mL    Vital1.5: 1630 mL  Total IN: 3675 mL    OUT:    Bulb (mL): 22 mL    Colostomy (mL): 380 mL    Drain (mL): 400 mL    Gastrostomy Tube (mL): 1100 mL    Intermittent Catheterization - Urethral (mL): 300 mL    Other (mL): 3500 mL  Total OUT: 5702 mL    Total NET: -2027 mL    O2 Concentration (%): 21  PHYSICAL EXAM:  GENERAL: on vent  CHEST/LUNG: fair air entry  HEART: S1S2 RRR  I&O's Detail  ABDOMEN: soft  EXTREMITIES: 3-4+ edema in LE   SKIN:     LABS:                          7.3    12.26 )-----------( 69       ( 28 May 2023 05:39 )             22.3                             7.7    10.21 )-----------( 59       ( 27 May 2023 05:19 )             23.4                             8.0    9.69  )-----------( 56       ( 26 May 2023 05:31 )             24.2                           7.1    7.56  )-----------( 52       ( 25 May 2023 05:40 )             21.5     05-28    142  |  106  |  76<H>  ----------------------------<  275<H>  4.7   |  23  |  3.46<H>    Ca    8.5      28 May 2023 05:39  Phos  7.6     05-28  Mg     2.3     05-28 05-27    141  |  106  |  56<H>  ----------------------------<  177<H>  4.2   |  25  |  2.88<H>    Ca    8.8      27 May 2023 05:19  Phos  6.1     05-27  Mg     2.1     05-27    TPro  6.7  /  Alb  3.4  /  TBili  6.8<H>  /  DBili  x   /  AST  192<H>  /  ALT  54  /  AlkPhos  256<H>  05-26 05-26    141  |  107  |  66<H>  ----------------------------<  202<H>  4.2   |  24  |  3.41<H>    Ca    8.7      26 May 2023 05:31  Phos  6.4     05-26  Mg     2.1     05-25    TPro  6.7  /  Alb  3.4  /  TBili  6.8<H>  /  DBili  x   /  AST  192<H>  /  ALT  54  /  AlkPhos  256<H>  05-26 05-25    143  |  107  |  48<H>  ----------------------------<  310<H>  4.2   |  24  |  2.89<H>    Ca    8.9      25 May 2023 05:40  Phos  5.5     05-25  Mg     2.1     05-25    TPro  6.3  /  Alb  3.3  /  TBili  5.7<H>  /  DBili  x   /  AST  170<H>  /  ALT  57  /  AlkPhos  240<H>  05-25        Magnesium, Serum: 2.1 mg/dL (05-25 @ 05:40)  Phosphorus Level, Serum: 5.5 mg/dL (05-25 @ 05:40)          RADIOLOGY & ADDITIONAL TESTS:

## 2023-05-28 NOTE — PROGRESS NOTE ADULT - SUBJECTIVE AND OBJECTIVE BOX
Patient is a 68y old  Male who presents with a chief complaint of GIB (28 May 2023 11:40)    24 hour events: remains on 2 iv pressors, jarad dose improved with vaso and midodrine     Allergies    losartan (Angioedema)    Intolerances      REVIEW OF SYSTEMS: SEE BELOW       ICU Vital Signs Last 24 Hrs  T(C): 36.8 (28 May 2023 12:30), Max: 37.7 (27 May 2023 20:00)  T(F): 98.3 (28 May 2023 12:30), Max: 99.8 (27 May 2023 20:00)  HR: 95 (28 May 2023 14:00) (80 - 110)  BP: 97/62 (28 May 2023 14:00) (84/48 - 117/73)  BP(mean): 70 (28 May 2023 14:00) (56 - 83)  ABP: --  ABP(mean): --  RR: 20 (28 May 2023 14:00) (13 - 26)  SpO2: 100% (28 May 2023 14:00) (97% - 100%)    O2 Parameters below as of 28 May 2023 08:00  Patient On (Oxygen Delivery Method): ventilator    O2 Concentration (%): 21        CAPILLARY BLOOD GLUCOSE      POCT Blood Glucose.: 252 mg/dL (28 May 2023 10:28)  POCT Blood Glucose.: 252 mg/dL (28 May 2023 07:47)  POCT Blood Glucose.: 261 mg/dL (28 May 2023 05:24)  POCT Blood Glucose.: 210 mg/dL (27 May 2023 23:51)  POCT Blood Glucose.: 221 mg/dL (27 May 2023 18:47)      I&O's Summary    27 May 2023 07:01  -  28 May 2023 07:00  --------------------------------------------------------  IN: 2774 mL / OUT: 1740 mL / NET: 1034 mL        Mode: AC/ CMV (Assist Control/ Continuous Mandatory Ventilation)  RR (machine): 16  TV (machine): 400  FiO2: 21  PEEP: 5  ITime: 1  PIP: 24      MEDICATIONS  (STANDING):  caspofungin IVPB 50 milliGRAM(s) IV Intermittent every 24 hours  chlorhexidine 0.12% Liquid 15 milliLiter(s) Oral Mucosa every 12 hours  chlorhexidine 4% Liquid 1 Application(s) Topical <User Schedule>  dextrose 5%. 1000 milliLiter(s) (50 mL/Hr) IV Continuous <Continuous>  dextrose 5%. 1000 milliLiter(s) (100 mL/Hr) IV Continuous <Continuous>  dextrose 50% Injectable 25 Gram(s) IV Push once  dextrose 50% Injectable 25 Gram(s) IV Push once  dextrose 50% Injectable 12.5 Gram(s) IV Push once  glucagon  Injectable 1 milliGRAM(s) IntraMuscular once  heparin   Injectable 5000 Unit(s) SubCutaneous every 12 hours  insulin lispro (ADMELOG) corrective regimen sliding scale   SubCutaneous every 6 hours  meropenem  IVPB 500 milliGRAM(s) IV Intermittent every 12 hours  meropenem  IVPB      metoclopramide Injectable 5 milliGRAM(s) IV Push every 6 hours  midodrine 10 milliGRAM(s) Oral every 8 hours  nystatin Cream 1 Application(s) Topical two times a day  pantoprazole  Injectable 40 milliGRAM(s) IV Push every 12 hours  phenylephrine    Infusion 0.05 MICROgram(s)/kG/Min (0.67 mL/Hr) IV Continuous <Continuous>  rifAXIMin 550 milliGRAM(s) Oral two times a day  vasopressin Infusion 0.04 Unit(s)/Min (6 mL/Hr) IV Continuous <Continuous>      MEDICATIONS  (PRN):  acetaminophen     Tablet .. 650 milliGRAM(s) Oral every 6 hours PRN Temp greater or equal to 38.5C (101.3F)  albumin human 25% IVPB 50 milliLiter(s) IV Intermittent every 1 hour PRN SBP less than  artificial tears (preservative free) Ophthalmic Solution 1 Drop(s) Both EYES four times a day PRN Dry/Irritated Eyes  dextrose Oral Gel 15 Gram(s) Oral once PRN Blood Glucose LESS THAN 70 milliGRAM(s)/deciliter  ondansetron Injectable 4 milliGRAM(s) IV Push every 6 hours PRN Nausea and/or Vomiting  sodium chloride 0.9% lock flush 10 milliLiter(s) IV Push every 1 hour PRN Pre/post blood products, medications, blood draw, and to maintain line patency  sodium chloride 0.9% lock flush 10 milliLiter(s) IV Push every 1 hour PRN Pre/post blood products, medications, blood draw, and to maintain line patency      PHYSICAL EXAM: SEE BELOW                          7.3    12.26 )-----------( 69       ( 28 May 2023 05:39 )             22.3       05-28    142  |  106  |  76<H>  ----------------------------<  275<H>  4.7   |  23  |  3.46<H>    Ca    8.5      28 May 2023 05:39  Phos  7.6     05-28  Mg     2.3     05-28

## 2023-05-28 NOTE — PROGRESS NOTE ADULT - MENTAL STATUS
somnolent, poorly responsive
opens eyes to voice, does not follow commands, encephalopathic
somnolent today c/w yesterday, follows simple commands but not very interactive
poorly responsive, opens eyes but does not follow commands, moves extremities spontaneously
encephalopathic but responds to very simple commands
stuporous, withdraws to pain, does not respond to voice or follow command
awake, follows simple commands, moves all extremities

## 2023-05-29 NOTE — PROGRESS NOTE ADULT - ASSESSMENT
69 yo male with PMHx metastatic neuroendocrine tumor of the pancreas, recently admitted with nonfunctioning GJ tube  due to external mechanical  obstruction   now Post Op  after an open distal pancreatectomy, splenectomy, partial colectomy, partial L adrenalectomy,colostomy  With Post Op distributive Shock/bleeding developed ZO now requiring dialysis  onow back on pressors septic shock gram negative bacterem ia Enterobacter, Cloacae  CT 5/14 no distinct collection  dialysis dependent plan T/h/S    PLAN:  ICU    PULM: No weaning given poor MS  Cardio:  Wean pressors if possible  Renal: HD as per renal  GI:   Continue Feeds via N-J.   Endo RISS  Heme -SQH DVT Prophylaxis  Neurometabolic encephalopthy.  HCT and EEG unrevealing  ID: Continue caspo/bowen    To D/W Family GOC and move toward comfort care

## 2023-05-29 NOTE — PROGRESS NOTE ADULT - SUBJECTIVE AND OBJECTIVE BOX
HPI:  67 yo male with PMHx metastatic neuroendocrine tumor of the pancreas, recently admitted with nonfunctioning GJ tube (patient is chronically obstructed).   Patient has had severe heartburn, and has had several episodes of vomiting small amount of dark blood. Patient was discharged on Thursday. Hgb unchanged. CT angiography has no active bleeding, but demonstrates extensive tumor in distal pancreas.     5/1 pt feeling slightly nauseous, denies abd pain.  5/2 pt c/o frequent BM overnight causing him to not sleep well. denies abd pain , nausea  5/3 pt reports feeling well, hematuria resolved. coulter in place. not having as many BMs anymore.   5/4: Patient went to the OR today for a Open distal pancreatectomy, splenectomy, partial colectomy, partial L adrenalectomy, temporary abdominal closure.  Patient had a 2.2 L EBL.  Intra Op had 6U PRBC, 2FFP, 1 Platelets, 4L crystalloid  Post Op 1U PRBC ans 1 U FFP.  Post OP vented, with poor UO, on levo and Vaso.    5/5: He remains vented, on paralytics, 2 pressors, essentially anuric.    5/6: Completed the closure and ostomy yesterday.  Vented and on 2 Pressors, No UO.  Off Sedation awake and moving all 4 extremities  5/7: Extubated, required NIV for Hypercarbia, decreased H & H, still on 2 Pressors    5/15: Still on Pressors and HD dependent  5/16: He remains on pressors, draining from wound     5/26: Patient vented with a Poor MS, on 2 pressors.    5/27: Vented.  MS remains Poor.  on 2 Pressors.  HD today     5/29: He continues to do very poorly.  Vented, on 2 pressors, not responsive       PAST MEDICAL & SURGICAL HISTORY:  Hypertension      BPH (benign prostatic hyperplasia)      Renal insufficiency      Light chain nephropathy      DM (diabetes mellitus)      HLD (hyperlipidemia)      No significant past surgical history          FAMILY HISTORY:  FH: breast cancer (Mother)    FH: aneurysm (Father)        Social Hx:    Allergies    losartan (Angioedema)    Intolerances            ICU Vital Signs Last 24 Hrs  T(C): 36.9 (29 May 2023 06:00), Max: 37.4 (28 May 2023 20:00)  T(F): 98.4 (29 May 2023 06:00), Max: 99.3 (28 May 2023 20:00)  HR: 95 (29 May 2023 12:00) (69 - 104)  BP: 98/59 (29 May 2023 12:00) (88/49 - 108/59)  BP(mean): 68 (29 May 2023 12:00) (56 - 86)  ABP: --  ABP(mean): --  RR: 23 (29 May 2023 10:00) (17 - 32)  SpO2: 100% (29 May 2023 12:00) (100% - 100%)    O2 Parameters below as of 29 May 2023 04:00  Patient On (Oxygen Delivery Method): ventilator    O2 Concentration (%): 21        Mode: AC/ CMV (Assist Control/ Continuous Mandatory Ventilation)  RR (machine): 16  TV (machine): 400  FiO2: 21  PEEP: 5  ITime: 1  PIP: 20      I&O's Summary    28 May 2023 07:01  -  29 May 2023 07:00  --------------------------------------------------------  IN: 2308 mL / OUT: 1365 mL / NET: 943 mL                              7.4    11.83 )-----------( 90       ( 29 May 2023 04:44 )             22.5       05-29    144  |  108  |  87<H>  ----------------------------<  210<H>  4.9   |  22  |  3.99<H>    Ca    8.8      29 May 2023 04:44  Phos  8.2     05-29  Mg     2.5     05-29    TPro  6.5  /  Alb  2.8<L>  /  TBili  6.2<H>  /  DBili  x   /  AST  182<H>  /  ALT  75  /  AlkPhos  357<H>  05-29                    MEDICATIONS  (STANDING):  caspofungin IVPB 50 milliGRAM(s) IV Intermittent every 24 hours  chlorhexidine 0.12% Liquid 15 milliLiter(s) Oral Mucosa every 12 hours  chlorhexidine 4% Liquid 1 Application(s) Topical <User Schedule>  dextrose 5%. 1000 milliLiter(s) (100 mL/Hr) IV Continuous <Continuous>  dextrose 5%. 1000 milliLiter(s) (50 mL/Hr) IV Continuous <Continuous>  dextrose 50% Injectable 25 Gram(s) IV Push once  dextrose 50% Injectable 25 Gram(s) IV Push once  dextrose 50% Injectable 12.5 Gram(s) IV Push once  glucagon  Injectable 1 milliGRAM(s) IntraMuscular once  heparin   Injectable 5000 Unit(s) SubCutaneous every 12 hours  insulin glargine Injectable (LANTUS) 15 Unit(s) SubCutaneous every morning  insulin lispro (ADMELOG) corrective regimen sliding scale   SubCutaneous every 6 hours  midodrine 10 milliGRAM(s) Oral every 8 hours  nystatin Cream 1 Application(s) Topical two times a day  pantoprazole  Injectable 40 milliGRAM(s) IV Push every 12 hours  phenylephrine    Infusion 0.05 MICROgram(s)/kG/Min (0.67 mL/Hr) IV Continuous <Continuous>  rifAXIMin 550 milliGRAM(s) Oral two times a day  vasopressin Infusion 0.04 Unit(s)/Min (6 mL/Hr) IV Continuous <Continuous>    MEDICATIONS  (PRN):  acetaminophen     Tablet .. 650 milliGRAM(s) Oral every 6 hours PRN Temp greater or equal to 38.5C (101.3F)  albumin human 25% IVPB 50 milliLiter(s) IV Intermittent every 1 hour PRN SBP less than  artificial tears (preservative free) Ophthalmic Solution 1 Drop(s) Both EYES four times a day PRN Dry/Irritated Eyes  dextrose Oral Gel 15 Gram(s) Oral once PRN Blood Glucose LESS THAN 70 milliGRAM(s)/deciliter  ondansetron Injectable 4 milliGRAM(s) IV Push every 6 hours PRN Nausea and/or Vomiting  sodium chloride 0.9% lock flush 10 milliLiter(s) IV Push every 1 hour PRN Pre/post blood products, medications, blood draw, and to maintain line patency  sodium chloride 0.9% lock flush 10 milliLiter(s) IV Push every 1 hour PRN Pre/post blood products, medications, blood draw, and to maintain line patency      DVT Prophylaxis:    Advanced Directives:  Discussed with:    Visit Information: 30 min    ** Time is exclusive of billed procedures and/or teaching and/or routine family updates.

## 2023-05-29 NOTE — PROGRESS NOTE ADULT - ASSESSMENT
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  S/P distal pancreatectomy, splenectomy, partial colectomy  S/P abdominal washout and colostomy creation  Partial Midline wound dehiscence   on and off pressors  no mental status at this time  ostomy functional    Plan:  No plans at this time for surgical intervention  family discussion today for transition to comfort measures    Plan discussed with surg/onc team

## 2023-05-29 NOTE — PROGRESS NOTE ADULT - SUBJECTIVE AND OBJECTIVE BOX
SURGERY DAILY PROGRESS NOTE:     Subjective:  Patient seen and examined at bedside during am rounds this morning. Still intubated and off sedation. pt is still on 2 pressors.     Objective:    Vital Signs Last 24 Hrs  T(C): 36.9 (29 May 2023 06:00), Max: 37.4 (28 May 2023 20:00)  T(F): 98.4 (29 May 2023 06:00), Max: 99.3 (28 May 2023 20:00)  HR: 101 (29 May 2023 08:00) (69 - 104)  BP: 90/56 (29 May 2023 08:00) (88/49 - 108/59)  BP(mean): 65 (29 May 2023 08:00) (56 - 86)  RR: 21 (29 May 2023 08:00) (13 - 32)  SpO2: 100% (29 May 2023 08:00) (100% - 100%)    Parameters below as of 29 May 2023 04:00  Patient On (Oxygen Delivery Method): ventilator    O2 Concentration (%): 21                        7.4    11.83 )-----------( 90       ( 29 May 2023 04:44 )             22.5     05-29    144  |  108  |  87<H>  ----------------------------<  210<H>  4.9   |  22  |  3.99<H>    Ca    8.8      29 May 2023 04:44  Phos  8.2     05-29  Mg     2.5     05-29    TPro  6.5  /  Alb  2.8<L>  /  TBili  6.2<H>  /  DBili  x   /  AST  182<H>  /  ALT  75  /  AlkPhos  357<H>  05-29    I&O's Summary    28 May 2023 07:01  -  29 May 2023 07:00  --------------------------------------------------------  IN: 2308 mL / OUT: 1365 mL / NET: 943 mL          PHYSICAL EXAM   Gen: intubated with no sedation  CV: pulse regularly present   Resp: airway patent on MV  Abdomen: soft, Midline wound open superiorly and inferiorly with ascites drainage, few staples removed, new wet to dry packing applied, colostomy bag covering the wounds dry gauze packing.  New drain on thru midline wound with minimal fluids. No evidence of wound infection. lower aspect of incision dehisced with bowel visualized, ostomy functional with stool  Extremities: No swelling or tenderness

## 2023-05-30 NOTE — CONSULT NOTE ADULT - CONSULT REASON
Hematemesis
complex goals of care and symptom management
septic shock
Evaluation of renal dysfunction.
Neuroendocrine tumor
hematemesis
Difficult coulter placement
Post op care

## 2023-05-30 NOTE — PROGRESS NOTE ADULT - SUBJECTIVE AND OBJECTIVE BOX
HPI:  67 yo male with PMHx metastatic neuroendocrine tumor of the pancreas, recently admitted with nonfunctioning GJ tube (patient is chronically obstructed).   Patient has had severe heartburn, and has had several episodes of vomiting small amount of dark blood. Patient was discharged on Thursday. Hgb unchanged. CT angiography has no active bleeding, but demonstrates extensive tumor in distal pancreas.     5/1 pt feeling slightly nauseous, denies abd pain.  5/2 pt c/o frequent BM overnight causing him to not sleep well. denies abd pain , nausea  5/3 pt reports feeling well, hematuria resolved. coulter in place. not having as many BMs anymore.   5/4: Patient went to the OR today for a Open distal pancreatectomy, splenectomy, partial colectomy, partial L adrenalectomy, temporary abdominal closure.  Patient had a 2.2 L EBL.  Intra Op had 6U PRBC, 2FFP, 1 Platelets, 4L crystalloid  Post Op 1U PRBC ans 1 U FFP.  Post OP vented, with poor UO, on levo and Vaso.    5/5: He remains vented, on paralytics, 2 pressors, essentially anuric.    5/6: Completed the closure and ostomy yesterday.  Vented and on 2 Pressors, No UO.  Off Sedation awake and moving all 4 extremities  5/7: Extubated, required NIV for Hypercarbia, decreased H & H, still on 2 Pressors    5/15: Still on Pressors and HD dependent  5/16: He remains on pressors, draining from wound     5/26: Patient vented with a Poor MS, on 2 pressors.    5/27: Vented.  MS remains Poor.  on 2 Pressors.  HD today     5/29: He continues to do very poorly.  Vented, on 2 pressors, not responsive  5/30: No changes, on 2 pressors, vented, MS poor           PAST MEDICAL & SURGICAL HISTORY:  Hypertension      BPH (benign prostatic hyperplasia)      Renal insufficiency      Light chain nephropathy      DM (diabetes mellitus)      HLD (hyperlipidemia)      No significant past surgical history          FAMILY HISTORY:  FH: breast cancer (Mother)    FH: aneurysm (Father)        Social Hx:    Allergies    losartan (Angioedema)    Intolerances            ICU Vital Signs Last 24 Hrs  T(C): 36.4 (30 May 2023 08:00), Max: 37.1 (29 May 2023 16:00)  T(F): 97.6 (30 May 2023 08:00), Max: 98.8 (29 May 2023 16:00)  HR: 86 (30 May 2023 10:00) (67 - 101)  BP: 96/49 (30 May 2023 10:00) (89/61 - 107/73)  BP(mean): 58 (30 May 2023 10:00) (58 - 80)  ABP: --  ABP(mean): --  RR: 19 (30 May 2023 10:00) (16 - 27)  SpO2: 100% (30 May 2023 10:00) (99% - 100%)    O2 Parameters below as of 30 May 2023 10:00  Patient On (Oxygen Delivery Method): ventilator    O2 Concentration (%): 21        Mode: AC/ CMV (Assist Control/ Continuous Mandatory Ventilation)  RR (machine): 16  TV (machine): 400  FiO2: 21  PEEP: 5  ITime: 1  PIP: 15      I&O's Summary    29 May 2023 07:01  -  30 May 2023 07:00  --------------------------------------------------------  IN: 2494.9 mL / OUT: 1717 mL / NET: 777.9 mL                              7.4    11.83 )-----------( 90       ( 29 May 2023 04:44 )             22.5       05-29    144  |  108  |  87<H>  ----------------------------<  210<H>  4.9   |  22  |  3.99<H>    Ca    8.8      29 May 2023 04:44  Phos  8.2     05-29  Mg     2.5     05-29    TPro  6.5  /  Alb  2.8<L>  /  TBili  6.2<H>  /  DBili  x   /  AST  182<H>  /  ALT  75  /  AlkPhos  357<H>  05-29                    MEDICATIONS  (STANDING):  caspofungin IVPB 50 milliGRAM(s) IV Intermittent every 24 hours  chlorhexidine 0.12% Liquid 15 milliLiter(s) Oral Mucosa every 12 hours  chlorhexidine 4% Liquid 1 Application(s) Topical <User Schedule>  dextrose 5%. 1000 milliLiter(s) (100 mL/Hr) IV Continuous <Continuous>  dextrose 5%. 1000 milliLiter(s) (50 mL/Hr) IV Continuous <Continuous>  dextrose 50% Injectable 25 Gram(s) IV Push once  dextrose 50% Injectable 25 Gram(s) IV Push once  dextrose 50% Injectable 12.5 Gram(s) IV Push once  glucagon  Injectable 1 milliGRAM(s) IntraMuscular once  heparin   Injectable 5000 Unit(s) SubCutaneous every 12 hours  insulin glargine Injectable (LANTUS) 15 Unit(s) SubCutaneous every morning  insulin lispro (ADMELOG) corrective regimen sliding scale   SubCutaneous every 6 hours  midodrine 10 milliGRAM(s) Oral every 8 hours  nystatin Cream 1 Application(s) Topical two times a day  pantoprazole  Injectable 40 milliGRAM(s) IV Push every 12 hours  phenylephrine    Infusion 0.05 MICROgram(s)/kG/Min (0.67 mL/Hr) IV Continuous <Continuous>  rifAXIMin 550 milliGRAM(s) Oral two times a day  vasopressin Infusion 0.04 Unit(s)/Min (6 mL/Hr) IV Continuous <Continuous>    MEDICATIONS  (PRN):  acetaminophen     Tablet .. 650 milliGRAM(s) Oral every 6 hours PRN Temp greater or equal to 38.5C (101.3F)  albumin human 25% IVPB 50 milliLiter(s) IV Intermittent every 1 hour PRN SBP less than  artificial tears (preservative free) Ophthalmic Solution 1 Drop(s) Both EYES four times a day PRN Dry/Irritated Eyes  dextrose Oral Gel 15 Gram(s) Oral once PRN Blood Glucose LESS THAN 70 milliGRAM(s)/deciliter  ondansetron Injectable 4 milliGRAM(s) IV Push every 6 hours PRN Nausea and/or Vomiting  sodium chloride 0.9% lock flush 10 milliLiter(s) IV Push every 1 hour PRN Pre/post blood products, medications, blood draw, and to maintain line patency  sodium chloride 0.9% lock flush 10 milliLiter(s) IV Push every 1 hour PRN Pre/post blood products, medications, blood draw, and to maintain line patency      DVT Prophylaxis:    Advanced Directives:  Discussed with:    Visit Information: 30 min    ** Time is exclusive of billed procedures and/or teaching and/or routine family updates.

## 2023-05-30 NOTE — CHART NOTE - NSCHARTNOTEFT_GEN_A_CORE
I met today with Palliative Medicine and the Indu family. His wife and daughters as well as his brother, sister and sister-in-law were all present. We outlined the extent of his care to date and attempted to reassure them that he has been receiving the maximal support available to help him recover from his operation and underlying disease, His wife made it clear that the patient had discussed goals of care with her and that he doesn't want to be kept alive mechanically. We agreed that we are at a point in his care where further life support is futile. The brother had trouble accepting the finality of the situation but understood that the future was not without suffering even if he did survive. We will begin to wean support as additional family member come to say their goodbyes.   He was made DNR.

## 2023-05-30 NOTE — CONSULT NOTE ADULT - CONVERSATION DETAILS
We discussed Palliative Care team being a supportive team when a patient has ongoing illnesses.  We also discussed that it is not an end of life care service, but can help navigate symptoms and emotional support throughout their hospital stay here.     Hospice was explained as well as an end of life care philosophy. When a disease cannot be cured, or family/patient decide the treatment burdens out weight the risk and chose to change focus of treatment from cure to quality/comfort.       Long discussion w/ patients family and Dr. Siddiqi was present.   Patient has had complicated course and offered all medical interventions in hopes to lead to improvement and further chemo. Unfortunately he has had continued worsening clinical condition and this was discussed at length.     Unfortunately at this time his likelihood We discussed Palliative Care team being a supportive team when a patient has ongoing illnesses.  We also discussed that it is not an end of life care service, but can help navigate symptoms and emotional support throughout their hospital stay here.     Hospice was explained as well as an end of life care philosophy. When a disease cannot be cured, or family/patient decide the treatment burdens out weight the risk and chose to change focus of treatment from cure to quality/comfort.       Long discussion w/ patients family and Dr. Siddiqi was present.   Patient has had complicated course and offered all medical interventions in hopes to lead to improvement and further chemo. Unfortunately he has had continued worsening clinical condition and this was discussed at length.     Unfortunately at this time his likelihood of survival is just not there.   His wife and daughters all agree that he would not want this any longer- his quality of life is important to him and they don't want to see him suffering any more.     His brother struggled w/ this .  He was hoping more time would help but it was explained that after a month long admission w/ only decline overall this has proven it was unlikely to help anymore.  W. noted episodes of decelerations in heartbeat over night it was a big concern he could arrest at any time.      The family decided for DNR and comfort w/ weaning over next day.       Patients brother did ask if this was a conversation being had bc of 'insurance reasons' essentially If it was monitary issues.  I explained that this was purely a discussion held d/t clinical condition and his ongoing decline.  It comes a time wehre medically we need to assess risk and benefits and at this point the benefits of our interventions have been declining.

## 2023-05-30 NOTE — CONSULT NOTE ADULT - PROVIDER SPECIALTY LIST ADULT
Heme/Onc
Critical Care
Gastroenterology
Nephrology
Critical Care
Infectious Disease
Surgery
Urology
Palliative Care

## 2023-05-30 NOTE — PROGRESS NOTE ADULT - SUBJECTIVE AND OBJECTIVE BOX
NEPHROLOGY INTERVAL HPI/OVERNIGHT EVENTS:  23 @ 09:17    - no new changes, s/p HD yesterday, tolerated fluid removal will HD/ UF in am  - for UF today 2 hrs for 3 KG off  - Vented, family at bedside, case discussed  --Remains on vent.  No OR yesterday.  restarted on enteral feeding via N-J tube.  4KG UF with HD yesterday.  --Remains on vent.  awaiting decision for OR today.  NPO today.   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  Treated with Lutathera one year ago and restarted in 2022  G-J tube placed for nutrition in January due to chronic dysphagia and severe esophagitis on EGD.  Post recent  admission due to G tube clogging.  D/mansi home on .    Returned to ED due to vomiting when night time enteral feeding via G tube started.  CT with IVC done due to concern for GIB.  Pt reports chronic self catheterization due to urinary retention.   ZO improved when catheterization started.  Follows mainly with EMELY Cortez.   Pt has been self cath'ing 3-4 x /day.   Indwelling Hale placed by EMELY due to resistance with blood clots with resistance reported by pt.  For palliative mass resection tomorrow.    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  Creat has been variable during previous admissions--ranging 1.1-2.2.    PMHX and PSHX.  --Neuroendocrine tumor of pancreas with mets to liver --dx 6 years ago.  --GJ tube for nutrition in 2023 due to chronic dysphagia.  --Hx of HTN  --Hx of DM  --Hx of light chain disease --unclear hx and no hx of renal biopsy.    MEDICATIONS  (STANDING):  caspofungin IVPB 50 milliGRAM(s) IV Intermittent every 24 hours  chlorhexidine 0.12% Liquid 15 milliLiter(s) Oral Mucosa every 12 hours  chlorhexidine 4% Liquid 1 Application(s) Topical <User Schedule>  dextrose 5%. 1000 milliLiter(s) (50 mL/Hr) IV Continuous <Continuous>  dextrose 5%. 1000 milliLiter(s) (100 mL/Hr) IV Continuous <Continuous>  dextrose 50% Injectable 12.5 Gram(s) IV Push once  dextrose 50% Injectable 25 Gram(s) IV Push once  dextrose 50% Injectable 25 Gram(s) IV Push once  glucagon  Injectable 1 milliGRAM(s) IntraMuscular once  heparin   Injectable 5000 Unit(s) SubCutaneous every 12 hours  insulin glargine Injectable (LANTUS) 15 Unit(s) SubCutaneous every morning  insulin lispro (ADMELOG) corrective regimen sliding scale   SubCutaneous every 6 hours  midodrine 10 milliGRAM(s) Oral every 8 hours  nystatin Cream 1 Application(s) Topical two times a day  pantoprazole  Injectable 40 milliGRAM(s) IV Push every 12 hours  phenylephrine    Infusion 0.05 MICROgram(s)/kG/Min (0.67 mL/Hr) IV Continuous <Continuous>  rifAXIMin 550 milliGRAM(s) Oral two times a day  vasopressin Infusion 0.04 Unit(s)/Min (6 mL/Hr) IV Continuous <Continuous>    MEDICATIONS  (PRN):  acetaminophen     Tablet .. 650 milliGRAM(s) Oral every 6 hours PRN Temp greater or equal to 38.5C (101.3F)  albumin human 25% IVPB 50 milliLiter(s) IV Intermittent every 1 hour PRN SBP less than  artificial tears (preservative free) Ophthalmic Solution 1 Drop(s) Both EYES four times a day PRN Dry/Irritated Eyes  dextrose Oral Gel 15 Gram(s) Oral once PRN Blood Glucose LESS THAN 70 milliGRAM(s)/deciliter  ondansetron Injectable 4 milliGRAM(s) IV Push every 6 hours PRN Nausea and/or Vomiting  sodium chloride 0.9% lock flush 10 milliLiter(s) IV Push every 1 hour PRN Pre/post blood products, medications, blood draw, and to maintain line patency  sodium chloride 0.9% lock flush 10 milliLiter(s) IV Push every 1 hour PRN Pre/post blood products, medications, blood draw, and to maintain line patency      Allergies    losartan (Angioedema)    Intolerances        I&O's Detail    29 May 2023 07:01  -  30 May 2023 07:00  --------------------------------------------------------  IN:    IV PiggyBack: 250 mL    Phenylephrine: 392 mL    Vasopressin: 138.9 mL    Vital1.5: 1714 mL  Total IN: 2494.9 mL    OUT:    Bulb (mL): 17 mL    Colostomy (mL): 800 mL    Drain (mL): 300 mL    Gastrostomy Tube (mL): 600 mL  Total OUT: 1717 mL    Total NET: 777.9 mL          .    Patient was seen and evaluated on dialysis.   Patient is tolerating the procedure well.   Continue dialysis:   Dialyzer:          QB:        QD:   Goal UF ___ over ___ Hours       Vital Signs Last 24 Hrs  T(C): 36.4 (30 May 2023 08:00), Max: 37.1 (29 May 2023 16:00)  T(F): 97.6 (30 May 2023 08:00), Max: 98.8 (29 May 2023 16:00)  HR: 101 (30 May 2023 09:00) (67 - 101)  BP: 95/61 (30 May 2023 09:00) (89/61 - 107/73)  BP(mean): 69 (30 May 2023 09:00) (58 - 80)  RR: 23 (30 May 2023 09:00) (16 - 27)  SpO2: 100% (30 May 2023 09:00) (99% - 100%)    Parameters below as of 30 May 2023 09:00  Patient On (Oxygen Delivery Method): ventilator    O2 Concentration (%): 21  Daily     Daily Weight in k.6 (30 May 2023 04:00)    PHYSICAL EXAM:  General: alert. awake Ox3  HEENT: MMM  CV: s1s2 rrr  LUNGS: B/L CTA  EXT: no edema    LABS:                        7.4    11.83 )-----------( 90       ( 29 May 2023 04:44 )             22.5     05-    144  |  108  |  87<H>  ----------------------------<  210<H>  4.9   |  22  |  3.99<H>    Ca    8.8      29 May 2023 04:44  Phos  8.2       Mg     2.5         TPro  6.5  /  Alb  2.8<L>  /  TBili  6.2<H>  /  DBili  x   /  AST  182<H>  /  ALT  75  /  AlkPhos  357<H>               NEPHROLOGY INTERVAL HPI/OVERNIGHT EVENTS:  23 @ 09:17     family meeting at 11;30 AM, seen earlier and dw dr mc   - no new changes, s/p HD yesterday, tolerated fluid removal will HD/ UF in am  - for UF today 2 hrs for 3 KG off  - Vented, family at bedside, case discussed  --Remains on vent.  No OR yesterday.  restarted on enteral feeding via N-J tube.  4KG UF with HD yesterday.  --Remains on vent.  awaiting decision for OR today.  NPO today.   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  Treated with Lutathera one year ago and restarted in 2022  G-J tube placed for nutrition in January due to chronic dysphagia and severe esophagitis on EGD.  Post recent  admission due to G tube clogging.  D/mansi home on .    Returned to ED due to vomiting when night time enteral feeding via G tube started.  CT with IVC done due to concern for GIB.  Pt reports chronic self catheterization due to urinary retention.   ZO improved when catheterization started.  Follows mainly with EMELY Cortez.   Pt has been self cath'ing 3-4 x /day.   Indwelling Hale placed by EMELY due to resistance with blood clots with resistance reported by pt.  For palliative mass resection tomorrow.    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  Creat has been variable during previous admissions--ranging 1.1-2.2.    PMHX and PSHX.  --Neuroendocrine tumor of pancreas with mets to liver --dx 6 years ago.  --GJ tube for nutrition in 2023 due to chronic dysphagia.  --Hx of HTN  --Hx of DM  --Hx of light chain disease --unclear hx and no hx of renal biopsy.    MEDICATIONS  (STANDING):  caspofungin IVPB 50 milliGRAM(s) IV Intermittent every 24 hours  chlorhexidine 0.12% Liquid 15 milliLiter(s) Oral Mucosa every 12 hours  chlorhexidine 4% Liquid 1 Application(s) Topical <User Schedule>  dextrose 5%. 1000 milliLiter(s) (50 mL/Hr) IV Continuous <Continuous>  dextrose 5%. 1000 milliLiter(s) (100 mL/Hr) IV Continuous <Continuous>  dextrose 50% Injectable 12.5 Gram(s) IV Push once  dextrose 50% Injectable 25 Gram(s) IV Push once  dextrose 50% Injectable 25 Gram(s) IV Push once  glucagon  Injectable 1 milliGRAM(s) IntraMuscular once  heparin   Injectable 5000 Unit(s) SubCutaneous every 12 hours  insulin glargine Injectable (LANTUS) 15 Unit(s) SubCutaneous every morning  insulin lispro (ADMELOG) corrective regimen sliding scale   SubCutaneous every 6 hours  midodrine 10 milliGRAM(s) Oral every 8 hours  nystatin Cream 1 Application(s) Topical two times a day  pantoprazole  Injectable 40 milliGRAM(s) IV Push every 12 hours  phenylephrine    Infusion 0.05 MICROgram(s)/kG/Min (0.67 mL/Hr) IV Continuous <Continuous>  rifAXIMin 550 milliGRAM(s) Oral two times a day  vasopressin Infusion 0.04 Unit(s)/Min (6 mL/Hr) IV Continuous <Continuous>    MEDICATIONS  (PRN):  acetaminophen     Tablet .. 650 milliGRAM(s) Oral every 6 hours PRN Temp greater or equal to 38.5C (101.3F)  albumin human 25% IVPB 50 milliLiter(s) IV Intermittent every 1 hour PRN SBP less than  artificial tears (preservative free) Ophthalmic Solution 1 Drop(s) Both EYES four times a day PRN Dry/Irritated Eyes  dextrose Oral Gel 15 Gram(s) Oral once PRN Blood Glucose LESS THAN 70 milliGRAM(s)/deciliter  ondansetron Injectable 4 milliGRAM(s) IV Push every 6 hours PRN Nausea and/or Vomiting  sodium chloride 0.9% lock flush 10 milliLiter(s) IV Push every 1 hour PRN Pre/post blood products, medications, blood draw, and to maintain line patency  sodium chloride 0.9% lock flush 10 milliLiter(s) IV Push every 1 hour PRN Pre/post blood products, medications, blood draw, and to maintain line patency      Allergies    losartan (Angioedema)    Intolerances        I&O's Detail    29 May 2023 07:01  -  30 May 2023 07:00  --------------------------------------------------------  IN:    IV PiggyBack: 250 mL    Phenylephrine: 392 mL    Vasopressin: 138.9 mL    Vital1.5: 1714 mL  Total IN: 2494.9 mL    OUT:    Bulb (mL): 17 mL    Colostomy (mL): 800 mL    Drain (mL): 300 mL    Gastrostomy Tube (mL): 600 mL  Total OUT: 1717 mL    Total NET: 777.9 mL          .    Patient was seen and evaluated on dialysis.   Patient is tolerating the procedure well.   Continue dialysis:   Dialyzer:          QB:        QD:   Goal UF ___ over ___ Hours       Vital Signs Last 24 Hrs  T(C): 36.4 (30 May 2023 08:00), Max: 37.1 (29 May 2023 16:00)  T(F): 97.6 (30 May 2023 08:00), Max: 98.8 (29 May 2023 16:00)  HR: 101 (30 May 2023 09:00) (67 - 101)  BP: 95/61 (30 May 2023 09:00) (89/61 - 107/73)  BP(mean): 69 (30 May 2023 09:00) (58 - 80)  RR: 23 (30 May 2023 09:00) (16 - 27)  SpO2: 100% (30 May 2023 09:00) (99% - 100%)    Parameters below as of 30 May 2023 09:00  Patient On (Oxygen Delivery Method): ventilator    O2 Concentration (%): 21  Daily     Daily Weight in k.6 (30 May 2023 04:00)    PHYSICAL EXAM:  General: alert. awake Ox3  HEENT: MMM  CV: s1s2 rrr  LUNGS: B/L CTA  EXT: no edema    LABS:                        7.4    11.83 )-----------( 90       ( 29 May 2023 04:44 )             22.5     05-29    144  |  108  |  87<H>  ----------------------------<  210<H>  4.9   |  22  |  3.99<H>    Ca    8.8      29 May 2023 04:44  Phos  8.2       Mg     2.5         TPro  6.5  /  Alb  2.8<L>  /  TBili  6.2<H>  /  DBili  x   /  AST  182<H>  /  ALT  75  /  AlkPhos  357<H>

## 2023-05-30 NOTE — PROGRESS NOTE ADULT - ASSESSMENT
69 yo male with PMHx metastatic neuroendocrine tumor of the pancreas, recently admitted with nonfunctioning GJ tube  due to external mechanical  obstruction   now Post Op  after an open distal pancreatectomy, splenectomy, partial colectomy, partial L adrenalectomy,colostomy  With Post Op distributive Shock/bleeding developed ZO now requiring dialysis  onow back on pressors septic shock gram negative bacterem ia Enterobacter, Cloacae  CT 5/14 no distinct collection  dialysis dependent plan T/h/S    PLAN:  ICU    PULM: No weaning given poor MS  Cardio:  Wean pressors if possible  Renal: HD as per renal  GI:   Continue Feeds via N-J.   Endo RISS  Heme -SQH DVT Prophylaxis  Neurometabolic encephalopthy.  HCT and EEG unrevealing  ID: Continue caspo/bowen    To D/W Family GOC meeting today.  Palliative care called

## 2023-05-30 NOTE — CONSULT NOTE ADULT - SUBJECTIVE AND OBJECTIVE BOX
HPI:  "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 S/P distal pancreatectomy, splenectomy, partial colectomy S/P abdominal washout and colostomy creation Partial Midline wound dehiscence, on and off pressors, no mental status at this time, ostomy functional. "     5.30  pt seen and examined significantly worsened over this hospital stay.  Has been aggressively treated w/ decline. Unfortunately today team was to have discussion w/ family regarding comfort/hospcie approach w/a compassionate wean from the vent.   pt is HD dependent but unfortunately is not expected to leave the hospital d/t his high mortality risk.        "        PAIN: ( )Yes   ( )No  Pt unable to characterise d/t clinical status     DYSPNEA: ( ) Yes  x( ) No  Level:    PAST MEDICAL & SURGICAL HISTORY:  Hypertension      BPH (benign prostatic hyperplasia)      Renal insufficiency      Light chain nephropathy      DM (diabetes mellitus)      HLD (hyperlipidemia)      No significant past surgical history          SOCIAL HX:    Hx opiate tolerance ( )YES  (x)NO    Baseline ADLs  (Prior to Admission)  (x ) Independent   ( )Dependent    FAMILY HISTORY:  FH: breast cancer (Mother)    FH: aneurysm (Father)        Review of Systems:     Unable to obtain/Limited due to: intubated not responsive      PHYSICAL EXAM:    Vital Signs Last 24 Hrs  T(C): 36.4 (30 May 2023 08:00), Max: 37.1 (29 May 2023 16:00)  T(F): 97.6 (30 May 2023 08:00), Max: 98.8 (29 May 2023 16:00)  HR: 86 (30 May 2023 10:00) (67 - 101)  BP: 96/49 (30 May 2023 10:00) (89/61 - 107/73)  BP(mean): 58 (30 May 2023 10:00) (58 - 80)  RR: 19 (30 May 2023 10:00) (16 - 27)  SpO2: 100% (30 May 2023 10:00) (99% - 100%)    Parameters below as of 30 May 2023 10:00  Patient On (Oxygen Delivery Method): ventilator    O2 Concentration (%): 21  Daily     Daily Weight in k.6 (30 May 2023 04:00)    PPSV2:  30 %  FAST:    General: calm in NAD  Mental Status: unresponsive on vent  HEENT: at ncl  Lungs: mechanical bs  Cardiac: s1s2    GI:  nontender    : voids  Ext:  no edema  Neuro: unresponsive     LABS:                        7.4    11.83 )-----------( 90       ( 29 May 2023 04:44 )             22.5         144  |  108  |  87<H>  ----------------------------<  210<H>  4.9   |  22  |  3.99<H>    Ca    8.8      29 May 2023 04:44  Phos  8.2       Mg     2.5         TPro  6.5  /  Alb  2.8<L>  /  TBili  6.2<H>  /  DBili  x   /  AST  182<H>  /  ALT  75  /  AlkPhos  357<H>        Albumin: Albumin, Serum: 2.8 g/dL ( @ 04:44)      Allergies    losartan (Angioedema)    Intolerances      MEDICATIONS  (STANDING):  caspofungin IVPB 50 milliGRAM(s) IV Intermittent every 24 hours  chlorhexidine 0.12% Liquid 15 milliLiter(s) Oral Mucosa every 12 hours  chlorhexidine 4% Liquid 1 Application(s) Topical <User Schedule>  dextrose 5%. 1000 milliLiter(s) (100 mL/Hr) IV Continuous <Continuous>  dextrose 5%. 1000 milliLiter(s) (50 mL/Hr) IV Continuous <Continuous>  dextrose 50% Injectable 25 Gram(s) IV Push once  dextrose 50% Injectable 25 Gram(s) IV Push once  dextrose 50% Injectable 12.5 Gram(s) IV Push once  glucagon  Injectable 1 milliGRAM(s) IntraMuscular once  heparin   Injectable 5000 Unit(s) SubCutaneous every 12 hours  insulin glargine Injectable (LANTUS) 15 Unit(s) SubCutaneous every morning  insulin lispro (ADMELOG) corrective regimen sliding scale   SubCutaneous every 6 hours  midodrine 10 milliGRAM(s) Oral every 8 hours  nystatin Cream 1 Application(s) Topical two times a day  pantoprazole  Injectable 40 milliGRAM(s) IV Push every 12 hours  phenylephrine    Infusion 0.05 MICROgram(s)/kG/Min (0.67 mL/Hr) IV Continuous <Continuous>  rifAXIMin 550 milliGRAM(s) Oral two times a day  vasopressin Infusion 0.04 Unit(s)/Min (6 mL/Hr) IV Continuous <Continuous>    MEDICATIONS  (PRN):  acetaminophen     Tablet .. 650 milliGRAM(s) Oral every 6 hours PRN Temp greater or equal to 38.5C (101.3F)  albumin human 25% IVPB 50 milliLiter(s) IV Intermittent every 1 hour PRN SBP less than  artificial tears (preservative free) Ophthalmic Solution 1 Drop(s) Both EYES four times a day PRN Dry/Irritated Eyes  dextrose Oral Gel 15 Gram(s) Oral once PRN Blood Glucose LESS THAN 70 milliGRAM(s)/deciliter  ondansetron Injectable 4 milliGRAM(s) IV Push every 6 hours PRN Nausea and/or Vomiting  sodium chloride 0.9% lock flush 10 milliLiter(s) IV Push every 1 hour PRN Pre/post blood products, medications, blood draw, and to maintain line patency  sodium chloride 0.9% lock flush 10 milliLiter(s) IV Push every 1 hour PRN Pre/post blood products, medications, blood draw, and to maintain line patency      RADIOLOGY/ADDITIONAL STUDIES:   HPI:    DNR DNI  COMFORT ONLY NO FURTHER ESCALATION OF CARE- WEANING TO BE DONE SOME POINT TOMORROW     "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 S/P distal pancreatectomy, splenectomy, partial colectomy S/P abdominal washout and colostomy creation Partial Midline wound dehiscence, on and off pressors, no mental status at this time, ostomy functional. "     5.30  pt seen and examined significantly worsened over this hospital stay.  Has been aggressively treated w/ decline. Unfortunately today team was to have discussion w/ family regarding comfort/hospcie approach w/a compassionate wean from the vent.   pt is HD dependent but unfortunately is not expected to leave the hospital d/t his high mortality risk.        "      GOC below:  DNR DNI  COMFORT ONLY NO FURTHER ESCALATION OF CARE- WEANING TO BE DONE SOME POINT TOMORROW     PAIN: ( )Yes   ( )No  Pt unable to characterise d/t clinical status     DYSPNEA: ( ) Yes  x( ) No  Level:    PAST MEDICAL & SURGICAL HISTORY:  Hypertension      BPH (benign prostatic hyperplasia)      Renal insufficiency      Light chain nephropathy      DM (diabetes mellitus)      HLD (hyperlipidemia)      No significant past surgical history          SOCIAL HX:    Hx opiate tolerance ( )YES  (x)NO    Baseline ADLs  (Prior to Admission)  (x ) Independent   ( )Dependent    FAMILY HISTORY:  FH: breast cancer (Mother)    FH: aneurysm (Father)        Review of Systems:     Unable to obtain/Limited due to: intubated not responsive      PHYSICAL EXAM:    Vital Signs Last 24 Hrs  T(C): 36.4 (30 May 2023 08:00), Max: 37.1 (29 May 2023 16:00)  T(F): 97.6 (30 May 2023 08:00), Max: 98.8 (29 May 2023 16:00)  HR: 86 (30 May 2023 10:00) (67 - 101)  BP: 96/49 (30 May 2023 10:00) (89/61 - 107/73)  BP(mean): 58 (30 May 2023 10:00) (58 - 80)  RR: 19 (30 May 2023 10:00) (16 - 27)  SpO2: 100% (30 May 2023 10:00) (99% - 100%)    Parameters below as of 30 May 2023 10:00  Patient On (Oxygen Delivery Method): ventilator    O2 Concentration (%): 21  Daily     Daily Weight in k.6 (30 May 2023 04:00)    PPSV2:  10 %  FAST:    General: calm in NAD  Mental Status: unresponsive on vent  HEENT: at ncl  Lungs: mechanical bs  Cardiac: s1s2    GI:  nontender    : voids  Ext:  no edema  Neuro: unresponsive     LABS:                        7.4    11.83 )-----------( 90       ( 29 May 2023 04:44 )             22.5         144  |  108  |  87<H>  ----------------------------<  210<H>  4.9   |  22  |  3.99<H>    Ca    8.8      29 May 2023 04:44  Phos  8.2       Mg     2.5         TPro  6.5  /  Alb  2.8<L>  /  TBili  6.2<H>  /  DBili  x   /  AST  182<H>  /  ALT  75  /  AlkPhos  357<H>        Albumin: Albumin, Serum: 2.8 g/dL ( @ 04:44)      Allergies    losartan (Angioedema)    Intolerances      MEDICATIONS  (STANDING):  caspofungin IVPB 50 milliGRAM(s) IV Intermittent every 24 hours  chlorhexidine 0.12% Liquid 15 milliLiter(s) Oral Mucosa every 12 hours  chlorhexidine 4% Liquid 1 Application(s) Topical <User Schedule>  dextrose 5%. 1000 milliLiter(s) (100 mL/Hr) IV Continuous <Continuous>  dextrose 5%. 1000 milliLiter(s) (50 mL/Hr) IV Continuous <Continuous>  dextrose 50% Injectable 25 Gram(s) IV Push once  dextrose 50% Injectable 25 Gram(s) IV Push once  dextrose 50% Injectable 12.5 Gram(s) IV Push once  glucagon  Injectable 1 milliGRAM(s) IntraMuscular once  heparin   Injectable 5000 Unit(s) SubCutaneous every 12 hours  insulin glargine Injectable (LANTUS) 15 Unit(s) SubCutaneous every morning  insulin lispro (ADMELOG) corrective regimen sliding scale   SubCutaneous every 6 hours  midodrine 10 milliGRAM(s) Oral every 8 hours  nystatin Cream 1 Application(s) Topical two times a day  pantoprazole  Injectable 40 milliGRAM(s) IV Push every 12 hours  phenylephrine    Infusion 0.05 MICROgram(s)/kG/Min (0.67 mL/Hr) IV Continuous <Continuous>  rifAXIMin 550 milliGRAM(s) Oral two times a day  vasopressin Infusion 0.04 Unit(s)/Min (6 mL/Hr) IV Continuous <Continuous>    MEDICATIONS  (PRN):  acetaminophen     Tablet .. 650 milliGRAM(s) Oral every 6 hours PRN Temp greater or equal to 38.5C (101.3F)  albumin human 25% IVPB 50 milliLiter(s) IV Intermittent every 1 hour PRN SBP less than  artificial tears (preservative free) Ophthalmic Solution 1 Drop(s) Both EYES four times a day PRN Dry/Irritated Eyes  dextrose Oral Gel 15 Gram(s) Oral once PRN Blood Glucose LESS THAN 70 milliGRAM(s)/deciliter  ondansetron Injectable 4 milliGRAM(s) IV Push every 6 hours PRN Nausea and/or Vomiting  sodium chloride 0.9% lock flush 10 milliLiter(s) IV Push every 1 hour PRN Pre/post blood products, medications, blood draw, and to maintain line patency  sodium chloride 0.9% lock flush 10 milliLiter(s) IV Push every 1 hour PRN Pre/post blood products, medications, blood draw, and to maintain line patency      RADIOLOGY/ADDITIONAL STUDIES:

## 2023-05-30 NOTE — PROGRESS NOTE ADULT - ASSESSMENT
67 yo man with Hx of Neuroendocrine tumor/Pancreatic mass and liver mets.  Now for palliative resection of pancreatic mass.    --ZO with variable creat level and unclear CKD.    Likely dependent on volume status.    Limited intake even with Enteral feeding.      Start gentle hydration.  -- : continue Hale drainage.  --Hx of light chain nephropathy  --No contraindication for surgery from renal standpoint.    5/4  s/p Palliative surgery for neuroendocrine mass  will watch for ZO, pt with low UO immediate post op  s/p multiple PRBC and FFP transfusion and volume resuscitation    5/5  patient anuric, K 5.2  On bicarb drip  to go back to OR today  case d/w Dr Jean and Dr Siddiqi  Will HD x 2 hrs to correct potassium  >10 L infused in blood products and fluids  More fluids not indicated, UO 50 ml  no fluid removal on HD  Hep profile ordered    5/6  Ostomy created  will monitor i/o  labs stable   no need for HD today  d/w intensivist  will monitor daily for hd requirement    5/7  seen earlier on HD   tolerating well  TPN  d/w Dr Jean  d/w pts fam at bedside    5/8 MK   - ZO with anuria, remains HD dependent with hx of light chain nephropathy    tolerating hd     dc femoral shiley today after hd   - enceph monitor response to hd and xifaxan and lactulose    5/9 MK   - ZO with anuria, remains HD dependent with hx of light chain nephropathy    no indication for hd    off pressor     monitor electrolytes on TPN     NG in place for TF initiation   - enceph, improving monitor response to hd and xifaxan and lactulose    ostomy fx with liquid stool  dw dr marley     5/10 SY  --ZO : remains oligoanuric.     Follow with repeat bladder scans--no retention.    Will hold HD today and assess daily.    Trial of gentle hydration due to increased fluid loss.  --Metabolic acidosis : trial of HCO3 in addition to TPN   --Monitor encephalopathy.    5/11 SY  --ZO : remains oligoanuric.    Renal parameters further increased with no improvement in acidosis with HCO3 infusion.    D/w pt's wife over phone.    Explained his poor prognosis from Oncology aspect due to large tumor burden in liver and that surgery was palliative in nature.    Explained added burden of continued dialysis adding to pt's suffering at this point.    Pt's wife requested not to proceed with HD catheter placement and dialysis until she can d/w Dr Siddiqi and rest of family.    Will discuss further later today.    5/12  HD yesterday, planned for HD Sat  case d/w family, and ID, pts nurse  Dr Velez covering this weekend    5/13 SY  --ZO : remains oligoanuric.   HD today : HD order and tx reviewed.     --Positive fluid balance due to hypotension.  RR at 30 today.   Check follow up CXR.  Will plan for 1-2 kg UF with HD if tolerated.  --Continue enteral feeding.  --D/w Dr Mcclendon : positive blood culture/ GNR-- pre current HD catheter.   Plan HD today and on Monday,, then remove catheter for bacteremia clearance.    5/14 SY  --ZO : reamins dialysis dependent for now.    Plan HD in am and remove temporary HD catheter due to bacteremia to allow clearance.  --CT with patch lower lung inf : concern for PNA : continue abtx.  --Continue enteral feeding.  --Enterobacter bacteremia : continue abtx.    5/15 MK   - ZO for now HD dependent    tolerating hd and will attempt uf with hd   - enterobacter sepsis on meropenem renally dosed, aspiration pna     for dc of [place] post hd today   - anemia transfusion ongoing   - FEN On vitality with ostomy output noted     5/16 MK   - ZO Hd dependent    for hd in am with replacement of catheter to occur   - enterobacter sepsis on meropenem, renally dosed     asp pna   - anemia h/h stable   - FEN: on d10 ng tube pulled out    5/17 SY  --ZO : continue TIW HD--will maintain on MWF schedule this week.  --Metabolic acidosis : mainly due to GI loss,  will need to correct with HD.  --Enterobacter bacteremia : continue meropenem  --Post TPN.  NGT pulled out.  For J tube placement.    5/18 SY  --ZO : Continue HD support .    Next tx in am.  Will attempt to start UF with HD as tolerated   --Metabolic acidosis : due to GI loss : increase HCO3 in dialysate  --Enterobacter bacteremia : continue Meropenem  --Continue TPN for now.    5/19  Operative findings:  "Sepsis secondary to perforation leading to a cavitary abscess into the peritoneum.   - Drain placed with endoscopic guidance, going from the skin (sutured) through the cavitary abscess and into the stomach.   - Placement of NJ tube"  For HD today   orders outlined  will follow for tolerance   Dr Daniels covering the weekend    5/;22 MK   - ZO/ATN with anasaraca     will attempt uf with HD today and monitor response, spa with hd, titration of pressors as needed    if tolerates HD, plan for HD in am   - sepsis as outlined above, now on antifungal   dw dr lopes and HD rn     5/23 MK   - ZO/ATN with anasarca    inc uf with hd today and monitor response     spa and pressors with hd to support bp  - FEN on vitaliaty tf, no fw flushes  - sepsis abx and antifungal as per ID   dw icu and hd rn , dr lopes     5/24 SY  --ZO/ATN  : continue dialysis support    creat low due to low muscle mass and with no signs of renal recovery.  --Anasarca with increased third spacing of fluid : HD on Mon/Tues.  Mainly attempt to increase UF.  --Electrolytes stable.  --Sepsis : continue Meropenem and Caspofungin.    5/25 SY  --ZO/ATN : remains dialysis dependent.  Next tx in am    Plan PUF on Sat to manage LE edema/anasarca  --Electrolytes stable.  Continue enteral feeding.  Trend Phos level.  --Sepsis : continue Meropenem and Caspofungin.    5/26  Seen on HD   case d/w pts family  Cont ABX as per ID  HD sat mainly for UF, pt cont to have massive upper thigh edema, anasarca      5/27  UF for 3 KG  as tolerated  Transfusion order as needed by intensivist  ABX as per ID    5/28  Anemic 7.3, will T/C  for HD in am  Labs stable today         69 yo man with Hx of Neuroendocrine tumor/Pancreatic mass and liver mets.  Now for palliative resection of pancreatic mass.    --ZO with variable creat level and unclear CKD.    Likely dependent on volume status.    Limited intake even with Enteral feeding.      Start gentle hydration.  -- : continue Hale drainage.  --Hx of light chain nephropathy  --No contraindication for surgery from renal standpoint.    5/4  s/p Palliative surgery for neuroendocrine mass  will watch for ZO, pt with low UO immediate post op  s/p multiple PRBC and FFP transfusion and volume resuscitation    5/5  patient anuric, K 5.2  On bicarb drip  to go back to OR today  case d/w Dr Jean and Dr Siddiqi  Will HD x 2 hrs to correct potassium  >10 L infused in blood products and fluids  More fluids not indicated, UO 50 ml  no fluid removal on HD  Hep profile ordered    5/6  Ostomy created  will monitor i/o  labs stable   no need for HD today  d/w intensivist  will monitor daily for hd requirement    5/7  seen earlier on HD   tolerating well  TPN  d/w Dr Jean  d/w pts fam at bedside    5/8 MK   - ZO with anuria, remains HD dependent with hx of light chain nephropathy    tolerating hd     dc femoral shiley today after hd   - enceph monitor response to hd and xifaxan and lactulose    5/9 MK   - ZO with anuria, remains HD dependent with hx of light chain nephropathy    no indication for hd    off pressor     monitor electrolytes on TPN     NG in place for TF initiation   - enceph, improving monitor response to hd and xifaxan and lactulose    ostomy fx with liquid stool  dw dr marley     5/10 SY  --ZO : remains oligoanuric.     Follow with repeat bladder scans--no retention.    Will hold HD today and assess daily.    Trial of gentle hydration due to increased fluid loss.  --Metabolic acidosis : trial of HCO3 in addition to TPN   --Monitor encephalopathy.    5/11 SY  --ZO : remains oligoanuric.    Renal parameters further increased with no improvement in acidosis with HCO3 infusion.    D/w pt's wife over phone.    Explained his poor prognosis from Oncology aspect due to large tumor burden in liver and that surgery was palliative in nature.    Explained added burden of continued dialysis adding to pt's suffering at this point.    Pt's wife requested not to proceed with HD catheter placement and dialysis until she can d/w Dr Siddiqi and rest of family.    Will discuss further later today.    5/12  HD yesterday, planned for HD Sat  case d/w family, and ID, pts nurse  Dr Velez covering this weekend    5/13 SY  --ZO : remains oligoanuric.   HD today : HD order and tx reviewed.     --Positive fluid balance due to hypotension.  RR at 30 today.   Check follow up CXR.  Will plan for 1-2 kg UF with HD if tolerated.  --Continue enteral feeding.  --D/w Dr Mcclendon : positive blood culture/ GNR-- pre current HD catheter.   Plan HD today and on Monday,, then remove catheter for bacteremia clearance.    5/14 SY  --ZO : reamins dialysis dependent for now.    Plan HD in am and remove temporary HD catheter due to bacteremia to allow clearance.  --CT with patch lower lung inf : concern for PNA : continue abtx.  --Continue enteral feeding.  --Enterobacter bacteremia : continue abtx.    5/15 MK   - ZO for now HD dependent    tolerating hd and will attempt uf with hd   - enterobacter sepsis on meropenem renally dosed, aspiration pna     for dc of [place] post hd today   - anemia transfusion ongoing   - FEN On vitality with ostomy output noted     5/16 MK   - ZO Hd dependent    for hd in am with replacement of catheter to occur   - enterobacter sepsis on meropenem, renally dosed     asp pna   - anemia h/h stable   - FEN: on d10 ng tube pulled out    5/17 SY  --ZO : continue TIW HD--will maintain on MWF schedule this week.  --Metabolic acidosis : mainly due to GI loss,  will need to correct with HD.  --Enterobacter bacteremia : continue meropenem  --Post TPN.  NGT pulled out.  For J tube placement.    5/18 SY  --ZO : Continue HD support .    Next tx in am.  Will attempt to start UF with HD as tolerated   --Metabolic acidosis : due to GI loss : increase HCO3 in dialysate  --Enterobacter bacteremia : continue Meropenem  --Continue TPN for now.    5/19  Operative findings:  "Sepsis secondary to perforation leading to a cavitary abscess into the peritoneum.   - Drain placed with endoscopic guidance, going from the skin (sutured) through the cavitary abscess and into the stomach.   - Placement of NJ tube"  For HD today   orders outlined  will follow for tolerance   Dr Daniels covering the weekend    5/;22 MK   - ZO/ATN with anasaraca     will attempt uf with HD today and monitor response, spa with hd, titration of pressors as needed    if tolerates HD, plan for HD in am   - sepsis as outlined above, now on antifungal   dw dr lopes and HD rn     5/23 MK   - ZO/ATN with anasarca    inc uf with hd today and monitor response     spa and pressors with hd to support bp  - FEN on vitaliaty tf, no fw flushes  - sepsis abx and antifungal as per ID   dw icu and hd rn , dr lopes     5/24 SY  --ZO/ATN  : continue dialysis support    creat low due to low muscle mass and with no signs of renal recovery.  --Anasarca with increased third spacing of fluid : HD on Mon/Tues.  Mainly attempt to increase UF.  --Electrolytes stable.  --Sepsis : continue Meropenem and Caspofungin.    5/25 SY  --ZO/ATN : remains dialysis dependent.  Next tx in am    Plan PUF on Sat to manage LE edema/anasarca  --Electrolytes stable.  Continue enteral feeding.  Trend Phos level.  --Sepsis : continue Meropenem and Caspofungin.    5/26  Seen on HD   case d/w pts family  Cont ABX as per ID  HD sat mainly for UF, pt cont to have massive upper thigh edema, anasarca      5/27  UF for 3 KG  as tolerated  Transfusion order as needed by intensivist  ABX as per ID    5/28  Anemic 7.3, will T/C  for HD in am  Labs stable today    5/30 MK   - ZO remains HD dependent    family meeting for 11:30 this am to discuss GOC     will hold hd today and fu outcomes  dw dr jean

## 2023-05-30 NOTE — CONSULT NOTE ADULT - ASSESSMENT
Process of Care  --Reviewed dx/treatment problems and alignment with Goals of Care    Physical Aspects of Care  --Pain  patient denies at this time  c/w current managment    --Bowel Regimen  denies constipation  risk for constipation d/t immobility  daily dulcolax    --Dyspnea  No SOB at this time  comfortable and in NAD    --Nausea Vomiting  denies    --Weakness  PT as tolerated     Psychological and Psychiatric Aspects of Care:   --Greif/Bereavment: emotional support provided  --Hx of psychiatric dx: none  -Pastoral Care Available PRN     Social Aspects of Care  -SW involved     Cultural Aspects  -Primary Language: English    Goals of Care:     We discussed Palliative Care team being a supportive team when a patient has ongoing illnesses.  We also discussed that it is not an end of life care service, but can help navigate symptoms and emotional support througout their hospital stay here.    Hospice was explained as well  as an end of life care philosophy.  When a disease cannot be cured, or family/patient decide the treatment burdens out weigh the risk and one choses to change focus of treatment from cure to quality/comfort.       Prognosis: Death can occur at anytime, but if disease continues to progress naturally patient likely has days to weeks.    Ethical and Legal Aspects:   NA        Capacity: w/o capacity  HCP/Surrogate: wife is surrogate  Code Status: FULL CODE  MOLST:  Dispo Plan: pending further discussion recommending comfort and weaning     Discussed With: Case coordinated with attending and SW and RN     Time Spent: 75 minutes including the care, coordination and counseling of this patient, 50% of which was spent coordinating and counseling.  Process of Care  --Reviewed dx/treatment problems and alignment with Goals of Care    Physical Aspects of Care  --Pain  0.5mg Dilaudid [id number] prn for moderate pain  1mg Dilaudid [id number] prn for severe pain      --Bowel Regimen   risk for constipation d/t immobility  daily dulcolax    --Dyspnea  on vent  awaiting family to let us know when weaning   (today or tomorrow)     hypotension   do not escalate pressors or care.     --Nausea Vomiting  denies       Psychological and Psychiatric Aspects of Care:   --Greif/Bereavment: emotional support provided  --Hx of psychiatric dx: none  -Pastoral Care Available PRN     Social Aspects of Care  -SW involved     Cultural Aspects  -Primary Language: English    Goals of Care:      Long discussion with family at this stage, we are recommending comfort measures as patient continues to decline regardless of maximal support.  We discussed prolonging suffering rather than prolonging life.     We discussed what that would mean,  no longer doing blood tests, dx imaging, abx, and palliative extubation to RA with comfort medications to address any symptoms that may arise. This would mean shortened life span but would focus on comfort and quality at the end of life.     Hospice was explained as well  as an end of life care philosophy.  When a disease cannot be cured, or family/patient decide the treatment burdens out weigh the risk and one choses to change focus of treatment from cure to quality/comfort.       Prognosis: Death can occur at anytime, but if disease continues to progress naturally patient likely has days to weeks.    Ethical and Legal Aspects:   NA        Capacity: w/o capacity  HCP/Surrogate: wife is surrogate  Code Status: DNR DNI COMFORT ONLY  MOLST: COMFORT ONLY  Dispo Plan: PENDING TIME FOR WEANING  Discussed With: Case coordinated with attending and SW and RN     Time Spent: 75 minutes including the care, coordination and counseling of this patient, 50% of which was spent coordinating and counseling.

## 2023-05-30 NOTE — PROGRESS NOTE ADULT - SUBJECTIVE AND OBJECTIVE BOX
Patient was seen and examined at the bedside this morning  Still on 2 pressors  Still off sedation with no improvement in neurological status  Still having discharge from the midline wound  Still on tube feeds and having function from the ostomy    O/E:  T(C): 36.4 (05-30-23 @ 08:00), Max: 37.1 (05-29-23 @ 16:00)  HR: 73 (05-30-23 @ 11:00) (67 - 101)  BP: 90/48 (05-30-23 @ 11:00) (89/61 - 107/73)  RR: 16 (05-30-23 @ 11:00) (16 - 27)  SpO2: 100% (05-30-23 @ 11:00) (99% - 100%)  Intubated, minimally responsive, off sedation  No pallor, jaundice or cyanosis  Chest clear, equal air entry bilaterally  Abdomen: soft, Midline wound open with ascites drainage, most staples removed, new wet to dry packing applied, colostomy bag covering the wounds dry gauze packing.  New drain on thru midline wound with minimal fluids. No evidence of wound infection. lower aspect of incision dehisced with bowel visualized, ostomy functional with stool  Extremities: bilateral upper and lower extremity pitting edema, necrosis of the tips of the left index and thumb    05-29-23 @ 07:01  -  05-30-23 @ 07:00  --------------------------------------------------------  IN: 2494.9 mL / OUT: 1717 mL / NET: 777.9 mL                            7.4    11.83 )-----------( 90       ( 29 May 2023 04:44 )             22.5   05-29    144  |  108  |  87<H>  ----------------------------<  210<H>  4.9   |  22  |  3.99<H>    Ca    8.8      29 May 2023 04:44  Phos  8.2     05-29  Mg     2.5     05-29    TPro  6.5  /  Alb  2.8<L>  /  TBili  6.2<H>  /  DBili  x   /  AST  182<H>  /  ALT  75  /  AlkPhos  357<H>  05-29  MEDICATIONS  (STANDING):  caspofungin IVPB 50 milliGRAM(s) IV Intermittent every 24 hours  chlorhexidine 0.12% Liquid 15 milliLiter(s) Oral Mucosa every 12 hours  chlorhexidine 4% Liquid 1 Application(s) Topical <User Schedule>  dextrose 5%. 1000 milliLiter(s) (50 mL/Hr) IV Continuous <Continuous>  dextrose 5%. 1000 milliLiter(s) (100 mL/Hr) IV Continuous <Continuous>  dextrose 50% Injectable 12.5 Gram(s) IV Push once  dextrose 50% Injectable 25 Gram(s) IV Push once  dextrose 50% Injectable 25 Gram(s) IV Push once  glucagon  Injectable 1 milliGRAM(s) IntraMuscular once  heparin   Injectable 5000 Unit(s) SubCutaneous every 12 hours  insulin glargine Injectable (LANTUS) 15 Unit(s) SubCutaneous every morning  insulin lispro (ADMELOG) corrective regimen sliding scale   SubCutaneous every 6 hours  midodrine 10 milliGRAM(s) Oral every 8 hours  nystatin Cream 1 Application(s) Topical two times a day  pantoprazole  Injectable 40 milliGRAM(s) IV Push every 12 hours  phenylephrine    Infusion 0.05 MICROgram(s)/kG/Min (0.67 mL/Hr) IV Continuous <Continuous>  rifAXIMin 550 milliGRAM(s) Oral two times a day  vasopressin Infusion 0.04 Unit(s)/Min (6 mL/Hr) IV Continuous <Continuous>    MEDICATIONS  (PRN):  acetaminophen     Tablet .. 650 milliGRAM(s) Oral every 6 hours PRN Temp greater or equal to 38.5C (101.3F)  albumin human 25% IVPB 50 milliLiter(s) IV Intermittent every 1 hour PRN SBP less than  artificial tears (preservative free) Ophthalmic Solution 1 Drop(s) Both EYES four times a day PRN Dry/Irritated Eyes  dextrose Oral Gel 15 Gram(s) Oral once PRN Blood Glucose LESS THAN 70 milliGRAM(s)/deciliter  ondansetron Injectable 4 milliGRAM(s) IV Push every 6 hours PRN Nausea and/or Vomiting  sodium chloride 0.9% lock flush 10 milliLiter(s) IV Push every 1 hour PRN Pre/post blood products, medications, blood draw, and to maintain line patency  sodium chloride 0.9% lock flush 10 milliLiter(s) IV Push every 1 hour PRN Pre/post blood products, medications, blood draw, and to maintain line patency

## 2023-05-30 NOTE — CONSULT NOTE ADULT - CONSULT REQUESTED DATE/TIME
02-May-2023
29-Apr-2023 14:32
29-Apr-2023
04-May-2023 16:58
03-May-2023 10:01
12-May-2023 13:20
29-Apr-2023 10:43
30-May-2023 10:53

## 2023-05-31 NOTE — PROGRESS NOTE ADULT - SUBJECTIVE AND OBJECTIVE BOX
HPI:    CODE STATUS: DNR DNI NO FURTHER ESCALATION OF CARE - AWAITING FAMILY TO SET TIME FOR WEANING      "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 S/P distal pancreatectomy, splenectomy, partial colectomy S/P abdominal washout and colostomy creation Partial Midline wound dehiscence, on and off pressors, no mental status at this time, ostomy functional. "     5.30  pt seen and examined significantly worsened over this hospital stay.  Has been aggressively treated w/ decline. Unfortunately today team was to have discussion w/ family regarding comfort/hospcie approach w/a compassionate wean from the vent.   pt is HD dependent but unfortunately is not expected to leave the hospital d/t his high mortality risk.        5.31  PT Remains intubated and on pressors  awaiting family to tell team what time they would be ready for wean  team is aware and will periodically check in       Pain and Dyspnea:   appeared comfortable    Review of Systems:    UTO d/t AMS       PHYSICAL EXAM:    Vital Signs Last 24 Hrs  T(C): 36 (31 May 2023 10:00), Max: 36.4 (30 May 2023 20:00)  T(F): 96.8 (31 May 2023 10:00), Max: 97.6 (30 May 2023 20:00)  HR: 76 (31 May 2023 13:00) (69 - 96)  BP: 97/65 (31 May 2023 13:00) (89/53 - 111/67)  BP(mean): 70 (31 May 2023 13:00) (56 - 84)  RR: 17 (31 May 2023 13:00) (15 - 25)  SpO2: 100% (31 May 2023 13:00) (100% - 100%)    Parameters below as of 31 May 2023 08:00  Patient On (Oxygen Delivery Method): ventilator    O2 Concentration (%): 21  Daily     Daily     PPSV2:10   %  FAST:    General:  in NAD  Mental Status: unresposnive  HEENT:  perrl  Lungs: mechanical b/l bs   Cardiac: s1s2    GI:  nontender   : voids  Ext: has spontaneous non-meaningful/intentional movments  Neuro: no gross findings       LABS and RADIOLOGY: REVIEWED

## 2023-05-31 NOTE — PROGRESS NOTE ADULT - ASSESSMENT
68 year old male with advanced pancreatic NET  S/P distal pancreatectomy, splenectomy, partial colectomy  S/P abdominal washout and colostomy creation  Partial Midline wound dehiscence   on and off pressors  no mental status at this time  ostomy functional    Plan:  No plans at this time for surgical intervention  comfort measures    Plan discussed with surg/onc team

## 2023-05-31 NOTE — PROGRESS NOTE ADULT - SUBJECTIVE AND OBJECTIVE BOX
Patient was seen and examined at the bedside this morning. Comfort measures were instated yesterday.      Vital Signs Last 24 Hrs  T(C): 36.3 (31 May 2023 04:00), Max: 36.4 (30 May 2023 08:00)  T(F): 97.3 (31 May 2023 04:00), Max: 97.6 (30 May 2023 08:00)  HR: 71 (31 May 2023 06:00) (69 - 101)  BP: 104/80 (31 May 2023 06:00) (90/48 - 111/67)  BP(mean): 84 (31 May 2023 06:00) (56 - 84)  RR: 16 (31 May 2023 06:00) (15 - 25)  SpO2: 100% (31 May 2023 06:00) (99% - 100%)    Parameters below as of 31 May 2023 06:00  Patient On (Oxygen Delivery Method): ventilator    O2 Concentration (%): 21          I&O's Summary    29 May 2023 07:01  -  30 May 2023 07:00  --------------------------------------------------------  IN: 2494.9 mL / OUT: 1717 mL / NET: 777.9 mL    30 May 2023 07:01  -  31 May 2023 06:40  --------------------------------------------------------  IN: 1561 mL / OUT: 1240 mL / NET: 321 mL        Intubated, non-responsive, off sedation  No pallor, jaundice or cyanosis  Chest clear, equal air entry bilaterally  Abdomen: soft, Midline wound open with ascites drainage, most staples removed, new wet to dry packing applied, colostomy bag covering the wounds dry gauze packing.  drain on thru midline wound with minimal fluids. No evidence of wound infection. lower aspect of incision dehisced with bowel visualized, ostomy functional with stool  Extremities: bilateral upper and lower extremity pitting edema, necrosis of the tips of the left index and thumb

## 2023-05-31 NOTE — PROGRESS NOTE ADULT - ASSESSMENT
Process of Care  --Reviewed dx/treatment problems and alignment with Goals of Care    Physical Aspects of Care  --Pain  recommend 0.5mg Dilaudid [id number] prn for moderate pain  recommend  1mg Dilaudid [id number] prn for severe pain         --Bowel Regimen   risk for constipation d/t immobility  daily dulcolax    --Dyspnea  on vent  awaiting family to let us know when weaning   (today or tomorrow)     hypotension   do not escalate pressors or care.     --Nausea Vomiting  denies       Psychological and Psychiatric Aspects of Care:   Grief/Bereavement emotional support provided  --Hx of psychiatric dx: none  -Pastoral Care Available PRN     Social Aspects of Care  -SW involved     Cultural Aspects  -Primary Language: English    Goals of Care:     comfort measures- no escalation of care   awaiting family to decide on final time to withdraw  ICU team aware and will periodically check in.         Ethical and Legal Aspects:   NA        Capacity: w/o capacity  HCP/Surrogate: wife is surrogate  Code Status: DNR DNI COMFORT ONLY  MOLST: COMFORT ONLY  Dispo Plan: PENDING TIME FOR WEANING  Discussed With: Case coordinated with attending and SW and RN     Time Spent: 55inutes including the care, coordination and counseling of this patient, 50% of which was spent coordinating and counseling.

## 2023-05-31 NOTE — PROGRESS NOTE ADULT - SUBJECTIVE AND OBJECTIVE BOX
HPI:  69 yo male with PMHx metastatic neuroendocrine tumor of the pancreas, recently admitted with nonfunctioning GJ tube (patient is chronically obstructed).   Patient has had severe heartburn, and has had several episodes of vomiting small amount of dark blood. Patient was discharged on Thursday. Hgb unchanged. CT angiography has no active bleeding, but demonstrates extensive tumor in distal pancreas.     5/1 pt feeling slightly nauseous, denies abd pain.  5/2 pt c/o frequent BM overnight causing him to not sleep well. denies abd pain , nausea  5/3 pt reports feeling well, hematuria resolved. coulter in place. not having as many BMs anymore.   5/4: Patient went to the OR today for a Open distal pancreatectomy, splenectomy, partial colectomy, partial L adrenalectomy, temporary abdominal closure.  Patient had a 2.2 L EBL.  Intra Op had 6U PRBC, 2FFP, 1 Platelets, 4L crystalloid  Post Op 1U PRBC ans 1 U FFP.  Post OP vented, with poor UO, on levo and Vaso.    5/5: He remains vented, on paralytics, 2 pressors, essentially anuric.    5/6: Completed the closure and ostomy yesterday.  Vented and on 2 Pressors, No UO.  Off Sedation awake and moving all 4 extremities  5/7: Extubated, required NIV for Hypercarbia, decreased H & H, still on 2 Pressors    5/15: Still on Pressors and HD dependent  5/16: He remains on pressors, draining from wound     5/26: Patient vented with a Poor MS, on 2 pressors.    5/27: Vented.  MS remains Poor.  on 2 Pressors.  HD today     5/29: He continues to do very poorly.  Vented, on 2 pressors, not responsive  5/30: No changes, on 2 pressors, vented, MS poor  5/31: He remains on 2 pressors and vented.  Not following commands.             PAST MEDICAL & SURGICAL HISTORY:  Hypertension      BPH (benign prostatic hyperplasia)      Renal insufficiency      Light chain nephropathy      DM (diabetes mellitus)      HLD (hyperlipidemia)      No significant past surgical history          FAMILY HISTORY:  FH: breast cancer (Mother)    FH: aneurysm (Father)        Social Hx:    Allergies    losartan (Angioedema)    Intolerances            ICU Vital Signs Last 24 Hrs  T(C): 36 (31 May 2023 10:00), Max: 36.4 (30 May 2023 20:00)  T(F): 96.8 (31 May 2023 10:00), Max: 97.6 (30 May 2023 20:00)  HR: 82 (31 May 2023 11:00) (69 - 96)  BP: 89/53 (31 May 2023 11:00) (89/53 - 111/67)  BP(mean): 60 (31 May 2023 11:00) (56 - 84)  ABP: --  ABP(mean): --  RR: 17 (31 May 2023 11:00) (15 - 25)  SpO2: 100% (31 May 2023 11:00) (100% - 100%)    O2 Parameters below as of 31 May 2023 08:00  Patient On (Oxygen Delivery Method): ventilator    O2 Concentration (%): 21        Mode: AC/ CMV (Assist Control/ Continuous Mandatory Ventilation)  RR (machine): 16  TV (machine): 400  FiO2: 21  PEEP: 5  ITime: 1  PIP: 16      I&O's Summary    30 May 2023 07:01  -  31 May 2023 07:00  --------------------------------------------------------  IN: 1561 mL / OUT: 1240 mL / NET: 321 mL                  MEDICATIONS  (STANDING):  chlorhexidine 0.12% Liquid 15 milliLiter(s) Oral Mucosa every 12 hours  phenylephrine    Infusion 0.05 MICROgram(s)/kG/Min (0.67 mL/Hr) IV Continuous <Continuous>  vasopressin Infusion 0.04 Unit(s)/Min (6 mL/Hr) IV Continuous <Continuous>    MEDICATIONS  (PRN):  acetaminophen     Tablet .. 650 milliGRAM(s) Oral every 6 hours PRN Temp greater or equal to 38.5C (101.3F)  artificial tears (preservative free) Ophthalmic Solution 1 Drop(s) Both EYES four times a day PRN Dry/Irritated Eyes  ondansetron Injectable 4 milliGRAM(s) IV Push every 6 hours PRN Nausea and/or Vomiting  sodium chloride 0.9% lock flush 10 milliLiter(s) IV Push every 1 hour PRN Pre/post blood products, medications, blood draw, and to maintain line patency  sodium chloride 0.9% lock flush 10 milliLiter(s) IV Push every 1 hour PRN Pre/post blood products, medications, blood draw, and to maintain line patency      DVT Prophylaxis:    Advanced Directives:  Discussed with:    Visit Information: 30 min    ** Time is exclusive of billed procedures and/or teaching and/or routine family updates.

## 2023-05-31 NOTE — PROGRESS NOTE ADULT - ATTENDING COMMENTS
Patient seen and examined at bedside with resident this AM . He has a fever this AM but his level of support remained stable over night. WBC 9.2 Calixto was weaned off after dialysis but remains on phenylephrine. He moves all extremities but not purposeful  on sedation. The dressing was changed to reveal that the inferior portion of the visible wound has dehisced but the would is stable, we will continue daily would care. The drainage is 3-400 of ascities/day. 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 Enteral nutrition is back on and we will wean the TPN.  I am in daily contact with the family and we agree to continue to max support unless he becomes unresponsive to our efforts.
I have seen and examined this patient and agree with the residents findings. I have also discusses the plan of care with Dr Jean. The patient is making slow progress.
I have seen and examined this patient this AM. He is stable on pressor support that is being weaned. Now that his abdomen is closed we can back off of the sedation and paralytics which contributed to the need for pressors. He remains afebrile. His bowel and intraabdominal organs were all well perfused yesterday in the OR and there was no evidence of bleeding so we expect him to improve with time. The ostomy is pink and viable. We hope his kidney function returns soon but in the meantime we will continue maximum support. I have spoken tot he family and kept them abreast of where we are.
Patient seen and evaluated. I agree with the residents assessment. Plan to continue comfort care. More family expected to see patient today. He is not expected to survive much longer.
I addressed some confusion today regarding the goals of care. I spoke to the patients wife again today about our plan to continue maximal support to get the patient over the acute sequelae of surgery that was done to palliate his obstructive symptoms. Given the natural history of metastatic neuroendocrine tumors we expect he still has significant life expectancy. His tumor was resistant to lutetium 177 dotatate ( Lutathera) but appears to be responsive to somatostatin analogs and the liver mets have been present for at least the last 6 years. He will resume treatment once recovered from the surgery. I have relayed this information to Dr. Mcclendon and the treating nurse. We hope to proceed with dialysis today which has helped to improve his mental status. His liver function continues to improve each day which will also improve his mental status. His GI function has returned and he is tolerating NG feedings with good ostomy output. Renal function remains the main samantha at this point and all other systems are improving.

## 2023-05-31 NOTE — PROGRESS NOTE ADULT - EYES
PERRL
PERRL/Icteric sclera
conjunctiva clear
PERRL/Icteric sclera
Icteric sclera
PERRL/Icteric sclera

## 2023-05-31 NOTE — PROGRESS NOTE ADULT - ASSESSMENT
67 yo male with PMHx metastatic neuroendocrine tumor of the pancreas, recently admitted with nonfunctioning GJ tube  due to external mechanical  obstruction   now Post Op  after an open distal pancreatectomy, splenectomy, partial colectomy, partial L adrenalectomy,colostomy  With Post Op distributive Shock/bleeding developed ZO now requiring dialysis  onow back on pressors septic shock gram negative bacterem ia Enterobacter, Cloacae  CT 5/14 no distinct collection  dialysis dependent plan T/h/S    PLAN:  ICU    Plan is for Full Comfort care  Continue Pressors until the family is ready to D/C  Then will determine if there will be a compassionate Liberation from the Vent  GOC: Now DNR/DNI/No more HD  Feeds stopped

## 2023-06-01 NOTE — PROGRESS NOTE ADULT - SUBJECTIVE AND OBJECTIVE BOX
Patient was seen and examined at the bedside this morning. Comfort measures      T(C): 37.1 (06-01-23 @ 04:00), Max: 37.2 (05-31-23 @ 17:00)  T(F): 98.8 (06-01-23 @ 04:00), Max: 99 (05-31-23 @ 17:00)  HR: 78 (06-01-23 @ 09:00) (76 - 109)  BP: 98/52 (06-01-23 @ 09:00) (89/53 - 120/57)  RR: 17 (06-01-23 @ 09:00) (17 - 28)  SpO2: 100% (06-01-23 @ 09:00) (99% - 100%)  Wt(kg): --      Physical Exam:  Intubated, non-responsive, off sedation  No pallor, jaundice or cyanosis  Chest clear, equal air entry bilaterally  Abdomen: soft, Midline wound open with ascites drainage, most staples removed, new wet to dry packing applied, colostomy bag covering the wounds dry gauze packing.  drain on thru midline wound with minimal fluids. No evidence of wound infection. lower aspect of incision dehisced with bowel visualized, ostomy functional with stool  Extremities: bilateral upper and lower extremity pitting edema, necrosis of the tips of the left index and thumb

## 2023-06-01 NOTE — PROGRESS NOTE ADULT - ASSESSMENT
69 yo male with PMHx metastatic neuroendocrine tumor of the pancreas, recently admitted with nonfunctioning GJ tube  due to external mechanical  obstruction   now Post Op  after an open distal pancreatectomy, splenectomy, partial colectomy, partial L adrenalectomy,colostomy  With Post Op distributive Shock/bleeding developed ZO now requiring dialysis  onow back on pressors septic shock gram negative bacterem ia Enterobacter, Cloacae  CT 5/14 no distinct collection  dialysis dependent plan T/h/S    PLAN:  ICU    Plan is for Full Comfort care  Continue Pressors until the family is ready to D/C  Then will determine when there will be a compassionate Liberation from the Vent  GOC: Now DNR/DNI/No more HD  Feeds stopped

## 2023-06-01 NOTE — PROGRESS NOTE ADULT - TREATMENT GUIDELINE COMMENT
case discussed over phone, family coming bedsdie later today again but wanted to talk prior to arriving

## 2023-06-01 NOTE — PROGRESS NOTE ADULT - ASSESSMENT
Process of Care  --Reviewed dx/treatment problems and alignment with Goals of Care    Physical Aspects of Care  --Pain  recommend 0.5mg Dilaudid [id number] prn for moderate pain  recommend  1mg Dilaudid [id number] prn for severe pain         --Bowel Regimen   risk for constipation d/t immobility  daily dulcolax    --Dyspnea  on vent  awaiting family to let us know when weaning   (today or tomorrow)     hypotension   do not escalate pressors or care.     --Nausea Vomiting  denies       Psychological and Psychiatric Aspects of Care:   Grief/Bereavement emotional support provided  --Hx of psychiatric dx: none  -Pastoral Care Available PRN     Social Aspects of Care  -SW involved     Cultural Aspects  -Primary Language: English    Goals of Care:     comfort measures- no escalation of care   awaiting family to decide on final time to withdraw  ICU team aware and will periodically check in.         Ethical and Legal Aspects:   NA        Capacity: w/o capacity  HCP/Surrogate: wife is surrogate  Code Status: DNR DNI COMFORT ONLY  MOLST: COMFORT ONLY  Dispo Plan: PENDING TIME FOR WEANING  Discussed With: Case coordinated with attending and SW and RN     Time Spent: 55inutes including the care, coordination and counseling of this patient, 50% of which was spent coordinating and counseling.

## 2023-06-01 NOTE — PROGRESS NOTE ADULT - ASSESSMENT
68 year old male with advanced pancreatic NET  S/P distal pancreatectomy, splenectomy, partial colectomy  S/P abdominal washout and colostomy creation  Partial Midline wound dehiscence   on pressors  no mental status at this time  ostomy functional    Plan:  No plans at this time for surgical intervention  comfort measures    Plan discussed with surg/onc team

## 2023-06-01 NOTE — PROGRESS NOTE ADULT - PROBLEM SELECTOR PROBLEM 1
Neuroendocrine tumor status post surgical treatment
Neuroendocrine tumor status post surgical treatment

## 2023-06-01 NOTE — PROGRESS NOTE ADULT - SUBJECTIVE AND OBJECTIVE BOX
HPI:    HPI:    CODE STATUS: DNR DNI NO FURTHER ESCALATION OF CARE - AWAITING FAMILY TO SET TIME FOR WEANING      "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 S/P distal pancreatectomy, splenectomy, partial colectomy S/P abdominal washout and colostomy creation Partial Midline wound dehiscence, on and off pressors, no mental status at this time, ostomy functional. "     5.30  pt seen and examined significantly worsened over this hospital stay.  Has been aggressively treated w/ decline. Unfortunately today team was to have discussion w/ family regarding comfort/hospcie approach w/a compassionate wean from the vent.   pt is HD dependent but unfortunately is not expected to leave the hospital d/t his high mortality risk.        5.31  PT Remains intubated and on pressors  awaiting family to tell team what time they would be ready for wean  team is aware and will periodically check in       pt seen and examined  remains intubated on pressors  Family called- patients wife admitted to hospital and is currently on 3N d/t medical emergency.   Att his time his daughter called -  they are going to speak w/ patients wife to see if she still would want to move forward w/ weaning today and if not at least arrange to have her visit him while he's in the unit and she too receives treatments.     Daughter and I discussed current care measures  Continue w/ comfort approach and to decide on weaning.   DNR or AND if anything were to occur-  no escalation of care.  She understands why continuing tube feeds is more problematic (same w/  IV hydration)   pt continues to be off HD   Symptom treatment as it arises, ativan and Dilaudid is available. if patient overtly uncomfortable.       Pain and Dyspnea:   appeared comfortable    Review of Systems:    UTO d/t AMS       PHYSICAL EXAM:  Vital Signs Last 24 Hrs  T(C): 37.1 (2023 04:00), Max: 37.2 (31 May 2023 17:00)  T(F): 98.8 (2023 04:00), Max: 99 (31 May 2023 17:00)  HR: 78 (2023 09:00) (76 - 109)  BP: 98/52 (2023 09:00) (89/53 - 120/57)  BP(mean): 62 (2023 09:00) (60 - 81)  RR: 17 (2023 09:00) (17 - 28)  SpO2: 100% (2023 09:00) (99% - 100%)    Parameters below as of 2023 08:00  Patient On (Oxygen Delivery Method): ventilator    O2 Concentration (%): 21  Daily     Daily Weight in k.7 (2023 06:00)    PPSV2: 10  %  FAST:    General: unresponsive  HEENT: spontaneous eye opening at times- does not appear to be meaningful  Lungs: mechanical   Cardiac: s1s2  GI: soft  : no u/o  Ext: moves spontaneously   Neuro: doesnt follow commands, no interaction, no meaningful interactions bedside       LABS and RADIOLOGY: REVIEWED

## 2023-06-01 NOTE — PROGRESS NOTE ADULT - CONVERSATION DETAILS
Family called- patients wife admitted to hospital and is currently on 3N d/t medical emergency.   Att his time his daughter called -  they are going to speak w/ patients wife to see if she still would want to move forward w/ weaning today and if not at least arrange to have her visit him while he's in the unit and she too receives treatments.     Daughter and I discussed current care measures  Continue w/ comfort approach and to decide on weaning.   DNR or AND if anything were to occur-  no escalation of care.  She understands why continuing tube feeds is more problematic (same w/  IV hydration)   pt continues to be off HD   Symptom treatment as it arises, ativan and Dilaudid is available. if patient overtly uncomfortable.

## 2023-06-01 NOTE — PROGRESS NOTE ADULT - SUBJECTIVE AND OBJECTIVE BOX
HPI:  69 yo male with PMHx metastatic neuroendocrine tumor of the pancreas, recently admitted with nonfunctioning GJ tube (patient is chronically obstructed).   Patient has had severe heartburn, and has had several episodes of vomiting small amount of dark blood. Patient was discharged on Thursday. Hgb unchanged. CT angiography has no active bleeding, but demonstrates extensive tumor in distal pancreas.     5/1 pt feeling slightly nauseous, denies abd pain.  5/2 pt c/o frequent BM overnight causing him to not sleep well. denies abd pain , nausea  5/3 pt reports feeling well, hematuria resolved. coulter in place. not having as many BMs anymore.   5/4: Patient went to the OR today for a Open distal pancreatectomy, splenectomy, partial colectomy, partial L adrenalectomy, temporary abdominal closure.  Patient had a 2.2 L EBL.  Intra Op had 6U PRBC, 2FFP, 1 Platelets, 4L crystalloid  Post Op 1U PRBC ans 1 U FFP.  Post OP vented, with poor UO, on levo and Vaso.    5/5: He remains vented, on paralytics, 2 pressors, essentially anuric.    5/6: Completed the closure and ostomy yesterday.  Vented and on 2 Pressors, No UO.  Off Sedation awake and moving all 4 extremities  5/7: Extubated, required NIV for Hypercarbia, decreased H & H, still on 2 Pressors    5/15: Still on Pressors and HD dependent  5/16: He remains on pressors, draining from wound     5/26: Patient vented with a Poor MS, on 2 pressors.    5/27: Vented.  MS remains Poor.  on 2 Pressors.  HD today     5/29: He continues to do very poorly.  Vented, on 2 pressors, not responsive  5/30: No changes, on 2 pressors, vented, MS poor  5/31: He remains on 2 pressors and vented.  Not following commands.    6/1: 2 pressors.  Vented.  Not responsive          PAST MEDICAL & SURGICAL HISTORY:  Hypertension      BPH (benign prostatic hyperplasia)      Renal insufficiency      Light chain nephropathy      DM (diabetes mellitus)      HLD (hyperlipidemia)      No significant past surgical history          FAMILY HISTORY:  FH: breast cancer (Mother)    FH: aneurysm (Father)        Social Hx:    Allergies    losartan (Angioedema)    Intolerances            ICU Vital Signs Last 24 Hrs  T(C): 37.1 (01 Jun 2023 04:00), Max: 37.2 (31 May 2023 17:00)  T(F): 98.8 (01 Jun 2023 04:00), Max: 99 (31 May 2023 17:00)  HR: 78 (01 Jun 2023 09:00) (76 - 109)  BP: 98/52 (01 Jun 2023 09:00) (92/60 - 120/57)  BP(mean): 62 (01 Jun 2023 09:00) (61 - 81)  ABP: --  ABP(mean): --  RR: 17 (01 Jun 2023 09:00) (17 - 28)  SpO2: 100% (01 Jun 2023 09:00) (99% - 100%)    O2 Parameters below as of 01 Jun 2023 08:00  Patient On (Oxygen Delivery Method): ventilator    O2 Concentration (%): 21        Mode: AC/ CMV (Assist Control/ Continuous Mandatory Ventilation)  RR (machine): 16  TV (machine): 400  FiO2: 21  PEEP: 5  ITime: 1  MAP: 9  PIP: 16      I&O's Summary    31 May 2023 07:01  -  01 Jun 2023 07:00  --------------------------------------------------------  IN: 592 mL / OUT: 385 mL / NET: 207 mL                    MEDICATIONS  (STANDING):  chlorhexidine 0.12% Liquid 15 milliLiter(s) Oral Mucosa every 12 hours  phenylephrine    Infusion 0.05 MICROgram(s)/kG/Min (0.67 mL/Hr) IV Continuous <Continuous>  vasopressin Infusion 0.04 Unit(s)/Min (6 mL/Hr) IV Continuous <Continuous>    MEDICATIONS  (PRN):  acetaminophen     Tablet .. 650 milliGRAM(s) Oral every 6 hours PRN Temp greater or equal to 38.5C (101.3F)  artificial tears (preservative free) Ophthalmic Solution 1 Drop(s) Both EYES four times a day PRN Dry/Irritated Eyes  LORazepam   Injectable 0.5 milliGRAM(s) IV Push every 3 hours PRN Agitation  ondansetron Injectable 4 milliGRAM(s) IV Push every 6 hours PRN Nausea and/or Vomiting  sodium chloride 0.9% lock flush 10 milliLiter(s) IV Push every 1 hour PRN Pre/post blood products, medications, blood draw, and to maintain line patency  sodium chloride 0.9% lock flush 10 milliLiter(s) IV Push every 1 hour PRN Pre/post blood products, medications, blood draw, and to maintain line patency      DVT Prophylaxis:    Advanced Directives:  Discussed with:    Visit Information: 30 min    ** Time is exclusive of billed procedures and/or teaching and/or routine family updates.

## 2023-06-02 NOTE — PROGRESS NOTE ADULT - SUBJECTIVE AND OBJECTIVE BOX
HPI:  67 yo male with PMHx metastatic neuroendocrine tumor of the pancreas, recently admitted with nonfunctioning GJ tube (patient is chronically obstructed).   Patient has had severe heartburn, and has had several episodes of vomiting small amount of dark blood. Patient was discharged on Thursday. Hgb unchanged. CT angiography has no active bleeding, but demonstrates extensive tumor in distal pancreas.     5/1 pt feeling slightly nauseous, denies abd pain.  5/2 pt c/o frequent BM overnight causing him to not sleep well. denies abd pain , nausea  5/3 pt reports feeling well, hematuria resolved. coulter in place. not having as many BMs anymore.   5/4: Patient went to the OR today for a Open distal pancreatectomy, splenectomy, partial colectomy, partial L adrenalectomy, temporary abdominal closure.  Patient had a 2.2 L EBL.  Intra Op had 6U PRBC, 2FFP, 1 Platelets, 4L crystalloid  Post Op 1U PRBC ans 1 U FFP.  Post OP vented, with poor UO, on levo and Vaso.    5/5: He remains vented, on paralytics, 2 pressors, essentially anuric.    5/6: Completed the closure and ostomy yesterday.  Vented and on 2 Pressors, No UO.  Off Sedation awake and moving all 4 extremities  5/7: Extubated, required NIV for Hypercarbia, decreased H & H, still on 2 Pressors    5/15: Still on Pressors and HD dependent  5/16: He remains on pressors, draining from wound     5/26: Patient vented with a Poor MS, on 2 pressors.    5/27: Vented.  MS remains Poor.  on 2 Pressors.  HD today     5/29: He continues to do very poorly.  Vented, on 2 pressors, not responsive  5/30: No changes, on 2 pressors, vented, MS poor  5/31: He remains on 2 pressors and vented.  Not following commands.    6/1: 2 pressors.  Vented.  Not responsive  6/2: 2 pressors, vented             PAST MEDICAL & SURGICAL HISTORY:  Hypertension      BPH (benign prostatic hyperplasia)      Renal insufficiency      Light chain nephropathy      DM (diabetes mellitus)      HLD (hyperlipidemia)      No significant past surgical history          FAMILY HISTORY:  FH: breast cancer (Mother)    FH: aneurysm (Father)        Social Hx:    Allergies    losartan (Angioedema)    Intolerances            ICU Vital Signs Last 24 Hrs  T(C): 36.3 (02 Jun 2023 02:00), Max: 36.5 (01 Jun 2023 18:00)  T(F): 97.3 (02 Jun 2023 02:00), Max: 97.7 (01 Jun 2023 18:00)  HR: 87 (02 Jun 2023 07:00) (75 - 95)  BP: 76/43 (02 Jun 2023 07:00) (76/43 - 101/57)  BP(mean): 51 (02 Jun 2023 07:00) (51 - 77)  ABP: --  ABP(mean): --  RR: 18 (02 Jun 2023 07:00) (16 - 25)  SpO2: 97% (02 Jun 2023 07:00) (97% - 100%)    O2 Parameters below as of 02 Jun 2023 07:00  Patient On (Oxygen Delivery Method): ventilator            Mode: AC/ CMV (Assist Control/ Continuous Mandatory Ventilation)  RR (machine): 16  TV (machine): 400  FiO2: 21  PEEP: 5  ITime: 1  MAP: 9  PIP: 18      I&O's Summary    01 Jun 2023 07:01  -  02 Jun 2023 07:00  --------------------------------------------------------  IN: 599 mL / OUT: 335 mL / NET: 264 mL                        MEDICATIONS  (STANDING):  chlorhexidine 0.12% Liquid 15 milliLiter(s) Oral Mucosa every 12 hours  phenylephrine    Infusion 0.05 MICROgram(s)/kG/Min (0.67 mL/Hr) IV Continuous <Continuous>  vasopressin Infusion 0.04 Unit(s)/Min (6 mL/Hr) IV Continuous <Continuous>    MEDICATIONS  (PRN):  acetaminophen     Tablet .. 650 milliGRAM(s) Oral every 6 hours PRN Temp greater or equal to 38.5C (101.3F)  artificial tears (preservative free) Ophthalmic Solution 1 Drop(s) Both EYES four times a day PRN Dry/Irritated Eyes  HYDROmorphone  Injectable 1 milliGRAM(s) IV Push every 6 hours PRN distress  LORazepam   Injectable 0.5 milliGRAM(s) IV Push every 3 hours PRN Agitation  ondansetron Injectable 4 milliGRAM(s) IV Push every 6 hours PRN Nausea and/or Vomiting  sodium chloride 0.9% lock flush 10 milliLiter(s) IV Push every 1 hour PRN Pre/post blood products, medications, blood draw, and to maintain line patency  sodium chloride 0.9% lock flush 10 milliLiter(s) IV Push every 1 hour PRN Pre/post blood products, medications, blood draw, and to maintain line patency      DVT Prophylaxis:    Advanced Directives:  Discussed with:    Visit Information: 30 min    ** Time is exclusive of billed procedures and/or teaching and/or routine family updates.

## 2023-06-02 NOTE — PROGRESS NOTE ADULT - ASSESSMENT
69 yo male with PMHx metastatic neuroendocrine tumor of the pancreas, recently admitted with nonfunctioning GJ tube  due to external mechanical  obstruction   now Post Op  after an open distal pancreatectomy, splenectomy, partial colectomy, partial L adrenalectomy,colostomy  With Post Op distributive Shock/bleeding developed ZO now requiring dialysis  onow back on pressors septic shock gram negative bacterem ia Enterobacter, Cloacae  CT 5/14 no distinct collection  dialysis dependent plan T/h/S    PLAN:  ICU    Plan is for Full Comfort care and compassionate liberation from the vent today  Continue Pressors until the family is ready to D/C  GOC: Now DNR/DNI/No more HD  Feeds stopped

## 2023-06-02 NOTE — PROGRESS NOTE ADULT - CONSTITUTIONAL
cachectic
no distress/cachectic

## 2023-06-02 NOTE — PROGRESS NOTE ADULT - NUTRITIONAL ASSESSMENT
This patient has been assessed with a concern for Malnutrition and has been determined to have a diagnosis/diagnoses of Severe protein-calorie malnutrition.  
This patient has been assessed with a concern for Malnutrition and has been determined to have a diagnosis/diagnoses of Severe protein-calorie malnutrition.    The following pending diet order is being considered for treatment of Severe protein-calorie malnutrition:  Diet NPO with Tube Feed-  Tube Feeding Modality: Gastro-Jejunostomy  Glucerna 1.5 Chidi (GLUCERNA1.5)  Total Volume for 24 Hours (mL): 1560  Continuous  Starting Tube Feed Rate {mL per Hour}: 20  Increase Tube Feed Rate by (mL): 10     Every 4 hours  Until Goal Tube Feed Rate (mL per Hour): 65  Tube Feed Duration (in Hours): 24  Tube Feed Start Time: 00:00  Free Water Flush  Free Water Flush Instructions:  Free water flushes of 40cc/hr (provides ~960cc/day); monitor and adjust free water flushes PRN per MD Squires Carb Prosource TF     Qty per Day:  2  Entered: May  2 2023 11:28AM  
This patient has been assessed with a concern for Malnutrition and has been determined to have a diagnosis/diagnoses of Severe protein-calorie malnutrition.    This patient is being managed with:   Diet NPO with Tube Feed-  Tube Feeding Modality: Nasogastric  Vital 1.5 Chidi (VITAL1.5)  Total Volume for 24 Hours (mL): 1560  Continuous  Starting Tube Feed Rate {mL per Hour}: 20  Increase Tube Feed Rate by (mL): 10     Every 6 hours  Until Goal Tube Feed Rate (mL per Hour): 65  Tube Feed Duration (in Hours): 24  Tube Feed Start Time: 16:00  Free Water Flush  Free Water Flush Instructions:  Consider free water flushes to maintain hydration as per MD  No Carb Prosource TF     Qty per Day:  3  Entered: May  9 2023  2:50PM  
This patient has been assessed with a concern for Malnutrition and has been determined to have a diagnosis/diagnoses of Severe protein-calorie malnutrition.    This patient is being managed with:   Parenteral Nutrition - Adult-  Entered: May 18 2023 10:00PM    Parenteral Nutrition - Adult-  Entered: May 17 2023 10:00PM    Diet NPO with Tube Feed-  Tube Feeding Modality: Nasogastric  Vital 1.5 Chidi (VITAL1.5)  Total Volume for 24 Hours (mL): 1560  Continuous  Starting Tube Feed Rate {mL per Hour}: 20  Increase Tube Feed Rate by (mL): 10     Every 6 hours  Until Goal Tube Feed Rate (mL per Hour): 65  Tube Feed Duration (in Hours): 24  Tube Feed Start Time: 16:00  Free Water Flush  Free Water Flush Instructions:  Consider free water flushes to maintain hydration as per MD  No Carb Prosource TF     Qty per Day:  3  Entered: May  9 2023  2:50PM  
This patient has been assessed with a concern for Malnutrition and has been determined to have a diagnosis/diagnoses of Severe protein-calorie malnutrition.    This patient is being managed with:   fat emulsion (Fish Oil and Plant Based) 20% Infusion-[Known as SMOFLIPID 20% Infusion]  50 Gram(s) in IV Solution 250 milliLiter(s) infuse at 20.8 mL/Hr  Dose Rate: 0.695 Gm/kG/Day Infuse Over: 12 Hours; Stop After 12 Hours  Administration Instructions: Use 1.2 micron in-line filter  Entered: May  7 2023 10:00PM    Parenteral Nutrition - Adult-  Entered: May  7 2023 10:00PM  
This patient has been assessed with a concern for Malnutrition and has been determined to have a diagnosis/diagnoses of Severe protein-calorie malnutrition.    This patient is being managed with:   Diet NPO with Tube Feed-  Tube Feeding Modality: Nasogastric  Vital 1.5 Chidi (VITAL1.5)  Total Volume for 24 Hours (mL): 1560  Continuous  Starting Tube Feed Rate {mL per Hour}: 20  Increase Tube Feed Rate by (mL): 10     Every 6 hours  Until Goal Tube Feed Rate (mL per Hour): 65  Tube Feed Duration (in Hours): 24  Tube Feed Start Time: 16:00  Free Water Flush  Free Water Flush Instructions:  Consider free water flushes to maintain hydration as per MD  No Carb Prosource TF     Qty per Day:  3  Entered: May  9 2023  2:50PM  
This patient has been assessed with a concern for Malnutrition and has been determined to have a diagnosis/diagnoses of Severe protein-calorie malnutrition.    This patient is being managed with:   Diet NPO with Tube Feed-  Tube Feeding Modality: Nasogastric  Vital 1.5 Chidi (VITAL1.5)  Total Volume for 24 Hours (mL): 1560  Continuous  Starting Tube Feed Rate {mL per Hour}: 20  Increase Tube Feed Rate by (mL): 10     Every 6 hours  Until Goal Tube Feed Rate (mL per Hour): 65  Tube Feed Duration (in Hours): 24  Tube Feed Start Time: 16:00  Free Water Flush  Free Water Flush Instructions:  Consider free water flushes to maintain hydration as per MD  No Carb Prosource TF     Qty per Day:  3  Entered: May  9 2023  2:50PM  
This patient has been assessed with a concern for Malnutrition and has been determined to have a diagnosis/diagnoses of Severe protein-calorie malnutrition.    This patient is being managed with:   Diet NPO with Tube Feed-  Tube Feeding Modality: Nasojejunal  Vital 1.5 Chidi (VITAL1.5)  Total Volume for 24 Hours (mL): 2040  Continuous  Starting Tube Feed Rate {mL per Hour}: 85  Until Goal Tube Feed Rate (mL per Hour): 85  Tube Feed Duration (in Hours): 24  Tube Feed Start Time: 18:00  Entered: May 24 2023  4:05PM  
This patient has been assessed with a concern for Malnutrition and has been determined to have a diagnosis/diagnoses of Severe protein-calorie malnutrition.    This patient is being managed with:   Diet NPO-  Entered: May  6 2023  4:06AM    The following pending diet order is being considered for treatment of Severe protein-calorie malnutrition:  fat emulsion (Fish Oil and Plant Based) 20% Infusion-[Known as SMOFLIPID 20% Infusion]  50 Gram(s) in IV Solution 250 milliLiter(s) infuse at 20.8 mL/Hr  Dose Rate: 0.695 Gm/kG/Day Infuse Over: 12 Hours; Stop After 12 Hours  Administration Instructions: Use 1.2 micron in-line filter  Entered: May  7 2023  5:00PM    Parenteral Nutrition - Adult-  Entered: May  7 2023  5:00PM    Diet NPO with Tube Feed-  Tube Feeding Modality: Gastro-Jejunostomy  Glucerna 1.5 Chidi (GLUCERNA1.5)  Total Volume for 24 Hours (mL): 1560  Continuous  Starting Tube Feed Rate {mL per Hour}: 20  Increase Tube Feed Rate by (mL): 10     Every 4 hours  Until Goal Tube Feed Rate (mL per Hour): 65  Tube Feed Duration (in Hours): 24  Tube Feed Start Time: 00:00  Free Water Flush  Free Water Flush Instructions:  Free water flushes of 40cc/hr (provides ~960cc/day); monitor and adjust free water flushes PRN per MD Squires Carb Prosource TF     Qty per Day:  2  Entered: May  2 2023 11:28AM  
This patient has been assessed with a concern for Malnutrition and has been determined to have a diagnosis/diagnoses of Severe protein-calorie malnutrition.    This patient is being managed with:   Parenteral Nutrition - Adult-  Entered: May 18 2023 10:00PM  
This patient has been assessed with a concern for Malnutrition and has been determined to have a diagnosis/diagnoses of Severe protein-calorie malnutrition.    This patient is being managed with:   Parenteral Nutrition - Adult-  Entered: May 18 2023 10:00PM    Parenteral Nutrition - Adult-  Entered: May 17 2023 10:00PM  
This patient has been assessed with a concern for Malnutrition and has been determined to have a diagnosis/diagnoses of Severe protein-calorie malnutrition.    This patient is being managed with:   fat emulsion (Fish Oil and Plant Based) 20% Infusion-[Known as SMOFLIPID 20% Infusion]  50 Gram(s) in IV Solution 250 milliLiter(s) infuse at 20.83 mL/Hr  Dose Rate: 0.695 Gm/kG/Day Infuse Over: 12 Hours; Stop After 12 Hours  Administration Instructions: Use 1.2 micron in-line filter  Entered: May 10 2023 10:00PM    Parenteral Nutrition - Adult-  Entered: May 10 2023 10:00PM    Diet NPO with Tube Feed-  Tube Feeding Modality: Nasogastric  Vital 1.5 Chidi (VITAL1.5)  Total Volume for 24 Hours (mL): 1560  Continuous  Starting Tube Feed Rate {mL per Hour}: 20  Increase Tube Feed Rate by (mL): 10     Every 6 hours  Until Goal Tube Feed Rate (mL per Hour): 65  Tube Feed Duration (in Hours): 24  Tube Feed Start Time: 16:00  Free Water Flush  Free Water Flush Instructions:  Consider free water flushes to maintain hydration as per MD Squires Carb Prosource TF     Qty per Day:  3  Entered: May  9 2023  2:50PM  
This patient has been assessed with a concern for Malnutrition and has been determined to have a diagnosis/diagnoses of Severe protein-calorie malnutrition.    This patient is being managed with:   fat emulsion (Fish Oil and Plant Based) 20% Infusion-[Known as SMOFLIPID 20% Infusion]  50 Gram(s) in IV Solution 250 milliLiter(s) infuse at 20.83 mL/Hr  Dose Rate: 0.6954 Gm/kG/Day Infuse Over: 12 Hours; Stop After 12 Hours  Administration Instructions: Use 1.2 micron in-line filter  Entered: May  8 2023 10:00PM    Parenteral Nutrition - Adult-  Entered: May  8 2023 10:00PM    Parenteral Nutrition - Adult-  Entered: May  7 2023 10:00PM  
This patient has been assessed with a concern for Malnutrition and has been determined to have a diagnosis/diagnoses of Severe protein-calorie malnutrition.    This patient is being managed with:   Diet NPO with Tube Feed-  Tube Feeding Modality: Nasogastric  Vital 1.5 Chidi (VITAL1.5)  Total Volume for 24 Hours (mL): 1560  Continuous  Starting Tube Feed Rate {mL per Hour}: 20  Increase Tube Feed Rate by (mL): 10     Every 6 hours  Until Goal Tube Feed Rate (mL per Hour): 65  Tube Feed Duration (in Hours): 24  Tube Feed Start Time: 16:00  Free Water Flush  Free Water Flush Instructions:  Consider free water flushes to maintain hydration as per MD  No Carb Prosource TF     Qty per Day:  3  Entered: May  9 2023  2:50PM  
This patient has been assessed with a concern for Malnutrition and has been determined to have a diagnosis/diagnoses of Severe protein-calorie malnutrition.    This patient is being managed with:   Diet NPO with Tube Feed-  Tube Feeding Modality: Nasogastric  Vital 1.5 Chidi (VITAL1.5)  Total Volume for 24 Hours (mL): 1560  Continuous  Starting Tube Feed Rate {mL per Hour}: 20  Increase Tube Feed Rate by (mL): 10     Every 6 hours  Until Goal Tube Feed Rate (mL per Hour): 65  Tube Feed Duration (in Hours): 24  Tube Feed Start Time: 16:00  Free Water Flush  Free Water Flush Instructions:  Consider free water flushes to maintain hydration as per MD  No Carb Prosource TF     Qty per Day:  3  Entered: May  9 2023  2:50PM  
This patient has been assessed with a concern for Malnutrition and has been determined to have a diagnosis/diagnoses of Severe protein-calorie malnutrition.    This patient is being managed with:   Diet NPO with Tube Feed-  Tube Feeding Modality: Nasojejunal  Vital 1.5 Chidi (VITAL1.5)  Total Volume for 24 Hours (mL): 1680  Continuous  Starting Tube Feed Rate {mL per Hour}: 20  Increase Tube Feed Rate by (mL): 10     Every 6 hours  Until Goal Tube Feed Rate (mL per Hour): 70  Tube Feed Duration (in Hours): 24  Tube Feed Start Time: 00:00  Free Water Flush  Free Water Flush Instructions:  free water flush as per MD to maintain hydration  No Carb Prosource TF     Qty per Day:  3  Entered: May 19 2023  9:56AM  
This patient has been assessed with a concern for Malnutrition and has been determined to have a diagnosis/diagnoses of Severe protein-calorie malnutrition.    This patient is being managed with:   Diet NPO with Tube Feed-  Tube Feeding Modality: Nasojejunal  Vital 1.5 Chidi (VITAL1.5)  Total Volume for 24 Hours (mL): 2040  Continuous  Starting Tube Feed Rate {mL per Hour}: 85  Until Goal Tube Feed Rate (mL per Hour): 85  Tube Feed Duration (in Hours): 24  Tube Feed Start Time: 18:00  Entered: May 24 2023  4:05PM  
This patient has been assessed with a concern for Malnutrition and has been determined to have a diagnosis/diagnoses of Severe protein-calorie malnutrition.    This patient is being managed with:   Diet NPO with Tube Feed-  Tube Feeding Modality: Nasojejunal  Vital 1.5 Chidi (VITAL1.5)  Total Volume for 24 Hours (mL): 2040  Continuous  Starting Tube Feed Rate {mL per Hour}: 85  Until Goal Tube Feed Rate (mL per Hour): 85  Tube Feed Duration (in Hours): 24  Tube Feed Start Time: 18:00  Entered: May 24 2023  4:05PM  
This patient has been assessed with a concern for Malnutrition and has been determined to have a diagnosis/diagnoses of Severe protein-calorie malnutrition.  
This patient has been assessed with a concern for Malnutrition and has been determined to have a diagnosis/diagnoses of Severe protein-calorie malnutrition.    The following pending diet order is being considered for treatment of Severe protein-calorie malnutrition:  Diet NPO with Tube Feed-  Tube Feeding Modality: Gastro-Jejunostomy  Glucerna 1.5 Chidi (GLUCERNA1.5)  Total Volume for 24 Hours (mL): 1560  Continuous  Starting Tube Feed Rate {mL per Hour}: 20  Increase Tube Feed Rate by (mL): 10     Every 4 hours  Until Goal Tube Feed Rate (mL per Hour): 65  Tube Feed Duration (in Hours): 24  Tube Feed Start Time: 00:00  Free Water Flush  Free Water Flush Instructions:  Free water flushes of 40cc/hr (provides ~960cc/day); monitor and adjust free water flushes PRN per MD Squires Carb Prosource TF     Qty per Day:  2  Entered: May  2 2023 11:28AM  
This patient has been assessed with a concern for Malnutrition and has been determined to have a diagnosis/diagnoses of Severe protein-calorie malnutrition.    This patient is being managed with:   Diet NPO with Tube Feed-  Tube Feeding Modality: Nasogastric  Vital 1.5 Chidi (VITAL1.5)  Total Volume for 24 Hours (mL): 1560  Continuous  Starting Tube Feed Rate {mL per Hour}: 20  Increase Tube Feed Rate by (mL): 10     Every 6 hours  Until Goal Tube Feed Rate (mL per Hour): 65  Tube Feed Duration (in Hours): 24  Tube Feed Start Time: 16:00  Free Water Flush  Free Water Flush Instructions:  Consider free water flushes to maintain hydration as per MD  No Carb Prosource TF     Qty per Day:  3  Entered: May  9 2023  2:50PM  
This patient has been assessed with a concern for Malnutrition and has been determined to have a diagnosis/diagnoses of Severe protein-calorie malnutrition.    This patient is being managed with:   Diet NPO with Tube Feed-  Tube Feeding Modality: Nasogastric  Vital 1.5 Chidi (VITAL1.5)  Total Volume for 24 Hours (mL): 1560  Continuous  Starting Tube Feed Rate {mL per Hour}: 20  Increase Tube Feed Rate by (mL): 10     Every 6 hours  Until Goal Tube Feed Rate (mL per Hour): 65  Tube Feed Duration (in Hours): 24  Tube Feed Start Time: 16:00  Free Water Flush  Free Water Flush Instructions:  Consider free water flushes to maintain hydration as per MD  No Carb Prosource TF     Qty per Day:  3  Entered: May  9 2023  2:50PM  
This patient has been assessed with a concern for Malnutrition and has been determined to have a diagnosis/diagnoses of Severe protein-calorie malnutrition.    This patient is being managed with:   Diet NPO with Tube Feed-  Tube Feeding Modality: Nasogastric  Vital 1.5 Chidi (VITAL1.5)  Total Volume for 24 Hours (mL): 1560  Continuous  Starting Tube Feed Rate {mL per Hour}: 20  Increase Tube Feed Rate by (mL): 10     Every 6 hours  Until Goal Tube Feed Rate (mL per Hour): 65  Tube Feed Duration (in Hours): 24  Tube Feed Start Time: 16:00  Free Water Flush  Free Water Flush Instructions:  Consider free water flushes to maintain hydration as per MD Squires Carb Prosource TF     Qty per Day:  3  Entered: May  9 2023  2:50PM    Diet NPO with Tube Feed-  Tube Feeding Modality: Nasogastric  Nepro with Carb Steady (NEPRORTH)  Total Volume for 24 Hours (mL): 1080  Continuous  Starting Tube Feed Rate {mL per Hour}: 20  Increase Tube Feed Rate by (mL): 25     Every 6 hours  Until Goal Tube Feed Rate (mL per Hour): 45  Tube Feed Duration (in Hours): 24  Tube Feed Start Time: 13:00  Entered: May  9 2023 11:57AM    The following pending diet order is being considered for treatment of Severe protein-calorie malnutrition:  fat emulsion (Fish Oil and Plant Based) 20% Infusion-[Known as SMOFLIPID 20% Infusion]  50 Gram(s) in IV Solution 250 milliLiter(s) infuse at 20.8 mL/Hr  Dose Rate: 0.695 Gm/kG/Day Infuse Over: 12 Hours; Stop After 12 Hours  Administration Instructions: Use 1.2 micron in-line filter  Entered: May 10 2023  5:00PM    Parenteral Nutrition - Adult-  Entered: May 10 2023  5:00PM  
This patient has been assessed with a concern for Malnutrition and has been determined to have a diagnosis/diagnoses of Severe protein-calorie malnutrition.    This patient is being managed with:   Diet NPO with Tube Feed-  Tube Feeding Modality: Nasojejunal  Vital 1.5 Chidi (VITAL1.5)  Total Volume for 24 Hours (mL): 1680  Continuous  Starting Tube Feed Rate {mL per Hour}: 20  Increase Tube Feed Rate by (mL): 10     Every 6 hours  Until Goal Tube Feed Rate (mL per Hour): 70  Tube Feed Duration (in Hours): 24  Tube Feed Start Time: 00:00  Free Water Flush  Free Water Flush Instructions:  free water flush as per MD to maintain hydration  No Carb Prosource TF     Qty per Day:  3  Entered: May 19 2023  9:56AM  
This patient has been assessed with a concern for Malnutrition and has been determined to have a diagnosis/diagnoses of Severe protein-calorie malnutrition.    This patient is being managed with:   Diet NPO with Tube Feed-  Tube Feeding Modality: Nasojejunal  Vital 1.5 Chidi (VITAL1.5)  Total Volume for 24 Hours (mL): 2040  Continuous  Starting Tube Feed Rate {mL per Hour}: 85  Until Goal Tube Feed Rate (mL per Hour): 85  Tube Feed Duration (in Hours): 24  Tube Feed Start Time: 18:00  Entered: May 24 2023  4:05PM  
This patient has been assessed with a concern for Malnutrition and has been determined to have a diagnosis/diagnoses of Severe protein-calorie malnutrition.    This patient is being managed with:   Parenteral Nutrition - Adult-  Entered: May 17 2023  5:00PM    Diet NPO with Tube Feed-  Tube Feeding Modality: Nasogastric  Vital 1.5 Chidi (VITAL1.5)  Total Volume for 24 Hours (mL): 1560  Continuous  Starting Tube Feed Rate {mL per Hour}: 20  Increase Tube Feed Rate by (mL): 10     Every 6 hours  Until Goal Tube Feed Rate (mL per Hour): 65  Tube Feed Duration (in Hours): 24  Tube Feed Start Time: 16:00  Free Water Flush  Free Water Flush Instructions:  Consider free water flushes to maintain hydration as per MD  No Carb Prosource TF     Qty per Day:  3  Entered: May  9 2023  2:50PM  
This patient has been assessed with a concern for Malnutrition and has been determined to have a diagnosis/diagnoses of Severe protein-calorie malnutrition.    This patient is being managed with:   fat emulsion (Fish Oil and Plant Based) 20% Infusion-[Known as SMOFLIPID 20% Infusion]  50 Gram(s) in IV Solution 250 milliLiter(s) infuse at 20.8 mL/Hr  Dose Rate: 0.695 Gm/kG/Day Infuse Over: 12 Hours; Stop After 12 Hours  Administration Instructions: Use 1.2 micron in-line filter  Entered: May  9 2023  5:00PM    Parenteral Nutrition - Adult-  Entered: May  9 2023  5:00PM    fat emulsion (Fish Oil and Plant Based) 20% Infusion-[Known as SMOFLIPID 20% Infusion]  50 Gram(s) in IV Solution 250 milliLiter(s) infuse at 20.83 mL/Hr  Dose Rate: 0.6954 Gm/kG/Day Infuse Over: 12 Hours; Stop After 12 Hours  Administration Instructions: Use 1.2 micron in-line filter  Entered: May  8 2023 10:00PM    Parenteral Nutrition - Adult-  Entered: May  8 2023 10:00PM    The following pending diet order is being considered for treatment of Severe protein-calorie malnutrition:null
This patient has been assessed with a concern for Malnutrition and has been determined to have a diagnosis/diagnoses of Severe protein-calorie malnutrition.    This patient is being managed with:   Diet NPO after Midnight-     NPO Start Date: 23-May-2023   NPO Start Time: 23:59  Entered: May 23 2023  8:56PM    Diet NPO with Tube Feed-  Tube Feeding Modality: Nasojejunal  Vital 1.5 Chidi (VITAL1.5)  Total Volume for 24 Hours (mL): 1680  Continuous  Starting Tube Feed Rate {mL per Hour}: 20  Increase Tube Feed Rate by (mL): 10     Every 6 hours  Until Goal Tube Feed Rate (mL per Hour): 70  Tube Feed Duration (in Hours): 24  Tube Feed Start Time: 00:00  Free Water Flush  Free Water Flush Instructions:  free water flush as per MD to maintain hydration  No Carb Prosource TF     Qty per Day:  3  Entered: May 19 2023  9:56AM  
This patient has been assessed with a concern for Malnutrition and has been determined to have a diagnosis/diagnoses of Severe protein-calorie malnutrition.    This patient is being managed with:   Diet NPO with Tube Feed-  Tube Feeding Modality: Gastro-Jejunostomy  Glucerna 1.5 Chidi (GLUCERNA1.5)  Total Volume for 24 Hours (mL): 980  Continuous  Until Goal Tube Feed Rate (mL per Hour): 70  Tube Feed Duration (in Hours): 14  Tube Feed Start Time: 18:00  Tube Feed Stop Time: 08:00  Free Water Flush  Free Water Flush Instructions:  free water flush of 40 cc/hr x 14 hours; adjust prn to maintain hydration as per MD  No Carb Prosource TF     Qty per Day:  2  Entered: May  1 2023  2:48PM  
This patient has been assessed with a concern for Malnutrition and has been determined to have a diagnosis/diagnoses of Severe protein-calorie malnutrition.    This patient is being managed with:   Diet NPO with Tube Feed-  Tube Feeding Modality: Nasojejunal  Vital 1.5 Chidi (VITAL1.5)  Total Volume for 24 Hours (mL): 2040  Continuous  Starting Tube Feed Rate {mL per Hour}: 85  Until Goal Tube Feed Rate (mL per Hour): 85  Tube Feed Duration (in Hours): 24  Tube Feed Start Time: 18:00  Entered: May 24 2023  4:05PM  
This patient has been assessed with a concern for Malnutrition and has been determined to have a diagnosis/diagnoses of Severe protein-calorie malnutrition.    This patient is being managed with:   Diet NPO-  Entered: May  6 2023  4:06AM    The following pending diet order is being considered for treatment of Severe protein-calorie malnutrition:  Diet NPO with Tube Feed-  Tube Feeding Modality: Gastro-Jejunostomy  Glucerna 1.5 Chidi (GLUCERNA1.5)  Total Volume for 24 Hours (mL): 1560  Continuous  Starting Tube Feed Rate {mL per Hour}: 20  Increase Tube Feed Rate by (mL): 10     Every 4 hours  Until Goal Tube Feed Rate (mL per Hour): 65  Tube Feed Duration (in Hours): 24  Tube Feed Start Time: 00:00  Free Water Flush  Free Water Flush Instructions:  Free water flushes of 40cc/hr (provides ~960cc/day); monitor and adjust free water flushes PRN per MD Squires Carb Prosource TF     Qty per Day:  2  Entered: May  2 2023 11:28AM  
This patient has been assessed with a concern for Malnutrition and has been determined to have a diagnosis/diagnoses of Severe protein-calorie malnutrition.    This patient is being managed with:   Parenteral Nutrition - Adult-  Entered: May 10 2023 10:00PM    Diet NPO with Tube Feed-  Tube Feeding Modality: Nasogastric  Vital 1.5 Chidi (VITAL1.5)  Total Volume for 24 Hours (mL): 1560  Continuous  Starting Tube Feed Rate {mL per Hour}: 20  Increase Tube Feed Rate by (mL): 10     Every 6 hours  Until Goal Tube Feed Rate (mL per Hour): 65  Tube Feed Duration (in Hours): 24  Tube Feed Start Time: 16:00  Free Water Flush  Free Water Flush Instructions:  Consider free water flushes to maintain hydration as per MD  No Carb Prosource TF     Qty per Day:  3  Entered: May  9 2023  2:50PM  
This patient has been assessed with a concern for Malnutrition and has been determined to have a diagnosis/diagnoses of Severe protein-calorie malnutrition.    This patient is being managed with:   fat emulsion (Fish Oil and Plant Based) 20% Infusion-[Known as SMOFLIPID 20% Infusion]  50 Gram(s) in IV Solution 250 milliLiter(s) infuse at 20.8 mL/Hr  Dose Rate: 0.695 Gm/kG/Day Infuse Over: 12 Hours; Stop After 12 Hours  Administration Instructions: Use 1.2 micron in-line filter  Entered: May  7 2023 10:00PM    Parenteral Nutrition - Adult-  Entered: May  7 2023 10:00PM    Diet NPO with Tube Feed-  Tube Feeding Modality: Gastro-Jejunostomy  Glucerna 1.5 Chidi (GLUCERNA1.5)  Total Volume for 24 Hours (mL): 1560  Continuous  Starting Tube Feed Rate {mL per Hour}: 20  Increase Tube Feed Rate by (mL): 10     Every 4 hours  Until Goal Tube Feed Rate (mL per Hour): 65  Tube Feed Duration (in Hours): 24  Tube Feed Start Time: 00:00  Free Water Flush  Free Water Flush Instructions:  Free water flushes of 40cc/hr (provides ~960cc/day); monitor and adjust free water flushes PRN per MD Squires Carb Prosource TF     Qty per Day:  2  Entered: May  2 2023 11:28AM  
This patient has been assessed with a concern for Malnutrition and has been determined to have a diagnosis/diagnoses of Severe protein-calorie malnutrition.    This patient is being managed with:   fat emulsion (Fish Oil and Plant Based) 20% Infusion-[Known as SMOFLIPID 20% Infusion]  50 Gram(s) in IV Solution 250 milliLiter(s) infuse at 20.8 mL/Hr  Dose Rate: 0.695 Gm/kG/Day Infuse Over: 12 Hours; Stop After 12 Hours  Administration Instructions: Use 1.2 micron in-line filter  Entered: May  7 2023 10:00PM    Parenteral Nutrition - Adult-  Entered: May  7 2023 10:00PM    Diet NPO with Tube Feed-  Tube Feeding Modality: Gastro-Jejunostomy  Glucerna 1.5 Chidi (GLUCERNA1.5)  Total Volume for 24 Hours (mL): 1560  Continuous  Starting Tube Feed Rate {mL per Hour}: 20  Increase Tube Feed Rate by (mL): 10     Every 4 hours  Until Goal Tube Feed Rate (mL per Hour): 65  Tube Feed Duration (in Hours): 24  Tube Feed Start Time: 00:00  Free Water Flush  Free Water Flush Instructions:  Free water flushes of 40cc/hr (provides ~960cc/day); monitor and adjust free water flushes PRN per MD Squires Carb Prosource TF     Qty per Day:  2  Entered: May  2 2023 11:28AM  
This patient has been assessed with a concern for Malnutrition and has been determined to have a diagnosis/diagnoses of Severe protein-calorie malnutrition.    This patient is being managed with:   Diet NPO with Tube Feed-  Tube Feeding Modality: Nasojejunal  Vital 1.5 Chidi (VITAL1.5)  Total Volume for 24 Hours (mL): 2040  Continuous  Starting Tube Feed Rate {mL per Hour}: 85  Until Goal Tube Feed Rate (mL per Hour): 85  Tube Feed Duration (in Hours): 24  Tube Feed Start Time: 18:00  Entered: May 24 2023  4:05PM  
This patient has been assessed with a concern for Malnutrition and has been determined to have a diagnosis/diagnoses of Severe protein-calorie malnutrition.    This patient is being managed with:   fat emulsion (Fish Oil and Plant Based) 20% Infusion-[Known as SMOFLIPID 20% Infusion]  50 Gram(s) in IV Solution 250 milliLiter(s) infuse at 20.83 mL/Hr  Dose Rate: 0.695 Gm/kG/Day Infuse Over: 12 Hours; Stop After 12 Hours  Administration Instructions: Use 1.2 micron in-line filter  Entered: May 10 2023 10:00PM    Parenteral Nutrition - Adult-  Entered: May 10 2023 10:00PM    Diet NPO with Tube Feed-  Tube Feeding Modality: Nasogastric  Vital 1.5 Chidi (VITAL1.5)  Total Volume for 24 Hours (mL): 1560  Continuous  Starting Tube Feed Rate {mL per Hour}: 20  Increase Tube Feed Rate by (mL): 10     Every 6 hours  Until Goal Tube Feed Rate (mL per Hour): 65  Tube Feed Duration (in Hours): 24  Tube Feed Start Time: 16:00  Free Water Flush  Free Water Flush Instructions:  Consider free water flushes to maintain hydration as per MD Squires Carb Prosource TF     Qty per Day:  3  Entered: May  9 2023  2:50PM  
This patient has been assessed with a concern for Malnutrition and has been determined to have a diagnosis/diagnoses of Severe protein-calorie malnutrition.    This patient is being managed with:   Diet NPO with Tube Feed-  Tube Feeding Modality: Nasojejunal  Vital 1.5 Chidi (VITAL1.5)  Total Volume for 24 Hours (mL): 1680  Continuous  Starting Tube Feed Rate {mL per Hour}: 20  Increase Tube Feed Rate by (mL): 10     Every 6 hours  Until Goal Tube Feed Rate (mL per Hour): 70  Tube Feed Duration (in Hours): 24  Tube Feed Start Time: 00:00  Free Water Flush  Free Water Flush Instructions:  free water flush as per MD to maintain hydration  No Carb Prosource TF     Qty per Day:  3  Entered: May 19 2023  9:56AM  
This patient has been assessed with a concern for Malnutrition and has been determined to have a diagnosis/diagnoses of Severe protein-calorie malnutrition.    This patient is being managed with:   fat emulsion (Fish Oil and Plant Based) 20% Infusion-[Known as SMOFLIPID 20% Infusion]  50 Gram(s) in IV Solution 250 milliLiter(s) infuse at 20.8 mL/Hr  Dose Rate: 0.695 Gm/kG/Day Infuse Over: 12 Hours; Stop After 12 Hours  Administration Instructions: Use 1.2 micron in-line filter  Entered: May  7 2023  5:00PM    Parenteral Nutrition - Adult-  Entered: May  7 2023  5:00PM    Diet NPO with Tube Feed-  Tube Feeding Modality: Gastro-Jejunostomy  Glucerna 1.5 Chidi (GLUCERNA1.5)  Total Volume for 24 Hours (mL): 1560  Continuous  Starting Tube Feed Rate {mL per Hour}: 20  Increase Tube Feed Rate by (mL): 10     Every 4 hours  Until Goal Tube Feed Rate (mL per Hour): 65  Tube Feed Duration (in Hours): 24  Tube Feed Start Time: 00:00  Free Water Flush  Free Water Flush Instructions:  Free water flushes of 40cc/hr (provides ~960cc/day); monitor and adjust free water flushes PRN per MD Squries Carb Prosource TF     Qty per Day:  2  Entered: May  2 2023 11:28AM  
This patient has been assessed with a concern for Malnutrition and has been determined to have a diagnosis/diagnoses of Severe protein-calorie malnutrition.  
This patient has been assessed with a concern for Malnutrition and has been determined to have a diagnosis/diagnoses of Severe protein-calorie malnutrition.    The following pending diet order is being considered for treatment of Severe protein-calorie malnutrition:  Diet NPO with Tube Feed-  Tube Feeding Modality: Gastro-Jejunostomy  Glucerna 1.5 Chidi (GLUCERNA1.5)  Total Volume for 24 Hours (mL): 1560  Continuous  Starting Tube Feed Rate {mL per Hour}: 20  Increase Tube Feed Rate by (mL): 10     Every 4 hours  Until Goal Tube Feed Rate (mL per Hour): 65  Tube Feed Duration (in Hours): 24  Tube Feed Start Time: 00:00  Free Water Flush  Free Water Flush Instructions:  Free water flushes of 40cc/hr (provides ~960cc/day); monitor and adjust free water flushes PRN per MD Squires Carb Prosource TF     Qty per Day:  2  Entered: May  2 2023 11:28AM  
This patient has been assessed with a concern for Malnutrition and has been determined to have a diagnosis/diagnoses of Severe protein-calorie malnutrition.    This patient is being managed with:   Diet NPO with Tube Feed-  Tube Feeding Modality: Nasojejunal  Vital 1.5 Chidi (VITAL1.5)  Total Volume for 24 Hours (mL): 2040  Continuous  Starting Tube Feed Rate {mL per Hour}: 85  Until Goal Tube Feed Rate (mL per Hour): 85  Tube Feed Duration (in Hours): 24  Tube Feed Start Time: 18:00  Entered: May 24 2023  4:05PM  
This patient has been assessed with a concern for Malnutrition and has been determined to have a diagnosis/diagnoses of Severe protein-calorie malnutrition.    The following pending diet order is being considered for treatment of Severe protein-calorie malnutrition:  Diet NPO with Tube Feed-  Tube Feeding Modality: Gastro-Jejunostomy  Glucerna 1.5 Chidi (GLUCERNA1.5)  Total Volume for 24 Hours (mL): 1560  Continuous  Starting Tube Feed Rate {mL per Hour}: 20  Increase Tube Feed Rate by (mL): 10     Every 4 hours  Until Goal Tube Feed Rate (mL per Hour): 65  Tube Feed Duration (in Hours): 24  Tube Feed Start Time: 00:00  Free Water Flush  Free Water Flush Instructions:  Free water flushes of 40cc/hr (provides ~960cc/day); monitor and adjust free water flushes PRN per MD Squires Carb Prosource TF     Qty per Day:  2  Entered: May  2 2023 11:28AM  
This patient has been assessed with a concern for Malnutrition and has been determined to have a diagnosis/diagnoses of Severe protein-calorie malnutrition.    This patient is being managed with:   Diet NPO with Tube Feed-  Tube Feeding Modality: Nasojejunal  Vital 1.5 Chidi (VITAL1.5)  Total Volume for 24 Hours (mL): 1680  Continuous  Starting Tube Feed Rate {mL per Hour}: 20  Increase Tube Feed Rate by (mL): 10     Every 6 hours  Until Goal Tube Feed Rate (mL per Hour): 70  Tube Feed Duration (in Hours): 24  Tube Feed Start Time: 00:00  Free Water Flush  Free Water Flush Instructions:  free water flush as per MD to maintain hydration  No Carb Prosource TF     Qty per Day:  3  Entered: May 19 2023  9:56AM  
This patient has been assessed with a concern for Malnutrition and has been determined to have a diagnosis/diagnoses of Severe protein-calorie malnutrition.    This patient is being managed with:   Diet NPO after Midnight-     NPO Start Date: 23-May-2023   NPO Start Time: 23:59  Entered: May 23 2023  8:56PM    Diet NPO with Tube Feed-  Tube Feeding Modality: Nasojejunal  Vital 1.5 Chidi (VITAL1.5)  Total Volume for 24 Hours (mL): 1680  Continuous  Starting Tube Feed Rate {mL per Hour}: 20  Increase Tube Feed Rate by (mL): 10     Every 6 hours  Until Goal Tube Feed Rate (mL per Hour): 70  Tube Feed Duration (in Hours): 24  Tube Feed Start Time: 00:00  Free Water Flush  Free Water Flush Instructions:  free water flush as per MD to maintain hydration  No Carb Prosource TF     Qty per Day:  3  Entered: May 19 2023  9:56AM  
This patient has been assessed with a concern for Malnutrition and has been determined to have a diagnosis/diagnoses of Severe protein-calorie malnutrition.    This patient is being managed with:   Diet NPO-  Entered: May  6 2023  4:06AM    The following pending diet order is being considered for treatment of Severe protein-calorie malnutrition:  Diet NPO with Tube Feed-  Tube Feeding Modality: Gastro-Jejunostomy  Glucerna 1.5 Chidi (GLUCERNA1.5)  Total Volume for 24 Hours (mL): 1560  Continuous  Starting Tube Feed Rate {mL per Hour}: 20  Increase Tube Feed Rate by (mL): 10     Every 4 hours  Until Goal Tube Feed Rate (mL per Hour): 65  Tube Feed Duration (in Hours): 24  Tube Feed Start Time: 00:00  Free Water Flush  Free Water Flush Instructions:  Free water flushes of 40cc/hr (provides ~960cc/day); monitor and adjust free water flushes PRN per MD  No Carb Prosource TF     Qty per Day:  2  Entered: May  2 2023 11:28AM    This patient has been assessed with a concern for Malnutrition and has been determined to have a diagnosis/diagnoses of Severe protein-calorie malnutrition.    This patient is being managed with:   Diet NPO-  Entered: May  6 2023  4:06AM    The following pending diet order is being considered for treatment of Severe protein-calorie malnutrition:  Diet NPO with Tube Feed-  Tube Feeding Modality: Gastro-Jejunostomy  Glucerna 1.5 Chidi (GLUCERNA1.5)  Total Volume for 24 Hours (mL): 1560  Continuous  Starting Tube Feed Rate {mL per Hour}: 20  Increase Tube Feed Rate by (mL): 10     Every 4 hours  Until Goal Tube Feed Rate (mL per Hour): 65  Tube Feed Duration (in Hours): 24  Tube Feed Start Time: 00:00  Free Water Flush  Free Water Flush Instructions:  Free water flushes of 40cc/hr (provides ~960cc/day); monitor and adjust free water flushes PRN per MD  No Carb Prosource TF     Qty per Day:  2  Entered: May  2 2023 11:28AM  
This patient has been assessed with a concern for Malnutrition and has been determined to have a diagnosis/diagnoses of Severe protein-calorie malnutrition.    This patient is being managed with:   Diet NPO with Tube Feed-  Tube Feeding Modality: Nasojejunal  Vital 1.5 Chidi (VITAL1.5)  Total Volume for 24 Hours (mL): 1680  Continuous  Starting Tube Feed Rate {mL per Hour}: 20  Increase Tube Feed Rate by (mL): 10     Every 6 hours  Until Goal Tube Feed Rate (mL per Hour): 70  Tube Feed Duration (in Hours): 24  Tube Feed Start Time: 00:00  Free Water Flush  Free Water Flush Instructions:  free water flush as per MD to maintain hydration  No Carb Prosource TF     Qty per Day:  3  Entered: May 19 2023  9:56AM  
This patient has been assessed with a concern for Malnutrition and has been determined to have a diagnosis/diagnoses of Severe protein-calorie malnutrition.    This patient is being managed with:   Diet NPO with Tube Feed-  Tube Feeding Modality: Nasojejunal  Vital 1.5 Chidi (VITAL1.5)  Total Volume for 24 Hours (mL): 2040  Continuous  Starting Tube Feed Rate {mL per Hour}: 85  Until Goal Tube Feed Rate (mL per Hour): 85  Tube Feed Duration (in Hours): 24  Tube Feed Start Time: 18:00  Entered: May 24 2023  4:05PM  
This patient has been assessed with a concern for Malnutrition and has been determined to have a diagnosis/diagnoses of Severe protein-calorie malnutrition.    This patient is being managed with:   Diet NPO with Tube Feed-  Tube Feeding Modality: Nasojejunal  Vital 1.5 Chidi (VITAL1.5)  Total Volume for 24 Hours (mL): 2040  Continuous  Starting Tube Feed Rate {mL per Hour}: 85  Until Goal Tube Feed Rate (mL per Hour): 85  Tube Feed Duration (in Hours): 24  Tube Feed Start Time: 18:00  Entered: May 24 2023  4:05PM

## 2023-06-02 NOTE — PROGRESS NOTE ADULT - RESPIRATORY
clear to auscultation bilaterally/no wheezes/no rales/no rhonchi
clear to auscultation bilaterally/no wheezes/no rales/no rhonchi
clear to auscultation bilaterally/no wheezes/no respiratory distress
clear to auscultation bilaterally/no wheezes/no rales/no rhonchi
clear to auscultation bilaterally/no respiratory distress/respirations non-labored
clear to auscultation bilaterally/no wheezes/no rales/no rhonchi
clear to auscultation bilaterally/no wheezes/no respiratory distress/respirations non-labored
clear to auscultation bilaterally/no respiratory distress/respirations non-labored
clear to auscultation bilaterally/no wheezes/no rales/no rhonchi
clear to auscultation bilaterally/no wheezes/no rales/no rhonchi
clear to auscultation bilaterally/no respiratory distress/respirations non-labored
clear to auscultation bilaterally/respiratory distress
clear to auscultation bilaterally/no wheezes/no respiratory distress
clear to auscultation bilaterally/no wheezes/no respiratory distress/respirations non-labored

## 2023-06-02 NOTE — PROGRESS NOTE ADULT - PROVIDER SPECIALTY LIST ADULT
Critical Care
Nephrology
SICU
SICU
Surgery
Critical Care
Gastroenterology
Heme/Onc
Infectious Disease
MICU
Nephrology
SICU
Surgery
Critical Care
Gastroenterology
Heme/Onc
Heme/Onc
Infectious Disease
MICU
MICU
Nephrology
Surgery
Critical Care
Gastroenterology
Heme/Onc
Infectious Disease
Nephrology
Palliative Care
Surgery
Critical Care
Surgery
Critical Care
Heme/Onc
Critical Care
Palliative Care
Critical Care

## 2023-06-02 NOTE — DISCHARGE NOTE FOR THE EXPIRED PATIENT - NS DECEASED NEXTOFKIN_RELATIONSHIP
[FreeTextEntry1] : 1) Hyperprolactinemia:  PRL level is normal on her current regimen, though her cabergoline requirements remain relatively high.  Her last MRI did not show any further regression of the adenoma, but was done two years ago--she needs an updated study.  Also needs an echocardiogram at some point given the duration of her cabergoline therapy.\par --Continue cabergoline 0.75 mg 2X/week\par --Repeat pituitary MRI at [facility]\par \par 2) Hirsutism:  This is presumably due to the PCOS, though her testosterone levels have been normal.  The scalp hair loss presumably reflects the same pathophysiology, though is a newer problem and could be related to emotional stress rather than to increased androgens.\par --With her potassium level still well within the normal range at 4.4 meq/l, can increase the spironolactone to 200 mg/day.  If she tolerates this, will send a new prescription for 100 mg tablets.\par --Recheck K level after 2-3 weeks on the 200 mg dose\par \par 3) Obesity:  Has lost 2 lb since her last visit, but has been having many more diet lapses since she has been working from home.  Her lack of exercise is also not helping, but is not likely a major contributor to her failure to lose weight.  Her normal glucose tolerance (FBS < 100 and A1c of only 5.3%) as well as her lack of significant insulin resistance is surprising.\par --Diet was discussed in detail.  I again emphasized the number of calories which she is getting from the sugar in her tea and coffee, and the need to find a substitute.  She also obviously needs to curtail her intake of cookies, etc at night.\par --Resumption of exercise was encouraged\par \par 4) PCOS:  The only clinical manifestation at this point is her hirsutism (and ?? the scalp hair loss).  Her testosterone levels have been normal during the past year, but the result for the current one is still pending.  Her insulin level is surprisingly low despite her obesity and a metformin dose of only 500 mg once a day.  Her intermittent oligomenorrhea is possibly due to the PCOS but more likely reflects genia-menopause.  \par --Encouraged the pt to increase the metformin to 500 mg BID, or to take 1000 mg once a day in the morning.\par \par See for follow-up in 4-6 months.  CMP, lipids, PRL, total/bioavail testosterone, androstenedione, CBC before the visit\par \par Spent 40 min with the pt, > 50% on counseling regarding the multiple inter-related problems noted above [FreeTextEntry2] : Diet Spouse

## 2023-06-06 NOTE — REVIEW OF SYSTEMS
No [Back Pain] : back pain [Negative] : Heme/Lymph [Dysuria] : no dysuria [Hematuria] : no hematuria [Swollen Glands] : no swollen glands [FreeTextEntry8] : catheter / bladder issues  [FreeTextEntry9] : back stiffness

## 2023-06-10 DIAGNOSIS — T81.31XA DISRUPTION OF EXTERNAL OPERATION (SURGICAL) WOUND, NOT ELSEWHERE CLASSIFIED, INITIAL ENCOUNTER: ICD-10-CM

## 2023-06-10 DIAGNOSIS — K65.1 PERITONEAL ABSCESS: ICD-10-CM

## 2023-06-10 DIAGNOSIS — J96.01 ACUTE RESPIRATORY FAILURE WITH HYPOXIA: ICD-10-CM

## 2023-06-10 DIAGNOSIS — R65.21 SEVERE SEPSIS WITH SEPTIC SHOCK: ICD-10-CM

## 2023-06-10 DIAGNOSIS — T81.19XA OTHER POSTPROCEDURAL SHOCK, INITIAL ENCOUNTER: ICD-10-CM

## 2023-06-10 DIAGNOSIS — E87.20 ACIDOSIS, UNSPECIFIED: ICD-10-CM

## 2023-06-10 DIAGNOSIS — R64 CACHEXIA: ICD-10-CM

## 2023-06-10 DIAGNOSIS — E43 UNSPECIFIED SEVERE PROTEIN-CALORIE MALNUTRITION: ICD-10-CM

## 2023-06-10 DIAGNOSIS — C7A.8 OTHER MALIGNANT NEUROENDOCRINE TUMORS: ICD-10-CM

## 2023-06-10 DIAGNOSIS — E83.42 HYPOMAGNESEMIA: ICD-10-CM

## 2023-06-10 DIAGNOSIS — E78.5 HYPERLIPIDEMIA, UNSPECIFIED: ICD-10-CM

## 2023-06-10 DIAGNOSIS — T81.10XA POSTPROCEDURAL SHOCK UNSPECIFIED, INITIAL ENCOUNTER: ICD-10-CM

## 2023-06-10 DIAGNOSIS — A41.81 SEPSIS DUE TO ENTEROCOCCUS: ICD-10-CM

## 2023-06-10 DIAGNOSIS — D62 ACUTE POSTHEMORRHAGIC ANEMIA: ICD-10-CM

## 2023-06-10 DIAGNOSIS — B96.89 OTHER SPECIFIED BACTERIAL AGENTS AS THE CAUSE OF DISEASES CLASSIFIED ELSEWHERE: ICD-10-CM

## 2023-06-10 DIAGNOSIS — D69.59 OTHER SECONDARY THROMBOCYTOPENIA: ICD-10-CM

## 2023-06-10 DIAGNOSIS — E11.22 TYPE 2 DIABETES MELLITUS WITH DIABETIC CHRONIC KIDNEY DISEASE: ICD-10-CM

## 2023-06-10 DIAGNOSIS — G93.41 METABOLIC ENCEPHALOPATHY: ICD-10-CM
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
## 2023-06-10 DIAGNOSIS — R13.10 DYSPHAGIA, UNSPECIFIED: ICD-10-CM

## 2023-06-10 DIAGNOSIS — I12.9 HYPERTENSIVE CHRONIC KIDNEY DISEASE WITH STAGE 1 THROUGH STAGE 4 CHRONIC KIDNEY DISEASE, OR UNSPECIFIED CHRONIC KIDNEY DISEASE: ICD-10-CM

## 2023-06-10 DIAGNOSIS — Z51.5 ENCOUNTER FOR PALLIATIVE CARE: ICD-10-CM

## 2023-06-10 DIAGNOSIS — C7B.8 OTHER SECONDARY NEUROENDOCRINE TUMORS: ICD-10-CM

## 2023-06-10 DIAGNOSIS — R33.9 RETENTION OF URINE, UNSPECIFIED: ICD-10-CM

## 2023-06-10 DIAGNOSIS — E11.65 TYPE 2 DIABETES MELLITUS WITH HYPERGLYCEMIA: ICD-10-CM

## 2023-06-10 DIAGNOSIS — N40.0 BENIGN PROSTATIC HYPERPLASIA WITHOUT LOWER URINARY TRACT SYMPTOMS: ICD-10-CM

## 2023-06-10 DIAGNOSIS — K31.89 OTHER DISEASES OF STOMACH AND DUODENUM: ICD-10-CM

## 2023-06-10 DIAGNOSIS — E72.20 DISORDER OF UREA CYCLE METABOLISM, UNSPECIFIED: ICD-10-CM

## 2023-06-10 DIAGNOSIS — Z66 DO NOT RESUSCITATE: ICD-10-CM

## 2023-06-10 DIAGNOSIS — K66.8 OTHER SPECIFIED DISORDERS OF PERITONEUM: ICD-10-CM

## 2023-06-10 DIAGNOSIS — N17.0 ACUTE KIDNEY FAILURE WITH TUBULAR NECROSIS: ICD-10-CM

## 2023-06-10 DIAGNOSIS — K76.82 HEPATIC ENCEPHALOPATHY: ICD-10-CM

## 2023-06-10 DIAGNOSIS — K21.01 GASTRO-ESOPHAGEAL REFLUX DISEASE WITH ESOPHAGITIS, WITH BLEEDING: ICD-10-CM

## 2023-06-10 DIAGNOSIS — N18.9 CHRONIC KIDNEY DISEASE, UNSPECIFIED: ICD-10-CM

## 2023-06-10 DIAGNOSIS — R18.8 OTHER ASCITES: ICD-10-CM

## 2023-06-10 DIAGNOSIS — R00.0 TACHYCARDIA, UNSPECIFIED: ICD-10-CM

## 2023-06-10 DIAGNOSIS — J69.0 PNEUMONITIS DUE TO INHALATION OF FOOD AND VOMIT: ICD-10-CM

## 2023-06-10 DIAGNOSIS — J96.02 ACUTE RESPIRATORY FAILURE WITH HYPERCAPNIA: ICD-10-CM

## 2023-06-26 NOTE — DIETITIAN INITIAL EVALUATION ADULT - WEIGHT IN LBS
[de-identified] : NECK:\par Inspection: no ecchymosis. \par Palpation: trapezial tenderness. \par Range of motion:  Full range of motion with mild stiffness . Pain at extremes of rotation to left. \par Strength Testing: Weakness with Left Finger Abductors and Grasp\par Normal Deltoid, Biceps, Triceps, Wrist Flexors\par Neurological testing: light touch is intact throughout both upper extremities\par Mendez reflex: neg\par Spurling test: positive\par \par \par LEFT SHOULDER\par Inspection: No swelling. \par Palpation: Tenderness is noted at the bicipital groove, anterior and lateral. \par Range of motion: There is pain with range of motion.\par , ER 55, @90ER 90, @90IR 30\par Strength: There is pain and discomfort with strength testing.\par Forward Flexion 4/5. Abduction 4/5.  External Rotation 5-/5 and Internal Rotation 5/5 \par Neurological testings: motor and sensor intact distally.\par Ligament Stability and Special Tests: \par There is positive arc of pain. \par Shoulder apprehension: neg\par Shoulder relocation: neg\par Giron’s test: pos\par Biceps Active test: neg\par Mendoza Labral Shear: neg\par Impingement testing: pos\par Noe testing: pos\par Whipple: pos\par Cross Body Adduction: neg\par \par \par RIGHT SHOULDER\par Inspection: No swelling. \par Palpation: Tenderness is noted at the bicipital groove, anterior and lateral. \par Range of motion: There is pain with range of motion.\par , ER 55, @90ER 90, @90IR 30\par Strength: There is pain and discomfort with strength testing.\par Forward Flexion 4/5. Abduction 4/5.  External Rotation 5-/5 and Internal Rotation 5/5 \par Neurological testings: motor and sensor intact distally.\par Ligament Stability and Special Tests: \par There is positive arc of pain. \par Shoulder apprehension: neg\par Shoulder relocation: neg\par Giron’s test: pos\par Biceps Active test: neg\par Mendoza Labral Shear: neg\par Impingement testing: pos\par Noe testing: pos\par Whipple: pos\par Cross Body Adduction: neg\par \par  150

## 2023-06-29 NOTE — PATIENT PROFILE ADULT - FUNCTIONAL ASSESSMENT - DAILY ACTIVITY ASSESSMENT TYPE
Natural history and expected course discussed. Questions answered.  Educational material distributed.  Proper lifting, bending technique discussed.  Stretching exercises discussed.  Regular aerobic and trunk strengthening exercises discussed.  Ice to affected area as needed for local pain relief.  Heat to affected area as needed for local pain relief.     Admission

## 2023-10-09 NOTE — PROCEDURAL SAFETY CHECKLIST WITH OR WITHOUT SEDATION - NSPREPROCDATE6_GEN_ALL_CORE
11-May-2023 12:40
Patient had virtual visit 10/5/23. Would you like office visit to further evaluate symptoms of elbow pain?    Claudia RIDERN, RN  [facility]    
24-May-2023 09:30
14-May-2023 16:30
05-May-2023 10:17
17-May-2023 11:15

## 2024-04-29 NOTE — PROGRESS NOTE ADULT - ASSESSMENT
Below is some helpful information about your upcoming surgery.  Please arrive at [facility] at 0930 on 5/2/2024.  Please do not eat after midnight, the night prior to your surgery.  It is ok to drink clear liquids up until 0730.  Some examples of clear liquids include: water, apple juice, jello or black coffee.   Please don’t drink anything with pulp such as, orange juice.    Please take the following medication the morning of surgery: protonix.  Please bring medications that you take in their original prescription bottles the day of surgery.  Bring any cases for your contact lens, dentures, partials or glasses.  Leave any valuables at home and remove all jewelry prior to your arrival.  Please don't wear any make-up or hair product the morning of surgery.    Bring your insurance card and a photo ID.  Make sure you have arranged for someone to bring you home.     If you were to become ill prior to your surgery, please contact your family care physician.  The quality of your stay is important to us.  We want to ensure you have excellent care during your stay.  Please don’t hesitate to call with any questions about your surgery at [phone] (hours of operation are 8am-4pm Monday-Friday).  We look forward to caring for you!    Sincerely,   The Pre-surgical Evaluation Department     67y Male with PMHx of neuroendocrine tumor, HTN, HLD, T2DM presenting with nausea and vomiting after receiving Lutathera treatment being admitted for angioedema of lips and nausea/vomiting.

## 2024-05-17 NOTE — PROGRESS NOTE ADULT - SUBJECTIVE AND OBJECTIVE BOX
NEPHROLOGY INTERVAL HPI/OVERNIGHT EVENTS:  23 @ 09:56  HPI:  S:    Pt seen and examined  HD # 1  FULL CODE  PMHx  metastatic neuroendocrine tumor of the pancreas, recently admitted with nonfunctioning GJ tube (patient is chronically obstructed).   Patient has had severe heartburn, and has had several episodes of vomiting small amount of dark blood. Patient was discharged on Thursday. Hb unchanged. CT angiography has no active bleeding, but demonstrates extensive tumor in distal pancreas.     ICU consult for UGIB    : Lying in bed, awake, tachycardic.    (2023 12:17)      Patient is a 68y old  Male who presents with a chief complaint of GIB (16 May 2023 03:16)      MEDICATIONS  (STANDING):  chlorhexidine 4% Liquid 1 Application(s) Topical <User Schedule>  dextrose 10% + sodium chloride 0.45%. 1000 milliLiter(s) (30 mL/Hr) IV Continuous <Continuous>  dextrose 5%. 1000 milliLiter(s) (50 mL/Hr) IV Continuous <Continuous>  dextrose 50% Injectable 12.5 Gram(s) IV Push once  dextrose 50% Injectable 25 Gram(s) IV Push once  dextrose 50% Injectable 25 Gram(s) IV Push once  glucagon  Injectable 1 milliGRAM(s) IntraMuscular once  insulin lispro (ADMELOG) corrective regimen sliding scale   SubCutaneous every 6 hours  meropenem  IVPB 500 milliGRAM(s) IV Intermittent every 24 hours  metoclopramide Injectable 5 milliGRAM(s) IV Push every 6 hours  midodrine 15 milliGRAM(s) Oral every 8 hours  pantoprazole  Injectable 40 milliGRAM(s) IV Push every 12 hours  phenylephrine    Infusion 0.05 MICROgram(s)/kG/Min (0.67 mL/Hr) IV Continuous <Continuous>  rifAXIMin 550 milliGRAM(s) Oral two times a day    MEDICATIONS  (PRN):  acetaminophen     Tablet .. 650 milliGRAM(s) Oral every 6 hours PRN Temp greater or equal to 38.5C (101.3F)  albumin human 25% IVPB 50 milliLiter(s) IV Intermittent every 1 hour PRN SBP less than 100  dextrose Oral Gel 15 Gram(s) Oral once PRN Blood Glucose LESS THAN 70 milliGRAM(s)/deciliter  ondansetron Injectable 4 milliGRAM(s) IV Push every 6 hours PRN Nausea and/or Vomiting  sodium chloride 0.9% lock flush 10 milliLiter(s) IV Push every 1 hour PRN Pre/post blood products, medications, blood draw, and to maintain line patency      Allergies    losartan (Angioedema)    Intolerances        I&O's Detail    15 May 2023 07:01  -  16 May 2023 07:00  --------------------------------------------------------  IN:    dextrose 10% + sodium chloride 0.45%: 875 mL    IV PiggyBack: 50 mL    Norepinephrine: 400 mL    Other (mL): 400 mL    Phenylephrine: 1004 mL    Phenylephrine: 366 mL  Total IN: 3095 mL    OUT:    Bulb (mL): 10 mL    Colostomy (mL): 50 mL    Drain (mL): 1070 mL    Nasogastric/Oral tube (mL): 1150 mL    Other (mL): 3500 mL  Total OUT: 5780 mL    Total NET: -2685 mL          .    Patient was seen and evaluated on dialysis.   Patient is tolerating the procedure well.   Continue dialysis:   Dialyzer:          QB:        QD:   Goal UF ___ over ___ Hours       Vital Signs Last 24 Hrs  T(C): 36.6 (16 May 2023 08:00), Max: 37.1 (15 May 2023 20:00)  T(F): 97.9 (16 May 2023 08:00), Max: 98.7 (15 May 2023 20:00)  HR: 88 (16 May 2023 09:00) (78 - 103)  BP: 101/66 (16 May 2023 09:00) (85/55 - 123/96)  BP(mean): 74 (16 May 2023 09:00) (62 - 103)  RR: 16 (16 May 2023 09:00) (14 - 26)  SpO2: 97% (16 May 2023 09:00) (94% - 100%)    Parameters below as of 16 May 2023 09:00    O2 Flow (L/min): 2    Daily     Daily Weight in k.7 (16 May 2023 03:00)    PHYSICAL EXAM:  General: alert. awake Ox3  HEENT: MMM  CV: s1s2 rrr  LUNGS: B/L CTA  EXT: no edema    LABS:                        8.4    7.80  )-----------( 22       ( 16 May 2023 05:20 )             24.6         144  |  108  |  32<H>  ----------------------------<  151<H>  3.5   |  21<L>  |  2.87<H>    Ca    9.1      16 May 2023 05:20  Phos  3.5       Mg     1.9         TPro  x   /  Alb  2.0<L>  /  TBili  x   /  DBili  x   /  AST  x   /  ALT  x   /  AlkPhos  x   05-15        Magnesium, Serum: 1.9 mg/dL ( @ 05:20)  Phosphorus Level, Serum: 3.5 mg/dL ( @ 05:20)       NEPHROLOGY INTERVAL HPI/OVERNIGHT EVENTS:  - @ 09:56     HD catheter removed yesterday, more confused removed NG tube   5/15  750 ostomy output with low bs on d10 at 100, unclear if absorbing, on solo pressors   --No acute distress.  No urine output.   Drains 1700cc.  colostomy 800cc  --Awake but lethargic.  Unclear if in pain.  RR 30.  On NG feeding.  No urine output  - seen w family and ID at bedside, plans for HD discussed w family  --Lethargic.  No acute distress.   Colostomy output 1500cc.  No UO.  5/10--No acute distress.  calm, off sedation and off pressor.  Colostomy output 250cc and Drain 850cc.  Oligoanuric.   more alert and awake, ostomy fx pressors off since this am unable to complete hd yesterday and only received 1/2 due to malfunctioning cathtere    pulled after HD    seen at hd, events noted, anuric, TPN infusing with lipids, on solo dec dose of pressors, encepha with starting of lactulose and xifaxin   - no new events, anuric, CO2 trending down   s/p OR yesterday, ostomy creation  - case d/w surgery and intensivist , anuric k 5.2 will dialyze preop, no fluid removal  - pt seen in PACU, PRBC and FFP, and LR resuscitation noted, coulter in place ~ 100 cc urine in coulter bag  case d/w pts nurse   Chart quote, operative note ""Open distal pancreatectomy, splenectomy, partial colectomy, partial L adrenalectomy, temporary abdominal closure; Large varices, 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 Liver laden with diffuse metastases and very friable, RP oozy. 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  Treated with Lutathera one year ago and restarted in 2022  G-J tube placed for nutrition in January due to chronic dysphagia and severe esophagitis on EGD.  Post recent  admission due to G tube clogging.  D/mansi home on .    Returned to ED due to vomiting when night time enteral feeding via G tube started.  CT with IVC done due to concern for GIB.  Pt reports chronic self catheterization due to urinary retention.   ZO improved when catheterization started.  Follows mainly with EMELY Cortez.   Pt has been self cath'ing 3-4 x /day.   Indwelling Coulter placed by EMELY due to resistance with blood clots with resistance reported by pt.  For palliative mass resection tomorrow.    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  Creat has been variable during previous admissions--ranging 1.1-2.2.    PMHX and PSHX.  --Neuroendocrine tumor of pancreas with mets to liver --dx 6 years ago.  --GJ tube for nutrition in 2023 due to chronic dysphagia.  --Hx of HTN  --Hx of DM  --Hx of light chain disease --unclear hx and no hx of renal biopsy.    MEDICATIONS  (STANDING):  chlorhexidine 4% Liquid 1 Application(s) Topical <User Schedule>  dextrose 10% + sodium chloride 0.45%. 1000 milliLiter(s) (30 mL/Hr) IV Continuous <Continuous>  dextrose 5%. 1000 milliLiter(s) (50 mL/Hr) IV Continuous <Continuous>  dextrose 50% Injectable 12.5 Gram(s) IV Push once  dextrose 50% Injectable 25 Gram(s) IV Push once  dextrose 50% Injectable 25 Gram(s) IV Push once  glucagon  Injectable 1 milliGRAM(s) IntraMuscular once  insulin lispro (ADMELOG) corrective regimen sliding scale   SubCutaneous every 6 hours  meropenem  IVPB 500 milliGRAM(s) IV Intermittent every 24 hours  metoclopramide Injectable 5 milliGRAM(s) IV Push every 6 hours  midodrine 15 milliGRAM(s) Oral every 8 hours  pantoprazole  Injectable 40 milliGRAM(s) IV Push every 12 hours  phenylephrine    Infusion 0.05 MICROgram(s)/kG/Min (0.67 mL/Hr) IV Continuous <Continuous>  rifAXIMin 550 milliGRAM(s) Oral two times a day    MEDICATIONS  (PRN):  acetaminophen     Tablet .. 650 milliGRAM(s) Oral every 6 hours PRN Temp greater or equal to 38.5C (101.3F)  albumin human 25% IVPB 50 milliLiter(s) IV Intermittent every 1 hour PRN SBP less than 100  dextrose Oral Gel 15 Gram(s) Oral once PRN Blood Glucose LESS THAN 70 milliGRAM(s)/deciliter  ondansetron Injectable 4 milliGRAM(s) IV Push every 6 hours PRN Nausea and/or Vomiting  sodium chloride 0.9% lock flush 10 milliLiter(s) IV Push every 1 hour PRN Pre/post blood products, medications, blood draw, and to maintain line patency      Allergies    losartan (Angioedema)    Intolerances        I&O's Detail    15 May 2023 07:01  -  16 May 2023 07:00  --------------------------------------------------------  IN:    dextrose 10% + sodium chloride 0.45%: 875 mL    IV PiggyBack: 50 mL    Norepinephrine: 400 mL    Other (mL): 400 mL    Phenylephrine: 1004 mL    Phenylephrine: 366 mL  Total IN: 3095 mL    OUT:    Bulb (mL): 10 mL    Colostomy (mL): 50 mL    Drain (mL): 1070 mL    Nasogastric/Oral tube (mL): 1150 mL    Other (mL): 3500 mL  Total OUT: 5780 mL    Total NET: -2685 mL          Vital Signs Last 24 Hrs  T(C): 36.6 (16 May 2023 08:00), Max: 37.1 (15 May 2023 20:00)  T(F): 97.9 (16 May 2023 08:00), Max: 98.7 (15 May 2023 20:00)  HR: 88 (16 May 2023 09:00) (78 - 103)  BP: 101/66 (16 May 2023 09:00) (85/55 - 123/96)  BP(mean): 74 (16 May 2023 09:00) (62 - 103)  RR: 16 (16 May 2023 09:00) (14 - 26)  SpO2: 97% (16 May 2023 09:00) (94% - 100%)    Parameters below as of 16 May 2023 09:00    O2 Flow (L/min): 2    Daily     Daily Weight in k.7 (16 May 2023 03:00)    PHYSICAL EXAM:  General: alert. awake   HEENT: MMM  CV: s1s2 rrr  LUNGS: B/L CTA  EXT: + edema    LABS:                        8.4    7.80  )-----------( 22       ( 16 May 2023 05:20 )             24.6         144  |  108  |  32<H>  ----------------------------<  151<H>  3.5   |  21<L>  |  2.87<H>    Ca    9.1      16 May 2023 05:20  Phos  3.5       Mg     1.9         TPro  x   /  Alb  2.0<L>  /  TBili  x   /  DBili  x   /  AST  x   /  ALT  x   /  AlkPhos  x   05-15        Magnesium, Serum: 1.9 mg/dL ( @ 05:20)  Phosphorus Level, Serum: 3.5 mg/dL ( @ 05:20)       No

## 2024-06-14 NOTE — PROGRESS NOTE ADULT - ATTENDING COMMENTS
Go for blood tests as directed. Your doctor will do lab tests at regular visits to monitor the effects of this medicine. Please follow up with your doctor and keep your health care provider appointments.
67y Male with PMHx of neuroendocrine tumor, HTN, HLD, T2DM presenting with nausea and vomiting after receiving Lutathera treatment being admitted for angioedema of lips and nausea/vomiting.    Patient seen and examined at bedside. States he feels well, NGT removed late last night and patient started on clear liquid diet. States he was able to tolerate clear liquid diet, did state he felt liquid may have gotten stuck at time. Patient states he is still spitting up some phlegm from time to time. No evidence of obstruction noted on small bowel series. Advance diet as tolerated.
67y Male with PMHx of neuroendocrine tumor, HTN, HLD, T2DM presenting with nausea and vomiting after receiving Lutathera treatment being admitted for angioedema of lips and nausea/vomiting.    Patient seen and examined at bedside. States he feels a bit better. States his lips have markedly improved, but still with nausea and "spitting up". States he has not been vomiting, but also has not been eating. CT scan reviewed, concern for GOO. Discussed with heme/Onc, may need NGT for decompression, to reach out to Surgery. Angioedema on admission could be secondary to losartan or recent lutathera treatment. Plan of care discussed with wife at bedside.
67y Male with PMHx of neuroendocrine tumor, HTN, HLD, T2DM presenting with nausea and vomiting after receiving Lutathera treatment being admitted for angioedema of lips and nausea/vomiting.    Patient seen and examined at bedside. States he feels much better, especially since patient had NGT placed last night. Patient states he is still spitting up some phlegm from time to time. Plan for small bowel series today. No gastric outlet obstruction noted on x-ray, discussed with Surgery- plan to start patient on clears and d/c NGT. Plan of care discussed with wife at bedside.

## 2024-09-19 NOTE — ED STATDOCS - OBJECTIVE STATEMENT
-Reports increased anxiety and work related-stress  -Was previously in therapy, however due to cost had to d/c sessions  -LITA 7 Score: 16 [Severe]  -Denies SI/HI  Plan:  -Continue current medication regimen  -Referral to Behavioral Health  -Advised pt to reestablish with therapy and provided information (see AVS)  -F/U in 1 month for continued evaluation and mgmt   See progress note.

## 2024-10-18 NOTE — ED ADULT NURSE NOTE - NSICDXPASTMEDICALHX_GEN_ALL_CORE_FT
PAST MEDICAL HISTORY:  BPH (benign prostatic hyperplasia)     DM (diabetes mellitus)     HLD (hyperlipidemia)     Hypertension     Light chain nephropathy     Renal insufficiency      tympanic

## 2024-10-29 NOTE — PROVIDER CONTACT NOTE (OTHER) - SITUATION
pt at start of complaining of GERD.  pt asked to turn down rate of feed and flush. pt request turn down feed to 65m ml/hr and free water flush at 40ml/hr Epigastric hernia repair

## 2025-02-02 NOTE — PROGRESS NOTE ADULT - REASON FOR ADMISSION
>>ASSESSMENT AND PLAN FOR TYPE 2 DIABETES MELLITUS WITH DIABETIC NEUROPATHY ([facility]) WRITTEN ON 2/2/2025  8:22 AM BY HEENA MEJIA MD      Lab Results   Component Value Date    HGBA1C 6.0 10/28/2024     Management deferred to PCP   GIB

## 2025-03-24 NOTE — ED STATDOCS - RESPIRATORY, MLM
"Outpatient Psychiatry Follow-Up Visit (MD/NP)    3/24/2025    Clinical Status of Patient:  Outpatient (Ambulatory)    Chief Complaint:  Albert Portillo Jr. is a 46 y.o. male with a history of major depressive disorder and generalized anxiety disorder who presents today for follow-up of depression and anxiety.  Met with patient.      Interval History and Content of Current Session:  First two weeks felt a lot of side effects from dose increase. Notes more mood swings and fatigue, overall didn't feel as normal. Now less mood swings. Reports does feel sleep at night is mostly the same, does now take a nap everyday during work - 30-45 minutes. No naps at home after work. Snores loudly at night, wakes himself up at night about once per week. No headaches. Never had a sleep study in the past.   Now feeling some improvements in symptoms. Anxiety feels controlled, no panic symptoms. Depression still present, comes and goes at times. No SI. No AVH. Some sexual side effects with new trial. Still having a mixed drink nightly daily. Smoking less as well, mostly just using cigars once every 2 weeks.       Review of Systems   PSYCHIATRIC: Pertinant items are noted in the narrative.    Past Medical, Family and Social History: The patient's past medical, family and social history have been reviewed and updated as appropriate within the electronic medical record - see encounter notes.    Current Psychotropic Medications:  Prozac 40 mg daily  Wellbutrin  mg daily    Compliance: yes    Side effects: None    Past Psychotropic Medication Trials:  Lexapro (was effective years ago, discontinued later due to it making him feel like a "zombie")  Zoloft (insufficient trial, only took 5-6 doses of 25 mg)    Risk Parameters:  Patient reports no suicidal ideation  Patient reports no homicidal ideation  Patient reports no self-injurious behavior  Patient reports no violent behavior    Exam (detailed: at least 9 elements; " "comprehensive: all 15 elements)   Constitutional  Vitals:  Most recent vital signs, dated less than 90 days prior to this appointment, were reviewed.   Vitals:    03/24/25 0830   BP: 117/84   Pulse: 89   Weight: 89.9 kg (198 lb 3.1 oz)      General:  unremarkable, age appropriate, casually dressed, neatly groomed     Musculoskeletal  Muscle Strength/Tone:  no dyskinesia, no dystonia, no tremor, no tic   Gait & Station:  non-ataxic     Mental Status Exam:  Appearance: fair hygiene and grooming, casually dressed, NAD, appears stated age  Behavior/Cooperation: calm, cooperative, pleasant, engaged  Language: fluent English  Speech: normal tone, normal rate, normal pitch, normal volume  Mood: "Alright"  Affect: Mildly constricted  Thought Process: linear, logical, goal-directed  Thought Content:  denies SI/HI/paranoia/delusions; no objective evidence of paranoia or delusions  Perception: denies AVH; no objective evidence of AVH   Orientation: grossly intact  Memory: intact to conversation  Attention Span/Concentration: intact to conversation  Fund of Knowledge: appropriate for education level  Insight: good, able to discuss mental health concerns with reasonable nuance and understanding  Judgment: good, behavior appropriate for circumstances       Assessment and Diagnosis   Status/Progress: Based on the examination today, the patient's problem(s) is/are worsening.  New problems have not been presented today.   Co-morbidities, Diagnostic uncertainty, and Lack of compliance are not complicating management of the primary condition.      General Impression:       ICD-10-CM ICD-9-CM   1. Severe episode of recurrent major depressive disorder, without psychotic features  F33.2 296.33   2. Insomnia, unspecified type  G47.00 780.52   3. Generalized anxiety disorder  F41.1 300.02       Intervention/Counseling/Treatment Plan     Increasing Wellbutrin XL to 300 mg daily  Patient has not previously noticed benefit while taking " Wellbutrin for past several months; however, he has previously experienced sexual side effects from taking SSRIs in the past, and noticed that these side effects were reduced while taking Wellbutrin in combination with SSRIs in the past  Reviewed side effects of Wellbutrin including diminished appetite, insomnia, anxiety, GI upset, headaches, increases in blood pressure, induction of karina, and seizures  Continue Prozac 40 mg daily  Counseled on lifestyle modifications, including cutting back/cessation of alcohol and kava use  Continue regular psychotherapy  Referral placed for sleep medicine for possible MARLA/sleep disorder - having daytime fatigue, loud snoring, and woken up by snoring weekly     Discussed with patient informed consent including diagnosis, risks and benefits of proposed treatment above vs. alternative treatments vs. no treatment, as well as serious and common side effects of these treatments, and the inherent unpredictability of individual responses to these treatments. The patient expresses understanding of the above and displays the capacity to agree with this current plan. Patient also agrees that, currently, the benefits outweigh the risks and would like to pursue treatment at this time, and had no other questions.     Instructions:  Take all medications as prescribed.    Abstain from recreational drugs and alcohol.  Present to ED or call 911 for SI/HI plan or intent, psychosis, or medical emergency.    Case to be discussed with supervising staff: Rey Avalos MD    Return to Clinic: 1 month    I spent a total of 25 minutes on the day of the visit.This includes face to face time and non-face to face time preparing to see the patient (eg, review of tests), obtaining and/or reviewing separately obtained history, documenting clinical information in the electronic or other health record, independently interpreting results and communicating results to the patient/family/caregiver, or care  coordinator.      Braydon Daily MD  [facility] PGY-III     breath sounds clear and equal bilaterally.

## 2025-07-06 NOTE — ED ADULT NURSE NOTE - NSICDXFAMILYHX_GEN_ALL_CORE_FT
PAIN FAMILY HISTORY:  Father  Still living? Unknown  FH: aneurysm, Age at diagnosis: Age Unknown    Mother  Still living? Unknown  FH: breast cancer, Age at diagnosis: Age Unknown